# Patient Record
Sex: FEMALE | Race: WHITE | NOT HISPANIC OR LATINO | Employment: OTHER | URBAN - METROPOLITAN AREA
[De-identification: names, ages, dates, MRNs, and addresses within clinical notes are randomized per-mention and may not be internally consistent; named-entity substitution may affect disease eponyms.]

---

## 2017-03-11 ENCOUNTER — HOSPITAL ENCOUNTER (OUTPATIENT)
Dept: LAB | Facility: MEDICAL CENTER | Age: 72
End: 2017-03-11
Attending: SPECIALIST
Payer: COMMERCIAL

## 2017-03-11 LAB
ALBUMIN SERPL BCP-MCNC: 4.3 G/DL (ref 3.2–4.9)
ALBUMIN/GLOB SERPL: 1.3 G/DL
ALP SERPL-CCNC: 79 U/L (ref 30–99)
ALT SERPL-CCNC: 35 U/L (ref 2–50)
ANION GAP SERPL CALC-SCNC: 11 MMOL/L (ref 0–11.9)
AST SERPL-CCNC: 39 U/L (ref 12–45)
BASOPHILS # BLD AUTO: 0.12 K/UL (ref 0–0.12)
BASOPHILS NFR BLD AUTO: 1.5 % (ref 0–1.8)
BILIRUB SERPL-MCNC: 0.7 MG/DL (ref 0.1–1.5)
BUN SERPL-MCNC: 29 MG/DL (ref 8–22)
CALCIUM SERPL-MCNC: 9.9 MG/DL (ref 8.5–10.5)
CHLORIDE SERPL-SCNC: 105 MMOL/L (ref 96–112)
CO2 SERPL-SCNC: 28 MMOL/L (ref 20–33)
CREAT SERPL-MCNC: 0.98 MG/DL (ref 0.5–1.4)
EOSINOPHIL # BLD: 0.21 K/UL (ref 0–0.51)
EOSINOPHIL NFR BLD AUTO: 2.6 % (ref 0–6.9)
ERYTHROCYTE [DISTWIDTH] IN BLOOD BY AUTOMATED COUNT: 48.1 FL (ref 35.9–50)
GLOBULIN SER CALC-MCNC: 3.3 G/DL (ref 1.9–3.5)
GLUCOSE SERPL-MCNC: 97 MG/DL (ref 65–99)
HCT VFR BLD AUTO: 46.7 % (ref 37–47)
HGB BLD-MCNC: 14.9 G/DL (ref 12–16)
IMM GRANULOCYTES # BLD AUTO: 0.02 K/UL (ref 0–0.11)
IMM GRANULOCYTES NFR BLD AUTO: 0.2 % (ref 0–0.9)
LYMPHOCYTES # BLD: 1.67 K/UL (ref 1–4.8)
LYMPHOCYTES NFR BLD AUTO: 20.6 % (ref 22–41)
MCH RBC QN AUTO: 28.5 PG (ref 27–33)
MCHC RBC AUTO-ENTMCNC: 31.9 G/DL (ref 33.6–35)
MCV RBC AUTO: 89.3 FL (ref 81.4–97.8)
MONOCYTES # BLD: 0.71 K/UL (ref 0–0.85)
MONOCYTES NFR BLD AUTO: 8.8 % (ref 0–13.4)
NEUTROPHILS # BLD: 5.37 K/UL (ref 2–7.15)
NEUTROPHILS NFR BLD AUTO: 66.3 % (ref 44–72)
NRBC # BLD AUTO: 0 K/UL
NRBC BLD-RTO: 0 /100 WBC
PLATELET # BLD AUTO: 373 K/UL (ref 164–446)
PMV BLD AUTO: 11 FL (ref 9–12.9)
POTASSIUM SERPL-SCNC: 4.2 MMOL/L (ref 3.6–5.5)
PROT SERPL-MCNC: 7.6 G/DL (ref 6–8.2)
RBC # BLD AUTO: 5.23 M/UL (ref 4.2–5.4)
SODIUM SERPL-SCNC: 144 MMOL/L (ref 135–145)
WBC # BLD AUTO: 8.1 K/UL (ref 4.8–10.8)

## 2017-03-11 PROCEDURE — 80053 COMPREHEN METABOLIC PANEL: CPT

## 2017-03-11 PROCEDURE — 85025 COMPLETE CBC W/AUTO DIFF WBC: CPT

## 2017-03-11 PROCEDURE — 36415 COLL VENOUS BLD VENIPUNCTURE: CPT

## 2017-03-13 RX ORDER — ATENOLOL 100 MG/1
TABLET ORAL
Qty: 90 TAB | Refills: 0 | Status: SHIPPED | OUTPATIENT
Start: 2017-03-13 | End: 2017-04-13 | Stop reason: SDUPTHER

## 2017-03-13 NOTE — TELEPHONE ENCOUNTER
Was the patient seen in the last year in this department? No     Does patient have an active prescription for medications requested? No     Received Request Via: Pharmacy      Pt met protocol?: No pt last ov with pcp 4/2014 last seen for this problem by Magi MARIN 2/2015   BP Readings from Last 1 Encounters:   02/08/16 110/72

## 2017-03-17 ENCOUNTER — HOSPITAL ENCOUNTER (OUTPATIENT)
Dept: RADIOLOGY | Facility: MEDICAL CENTER | Age: 72
End: 2017-03-17
Attending: SPECIALIST
Payer: COMMERCIAL

## 2017-03-17 DIAGNOSIS — C50.411 MALIGNANT NEOPLASM OF UPPER-OUTER QUADRANT OF RIGHT FEMALE BREAST (HCC): ICD-10-CM

## 2017-03-17 PROCEDURE — 77080 DXA BONE DENSITY AXIAL: CPT

## 2017-04-07 RX ORDER — BENAZEPRIL HYDROCHLORIDE AND HYDROCHLOROTHIAZIDE 20; 12.5 MG/1; MG/1
TABLET ORAL
Qty: 90 TAB | Refills: 0 | Status: SHIPPED | OUTPATIENT
Start: 2017-04-07 | End: 2017-04-13 | Stop reason: SDUPTHER

## 2017-04-07 NOTE — TELEPHONE ENCOUNTER
Was the patient seen in the last year in this department? No    Does patient have an active prescription for medications requested? No     Received Request Via: Pharmacy      Pt met protocol?: No, hasn't been seen since 2/16   BP Readings from Last 1 Encounters:   02/08/16 110/72

## 2017-04-07 NOTE — TELEPHONE ENCOUNTER
Was the patient seen in the last year in this department? No    Does patient have an active prescription for medications requested? No     Received Request Via: Pharmacy      Pt met protocol?: No, hasn't been seen since 2/16.

## 2017-04-07 NOTE — TELEPHONE ENCOUNTER
Pt was informed 1/17 that an appt needed to be made with PCP. Will only send 30 days to pharmacy, please make pt aware apt needs to be made or next request will be denied.

## 2017-04-07 NOTE — TELEPHONE ENCOUNTER
Please advise pt she is due for an appt. Have already told her 3/17. Will send 90 days but next request will be denied.

## 2017-04-13 ENCOUNTER — OFFICE VISIT (OUTPATIENT)
Dept: MEDICAL GROUP | Facility: PHYSICIAN GROUP | Age: 72
End: 2017-04-13
Payer: COMMERCIAL

## 2017-04-13 VITALS
BODY MASS INDEX: 37.5 KG/M2 | WEIGHT: 191 LBS | DIASTOLIC BLOOD PRESSURE: 84 MMHG | SYSTOLIC BLOOD PRESSURE: 140 MMHG | HEIGHT: 60 IN | OXYGEN SATURATION: 97 % | TEMPERATURE: 99.1 F | RESPIRATION RATE: 12 BRPM | HEART RATE: 88 BPM

## 2017-04-13 DIAGNOSIS — Z23 NEED FOR 23-POLYVALENT PNEUMOCOCCAL POLYSACCHARIDE VACCINE: ICD-10-CM

## 2017-04-13 DIAGNOSIS — I10 ESSENTIAL HYPERTENSION: Primary | ICD-10-CM

## 2017-04-13 PROCEDURE — 3014F SCREEN MAMMO DOC REV: CPT | Performed by: NURSE PRACTITIONER

## 2017-04-13 PROCEDURE — 3017F COLORECTAL CA SCREEN DOC REV: CPT | Performed by: NURSE PRACTITIONER

## 2017-04-13 PROCEDURE — 1101F PT FALLS ASSESS-DOCD LE1/YR: CPT | Performed by: NURSE PRACTITIONER

## 2017-04-13 PROCEDURE — 4040F PNEUMOC VAC/ADMIN/RCVD: CPT | Performed by: NURSE PRACTITIONER

## 2017-04-13 PROCEDURE — 90471 IMMUNIZATION ADMIN: CPT | Performed by: NURSE PRACTITIONER

## 2017-04-13 PROCEDURE — 99214 OFFICE O/P EST MOD 30 MIN: CPT | Mod: 25 | Performed by: NURSE PRACTITIONER

## 2017-04-13 PROCEDURE — 1036F TOBACCO NON-USER: CPT | Performed by: NURSE PRACTITIONER

## 2017-04-13 PROCEDURE — G8419 CALC BMI OUT NRM PARAM NOF/U: HCPCS | Performed by: NURSE PRACTITIONER

## 2017-04-13 PROCEDURE — 90732 PPSV23 VACC 2 YRS+ SUBQ/IM: CPT | Performed by: NURSE PRACTITIONER

## 2017-04-13 PROCEDURE — G8432 DEP SCR NOT DOC, RNG: HCPCS | Performed by: NURSE PRACTITIONER

## 2017-04-13 RX ORDER — BENAZEPRIL HYDROCHLORIDE AND HYDROCHLOROTHIAZIDE 20; 12.5 MG/1; MG/1
1 TABLET ORAL DAILY
Qty: 90 TAB | Refills: 3 | Status: SHIPPED | OUTPATIENT
Start: 2017-04-13 | End: 2017-09-18 | Stop reason: SDUPTHER

## 2017-04-13 RX ORDER — ATENOLOL 100 MG/1
100 TABLET ORAL DAILY
Qty: 90 TAB | Refills: 3 | Status: SHIPPED | OUTPATIENT
Start: 2017-04-13 | End: 2017-05-19 | Stop reason: SDUPTHER

## 2017-04-13 ASSESSMENT — PATIENT HEALTH QUESTIONNAIRE - PHQ9: CLINICAL INTERPRETATION OF PHQ2 SCORE: 0

## 2017-04-13 NOTE — MR AVS SNAPSHOT
"        Tereza Sandovalt   2017 3:40 PM   Office Visit   MRN: [id number]    Department:  [facility]   Dept Phone:  [phone]    Description:  Female : 1945   Provider:  HARRY Young           Reason for Visit     Medication Refill           Allergies as of 2017     Allergen Noted Reactions    Bactrim Ds 2016   Rash    Pt states \"I get a body rash\".    Morphine 2008   Vomiting    hallucinations      You were diagnosed with     Essential hypertension   [9465400]  -  Primary     Need for 23-polyvalent pneumococcal polysaccharide vaccine   [[id number]]         Vital Signs     Blood Pressure Pulse Temperature Respirations Height Weight    140/84 mmHg 88 37.3 °C (99.1 °F) 12 1.524 m (5') 86.637 kg (191 lb)    Body Mass Index Oxygen Saturation Smoking Status             37.30 kg/m2 97% Never Smoker          Basic Information     Date Of Birth Sex Race Ethnicity Preferred Language    1945 Female White, Unknown Non- English      Problem List              ICD-10-CM Priority Class Noted - Resolved    GERD (gastroesophageal reflux disease) K21.9   2013 - Present    HTN (hypertension) I10   2013 - Present    Chronic back pain M54.9, G89.29   2013 - Present    Peripheral edema R60.9   2013 - Present    Orthopnea R06.01   2013 - Present    Intracranial shunt Z98.2   2013 - Present    Shortness of breath R06.02   2013 - Present    Dyslipidemia E78.5   2013 - Present    Keratosis, seborrheic L82.1   2014 - Present    Strain of left Achilles tendon S86.012A   2014 - Present    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   2015 - Present      Health Maintenance        Date Due Completion Dates    IMM ZOSTER VACCINE 2007 ---    IMM PNEUMOCOCCAL 65+ (ADULT) LOW/MEDIUM RISK SERIES (2 of 2 - PPSV23) 2016    MAMMOGRAM 2017, 10/15/2015, 2015, 2014, 2009, " 9/24/2009, 3/18/2009, 3/3/2009, 3/3/2009    COLONOSCOPY 1/6/2018 1/6/2013 (Done)    Override on 1/6/2013: Done (2 polyps - 5 year return, [facility])    BONE DENSITY 3/17/2020 3/17/2017, 3/3/2009    IMM DTaP/Tdap/Td Vaccine (2 - Td) 6/6/2023 6/6/2013            Current Immunizations     13-VALENT PCV PREVNAR 12/1/2015    Hepatitis A Vaccine, Adult 6/6/2013    Influenza Vaccine Adult HD 11/17/2015    Pneumococcal polysaccharide vaccine (PPSV-23) 4/13/2017    Tdap Vaccine 6/6/2013      Below and/or attached are the medications your provider expects you to take. Review all of your home medications and newly ordered medications with your provider and/or pharmacist. Follow medication instructions as directed by your provider and/or pharmacist. Please keep your medication list with you and share with your provider. Update the information when medications are discontinued, doses are changed, or new medications (including over-the-counter products) are added; and carry medication information at all times in the event of emergency situations     Allergies:  BACTRIM DS - Rash     MORPHINE - Vomiting               Medications  Valid as of: April 13, 2017 -  3:56 PM    Generic Name Brand Name Tablet Size Instructions for use    Albuterol Sulfate (Aero Soln) albuterol 108 (90 BASE) MCG/ACT Inhale 2 Puffs by mouth every 6 hours as needed for Shortness of Breath.        Amitriptyline HCl (Tab) ELAVIL 25 MG Take 100 mg by mouth every evening.        Anastrozole (Tab) ARIMIDEX 1 MG Take 1 mg by mouth every day.        Atenolol (Tab) TENORMIN 100 MG Take 1 Tab by mouth every day.        Benazepril-Hydrochlorothiazide (Tab) LOTENSIN HCT 20-12.5 MG Take 1 Tab by mouth every day.        Calcium Citrate-Vitamin D   Take 2 Tabs by mouth every morning. Takes two        Esomeprazole Magnesium (CAPSULE DELAYED RELEASE) NEXIUM 20 MG TAKE 1 CAPSULE EVERY MORNING BEFORE BREAKFAST        Gabapentin (Cap) NEURONTIN 300 MG Take 300 mg by mouth 3 times a  day.        Hydrocodone-Acetaminophen (Tab) NORCO 5-325 MG Take 1 Tab by mouth every 6 hours as needed. Indications: Moderate to Moderately Severe Pain        Ibuprofen (Tab) MOTRIN 200 MG Take 400 mg by mouth every 6 hours as needed for Fever.        Multiple Vitamins-Minerals   Take 1 Tab by mouth every day.        .                 Medicines prescribed today were sent to:     [facility] #[id number] - [place], [place] - [address] [place] [address]    [address] [place] [facility]. [place] [address]    Phone: [phone] Fax: [phone]    Open 24 Hours?: No    [facility] - [place] [address]    Phone: [phone] Fax: [phone]    Open 24 Hours?: No    [facility] - [place] [address]    Phone: [phone] Fax: [phone]    Open 24 Hours?: No      Medication refill instructions:       If your prescription bottle indicates you have medication refills left, it is not necessary to call your provider’s office. Please contact your pharmacy and they will refill your medication.    If your prescription bottle indicates you do not have any refills left, you may request refills at any time through one of the following ways: The online [employer] system (except Urgent Care), by calling your provider’s office, or by asking your pharmacy to contact your provider’s office with a refill request. Medication refills are processed only during regular business hours and may not be available until the next business day. Your provider may request additional information or to have a follow-up visit with you prior to refilling your medication.   *Please Note: Medication refills are assigned a new Rx number when refilled electronically. Your pharmacy may indicate that no refills were authorized even though a new prescription for the same medication is available at the pharmacy. Please request the  medicine by name with the pharmacy before contacting your provider for a refill.           MyChart Access Code: Activation code not generated  Current MyChart Status: Active

## 2017-04-13 NOTE — PROGRESS NOTES
Chief Complaint   Patient presents with   • Medication Refill       HISTORY OF PRESENT ILLNESS: Patient is a 71 y.o. female established patient who presents today to discuss medication refills.  She is accompanied by her .    1. Essential hypertension  Patient is requesting a refill of Lotensin 20/12 point 5 mg.  She takes this daily.  She is also requesting a refill of atenolol 100 mg that she also takes daily.  She is not checking her blood pressures consistently outside of the clinic.  She denies any episodes of headache, blurry vision or chest pain.  She is under the care of a cardiologist.  Remainder of her medications are prescribed by her neurologist that she sees regularly.    2. Need for 23-polyvalent pneumococcal polysaccharide vaccine  Patient has received the Prevnar vaccine in the past and denies any reactions.  She has never received the pneumococcal vaccine.  She is going to have this completed today.    Allergies:Bactrim ds and Morphine    Current Outpatient Prescriptions Ordered in [facility]   Medication Sig Dispense Refill   • atenolol (TENORMIN) 100 MG Tab Take 1 Tab by mouth every day. 90 Tab 3   • benazepril-hydrochlorthiazide (LOTENSIN HCT) 20-12.5 MG per tablet Take 1 Tab by mouth every day. 90 Tab 3   • esomeprazole (NEXIUM) 20 MG capsule TAKE 1 CAPSULE EVERY MORNING BEFORE BREAKFAST 30 Cap 0   • ibuprofen (MOTRIN) 200 MG Tab Take 400 mg by mouth every 6 hours as needed for Fever.     • amitriptyline (ELAVIL) 25 MG Tab Take 100 mg by mouth every evening.     • anastrozole (ARIMIDEX) 1 MG Tab Take 1 mg by mouth every day.     • albuterol (VENTOLIN OR PROVENTIL) 108 (90 BASE) MCG/ACT Aero Soln inhalation aerosol Inhale 2 Puffs by mouth every 6 hours as needed for Shortness of Breath. 8.5 g 3   • GABAPENTIN 300 MG PO CAPS Take 300 mg by mouth 3 times a day.     • HYDROCODONE-ACETAMINOPHEN 5-325 MG PO TABS Take 1 Tab by mouth every 6 hours as needed. Indications: Moderate to Moderately Severe  Pain     • CALCIUM + D PO Take 2 Tabs by mouth every morning. Takes two     • MULTIVITAMIN PO Take 1 Tab by mouth every day.       No current [facility]-ordered facility-administered medications on file.       Past Medical History   Diagnosis Date   • Syringomyelia (CMS-[facility])      surgery 1973, 1977   • Scoliosis    • Anesthesia      PONV   • Arthritis      Left hip, knees, ankles   • Joint replacement      Right shoulder, cervical   • Heart burn    • Pain    • Hypertension    • Chronic back pain      2/2 scoliosis and syringomyelia    • Contracture of hand joint      left    • Peripheral edema 6/6/2013   • Indigestion    • Asthma    • Dental disorder      upper/lower   • Breath shortness      asthma   • Sleep apnea      uses O2 at night   • Chiari malformation 1970's     shunt  in place   • sore throat 1/15/15   • Decreased lung capacity        Social History   Substance Use Topics   • Smoking status: Never Smoker    • Smokeless tobacco: Never Used   • Alcohol Use: No       Family Status   Relation Status Death Age   • Mother Alive      Family History   Problem Relation Age of Onset   • Hypertension Mother    • Cancer Neg Hx    • Diabetes Neg Hx    • Stroke Neg Hx    • Heart Disease Neg Hx        ROS: see above    Review of Systems   Constitutional: Negative for fever, chills, weight loss and malaise/fatigue.   HENT: Negative for ear pain, nosebleeds, congestion, sore throat and neck pain.    Eyes: Negative for blurred vision.   Respiratory: Negative for cough, sputum production, shortness of breath and wheezing.    Cardiovascular: Negative for chest pain, palpitations, orthopnea and leg swelling.   Gastrointestinal: Negative for heartburn, nausea, vomiting and abdominal pain.   Genitourinary: Negative for dysuria, urgency and frequency.   Musculoskeletal: Negative for myalgias, back pain and joint pain.   Skin: Negative for rash and itching.   Neurological: Negative for dizziness, tingling, tremors, sensory change, focal  weakness and headaches.   Endo/Heme/Allergies: Does not bruise/bleed easily.   Psychiatric/Behavioral: Negative for depression, suicidal ideas and memory loss.  The patient is not nervous/anxious and does not have insomnia.        Exam:  Blood pressure 140/84, pulse 88, temperature 37.3 °C (99.1 °F), resp. rate 12, height 1.524 m (5'), weight 86.637 kg (191 lb), SpO2 97 %.  General:  Well nourished, well developed female in NAD  Head is grossly normal.  Neck: Thyroid is not enlarged.  Pulmonary: Normal effort. No rales, ronchi, or wheezing.  Cardiovascular: Regular rate and rhythm without murmur.   Extremities: no clubbing, cyanosis, or edema.  Psych:  Mood and affect are normal.  Answering questions appropriately with good eye contact.      Please note that this dictation was created using voice recognition software. I have made every reasonable attempt to correct obvious errors, but I expect that there are errors of grammar and possibly content that I did not discover before finalizing the note.    I have placed the below orders and discussed them with an approved delegating provider. The MA is performing the below orders under the direction of Dr. Duarte, who have provided verbal consent for supervision.    Assessment/Plan:    1. Essential hypertension  atenolol (TENORMIN) 100 MG Tab    benazepril-hydrochlorthiazide (LOTENSIN HCT) 20-12.5 MG per tablet   2. Need for 23-polyvalent pneumococcal polysaccharide vaccine  PNEUMOCOCCAL POLYSACCHARIDE VACCINE 23-VALENT =>1YO SQ/IM        1.  Refill medications as previously prescribed, adequately controlled.  2.  Pneumococcal vaccine given today, counseled  3.  Patient to schedule her annual wellness exam with Dr. Duarte and August, encouraged him to schedule today to ensure appropriate timing of appointment.

## 2017-05-19 DIAGNOSIS — I10 ESSENTIAL HYPERTENSION: ICD-10-CM

## 2017-05-19 RX ORDER — ATENOLOL 100 MG/1
100 TABLET ORAL DAILY
Qty: 90 TAB | Refills: 0 | Status: SHIPPED | OUTPATIENT
Start: 2017-05-19 | End: 2017-06-13 | Stop reason: SDUPTHER

## 2017-05-19 NOTE — TELEPHONE ENCOUNTER
[facility] sent us a fax that they are temporarily out of Atenolol 100 mg for the patient.  Please send to her local pharmacy([facility] [place]).    Thank you.

## 2017-06-13 ENCOUNTER — TELEPHONE (OUTPATIENT)
Dept: MEDICAL GROUP | Facility: PHYSICIAN GROUP | Age: 72
End: 2017-06-13

## 2017-06-13 DIAGNOSIS — I10 ESSENTIAL HYPERTENSION: ICD-10-CM

## 2017-06-13 RX ORDER — ATENOLOL 100 MG/1
100 TABLET ORAL DAILY
Qty: 90 TAB | Refills: 0 | Status: CANCELLED | OUTPATIENT
Start: 2017-06-13

## 2017-06-13 RX ORDER — ATENOLOL 50 MG/1
100 TABLET ORAL DAILY
Qty: 180 TAB | Refills: 1 | Status: SHIPPED | OUTPATIENT
Start: 2017-06-13 | End: 2017-09-18 | Stop reason: SDUPTHER

## 2017-06-13 NOTE — TELEPHONE ENCOUNTER
Was the patient seen in the last year in this department? Yes 04/13/17    Does patient have an active prescription for medications requested? Yes  atenolol pharmacy change    Received Request Via: Patient

## 2017-06-13 NOTE — TELEPHONE ENCOUNTER
Patient requests change in pharmacy.    [facility] - [facility] [place] - [facility] [address] [address]  Phone: [phone] Fax: [phone]

## 2017-06-13 NOTE — TELEPHONE ENCOUNTER
Received fax from [facility].  They are temporarily out of stock of atenolol.  To Dr Duarte to address

## 2017-06-13 NOTE — TELEPHONE ENCOUNTER
Received fax from [facility] stating they are temporarily out of stock of atenolol 100 mg.  They suggest possible alternative of atenolol 50 mg.  Option 1 is to send alternative to [facility].  Option 2 is refill sent to local pharmacy

## 2017-06-14 RX ORDER — BENAZEPRIL HYDROCHLORIDE AND HYDROCHLOROTHIAZIDE 20; 12.5 MG/1; MG/1
1 TABLET ORAL DAILY
Qty: 90 TAB | Refills: 0 | OUTPATIENT
Start: 2017-06-14

## 2017-06-14 NOTE — TELEPHONE ENCOUNTER
Ok lets change to 50 mg tabs, Tereza will need to take two tablets to equal 100 mg.  Please call patient and let her know of this change.

## 2017-07-27 ENCOUNTER — APPOINTMENT (OUTPATIENT)
Dept: PHYSICAL THERAPY | Facility: REHABILITATION | Age: 72
End: 2017-07-27
Attending: SPECIALIST
Payer: COMMERCIAL

## 2017-08-05 ENCOUNTER — OFFICE VISIT (OUTPATIENT)
Dept: URGENT CARE | Facility: PHYSICIAN GROUP | Age: 72
End: 2017-08-05
Payer: COMMERCIAL

## 2017-08-05 VITALS
HEART RATE: 78 BPM | RESPIRATION RATE: 14 BRPM | BODY MASS INDEX: 37.3 KG/M2 | OXYGEN SATURATION: 94 % | DIASTOLIC BLOOD PRESSURE: 86 MMHG | TEMPERATURE: 99 F | WEIGHT: 191 LBS | SYSTOLIC BLOOD PRESSURE: 144 MMHG

## 2017-08-05 DIAGNOSIS — J02.9 ACUTE PHARYNGITIS, UNSPECIFIED ETIOLOGY: ICD-10-CM

## 2017-08-05 PROCEDURE — 99214 OFFICE O/P EST MOD 30 MIN: CPT | Performed by: FAMILY MEDICINE

## 2017-08-05 RX ORDER — AMOXICILLIN AND CLAVULANATE POTASSIUM 875; 125 MG/1; MG/1
TABLET, FILM COATED ORAL
Qty: 14 TAB | Refills: 0 | Status: SHIPPED | OUTPATIENT
Start: 2017-08-05 | End: 2017-08-18

## 2017-08-05 NOTE — PROGRESS NOTES
Chief Complaint:    Chief Complaint   Patient presents with   • Cough     sore throat, nasal congestion- Sx started tuesday       History of Present Illness:     present. This is a new problem. Has sore throat since 8/1/17, not getting better. Has nasal symptoms and cough. No fever or purulent mucus. Augmentin worked/tolerated 1/31/16.      Review of Systems:    Constitutional: Negative for fever, chills, and diaphoresis.   Eyes: Negative for change in vision, photophobia, pain, redness, and discharge.  ENT: See HPI.    Respiratory: See HPI.  Cardiovascular: Negative for chest pain, palpitations, orthopnea, claudication, and PND.   Gastrointestinal: Negative for abdominal pain, nausea, vomiting, diarrhea, constipation, blood in stool, and melena.   Genitourinary: Negative for dysuria, urinary urgency, urinary frequency, hematuria, and flank pain.   Musculoskeletal: No new symptoms.  Skin: Negative for rash and itching.   Neurological: No new symptoms.  Endo: Negative for polydipsia.   Heme: Does not bruise/bleed easily.   Psychiatric/Behavioral: Negative for depression, suicidal ideas, hallucinations, memory loss and substance abuse. The patient is not nervous/anxious and does not have insomnia.      Past Medical History:    Past Medical History   Diagnosis Date   • Syringomyelia (CMS-[facility])      surgery 1973, 1977   • Scoliosis    • Anesthesia      PONV   • Arthritis      Left hip, knees, ankles   • Joint replacement      Right shoulder, cervical   • Heart burn    • Pain    • Hypertension    • Chronic back pain      2/2 scoliosis and syringomyelia    • Contracture of hand joint      left    • Peripheral edema 6/6/2013   • Indigestion    • Asthma    • Dental disorder      upper/lower   • Breath shortness      asthma   • Sleep apnea      uses O2 at night   • Chiari malformation 1970's     shunt  in place   • sore throat 1/15/15   • Decreased lung capacity        Past Surgical History:    Past Surgical History  "  Procedure Laterality Date   • Other surgical procedure  1973, 1977     \"remove cyst-cervical placement of shunt\"   • Ronda by laparoscopy  2002   • Shoulder arthroplasty total  2004     Right   • Hip arthroplasty total  5/19/08     Performed by FARTUN ERAZO at SURGERY [facility]   • Shoulder hanna-arthroplasty     • Hip arthroplasty total     • Us-needle core bx-breast panel     • Other neurological surg       cerebral shunt   • Mastectomy  2/6/2015     Performed by Adelina Wilson M.D. at SURGERY [facility]   • Node biopsy sentinel  2/6/2015     Performed by Adelina Wilson M.D. at SURGERY [facility]       Social History:    Social History     Social History   • Marital Status:      Spouse Name: N/A   • Number of Children: N/A   • Years of Education: N/A     Occupational History   • Not on file.     Social History Main Topics   • Smoking status: Never Smoker    • Smokeless tobacco: Never Used   • Alcohol Use: No   • Drug Use: No   • Sexual Activity: Not on file      Comment:  - 51 years      Other Topics Concern   • Not on file     Social History Narrative       Family History:    Family History   Problem Relation Age of Onset   • Hypertension Mother    • Cancer Neg Hx    • Diabetes Neg Hx    • Stroke Neg Hx    • Heart Disease Neg Hx        Medications:    Current Outpatient Prescriptions on File Prior to Visit   Medication Sig Dispense Refill   • esomeprazole (NEXIUM) 20 MG capsule TAKE 1 CAPSULE EVERY MORNING BEFORE BREAKFAST (MAKE APPOINTMENT PRIOR TO MORE REFILLS. NEXT REQUEST WILL BE DENIED) 30 Cap 1   • atenolol (TENORMIN) 50 MG Tab Take 2 Tabs by mouth every day. 180 Tab 1   • benazepril-hydrochlorthiazide (LOTENSIN HCT) 20-12.5 MG per tablet Take 1 Tab by mouth every day. 90 Tab 3   • ibuprofen (MOTRIN) 200 MG Tab Take 400 mg by mouth every 6 hours as needed for Fever.     • amitriptyline (ELAVIL) 25 MG Tab Take 100 mg by mouth every evening.     • anastrozole (ARIMIDEX) " "1 MG Tab Take 1 mg by mouth every day.     • albuterol (VENTOLIN OR PROVENTIL) 108 (90 BASE) MCG/ACT Aero Soln inhalation aerosol Inhale 2 Puffs by mouth every 6 hours as needed for Shortness of Breath. 8.5 g 3   • GABAPENTIN 300 MG PO CAPS Take 300 mg by mouth 3 times a day.     • HYDROCODONE-ACETAMINOPHEN 5-325 MG PO TABS Take 1 Tab by mouth every 6 hours as needed. Indications: Moderate to Moderately Severe Pain     • CALCIUM + D PO Take 2 Tabs by mouth every morning. Takes two     • MULTIVITAMIN PO Take 1 Tab by mouth every day.       No current facility-administered medications on file prior to visit.       Allergies:    Allergies   Allergen Reactions   • Bactrim Ds Rash     Pt states \"I get a body rash\".   • Morphine Vomiting     hallucinations         Vitals:    Filed Vitals:    08/05/17 0935   BP: 144/86   Pulse: 78   Temp: 37.2 °C (99 °F)   Resp: 14   Weight: 86.637 kg (191 lb)   SpO2: 94%       Physical Exam:    Constitutional: Vital signs reviewed. Appears well-developed and well-nourished. No acute distress.   Eyes: Sclera white, conjunctivae clear.   ENT: External ears normal. Hearing normal. Nasal mucosa pink. Lips/teeth are normal. Oral mucosa pink and moist. Posterior pharynx and uvula are moderately erythematous and swollen with some purulent mucus in posterior pharynx.  Neck: Neck supple.   Cardiovascular: Regular rate and rhythm. No murmur.  Pulmonary/Chest: Respirations non-labored. Clear to auscultation bilaterally.  Lymph: Cervical nodes without tenderness or enlargement.  Musculoskeletal: No muscular atrophy or weakness.  Neurological: Alert and oriented to person, place, and time. Muscle tone normal. Coordination normal.   Skin: No rashes or lesions. Warm, dry, normal turgor.  Psychiatric: Normal mood and affect. Behavior is normal. Judgment and thought content normal.       Assessment / Plan:    1. Acute pharyngitis, unspecified etiology  - amoxicillin-clavulanate (AUGMENTIN) 875-125 MG Tab; " 1 TAB TWICE A DAY X 7 DAYS. TAKE WITH FOOD.  Dispense: 14 Tab; Refill: 0      Discussed with them DDX and management options.    Declines Rapid Strep test.    Agreeable to medication prescribed.    Follow-up with PCP or urgent care if getting worse or not better with above.

## 2017-08-05 NOTE — MR AVS SNAPSHOT
"        Tereza Sánchez   2017 9:15 AM   Office Visit   MRN: [id number]    Department:  [facility]   Dept Phone:  [phone]    Description:  Female : 1945   Provider:  Rocky Monae M.D.           Reason for Visit     Cough sore throat, nasal congestion- Sx started tuesday      Allergies as of 2017     Allergen Noted Reactions    Bactrim Ds 2016   Rash    Pt states \"I get a body rash\".    Morphine 2008   Vomiting    hallucinations      You were diagnosed with     Acute pharyngitis, unspecified etiology   [[id number]]         Vital Signs     Blood Pressure Pulse Temperature Respirations Weight Oxygen Saturation    144/86 mmHg 78 37.2 °C (99 °F) 14 86.637 kg (191 lb) 94%    Smoking Status                   Never Smoker            Basic Information     Date Of Birth Sex Race Ethnicity Preferred Language    1945 Female White, Unknown Non- English      Your appointments     Aug 18, 2017  9:00 AM   ANNUAL EXAM PREVENTATIVE with Lucía Duarte M.D.   [facility] [address] [address]   [phone]              Problem List              ICD-10-CM Priority Class Noted - Resolved    GERD (gastroesophageal reflux disease) K21.9   2013 - Present    HTN (hypertension) I10   2013 - Present    Chronic back pain M54.9, G89.29   2013 - Present    Peripheral edema R60.9   2013 - Present    Orthopnea R06.01   2013 - Present    Intracranial shunt Z98.2   2013 - Present    Shortness of breath R06.02   2013 - Present    Dyslipidemia E78.5   2013 - Present    Keratosis, seborrheic L82.1   2014 - Present    Strain of left Achilles tendon S86.012A   2014 - Present    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   2015 - Present      Health Maintenance        Date Due Completion Dates    IMM ZOSTER VACCINE 2007 ---    IMM INFLUENZA (1) 2015   "    MAMMOGRAM 11/18/2017 11/18/2016, 10/15/2015, 1/7/2015, 12/26/2014, 9/24/2009, 9/24/2009, 3/18/2009, 3/3/2009, 3/3/2009    COLONOSCOPY 1/6/2018 1/6/2013 (Done)    Override on 1/6/2013: Done (2 polyps - 5 year return, DHA)    BONE DENSITY 3/17/2020 3/17/2017, 3/3/2009    IMM DTaP/Tdap/Td Vaccine (2 - Td) 6/6/2023 6/6/2013            Current Immunizations     13-VALENT PCV PREVNAR 12/1/2015    Hepatitis A Vaccine, Adult 6/6/2013    Influenza Vaccine Adult HD 11/17/2015    Pneumococcal polysaccharide vaccine (PPSV-23) 4/13/2017    Tdap Vaccine 6/6/2013      Below and/or attached are the medications your provider expects you to take. Review all of your home medications and newly ordered medications with your provider and/or pharmacist. Follow medication instructions as directed by your provider and/or pharmacist. Please keep your medication list with you and share with your provider. Update the information when medications are discontinued, doses are changed, or new medications (including over-the-counter products) are added; and carry medication information at all times in the event of emergency situations     Allergies:  BACTRIM DS - Rash     MORPHINE - Vomiting               Medications  Valid as of: August 05, 2017 - 10:49 AM    Generic Name Brand Name Tablet Size Instructions for use    Albuterol Sulfate (Aero Soln) albuterol 108 (90 BASE) MCG/ACT Inhale 2 Puffs by mouth every 6 hours as needed for Shortness of Breath.        Amitriptyline HCl (Tab) ELAVIL 25 MG Take 100 mg by mouth every evening.        Amoxicillin-Pot Clavulanate (Tab) AUGMENTIN 875-125 MG 1 TAB TWICE A DAY X 7 DAYS. TAKE WITH FOOD.        Anastrozole (Tab) ARIMIDEX 1 MG Take 1 mg by mouth every day.        Atenolol (Tab) TENORMIN 50 MG Take 2 Tabs by mouth every day.        Benazepril-Hydrochlorothiazide (Tab) LOTENSIN HCT 20-12.5 MG Take 1 Tab by mouth every day.        Calcium Citrate-Vitamin D   Take 2 Tabs by mouth every morning. Takes two         Esomeprazole Magnesium (CAPSULE DELAYED RELEASE) NEXIUM 20 MG TAKE 1 CAPSULE EVERY MORNING BEFORE BREAKFAST (MAKE APPOINTMENT PRIOR TO MORE REFILLS. NEXT REQUEST WILL BE DENIED)        Gabapentin (Cap) NEURONTIN 300 MG Take 300 mg by mouth 3 times a day.        Hydrocodone-Acetaminophen (Tab) NORCO 5-325 MG Take 1 Tab by mouth every 6 hours as needed. Indications: Moderate to Moderately Severe Pain        Ibuprofen (Tab) MOTRIN 200 MG Take 400 mg by mouth every 6 hours as needed for Fever.        Multiple Vitamins-Minerals   Take 1 Tab by mouth every day.        .                 Medicines prescribed today were sent to:     [facility]/PHARMACY #[id number] - [place], [place] - [address] [place] [address]    [address] [place] [address] [place] [address]    Phone: [phone] Fax: [phone]    Open 24 Hours?: No    [facility] - [place] [address]    Phone: [phone] Fax: [phone]    Open 24 Hours?: No    [facility] - [place] [address]    Phone: [phone] Fax: [phone]    Open 24 Hours?: No      Medication refill instructions:       If your prescription bottle indicates you have medication refills left, it is not necessary to call your provider’s office. Please contact your pharmacy and they will refill your medication.    If your prescription bottle indicates you do not have any refills left, you may request refills at any time through one of the following ways: The online [employer] system (except Urgent Care), by calling your provider’s office, or by asking your pharmacy to contact your provider’s office with a refill request. Medication refills are processed only during regular business hours and may not be available until the next business day. Your provider may request additional information or to have a follow-up visit with you prior to refilling your medication.    *Please Note: Medication refills are assigned a new Rx number when refilled electronically. Your pharmacy may indicate that no refills were authorized even though a new prescription for the same medication is available at the pharmacy. Please request the medicine by name with the pharmacy before contacting your provider for a refill.           [employer] Access Code: Activation code not generated  Current [employer] Status: Active

## 2017-08-18 ENCOUNTER — OFFICE VISIT (OUTPATIENT)
Dept: MEDICAL GROUP | Facility: PHYSICIAN GROUP | Age: 72
End: 2017-08-18
Payer: COMMERCIAL

## 2017-08-18 VITALS
WEIGHT: 192 LBS | BODY MASS INDEX: 38.71 KG/M2 | HEIGHT: 59 IN | DIASTOLIC BLOOD PRESSURE: 74 MMHG | OXYGEN SATURATION: 93 % | SYSTOLIC BLOOD PRESSURE: 130 MMHG | RESPIRATION RATE: 16 BRPM | HEART RATE: 74 BPM | TEMPERATURE: 96.4 F

## 2017-08-18 DIAGNOSIS — F32.0 MILD MAJOR DEPRESSION (HCC): ICD-10-CM

## 2017-08-18 DIAGNOSIS — G89.29 CHRONIC BACK PAIN, UNSPECIFIED BACK LOCATION, UNSPECIFIED BACK PAIN LATERALITY: ICD-10-CM

## 2017-08-18 DIAGNOSIS — M54.9 CHRONIC BACK PAIN, UNSPECIFIED BACK LOCATION, UNSPECIFIED BACK PAIN LATERALITY: ICD-10-CM

## 2017-08-18 DIAGNOSIS — C50.219 MALIGNANT NEOPLASM OF UPPER-INNER QUADRANT OF FEMALE BREAST, UNSPECIFIED LATERALITY: ICD-10-CM

## 2017-08-18 DIAGNOSIS — Z12.39 SCREENING FOR BREAST CANCER: ICD-10-CM

## 2017-08-18 DIAGNOSIS — I10 ESSENTIAL HYPERTENSION: ICD-10-CM

## 2017-08-18 DIAGNOSIS — M85.89 OSTEOPENIA OF MULTIPLE SITES: ICD-10-CM

## 2017-08-18 DIAGNOSIS — E78.5 DYSLIPIDEMIA: ICD-10-CM

## 2017-08-18 DIAGNOSIS — Z98.2 INTRACRANIAL SHUNT: ICD-10-CM

## 2017-08-18 DIAGNOSIS — K21.9 GASTROESOPHAGEAL REFLUX DISEASE, ESOPHAGITIS PRESENCE NOT SPECIFIED: ICD-10-CM

## 2017-08-18 DIAGNOSIS — Z00.00 WELLNESS EXAMINATION: ICD-10-CM

## 2017-08-18 DIAGNOSIS — Z91.81 RISK FOR FALLS: ICD-10-CM

## 2017-08-18 DIAGNOSIS — E66.9 OBESITY (BMI 35.0-39.9 WITHOUT COMORBIDITY): ICD-10-CM

## 2017-08-18 RX ORDER — DULOXETIN HYDROCHLORIDE 30 MG/1
30 CAPSULE, DELAYED RELEASE ORAL DAILY
Qty: 30 CAP | Refills: 11 | Status: SHIPPED | OUTPATIENT
Start: 2017-08-18 | End: 2017-09-18 | Stop reason: SDUPTHER

## 2017-08-18 ASSESSMENT — PATIENT HEALTH QUESTIONNAIRE - PHQ9
SUM OF ALL RESPONSES TO PHQ QUESTIONS 1-9: 11
CLINICAL INTERPRETATION OF PHQ2 SCORE: 4
5. POOR APPETITE OR OVEREATING: 0 - NOT AT ALL

## 2017-08-18 NOTE — PROGRESS NOTES
Chief Complaint   Patient presents with   • Annual Exam         HPI:  Tereza Sánchez is a 72 y.o. 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 NEXT REQUEST WILL BE DENIED) 30 Cap 1   • atenolol (TENORMIN) 50 MG Tab Take 2 Tabs by mouth every day. 180 Tab 1   • benazepril-hydrochlorthiazide (LOTENSIN HCT) 20-12.5 MG per tablet Take 1 Tab by mouth every day. 90 Tab 3   • ibuprofen (MOTRIN) 200 MG Tab Take 400 mg by mouth every 6 hours as needed for Fever.     • amitriptyline (ELAVIL) 25 MG Tab Take 100 mg by mouth every evening.     • anastrozole (ARIMIDEX) 1 MG Tab Take 1 mg by mouth every day.     • albuterol (VENTOLIN OR PROVENTIL) 108 (90 BASE) MCG/ACT Aero Soln inhalation aerosol Inhale 2 Puffs by mouth every 6 hours as needed for Shortness of Breath. 8.5 g 3   • GABAPENTIN 300 MG PO CAPS Take 300 mg by mouth 3 times a day.     • HYDROCODONE-ACETAMINOPHEN 5-325 MG PO TABS Take 1 Tab by mouth every 6 hours as needed. Indications: Moderate to Moderately Severe Pain     • CALCIUM + D PO Take 2 Tabs by mouth every morning. Takes two     • MULTIVITAMIN PO Take 1 Tab by mouth every day.     • amoxicillin-clavulanate (AUGMENTIN) 875-125 MG Tab 1 TAB TWICE A DAY X 7 DAYS. TAKE WITH FOOD. 14 Tab 0     No current facility-administered medications for this visit.            Current supplements as per medication list.       Allergies: Bactrim ds and  Morphine    Current social contact/activities:   Out of the house once per week     She  reports that she has never smoked. She has never used smokeless tobacco. She reports that she does not drink alcohol or use illicit drugs.  Counseling given: Not Answered        DPA/Advanced Directive:  Patient does not have an Advanced Directive.  A packet and workshop information was given on Advanced Directives.      ROS:    Gait: Uses a cane and walker  Ostomy: no   Other tubes: no   Amputations: no   Chronic oxygen use: yes   Last eye exam:N/A   : Denies incontinence.       Screening:    Depression Screening    Little interest or pleasure in doing things?  2 - more than half the days  Feeling down, depressed , or hopeless? 2 - more than half the days  Trouble falling or staying asleep, or sleeping too much?  3 - nearly every day  Feeling tired or having little energy?  3 - nearly every day  Poor appetite or overeating?  0 - not at all  Feeling bad about yourself - or that you are a failure or have let yourself or your family down? 0 - not at all  Trouble concentrating on things, such as reading the newspaper or watching television? 0 - not at all  Moving or speaking so slowly that other people could have noticed.  Or the opposite - being so fidgety or restless that you have been moving around a lot more than usual?  1 - several days  Thoughts that you would be better off dead, or of hurting yourself?  0 - not at all  Patient Health Questionnaire Score: 11    If depressive symptoms identified deferred to follow up visit unless specifically addressed in assessment and plan.    Interpretation of PHQ-9 Total Score   Score Severity   1-4 No Depression   5-9 Mild Depression   10-14 Moderate Depression   15-19 Moderately Severe Depression   20-27 Severe Depression      Screening for Cognitive Impairment    Three Minute Recall (apple, watch, dario)  2/3    Draw clock face with all 12 numbers set to the hand to show 10 minutes  past 11 o'clock  1    Cognitive concerns identified deferred for follow up unless specifically addressed in assessment and plan.    Fall Risk Assessment    Has the patient had two or more falls in the last year or any fall with injury in the last year?  Yes    Safety Assessment    Throw rugs on floor.  Yes  Handrails on all stairs.  Yes  Good lighting in all hallways.  Yes  Difficulty hearing.  No  Patient counseled about all safety risks that were identified.    Functional Assessment ADLs    Are there any barriers preventing you from cooking for yourself or meeting nutritional needs?  No.    Are there any barriers preventing you from driving safely or obtaining transportation?  No.    Are there any barriers preventing you from using a telephone or calling for help?  No.    Are there any barriers preventing you from shopping?  Yes.    Are there any barriers preventing you from taking care of your own finances?  No.    Are there any barriers preventing you from managing your medications?  No.    Are currently engaging any exercise or physical activity?  No.       Health Maintenance Summary                IMM ZOSTER VACCINE Overdue 8/4/2007     IMM INFLUENZA Next Due 9/1/2017      Done 11/17/2015 Imm Admin: Influenza Vaccine Adult HD    MAMMOGRAM Next Due 11/18/2017      Done 11/18/2016 MA-SCREEN MAMMO W/CAD-BILAT     Patient has more history with this topic...    COLONOSCOPY Next Due 1/6/2018      Done 1/6/2013 2 polyps - 5 year return, DHA    BONE DENSITY Next Due 3/17/2020      Done 3/17/2017 DS-BONE DENSITY STUDY (DEXA)     Patient has more history with this topic...    IMM DTaP/Tdap/Td Vaccine Next Due 6/6/2023      Done 6/6/2013 Imm Admin: Tdap Vaccine          Patient Care Team:  Lucía Duarte M.D. as PCP - General (Family Medicine)      Social History   Substance Use Topics   • Smoking status: Never Smoker    • Smokeless tobacco: Never Used   • Alcohol Use: No     Family History   Problem Relation Age of  "Onset   • Hypertension Mother    • Cancer Neg Hx    • Diabetes Neg Hx    • Stroke Neg Hx    • Heart Disease Neg Hx      She  has a past medical history of Syringomyelia (CMS-[facility]); Scoliosis; Anesthesia; Arthritis; Joint replacement; Heart burn; Pain; Hypertension; Chronic back pain; Contracture of hand joint; Peripheral edema (6/6/2013); Indigestion; Asthma; Dental disorder; Breath shortness; Sleep apnea; Chiari malformation (1970's); sore throat (1/15/15); and Decreased lung capacity.   Past Surgical History   Procedure Laterality Date   • Other surgical procedure  1973, 1977     \"remove cyst-cervical placement of shunt\"   • Ronda by laparoscopy  2002   • Shoulder arthroplasty total  2004     Right   • Hip arthroplasty total  5/19/08     Performed by FARTUN ERAZO at SURGERY [facility]   • Shoulder hanna-arthroplasty     • Hip arthroplasty total     • Us-needle core bx-breast panel     • Other neurological surg       cerebral shunt   • Mastectomy  2/6/2015     Performed by Adelina Wilson M.D. at SURGERY [facility]   • Node biopsy sentinel  2/6/2015     Performed by Adelina Wilson M.D. at SURGERY [facility]       Exam:     Blood pressure 130/74, pulse 74, temperature 35.8 °C (96.4 °F), resp. rate 16, height 1.511 m (4' 11.49\"), weight 87.091 kg (192 lb), SpO2 93 %. Body mass index is 38.15 kg/(m^2).    Hearing excellent.    Dentition good  Alert, oriented in no acute distress.  Eye contact is good, speech goal directed, affect calm      Assessment and Plan. The following treatment and monitoring plan is recommended:    1. Wellness examination     2. Obesity (BMI 35.0-39.9 without comorbidity) ([facility])  Patient identified as having weight management issue.  Appropriate orders and counseling given.   3. Screening for breast cancer  MA-MAMMO SCREENING BILAT W/MELISSA W/O CAD   4. Chronic back pain, unspecified back location, unspecified back pain laterality     5. Dyslipidemia     6. Gastroesophageal " reflux disease, esophagitis presence not specified     7. Essential hypertension     8. Intracranial shunt     9. 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  Follow up with neurology, oncology    HM topic updated and reviewed.      Start Cymbalta 30 mg daily for chronic pain related to chiari malformation/shunt, scoliosis and chronic back pain  As well as mood support,     Follow up in 4 weeks.     Labs for cholesterol.    Services suggested: No services needed at this time  Health Care Screening: Age-appropriate preventive services Medicare covers discussed today and ordered if indicated.  Referrals offered: Community-based lifestyle interventions to reduce health risks and promote self-management and wellness, fall prevention, nutrition, physical activity, tobacco-use cessation, weight loss, and mental health services as per orders if indicated.    Discussion today about general wellness and lifestyle habits:    · Prevent falls and reduce trip hazards; Cautioned about securing or removing rugs.  · Have a working fire alarm and carbon monoxide detector;   · Engage in regular physical activity and social activities       Follow-up:1 month

## 2017-08-18 NOTE — MR AVS SNAPSHOT
"        Tereza Sánchez   2017 9:00 AM   Office Visit   MRN: [id number]    Department:  [facility]   Dept Phone:  [phone]    Description:  Female : 1945   Provider:  Lucía Duarte M.D.           Reason for Visit     Annual Exam           Allergies as of 2017     Allergen Noted Reactions    Bactrim Ds 2016   Rash    Pt states \"I get a body rash\".    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, unspecified laterality (CMS-[facility])   [468555]       Risk for falls   [982623]       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[facility])   [434561]       Osteopenia of multiple sites   [4862470]         Vital Signs     Blood Pressure Pulse Temperature Respirations Height Weight    130/74 mmHg 74 35.8 °C (96.4 °F) 16 1.511 m (4' 11.49\") 87.091 kg (192 lb)    Body Mass Index Oxygen Saturation Smoking Status             38.15 kg/m2 93% Never Smoker          Basic Information     Date Of Birth Sex Race Ethnicity Preferred Language    1945 Female White, Unknown Non- English      Problem List              ICD-10-CM Priority Class Noted - Resolved    GERD (gastroesophageal reflux disease) K21.9   2013 - Present    HTN (hypertension) I10   2013 - Present    Chronic back pain M54.9, G89.29   2013 - Present    Intracranial shunt Z98.2   2013 - Present    Dyslipidemia E78.5   2013 - Present    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) " C50.219   2/6/2015 - Present    Obesity (BMI 35.0-39.9 without comorbidity) ([facility]) E66.9   8/18/2017 - Present    Risk for falls Z91.81   8/18/2017 - Present      Health Maintenance        Date Due Completion Dates    IMM ZOSTER VACCINE 8/4/2007 ---    IMM INFLUENZA (1) 9/1/2017 11/17/2015    MAMMOGRAM 11/18/2017 11/18/2016, 10/15/2015, 1/7/2015, 12/26/2014, 9/24/2009, 9/24/2009, 3/18/2009, 3/3/2009, 3/3/2009    COLONOSCOPY 1/6/2018 1/6/2013 (Done)    Override on 1/6/2013: Done (2 polyps - 5 year return, DHA)    BONE DENSITY 3/17/2020 3/17/2017, 3/3/2009    IMM DTaP/Tdap/Td Vaccine (2 - Td) 6/6/2023 6/6/2013            Current Immunizations     13-VALENT PCV PREVNAR 12/1/2015    Hepatitis A Vaccine, Adult 6/6/2013    Influenza Vaccine Adult HD 11/17/2015    Pneumococcal polysaccharide vaccine (PPSV-23) 4/13/2017    Tdap Vaccine 6/6/2013      Below and/or attached are the medications your provider expects you to take. Review all of your home medications and newly ordered medications with your provider and/or pharmacist. Follow medication instructions as directed by your provider and/or pharmacist. Please keep your medication list with you and share with your provider. Update the information when medications are discontinued, doses are changed, or new medications (including over-the-counter products) are added; and carry medication information at all times in the event of emergency situations     Allergies:  BACTRIM DS - Rash     MORPHINE - Vomiting               Medications  Valid as of: August 18, 2017 -  9:23 AM    Generic Name Brand Name Tablet Size Instructions for use    Albuterol Sulfate (Aero Soln) albuterol 108 (90 Base) MCG/ACT Inhale 2 Puffs by mouth every 6 hours as needed for Shortness of Breath.        Amitriptyline HCl (Tab) ELAVIL 25 MG Take 100 mg by mouth every evening.        Anastrozole (Tab) ARIMIDEX 1 MG Take 1 mg by mouth every day.        Atenolol (Tab) TENORMIN 50 MG Take 2 Tabs by mouth every  day.        Benazepril-Hydrochlorothiazide (Tab) LOTENSIN HCT 20-12.5 MG Take 1 Tab by mouth every day.        Calcium Citrate-Vitamin D   Take 2 Tabs by mouth every morning. Takes two        DULoxetine HCl (Cap DR Particles) CYMBALTA 30 MG Take 1 Cap by mouth every day.        Esomeprazole Magnesium (CAPSULE DELAYED RELEASE) NEXIUM 20 MG TAKE 1 CAPSULE EVERY MORNING BEFORE BREAKFAST (MAKE APPOINTMENT PRIOR TO MORE REFILLS. NEXT REQUEST WILL BE DENIED)        Gabapentin (Cap) NEURONTIN 300 MG Take 300 mg by mouth 3 times a day.        Hydrocodone-Acetaminophen (Tab) NORCO 5-325 MG Take 1 Tab by mouth every 6 hours as needed. Indications: Moderate to Moderately Severe Pain        Ibuprofen (Tab) MOTRIN 200 MG Take 400 mg by mouth every 6 hours as needed for Fever.        Multiple Vitamins-Minerals   Take 1 Tab by mouth every day.        .                 Medicines prescribed today were sent to:     [facility]/PHARMACY #[id number] - [place] - [address]    [address] [place] [address]    Phone: [phone] Fax: [phone]    Open 24 Hours?: No    [facility] - [place] [address]    Phone: [phone] Fax: [phone]    Open 24 Hours?: No    [facility] - [place] [address]    Phone: [phone] Fax: [phone]    Open 24 Hours?: No      Medication refill instructions:       If your prescription bottle indicates you have medication refills left, it is not necessary to call your provider’s office. Please contact your pharmacy and they will refill your medication.    If your prescription bottle indicates you do not have any refills left, you may request refills at any time through one of the following ways: The online [employer] system (except Urgent Care), by calling your provider’s office, or by asking your pharmacy to contact your provider’s office  with a refill request. Medication refills are processed only during regular business hours and may not be available until the next business day. Your provider may request additional information or to have a follow-up visit with you prior to refilling your medication.   *Please Note: Medication refills are assigned a new Rx number when refilled electronically. Your pharmacy may indicate that no refills were authorized even though a new prescription for the same medication is available at the pharmacy. Please request the medicine by name with the pharmacy before contacting your provider for a refill.        Your To Do List     Future Labs/Procedures Complete By Expires    MA-MAMMO SCREENING BILAT W/MELISSA W/O CAD  11/18/2017 8/18/2018    LIPID PROFILE  As directed 8/18/2018    VITAMIN D,25 HYDROXY  As directed 8/18/2018         [employer] Access Code: Activation code not generated  Current [employer] Status: Active

## 2017-09-18 ENCOUNTER — HOSPITAL ENCOUNTER (OUTPATIENT)
Dept: LAB | Facility: MEDICAL CENTER | Age: 72
End: 2017-09-18
Attending: FAMILY MEDICINE
Payer: COMMERCIAL

## 2017-09-18 ENCOUNTER — HOSPITAL ENCOUNTER (OUTPATIENT)
Dept: LAB | Facility: MEDICAL CENTER | Age: 72
End: 2017-09-18
Attending: SPECIALIST
Payer: COMMERCIAL

## 2017-09-18 ENCOUNTER — OFFICE VISIT (OUTPATIENT)
Dept: MEDICAL GROUP | Facility: PHYSICIAN GROUP | Age: 72
End: 2017-09-18
Payer: COMMERCIAL

## 2017-09-18 VITALS
WEIGHT: 192 LBS | TEMPERATURE: 98.2 F | HEIGHT: 59 IN | RESPIRATION RATE: 18 BRPM | BODY MASS INDEX: 38.71 KG/M2 | OXYGEN SATURATION: 97 % | HEART RATE: 98 BPM | SYSTOLIC BLOOD PRESSURE: 128 MMHG | DIASTOLIC BLOOD PRESSURE: 80 MMHG

## 2017-09-18 DIAGNOSIS — K21.9 GASTROESOPHAGEAL REFLUX DISEASE, ESOPHAGITIS PRESENCE NOT SPECIFIED: ICD-10-CM

## 2017-09-18 DIAGNOSIS — F32.0 MILD MAJOR DEPRESSION (HCC): ICD-10-CM

## 2017-09-18 DIAGNOSIS — M54.9 CHRONIC BACK PAIN, UNSPECIFIED BACK LOCATION, UNSPECIFIED BACK PAIN LATERALITY: ICD-10-CM

## 2017-09-18 DIAGNOSIS — I10 ESSENTIAL HYPERTENSION: ICD-10-CM

## 2017-09-18 DIAGNOSIS — E78.5 DYSLIPIDEMIA: ICD-10-CM

## 2017-09-18 DIAGNOSIS — G89.29 CHRONIC BACK PAIN, UNSPECIFIED BACK LOCATION, UNSPECIFIED BACK PAIN LATERALITY: ICD-10-CM

## 2017-09-18 DIAGNOSIS — M85.89 OSTEOPENIA OF MULTIPLE SITES: ICD-10-CM

## 2017-09-18 LAB
25(OH)D3 SERPL-MCNC: 33 NG/ML (ref 30–100)
ALBUMIN SERPL BCP-MCNC: 4.1 G/DL (ref 3.2–4.9)
ALBUMIN/GLOB SERPL: 1.2 G/DL
ALP SERPL-CCNC: 87 U/L (ref 30–99)
ALT SERPL-CCNC: 31 U/L (ref 2–50)
ANION GAP SERPL CALC-SCNC: 11 MMOL/L (ref 0–11.9)
AST SERPL-CCNC: 36 U/L (ref 12–45)
BASOPHILS # BLD AUTO: 1.4 % (ref 0–1.8)
BASOPHILS # BLD: 0.1 K/UL (ref 0–0.12)
BILIRUB SERPL-MCNC: 0.5 MG/DL (ref 0.1–1.5)
BUN SERPL-MCNC: 19 MG/DL (ref 8–22)
CALCIUM SERPL-MCNC: 9.6 MG/DL (ref 8.5–10.5)
CHLORIDE SERPL-SCNC: 103 MMOL/L (ref 96–112)
CHOLEST SERPL-MCNC: 191 MG/DL (ref 100–199)
CO2 SERPL-SCNC: 27 MMOL/L (ref 20–33)
CREAT SERPL-MCNC: 0.77 MG/DL (ref 0.5–1.4)
EOSINOPHIL # BLD AUTO: 0.33 K/UL (ref 0–0.51)
EOSINOPHIL NFR BLD: 4.5 % (ref 0–6.9)
ERYTHROCYTE [DISTWIDTH] IN BLOOD BY AUTOMATED COUNT: 47 FL (ref 35.9–50)
GFR SERPL CREATININE-BSD FRML MDRD: >60 ML/MIN/1.73 M 2
GLOBULIN SER CALC-MCNC: 3.4 G/DL (ref 1.9–3.5)
GLUCOSE SERPL-MCNC: 97 MG/DL (ref 65–99)
HCT VFR BLD AUTO: 44.3 % (ref 37–47)
HDLC SERPL-MCNC: 51 MG/DL
HGB BLD-MCNC: 14.4 G/DL (ref 12–16)
IMM GRANULOCYTES # BLD AUTO: 0.02 K/UL (ref 0–0.11)
IMM GRANULOCYTES NFR BLD AUTO: 0.3 % (ref 0–0.9)
LDLC SERPL CALC-MCNC: 108 MG/DL
LYMPHOCYTES # BLD AUTO: 1.41 K/UL (ref 1–4.8)
LYMPHOCYTES NFR BLD: 19.3 % (ref 22–41)
MCH RBC QN AUTO: 29.3 PG (ref 27–33)
MCHC RBC AUTO-ENTMCNC: 32.5 G/DL (ref 33.6–35)
MCV RBC AUTO: 90 FL (ref 81.4–97.8)
MONOCYTES # BLD AUTO: 0.55 K/UL (ref 0–0.85)
MONOCYTES NFR BLD AUTO: 7.5 % (ref 0–13.4)
NEUTROPHILS # BLD AUTO: 4.91 K/UL (ref 2–7.15)
NEUTROPHILS NFR BLD: 67 % (ref 44–72)
NRBC # BLD AUTO: 0 K/UL
NRBC BLD AUTO-RTO: 0 /100 WBC
PLATELET # BLD AUTO: 361 K/UL (ref 164–446)
PMV BLD AUTO: 10.9 FL (ref 9–12.9)
POTASSIUM SERPL-SCNC: 3.7 MMOL/L (ref 3.6–5.5)
PROT SERPL-MCNC: 7.5 G/DL (ref 6–8.2)
RBC # BLD AUTO: 4.92 M/UL (ref 4.2–5.4)
SODIUM SERPL-SCNC: 141 MMOL/L (ref 135–145)
TRIGL SERPL-MCNC: 159 MG/DL (ref 0–149)
WBC # BLD AUTO: 7.3 K/UL (ref 4.8–10.8)

## 2017-09-18 PROCEDURE — 99214 OFFICE O/P EST MOD 30 MIN: CPT | Performed by: FAMILY MEDICINE

## 2017-09-18 PROCEDURE — 82306 VITAMIN D 25 HYDROXY: CPT | Mod: GA

## 2017-09-18 PROCEDURE — 85025 COMPLETE CBC W/AUTO DIFF WBC: CPT

## 2017-09-18 PROCEDURE — 80053 COMPREHEN METABOLIC PANEL: CPT

## 2017-09-18 PROCEDURE — 80061 LIPID PANEL: CPT

## 2017-09-18 PROCEDURE — 36415 COLL VENOUS BLD VENIPUNCTURE: CPT

## 2017-09-18 RX ORDER — DULOXETIN HYDROCHLORIDE 30 MG/1
30 CAPSULE, DELAYED RELEASE ORAL DAILY
Qty: 90 CAP | Refills: 3 | Status: SHIPPED | OUTPATIENT
Start: 2017-09-18 | End: 2018-09-17 | Stop reason: SDUPTHER

## 2017-09-18 RX ORDER — ATENOLOL 50 MG/1
100 TABLET ORAL DAILY
Qty: 180 TAB | Refills: 3 | Status: SHIPPED | OUTPATIENT
Start: 2017-09-18 | End: 2018-11-01 | Stop reason: SDUPTHER

## 2017-09-18 RX ORDER — BENAZEPRIL HYDROCHLORIDE AND HYDROCHLOROTHIAZIDE 20; 12.5 MG/1; MG/1
1 TABLET ORAL DAILY
Qty: 90 TAB | Refills: 3 | Status: SHIPPED | OUTPATIENT
Start: 2017-09-18 | End: 2018-11-28 | Stop reason: SDUPTHER

## 2017-09-18 ASSESSMENT — PATIENT HEALTH QUESTIONNAIRE - PHQ9: CLINICAL INTERPRETATION OF PHQ2 SCORE: 0

## 2017-09-18 NOTE — PROGRESS NOTES
Chief Complaint   Patient presents with   • Follow-Up     meds       HISTORY OF PRESENT ILLNESS: Patient is a 72 y.o. female established patient here today for the following concerns:    1. Essential hypertension  Here today for follow up on BP, continues on the lotensin HCTZ and Atenolol.  Reports no dizziness or light-headed.     2. Chronic back pain, unspecified back location, unspecified back pain laterality  Struggling with chronic low back pain. Followed by neurology, Dr. Pierce who prescribes her amitriptyline, Hydrocodone, and gabapentin.  She does ambulate with a cane.     3. 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HCC)  Last visit we added cymbalta for mood support and pain control.  repots good tolerability and no side effects.  Would like to continue at the lower dose a bit longer to see if she gets any more effect from it.      4. Gastroesophageal reflux disease, esophagitis presence not specified  Needs refill on Nexium.  Reports no regurgitation, no dysphagia.  Has attempted to stop in the past and develops worsening symptoms.       Past Medical, Social, and Family history reviewed and updated in EPIC    Allergies:Bactrim ds and Morphine    Current Outpatient Prescriptions   Medication Sig Dispense Refill   • benazepril-hydrochlorthiazide (LOTENSIN HCT) 20-12.5 MG per tablet Take 1 Tab by mouth every day. 90 Tab 3   • atenolol (TENORMIN) 50 MG Tab Take 2 Tabs by mouth every day. 180 Tab 3   • esomeprazole (NEXIUM) 20 MG capsule Take 1 Cap by mouth every morning before breakfast. 90 Cap 3   • duloxetine (CYMBALTA) 30 MG Cap DR Particles Take 1 Cap by mouth every day. 90 Cap 3   • ibuprofen (MOTRIN) 200 MG Tab Take 400 mg by mouth every 6 hours as needed for Fever.     • amitriptyline (ELAVIL) 25 MG Tab Take 100 mg by mouth every evening.     • anastrozole (ARIMIDEX) 1 MG Tab Take 1 mg by mouth every day.     • GABAPENTIN 300 MG PO CAPS Take 300 mg by mouth 3 times a day.     • HYDROCODONE-ACETAMINOPHEN 5-325 MG PO  "TABS Take 1 Tab by mouth every 6 hours as needed. Indications: Moderate to Moderately Severe Pain     • CALCIUM + D PO Take 2 Tabs by mouth every morning. Takes two     • MULTIVITAMIN PO Take 1 Tab by mouth every day.     • albuterol (VENTOLIN OR PROVENTIL) 108 (90 BASE) MCG/ACT Aero Soln inhalation aerosol Inhale 2 Puffs by mouth every 6 hours as needed for Shortness of Breath. 8.5 g 3     No current facility-administered medications for this visit.          ROS:  Review of Systems   Constitutional: Negative for fever, chills, weight loss and malaise/fatigue.   HENT: Negative for ear pain, nosebleeds, congestion, sore throat and neck pain.    Eyes: Negative for blurred vision.   Respiratory: Negative for cough, sputum production, shortness of breath and wheezing.    Cardiovascular: Negative for chest pain, palpitations,  and leg swelling.   Gastrointestinal: Negative for heartburn, nausea, vomiting, diarrhea and abdominal pain.   Genitourinary: Negative for dysuria, urgency and frequency.   Musculoskeletal: Negative for myalgias, back pain and joint pain.   Skin: Negative for rash and itching.   Neurological: Negative for dizziness, tingling, tremors, sensory change, focal weakness and headaches.   Endo/Heme/Allergies: Does not bruise/bleed easily.   Psychiatric/Behavioral: Negative for depression, anxiety, suicidal ideas, insomnia and memory loss.      Exam:  Blood pressure 128/80, pulse 98, temperature 36.8 °C (98.2 °F), resp. rate 18, height 1.499 m (4' 11\"), weight 87.1 kg (192 lb), SpO2 97 %, not currently breastfeeding.    General:  Well nourished, well developed in NAD  Head is grossly normal.  Neck: Supple without JVD   Pulmonary:  Normal effort.   Cardiovascular: Regular rate  Extremities: no clubbing, cyanosis, or edema.  Psych: affect appropriate      Please note that this dictation was created using voice recognition software. I have made every reasonable attempt to correct obvious errors, but I expect " that there are errors of grammar and possibly content that I did not discover before finalizing the note.    Assessment/Plan:  1. Essential hypertension  Continue   - benazepril-hydrochlorthiazide (LOTENSIN HCT) 20-12.5 MG per tablet; Take 1 Tab by mouth every day.  Dispense: 90 Tab; Refill: 3  - atenolol (TENORMIN) 50 MG Tab; Take 2 Tabs by mouth every day.  Dispense: 180 Tab; Refill: 3    2. Chronic back pain, unspecified back location, unspecified back pain laterality  continue  - duloxetine (CYMBALTA) 30 MG Cap DR Particles; Take 1 Cap by mouth every day.  Dispense: 90 Cap; Refill: 3    3. 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HCC)  Continue   - duloxetine (CYMBALTA) 30 MG Cap DR Particles; Take 1 Cap by mouth every day.  Dispense: 90 Cap; Refill: 3  May increase to 60 mg daily  4. Gastroesophageal reflux disease, esophagitis presence not specified  Continue   - esomeprazole (NEXIUM) 20 MG capsule; Take 1 Cap by mouth every morning before breakfast.  Dispense: 90 Cap; Refill: 3    6 month follow up

## 2017-10-21 ENCOUNTER — OFFICE VISIT (OUTPATIENT)
Dept: URGENT CARE | Facility: PHYSICIAN GROUP | Age: 72
End: 2017-10-21
Payer: COMMERCIAL

## 2017-10-21 VITALS
WEIGHT: 191 LBS | RESPIRATION RATE: 18 BRPM | DIASTOLIC BLOOD PRESSURE: 78 MMHG | OXYGEN SATURATION: 95 % | HEART RATE: 75 BPM | HEIGHT: 60 IN | BODY MASS INDEX: 37.5 KG/M2 | SYSTOLIC BLOOD PRESSURE: 124 MMHG | TEMPERATURE: 98.8 F

## 2017-10-21 DIAGNOSIS — B02.9 HERPES ZOSTER WITHOUT COMPLICATION: ICD-10-CM

## 2017-10-21 PROCEDURE — 99214 OFFICE O/P EST MOD 30 MIN: CPT | Performed by: PHYSICIAN ASSISTANT

## 2017-10-21 RX ORDER — VALACYCLOVIR HYDROCHLORIDE 1 G/1
1000 TABLET, FILM COATED ORAL 3 TIMES DAILY
Qty: 21 TAB | Refills: 0 | Status: SHIPPED | OUTPATIENT
Start: 2017-10-21 | End: 2017-10-28

## 2017-10-21 ASSESSMENT — ENCOUNTER SYMPTOMS
NEUROLOGICAL NEGATIVE: 1
CONSTITUTIONAL NEGATIVE: 1
MYALGIAS: 0
RESPIRATORY NEGATIVE: 1
HEADACHES: 0
CARDIOVASCULAR NEGATIVE: 1
GASTROINTESTINAL NEGATIVE: 1
BRUISES/BLEEDS EASILY: 0

## 2017-10-21 NOTE — PROGRESS NOTES
Subjective:      Tereza Sánchez is a 72 y.o. female who presents with Rash (rash, blisters over chest)        Rash     Patient presents today for about 5 day history of left sided thorax rash.  Started anterior chest around her sternum she feels and now has it under her breast on and the side of her chest.  She states there are blisters but the that started is crusting over at this point.  She has not been under increased stress that she is aware of.   No fevers or malaise.  No attempted interventions.     Review of Systems   Constitutional: Negative.    Respiratory: Negative.    Cardiovascular: Negative.    Gastrointestinal: Negative.    Musculoskeletal: Negative for myalgias.   Skin: Positive for itching and rash.   Neurological: Negative.  Negative for headaches.   Endo/Heme/Allergies: Does not bruise/bleed easily.       PMH:  has a past medical history of Anesthesia; Arthritis; Asthma; Breath shortness; Chiari malformation (1970's); Chronic back pain; Contracture of hand joint; Decreased lung capacity; Dental disorder; Heart burn; Hypertension; Indigestion; Joint replacement; Pain; Peripheral edema (6/6/2013); Scoliosis; Sleep apnea; sore throat (1/15/15); and Syringomyelia (CMS-[facility]).  MEDS:   Current Outpatient Prescriptions:   •  valacyclovir (VALTREX) 1 GM Tab, Take 1 Tab by mouth 3 times a day for 7 days., Disp: 21 Tab, Rfl: 0  •  benazepril-hydrochlorthiazide (LOTENSIN HCT) 20-12.5 MG per tablet, Take 1 Tab by mouth every day., Disp: 90 Tab, Rfl: 3  •  atenolol (TENORMIN) 50 MG Tab, Take 2 Tabs by mouth every day., Disp: 180 Tab, Rfl: 3  •  esomeprazole (NEXIUM) 20 MG capsule, Take 1 Cap by mouth every morning before breakfast., Disp: 90 Cap, Rfl: 3  •  duloxetine (CYMBALTA) 30 MG Cap DR Particles, Take 1 Cap by mouth every day., Disp: 90 Cap, Rfl: 3  •  ibuprofen (MOTRIN) 200 MG Tab, Take 400 mg by mouth every 6 hours as needed for Fever., Disp: , Rfl:   •  amitriptyline (ELAVIL) 25 MG Tab, Take 100 mg  "by mouth every evening., Disp: , Rfl:   •  anastrozole (ARIMIDEX) 1 MG Tab, Take 1 mg by mouth every day., Disp: , Rfl:   •  albuterol (VENTOLIN OR PROVENTIL) 108 (90 BASE) MCG/ACT Aero Soln inhalation aerosol, Inhale 2 Puffs by mouth every 6 hours as needed for Shortness of Breath., Disp: 8.5 g, Rfl: 3  •  GABAPENTIN 300 MG PO CAPS, Take 300 mg by mouth 3 times a day., Disp: , Rfl:   •  HYDROCODONE-ACETAMINOPHEN 5-325 MG PO TABS, Take 1 Tab by mouth every 6 hours as needed. Indications: Moderate to Moderately Severe Pain, Disp: , Rfl:   •  CALCIUM + D PO, Take 2 Tabs by mouth every morning. Takes two, Disp: , Rfl:   •  MULTIVITAMIN PO, Take 1 Tab by mouth every day., Disp: , Rfl:   ALLERGIES:   Allergies   Allergen Reactions   • Bactrim Ds Rash     Pt states \"I get a body rash\".   • Morphine Vomiting     hallucinations     SURGHX:   Past Surgical History:   Procedure Laterality Date   • MASTECTOMY  2/6/2015    Performed by Adelina Wilson M.D. at SURGERY [facility]   • NODE BIOPSY SENTINEL  2/6/2015    Performed by Adelina Wilson M.D. at SURGERY [facility]   • HIP ARTHROPLASTY TOTAL  5/19/08    Performed by FARTUN ERAZO at SURGERY [facility]   • SHOULDER ARTHROPLASTY TOTAL  2004    Right   • JAN BY LAPAROSCOPY  2002   • HIP ARTHROPLASTY TOTAL     • OTHER NEUROLOGICAL SURG      cerebral shunt   • OTHER SURGICAL PROCEDURE  1973, 1977    \"remove cyst-cervical placement of shunt\"   • SHOULDER ILEANA-ARTHROPLASTY     • US-NEEDLE CORE BX-BREAST PANEL       SOCHX:  reports that she has never smoked. She has never used smokeless tobacco. She reports that she does not drink alcohol or use drugs.  FH: Family history was reviewed, no pertinent findings to report     Objective:     /78   Pulse 75   Temp 37.1 °C (98.8 °F)   Resp 18   Ht 1.524 m (5')   Wt 86.6 kg (191 lb)   SpO2 95%   BMI 37.30 kg/m²      Physical Exam   Constitutional: She is oriented to person, place, and time. She appears " well-developed and well-nourished.   Eyes: Conjunctivae and EOM are normal. Pupils are equal, round, and reactive to light.   Neck: Normal range of motion. Neck supple.   Cardiovascular: Normal rate and regular rhythm.    Pulmonary/Chest: Effort normal and breath sounds normal.   Neurological: She is alert and oriented to person, place, and time.   Skin: Skin is warm and dry. Rash noted.        Psychiatric: She has a normal mood and affect. Her behavior is normal.   Vitals reviewed.            Assessment/Plan:     1. Herpes zoster without complication  valacyclovir (VALTREX) 1 GM Tab       -consistent with zoster as above.   -detailed discussion and education of this dx with patient.   -valtrex rx.   -PCP follow up encouraged and recommended ot this.       Bruna Montes P.A.-C.

## 2017-10-21 NOTE — PATIENT INSTRUCTIONS
Shingles    Shingles, which is also known as herpes zoster, is an infection that causes a painful skin rash and fluid-filled blisters. Shingles is not related to genital herpes, which is a sexually transmitted infection.     Shingles only develops in people who:  · Have had chickenpox.  · Have received the chickenpox vaccine. (This is rare.)  CAUSES  Shingles is caused by varicella-zoster virus (VZV). This is the same virus that causes chickenpox. After exposure to VZV, the virus stays in the body in an inactive (dormant) state. Shingles develops if the virus reactivates. This can happen many years after the initial exposure to VZV. It is not known what causes this virus to reactivate.  RISK FACTORS  People who have had chickenpox or received the chickenpox vaccine are at risk for shingles. Infection is more common in people who:  · Are older than age 50.  · Have a weakened defense (immune) system, such as those with HIV, AIDS, or cancer.  · Are taking medicines that weaken the immune system, such as transplant medicines.  · Are under great stress.  SYMPTOMS  Early symptoms of this condition include itching, tingling, and pain in an area on your skin. Pain may be described as burning, stabbing, or throbbing.  A few days or weeks after symptoms start, a painful red rash appears, usually on one side of the body in a bandlike or beltlike pattern. The rash eventually turns into fluid-filled blisters that break open, scab over, and dry up in about 2-3 weeks.  At any time during the infection, you may also develop:  · A fever.  · Chills.  · A headache.  · An upset stomach.  DIAGNOSIS  This condition is diagnosed with a skin exam. Sometimes, skin or fluid samples are taken from the blisters before a diagnosis is made. These samples are examined under a microscope or sent to a lab for testing.  TREATMENT  There is no specific cure for this condition. Your health care provider will probably prescribe medicines to help you  manage pain, recover more quickly, and avoid long-term problems. Medicines may include:  · Antiviral drugs.  · Anti-inflammatory drugs.  · Pain medicines.  If the area involved is on your face, you may be referred to a specialist, such as an eye doctor (ophthalmologist) or an ear, nose, and throat (ENT) doctor to help you avoid eye problems, chronic pain, or disability.  HOME CARE INSTRUCTIONS  Medicines  · Take medicines only as directed by your health care provider.  · Apply an anti-itch or numbing cream to the affected area as directed by your health care provider.  Blister and Rash Care  · Take a cool bath or apply cool compresses to the area of the rash or blisters as directed by your health care provider. This may help with pain and itching.  · Keep your rash covered with a loose bandage (dressing). Wear loose-fitting clothing to help ease the pain of material rubbing against the rash.  · Keep your rash and blisters clean with mild soap and cool water or as directed by your health care provider.  · Check your rash every day for signs of infection. These include redness, swelling, and pain that lasts or increases.  · Do not pick your blisters.  · Do not scratch your rash.  General Instructions  · Rest as directed by your health care provider.  · Keep all follow-up visits as directed by your health care provider. This is important.  · Until your blisters scab over, your infection can cause chickenpox in people who have never had it or been vaccinated against it. To prevent this from happening, avoid contact with other people, especially:  ¨ Babies.  ¨ Pregnant women.  ¨ Children who have eczema.  ¨ Elderly people who have transplants.  ¨ People who have chronic illnesses, such as leukemia or AIDS.  SEEK MEDICAL CARE IF:  · Your pain is not relieved with prescribed medicines.  · Your pain does not get better after the rash heals.  · Your rash looks infected. Signs of infection include redness, swelling, and pain  that lasts or increases.  SEEK IMMEDIATE MEDICAL CARE IF:  · The rash is on your face or nose.  · You have facial pain, pain around your eye area, or loss of feeling on one side of your face.  · You have ear pain or you have ringing in your ear.  · You have loss of taste.  · Your condition gets worse.     This information is not intended to replace advice given to you by your health care provider. Make sure you discuss any questions you have with your health care provider.     Document Released: 12/18/2006 Document Revised: 01/08/2016 Document Reviewed: 10/29/2015  [employer] Interactive Patient Education ©2016 [employer]

## 2017-11-20 ENCOUNTER — HOSPITAL ENCOUNTER (OUTPATIENT)
Dept: RADIOLOGY | Facility: MEDICAL CENTER | Age: 72
End: 2017-11-20
Attending: FAMILY MEDICINE
Payer: COMMERCIAL

## 2017-11-20 DIAGNOSIS — Z12.31 ENCOUNTER FOR MAMMOGRAM TO ESTABLISH BASELINE MAMMOGRAM: ICD-10-CM

## 2017-11-20 DIAGNOSIS — Z12.39 SCREENING FOR BREAST CANCER: ICD-10-CM

## 2017-11-20 PROCEDURE — 77063 BREAST TOMOSYNTHESIS BI: CPT

## 2017-12-26 ENCOUNTER — HOSPITAL ENCOUNTER (OUTPATIENT)
Dept: LAB | Facility: MEDICAL CENTER | Age: 72
End: 2017-12-26
Attending: SPECIALIST
Payer: COMMERCIAL

## 2017-12-26 LAB
ALBUMIN SERPL BCP-MCNC: 4 G/DL (ref 3.2–4.9)
ALBUMIN/GLOB SERPL: 1.1 G/DL
ALP SERPL-CCNC: 86 U/L (ref 30–99)
ALT SERPL-CCNC: 45 U/L (ref 2–50)
ANION GAP SERPL CALC-SCNC: 11 MMOL/L (ref 0–11.9)
AST SERPL-CCNC: 53 U/L (ref 12–45)
BASOPHILS # BLD AUTO: 1.3 % (ref 0–1.8)
BASOPHILS # BLD: 0.12 K/UL (ref 0–0.12)
BILIRUB SERPL-MCNC: 0.7 MG/DL (ref 0.1–1.5)
BUN SERPL-MCNC: 19 MG/DL (ref 8–22)
CALCIUM SERPL-MCNC: 9.8 MG/DL (ref 8.5–10.5)
CHLORIDE SERPL-SCNC: 103 MMOL/L (ref 96–112)
CO2 SERPL-SCNC: 27 MMOL/L (ref 20–33)
CREAT SERPL-MCNC: 0.92 MG/DL (ref 0.5–1.4)
EOSINOPHIL # BLD AUTO: 0.28 K/UL (ref 0–0.51)
EOSINOPHIL NFR BLD: 3.1 % (ref 0–6.9)
ERYTHROCYTE [DISTWIDTH] IN BLOOD BY AUTOMATED COUNT: 49.4 FL (ref 35.9–50)
GFR SERPL CREATININE-BSD FRML MDRD: >60 ML/MIN/1.73 M 2
GLOBULIN SER CALC-MCNC: 3.5 G/DL (ref 1.9–3.5)
GLUCOSE SERPL-MCNC: 86 MG/DL (ref 65–99)
HCT VFR BLD AUTO: 44.2 % (ref 37–47)
HGB BLD-MCNC: 14.5 G/DL (ref 12–16)
IMM GRANULOCYTES # BLD AUTO: 0.04 K/UL (ref 0–0.11)
IMM GRANULOCYTES NFR BLD AUTO: 0.4 % (ref 0–0.9)
LYMPHOCYTES # BLD AUTO: 1.89 K/UL (ref 1–4.8)
LYMPHOCYTES NFR BLD: 21.2 % (ref 22–41)
MCH RBC QN AUTO: 28.8 PG (ref 27–33)
MCHC RBC AUTO-ENTMCNC: 32.8 G/DL (ref 33.6–35)
MCV RBC AUTO: 87.7 FL (ref 81.4–97.8)
MONOCYTES # BLD AUTO: 0.69 K/UL (ref 0–0.85)
MONOCYTES NFR BLD AUTO: 7.7 % (ref 0–13.4)
NEUTROPHILS # BLD AUTO: 5.91 K/UL (ref 2–7.15)
NEUTROPHILS NFR BLD: 66.3 % (ref 44–72)
NRBC # BLD AUTO: 0 K/UL
NRBC BLD-RTO: 0 /100 WBC
PLATELET # BLD AUTO: 345 K/UL (ref 164–446)
PMV BLD AUTO: 10.5 FL (ref 9–12.9)
POTASSIUM SERPL-SCNC: 3.6 MMOL/L (ref 3.6–5.5)
PROT SERPL-MCNC: 7.5 G/DL (ref 6–8.2)
RBC # BLD AUTO: 5.04 M/UL (ref 4.2–5.4)
SODIUM SERPL-SCNC: 141 MMOL/L (ref 135–145)
WBC # BLD AUTO: 8.9 K/UL (ref 4.8–10.8)

## 2017-12-26 PROCEDURE — 36415 COLL VENOUS BLD VENIPUNCTURE: CPT

## 2017-12-26 PROCEDURE — 85025 COMPLETE CBC W/AUTO DIFF WBC: CPT

## 2017-12-26 PROCEDURE — 80053 COMPREHEN METABOLIC PANEL: CPT

## 2018-01-23 ENCOUNTER — HOSPITAL ENCOUNTER (OUTPATIENT)
Dept: RADIOLOGY | Facility: MEDICAL CENTER | Age: 73
End: 2018-01-23
Attending: SPECIALIST
Payer: COMMERCIAL

## 2018-01-23 VITALS — HEIGHT: 60 IN | BODY MASS INDEX: 37.3 KG/M2 | WEIGHT: 190 LBS

## 2018-01-23 DIAGNOSIS — G95.0 SYRINGOMYELIA AND SYRINGOBULBIA (HCC): ICD-10-CM

## 2018-01-23 PROCEDURE — 72156 MRI NECK SPINE W/O & W/DYE: CPT

## 2018-01-23 PROCEDURE — 700102 HCHG RX REV CODE 250 W/ 637 OVERRIDE(OP): Performed by: RADIOLOGY

## 2018-01-23 PROCEDURE — 700117 HCHG RX CONTRAST REV CODE 255: Performed by: SPECIALIST

## 2018-01-23 PROCEDURE — 72158 MRI LUMBAR SPINE W/O & W/DYE: CPT

## 2018-01-23 PROCEDURE — A9270 NON-COVERED ITEM OR SERVICE: HCPCS | Performed by: RADIOLOGY

## 2018-01-23 PROCEDURE — 72157 MRI CHEST SPINE W/O & W/DYE: CPT

## 2018-01-23 PROCEDURE — A9579 GAD-BASE MR CONTRAST NOS,1ML: HCPCS | Performed by: SPECIALIST

## 2018-01-23 RX ORDER — DIAZEPAM 5 MG/1
5 TABLET ORAL
Status: COMPLETED | OUTPATIENT
Start: 2018-01-23 | End: 2018-01-23

## 2018-01-23 RX ADMIN — DIAZEPAM 5 MG: 5 TABLET ORAL at 14:10

## 2018-01-23 RX ADMIN — GADODIAMIDE 19 ML: 287 INJECTION INTRAVENOUS at 16:55

## 2018-01-23 ASSESSMENT — PAIN SCALES - GENERAL
PAINLEVEL_OUTOF10: 0
PAINLEVEL_OUTOF10: 8

## 2018-01-23 NOTE — DISCHARGE INSTRUCTIONS
MRI ADULT DISCHARGE INSTRUCTIONS    You have been medicated today for your scan. Please follow the instructions below to ensure your safe recovery. If you have any questions or problems, feel free to call us at [phone] or [phone].     1.   Have someone stay with you to assist you as needed.    2.   Do not drive or operate any mechanical devices.    3.   Do not perform any activity that requires concentration. Make no major decisions over the next 24 hours.     4.   Be careful changing positions from laying down to sitting or standing, as you may become dizzy.     5.   Do not drink alcohol for 48 hours.    6.   There are no restrictions for eating your normal meals. Drink fluids.    7.   You may continue your usual medications for pain, tranquilizers, muscle relaxants or sedatives when awake.     8.   Tomorrow, you may continue your normal daily activities.     9.   Pressure dressing on 10 - 15 minutes. If swelling or bleeding occurs when removed, continue placing direct pressure on injection site for another 5 minutes, or until bleeding stops.     I have been informed of and understand the above discharge instructions. Diazepam (VALIUM) Oral solution  What is this medicine?  You were prescribed DIAZEPAM (dye AZ e vainash) for the procedure you had today. This medication is a benzodiazepine. It is used to treat anxiety and nervousness. It also can help treat alcohol withdrawal, relax muscles, and treat certain types of seizures.  This medicine may be used for other purposes; ask your health care provider or pharmacist if you have questions.  What side effects may I notice from receiving this medicine?  Side effects that you should report to your doctor or health care professional as soon as possible:  • allergic reactions like skin rash, itching or hives, swelling of the face, lips, or tongue  • angry, confused, depressed, other mood changes  • breathing problems  • feeling faint or lightheaded, falls  • muscle  cramps  • problems with balance, talking, walking  • restlessness  • tremors  • trouble passing urine or change in the amount of urine  • unusually weak or tired  Side effects that usually do not require medical attention (report to your doctor or health care professional if they continue or are bothersome):  • difficulty sleeping, nightmares  • dizziness, drowsiness, clumsiness, or unsteadiness, a hangover effect  • headache  • nausea, vomiting  This list may not describe all possible side effects. Call your doctor for medical advice about side effects. You may report side effects to FDA at 4-413-LBC-9187.

## 2018-03-21 NOTE — PROGRESS NOTES
Order(s) created erroneously. Erroneous order ID: [id number]   Order moved by: YARON PATEL   Order move date/time: 03/21/2018 9:27 AM   Source Patient: [id number]   Source Contact: 03/19/2018   Destination Patient: [id number]   Destination Contact: 02/08/2016

## 2018-04-21 ENCOUNTER — HOSPITAL ENCOUNTER (OUTPATIENT)
Dept: LAB | Facility: MEDICAL CENTER | Age: 73
End: 2018-04-21
Attending: SPECIALIST
Payer: COMMERCIAL

## 2018-04-21 LAB
ALBUMIN SERPL BCP-MCNC: 4.1 G/DL (ref 3.2–4.9)
ALBUMIN/GLOB SERPL: 1.2 G/DL
ALP SERPL-CCNC: 96 U/L (ref 30–99)
ALT SERPL-CCNC: 35 U/L (ref 2–50)
ANION GAP SERPL CALC-SCNC: 8 MMOL/L (ref 0–11.9)
AST SERPL-CCNC: 35 U/L (ref 12–45)
BASOPHILS # BLD AUTO: 1.1 % (ref 0–1.8)
BASOPHILS # BLD: 0.1 K/UL (ref 0–0.12)
BILIRUB SERPL-MCNC: 0.7 MG/DL (ref 0.1–1.5)
BUN SERPL-MCNC: 22 MG/DL (ref 8–22)
CALCIUM SERPL-MCNC: 9.9 MG/DL (ref 8.5–10.5)
CHLORIDE SERPL-SCNC: 102 MMOL/L (ref 96–112)
CO2 SERPL-SCNC: 30 MMOL/L (ref 20–33)
CREAT SERPL-MCNC: 1.02 MG/DL (ref 0.5–1.4)
EOSINOPHIL # BLD AUTO: 0.25 K/UL (ref 0–0.51)
EOSINOPHIL NFR BLD: 2.8 % (ref 0–6.9)
ERYTHROCYTE [DISTWIDTH] IN BLOOD BY AUTOMATED COUNT: 53.1 FL (ref 35.9–50)
GLOBULIN SER CALC-MCNC: 3.3 G/DL (ref 1.9–3.5)
GLUCOSE SERPL-MCNC: 93 MG/DL (ref 65–99)
HCT VFR BLD AUTO: 44.2 % (ref 37–47)
HGB BLD-MCNC: 14.3 G/DL (ref 12–16)
IMM GRANULOCYTES # BLD AUTO: 0.03 K/UL (ref 0–0.11)
IMM GRANULOCYTES NFR BLD AUTO: 0.3 % (ref 0–0.9)
LYMPHOCYTES # BLD AUTO: 1.54 K/UL (ref 1–4.8)
LYMPHOCYTES NFR BLD: 17.5 % (ref 22–41)
MCH RBC QN AUTO: 29.1 PG (ref 27–33)
MCHC RBC AUTO-ENTMCNC: 32.4 G/DL (ref 33.6–35)
MCV RBC AUTO: 90 FL (ref 81.4–97.8)
MONOCYTES # BLD AUTO: 0.81 K/UL (ref 0–0.85)
MONOCYTES NFR BLD AUTO: 9.2 % (ref 0–13.4)
NEUTROPHILS # BLD AUTO: 6.09 K/UL (ref 2–7.15)
NEUTROPHILS NFR BLD: 69.1 % (ref 44–72)
NRBC # BLD AUTO: 0 K/UL
NRBC BLD-RTO: 0 /100 WBC
PLATELET # BLD AUTO: 334 K/UL (ref 164–446)
PMV BLD AUTO: 10.5 FL (ref 9–12.9)
POTASSIUM SERPL-SCNC: 4.6 MMOL/L (ref 3.6–5.5)
PROT SERPL-MCNC: 7.4 G/DL (ref 6–8.2)
RBC # BLD AUTO: 4.91 M/UL (ref 4.2–5.4)
SODIUM SERPL-SCNC: 140 MMOL/L (ref 135–145)
WBC # BLD AUTO: 8.8 K/UL (ref 4.8–10.8)

## 2018-04-21 PROCEDURE — 85025 COMPLETE CBC W/AUTO DIFF WBC: CPT

## 2018-04-21 PROCEDURE — 80053 COMPREHEN METABOLIC PANEL: CPT

## 2018-04-21 PROCEDURE — 36415 COLL VENOUS BLD VENIPUNCTURE: CPT

## 2018-05-01 ENCOUNTER — OFFICE VISIT (OUTPATIENT)
Dept: MEDICAL GROUP | Facility: PHYSICIAN GROUP | Age: 73
End: 2018-05-01
Payer: COMMERCIAL

## 2018-05-01 VITALS
RESPIRATION RATE: 16 BRPM | HEART RATE: 100 BPM | HEIGHT: 60 IN | BODY MASS INDEX: 37.5 KG/M2 | SYSTOLIC BLOOD PRESSURE: 120 MMHG | DIASTOLIC BLOOD PRESSURE: 86 MMHG | TEMPERATURE: 98.5 F | OXYGEN SATURATION: 98 % | WEIGHT: 191 LBS

## 2018-05-01 DIAGNOSIS — J44.9 CHRONIC OBSTRUCTIVE PULMONARY DISEASE, UNSPECIFIED COPD TYPE (HCC): ICD-10-CM

## 2018-05-01 DIAGNOSIS — G47.30 SLEEP-DISORDERED BREATHING: ICD-10-CM

## 2018-05-01 DIAGNOSIS — E66.9 OBESITY (BMI 30-39.9): ICD-10-CM

## 2018-05-01 PROCEDURE — 99214 OFFICE O/P EST MOD 30 MIN: CPT | Performed by: FAMILY MEDICINE

## 2018-05-01 RX ORDER — FLUTICASONE PROPIONATE AND SALMETEROL 232; 14 UG/1; UG/1
1 POWDER, METERED RESPIRATORY (INHALATION) 2 TIMES DAILY
Qty: 1 EACH | Refills: 11 | Status: SHIPPED | OUTPATIENT
Start: 2018-05-01 | End: 2021-08-27

## 2018-05-01 ASSESSMENT — PATIENT HEALTH QUESTIONNAIRE - PHQ9: CLINICAL INTERPRETATION OF PHQ2 SCORE: 0

## 2018-05-01 NOTE — PROGRESS NOTES
Chief Complaint   Patient presents with   • Sleep Problem       HISTORY OF PRESENT ILLNESS: Patient is a 72 y.o. female established patient here today for the following concerns:    1. Sleep-disordered breathing  2. Obesity (BMI 30-39.9)  3. Chronic obstructive pulmonary disease, unspecified COPD type (HCC)  Tereza is a pleasant 72-year-old female today accompanied by her significant other for concerns over her stopping breathing throughout the night. She reports that she sometimes awakens to him trying to arouse her from sleep as she is not taking a deep breath. She's noticed herself that she does not take deep breaths and often can feel a suffocating feeling at night and has difficulty awakening and not to take a deep breath. She reports that she did have some breathing tests many years ago which did reveal some decrease in lung function. She denies history of smoking. She was told that she had about 30% lung function at that time.  In addition she wheezes on a daily basis, does have an albuterol inhaler but does not always use it. Wheezing occurs day or night. Worse with exertion. Denies any productive cough no hemoptysis. She reports that they did do a overnight sleep study and a lab many many years ago but she does not recall what happened to the results. She does not use CPAP or oxygen at night at this time. Patient continues to struggle with weight. Much due to chronic pain that is treated with gabapentin and hydrocodone and tricyclic antidepressant. She reports the pain limits her movement. Severe arthritis also does so. She is hoping to get rid of the hydrocodone as she feels it may not be even giving her relief and she understands that it can make her breathing worse.      Past Medical, Social, and Family history reviewed and updated in EPIC    Allergies:Bactrim ds and Morphine    Current Outpatient Prescriptions   Medication Sig Dispense Refill   • Fluticasone-Salmeterol 232-14 MCG/ACT AEROSOL POWDER,  BREATH ACTIVATED Inhale 1 Inhalation by mouth 2 times a day. 1 Each 11   • benazepril-hydrochlorthiazide (LOTENSIN HCT) 20-12.5 MG per tablet Take 1 Tab by mouth every day. 90 Tab 3   • atenolol (TENORMIN) 50 MG Tab Take 2 Tabs by mouth every day. 180 Tab 3   • esomeprazole (NEXIUM) 20 MG capsule Take 1 Cap by mouth every morning before breakfast. 90 Cap 3   • duloxetine (CYMBALTA) 30 MG Cap DR Particles Take 1 Cap by mouth every day. 90 Cap 3   • ibuprofen (MOTRIN) 200 MG Tab Take 400 mg by mouth every 6 hours as needed for Fever.     • amitriptyline (ELAVIL) 25 MG Tab Take 100 mg by mouth every evening.     • anastrozole (ARIMIDEX) 1 MG Tab Take 1 mg by mouth every day.     • albuterol (VENTOLIN OR PROVENTIL) 108 (90 BASE) MCG/ACT Aero Soln inhalation aerosol Inhale 2 Puffs by mouth every 6 hours as needed for Shortness of Breath. 8.5 g 3   • GABAPENTIN 300 MG PO CAPS Take 300 mg by mouth 3 times a day.     • CALCIUM + D PO Take 2 Tabs by mouth every morning. Takes two     • MULTIVITAMIN PO Take 1 Tab by mouth every day.       No current facility-administered medications for this visit.          ROS:  Review of Systems   Constitutional: Negative for fever, chills, weight loss and malaise/fatigue.   HENT: Negative for ear pain, nosebleeds, congestion, sore throat and neck pain.    Eyes: Negative for blurred vision.   Respiratory: Negative for cough, sputum production, shortness of breath and wheezing.    Cardiovascular: Negative for chest pain, palpitations,  and leg swelling.   Gastrointestinal: Negative for heartburn, nausea, vomiting, diarrhea and abdominal pain.   Genitourinary: Negative for dysuria, urgency and frequency.   Musculoskeletal: Negative for myalgias, back pain and joint pain.   Skin: Negative for rash and itching.   Neurological: Negative for dizziness, tingling, tremors, sensory change, focal weakness and headaches.   Endo/Heme/Allergies: Does not bruise/bleed easily.   Psychiatric/Behavioral:  Negative for depression, anxiety, suicidal ideas, insomnia and memory loss.      Exam:  Blood pressure 120/86, pulse 100, temperature 36.9 °C (98.5 °F), resp. rate 16, height 1.524 m (5'), weight 86.6 kg (191 lb), SpO2 98 %.    General:  Well nourished, well developed in NAD  Head is grossly normal.  Neck: Supple without JVD   Pulmonary:  Normal effort. Scattered wheezes throughout  Cardiovascular: Regular rate  Extremities: no clubbing, cyanosis, or edema.  Psych: affect appropriate      Please note that this dictation was created using voice recognition software. I have made every reasonable attempt to correct obvious errors, but I expect that there are errors of grammar and possibly content that I did not discover before finalizing the note.    Assessment/Plan:  1. Sleep-disordered breathing  Highly suspicious for obstructive sleep apnea versus central sleep apnea or combination thereof. We will obtain OPO and make referral to sleep medicine for full evaluation.  - REFERRAL TO SLEEP STUDIES    2. Obesity (BMI 30-39.9)   today  - Patient identified as having weight management issue.  Appropriate orders and counseling given.    3. Chronic obstructive pulmonary disease, unspecified COPD type (HCC)  Start Airduo, check pulmonary function testing, rescue inhaler with albuterol  - PULMONARY FUNCTION TESTS Test requested: Complete Pulmonary Function Test  - Fluticasone-Salmeterol 232-14 MCG/ACT AEROSOL POWDER, BREATH ACTIVATED; Inhale 1 Inhalation by mouth 2 times a day.  Dispense: 1 Each; Refill: 11    Follow-up in 1 month

## 2018-05-30 ENCOUNTER — TELEPHONE (OUTPATIENT)
Dept: MEDICAL GROUP | Facility: PHYSICIAN GROUP | Age: 73
End: 2018-05-30

## 2018-05-30 NOTE — TELEPHONE ENCOUNTER
Pt notified on results. Would like to wait until she sees the Pulmonologist on 6/6/18 and go from there.

## 2018-05-30 NOTE — TELEPHONE ENCOUNTER
----- Message from Lucía Duarte M.D. sent at 5/30/2018  1:09 PM PDT -----  It appears the oxygen levels are dipping down below 88 at night.  Recommend 2 L O2 by NC

## 2018-06-06 ENCOUNTER — HOSPITAL ENCOUNTER (OUTPATIENT)
Dept: PULMONOLOGY | Facility: MEDICAL CENTER | Age: 73
End: 2018-06-06
Attending: FAMILY MEDICINE
Payer: COMMERCIAL

## 2018-06-06 PROCEDURE — 94726 PLETHYSMOGRAPHY LUNG VOLUMES: CPT | Mod: 26 | Performed by: INTERNAL MEDICINE

## 2018-06-06 PROCEDURE — 94729 DIFFUSING CAPACITY: CPT

## 2018-06-06 PROCEDURE — 94729 DIFFUSING CAPACITY: CPT | Mod: 26 | Performed by: INTERNAL MEDICINE

## 2018-06-06 PROCEDURE — 94726 PLETHYSMOGRAPHY LUNG VOLUMES: CPT

## 2018-06-06 PROCEDURE — 94060 EVALUATION OF WHEEZING: CPT | Mod: 26 | Performed by: INTERNAL MEDICINE

## 2018-06-06 PROCEDURE — 94060 EVALUATION OF WHEEZING: CPT

## 2018-06-07 ENCOUNTER — TELEPHONE (OUTPATIENT)
Dept: MEDICAL GROUP | Facility: PHYSICIAN GROUP | Age: 73
End: 2018-06-07

## 2018-06-07 DIAGNOSIS — J98.4 RESTRICTIVE LUNG DISEASE: ICD-10-CM

## 2018-06-07 NOTE — TELEPHONE ENCOUNTER
----- Message from Lucía Duarte M.D. sent at 6/7/2018  3:20 PM PDT -----  Please let Tereza know that the PFTs showed restrictive lung disease.  I'd like to get a cat scan of her lungs to further try to find out why.  Please have her keep follow up.

## 2018-06-07 NOTE — PROCEDURES
PULMONARY FUNCTION TEST INTERPRETATION    AGE:  72.    GENDER:  Female.    RACE:  .    HEIGHT:  152.4 cm.    WEIGHT:  191 pounds.    INDICATION:  COPD.    DATE OF STUDY:  06/06/2018    INTERPRETATION:  Spirometry is suggestive of mild restrictive defect with no   significant post-bronchodilator change.  The post-bronchodilator ratio of FEV1   to forced vital capacity is 90% pre-bronchodilator FEV1 is 1.43 liters, 74%   of predicted with no post-bronchodilator change and pre-bronchodilator forced   vital capacity is reduced to 1.63 liters, 66% of predicted and   post-bronchodilator, there is a bronchoconstriction with forced vital capacity   decreased to 1.58 liters, 64% of predicted with negative 3% change.  The lung   volumes are also suggestive of a restrictive pattern with total lung capacity   decreased to 3.22 liters, 72% of predicted.  However, the diffusing capacity   of the lung for carbon monoxide is in the normal range, it is 15.90 mL per   minute per mmHg, 111% of predicted.    IMPRESSION:  Mild restrictive defect with normal diffusion.  Consider extra   pulmonary restrictive pathology versus poor effort.       ____________________________________     Muhammad K. Gondal, MD MKG / JOSE    DD:  06/07/2018 06:36:54  DT:  06/07/2018 07:34:38    D#:  [id number]  Job#:  [id number]

## 2018-06-15 ENCOUNTER — HOSPITAL ENCOUNTER (OUTPATIENT)
Dept: RADIOLOGY | Facility: MEDICAL CENTER | Age: 73
End: 2018-06-15
Attending: FAMILY MEDICINE
Payer: COMMERCIAL

## 2018-06-15 DIAGNOSIS — J98.4 RESTRICTIVE LUNG DISEASE: ICD-10-CM

## 2018-06-15 PROCEDURE — 71250 CT THORAX DX C-: CPT

## 2018-06-22 ENCOUNTER — OFFICE VISIT (OUTPATIENT)
Dept: MEDICAL GROUP | Facility: PHYSICIAN GROUP | Age: 73
End: 2018-06-22
Payer: COMMERCIAL

## 2018-06-22 VITALS
OXYGEN SATURATION: 96 % | HEIGHT: 60 IN | HEART RATE: 74 BPM | WEIGHT: 191.2 LBS | RESPIRATION RATE: 14 BRPM | SYSTOLIC BLOOD PRESSURE: 114 MMHG | DIASTOLIC BLOOD PRESSURE: 68 MMHG | BODY MASS INDEX: 37.54 KG/M2 | TEMPERATURE: 96.6 F

## 2018-06-22 DIAGNOSIS — G47.34 NOCTURNAL HYPOXIA: ICD-10-CM

## 2018-06-22 DIAGNOSIS — J44.9 CHRONIC OBSTRUCTIVE PULMONARY DISEASE, UNSPECIFIED COPD TYPE (HCC): ICD-10-CM

## 2018-06-22 DIAGNOSIS — R91.8 LUNG NODULES: ICD-10-CM

## 2018-06-22 DIAGNOSIS — E04.1 THYROID NODULE: ICD-10-CM

## 2018-06-22 PROCEDURE — 99214 OFFICE O/P EST MOD 30 MIN: CPT | Performed by: FAMILY MEDICINE

## 2018-06-22 NOTE — PROGRESS NOTES
Chief Complaint   Patient presents with   • Shortness of Breath     fv ct       HISTORY OF PRESENT ILLNESS: Patient is a 72 y.o. female established patient here today for the following concerns:    Here today for follow up on lung testing for symptoms of SOB and nocturnal sleep disordered breathing and hypoxia.  She has started the Airduo with improvement of her symptoms.  She PFTs showed a bit of a restrictive pattern and therefore CT was performed.  This showed air trapping and some Sub-centermeric lung nodules consistent with her picture of likely COPD and pickwickian habitus.  She was found to have an incidental thyroid nodule of 2.7 cm thyroid nodule which was not previously noted.  Her Noc Ox demonstrated dips in oxygen below 88 % consistent with nocturnal hypoxia.  She was referred to sleep medicine but has not yet set up the consultation.      Past Medical, Social, and Family history reviewed and updated in EPIC    Allergies:Bactrim ds and Morphine    Current Outpatient Prescriptions   Medication Sig Dispense Refill   • Fluticasone-Salmeterol 232-14 MCG/ACT AEROSOL POWDER, BREATH ACTIVATED Inhale 1 Inhalation by mouth 2 times a day. 1 Each 11   • benazepril-hydrochlorthiazide (LOTENSIN HCT) 20-12.5 MG per tablet Take 1 Tab by mouth every day. 90 Tab 3   • atenolol (TENORMIN) 50 MG Tab Take 2 Tabs by mouth every day. 180 Tab 3   • esomeprazole (NEXIUM) 20 MG capsule Take 1 Cap by mouth every morning before breakfast. 90 Cap 3   • duloxetine (CYMBALTA) 30 MG Cap DR Particles Take 1 Cap by mouth every day. 90 Cap 3   • ibuprofen (MOTRIN) 200 MG Tab Take 400 mg by mouth every 6 hours as needed for Fever.     • amitriptyline (ELAVIL) 25 MG Tab Take 100 mg by mouth every evening.     • anastrozole (ARIMIDEX) 1 MG Tab Take 1 mg by mouth every day.     • albuterol (VENTOLIN OR PROVENTIL) 108 (90 BASE) MCG/ACT Aero Soln inhalation aerosol Inhale 2 Puffs by mouth every 6 hours as needed for Shortness of Breath. 8.5 g  3   • GABAPENTIN 300 MG PO CAPS Take 300 mg by mouth 3 times a day.     • CALCIUM + D PO Take 2 Tabs by mouth every morning. Takes two     • MULTIVITAMIN PO Take 1 Tab by mouth every day.       No current facility-administered medications for this visit.          ROS:  Review of Systems   Constitutional: Negative for fever, chills, weight loss and malaise/fatigue.   HENT: Negative for ear pain, nosebleeds, congestion, sore throat and neck pain.    Eyes: Negative for blurred vision.   Respiratory: Negative for cough, sputum production, shortness of breath and wheezing.    Cardiovascular: Negative for chest pain, palpitations,  and leg swelling.   Gastrointestinal: Negative for heartburn, nausea, vomiting, diarrhea and abdominal pain.   Genitourinary: Negative for dysuria, urgency and frequency.   Musculoskeletal: Negative for myalgias, back pain and joint pain.   Skin: Negative for rash and itching.   Neurological: Negative for dizziness, tingling, tremors, sensory change, focal weakness and headaches.   Endo/Heme/Allergies: Does not bruise/bleed easily.   Psychiatric/Behavioral: Negative for depression, anxiety, suicidal ideas, insomnia and memory loss.      Exam:  Blood pressure 114/68, pulse 74, temperature 35.9 °C (96.6 °F), resp. rate 14, height 1.524 m (5'), weight 86.7 kg (191 lb 3.2 oz), SpO2 96 %.    General:  Well nourished, well developed in NAD  Head is grossly normal.  Neck: Supple without JVD   Pulmonary:  Normal effort.   Cardiovascular: Regular rate  Extremities: no clubbing, cyanosis, or edema.  Psych: affect appropriate      Please note that this dictation was created using voice recognition software. I have made every reasonable attempt to correct obvious errors, but I expect that there are errors of grammar and possibly content that I did not discover before finalizing the note.    Assessment/Plan:  1. Thyroid nodule  - US-SOFT TISSUES OF HEAD - NECK; Future    2. Chronic obstructive pulmonary  disease, unspecified COPD type (HCC)  Continue airduo,     3. Nocturnal hypoxia  Start O2    Follow up on lung nodules in 12 months (low risk)

## 2018-07-05 ENCOUNTER — HOSPITAL ENCOUNTER (OUTPATIENT)
Dept: RADIOLOGY | Facility: MEDICAL CENTER | Age: 73
End: 2018-07-05
Attending: FAMILY MEDICINE
Payer: COMMERCIAL

## 2018-07-05 DIAGNOSIS — E04.1 THYROID NODULE: ICD-10-CM

## 2018-07-05 PROCEDURE — 76536 US EXAM OF HEAD AND NECK: CPT

## 2018-07-06 ENCOUNTER — TELEPHONE (OUTPATIENT)
Dept: MEDICAL GROUP | Facility: PHYSICIAN GROUP | Age: 73
End: 2018-07-06

## 2018-07-06 DIAGNOSIS — E07.9 THYROID MASS: ICD-10-CM

## 2018-07-06 NOTE — PROGRESS NOTES
Patient called and given results of her Ultrasound.  Referral is being placed to Surgery for consultation on 3 cm thyroid mass.       Please also send the Ultrasound report to Dr. Dia's office who treats her Breast Cancer

## 2018-07-06 NOTE — TELEPHONE ENCOUNTER
Called [facility] and [facility] Have sent orders over to [facility].

## 2018-07-16 ENCOUNTER — HOSPITAL ENCOUNTER (OUTPATIENT)
Dept: LAB | Facility: MEDICAL CENTER | Age: 73
End: 2018-07-16
Attending: SURGERY
Payer: COMMERCIAL

## 2018-07-16 LAB
T3 SERPL-MCNC: 96.5 NG/DL (ref 60–181)
T3FREE SERPL-MCNC: 3.49 PG/ML (ref 2.4–4.2)
T4 FREE SERPL-MCNC: 0.97 NG/DL (ref 0.53–1.43)
TSH SERPL DL<=0.005 MIU/L-ACNC: 0.23 UIU/ML (ref 0.38–5.33)

## 2018-07-16 PROCEDURE — 36415 COLL VENOUS BLD VENIPUNCTURE: CPT

## 2018-07-16 PROCEDURE — 84443 ASSAY THYROID STIM HORMONE: CPT

## 2018-07-16 PROCEDURE — 86376 MICROSOMAL ANTIBODY EACH: CPT

## 2018-07-16 PROCEDURE — 84481 FREE ASSAY (FT-3): CPT

## 2018-07-16 PROCEDURE — 84439 ASSAY OF FREE THYROXINE: CPT

## 2018-07-16 PROCEDURE — 86800 THYROGLOBULIN ANTIBODY: CPT

## 2018-07-16 PROCEDURE — 84480 ASSAY TRIIODOTHYRONINE (T3): CPT

## 2018-07-17 ENCOUNTER — HOSPITAL ENCOUNTER (OUTPATIENT)
Dept: RADIOLOGY | Facility: MEDICAL CENTER | Age: 73
End: 2018-07-17
Attending: SURGERY
Payer: COMMERCIAL

## 2018-07-17 DIAGNOSIS — E07.9 THYROID MASS: ICD-10-CM

## 2018-07-17 DIAGNOSIS — E04.2 MULTINODULAR GOITER: ICD-10-CM

## 2018-07-17 LAB — THYROPEROXIDASE AB SERPL-ACNC: <0.2 IU/ML (ref 0–9)

## 2018-07-17 PROCEDURE — 88112 CYTOPATH CELL ENHANCE TECH: CPT

## 2018-07-17 PROCEDURE — 10022 IR-FINE NEEDLE ASPIR W/GUIDE(FL): CPT

## 2018-07-17 NOTE — PROGRESS NOTES
"Patient given [facility] \"Preventing the Spread of Infection\" Brochure upon being checked in.     US guided left thyroid nodule fine needle aspiration done by Dr. Sutton; NON-SEDATION  (no H&P required as this is a NON SEDATION procedure); left anterior aspect of neck access site; procedural RN: Rich; 1 jar of cytolyt obtained and sent to pathology lab; pt tolerated the procedure well; pt remained stable pre/intra/post procedure; all questions and concerns answered prior to being d/c; patient provided with appropriate education for procedure; pt d/c home.     "

## 2018-07-18 LAB — THYROGLOB AB SERPL-ACNC: <0.9 IU/ML (ref 0–4)

## 2018-08-06 DIAGNOSIS — Z01.812 PRE-PROCEDURAL LABORATORY EXAMINATION: ICD-10-CM

## 2018-08-06 DIAGNOSIS — Z01.810 PRE-OPERATIVE CARDIOVASCULAR EXAMINATION: ICD-10-CM

## 2018-08-06 LAB
ANION GAP SERPL CALC-SCNC: 10 MMOL/L (ref 0–11.9)
BUN SERPL-MCNC: 16 MG/DL (ref 8–22)
CALCIUM SERPL-MCNC: 9.6 MG/DL (ref 8.5–10.5)
CHLORIDE SERPL-SCNC: 105 MMOL/L (ref 96–112)
CO2 SERPL-SCNC: 26 MMOL/L (ref 20–33)
CREAT SERPL-MCNC: 0.95 MG/DL (ref 0.5–1.4)
ERYTHROCYTE [DISTWIDTH] IN BLOOD BY AUTOMATED COUNT: 45.5 FL (ref 35.9–50)
GLUCOSE SERPL-MCNC: 94 MG/DL (ref 65–99)
HCT VFR BLD AUTO: 39.4 % (ref 37–47)
HGB BLD-MCNC: 13 G/DL (ref 12–16)
MCH RBC QN AUTO: 28.9 PG (ref 27–33)
MCHC RBC AUTO-ENTMCNC: 33 G/DL (ref 33.6–35)
MCV RBC AUTO: 87.6 FL (ref 81.4–97.8)
PLATELET # BLD AUTO: 341 K/UL (ref 164–446)
PMV BLD AUTO: 10.6 FL (ref 9–12.9)
POTASSIUM SERPL-SCNC: 3.6 MMOL/L (ref 3.6–5.5)
RBC # BLD AUTO: 4.5 M/UL (ref 4.2–5.4)
SODIUM SERPL-SCNC: 141 MMOL/L (ref 135–145)
WBC # BLD AUTO: 9.3 K/UL (ref 4.8–10.8)

## 2018-08-06 PROCEDURE — 36415 COLL VENOUS BLD VENIPUNCTURE: CPT

## 2018-08-06 PROCEDURE — 93005 ELECTROCARDIOGRAM TRACING: CPT

## 2018-08-06 PROCEDURE — 85027 COMPLETE CBC AUTOMATED: CPT

## 2018-08-06 PROCEDURE — 93010 ELECTROCARDIOGRAM REPORT: CPT | Performed by: INTERNAL MEDICINE

## 2018-08-06 PROCEDURE — 80048 BASIC METABOLIC PNL TOTAL CA: CPT

## 2018-08-07 LAB — EKG IMPRESSION: NORMAL

## 2018-08-17 ENCOUNTER — HOSPITAL ENCOUNTER (OUTPATIENT)
Facility: MEDICAL CENTER | Age: 73
End: 2018-08-17
Attending: SURGERY | Admitting: SURGERY
Payer: COMMERCIAL

## 2018-08-17 VITALS
HEIGHT: 60 IN | TEMPERATURE: 97.4 F | RESPIRATION RATE: 18 BRPM | SYSTOLIC BLOOD PRESSURE: 121 MMHG | BODY MASS INDEX: 36.49 KG/M2 | DIASTOLIC BLOOD PRESSURE: 62 MMHG | OXYGEN SATURATION: 94 % | WEIGHT: 185.85 LBS | HEART RATE: 101 BPM

## 2018-08-17 DIAGNOSIS — G89.18 POSTOPERATIVE PAIN: ICD-10-CM

## 2018-08-17 PROCEDURE — 502594 HCHG SCISSOR HANDLE, HARMONIC ACE: Performed by: SURGERY

## 2018-08-17 PROCEDURE — 160009 HCHG ANES TIME/MIN: Performed by: SURGERY

## 2018-08-17 PROCEDURE — 700102 HCHG RX REV CODE 250 W/ 637 OVERRIDE(OP)

## 2018-08-17 PROCEDURE — 88307 TISSUE EXAM BY PATHOLOGIST: CPT

## 2018-08-17 PROCEDURE — 700101 HCHG RX REV CODE 250

## 2018-08-17 PROCEDURE — 700111 HCHG RX REV CODE 636 W/ 250 OVERRIDE (IP)

## 2018-08-17 PROCEDURE — 501838 HCHG SUTURE GENERAL: Performed by: SURGERY

## 2018-08-17 PROCEDURE — 160036 HCHG PACU - EA ADDL 30 MINS PHASE I: Performed by: SURGERY

## 2018-08-17 PROCEDURE — 160002 HCHG RECOVERY MINUTES (STAT): Performed by: SURGERY

## 2018-08-17 PROCEDURE — 160048 HCHG OR STATISTICAL LEVEL 1-5: Performed by: SURGERY

## 2018-08-17 PROCEDURE — 160042 HCHG SURGERY MINUTES - EA ADDL 1 MIN LEVEL 5: Performed by: SURGERY

## 2018-08-17 PROCEDURE — A9270 NON-COVERED ITEM OR SERVICE: HCPCS

## 2018-08-17 PROCEDURE — 160035 HCHG PACU - 1ST 60 MINS PHASE I: Performed by: SURGERY

## 2018-08-17 PROCEDURE — 700101 HCHG RX REV CODE 250: Mod: JW

## 2018-08-17 PROCEDURE — 160031 HCHG SURGERY MINUTES - 1ST 30 MINS LEVEL 5: Performed by: SURGERY

## 2018-08-17 RX ORDER — ACETAMINOPHEN 500 MG
TABLET ORAL
Status: COMPLETED
Start: 2018-08-17 | End: 2018-08-17

## 2018-08-17 RX ORDER — CELECOXIB 200 MG/1
CAPSULE ORAL
Status: COMPLETED
Start: 2018-08-17 | End: 2018-08-17

## 2018-08-17 RX ORDER — OXYCODONE HCL 5 MG/5 ML
SOLUTION, ORAL ORAL
Status: COMPLETED
Start: 2018-08-17 | End: 2018-08-17

## 2018-08-17 RX ORDER — KETAMINE HYDROCHLORIDE 50 MG/ML
INJECTION, SOLUTION INTRAMUSCULAR; INTRAVENOUS
Status: DISCONTINUED
Start: 2018-08-17 | End: 2018-08-17 | Stop reason: HOSPADM

## 2018-08-17 RX ORDER — SCOLOPAMINE TRANSDERMAL SYSTEM 1 MG/1
PATCH, EXTENDED RELEASE TRANSDERMAL
Status: DISCONTINUED
Start: 2018-08-17 | End: 2018-08-17 | Stop reason: HOSPADM

## 2018-08-17 RX ORDER — REMIFENTANIL HYDROCHLORIDE 1 MG/ML
INJECTION, POWDER, LYOPHILIZED, FOR SOLUTION INTRAVENOUS
Status: DISCONTINUED
Start: 2018-08-17 | End: 2018-08-17 | Stop reason: HOSPADM

## 2018-08-17 RX ORDER — SODIUM CHLORIDE, SODIUM LACTATE, POTASSIUM CHLORIDE, CALCIUM CHLORIDE 600; 310; 30; 20 MG/100ML; MG/100ML; MG/100ML; MG/100ML
INJECTION, SOLUTION INTRAVENOUS CONTINUOUS
Status: DISCONTINUED | OUTPATIENT
Start: 2018-08-17 | End: 2018-08-17 | Stop reason: HOSPADM

## 2018-08-17 RX ORDER — HYDROCODONE BITARTRATE AND ACETAMINOPHEN 5; 325 MG/1; MG/1
1-2 TABLET ORAL EVERY 4 HOURS PRN
Qty: 30 TAB | Refills: 0 | Status: SHIPPED | OUTPATIENT
Start: 2018-08-17 | End: 2018-08-24

## 2018-08-17 RX ORDER — ACETAMINOPHEN 500 MG
500-1000 TABLET ORAL EVERY 6 HOURS PRN
COMMUNITY
End: 2021-01-22

## 2018-08-17 RX ADMIN — SODIUM CHLORIDE, SODIUM LACTATE, POTASSIUM CHLORIDE, CALCIUM CHLORIDE: 600; 310; 30; 20 INJECTION, SOLUTION INTRAVENOUS at 06:50

## 2018-08-17 RX ADMIN — ACETAMINOPHEN 1000 MG: 500 TABLET, FILM COATED ORAL at 06:30

## 2018-08-17 RX ADMIN — ALBUTEROL SULFATE 2.5 MG: 2.5 SOLUTION RESPIRATORY (INHALATION) at 10:20

## 2018-08-17 RX ADMIN — FENTANYL CITRATE 25 MCG: 50 INJECTION, SOLUTION INTRAMUSCULAR; INTRAVENOUS at 09:45

## 2018-08-17 RX ADMIN — OXYCODONE HYDROCHLORIDE 10 MG: 5 SOLUTION ORAL at 09:45

## 2018-08-17 RX ADMIN — CELECOXIB 200 MG: 200 CAPSULE ORAL at 06:30

## 2018-08-17 ASSESSMENT — PAIN SCALES - GENERAL
PAINLEVEL_OUTOF10: 0
PAINLEVEL_OUTOF10: 6
PAINLEVEL_OUTOF10: 5
PAINLEVEL_OUTOF10: 0

## 2018-08-17 NOTE — OR NURSING
1150 Handoff report received from JIMMY Choi. Patient resting quietly on gurney, O2 on per nasal cannula,  at bedside.    1220 Continues to sleep.  at side. O2 remains on at 1.5 LPM.    1225 Handoff report to JIMMY Choi.

## 2018-08-17 NOTE — PROGRESS NOTES
Narcotic  check, risk stratification, and patient informed consent undertaken in the office, does not need to be repeated in hospital setting.    Adelina Wilson M.D.  [facility]  [phone]

## 2018-08-17 NOTE — DISCHARGE INSTRUCTIONS
ACTIVITY: Rest and take it easy for the first 24 hours.  A responsible adult is recommended to remain with you during that time.  It is normal to feel sleepy.  We encourage you to not do anything that requires balance, judgment or coordination.    MILD FLU-LIKE SYMPTOMS ARE NORMAL. YOU MAY EXPERIENCE GENERALIZED MUSCLE ACHES, THROAT IRRITATION, HEADACHE AND/OR SOME NAUSEA.    FOR 24 HOURS DO NOT:  Drive, operate machinery or run household appliances.  Drink beer or alcoholic beverages.   Make important decisions or sign legal documents.    SPECIAL INSTRUCTIONS: *Discharge instructions after thyroidectomy:     You had surgery called thyroidectomy. This means that part or all of your thyroid gland was removed. The main job of the thyroid gland is to make thyroid hormone. This hormone controls your body’s metabolism. This is the way your body creates and uses energy. Removing the thyroid gland removes your body’s source of thyroid hormone. So after the surgery, you will need to take thyroid hormone pills daily. This helps keep the level of thyroid hormone in your body steady. This sheet tells you more about how to care for yourself after surgery.     Recovering After Surgery:     Get plenty of rest.     Care for your incision as directed. Remove dressing in 2 days.     Avoid heavy lifting and strenuous activity for 3-5 weeks.     Walk a few times daily. But don’t push yourself too hard. Slowly increase your pace and distance, as you feel able.     Return to work when your doctor says it’s okay.     Taking Your Thyroid Medication:     Take your medication as directed.     Use a pillbox labeled with the days of the week. This will help you remember whether you’ve taken your medication each day.     Take your medication with a liquid. But avoid taking it with soy milk. Soy milk can affect how well your body absorbs the medication. The pill needs to reach your stomach and not dissolve in your throat.     Try to take your  medication with the same types and amounts of food and liquid each day. This helps control the amount of thyroid hormone in your system.     After taking your medication:     Wait 4 hours before eating or drinking anything that contains soy.     Wait 4 hours before taking certain medications. These include:                 Iron supplements                 Calcium supplements                 Antacids that contain either calcium or aluminum hydroxide                 Medications that lower your cholesterol     Do not stop taking your medication even if you become pregnant.     Never stop taking medications on your own.     Keeping Your Doctor’s Appointments:     See your doctor for regular visits. These are needed to monitor your health.     Have routine blood tests done. These check the level of thyroid hormone in your body. This helps your doctor know whether to adjust the dosage of your medication if needed.     Tell your doctor about any signs of further thyroid problems.     Signs that you may have too much thyroid hormone in your body include:                 Restlessness                 Rapid weight loss                 Sweating                 Signs that you may have too little thyroid hormone in your body include:                 Fatigue or sluggishness                 Puffy hands, face, or feet                 Hoarseness                 Muscle pain                 Slow pulse (less than 60 beats per minute)     When to Call Your Doctor:  Call your doctor right away if you have any of the following:  Fever above 100.4°F  Swelling or bleeding at the incision site  Choking  Trouble breathing  A sore throat that lasts longer than 7 days  Tingling or cramps in your hands, feet, or lips     FOLLOW-UP:  · Please call my office at [phone] to make an appointment in 1 week     Office address:  [address] [place] [address] [address]  [place] [address]     Adelina Wilson M.D.  [facility]  [phone]**    DIET: To avoid  nausea, slowly advance diet as tolerated, avoiding spicy or greasy foods for the first day.  Add more substantial food to your diet according to your physician's instructions.  Babies can be fed formula or breast milk as soon as they are hungry.  INCREASE FLUIDS AND FIBER TO AVOID CONSTIPATION.    FOLLOW-UP APPOINTMENT:  A follow-up appointment should be arranged with your doctor in *1 week**; call to schedule.    You should CALL YOUR PHYSICIAN if you develop:  Fever greater than 101 degrees F.  Pain not relieved by medication, or persistent nausea or vomiting.  Excessive bleeding (blood soaking through dressing) or unexpected drainage from the wound.  Extreme redness or swelling around the incision site, drainage of pus or foul smelling drainage.  Inability to urinate or empty your bladder within 8 hours.  Problems with breathing or chest pain.    You should call 911 if you develop problems with breathing or chest pain.  If you are unable to contact your doctor or surgical center, you should go to the nearest emergency room or urgent care center.    Physician's telephone #: *[phone]**    If any questions arise, call your doctor.  If your doctor is not available, please feel free to call the Surgical Center at [phone].  The Center is open Monday through Friday from 7AM to 7PM.  You can also call the [facility] HOTLINE open 24 hours/day, 7 days/week and speak to a nurse at [phone], or toll free at [phone].    A registered nurse may call you a few days after your surgery to see how you are doing after your procedure.    MEDICATIONS: Resume taking daily medication.  Take prescribed pain medication with food.  If no medication is prescribed, you may take non-aspirin pain medication if needed.  PAIN MEDICATION CAN BE VERY CONSTIPATING.  Take a stool softener or laxative such as senokot, pericolace, or milk of magnesia if needed.    Prescription given for **Norco*.  Last pain medication given at  *_______**.    If your physician has prescribed pain medication that includes Acetaminophen (Tylenol), do not take additional Acetaminophen (Tylenol) while taking the prescribed medication.    Depression / Suicide Risk    As you are discharged from this [facility] facility, it is important to learn how to keep safe from harming yourself.    Recognize the warning signs:  · Abrupt changes in personality, positive or negative- including increase in energy   · Giving away possessions  · Change in eating patterns- significant weight changes-  positive or negative  · Change in sleeping patterns- unable to sleep or sleeping all the time   · Unwillingness or inability to communicate  · Depression  · Unusual sadness, discouragement and loneliness  · Talk of wanting to die  · Neglect of personal appearance   · Rebelliousness- reckless behavior  · Withdrawal from people/activities they love  · Confusion- inability to concentrate     If you or a loved one observes any of these behaviors or has concerns about self-harm, here's what you can do:  · Talk about it- your feelings and reasons for harming yourself  · Remove any means that you might use to hurt yourself (examples: pills, rope, extension cords, firearm)  · Get professional help from the community (Mental Health, Substance Abuse, psychological counseling)  · Do not be alone:Call your Safe Contact- someone whom you trust who will be there for you.  · Call your local CRISIS HOTLINE 986-9516 or 394-387-5892  · Call your local Children's Mobile Crisis Response Team [place] (810) 776-7687 or www.[employer]  · Call the toll free National Suicide Prevention Hotlines   · National Suicide Prevention Lifeline 642-793-ARIN (5468)  · National Hope Line Network 800-SUICIDE (829-7267)

## 2018-08-17 NOTE — OR NURSING
0891 Pt transferred to PACU. Report received from OR RN and anesthesia. Oral airway in place. Appears to have no distress at this time. VS stable, respirations even and unlabored. Medial neck sx site with dry gauze and Tegaderm, CDI.     0854 Airway dc/d.     8415 Handoff to JIMMY Rodriguez.    0965 Report back from JIMMY Rodriguez. Pt medicated with fent and oxy for pain 5/10 to left ear per JIMMY Rodriguez. Dr. Strange at bedside. Pt tolerating sips of water without complains of nausea.    1010 Pt educated on cough and deep breathing exercises. Able to pull IS at 1000.    1020 Pt complains of difficulty breathing. Pt sats 92% on 1.5L NC. Lungs clear to auscultation. Given nebulizer treatment.    1150 Pt is currently sleeping. Handoff to JIMMY Mcduffie.     1220 Report back from JIMMY Mcduffie. Pt is awake and alert. Placed on room air. Educated patient on discharge process.      1343 Pt and spouse educated on discharge instructions. Verbalized understanding. All questions answered. All belongings are with patient. Pt discharged home.

## 2018-08-17 NOTE — PROGRESS NOTES
Discharge instructions after thyroidectomy:    You had surgery called thyroidectomy. This means that part or all of your thyroid gland was removed. The main job of the thyroid gland is to make thyroid hormone. This hormone controls your body’s metabolism. This is the way your body creates and uses energy. Removing the thyroid gland removes your body’s source of thyroid hormone. So after the surgery, you will need to take thyroid hormone pills daily. This helps keep the level of thyroid hormone in your body steady. This sheet tells you more about how to care for yourself after surgery.    Recovering After Surgery:    Get plenty of rest.    Care for your incision as directed.    Avoid heavy lifting and strenuous activity for 3-5 weeks.    Walk a few times daily. But don’t push yourself too hard. Slowly increase your pace and distance, as you feel able.    Return to work when your doctor says it’s okay.    Taking Your Thyroid Medication:    Take your medication as directed.    Use a pillbox labeled with the days of the week. This will help you remember whether you’ve taken your medication each day.    Take your medication with a liquid. But avoid taking it with soy milk. Soy milk can affect how well your body absorbs the medication. The pill needs to reach your stomach and not dissolve in your throat.    Try to take your medication with the same types and amounts of food and liquid each day. This helps control the amount of thyroid hormone in your system.    After taking your medication:    Wait 4 hours before eating or drinking anything that contains soy.    Wait 4 hours before taking certain medications. These include:     Iron supplements     Calcium supplements     Antacids that contain either calcium or aluminum hydroxide     Medications that lower your cholesterol    Do not stop taking your medication even if you become pregnant.    Never stop taking medications on your own.    Keeping Your Doctor’s  Appointments:    See your doctor for regular visits. These are needed to monitor your health.    Have routine blood tests done. These check the level of thyroid hormone in your body. This helps your doctor know whether to adjust the dosage of your medication if needed.    Tell your doctor about any signs of further thyroid problems.    Signs that you may have too much thyroid hormone in your body include:     Restlessness     Rapid weight loss     Sweating     Signs that you may have too little thyroid hormone in your body include:     Fatigue or sluggishness     Puffy hands, face, or feet     Hoarseness     Muscle pain     Slow pulse (less than 60 beats per minute)    When to Call Your Doctor:  Call your doctor right away if you have any of the following:  Fever above 100.4°F  Swelling or bleeding at the incision site  Choking  Trouble breathing  A sore throat that lasts longer than 7 days  Tingling or cramps in your hands, feet, or lips    FOLLOW-UP:  ? Please call my office at [phone] to make an appointment in 1 week    Office address:  [address] [place] [address] [address]  [place] [address]    Adelina Wilson M.D.  [facility]  [phone]

## 2018-08-17 NOTE — OP REPORT
Operative Report    Date: 8/17/2018    Surgeon: Adelina Wilson M.D.    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    Procedure in detail: The patient was identified in the pre-operative holding area and brought to the operating room. Correct side and site were identified.  GETA was smoothly induced. The patient was prepped and draped in the usual sterile fashion.    With the patient in the supine position, the head of the bed slightly elevated, the neck slightly extended, and the patient intubated with a NIM endotracheal tube, the precordial electrodes were placed by the surgical assistant, who then monitored the left recurrent laryngeal nerve throughout the procedure.     The neck was prepared with betadiene and draped in the usual fashion. The neck was entered through a lower transverse cervical incision and carried down through the platysma. Subplatysmal flaps were dissected superiorly and inferiorly down to the sternal notch. The midline cervical fascia was incised down to the capsule of the thyroid. The strap muscles were mobilized off the left thyroid lobe, and the superior pole vessels progressively divided with the Harmonic. The thyroid veins were divided with the Harmonic. The recurrent laryngeal nerve and both parathyroids were identified and protected. Branches of the inferior thyroid artery were divided on the capsule of the gland with the Harmonic. Smaller vessels were either divided with the Harmonic or, when near to the recurrent nerve, divided between clamps and suture ligated with 3-0 Vicryl suture as the gland was dissected free from the nerve  and off the trachea. The gland was transected at the medial border of the right thyroid lobe and the left thyroid lobe and the isthmus were sent for pathologic exam.    Good hemostasis was assured.     The integrity of the left recurrent laryngeal nerve was documented. Small pieces of Surgicel Fibrillar were placed in in the left side of the neck. The midline cervical fascia was reapproximated with running 3-0 Vicryl. Platysma was reapproximated with interrupted 3-0 Vicryl. The skin was closed with monocryl.    The patient was awakened from general anesthetic, and was taken to the recovery room in stable condition.    Sponge and needle counts were correct at the end of the case.     Specimen: left thyroid lobe and isthmus    EBL: 25 mL    Dispo: stable, extubated, to PACU    Adelina Wilson M.D.  [facility]  [phone]

## 2018-09-06 ENCOUNTER — OFFICE VISIT (OUTPATIENT)
Dept: MEDICAL GROUP | Facility: PHYSICIAN GROUP | Age: 73
End: 2018-09-06
Payer: COMMERCIAL

## 2018-09-06 VITALS
OXYGEN SATURATION: 93 % | HEIGHT: 60 IN | DIASTOLIC BLOOD PRESSURE: 76 MMHG | WEIGHT: 189.6 LBS | TEMPERATURE: 98 F | BODY MASS INDEX: 37.22 KG/M2 | HEART RATE: 96 BPM | SYSTOLIC BLOOD PRESSURE: 114 MMHG | RESPIRATION RATE: 14 BRPM

## 2018-09-06 DIAGNOSIS — E89.0 H/O PARTIAL THYROIDECTOMY: ICD-10-CM

## 2018-09-06 DIAGNOSIS — F41.0 PANIC ATTACKS: ICD-10-CM

## 2018-09-06 DIAGNOSIS — E04.1 THYROID NODULE: ICD-10-CM

## 2018-09-06 PROCEDURE — 99214 OFFICE O/P EST MOD 30 MIN: CPT | Performed by: FAMILY MEDICINE

## 2018-09-06 RX ORDER — CLONAZEPAM 0.5 MG/1
0.5 TABLET ORAL 2 TIMES DAILY PRN
Qty: 30 TAB | Refills: 0 | Status: SHIPPED | OUTPATIENT
Start: 2018-09-06 | End: 2018-10-06

## 2018-09-06 NOTE — PROGRESS NOTES
Chief Complaint   Patient presents with   • Follow-Up     surgery fv    • Nodule     lung       HISTORY OF PRESENT ILLNESS: Patient is a 73 y.o. female established patient here today for the following concerns:    1. Panic attacks  Here today for follow up.  8/17/18 underwent partial thyroidectomy for very large thyroid nodule.  Pathology was benign, but developed panic attacks after surgery.  She reports that they have continued to some extent.  She would like to consider treatment for it.   Denies any leg swelling, CP.  She has hx of COPD and notes that the smoke has worsened her breathing slightly with occasional wheezing.  She is using her Asmanex and albuterol as needed.  No productive cough.  She does have pending labs prior to appointment with surgery in the next few weeks.     In addition, as part of the work up for her lungs she was found to have pulmonary nodules.  Never smoker, but has had significant second hand exposure with recommendations for follow up CT in 6-12 months.         Past Medical, Social, and Family history reviewed and updated in EPIC    Allergies:Bactrim ds and Morphine    Current Outpatient Prescriptions   Medication Sig Dispense Refill   • clonazePAM (KLONOPIN) 0.5 MG Tab Take 1 Tab by mouth 2 times a day as needed for up to 30 days. 30 Tab 0   • acetaminophen (TYLENOL) 500 MG Tab Take 500-1,000 mg by mouth every 6 hours as needed.     • Fluticasone-Salmeterol 232-14 MCG/ACT AEROSOL POWDER, BREATH ACTIVATED Inhale 1 Inhalation by mouth 2 times a day. 1 Each 11   • benazepril-hydrochlorthiazide (LOTENSIN HCT) 20-12.5 MG per tablet Take 1 Tab by mouth every day. 90 Tab 3   • atenolol (TENORMIN) 50 MG Tab Take 2 Tabs by mouth every day. 180 Tab 3   • esomeprazole (NEXIUM) 20 MG capsule Take 1 Cap by mouth every morning before breakfast. 90 Cap 3   • duloxetine (CYMBALTA) 30 MG Cap DR Particles Take 1 Cap by mouth every day. 90 Cap 3   • amitriptyline (ELAVIL) 25 MG Tab Take 100 mg by  mouth every evening.     • anastrozole (ARIMIDEX) 1 MG Tab Take 1 mg by mouth every day.     • GABAPENTIN 300 MG PO CAPS Take 300 mg by mouth 3 times a day.     • CALCIUM + D PO Take 2 Tabs by mouth every morning. Takes two     • MULTIVITAMIN PO Take 1 Tab by mouth every day.       No current facility-administered medications for this visit.          ROS:  Review of Systems   Constitutional: Negative for fever, chills, weight loss and malaise/fatigue.   HENT: Negative for ear pain, nosebleeds, congestion, sore throat and neck pain.    Eyes: Negative for blurred vision.   Respiratory: Negative for cough, sputum production, shortness of breath and wheezing.    Cardiovascular: Negative for chest pain, palpitations,  and leg swelling.   Gastrointestinal: Negative for heartburn, nausea, vomiting, diarrhea and abdominal pain.   Genitourinary: Negative for dysuria, urgency and frequency.   Musculoskeletal: Negative for myalgias, back pain and joint pain.   Skin: Negative for rash and itching.   Neurological: Negative for dizziness, tingling, tremors, sensory change, focal weakness and headaches.   Endo/Heme/Allergies: Does not bruise/bleed easily.   Psychiatric/Behavioral: Negative for depression, anxiety, suicidal ideas, insomnia and memory loss.      Exam:  Blood pressure 114/76, pulse 96, temperature 36.7 °C (98 °F), resp. rate 14, height 1.524 m (5'), weight 86 kg (189 lb 9.6 oz), SpO2 93 %.    General:  Well nourished, well developed in NAD  Head is grossly normal.  Neck: Supple without JVD   Pulmonary:  Normal effort.   Cardiovascular: Regular rate  Extremities: no clubbing, cyanosis, or edema.  Psych: affect appropriate      Please note that this dictation was created using voice recognition software. I have made every reasonable attempt to correct obvious errors, but I expect that there are errors of grammar and possibly content that I did not discover before finalizing the note.    Assessment/Plan:  1. Panic  attacks  For the mean time will use  - clonazePAM (KLONOPIN) 0.5 MG Tab; Take 1 Tab by mouth 2 times a day as needed for up to 30 days.  Dispense: 30 Tab; Refill: 0  Await thyroid and calcium labs.     2. H/O partial thyroidectomy  Noted healing well.     3. Thyroid nodule  Benign pathology s/p thyroidectomy    4. Lung nodule < 6cm on CT  Recheck in 6-12 months .    2 month follow up

## 2018-09-11 ENCOUNTER — HOSPITAL ENCOUNTER (OUTPATIENT)
Dept: LAB | Facility: MEDICAL CENTER | Age: 73
End: 2018-09-11
Attending: SURGERY
Payer: COMMERCIAL

## 2018-09-11 LAB — TSH SERPL DL<=0.005 MIU/L-ACNC: 0.62 UIU/ML (ref 0.38–5.33)

## 2018-09-11 PROCEDURE — 84443 ASSAY THYROID STIM HORMONE: CPT

## 2018-09-11 PROCEDURE — 36415 COLL VENOUS BLD VENIPUNCTURE: CPT

## 2018-09-17 DIAGNOSIS — F32.0 MILD MAJOR DEPRESSION (HCC): ICD-10-CM

## 2018-09-17 DIAGNOSIS — M54.9 CHRONIC BACK PAIN, UNSPECIFIED BACK LOCATION, UNSPECIFIED BACK PAIN LATERALITY: ICD-10-CM

## 2018-09-17 DIAGNOSIS — G89.29 CHRONIC BACK PAIN, UNSPECIFIED BACK LOCATION, UNSPECIFIED BACK PAIN LATERALITY: ICD-10-CM

## 2018-09-18 RX ORDER — DULOXETIN HYDROCHLORIDE 30 MG/1
CAPSULE, DELAYED RELEASE ORAL
Qty: 90 CAP | Refills: 0 | Status: SHIPPED | OUTPATIENT
Start: 2018-09-18 | End: 2019-01-11 | Stop reason: SDUPTHER

## 2018-09-18 NOTE — TELEPHONE ENCOUNTER
Was the patient seen in the last year in this department? Yes    Does patient have an active prescription for medications requested? No     Received Request Via: Pharmacy      Pt met protocol?: Yes, OV earlier this month

## 2018-10-05 ENCOUNTER — SLEEP CENTER VISIT (OUTPATIENT)
Dept: SLEEP MEDICINE | Facility: MEDICAL CENTER | Age: 73
End: 2018-10-05
Payer: COMMERCIAL

## 2018-10-05 VITALS
DIASTOLIC BLOOD PRESSURE: 78 MMHG | OXYGEN SATURATION: 93 % | BODY MASS INDEX: 37.3 KG/M2 | WEIGHT: 190 LBS | SYSTOLIC BLOOD PRESSURE: 124 MMHG | RESPIRATION RATE: 15 BRPM | HEIGHT: 60 IN | HEART RATE: 77 BPM

## 2018-10-05 DIAGNOSIS — E66.9 OBESITY (BMI 35.0-39.9 WITHOUT COMORBIDITY): ICD-10-CM

## 2018-10-05 DIAGNOSIS — G47.30 SLEEP DISORDER BREATHING: ICD-10-CM

## 2018-10-05 DIAGNOSIS — F51.3 SLEEP WALKING: ICD-10-CM

## 2018-10-05 PROCEDURE — 99204 OFFICE O/P NEW MOD 45 MIN: CPT | Performed by: FAMILY MEDICINE

## 2018-10-05 RX ORDER — ALPRAZOLAM 0.25 MG/1
0.25 TABLET ORAL NIGHTLY PRN
Qty: 2 TAB | Refills: 0 | Status: SHIPPED | OUTPATIENT
Start: 2018-10-05 | End: 2018-10-06

## 2018-10-05 NOTE — PROGRESS NOTES
"     [facility]  Consult Note     Date: 10/5/2018 / Time: 8:08 AM    Patient ID:   Name:             Tereza Sánchez   YOB: 1945  Age:                 73 y.o.  female   MRN:               [id number]      Thank you for requesting a sleep medicine consultation on Tereza Sánchez at the sleep center. He presents today with the chief complaints of snoring, and witnessed apneas. She is referred by Dr. Duarte for evaluation and treatment of sleep disorder breathing . She had OPO in 6/2018 which showed nocturnal hypoxia and has been on supplemental O2 at night time at 2L/min. She had a CT chest and was found to have  3 mm nodule in the left lower lobe. There is a 3 mm nodule in the left upper lobe. Additional 2 mm nodule in the left lingula. Recent PFT showed mild restrictive disease and currently on advair.     HISTORY OF PRESENT ILLNESS:       At night,  Tereza Sánchez goes to bed around 12 am on weekdays and  on weekends. She gets out of bed at 5 am on weekdays and on weekends.  She averages 5 hrs of sleep on a good night and few hrs on a bad night. Pt has bad nights few nights per week. She falls asleep within 60 minutes. She awakens 2-3 times a night due to back pain or bathroom use. It takes him 20 min to fall back asleep. During that time She lies in bed.  She does not use the bed only for sleeping. At the beginning of the night she watched TV for hours before trying to fall asleep.     She is aware of snoring/witnessed apneas but denies gasping or choking in sleep.  She  denies any symptoms of restless legs syndrome such as an \"urge to move\"  She  legs in the evening or bedtime. She  denies any symptoms of narcolepsy such as sleep paralysis or cataplexy. She has done sleep walking as an adult in recent past. Last episode was about 3 months ago however she has not done it since she has been on supplemental O2. or acting out of dreams. She  has not used any medications for her sleep " problem.    Overall,  Eric finds her sleep refreshing. When She  wakes up in the morning, She does feel tired. In terms of  excessive daytime sleepiness,  She complains of sleepiness while reading or watching TV. [place] sleepiness scale score is normal at  3.   She unintentionally takes regular nap. The naps are usually 30 min long.      REVIEW OF SYSTEMS:       Constitutional: Denies fevers, Denies weight changes  Eyes: Denies changes in vision, no eye pain  Ears/Nose/Throat/Mouth: + nasal congestion  Cardiovascular: Denies chest pain or palpitations   Respiratory: Denies shortness of breath , Denies cough  Gastrointestinal/Hepatic: Denies abdominal pain, nausea, vomiting, diarrhea, constipation or GI bleeding   Genitourinary: Denies bladder dysfunction, dysuria or frequency  Musculoskeletal/Rheum: + back pain   Neurological: Denies headache, confusion, memory loss or focal weakness/parasthesias  Psychiatric: denies mood disorder     Comprehensive review of systems form is reviewed with the patient and is attached in the EMR.     PMH:  has a past medical history of Anesthesia; Arthritis; Asthma; Bowel habit changes; Breath shortness; Cancer ([facility]); Chiari malformation (1970's); Chickenpox; Chronic back pain; Contracture of hand joint; Decreased lung capacity; Dental disorder; Emphysema of lung ([facility]); Heart burn; High cholesterol; Hypertension; Indigestion; Joint replacement; Obesity; Pain; Pain (08/06/2018); Peripheral edema (6/6/2013); Pneumonia; Scoliosis; Shortness of breath (08/06/2018); Sleep apnea; Sleep apnea (08/07/2018); Snoring; sore throat (1/15/15); and Syringomyelia ([facility]).  MEDS:   Current Outpatient Prescriptions:   •  DULoxetine (CYMBALTA) 30 MG Cap DR Particles, TAKE 1 CAPSULE DAILY, Disp: 90 Cap, Rfl: 0  •  clonazePAM (KLONOPIN) 0.5 MG Tab, Take 1 Tab by mouth 2 times a day as needed for up to 30 days., Disp: 30 Tab, Rfl: 0  •  acetaminophen (TYLENOL) 500 MG Tab, Take 500-1,000 mg by mouth  "every 6 hours as needed., Disp: , Rfl:   •  Fluticasone-Salmeterol 232-14 MCG/ACT AEROSOL POWDER, BREATH ACTIVATED, Inhale 1 Inhalation by mouth 2 times a day., Disp: 1 Each, Rfl: 11  •  benazepril-hydrochlorthiazide (LOTENSIN HCT) 20-12.5 MG per tablet, Take 1 Tab by mouth every day., Disp: 90 Tab, Rfl: 3  •  atenolol (TENORMIN) 50 MG Tab, Take 2 Tabs by mouth every day., Disp: 180 Tab, Rfl: 3  •  esomeprazole (NEXIUM) 20 MG capsule, Take 1 Cap by mouth every morning before breakfast., Disp: 90 Cap, Rfl: 3  •  amitriptyline (ELAVIL) 25 MG Tab, Take 100 mg by mouth every evening., Disp: , Rfl:   •  anastrozole (ARIMIDEX) 1 MG Tab, Take 1 mg by mouth every day., Disp: , Rfl:   •  GABAPENTIN 300 MG PO CAPS, Take 300 mg by mouth 3 times a day., Disp: , Rfl:   •  CALCIUM + D PO, Take 2 Tabs by mouth every morning. Takes two, Disp: , Rfl:   •  MULTIVITAMIN PO, Take 1 Tab by mouth every day., Disp: , Rfl:   ALLERGIES:   Allergies   Allergen Reactions   • Bactrim Ds Rash     Pt states \"I get a body rash\".   • Morphine Vomiting     hallucinations     SURGHX:   Past Surgical History:   Procedure Laterality Date   • THYROID LOBECTOMY Left 8/17/2018    Procedure: THYROID LOBECTOMY;  Surgeon: Adelina Wilson M.D.;  Location: SURGERY SAME DAY [facility] ORS;  Service: General   • THYROIDECTOMY TOTAL  8/17/2018    Procedure: NIMS RECURRENT LARYNGEAL NERVE MONITORING;  Surgeon: Adelina Wilson M.D.;  Location: SURGERY SAME DAY [facility] OR[facility];  Service: General   • MASTECTOMY  2/6/2015    Performed by Adelina Wilson M.D. at SURGERY [facility] ORS   • NODE BIOPSY SENTINEL  2/6/2015    Performed by Adelina Wilson M.D. at SURGERY [facility] ORS   • HIP ARTHROPLASTY TOTAL  5/19/08    Performed by FARTUN ERAZO at SURGERY [facility] ORS   • SHOULDER ARTHROPLASTY TOTAL  2004    Right   • JAN BY LAPAROSCOPY  2002   • HIP ARTHROPLASTY TOTAL     • HIP REPLACEMENT, TOTAL     • LAMINOTOMY     • OTHER NEUROLOGICAL SURG      cerebral " "shunt   • OTHER SURGICAL PROCEDURE  1973, 1977    \"remove cyst-cervical placement of shunt\"   • SHOULDER ILEANA-ARTHROPLASTY     • US-NEEDLE CORE BX-BREAST PANEL       SOCHX:  reports that she has never smoked. She has never used smokeless tobacco. She reports that she does not drink alcohol or use drugs..   FH:   Family History   Problem Relation Age of Onset   • Hypertension Mother    • Sleep Apnea Brother    • Cancer Neg Hx    • Diabetes Neg Hx    • Stroke Neg Hx    • Heart Disease Neg Hx            Physical Exam:  Vitals/ General Appearance:   Weight/BMI: Body mass index is 37.11 kg/m².  Blood pressure 124/78, pulse 77, resp. rate 15, height 1.524 m (5'), weight 86.2 kg (190 lb), SpO2 93 %, not currently breastfeeding.  Vitals:    10/05/18 0800   BP: 124/78   BP Location: Right arm   Patient Position: Sitting   BP Cuff Size: Adult   Pulse: 77   Resp: 15   SpO2: 93%   Weight: 86.2 kg (190 lb)   Height: 1.524 m (5')       Pt. is alert and oriented to time, place and person. Cooperative and in no apparent distress.       1. Head: Atraumatic, normocephalic.   2. Ears: Normal tympanic membrane and no discharge  3. Nose: No inferior turbinate hypertophy,   4. Throat: Oropharynx appears crowded in that the palate is overhanging (Malam Savita scale 4. Tonsils are not enlarged, uvula is large, pharynx not inflamed. Tongue is large. She wears complete upper and lower dentures  5. Neck: Supple. No thyromegaly  6. Chest: Trachea central  7. Lungs auscultation: B/L good air entry, vesicular breath sounds, no adventitious sounds  8. Heart auscultation: 1st and 2nd heart sounds normal, regular rhythm. No appreciable murmur.  9. Abdomen: Soft, non tender, no organomegaly. Bowel sounds present  10. Extremities:  no pedal edema.   Peripheral pulses felt.  11. Skin: No rash  12. NEUROLOGICAL EXAMINATION: On neurological exam, the patient was alert and oriented x3. speech was clear and fluent without dysarthria.    INVESTIGATIONS: "   PFT (6/6/18) : Spirometry is suggestive of mild restrictive defect with no   significant post-bronchodilator change.  The post-bronchodilator ratio of FEV1   to forced vital capacity is 90% pre-bronchodilator FEV1 is 1.43 liters, 74%   of predicted with no post-bronchodilator change and pre-bronchodilator forced   vital capacity is reduced to 1.63 liters, 66% of predicted and   post-bronchodilator.       ASSESSMENT AND PLAN     1. She  has symptoms of Obstructive Sleep Apnea (SELWYN). She  has excessive daytime sleepiness that interferes with activites of daily living. She  risk factors for SELWYN include obesity, thick neck, and crowded oropharynx.     The pathophysiology of SELWYN and the increased risk of cardiovascular morbidity from untreated SELWYN is discussed in detail with the patient.      We have discussed diagnostic options including in-laboratory, attended polysomnography and home sleep testing. We have also discussed treatment options including airway pressurization, reconstructive otolaryngologic surgery, dental appliances and weight management.       Subsequently,treatment options will be discussed based on the diagnostic study. Meanwhile, She is urged to avoid supine sleep, weight gain and alcoholic beverages since all of these can worsen SELWYN. She is cautioned against drowsy driving. If She feels sleepy while driving, She must pull over for a break/nap, rather than persist on the road, in the interest of She own safety and that of others on the road.    Plan  -  She  will be scheduled for an overnight  PSG  to assess sleep related  breathing disorder.  - continue O2 at night time      - xanax is prescribed only for the night of the SS. I have obtained and reviewed patient utilization report from [facility] prior to writing prescription for controlled substance II, III or IV. Based on the report and my clinical assessment the prescription is medically necessary. Pt was advised to usexanaxappropriately. Do not  drive or use fast moving machinery after taking the medication. Side affects were discussed in detail.      2. NREM Parasomnia: Sleep walking. No injuries due to sleep walking and it has improved after being on supplemental oxygen. It could have been precipitated by SDB.      Plan   - hold off on starting clonopin PRN for sleep walking. Reassess after the sleep study      -Bed safety: In the meantime, pt. is recommended to take bed-side safety precautions such as remove hard furniture or sharp objects from the bedside. Carpet the floor. Lower the level of the bed. Use padded side rails to prevent fall. Bed partner to use separate bed until symptoms resolve, or, at least, use a body pillow between the pt and the bed-partner to prevent injury.      3. Regarding treatment of other past medical problems and general health maintenance,  She is urged to follow up with PCP.    Patient's body mass index is 37.11 kg/m². Exercise and nutrition counseling were performed at this visit.    - recommended healthy diet with portion control , aerobic exercise 5x week  - obesity counseling done today: Drink more water. Reduce carb intake in drinks. Try to eat 3 meals a day with last meal 4-6 hours prior to bedtime. Limit caloric intake to 1600 - 1800 kcal per day.Restrict carbohydrate intake to 50 g per day to 100 g per day

## 2018-10-05 NOTE — PATIENT INSTRUCTIONS
Bed safety: pt. is recommended to take bed-side safety precautions such as remove hard furniture or sharp objects from the bedside. Carpet the floor. Lower the level of the bed. Use padded side rails to prevent fall. Bed partner to use separate bed until symptoms resolve, or, at least, use a body pillow between the pt and the bed-partner to prevent injury.    Sleep Apnea  Sleep apnea is a condition that affects breathing. People with sleep apnea have moments during sleep when their breathing pauses briefly or gets shallow. Sleep apnea can cause these symptoms:  · Trouble staying asleep.  · Sleepiness or tiredness during the day.  · Irritability.  · Loud snoring.  · Morning headaches.  · Trouble concentrating.  · Forgetting things.  · Less interest in sex.  · Being sleepy for no reason.  · Mood swings.  · Personality changes.  · Depression.  · Waking up a lot during the night to pee (urinate).  · Dry mouth.  · Sore throat.  Follow these instructions at home:  · Make any changes in your routine that your doctor recommends.  · Eat a healthy, well-balanced diet.  · Take over-the-counter and prescription medicines only as told by your doctor.  · Avoid using alcohol, calming medicines (sedatives), and narcotic medicines.  · Take steps to lose weight if you are overweight.  · If you were given a machine (device) to use while you sleep, use it only as told by your doctor.  · Do not use any tobacco products, such as cigarettes, chewing tobacco, and e-cigarettes. If you need help quitting, ask your doctor.  · Keep all follow-up visits as told by your doctor. This is important.  Contact a doctor if:  · The machine that you were given to use during sleep is uncomfortable or does not seem to be working.  · Your symptoms do not get better.  · Your symptoms get worse.  Get help right away if:  · Your chest hurts.  · You have trouble breathing in enough air (shortness of breath).  · You have an uncomfortable feeling in your back,  arms, or stomach.  · You have trouble talking.  · One side of your body feels weak.  · A part of your face is hanging down (drooping).  These symptoms may be an emergency. Do not wait to see if the symptoms will go away. Get medical help right away. Call your local emergency services (911 in the [place] Do not drive yourself to the hospital.   This information is not intended to replace advice given to you by your health care provider. Make sure you discuss any questions you have with your health care provider.  Document Released: 09/26/2009 Document Revised: 08/13/2017 Document Reviewed: 09/26/2016  [employer] Interactive Patient Education © 2017 [employer]

## 2018-10-26 ENCOUNTER — SLEEP STUDY (OUTPATIENT)
Dept: SLEEP MEDICINE | Facility: MEDICAL CENTER | Age: 73
End: 2018-10-26
Attending: FAMILY MEDICINE
Payer: COMMERCIAL

## 2018-10-26 DIAGNOSIS — F51.3 SLEEP WALKING: ICD-10-CM

## 2018-10-26 DIAGNOSIS — G47.30 SLEEP DISORDER BREATHING: ICD-10-CM

## 2018-10-26 PROCEDURE — 95810 POLYSOM 6/> YRS 4/> PARAM: CPT | Performed by: FAMILY MEDICINE

## 2018-10-29 NOTE — PROCEDURES
The patient underwent a diagnostic polysomnogram using the standard montage for measurement of parameters of sleep, respiratory events, movement abnormalities, and heart rate and rhythm.    A microphone was used to monitor snoring.  Interpretation:  Study start time was 08:52:53 PM.  Diagnostic recording time was 8h 56.0m with a total sleep time of 7h 17.0m resulting in a sleep efficiency of 81.53%%.    Sleep latency from the start of the study was 28 minutes and the latency from sleep to REM was 102 minutes.  In total,44  arousals were scored for an arousal index of 6.0.  Respiratory:  There were a total of 0 apneas consisting of 0 obstructive apneas, 0 mixed apneas, and 0 central apneas.  A total of 170 hypopneas were scored.  The apnea index was 0.00 per hour and the hypopnea index was 23.34 per hour resulting in an overall AHI of 23.34.  AHI during rem was 51.8 and AHI while supine was 24.32.  Oximetry:  There was a mean oxygen saturation of 88.0% with a minimum oxygen saturation of 72.0%.  Time spent with oxygen saturations below 89% was 279.6 minutes.  Cardiac:  The highest heart rate seen while awake was 105 BPM while the highest heart rate during sleep was 99 BPM with an average sleeping heart rate of 82 BPM.  Limb Movements:  There were a total of 0 PLMs during sleep, of which 0 were PLMS arousals.  This resulted in a PLMS index of 0.0 and a PLMS arousal index of 0.0.      Technical summary:The patient underwent a diagnostic polysomnogram. This was done with extended EMG montage.   Respiratory effort was assessed with the use of a thoracic and abdominal monitor and overnight oximetry was obtained. Audio and video recordings were reviewed. This was a fully attended study and sleep stage scoring was performed. The test was technically adequate.       Scoring Criteria: A modification of the the AASM Manual for the Scoring of Sleep and Associated Events, 2012, was used.   Obstructive apnea was scored by  cessation of airflow for at least 10 seconds with continuing respiratory effort.  Central apnea was scored by cessation of airflow for at least 10 seconds with no effort.  Hypopnea was scored by a 30% or more reduction in airflow for at least 10 seconds accompanied by an arterial oxygen desaturation of 3% or more.  (For Medicare patients, hypopneas were scored by a 30% or more reduction in airflow for at least 10 seconds accompanied by an arterial oxygen saturation of 4% or more, as required by their insurance, CMS.    General sleep summary:  General sleep summary:  During the overnight study, the sleep latency was 28 min which is prolonged. The REM latency was 102 which is increased. The total sleep time was 437 min and sleep efficiency was 81 % which is normal.  Sleep stage proportions showed decreased REM and increased WASO of 99 min.  In regards to sleep quality there was a mild degree of sleep fragmentation as shown by the arousal index of 6 an hourThe arousals were due to spontaneous arousal.    Respiratory summary: During the overnight study, the patient demonstrated moderate obstructive sleep apnea as shown by the apnea hypopnea index of 23/hr. There was a total of 0 apneas and 170 hypopneas. In the supine position the respiratory disturbance index was 20 an hour and in the non-supine position the respiratory disturbance index was 2 per hour. The respiratory events were associated with O2 fausto of 81% and average O2 saturation of 87%. She spent 271 % of sleep time below 89% O2 saturation. There was snoring. The patient demonstrated a NSr with an average heartbeat of 82 bpm.     Periodic limb movement summary: The patient demonstrated an normal degree of periodic limb movements as shown by the PLM index of 0 per hour.    The EMG leads showed excessive activity however she did not meed the criteria for REM w/o Atonia. She had non purposeful leg movement during sleep. No other abnormal behavior were observed  except somnambulism, which was observed in 3rd REM cycle around 0350 am with screaming. However upon awaking patient did not remember any any dreams.       Impression:  1.  Moderate SELWYN  2.  Sleep related hypoxia   3.  somnambulism       Recommendations:  Recommend the patient return for a CPAP titration. In some cases alternative treatment options may prove effective in resolving sleep apnea and these options include upper airway surgery, the use of a dental orthotic or weight loss and positional therapy. Clinical correlation is required. In general patients with sleep apnea are advised to avoid alcohol and sedatives and to not operate a motor vehicle while drowsy and are at a greater risk for cardiovascular disease.

## 2018-11-01 DIAGNOSIS — I10 ESSENTIAL HYPERTENSION: ICD-10-CM

## 2018-11-02 RX ORDER — ATENOLOL 50 MG/1
TABLET ORAL
Qty: 180 TAB | Refills: 0 | Status: SHIPPED | OUTPATIENT
Start: 2018-11-02 | End: 2020-08-24 | Stop reason: SDUPTHER

## 2018-11-02 NOTE — TELEPHONE ENCOUNTER
Was the patient seen in the last year in this department? Yes    Does patient have an active prescription for medications requested? No     Received Request Via: Pharmacy      Pt met protocol?: Yes  pt last ov 9/2018   BP Readings from Last 1 Encounters:   10/05/18 124/78

## 2018-11-05 DIAGNOSIS — K21.9 GASTROESOPHAGEAL REFLUX DISEASE, ESOPHAGITIS PRESENCE NOT SPECIFIED: ICD-10-CM

## 2018-11-28 ENCOUNTER — HOSPITAL ENCOUNTER (OUTPATIENT)
Dept: RADIOLOGY | Facility: MEDICAL CENTER | Age: 73
End: 2018-11-28
Attending: SPECIALIST
Payer: COMMERCIAL

## 2018-11-28 ENCOUNTER — HOSPITAL ENCOUNTER (OUTPATIENT)
Dept: RADIOLOGY | Facility: MEDICAL CENTER | Age: 73
End: 2018-11-28
Attending: FAMILY MEDICINE
Payer: COMMERCIAL

## 2018-11-28 ENCOUNTER — OFFICE VISIT (OUTPATIENT)
Dept: MEDICAL GROUP | Facility: PHYSICIAN GROUP | Age: 73
End: 2018-11-28
Payer: COMMERCIAL

## 2018-11-28 VITALS
TEMPERATURE: 97.6 F | RESPIRATION RATE: 16 BRPM | WEIGHT: 190 LBS | DIASTOLIC BLOOD PRESSURE: 82 MMHG | SYSTOLIC BLOOD PRESSURE: 130 MMHG | HEIGHT: 60 IN | BODY MASS INDEX: 37.3 KG/M2 | OXYGEN SATURATION: 96 % | HEART RATE: 80 BPM

## 2018-11-28 DIAGNOSIS — G47.34 NOCTURNAL HYPOXIA: ICD-10-CM

## 2018-11-28 DIAGNOSIS — I10 ESSENTIAL HYPERTENSION: ICD-10-CM

## 2018-11-28 DIAGNOSIS — G47.33 OSA (OBSTRUCTIVE SLEEP APNEA): ICD-10-CM

## 2018-11-28 DIAGNOSIS — E89.0 H/O PARTIAL THYROIDECTOMY: ICD-10-CM

## 2018-11-28 DIAGNOSIS — M79.671 RIGHT FOOT PAIN: ICD-10-CM

## 2018-11-28 DIAGNOSIS — Z12.31 ENCOUNTER FOR SCREENING MAMMOGRAM FOR MALIGNANT NEOPLASM OF BREAST: ICD-10-CM

## 2018-11-28 PROCEDURE — 73630 X-RAY EXAM OF FOOT: CPT | Mod: RT

## 2018-11-28 PROCEDURE — 77067 SCR MAMMO BI INCL CAD: CPT

## 2018-11-28 PROCEDURE — 99214 OFFICE O/P EST MOD 30 MIN: CPT | Performed by: FAMILY MEDICINE

## 2018-11-28 RX ORDER — BENAZEPRIL HYDROCHLORIDE AND HYDROCHLOROTHIAZIDE 20; 12.5 MG/1; MG/1
1 TABLET ORAL DAILY
Qty: 90 TAB | Refills: 3 | Status: SHIPPED | OUTPATIENT
Start: 2018-11-28 | End: 2019-10-28 | Stop reason: SDUPTHER

## 2018-11-28 NOTE — PROGRESS NOTES
Chief Complaint   Patient presents with   • Other     thyroid lab fv        HISTORY OF PRESENT ILLNESS: Patient is a 73 y.o. female established patient here today for the following concerns:    1. Essential hypertension  Here for follow up on HTN.  Doing well with the benazepril HCTZ.  NO dizziness or lightheaded.  No CP.      2. H/O partial thyroidectomy  Doing well s/p thyroidectomy partial for thyroid nodule, benign pathology and repeat thyroid labs show euthyroid.     3. SELWYN (obstructive sleep apnea)  4. Nocturnal hypoxia  Found to have SELWYN moderate and nocturnal hypoxia.  She has started O2 and is awaiting further consultation with sleep medicine about her sleep study.  Reports she feels much improved since starting the oxygen.     5. Right foot pain  Reports about 3 months ago, dropped a vacuum  on her right foot.  It was bruised and swollen, but continues to be painful to the touch on the lateral dorsal midfoot and medial aspect.  Able to wear shoes and bear weight.       Past Medical, Social, and Family history reviewed and updated in EPIC    Allergies:Bactrim ds and Morphine    Current Outpatient Prescriptions   Medication Sig Dispense Refill   • benazepril-hydrochlorthiazide (LOTENSIN HCT) 20-12.5 MG per tablet Take 1 Tab by mouth every day. 90 Tab 3   • esomeprazole (NEXIUM) 20 MG capsule TAKE 1 CAPSULE EVERY MORNING BEFORE BREAKFAST 90 Cap 0   • atenolol (TENORMIN) 50 MG Tab TAKE 2 TABLETS DAILY 180 Tab 0   • DULoxetine (CYMBALTA) 30 MG Cap DR Particles TAKE 1 CAPSULE DAILY 90 Cap 0   • Fluticasone-Salmeterol 232-14 MCG/ACT AEROSOL POWDER, BREATH ACTIVATED Inhale 1 Inhalation by mouth 2 times a day. 1 Each 11   • amitriptyline (ELAVIL) 25 MG Tab Take 100 mg by mouth every evening.     • anastrozole (ARIMIDEX) 1 MG Tab Take 1 mg by mouth every day.     • GABAPENTIN 300 MG PO CAPS Take 300 mg by mouth 3 times a day.     • CALCIUM + D PO Take 2 Tabs by mouth every morning. Takes two     •  MULTIVITAMIN PO Take 1 Tab by mouth every day.     • acetaminophen (TYLENOL) 500 MG Tab Take 500-1,000 mg by mouth every 6 hours as needed.       No current facility-administered medications for this visit.          ROS:  Review of Systems   Constitutional: Negative for fever, chills, weight loss and malaise/fatigue.   HENT: Negative for ear pain, nosebleeds, congestion, sore throat and neck pain.    Eyes: Negative for blurred vision.   Respiratory: Negative for cough, sputum production, shortness of breath and wheezing.    Cardiovascular: Negative for chest pain, palpitations,  and leg swelling.   Gastrointestinal: Negative for heartburn, nausea, vomiting, diarrhea and abdominal pain.   Genitourinary: Negative for dysuria, urgency and frequency.   Musculoskeletal: Negative for myalgias, back pain and joint pain.   Skin: Negative for rash and itching.   Neurological: Negative for dizziness, tingling, tremors, sensory change, focal weakness and headaches.   Endo/Heme/Allergies: Does not bruise/bleed easily.   Psychiatric/Behavioral: Negative for depression, anxiety, suicidal ideas, insomnia and memory loss.      Exam:  Blood pressure 130/82, pulse 80, temperature 36.4 °C (97.6 °F), resp. rate 16, height 1.524 m (5'), weight 86.2 kg (190 lb), SpO2 96 %, not currently breastfeeding.    General:  Well nourished, well developed in NAD  Head is grossly normal.  Neck: Supple without JVD   Pulmonary:  Normal effort.   Cardiovascular: Regular rate  Extremities: no clubbing, cyanosis, or edema.  Psych: affect appropriate  MSK; Right foot with some tenderness over the midfoot.  No deformity appreciated.      Please note that this dictation was created using voice recognition software. I have made every reasonable attempt to correct obvious errors, but I expect that there are errors of grammar and possibly content that I did not discover before finalizing the note.    Assessment/Plan:  1. Essential hypertension  -  benazepril-hydrochlorthiazide (LOTENSIN HCT) 20-12.5 MG per tablet; Take 1 Tab by mouth every day.  Dispense: 90 Tab; Refill: 3    2. H/O partial thyroidectomy  Stable without replacement.     3. SELWYN (obstructive sleep apnea)  Awaiting consultation with Pulm    4. Nocturnal hypoxia  Continue O2.     5. Right foot pain  - DX-FOOT-COMPLETE 3+ RIGHT; Future    3-6 month follow up

## 2019-01-11 DIAGNOSIS — G89.29 CHRONIC BACK PAIN, UNSPECIFIED BACK LOCATION, UNSPECIFIED BACK PAIN LATERALITY: ICD-10-CM

## 2019-01-11 DIAGNOSIS — F32.0 MILD MAJOR DEPRESSION (HCC): ICD-10-CM

## 2019-01-11 DIAGNOSIS — K21.9 GASTROESOPHAGEAL REFLUX DISEASE, ESOPHAGITIS PRESENCE NOT SPECIFIED: ICD-10-CM

## 2019-01-11 DIAGNOSIS — M54.9 CHRONIC BACK PAIN, UNSPECIFIED BACK LOCATION, UNSPECIFIED BACK PAIN LATERALITY: ICD-10-CM

## 2019-01-11 RX ORDER — DULOXETIN HYDROCHLORIDE 30 MG/1
30 CAPSULE, DELAYED RELEASE ORAL DAILY
Qty: 90 CAP | Refills: 1 | Status: SHIPPED | OUTPATIENT
Start: 2019-01-11 | End: 2019-07-12 | Stop reason: SDUPTHER

## 2019-02-16 ENCOUNTER — HOSPITAL ENCOUNTER (OUTPATIENT)
Dept: LAB | Facility: MEDICAL CENTER | Age: 74
End: 2019-02-16
Attending: SURGERY
Payer: COMMERCIAL

## 2019-02-16 LAB — TSH SERPL DL<=0.005 MIU/L-ACNC: 0.7 UIU/ML (ref 0.38–5.33)

## 2019-02-16 PROCEDURE — 84443 ASSAY THYROID STIM HORMONE: CPT

## 2019-02-16 PROCEDURE — 36415 COLL VENOUS BLD VENIPUNCTURE: CPT

## 2019-03-18 ENCOUNTER — HOSPITAL ENCOUNTER (OUTPATIENT)
Dept: RADIOLOGY | Facility: MEDICAL CENTER | Age: 74
End: 2019-03-18
Attending: INTERNAL MEDICINE
Payer: COMMERCIAL

## 2019-03-18 DIAGNOSIS — C50.411 MALIGNANT NEOPLASM OF UPPER-OUTER QUADRANT OF RIGHT FEMALE BREAST, UNSPECIFIED ESTROGEN RECEPTOR STATUS (HCC): ICD-10-CM

## 2019-03-18 PROCEDURE — 77080 DXA BONE DENSITY AXIAL: CPT

## 2019-06-05 ENCOUNTER — HOSPITAL ENCOUNTER (OUTPATIENT)
Facility: MEDICAL CENTER | Age: 74
End: 2019-06-05
Attending: FAMILY MEDICINE
Payer: COMMERCIAL

## 2019-06-05 ENCOUNTER — OFFICE VISIT (OUTPATIENT)
Dept: URGENT CARE | Facility: PHYSICIAN GROUP | Age: 74
End: 2019-06-05
Payer: COMMERCIAL

## 2019-06-05 VITALS
OXYGEN SATURATION: 92 % | DIASTOLIC BLOOD PRESSURE: 82 MMHG | HEART RATE: 108 BPM | TEMPERATURE: 99.2 F | RESPIRATION RATE: 18 BRPM | HEIGHT: 60 IN | WEIGHT: 190 LBS | BODY MASS INDEX: 37.3 KG/M2 | SYSTOLIC BLOOD PRESSURE: 128 MMHG

## 2019-06-05 DIAGNOSIS — E87.6 HYPOKALEMIA: ICD-10-CM

## 2019-06-05 DIAGNOSIS — R79.89 ELEVATED D-DIMER: ICD-10-CM

## 2019-06-05 DIAGNOSIS — R60.0 LOWER EXTREMITY EDEMA: ICD-10-CM

## 2019-06-05 LAB
ANION GAP SERPL CALC-SCNC: 13 MMOL/L (ref 0–11.9)
BNP SERPL-MCNC: 19 PG/ML (ref 0–100)
BUN SERPL-MCNC: 23 MG/DL (ref 8–22)
CALCIUM SERPL-MCNC: 9.7 MG/DL (ref 8.5–10.5)
CHLORIDE SERPL-SCNC: 104 MMOL/L (ref 96–112)
CO2 SERPL-SCNC: 23 MMOL/L (ref 20–33)
CREAT SERPL-MCNC: 1.16 MG/DL (ref 0.5–1.4)
D DIMER PPP IA.FEU-MCNC: 1.13 UG/ML (FEU) (ref 0–0.5)
GLUCOSE SERPL-MCNC: 84 MG/DL (ref 65–99)
POTASSIUM SERPL-SCNC: 3.4 MMOL/L (ref 3.6–5.5)
SODIUM SERPL-SCNC: 140 MMOL/L (ref 135–145)

## 2019-06-05 PROCEDURE — 85379 FIBRIN DEGRADATION QUANT: CPT

## 2019-06-05 PROCEDURE — 99214 OFFICE O/P EST MOD 30 MIN: CPT | Performed by: FAMILY MEDICINE

## 2019-06-05 PROCEDURE — 80048 BASIC METABOLIC PNL TOTAL CA: CPT

## 2019-06-05 PROCEDURE — 83880 ASSAY OF NATRIURETIC PEPTIDE: CPT | Mod: GZ

## 2019-06-05 ASSESSMENT — ENCOUNTER SYMPTOMS
NAUSEA: 0
ABDOMINAL PAIN: 0
FOCAL WEAKNESS: 0
MYALGIAS: 0
SENSORY CHANGE: 0
FEVER: 0
VOMITING: 0
WEIGHT LOSS: 0

## 2019-06-06 ENCOUNTER — HOSPITAL ENCOUNTER (OUTPATIENT)
Dept: RADIOLOGY | Facility: MEDICAL CENTER | Age: 74
End: 2019-06-06
Attending: FAMILY MEDICINE
Payer: COMMERCIAL

## 2019-06-06 DIAGNOSIS — R60.0 LOWER EXTREMITY EDEMA: ICD-10-CM

## 2019-06-06 DIAGNOSIS — R79.89 ELEVATED D-DIMER: ICD-10-CM

## 2019-06-06 PROCEDURE — 93970 EXTREMITY STUDY: CPT

## 2019-06-06 NOTE — PROGRESS NOTES
Subjective:      Tereza Sánchez is a 73 y.o. female who presents with Leg Swelling (bilat leg swelling x2 wks)            2 weeks bilateral ankle swelling. Burning sensation left knee. No change in chronic SOB. No new orthopnea. No new medications. No chest pain.  No calf pain.  No chest pain.  No hemoptysis.  No other aggravating or alleviating factors.        Review of Systems   Constitutional: Negative for fever and weight loss.   Gastrointestinal: Negative for abdominal pain, nausea and vomiting.   Musculoskeletal: Negative for joint pain ( No other joints affected) and myalgias.   Skin: Negative for itching and rash.   Neurological: Negative for sensory change and focal weakness.     .  Medications, Allergies, and current problem list reviewed today in Epic       Objective:     /82 (BP Location: Right arm, Patient Position: Sitting, BP Cuff Size: Adult)   Pulse (!) 108   Temp 37.3 °C (99.2 °F) (Temporal)   Resp 18   Ht 1.524 m (5')   Wt 86.2 kg (190 lb)   SpO2 92%   BMI 37.11 kg/m²      Physical Exam   Constitutional: She appears well-developed and well-nourished. No distress.   HENT:   Head: Normocephalic and atraumatic.   Mouth/Throat: Oropharynx is clear and moist.   Neck: Normal range of motion. Neck supple. No JVD present.   Cardiovascular: Normal rate, regular rhythm and normal heart sounds.    No murmur heard.  Pulse now 90   Pulmonary/Chest: Effort normal and breath sounds normal.   Musculoskeletal: She exhibits edema ( 1+ right 2+ left ankle). She exhibits no tenderness.   Skin: Skin is warm and dry. No rash noted.               Assessment/Plan:     1. Lower extremity edema  Basic Metabolic Panel    BTYPE NATRIURETIC PEPTIDE    D-DIMER     Differential diagnosis, natural history, supportive care, and indications for immediate follow-up discussed at length.     We will follow-up on laboratory studies.  Relatively low risk for DVT however she does have a past medical history of breast  cancer.

## 2019-06-07 ENCOUNTER — TELEPHONE (OUTPATIENT)
Dept: URGENT CARE | Facility: PHYSICIAN GROUP | Age: 74
End: 2019-06-07

## 2019-06-07 RX ORDER — POTASSIUM CHLORIDE 20 MEQ/1
20 TABLET, EXTENDED RELEASE ORAL 2 TIMES DAILY
Qty: 14 TAB | Refills: 0 | Status: SHIPPED | OUTPATIENT
Start: 2019-06-07 | End: 2019-06-14

## 2019-06-07 RX ORDER — FUROSEMIDE 20 MG/1
20 TABLET ORAL DAILY
Qty: 3 TAB | Refills: 0 | Status: SHIPPED | OUTPATIENT
Start: 2019-06-07 | End: 2019-06-10

## 2019-06-07 NOTE — PROGRESS NOTES
Discussed current findings with Dr. Duarte.  Will Rx Lasix for 3 days along with potassium.  Recheck BMP next week.    Compression stockings and elevation discussed.

## 2019-06-08 ENCOUNTER — TELEPHONE (OUTPATIENT)
Dept: URGENT CARE | Facility: PHYSICIAN GROUP | Age: 74
End: 2019-06-08

## 2019-06-08 NOTE — TELEPHONE ENCOUNTER
Lolita with [facility] phoned stating that they are unable to process the lab work due to NDC 10 codes not being accepted by the Insurance.  The are asking for different codes.

## 2019-06-22 ENCOUNTER — HOSPITAL ENCOUNTER (OUTPATIENT)
Dept: LAB | Facility: MEDICAL CENTER | Age: 74
End: 2019-06-22
Attending: FAMILY MEDICINE
Payer: COMMERCIAL

## 2019-06-22 DIAGNOSIS — E87.6 HYPOKALEMIA: ICD-10-CM

## 2019-06-22 LAB
ANION GAP SERPL CALC-SCNC: 14 MMOL/L (ref 0–11.9)
BUN SERPL-MCNC: 16 MG/DL (ref 8–22)
CALCIUM SERPL-MCNC: 9.7 MG/DL (ref 8.5–10.5)
CHLORIDE SERPL-SCNC: 103 MMOL/L (ref 96–112)
CO2 SERPL-SCNC: 26 MMOL/L (ref 20–33)
CREAT SERPL-MCNC: 1.02 MG/DL (ref 0.5–1.4)
GLUCOSE SERPL-MCNC: 106 MG/DL (ref 65–99)
POTASSIUM SERPL-SCNC: 3.5 MMOL/L (ref 3.6–5.5)
SODIUM SERPL-SCNC: 143 MMOL/L (ref 135–145)

## 2019-06-22 PROCEDURE — 36415 COLL VENOUS BLD VENIPUNCTURE: CPT

## 2019-06-22 PROCEDURE — 80048 BASIC METABOLIC PNL TOTAL CA: CPT

## 2019-06-24 RX ORDER — FUROSEMIDE 40 MG/1
40 TABLET ORAL DAILY
Qty: 5 TAB | Refills: 0 | Status: SHIPPED | OUTPATIENT
Start: 2019-06-24 | End: 2019-06-29

## 2019-06-24 NOTE — PROGRESS NOTES
Repeat labs are stable.  She got minimal relief with 20 mg of Lasix for 3 days.  Will try 40 mg daily for 5 days and ask her to make an appoint follow-up with Dr. Duarte.

## 2019-07-10 DIAGNOSIS — K21.9 GASTROESOPHAGEAL REFLUX DISEASE, ESOPHAGITIS PRESENCE NOT SPECIFIED: ICD-10-CM

## 2019-07-12 DIAGNOSIS — M54.9 CHRONIC BACK PAIN, UNSPECIFIED BACK LOCATION, UNSPECIFIED BACK PAIN LATERALITY: ICD-10-CM

## 2019-07-12 DIAGNOSIS — G89.29 CHRONIC BACK PAIN, UNSPECIFIED BACK LOCATION, UNSPECIFIED BACK PAIN LATERALITY: ICD-10-CM

## 2019-07-12 DIAGNOSIS — F32.0 MILD MAJOR DEPRESSION (HCC): ICD-10-CM

## 2019-07-12 NOTE — TELEPHONE ENCOUNTER
Was the patient seen in the last year in this department? Yes    Does patient have an active prescription for medications requested? No     Received Request Via: Pharmacy      Pt met protocol?: Yes    LAST OV 11/28/2018, FOLLOW UP 7/16 WITH PCP

## 2019-07-14 RX ORDER — DULOXETIN HYDROCHLORIDE 30 MG/1
30 CAPSULE, DELAYED RELEASE ORAL DAILY
Qty: 90 CAP | Refills: 0 | Status: SHIPPED | OUTPATIENT
Start: 2019-07-14 | End: 2019-10-31 | Stop reason: SDUPTHER

## 2019-07-16 ENCOUNTER — OFFICE VISIT (OUTPATIENT)
Dept: MEDICAL GROUP | Facility: PHYSICIAN GROUP | Age: 74
End: 2019-07-16
Payer: COMMERCIAL

## 2019-07-16 VITALS
RESPIRATION RATE: 18 BRPM | OXYGEN SATURATION: 92 % | WEIGHT: 198.8 LBS | HEART RATE: 88 BPM | HEIGHT: 60 IN | BODY MASS INDEX: 39.03 KG/M2 | TEMPERATURE: 99.9 F | DIASTOLIC BLOOD PRESSURE: 72 MMHG | SYSTOLIC BLOOD PRESSURE: 106 MMHG

## 2019-07-16 DIAGNOSIS — M25.40 SWELLING OF MULTIPLE JOINTS: ICD-10-CM

## 2019-07-16 DIAGNOSIS — R60.0 PEDAL EDEMA: ICD-10-CM

## 2019-07-16 DIAGNOSIS — F33.1 MODERATE EPISODE OF RECURRENT MAJOR DEPRESSIVE DISORDER (HCC): ICD-10-CM

## 2019-07-16 PROBLEM — F32.9 MAJOR DEPRESSIVE DISORDER: Status: ACTIVE | Noted: 2019-07-16

## 2019-07-16 PROCEDURE — 99214 OFFICE O/P EST MOD 30 MIN: CPT | Performed by: FAMILY MEDICINE

## 2019-07-16 RX ORDER — HYDROCODONE BITARTRATE AND ACETAMINOPHEN 5; 325 MG/1; MG/1
TABLET ORAL
Refills: 0 | COMMUNITY
Start: 2019-06-24

## 2019-07-16 RX ORDER — POTASSIUM CHLORIDE 20 MEQ/1
20 TABLET, EXTENDED RELEASE ORAL DAILY
Qty: 90 TAB | Refills: 1 | Status: ON HOLD | OUTPATIENT
Start: 2019-07-16 | End: 2023-10-02 | Stop reason: SDUPTHER

## 2019-07-16 RX ORDER — FUROSEMIDE 20 MG/1
20 TABLET ORAL DAILY
Qty: 90 TAB | Refills: 3 | Status: SHIPPED | OUTPATIENT
Start: 2019-07-16 | End: 2019-10-14

## 2019-07-16 ASSESSMENT — PATIENT HEALTH QUESTIONNAIRE - PHQ9
SUM OF ALL RESPONSES TO PHQ QUESTIONS 1-9: 16
CLINICAL INTERPRETATION OF PHQ2 SCORE: 6
5. POOR APPETITE OR OVEREATING: 0 - NOT AT ALL

## 2019-07-16 NOTE — PROGRESS NOTES
Chief Complaint   Patient presents with   • Foot Swelling     bilateral feet x over 1 mo       HISTORY OF PRESENT ILLNESS: Patient is a 73 y.o. female established patient here today for the following concerns:    1. Pedal edema  2. Swelling of multiple joints  Here today with persistent leg swelling, left >right.  Responded nicely to use of lasix from , but when stopped it the swelling returned.  Denies any trouble breathing.  No orthopnea or PND.  Patient has hx of SELWYN and nocturnal hypoxia, tolerating only oxygen at night.  Hx of Breast cancer on Arimidex.  Had + D-dimer and negative US for DVT.  She reports that they are aching in nature.  Her hands also swell, but she relates that to her back.  Occasionally the pain in the hands and feet will be accompanied by redness and warmth.  No hx of RA or lupus. No new rashes.      3. Moderate episode of recurrent major depressive disorder (HCC)  Annual screening shows PHQ 16.  She reports its situational.  She reports that with all her health problems she is depressed that she cannot do the things she used to.  Declines any adjustment to pharmacotherapy or counseling.        Past Medical, Social, and Family history reviewed and updated in EPIC    Allergies:Bactrim ds and Morphine    Current Outpatient Prescriptions   Medication Sig Dispense Refill   • LYRICA 100 MG Cap Take 100 mg by mouth 3 times a day.  1   • HYDROcodone-acetaminophen (NORCO) 5-325 MG Tab per tablet TAKE 1 TABLET BY MOUTH 3 TIMES A DAY FOR 30 DAYS, 724.02  0   • furosemide (LASIX) 20 MG Tab Take 1 Tab by mouth every day for 90 days. 90 Tab 3   • potassium chloride SA (KDUR) 20 MEQ Tab CR Take 1 Tab by mouth every day. 90 Tab 1   • DULoxetine (CYMBALTA) 30 MG Cap DR Particles Take 1 Cap by mouth every day. 90 Cap 0   • esomeprazole (NEXIUM) 20 MG capsule TAKE 1 CAPSULE EVERY MORNING BEFORE BREAKFAST 90 Cap 0   • benazepril-hydrochlorthiazide (LOTENSIN HCT) 20-12.5 MG per tablet Take 1 Tab by mouth  every day. 90 Tab 3   • atenolol (TENORMIN) 50 MG Tab TAKE 2 TABLETS DAILY 180 Tab 0   • Fluticasone-Salmeterol 232-14 MCG/ACT AEROSOL POWDER, BREATH ACTIVATED Inhale 1 Inhalation by mouth 2 times a day. 1 Each 11   • amitriptyline (ELAVIL) 25 MG Tab Take 100 mg by mouth every evening.     • anastrozole (ARIMIDEX) 1 MG Tab Take 1 mg by mouth every day.     • CALCIUM + D PO Take 2 Tabs by mouth every morning. Takes two     • acetaminophen (TYLENOL) 500 MG Tab Take 500-1,000 mg by mouth every 6 hours as needed.     • GABAPENTIN 300 MG PO CAPS Take 300 mg by mouth 3 times a day.     • MULTIVITAMIN PO Take 1 Tab by mouth every day.       No current facility-administered medications for this visit.          ROS:  Review of Systems   Constitutional: Negative for fever, chills, weight loss and malaise/fatigue.   HENT: Negative for ear pain, nosebleeds, congestion, sore throat and neck pain.    Eyes: Negative for blurred vision.   Respiratory: Negative for cough, sputum production, shortness of breath and wheezing.    Cardiovascular: Negative for chest pain, palpitations,  and leg swelling.   Gastrointestinal: Negative for heartburn, nausea, vomiting, diarrhea and abdominal pain.   Genitourinary: Negative for dysuria, urgency and frequency.   Musculoskeletal: Negative for myalgias, back pain and joint pain.   Skin: Negative for rash and itching.   Neurological: Negative for dizziness, tingling, tremors, sensory change, focal weakness and headaches.   Endo/Heme/Allergies: Does not bruise/bleed easily.   Psychiatric/Behavioral: Negative for depression, anxiety, suicidal ideas, insomnia and memory loss.      Exam:  /72 (BP Location: Right arm, Patient Position: Sitting, BP Cuff Size: Large adult)   Pulse 88   Temp 37.7 °C (99.9 °F)   Resp 18   Ht 1.524 m (5')   Wt 90.2 kg (198 lb 12.8 oz)   SpO2 92%     General:  Well nourished, well developed in NAD  Head is grossly normal.  Neck: Supple without JVD   Pulmonary:   Normal effort.   Cardiovascular: Regular rate  Extremities: no clubbing, cyanosis, or + bilateral non-p[itting edema.  Psych: affect appropriate      Please note that this dictation was created using voice recognition software. I have made every reasonable attempt to correct obvious errors, but I expect that there are errors of grammar and possibly content that I did not discover before finalizing the note.    Assessment/Plan:  1. Pedal edema  Start lasix and potassium, recheck labs in  2 weeks.  Check US.  Suspect pulmonary hypertension as the cause, but given non-pitting could be thyroid.  Will check.   - furosemide (LASIX) 20 MG Tab; Take 1 Tab by mouth every day for 90 days.  Dispense: 90 Tab; Refill: 3  - potassium chloride SA (KDUR) 20 MEQ Tab CR; Take 1 Tab by mouth every day.  Dispense: 90 Tab; Refill: 1  - Comp Metabolic Panel; Future  - TSH; Future  - FREE THYROXINE; Future  - EC-ECHOCARDIOGRAM COMPLETE W/O CONT; Future    2. Swelling of multiple joints  - RHEUMATOID ARTHRITIS FACTOR; Future  - ANTI-NUCLEAR ANTIBODY SERUM; Future    3. Moderate episode of recurrent major depressive disorder (HCC)  - Patient has been identified as having a positive depression screening. Appropriate orders and counseling have been given.  Continue cymbalta for now.  Declines further adjustment.     Follow up pending findings

## 2019-07-29 ENCOUNTER — HOSPITAL ENCOUNTER (OUTPATIENT)
Dept: CARDIOLOGY | Facility: MEDICAL CENTER | Age: 74
End: 2019-07-29
Attending: FAMILY MEDICINE
Payer: COMMERCIAL

## 2019-07-29 DIAGNOSIS — R60.0 PEDAL EDEMA: ICD-10-CM

## 2019-07-29 LAB
LV EJECT FRACT  99904: 70
LV EJECT FRACT MOD 2C 99903: 55.62
LV EJECT FRACT MOD 4C 99902: 73.76
LV EJECT FRACT MOD BP 99901: 64.73

## 2019-07-29 PROCEDURE — 93306 TTE W/DOPPLER COMPLETE: CPT | Mod: 26 | Performed by: INTERNAL MEDICINE

## 2019-07-29 PROCEDURE — 93306 TTE W/DOPPLER COMPLETE: CPT

## 2019-08-03 ENCOUNTER — HOSPITAL ENCOUNTER (OUTPATIENT)
Dept: LAB | Facility: MEDICAL CENTER | Age: 74
End: 2019-08-03
Attending: FAMILY MEDICINE
Payer: COMMERCIAL

## 2019-08-03 DIAGNOSIS — M25.40 SWELLING OF MULTIPLE JOINTS: ICD-10-CM

## 2019-08-03 DIAGNOSIS — R60.0 PEDAL EDEMA: ICD-10-CM

## 2019-08-03 LAB
ALBUMIN SERPL BCP-MCNC: 4.1 G/DL (ref 3.2–4.9)
ALBUMIN/GLOB SERPL: 1.2 G/DL
ALP SERPL-CCNC: 89 U/L (ref 30–99)
ALT SERPL-CCNC: 26 U/L (ref 2–50)
ANION GAP SERPL CALC-SCNC: 10 MMOL/L (ref 0–11.9)
AST SERPL-CCNC: 35 U/L (ref 12–45)
BILIRUB SERPL-MCNC: 0.3 MG/DL (ref 0.1–1.5)
BUN SERPL-MCNC: 20 MG/DL (ref 8–22)
CALCIUM SERPL-MCNC: 9.5 MG/DL (ref 8.5–10.5)
CHLORIDE SERPL-SCNC: 102 MMOL/L (ref 96–112)
CO2 SERPL-SCNC: 25 MMOL/L (ref 20–33)
CREAT SERPL-MCNC: 1.39 MG/DL (ref 0.5–1.4)
GLOBULIN SER CALC-MCNC: 3.5 G/DL (ref 1.9–3.5)
GLUCOSE SERPL-MCNC: 99 MG/DL (ref 65–99)
POTASSIUM SERPL-SCNC: 3.6 MMOL/L (ref 3.6–5.5)
PROT SERPL-MCNC: 7.6 G/DL (ref 6–8.2)
RHEUMATOID FACT SER IA-ACNC: <10 IU/ML (ref 0–14)
SODIUM SERPL-SCNC: 137 MMOL/L (ref 135–145)
T4 FREE SERPL-MCNC: 0.96 NG/DL (ref 0.53–1.43)
TSH SERPL DL<=0.005 MIU/L-ACNC: 0.75 UIU/ML (ref 0.38–5.33)

## 2019-08-03 PROCEDURE — 84443 ASSAY THYROID STIM HORMONE: CPT

## 2019-08-03 PROCEDURE — 86038 ANTINUCLEAR ANTIBODIES: CPT

## 2019-08-03 PROCEDURE — 86039 ANTINUCLEAR ANTIBODIES (ANA): CPT

## 2019-08-03 PROCEDURE — 80053 COMPREHEN METABOLIC PANEL: CPT

## 2019-08-03 PROCEDURE — 36415 COLL VENOUS BLD VENIPUNCTURE: CPT

## 2019-08-03 PROCEDURE — 84439 ASSAY OF FREE THYROXINE: CPT

## 2019-08-03 PROCEDURE — 86431 RHEUMATOID FACTOR QUANT: CPT

## 2019-08-05 LAB — NUCLEAR IGG SER QL IA: DETECTED

## 2019-08-06 LAB
ANA INTERPRETIVE COMMENT Q5143: NORMAL
ANTINUCLEAR ANTIBODY (ANA), HEP-2, IGG Q5142: NORMAL

## 2019-08-14 ENCOUNTER — OFFICE VISIT (OUTPATIENT)
Dept: MEDICAL GROUP | Facility: PHYSICIAN GROUP | Age: 74
End: 2019-08-14
Payer: COMMERCIAL

## 2019-08-14 VITALS
OXYGEN SATURATION: 92 % | HEART RATE: 80 BPM | TEMPERATURE: 97.9 F | BODY MASS INDEX: 38.28 KG/M2 | WEIGHT: 195 LBS | SYSTOLIC BLOOD PRESSURE: 114 MMHG | HEIGHT: 60 IN | DIASTOLIC BLOOD PRESSURE: 72 MMHG | RESPIRATION RATE: 16 BRPM

## 2019-08-14 DIAGNOSIS — M19.079 ARTHRITIS OF ANKLE: ICD-10-CM

## 2019-08-14 PROCEDURE — 99214 OFFICE O/P EST MOD 30 MIN: CPT | Performed by: FAMILY MEDICINE

## 2019-08-14 RX ORDER — PREDNISONE 10 MG/1
TABLET ORAL
Qty: 63 TAB | Refills: 0 | Status: SHIPPED
Start: 2019-08-14 | End: 2020-11-04 | Stop reason: SDUPTHER

## 2019-08-14 NOTE — PROGRESS NOTES
Chief Complaint   Patient presents with   • Foot Swelling     bilateral foot swelling   • Hypertension     fv labs       HISTORY OF PRESENT ILLNESS: Patient is a 74 y.o. female established patient here today for the following concerns:    1. Arthritis of ankle  Here for follow up.  Last visit was concerned over pedal edema that seems to be mostly concentrated in the ankles and feet.  She was started on lasix and potassium and underwent US, labs, and Echo all which was relatively normal and showed no reason for the symptoms.  She notes that the diuretics have not really made much improvement in the left ankle, which is still quite painful and swollen.        Past Medical, Social, and Family history reviewed and updated in EPIC    Allergies:Bactrim ds and Morphine    Current Outpatient Medications   Medication Sig Dispense Refill   • predniSONE (DELTASONE) 10 MG Tab 60 mg for 3 days, 50 mg for 3 days, 40 mg for 3 days, 30 mg for 3 days, 20 mg for 3 days, 10 mg for 3 days, stop. 63 Tab 0   • LYRICA 100 MG Cap Take 100 mg by mouth 3 times a day.  1   • HYDROcodone-acetaminophen (NORCO) 5-325 MG Tab per tablet TAKE 1 TABLET BY MOUTH 3 TIMES A DAY FOR 30 DAYS, 724.02  0   • furosemide (LASIX) 20 MG Tab Take 1 Tab by mouth every day for 90 days. 90 Tab 3   • potassium chloride SA (KDUR) 20 MEQ Tab CR Take 1 Tab by mouth every day. 90 Tab 1   • DULoxetine (CYMBALTA) 30 MG Cap DR Particles Take 1 Cap by mouth every day. 90 Cap 0   • esomeprazole (NEXIUM) 20 MG capsule TAKE 1 CAPSULE EVERY MORNING BEFORE BREAKFAST 90 Cap 0   • benazepril-hydrochlorthiazide (LOTENSIN HCT) 20-12.5 MG per tablet Take 1 Tab by mouth every day. 90 Tab 3   • atenolol (TENORMIN) 50 MG Tab TAKE 2 TABLETS DAILY 180 Tab 0   • acetaminophen (TYLENOL) 500 MG Tab Take 500-1,000 mg by mouth every 6 hours as needed.     • Fluticasone-Salmeterol 232-14 MCG/ACT AEROSOL POWDER, BREATH ACTIVATED Inhale 1 Inhalation by mouth 2 times a day. 1 Each 11   •  amitriptyline (ELAVIL) 25 MG Tab Take 100 mg by mouth every evening.     • anastrozole (ARIMIDEX) 1 MG Tab Take 1 mg by mouth every day.     • GABAPENTIN 300 MG PO CAPS Take 300 mg by mouth 3 times a day.     • CALCIUM + D PO Take 2 Tabs by mouth every morning. Takes two     • MULTIVITAMIN PO Take 1 Tab by mouth every day.       No current facility-administered medications for this visit.          ROS:  Review of Systems   Constitutional: Negative for fever, chills, weight loss and malaise/fatigue.   HENT: Negative for ear pain, nosebleeds, congestion, sore throat and neck pain.    Eyes: Negative for blurred vision.   Respiratory: Negative for cough, sputum production, shortness of breath and wheezing.    Cardiovascular: Negative for chest pain, palpitations,  and leg swelling.   Gastrointestinal: Negative for heartburn, nausea, vomiting, diarrhea and abdominal pain.   Genitourinary: Negative for dysuria, urgency and frequency.   Musculoskeletal: Negative for myalgias, back pain and + joint pain.   Skin: Negative for rash and itching.   Neurological: Negative for dizziness, tingling, tremors, sensory change, focal weakness and headaches.   Endo/Heme/Allergies: Does not bruise/bleed easily.   Psychiatric/Behavioral: Negative for depression, anxiety, suicidal ideas, insomnia and memory loss.      Exam:  /72 (BP Location: Left arm, Patient Position: Sitting, BP Cuff Size: Adult)   Pulse 80   Temp 36.6 °C (97.9 °F)   Resp 16   Ht 1.524 m (5')   Wt 88.5 kg (195 lb)   SpO2 92%     General:  Well nourished, well developed in NAD  Head is grossly normal.  Neck: Supple without JVD   Pulmonary:  Normal effort.   Cardiovascular: Regular rate  Extremities: no clubbing, cyanosis, or edema. Left ankle with swelling, tenderness.  No redness.  Bogginess noted about the joint space.   Psych: affect appropriate      Please note that this dictation was created using voice recognition software. I have made every reasonable  attempt to correct obvious errors, but I expect that there are errors of grammar and possibly content that I did not discover before finalizing the note.    Assessment/Plan:  1. Arthritis of ankle  Labs reviewed, likely false positive COURTNEY,  Seems to have some reactive arthritis/gout or other inflammatory arthritis.  Trial of steroids.Seems much less vascular.    - predniSONE (DELTASONE) 10 MG Tab; 60 mg for 3 days, 50 mg for 3 days, 40 mg for 3 days, 30 mg for 3 days, 20 mg for 3 days, 10 mg for 3 days, stop.  Dispense: 63 Tab; Refill: 0

## 2019-09-06 ENCOUNTER — TELEPHONE (OUTPATIENT)
Dept: MEDICAL GROUP | Facility: PHYSICIAN GROUP | Age: 74
End: 2019-09-06

## 2019-09-06 ENCOUNTER — OFFICE VISIT (OUTPATIENT)
Dept: MEDICAL GROUP | Facility: PHYSICIAN GROUP | Age: 74
End: 2019-09-06
Payer: COMMERCIAL

## 2019-09-06 VITALS
OXYGEN SATURATION: 92 % | TEMPERATURE: 99.1 F | RESPIRATION RATE: 18 BRPM | SYSTOLIC BLOOD PRESSURE: 104 MMHG | HEART RATE: 102 BPM | WEIGHT: 197 LBS | BODY MASS INDEX: 38.68 KG/M2 | HEIGHT: 60 IN | DIASTOLIC BLOOD PRESSURE: 68 MMHG

## 2019-09-06 DIAGNOSIS — M25.361 INSTABILITY OF RIGHT KNEE JOINT: ICD-10-CM

## 2019-09-06 DIAGNOSIS — M25.561 ACUTE PAIN OF RIGHT KNEE: ICD-10-CM

## 2019-09-06 PROCEDURE — 99214 OFFICE O/P EST MOD 30 MIN: CPT | Performed by: FAMILY MEDICINE

## 2019-09-06 RX ORDER — OXYCODONE HYDROCHLORIDE AND ACETAMINOPHEN 5; 325 MG/1; MG/1
1 TABLET ORAL EVERY 4 HOURS PRN
Qty: 28 TAB | Refills: 0 | Status: SHIPPED | OUTPATIENT
Start: 2019-09-06 | End: 2019-09-13

## 2019-09-06 NOTE — TELEPHONE ENCOUNTER
I see on  that she received 90 Hydrocodone.  This is her chronic therapy, she has an acute flare and can fill the oxycodone for the 1 week supply as she will hold the hydrocodone.  If they see that she filled oxycodone #90, then they need to keep the Rx and shread it.

## 2019-09-06 NOTE — TELEPHONE ENCOUNTER
[id number] - [place], [place] - [address]  [address] [place] [address]  [place] [address]  Phone: [phone] Fax: [phone]    Pharmacy received rx for oxycodone that they are asking if OK to fill as pt received #90 oxycodone on 08/27/19.

## 2019-09-06 NOTE — PROGRESS NOTES
Chief Complaint   Patient presents with   • Knee Injury     rt knee injury       HISTORY OF PRESENT ILLNESS: Patient is a 74 y.o. female established patient here today for the following concerns:    1. Acute pain of right knee  2. Instability of right knee joint  Tereza is here today accompanied by her  after a week ago bending down on the ground and kneeling when she stood up felt a pop in the right knee with sudden onset of severe searing pain in the posterior knee radiating down into the back of the calf.  She reports that it was associated with some swelling but no bruising was noted.  She went into the emergency room as the pain continued to worsen.  She underwent x-ray imaging and ultrasound which were largely negative but on exam was felt to likely have a Baker's cyst.  She has been referred back to us for consideration of additional imaging and treatment.  Currently Tereza has been using some hydrocodone that she has for her chronic back pain and arthritis pain and ended up taking 2 pills but did not have any improvement in her pain control.  She feels as though the knee is going to give out on her at times and has nearly fallen.  She does ambulate with a cane.      Past Medical, Social, and Family history reviewed and updated in EPIC    Allergies:Bactrim ds and Morphine    Current Outpatient Medications   Medication Sig Dispense Refill   • oxyCODONE-acetaminophen (PERCOCET) 5-325 MG Tab Take 1 Tab by mouth every four hours as needed for up to 7 days. 28 Tab 0   • LYRICA 100 MG Cap Take 100 mg by mouth 3 times a day.  1   • HYDROcodone-acetaminophen (NORCO) 5-325 MG Tab per tablet TAKE 1 TABLET BY MOUTH 3 TIMES A DAY FOR 30 DAYS, 724.02  0   • furosemide (LASIX) 20 MG Tab Take 1 Tab by mouth every day for 90 days. 90 Tab 3   • potassium chloride SA (KDUR) 20 MEQ Tab CR Take 1 Tab by mouth every day. 90 Tab 1   • DULoxetine (CYMBALTA) 30 MG Cap DR Particles Take 1 Cap by mouth every day. 90 Cap 0   •  benazepril-hydrochlorthiazide (LOTENSIN HCT) 20-12.5 MG per tablet Take 1 Tab by mouth every day. 90 Tab 3   • atenolol (TENORMIN) 50 MG Tab TAKE 2 TABLETS DAILY 180 Tab 0   • acetaminophen (TYLENOL) 500 MG Tab Take 500-1,000 mg by mouth every 6 hours as needed.     • Fluticasone-Salmeterol 232-14 MCG/ACT AEROSOL POWDER, BREATH ACTIVATED Inhale 1 Inhalation by mouth 2 times a day. 1 Each 11   • amitriptyline (ELAVIL) 25 MG Tab Take 100 mg by mouth every evening.     • anastrozole (ARIMIDEX) 1 MG Tab Take 1 mg by mouth every day.     • GABAPENTIN 300 MG PO CAPS Take 300 mg by mouth 3 times a day.     • CALCIUM + D PO Take 2 Tabs by mouth every morning. Takes two     • MULTIVITAMIN PO Take 1 Tab by mouth every day.     • predniSONE (DELTASONE) 10 MG Tab 60 mg for 3 days, 50 mg for 3 days, 40 mg for 3 days, 30 mg for 3 days, 20 mg for 3 days, 10 mg for 3 days, stop. (Patient not taking: Reported on 9/6/2019) 63 Tab 0   • esomeprazole (NEXIUM) 20 MG capsule TAKE 1 CAPSULE EVERY MORNING BEFORE BREAKFAST 90 Cap 0     No current facility-administered medications for this visit.          ROS:  Review of Systems   Constitutional: Negative for fever, chills, weight loss and malaise/fatigue.   HENT: Negative for ear pain, nosebleeds, congestion, sore throat and neck pain.    Eyes: Negative for blurred vision.   Respiratory: Negative for cough, sputum production, shortness of breath and wheezing.    Cardiovascular: Negative for chest pain, palpitations,  and leg swelling.   Gastrointestinal: Negative for heartburn, nausea, vomiting, diarrhea and abdominal pain.   Genitourinary: Negative for dysuria, urgency and frequency.   Musculoskeletal: Negative for myalgias, back pain and +joint pain.   Skin: Negative for rash and itching.   Neurological: Negative for dizziness, tingling, tremors, sensory change, focal weakness and headaches.   Endo/Heme/Allergies: Does not bruise/bleed easily.   Psychiatric/Behavioral: Negative for  depression, anxiety, suicidal ideas, insomnia and memory loss.      Exam:  /68   Pulse (!) 102   Temp 37.3 °C (99.1 °F)   Resp 18   Ht 1.524 m (5')   Wt 89.4 kg (197 lb)   SpO2 92%     General:  Well nourished, well developed in NAD  Head is grossly normal.  Neck: Supple without JVD   Pulmonary:  Normal effort.   Cardiovascular: Regular rate  Extremities: no clubbing, cyanosis, or edema.  Psych: affect appropriate  MSK: Large effusion on the right knee, large palpable Baker's cyst in the posterior fossa.  Tender to palpation over the joint line anteriorly.  No ecchymosis noted.  Unable to get good anterior and posterior drawer exam due to effusion and patient comfort    Please note that this dictation was created using voice recognition software. I have made every reasonable attempt to correct obvious errors, but I expect that there are errors of grammar and possibly content that I did not discover before finalizing the note.    Assessment/Plan:  1. Acute pain of right knee  Concerning for large Baker's cyst and possible ligament rupture, will treat acute pain use knee brace and elevate the leg as possible and obtain additional imaging  - MR-KNEE-W/O RIGHT; Future  - oxyCODONE-acetaminophen (PERCOCET) 5-325 MG Tab; Take 1 Tab by mouth every four hours as needed for up to 7 days.  Dispense: 28 Tab; Refill: 0    2. Instability of right knee joint  - MR-KNEE-W/O RIGHT; Future  - oxyCODONE-acetaminophen (PERCOCET) 5-325 MG Tab; Take 1 Tab by mouth every four hours as needed for up to 7 days.  Dispense: 28 Tab; Refill: 0    Follow-up pending findings

## 2019-09-07 ENCOUNTER — HOSPITAL ENCOUNTER (OUTPATIENT)
Dept: RADIOLOGY | Facility: MEDICAL CENTER | Age: 74
End: 2019-09-07
Attending: FAMILY MEDICINE
Payer: COMMERCIAL

## 2019-09-07 DIAGNOSIS — M25.561 ACUTE PAIN OF RIGHT KNEE: ICD-10-CM

## 2019-09-07 DIAGNOSIS — M25.361 INSTABILITY OF RIGHT KNEE JOINT: ICD-10-CM

## 2019-09-07 PROCEDURE — 73721 MRI JNT OF LWR EXTRE W/O DYE: CPT | Mod: RT

## 2019-09-10 NOTE — TELEPHONE ENCOUNTER
Asher from [facility] states that he does not see a request for Oxycodone at all and it does not need to be filled at this time.   Disregard previous message.

## 2019-09-23 ENCOUNTER — TELEPHONE (OUTPATIENT)
Dept: MEDICAL GROUP | Facility: PHYSICIAN GROUP | Age: 74
End: 2019-09-23

## 2019-09-23 NOTE — TELEPHONE ENCOUNTER
----- Message from Chichi Mendez, Med Ass't sent at 9/23/2019  7:48 AM PDT -----  Regarding: FW: Non-Urgent Medical Question  Contact: [phone]      ----- Message -----  From: Tereza Sánchez  Sent: 9/22/2019   4:35 PM PDT  To: [place] [facility]  Subject: Non-Urgent Medical Question                      I would like to know If you could get me some help right now my leg hurts so bad I. Can hardly stand it my whole leg hurts so bad my leg is swollen my feet are swollen it gets worse each day please try to help me soon Thank You will be waiting to hear from someone.Thank You  Tereza Sánchez

## 2019-09-24 ENCOUNTER — PATIENT MESSAGE (OUTPATIENT)
Dept: MEDICAL GROUP | Facility: PHYSICIAN GROUP | Age: 74
End: 2019-09-24

## 2019-09-24 DIAGNOSIS — M25.561 CHRONIC PAIN OF BOTH KNEES: ICD-10-CM

## 2019-09-24 DIAGNOSIS — M25.562 CHRONIC PAIN OF BOTH KNEES: ICD-10-CM

## 2019-09-24 DIAGNOSIS — G89.29 CHRONIC PAIN OF BOTH KNEES: ICD-10-CM

## 2019-10-04 NOTE — TELEPHONE ENCOUNTER
Patient was seen by [facility].  She states that she was just given a shot in her knee.  She states she is still really bad and now cannot walk without walker.  Please advise.    Thank you.

## 2019-10-09 DIAGNOSIS — K21.9 GASTROESOPHAGEAL REFLUX DISEASE, ESOPHAGITIS PRESENCE NOT SPECIFIED: ICD-10-CM

## 2019-10-16 ENCOUNTER — OFFICE VISIT (OUTPATIENT)
Dept: MEDICAL GROUP | Facility: PHYSICIAN GROUP | Age: 74
End: 2019-10-16
Payer: COMMERCIAL

## 2019-10-16 VITALS
BODY MASS INDEX: 36.95 KG/M2 | RESPIRATION RATE: 18 BRPM | HEIGHT: 60 IN | HEART RATE: 98 BPM | WEIGHT: 188.2 LBS | SYSTOLIC BLOOD PRESSURE: 115 MMHG | TEMPERATURE: 99.7 F | OXYGEN SATURATION: 93 % | DIASTOLIC BLOOD PRESSURE: 72 MMHG

## 2019-10-16 DIAGNOSIS — S83.511S RUPTURE OF ANTERIOR CRUCIATE LIGAMENT OF RIGHT KNEE, SEQUELA: ICD-10-CM

## 2019-10-16 DIAGNOSIS — M23.006 CYST OF MENISCUS OF RIGHT KNEE: ICD-10-CM

## 2019-10-16 DIAGNOSIS — M17.11 PRIMARY OSTEOARTHRITIS OF RIGHT KNEE: ICD-10-CM

## 2019-10-16 PROCEDURE — 99213 OFFICE O/P EST LOW 20 MIN: CPT | Performed by: FAMILY MEDICINE

## 2019-10-16 NOTE — PROGRESS NOTES
Chief Complaint   Patient presents with   • Knee Pain     Leg pain       HISTORY OF PRESENT ILLNESS: Patient is a 74 y.o. female established patient here today for the following concerns:    1. Rupture of anterior cruciate ligament of right knee, sequela  2. Primary osteoarthritis of right knee  3. Cyst of meniscus of right knee    Last visit: Tereza is here today accompanied by her  after a week ago bending down on the ground and kneeling when she stood up felt a pop in the right knee with sudden onset of severe searing pain in the posterior knee radiating down into the back of the calf.  She reports that it was associated with some swelling but no bruising was noted.  She went into the emergency room as the pain continued to worsen.  She underwent x-ray imaging and ultrasound which were largely negative but on exam was felt to likely have a Baker's cyst.  She has been referred back to us for consideration of additional imaging and treatment.  Currently Tereza has been using some hydrocodone that she has for her chronic back pain and arthritis pain and ended up taking 2 pills but did not have any improvement in her pain control.  She feels as though the knee is going to give out on her at times and has nearly fallen.  She does ambulate with a cane.     Since has had MRI of the knee demonstrating 9/7/2019:    1.  Horizontal type tear involving the anterior horn and posterior horn of the lateral meniscus.    2.  Chronic sprain/partial thickness tear of the anterior cruciate ligament. There is focal fluid signal seen adjacent to the femoral origin of the anterior cruciate ligament likely representing a ganglion cyst.    3.  Mild chronic sprain of the posterior cruciate ligament.    4.  Tricompartment degenerative change. This involves the patellofemoral articulation to the greatest degree.    5.   Joint effusion.    6.  Some edema and fluid tracking along the popliteus muscle consistent with strain.    7.  Soft  tissue edema surrounding the knee.    Referral was placed to Ortho and somehow this was routed to [facility].  There she reports another Xray was done and per patient was told that what was seen on the MRI was not present and given a steroid shot.  She reports that she had no improvement with the steroid shot.      She is using hydrocodone that she gets from Dr. Kline office for her chronic back pain, lyrica, and cymbalta without much relief.  It is very painful to bare weight.        Past Medical, Social, and Family history reviewed and updated in EPIC    Allergies:Bactrim ds and Morphine    Current Outpatient Medications   Medication Sig Dispense Refill   • Diclofenac Sodium 1 % Gel Apply 4 g to skin as directed 3 times a day as needed (Right Knee pain). 1 Tube 0   • esomeprazole (NEXIUM) 20 MG capsule TAKE 1 CAPSULE EVERY MORNING BEFORE BREAKFAST 90 Cap 0   • LYRICA 100 MG Cap Take 100 mg by mouth 3 times a day.  1   • HYDROcodone-acetaminophen (NORCO) 5-325 MG Tab per tablet TAKE 1 TABLET BY MOUTH 3 TIMES A DAY FOR 30 DAYS, 724.02  0   • potassium chloride SA (KDUR) 20 MEQ Tab CR Take 1 Tab by mouth every day. 90 Tab 1   • DULoxetine (CYMBALTA) 30 MG Cap DR Particles Take 1 Cap by mouth every day. 90 Cap 0   • benazepril-hydrochlorthiazide (LOTENSIN HCT) 20-12.5 MG per tablet Take 1 Tab by mouth every day. 90 Tab 3   • atenolol (TENORMIN) 50 MG Tab TAKE 2 TABLETS DAILY 180 Tab 0   • acetaminophen (TYLENOL) 500 MG Tab Take 500-1,000 mg by mouth every 6 hours as needed.     • amitriptyline (ELAVIL) 25 MG Tab Take 100 mg by mouth every evening.     • anastrozole (ARIMIDEX) 1 MG Tab Take 1 mg by mouth every day.     • CALCIUM + D PO Take 2 Tabs by mouth every morning. Takes two     • Calcium Carbonate-Vitamin D (CALCIUM 500/D PO) Calcium 500 + D     • predniSONE (DELTASONE) 10 MG Tab 60 mg for 3 days, 50 mg for 3 days, 40 mg for 3 days, 30 mg for 3 days, 20 mg for 3 days, 10 mg for 3 days, stop. (Patient not taking:  Reported on 9/6/2019) 63 Tab 0   • Fluticasone-Salmeterol 232-14 MCG/ACT AEROSOL POWDER, BREATH ACTIVATED Inhale 1 Inhalation by mouth 2 times a day. (Patient not taking: Reported on 10/16/2019) 1 Each 11   • GABAPENTIN 300 MG PO CAPS Take 300 mg by mouth 3 times a day.     • MULTIVITAMIN PO Take 1 Tab by mouth every day.       No current facility-administered medications for this visit.          ROS:  Review of Systems   Constitutional: Negative for fever, chills, weight loss and malaise/fatigue.   HENT: Negative for ear pain, nosebleeds, congestion, sore throat and neck pain.    Eyes: Negative for blurred vision.   Respiratory: Negative for cough, sputum production, shortness of breath and wheezing.    Cardiovascular: Negative for chest pain, palpitations,  and leg swelling.   Gastrointestinal: Negative for heartburn, nausea, vomiting, diarrhea and abdominal pain.   Genitourinary: Negative for dysuria, urgency and frequency.   Musculoskeletal: Negative for myalgias, back pain and joint pain.   Skin: Negative for rash and itching.   Neurological: Negative for dizziness, tingling, tremors, sensory change, focal weakness and headaches.   Endo/Heme/Allergies: Does not bruise/bleed easily.   Psychiatric/Behavioral: Negative for depression, anxiety, suicidal ideas, insomnia and memory loss.      Exam:  /72 (BP Location: Right arm, Patient Position: Sitting, BP Cuff Size: Adult)   Pulse 98   Temp 37.6 °C (99.7 °F) (Temporal)   Resp 18   Ht 1.524 m (5')   Wt 85.4 kg (188 lb 3.2 oz)   SpO2 93%     General:  Well nourished, well developed in NAD  Head is grossly normal.  Neck: Supple without JVD   Pulmonary:  Normal effort.   Cardiovascular: Regular rate  Extremities: no clubbing, cyanosis, or edema.  Psych: affect appropriate  MSK: right knee with effusion.  No redness or warmth appreciated.  Limited ROM due to pain and swelling.  Prominent     Please note that this dictation was created using voice recognition  software. I have made every reasonable attempt to correct obvious errors, but I expect that there are errors of grammar and possibly content that I did not discover before finalizing the note.    Assessment/Plan:  1. Rupture of anterior cruciate ligament of right knee, sequela  - REFERRAL TO ORTHOPEDICS    2. Primary osteoarthritis of right knee   REFERRAL TO ORTHOPEDICS    3. Cyst of meniscus of right knee  - REFERRAL TO ORTHOPEDICS    Trial of voltaren gel.  Renal function not conducive to use of oral NSAIDs.

## 2019-10-28 DIAGNOSIS — I10 ESSENTIAL HYPERTENSION: ICD-10-CM

## 2019-10-28 NOTE — TELEPHONE ENCOUNTER
Was the patient seen in the last year in this department? Yes    Does patient have an active prescription for medications requested? No     Received Request Via: pharmacy

## 2019-10-30 RX ORDER — BENAZEPRIL HYDROCHLORIDE AND HYDROCHLOROTHIAZIDE 20; 12.5 MG/1; MG/1
1 TABLET ORAL DAILY
Qty: 90 TAB | Refills: 1 | Status: SHIPPED | OUTPATIENT
Start: 2019-10-30 | End: 2020-04-28

## 2019-10-30 NOTE — TELEPHONE ENCOUNTER
Was the patient seen in the last year in this department? Yes    Does patient have an active prescription for medications requested? No     Received Request Via: Pharmacy      Pt met protocol?: Yes    LAST O V10/16/2019    BP Readings from Last 1 Encounters:   10/16/19 115/72     Lab Results   Component Value Date/Time    CHOLSTRLTOT 191 09/18/2017 08:59 AM     (H) 09/18/2017 08:59 AM    HDL 51 09/18/2017 08:59 AM    TRIGLYCERIDE 159 (H) 09/18/2017 08:59 AM       Lab Results   Component Value Date/Time    SODIUM 137 08/03/2019 11:11 AM    POTASSIUM 3.6 08/03/2019 11:11 AM    CHLORIDE 102 08/03/2019 11:11 AM    CO2 25 08/03/2019 11:11 AM    GLUCOSE 99 08/03/2019 11:11 AM    BUN 20 08/03/2019 11:11 AM    CREATININE 1.39 08/03/2019 11:11 AM    CREATININE 1.0 05/20/2008 05:55 AM     Lab Results   Component Value Date/Time    ALKPHOSPHAT 89 08/03/2019 11:11 AM    ASTSGOT 35 08/03/2019 11:11 AM    ALTSGPT 26 08/03/2019 11:11 AM    TBILIRUBIN 0.3 08/03/2019 11:11 AM

## 2019-10-30 NOTE — TELEPHONE ENCOUNTER
Refill X 6 months, sent to pharmacy.Pt. Seen in the last 6 months per protocol.   Lab Results   Component Value Date/Time    SODIUM 137 08/03/2019 11:11 AM    POTASSIUM 3.6 08/03/2019 11:11 AM    CHLORIDE 102 08/03/2019 11:11 AM    CO2 25 08/03/2019 11:11 AM    GLUCOSE 99 08/03/2019 11:11 AM    BUN 20 08/03/2019 11:11 AM    CREATININE 1.39 08/03/2019 11:11 AM    CREATININE 1.0 05/20/2008 05:55 AM

## 2019-10-31 DIAGNOSIS — G89.29 CHRONIC BACK PAIN, UNSPECIFIED BACK LOCATION, UNSPECIFIED BACK PAIN LATERALITY: ICD-10-CM

## 2019-10-31 DIAGNOSIS — F32.0 MILD MAJOR DEPRESSION (HCC): ICD-10-CM

## 2019-10-31 DIAGNOSIS — M54.9 CHRONIC BACK PAIN, UNSPECIFIED BACK LOCATION, UNSPECIFIED BACK PAIN LATERALITY: ICD-10-CM

## 2019-11-01 RX ORDER — DULOXETIN HYDROCHLORIDE 30 MG/1
CAPSULE, DELAYED RELEASE ORAL
Qty: 90 CAP | Refills: 1 | Status: SHIPPED | OUTPATIENT
Start: 2019-11-01 | End: 2020-04-29

## 2019-11-14 DIAGNOSIS — I10 ESSENTIAL HYPERTENSION: ICD-10-CM

## 2019-11-14 NOTE — TELEPHONE ENCOUNTER
Was the patient seen in the last year in this department? Yes    Does patient have an active prescription for medications requested? Yes    Received Request Via: Pharmacy      Pt met protocol?: Yes pt last ov  10/2019 pt is changing pharmacy from mail order    BP Readings from Last 1 Encounters:   10/16/19 115/72

## 2019-11-15 RX ORDER — BENAZEPRIL HYDROCHLORIDE AND HYDROCHLOROTHIAZIDE 20; 12.5 MG/1; MG/1
TABLET ORAL
Qty: 90 TAB | Refills: 3 | Status: SHIPPED | OUTPATIENT
Start: 2019-11-15 | End: 2020-04-28

## 2019-12-17 ENCOUNTER — HOSPITAL ENCOUNTER (OUTPATIENT)
Dept: RADIOLOGY | Facility: MEDICAL CENTER | Age: 74
End: 2019-12-17
Attending: INTERNAL MEDICINE
Payer: COMMERCIAL

## 2019-12-17 DIAGNOSIS — Z12.31 OTHER SCREENING MAMMOGRAM: ICD-10-CM

## 2019-12-17 PROCEDURE — 77063 BREAST TOMOSYNTHESIS BI: CPT

## 2020-01-10 DIAGNOSIS — K21.9 GASTROESOPHAGEAL REFLUX DISEASE, ESOPHAGITIS PRESENCE NOT SPECIFIED: ICD-10-CM

## 2020-01-13 NOTE — TELEPHONE ENCOUNTER
Was the patient seen in the last year in this department? Yes    Does patient have an active prescription for medications requested? No     Received Request Via: Patient      Pt met protocol?: Yes    OV 10/19

## 2020-03-03 ENCOUNTER — OFFICE VISIT (OUTPATIENT)
Dept: MEDICAL GROUP | Facility: PHYSICIAN GROUP | Age: 75
End: 2020-03-03
Payer: COMMERCIAL

## 2020-03-03 VITALS
TEMPERATURE: 99.7 F | OXYGEN SATURATION: 91 % | WEIGHT: 193 LBS | RESPIRATION RATE: 18 BRPM | HEIGHT: 60 IN | BODY MASS INDEX: 37.89 KG/M2 | SYSTOLIC BLOOD PRESSURE: 116 MMHG | DIASTOLIC BLOOD PRESSURE: 70 MMHG | HEART RATE: 84 BPM

## 2020-03-03 DIAGNOSIS — G89.29 CHRONIC NONINTRACTABLE HEADACHE, UNSPECIFIED HEADACHE TYPE: ICD-10-CM

## 2020-03-03 DIAGNOSIS — E89.0 H/O PARTIAL THYROIDECTOMY: ICD-10-CM

## 2020-03-03 DIAGNOSIS — C50.219 MALIGNANT NEOPLASM OF UPPER-INNER QUADRANT OF FEMALE BREAST, UNSPECIFIED ESTROGEN RECEPTOR STATUS, UNSPECIFIED LATERALITY (HCC): ICD-10-CM

## 2020-03-03 DIAGNOSIS — R51.9 CHRONIC NONINTRACTABLE HEADACHE, UNSPECIFIED HEADACHE TYPE: ICD-10-CM

## 2020-03-03 DIAGNOSIS — Z98.2 INTRACRANIAL SHUNT: ICD-10-CM

## 2020-03-03 DIAGNOSIS — I10 ESSENTIAL HYPERTENSION: ICD-10-CM

## 2020-03-03 DIAGNOSIS — Z86.69 HISTORY OF CHIARI MALFORMATION: ICD-10-CM

## 2020-03-03 DIAGNOSIS — N18.30 CKD (CHRONIC KIDNEY DISEASE) STAGE 3, GFR 30-59 ML/MIN: ICD-10-CM

## 2020-03-03 PROCEDURE — 99214 OFFICE O/P EST MOD 30 MIN: CPT | Performed by: FAMILY MEDICINE

## 2020-03-03 ASSESSMENT — FIBROSIS 4 INDEX: FIB4 SCORE: 1.45

## 2020-03-03 NOTE — PROGRESS NOTES
Chief Complaint   Patient presents with   • Fatigue       HISTORY OF PRESENT ILLNESS: Patient is a 74 y.o. female established patient here today for the following concerns:    1. Essential hypertension  Hx of hypertension.  Reports that it has been well controlled.  Hx of hypokalemia on replacement due for recheck.     2. H/O partial thyroidectomy  Large follicular cyst removed, benign pathology.  No difficulty swallowing.  Due for recheck on hormone levels.      3. Lung nodule < 6cm on CT  Previously seen on CT scan in 2018 < 6 mm no other risk factors.      4. 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  Followed by oncology.      5. CKD (chronic kidney disease) stage 3, GFR 30-59 ml/min (HCC)  Noted to have declining renal function over the last year.  Due for recheck on levels.  Has also noted some dysuria.  No flank pains.  Increasing fatigue     6. Chronic nonintractable headache, unspecified headache type  7. Intracranial shunt  8. History of Chiari malformation  Reports that she has been experiencing daily headaches for the last several months, feeling more fatigued and notes that she is seeing Things at night such as people standing in the door that are not there.  She denies any new medication changes other than lyrica which was added some time ago to manage pain.  She does have intracranial shunt which was placed > 50 years ago per patient.  No seizures.  No fevers, chills.       Past Medical, Social, and Family history reviewed and updated in EPIC    Allergies:Bactrim ds and Morphine    Current Outpatient Medications   Medication Sig Dispense Refill   • esomeprazole (NEXIUM) 20 MG capsule TAKE 1 CAPSULE EVERY MORNING BEFORE BREAKFAST 90 Cap 1   • benazepril-hydrochlorthiazide (LOTENSIN HCT) 20-12.5 MG per tablet TAKE 1 TABLET BY MOUTH EVERY DAY 90 Tab 3   • DULoxetine (CYMBALTA) 30 MG Cap DR Particles TAKE 1  CAPSULE DAILY 90 Cap 1   • benazepril-hydrochlorthiazide (LOTENSIN HCT) 20-12.5 MG per tablet Take 1 Tab by mouth every day. 90 Tab 1   • Calcium Carbonate-Vitamin D (CALCIUM 500/D PO) Calcium 500 + D     • Diclofenac Sodium 1 % Gel Apply 4 g to skin as directed 3 times a day as needed (Right Knee pain). 1 Tube 0   • LYRICA 100 MG Cap Take 100 mg by mouth 3 times a day.  1   • HYDROcodone-acetaminophen (NORCO) 5-325 MG Tab per tablet TAKE 1 TABLET BY MOUTH 3 TIMES A DAY FOR 30 DAYS, 724.02  0   • potassium chloride SA (KDUR) 20 MEQ Tab CR Take 1 Tab by mouth every day. 90 Tab 1   • atenolol (TENORMIN) 50 MG Tab TAKE 2 TABLETS DAILY 180 Tab 0   • acetaminophen (TYLENOL) 500 MG Tab Take 500-1,000 mg by mouth every 6 hours as needed.     • Fluticasone-Salmeterol 232-14 MCG/ACT AEROSOL POWDER, BREATH ACTIVATED Inhale 1 Inhalation by mouth 2 times a day. 1 Each 11   • amitriptyline (ELAVIL) 25 MG Tab Take 100 mg by mouth every evening.     • anastrozole (ARIMIDEX) 1 MG Tab Take 1 mg by mouth every day.     • GABAPENTIN 300 MG PO CAPS Take 300 mg by mouth 3 times a day.     • CALCIUM + D PO Take 2 Tabs by mouth every morning. Takes two     • predniSONE (DELTASONE) 10 MG Tab 60 mg for 3 days, 50 mg for 3 days, 40 mg for 3 days, 30 mg for 3 days, 20 mg for 3 days, 10 mg for 3 days, stop. (Patient not taking: Reported on 9/6/2019) 63 Tab 0   • MULTIVITAMIN PO Take 1 Tab by mouth every day.       No current facility-administered medications for this visit.          ROS:  Review of Systems   Constitutional: Negative for fever, chills, weight loss and malaise/fatigue.   HENT: Negative for ear pain, nosebleeds, congestion, sore throat and neck pain.    Eyes: Negative for blurred vision.   Respiratory: Negative for cough, sputum production, shortness of breath and wheezing.    Cardiovascular: Negative for chest pain, palpitations,  and leg swelling.   Gastrointestinal: Negative for heartburn, nausea, vomiting, diarrhea and  abdominal pain.   Genitourinary: Negative for dysuria, urgency and frequency.   Musculoskeletal: Negative for myalgias, back pain and joint pain.   Skin: Negative for rash and itching.   Neurological: Negative for dizziness, tingling, tremors, sensory change, focal weakness and headaches.   Endo/Heme/Allergies: Does not bruise/bleed easily.   Psychiatric/Behavioral: Negative for depression, anxiety, suicidal ideas, insomnia and memory loss.      Exam:  /70   Pulse 84   Temp 37.6 °C (99.7 °F)   Resp 18   Ht 1.524 m (5')   Wt 87.5 kg (193 lb)   SpO2 91%     General:  Well nourished, well developed in NAD  Head is grossly normal.  Neck: Supple without JVD   Pulmonary:  Normal effort.  CTAB  Cardiovascular: Regular rate and rhythm no m/r/g  Extremities: no clubbing, cyanosis, or 2+edema   Psych: affect appropriate   MSK: erythematous deformed MCP, PIP, DIP joints bilaterally     Please note that this dictation was created using voice recognition software. I have made every reasonable attempt to correct obvious errors, but I expect that there are errors of grammar and possibly content that I did not discover before finalizing the note.    Assessment/Plan:  1. Essential hypertension  Controlled, recheck renal function and lytes    2. H/O partial thyroidectomy  Recheck levels.   - FREE THYROXINE; Standing  - TRIIDOTHYRONINE; Standing  - TSH; Standing    3. Lung nodule < 6cm on CT  No indication for recheck at this time     4. 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 CKD (chronic kidney disease) stage 3, GFR 30-59 ml/min ([facility])  Recheck levels.   - Comp Metabolic Panel; Future  - URINALYSIS,CULTURE IF INDICATED; Future  - CBC WITH DIFFERENTIAL; Future    6. Chronic nonintractable headache, unspecified headache type  - CT-HEAD  W/O; Future    7. Intracranial shunt  - CT-HEAD W/O; Future    8. History of Chiari malformation  - CT-HEAD W/O; Future

## 2020-03-10 ENCOUNTER — HOSPITAL ENCOUNTER (OUTPATIENT)
Dept: LAB | Facility: MEDICAL CENTER | Age: 75
End: 2020-03-10
Attending: FAMILY MEDICINE
Payer: COMMERCIAL

## 2020-03-10 DIAGNOSIS — C50.219 MALIGNANT NEOPLASM OF UPPER-INNER QUADRANT OF FEMALE BREAST, UNSPECIFIED ESTROGEN RECEPTOR STATUS, UNSPECIFIED LATERALITY (HCC): ICD-10-CM

## 2020-03-10 DIAGNOSIS — E89.0 H/O PARTIAL THYROIDECTOMY: ICD-10-CM

## 2020-03-10 DIAGNOSIS — N18.30 CKD (CHRONIC KIDNEY DISEASE) STAGE 3, GFR 30-59 ML/MIN: ICD-10-CM

## 2020-03-10 LAB
ALBUMIN SERPL BCP-MCNC: 3.9 G/DL (ref 3.2–4.9)
ALBUMIN/GLOB SERPL: 1.1 G/DL
ALP SERPL-CCNC: 83 U/L (ref 30–99)
ALT SERPL-CCNC: 18 U/L (ref 2–50)
ANION GAP SERPL CALC-SCNC: 10 MMOL/L (ref 0–11.9)
AST SERPL-CCNC: 25 U/L (ref 12–45)
BASOPHILS # BLD AUTO: 1.7 % (ref 0–1.8)
BASOPHILS # BLD: 0.14 K/UL (ref 0–0.12)
BILIRUB SERPL-MCNC: 0.6 MG/DL (ref 0.1–1.5)
BUN SERPL-MCNC: 18 MG/DL (ref 8–22)
CALCIUM SERPL-MCNC: 10.2 MG/DL (ref 8.5–10.5)
CHLORIDE SERPL-SCNC: 103 MMOL/L (ref 96–112)
CO2 SERPL-SCNC: 28 MMOL/L (ref 20–33)
CREAT SERPL-MCNC: 1.01 MG/DL (ref 0.5–1.4)
EOSINOPHIL # BLD AUTO: 0.23 K/UL (ref 0–0.51)
EOSINOPHIL NFR BLD: 2.8 % (ref 0–6.9)
ERYTHROCYTE [DISTWIDTH] IN BLOOD BY AUTOMATED COUNT: 49.4 FL (ref 35.9–50)
GLOBULIN SER CALC-MCNC: 3.6 G/DL (ref 1.9–3.5)
GLUCOSE SERPL-MCNC: 97 MG/DL (ref 65–99)
HCT VFR BLD AUTO: 44.2 % (ref 37–47)
HGB BLD-MCNC: 13.4 G/DL (ref 12–16)
IMM GRANULOCYTES # BLD AUTO: 0.03 K/UL (ref 0–0.11)
IMM GRANULOCYTES NFR BLD AUTO: 0.4 % (ref 0–0.9)
LYMPHOCYTES # BLD AUTO: 1.45 K/UL (ref 1–4.8)
LYMPHOCYTES NFR BLD: 17.5 % (ref 22–41)
MCH RBC QN AUTO: 26.1 PG (ref 27–33)
MCHC RBC AUTO-ENTMCNC: 30.3 G/DL (ref 33.6–35)
MCV RBC AUTO: 86.2 FL (ref 81.4–97.8)
MONOCYTES # BLD AUTO: 0.64 K/UL (ref 0–0.85)
MONOCYTES NFR BLD AUTO: 7.7 % (ref 0–13.4)
NEUTROPHILS # BLD AUTO: 5.8 K/UL (ref 2–7.15)
NEUTROPHILS NFR BLD: 69.9 % (ref 44–72)
NRBC # BLD AUTO: 0 K/UL
NRBC BLD-RTO: 0 /100 WBC
PLATELET # BLD AUTO: 378 K/UL (ref 164–446)
PMV BLD AUTO: 11.2 FL (ref 9–12.9)
POTASSIUM SERPL-SCNC: 3.9 MMOL/L (ref 3.6–5.5)
PROT SERPL-MCNC: 7.5 G/DL (ref 6–8.2)
RBC # BLD AUTO: 5.13 M/UL (ref 4.2–5.4)
SODIUM SERPL-SCNC: 141 MMOL/L (ref 135–145)
T3 SERPL-MCNC: 99.3 NG/DL (ref 60–181)
T4 FREE SERPL-MCNC: 0.9 NG/DL (ref 0.53–1.43)
TSH SERPL DL<=0.005 MIU/L-ACNC: 0.39 UIU/ML (ref 0.38–5.33)
WBC # BLD AUTO: 8.3 K/UL (ref 4.8–10.8)

## 2020-03-10 PROCEDURE — 36415 COLL VENOUS BLD VENIPUNCTURE: CPT

## 2020-03-10 PROCEDURE — 84439 ASSAY OF FREE THYROXINE: CPT | Mod: GA

## 2020-03-10 PROCEDURE — 84480 ASSAY TRIIODOTHYRONINE (T3): CPT

## 2020-03-10 PROCEDURE — 80053 COMPREHEN METABOLIC PANEL: CPT

## 2020-03-10 PROCEDURE — 84443 ASSAY THYROID STIM HORMONE: CPT | Mod: GA

## 2020-03-10 PROCEDURE — 85025 COMPLETE CBC W/AUTO DIFF WBC: CPT

## 2020-03-23 ENCOUNTER — HOSPITAL ENCOUNTER (OUTPATIENT)
Dept: RADIOLOGY | Facility: MEDICAL CENTER | Age: 75
End: 2020-03-23
Attending: FAMILY MEDICINE
Payer: COMMERCIAL

## 2020-03-23 DIAGNOSIS — R51.9 CHRONIC NONINTRACTABLE HEADACHE, UNSPECIFIED HEADACHE TYPE: ICD-10-CM

## 2020-03-23 DIAGNOSIS — Z98.2 INTRACRANIAL SHUNT: ICD-10-CM

## 2020-03-23 DIAGNOSIS — G89.29 CHRONIC NONINTRACTABLE HEADACHE, UNSPECIFIED HEADACHE TYPE: ICD-10-CM

## 2020-03-23 DIAGNOSIS — Z86.69 HISTORY OF CHIARI MALFORMATION: ICD-10-CM

## 2020-03-23 PROCEDURE — 70450 CT HEAD/BRAIN W/O DYE: CPT

## 2020-04-26 DIAGNOSIS — I10 ESSENTIAL HYPERTENSION: ICD-10-CM

## 2020-04-28 DIAGNOSIS — M54.9 CHRONIC BACK PAIN, UNSPECIFIED BACK LOCATION, UNSPECIFIED BACK PAIN LATERALITY: ICD-10-CM

## 2020-04-28 DIAGNOSIS — F32.0 MILD MAJOR DEPRESSION (HCC): ICD-10-CM

## 2020-04-28 DIAGNOSIS — G89.29 CHRONIC BACK PAIN, UNSPECIFIED BACK LOCATION, UNSPECIFIED BACK PAIN LATERALITY: ICD-10-CM

## 2020-04-28 RX ORDER — BENAZEPRIL HYDROCHLORIDE AND HYDROCHLOROTHIAZIDE 20; 12.5 MG/1; MG/1
TABLET ORAL
Qty: 90 TAB | Refills: 1 | Status: SHIPPED | OUTPATIENT
Start: 2020-04-28 | End: 2020-10-27

## 2020-04-28 NOTE — TELEPHONE ENCOUNTER
*CHANGEIN PHARMACY*  Was the patient seen in the last year in this department? Yes    Does patient have an active prescription for medications requested?YES    Received Request Via: Pharmacy      Pt met protocol?: Yes    LAST OV 03/03/2020    BP Readings from Last 1 Encounters:   03/03/20 116/70     Lab Results   Component Value Date/Time    CHOLSTRLTOT 191 09/18/2017 08:59 AM     (H) 09/18/2017 08:59 AM    HDL 51 09/18/2017 08:59 AM    TRIGLYCERIDE 159 (H) 09/18/2017 08:59 AM       Lab Results   Component Value Date/Time    SODIUM 141 03/10/2020 03:20 PM    POTASSIUM 3.9 03/10/2020 03:20 PM    CHLORIDE 103 03/10/2020 03:20 PM    CO2 28 03/10/2020 03:20 PM    GLUCOSE 97 03/10/2020 03:20 PM    BUN 18 03/10/2020 03:20 PM    CREATININE 1.01 03/10/2020 03:20 PM    CREATININE 1.0 05/20/2008 05:55 AM     Lab Results   Component Value Date/Time    ALKPHOSPHAT 83 03/10/2020 03:20 PM    ASTSGOT 25 03/10/2020 03:20 PM    ALTSGPT 18 03/10/2020 03:20 PM    TBILIRUBIN 0.6 03/10/2020 03:20 PM

## 2020-04-29 NOTE — TELEPHONE ENCOUNTER
Was the patient seen in the last year in this department? Yes    Does patient have an active prescription for medications requested? No     Received Request Via: Pharmacy      Pt met protocol?: Yes, OV 3/20

## 2020-04-30 RX ORDER — DULOXETIN HYDROCHLORIDE 30 MG/1
CAPSULE, DELAYED RELEASE ORAL
Qty: 90 CAP | Refills: 1 | Status: SHIPPED | OUTPATIENT
Start: 2020-04-30 | End: 2020-10-27

## 2020-07-10 DIAGNOSIS — K21.9 GASTROESOPHAGEAL REFLUX DISEASE, ESOPHAGITIS PRESENCE NOT SPECIFIED: ICD-10-CM

## 2020-08-24 DIAGNOSIS — I10 ESSENTIAL HYPERTENSION: ICD-10-CM

## 2020-08-24 RX ORDER — ATENOLOL 50 MG/1
100 TABLET ORAL
Qty: 180 TAB | Refills: 0 | Status: SHIPPED | OUTPATIENT
Start: 2020-08-24 | End: 2020-11-04 | Stop reason: SDUPTHER

## 2020-10-25 DIAGNOSIS — F32.0 MILD MAJOR DEPRESSION (HCC): ICD-10-CM

## 2020-10-25 DIAGNOSIS — M54.9 CHRONIC BACK PAIN, UNSPECIFIED BACK LOCATION, UNSPECIFIED BACK PAIN LATERALITY: ICD-10-CM

## 2020-10-25 DIAGNOSIS — I10 ESSENTIAL HYPERTENSION: ICD-10-CM

## 2020-10-25 DIAGNOSIS — G89.29 CHRONIC BACK PAIN, UNSPECIFIED BACK LOCATION, UNSPECIFIED BACK PAIN LATERALITY: ICD-10-CM

## 2020-10-27 RX ORDER — DULOXETIN HYDROCHLORIDE 30 MG/1
CAPSULE, DELAYED RELEASE ORAL
Qty: 90 CAP | Refills: 1 | Status: SHIPPED | OUTPATIENT
Start: 2020-10-27 | End: 2020-11-04 | Stop reason: SDUPTHER

## 2020-10-27 RX ORDER — BENAZEPRIL HYDROCHLORIDE AND HYDROCHLOROTHIAZIDE 20; 12.5 MG/1; MG/1
TABLET ORAL
Qty: 90 TAB | Refills: 1 | Status: SHIPPED | OUTPATIENT
Start: 2020-10-27 | End: 2020-11-04 | Stop reason: SDUPTHER

## 2020-10-27 NOTE — TELEPHONE ENCOUNTER
Refill X 6 months, sent to pharmacy.Pt. Seen in the last 6 months per protocol.   Lab Results   Component Value Date/Time    SODIUM 141 03/10/2020 03:20 PM    POTASSIUM 3.9 03/10/2020 03:20 PM    CHLORIDE 103 03/10/2020 03:20 PM    CO2 28 03/10/2020 03:20 PM    GLUCOSE 97 03/10/2020 03:20 PM    BUN 18 03/10/2020 03:20 PM    CREATININE 1.01 03/10/2020 03:20 PM    CREATININE 1.0 05/20/2008 05:55 AM

## 2020-11-04 DIAGNOSIS — G89.29 CHRONIC BACK PAIN, UNSPECIFIED BACK LOCATION, UNSPECIFIED BACK PAIN LATERALITY: ICD-10-CM

## 2020-11-04 DIAGNOSIS — K21.9 GASTROESOPHAGEAL REFLUX DISEASE: ICD-10-CM

## 2020-11-04 DIAGNOSIS — M19.079 ARTHRITIS OF ANKLE: ICD-10-CM

## 2020-11-04 DIAGNOSIS — M54.9 CHRONIC BACK PAIN, UNSPECIFIED BACK LOCATION, UNSPECIFIED BACK PAIN LATERALITY: ICD-10-CM

## 2020-11-04 DIAGNOSIS — F32.0 MILD MAJOR DEPRESSION (HCC): ICD-10-CM

## 2020-11-04 DIAGNOSIS — I10 ESSENTIAL HYPERTENSION: ICD-10-CM

## 2020-11-04 RX ORDER — BENAZEPRIL HYDROCHLORIDE AND HYDROCHLOROTHIAZIDE 20; 12.5 MG/1; MG/1
TABLET ORAL
Qty: 90 TAB | Refills: 1 | Status: SHIPPED | OUTPATIENT
Start: 2020-11-04 | End: 2021-06-22 | Stop reason: SDUPTHER

## 2020-11-04 RX ORDER — PREDNISONE 10 MG/1
TABLET ORAL
Qty: 63 TAB | Refills: 0 | Status: SHIPPED | OUTPATIENT
Start: 2020-11-04 | End: 2021-01-22

## 2020-11-04 RX ORDER — DULOXETIN HYDROCHLORIDE 30 MG/1
CAPSULE, DELAYED RELEASE ORAL
Qty: 90 CAP | Refills: 1 | Status: SHIPPED | OUTPATIENT
Start: 2020-11-04 | End: 2021-05-10 | Stop reason: SDUPTHER

## 2020-11-04 RX ORDER — ATENOLOL 50 MG/1
100 TABLET ORAL
Qty: 180 TAB | Refills: 0 | Status: SHIPPED | OUTPATIENT
Start: 2020-11-04 | End: 2021-08-11

## 2020-11-04 NOTE — TELEPHONE ENCOUNTER
----- Message from Tereza Sánchez sent at 11/4/2020  8:52 AM PST -----  Regarding: Medication Renewal Request  Contact: [phone]  Refills have been requested for the following medications:        predniSONE (DELTASONE) 10 MG Tab [Lucía Duarte M.D.]        esomeprazole (NEXIUM) 20 MG capsule [Lucía Duarte M.D.]        atenolol (TENORMIN) 50 MG Tab [Lucía Duarte M.D.]        benazepril-hydrochlorthiazide (LOTENSIN HCT) 20-12.5 MG per tablet [Lucía Duarte M.D.]        DULoxetine (CYMBALTA) 30 MG Cap DR Particles [Lucía Duarte M.D.]    Preferred pharmacy: [facility] #[id number] - [place], [place] - [address].  Delivery method: Pickup

## 2020-11-14 DIAGNOSIS — I10 ESSENTIAL HYPERTENSION: ICD-10-CM

## 2020-11-17 RX ORDER — ATENOLOL 50 MG/1
TABLET ORAL
Qty: 60 TAB | Refills: 2 | OUTPATIENT
Start: 2020-11-17

## 2020-12-18 ENCOUNTER — HOSPITAL ENCOUNTER (OUTPATIENT)
Dept: RADIOLOGY | Facility: MEDICAL CENTER | Age: 75
End: 2020-12-18
Attending: INTERNAL MEDICINE
Payer: MEDICARE

## 2020-12-18 DIAGNOSIS — Z12.31 VISIT FOR SCREENING MAMMOGRAM: ICD-10-CM

## 2020-12-18 PROCEDURE — 77067 SCR MAMMO BI INCL CAD: CPT

## 2020-12-21 ENCOUNTER — HOSPITAL ENCOUNTER (OUTPATIENT)
Dept: RADIOLOGY | Facility: MEDICAL CENTER | Age: 75
End: 2020-12-21
Attending: SPECIALIST
Payer: MEDICARE

## 2020-12-21 DIAGNOSIS — M25.512 LEFT SHOULDER PAIN, UNSPECIFIED CHRONICITY: ICD-10-CM

## 2020-12-21 PROCEDURE — 73030 X-RAY EXAM OF SHOULDER: CPT | Mod: LT

## 2021-01-15 DIAGNOSIS — Z23 NEED FOR VACCINATION: ICD-10-CM

## 2021-01-18 DIAGNOSIS — K21.9 GASTROESOPHAGEAL REFLUX DISEASE: ICD-10-CM

## 2021-01-18 NOTE — TELEPHONE ENCOUNTER
Patient requests change in pharmacy.     [facility] - [facility], [place] - [address]  [address] [address]  Phone: [phone] Fax: [phone]

## 2021-01-20 NOTE — TELEPHONE ENCOUNTER
Was the patient seen in the last year in this department? Yes    Does patient have an active prescription for medications requested? Yes    Received Request Via: Pharmacy      Pt met protocol?: Yes pt last ov 3/2020

## 2021-01-22 ENCOUNTER — OFFICE VISIT (OUTPATIENT)
Dept: MEDICAL GROUP | Facility: PHYSICIAN GROUP | Age: 76
End: 2021-01-22
Payer: MEDICARE

## 2021-01-22 VITALS
DIASTOLIC BLOOD PRESSURE: 72 MMHG | SYSTOLIC BLOOD PRESSURE: 142 MMHG | TEMPERATURE: 99 F | HEART RATE: 95 BPM | HEIGHT: 60 IN | BODY MASS INDEX: 39.66 KG/M2 | OXYGEN SATURATION: 92 % | WEIGHT: 202 LBS

## 2021-01-22 DIAGNOSIS — E78.5 DYSLIPIDEMIA: Primary | ICD-10-CM

## 2021-01-22 DIAGNOSIS — J96.11 CHRONIC RESPIRATORY FAILURE WITH HYPOXIA (HCC): ICD-10-CM

## 2021-01-22 DIAGNOSIS — Z00.00 WELL ADULT EXAM: ICD-10-CM

## 2021-01-22 DIAGNOSIS — I10 ESSENTIAL HYPERTENSION: ICD-10-CM

## 2021-01-22 DIAGNOSIS — G89.29 CHRONIC BACK PAIN, UNSPECIFIED BACK LOCATION, UNSPECIFIED BACK PAIN LATERALITY: ICD-10-CM

## 2021-01-22 DIAGNOSIS — F33.1 MODERATE EPISODE OF RECURRENT MAJOR DEPRESSIVE DISORDER (HCC): ICD-10-CM

## 2021-01-22 DIAGNOSIS — Z85.3 HISTORY OF BREAST CANCER: ICD-10-CM

## 2021-01-22 DIAGNOSIS — M54.9 CHRONIC BACK PAIN, UNSPECIFIED BACK LOCATION, UNSPECIFIED BACK PAIN LATERALITY: ICD-10-CM

## 2021-01-22 DIAGNOSIS — E89.0 H/O PARTIAL THYROIDECTOMY: ICD-10-CM

## 2021-01-22 PROBLEM — M70.20 OLECRANON BURSITIS: Status: ACTIVE | Noted: 2021-01-22

## 2021-01-22 PROCEDURE — 99213 OFFICE O/P EST LOW 20 MIN: CPT | Performed by: NURSE PRACTITIONER

## 2021-01-22 RX ORDER — AMOXICILLIN AND CLAVULANATE POTASSIUM 875; 125 MG/1; MG/1
TABLET, FILM COATED ORAL
COMMUNITY
End: 2021-01-22

## 2021-01-22 RX ORDER — AMITRIPTYLINE HYDROCHLORIDE 25 MG/1
TABLET, FILM COATED ORAL
COMMUNITY
End: 2021-01-22

## 2021-01-22 RX ORDER — PREDNISONE 10 MG/1
TABLET ORAL
COMMUNITY
Start: 2021-09-25 | End: 2021-09-29 | Stop reason: CLARIF

## 2021-01-22 RX ORDER — ANASTROZOLE 1 MG/1
TABLET ORAL
COMMUNITY
End: 2021-03-11

## 2021-01-22 RX ORDER — PROMETHAZINE HYDROCHLORIDE, PHENYLEPHRINE HYDROCHLORIDE AND CODEINE PHOSPHATE 6.25; 5; 1 MG/5ML; MG/5ML; MG/5ML
SOLUTION ORAL
COMMUNITY
End: 2021-01-21

## 2021-01-22 RX ORDER — POTASSIUM CHLORIDE 20 MEQ/1
TABLET, EXTENDED RELEASE ORAL
COMMUNITY
End: 2021-01-22

## 2021-01-22 RX ORDER — BENAZEPRIL HYDROCHLORIDE AND HYDROCHLOROTHIAZIDE 20; 12.5 MG/1; MG/1
TABLET ORAL
COMMUNITY
End: 2021-01-22

## 2021-01-22 RX ORDER — ATENOLOL 100 MG/1
TABLET ORAL
COMMUNITY
End: 2021-01-22

## 2021-01-22 RX ORDER — FUROSEMIDE 20 MG/1
TABLET ORAL
COMMUNITY
End: 2021-08-27

## 2021-01-22 RX ORDER — PREGABALIN 100 MG/1
CAPSULE ORAL
COMMUNITY
End: 2021-01-22

## 2021-01-22 RX ORDER — MELOXICAM 15 MG/1
TABLET ORAL
COMMUNITY
End: 2021-08-27

## 2021-01-22 RX ORDER — DULOXETIN HYDROCHLORIDE 30 MG/1
CAPSULE, DELAYED RELEASE ORAL
COMMUNITY
End: 2021-01-22

## 2021-01-22 RX ORDER — SULFAMETHOXAZOLE AND TRIMETHOPRIM 800; 160 MG/1; MG/1
TABLET ORAL
COMMUNITY
End: 2021-08-27

## 2021-01-22 RX ORDER — ESOMEPRAZOLE MAGNESIUM 20 MG/1
GRANULE, DELAYED RELEASE ORAL
COMMUNITY
End: 2021-01-22

## 2021-01-22 RX ORDER — GABAPENTIN 300 MG/1
CAPSULE ORAL
COMMUNITY
End: 2021-01-22

## 2021-01-22 RX ORDER — ALBUTEROL SULFATE 90 UG/1
AEROSOL, METERED RESPIRATORY (INHALATION)
COMMUNITY
Start: 2021-09-25 | End: 2021-09-29 | Stop reason: CLARIF

## 2021-01-22 RX ORDER — ATENOLOL 50 MG/1
TABLET ORAL
COMMUNITY
End: 2021-01-22

## 2021-01-22 RX ORDER — HYDROCODONE BITARTRATE AND ACETAMINOPHEN 5; 325 MG/1; MG/1
TABLET ORAL
COMMUNITY
End: 2021-01-22

## 2021-01-22 RX ORDER — BENZONATATE 200 MG/1
CAPSULE ORAL
COMMUNITY
End: 2021-01-22

## 2021-01-22 RX ORDER — ALBUTEROL SULFATE 90 UG/1
AEROSOL, METERED RESPIRATORY (INHALATION)
COMMUNITY
End: 2021-08-27

## 2021-01-22 ASSESSMENT — FIBROSIS 4 INDEX: FIB4 SCORE: 1.14

## 2021-01-22 NOTE — ASSESSMENT & PLAN NOTE
Chronic condition, stable.  Patient takes atenolol 50 mg daily as well as benazepril/hydrochlorothiazide 20/12.5 mg daily.  Her blood pressure in the office is 142/72.  She denies symptoms of high blood pressure such as headache, chest pain, shortness of breath.  No dizziness or palpitations.

## 2021-01-22 NOTE — PROGRESS NOTES

## 2021-01-22 NOTE — ASSESSMENT & PLAN NOTE
Chronic condition, stable.  Patient reports that she feels well overall.  She denies lethargy, constipation, palpitations.  No skin or hair changes.

## 2021-01-22 NOTE — ASSESSMENT & PLAN NOTE
Chronic condition, stable.  Patient has not had her lipids checked since 2017.  At that time she did have a slightly elevated LDL though improved from prior years.  She does not take a statin at this time.

## 2021-01-22 NOTE — PROGRESS NOTES
Subjective:     CC: Establish care.    HPI:   Tereza presents today with her , to establish care.  Tereza tells me that she overall feels well, and though she has multiple medical conditions, she feels that they are all managed at this time.  Her past medical, social, and surgical history was reviewed today.  The following issues were discussed at todays visit:    Dyslipidemia  Chronic condition, stable.  Patient has not had her lipids checked since 2017.  At that time she did have a slightly elevated LDL though improved from prior years.  She does not take a statin at this time.    H/O partial thyroidectomy  Chronic condition, stable.  Patient reports that she feels well overall.  She denies lethargy, constipation, palpitations.  No skin or hair changes.    HTN (hypertension)  Chronic condition, stable.  Patient takes atenolol 50 mg daily as well as benazepril/hydrochlorothiazide 20/12.5 mg daily.  Her blood pressure in the office is 142/72.  She denies symptoms of high blood pressure such as headache, chest pain, shortness of breath.  No dizziness or palpitations.    Major depressive disorder  Chronic condition, stable.  Patient is maintained on amitriptyline and Cymbalta daily.  She reports good control of her mood.   Continue current regimen.             Past Medical History:   Diagnosis Date   • Anesthesia     PONV   • Arthritis     Left hip, knees, ankles   • Asthma     Inhaler use daily.   • Bowel habit changes     Constipation   • Breath shortness     asthma   • Cancer (HCC)     Breast 2015   • Chiari malformation 1970's    shunt  in place   • Chickenpox    • Chronic back pain     2/2 scoliosis and syringomyelia    • Contracture of hand joint     left    • Decreased lung capacity    • Dental disorder     upper/lower   • Emphysema of lung (HCC)     O2 at night.   • Heart burn    • High cholesterol    • Hypertension    • Indigestion    • Joint replacement     Right shoulder, cervical   • Obesity    •  Pain    • Pain 08/06/2018    Back, neck and legs   • Peripheral edema 6/6/2013   • Pneumonia    • Scoliosis    • Shortness of breath 08/06/2018    Chronic   • Sleep apnea     uses O2 at night   • Sleep apnea 08/07/2018    Patient states having sleep study October 2018.   • Snoring     No sleep study   • sore throat 1/15/15   • Syringomyelia (HCC)     surgery 1973, 1977       Social History     Tobacco Use   • Smoking status: Never Smoker   • Smokeless tobacco: Never Used   Substance Use Topics   • Alcohol use: No     Alcohol/week: 0.0 oz   • Drug use: No       Current Outpatient Medications Ordered in Epic   Medication Sig Dispense Refill   • calcium-vitamin D (CALCIUM 500 + D) 500-125 MG-UNIT Tab Calcium 500 + D     • albuterol (VENTOLIN HFA) 108 (90 Base) MCG/ACT Aero Soln inhalation aerosol Ventolin HFA 90 mcg/actuation aerosol inhaler     • predniSONE (DELTASONE) 10 MG Tab prednisone 10 mg tablet     • meloxicam (MOBIC) 15 MG tablet meloxicam 15 mg tablet     • furosemide (LASIX) 20 MG Tab furosemide 20 mg tablet     • albuterol 108 (90 Base) MCG/ACT Aero Soln inhalation aerosol Ventolin HFA 90 mcg/actuation aerosol inhaler     • esomeprazole (NEXIUM) 20 MG capsule Take 1 Cap by mouth every morning before breakfast. 90 Cap 0   • atenolol (TENORMIN) 50 MG Tab Take 2 Tabs by mouth every day. 180 Tab 0   • benazepril-hydrochlorthiazide (LOTENSIN HCT) 20-12.5 MG per tablet TAKE 1 TABLET DAILY 90 Tab 1   • DULoxetine (CYMBALTA) 30 MG Cap DR Particles TAKE 1 CAPSULE DAILY 90 Cap 1   • LYRICA 100 MG Cap Take 100 mg by mouth 3 times a day.  1   • HYDROcodone-acetaminophen (NORCO) 5-325 MG Tab per tablet TAKE 1 TABLET BY MOUTH 3 TIMES A DAY FOR 30 DAYS, 724.02  0   • potassium chloride SA (KDUR) 20 MEQ Tab CR Take 1 Tab by mouth every day. 90 Tab 1   • Fluticasone-Salmeterol 232-14 MCG/ACT AEROSOL POWDER, BREATH ACTIVATED Inhale 1 Inhalation by mouth 2 times a day. 1 Each 11   • amitriptyline (ELAVIL) 25 MG Tab Take 100  mg by mouth every evening.     • MULTIVITAMIN PO Take 1 Tab by mouth every day.     • anastrozole (ARIMIDEX) 1 MG Tab anastrozole 1 mg tablet     • sulfamethoxazole-trimethoprim (BACTRIM DS) 800-160 MG tablet sulfamethoxazole 800 mg-trimethoprim 160 mg tablet     • Influenza Vac Subunit Quad Suspension Flucelvax Quad 0383-1948 60 mcg (15 mcg x 4)/0.5 mL intramuscular susp   ADM 0.5ML IM UTD     • Influenza Vaccine High-Dose pf 0.5 ML Suspension Prefilled Syringe injection Fluzone High-Dose 1423-7140 (PF) 180 mcg/0.5 mL intramuscular syringe   ADM 0.5ML IM UTD     • Influenza Vaccine High-Dose pf 0.5 ML Suspension Prefilled Syringe injection Fluzone High-Dose 2015-16 (PF) 180 mcg/0.5 mL intramuscular syringe   ADM 0.5ML IM UTD     • Diclofenac Sodium 1 % Gel Apply 4 g to skin as directed 3 times a day as needed (Right Knee pain). (Patient not taking: Reported on 1/22/2021) 1 Tube 0     No current Epic-ordered facility-administered medications on file.        Allergies:  Bactrim ds and Morphine    Health Maintenance: Deferred.  Patient will return in 2 to 3 months for annual wellness.    ROS:  Gen: no fevers/chills, no changes in weight  Eyes: no changes in vision  ENT: no sore throat, no hearing loss, no bloody nose  Pulm: no sob, no cough  CV: no chest pain, no palpitations  GI: no nausea/vomiting, no diarrhea  : no dysuria  MSk: no myalgias  Skin: no rash  Neuro: no headaches, no numbness/tingling  Heme/Lymph: no easy bruising      Objective:       Exam:  /72   Pulse 95   Temp 37.2 °C (99 °F)   Ht 1.524 m (5')   Wt 91.6 kg (202 lb)   SpO2 92%   BMI 39.45 kg/m²  Body mass index is 39.45 kg/m².    Gen: Alert and oriented, No apparent distress.  Neck: Neck is supple without lymphadenopathy.  Lungs: Normal effort, CTA bilaterally, no wheezes, rhonchi, or rales  CV: Regular rate and rhythm. No murmurs, rubs, or gallops.  Ext: No clubbing, cyanosis, edema.    Labs: None.    Assessment & Plan:     75 y.o.  female with the following -     1. Well adult exam  - CBC WITH DIFFERENTIAL; Future  - Comp Metabolic Panel; Future  - HEMOGLOBIN A1C; Future  - VITAMIN D,25 HYDROXY; Future    2. Dyslipidemia  Chronic condition, stable.  Will update labs.  - Lipid Profile; Future    3. H/O partial thyroidectomy  Chronic condition, stable.  Will update labs.  - TSH; Future  - TRIIDOTHYRONINE; Future    4. Essential hypertension  Chronic condition, stable.  Continue current medication regimen.    Problem List Items Addressed This Visit     HTN (hypertension)     Chronic condition, stable.  Patient takes atenolol 50 mg daily as well as benazepril/hydrochlorothiazide 20/12.5 mg daily.  Her blood pressure in the office is 142/72.  She denies symptoms of high blood pressure such as headache, chest pain, shortness of breath.  No dizziness or palpitations.         Relevant Medications    furosemide (LASIX) 20 MG Tab    Chronic back pain, due to scoliosis    Relevant Medications    predniSONE (DELTASONE) 10 MG Tab    meloxicam (MOBIC) 15 MG tablet    Dyslipidemia - Primary     Chronic condition, stable.  Patient has not had her lipids checked since 2017.  At that time she did have a slightly elevated LDL though improved from prior years.  She does not take a statin at this time.         H/O partial thyroidectomy     Chronic condition, stable.  Patient reports that she feels well overall.  She denies lethargy, constipation, palpitations.  No skin or hair changes.         Major depressive disorder, mild     Chronic condition, stable.  Patient is maintained on amitriptyline and Cymbalta daily.  She reports good control of her mood.   Continue current regimen.            Chronic respiratory failure with hypoxia (HCC)  Patient is on O2 at nighttime, and needs a CPAP    History of breast cancer      Other Visit Diagnoses     Well adult exam        Relevant Orders    CBC WITH DIFFERENTIAL (Completed)    Comp Metabolic Panel (Completed)    TSH  (Completed)    TRIIDOTHYRONINE (Completed)    Lipid Profile (Completed)    HEMOGLOBIN A1C (Completed)    VITAMIN D,25 HYDROXY (Completed)            Follow-up in approximately 2 months, for annual wellness exam.  Sooner if indicated.    Please note that this dictation was created using voice recognition software. I have made every reasonable attempt to correct obvious errors, but I expect that there are errors of grammar and possibly content that I did not discover before finalizing the note.

## 2021-01-25 ENCOUNTER — TELEPHONE (OUTPATIENT)
Dept: MEDICAL GROUP | Facility: PHYSICIAN GROUP | Age: 76
End: 2021-01-25

## 2021-01-25 DIAGNOSIS — R91.1 SOLITARY PULMONARY NODULE: ICD-10-CM

## 2021-01-25 NOTE — TELEPHONE ENCOUNTER
----- Message from ERWIN Mae sent at 1/25/2021  2:16 PM PST -----  Can you call and let them know that I did order a f/u chest CT to look at the lung nodule please?  Thanks,  Catie

## 2021-02-03 ENCOUNTER — HOSPITAL ENCOUNTER (OUTPATIENT)
Dept: RADIOLOGY | Facility: MEDICAL CENTER | Age: 76
End: 2021-02-03
Attending: NURSE PRACTITIONER
Payer: MEDICARE

## 2021-02-03 DIAGNOSIS — R91.1 SOLITARY PULMONARY NODULE: ICD-10-CM

## 2021-02-03 PROCEDURE — 71250 CT THORAX DX C-: CPT

## 2021-02-23 ENCOUNTER — IMMUNIZATION (OUTPATIENT)
Dept: FAMILY PLANNING/WOMEN'S HEALTH CLINIC | Facility: IMMUNIZATION CENTER | Age: 76
End: 2021-02-23
Payer: MEDICARE

## 2021-02-23 ENCOUNTER — APPOINTMENT (OUTPATIENT)
Dept: FAMILY PLANNING/WOMEN'S HEALTH CLINIC | Facility: IMMUNIZATION CENTER | Age: 76
End: 2021-02-23
Attending: INTERNAL MEDICINE
Payer: MEDICARE

## 2021-02-23 DIAGNOSIS — Z23 NEED FOR VACCINATION: ICD-10-CM

## 2021-02-23 DIAGNOSIS — Z23 ENCOUNTER FOR VACCINATION: Primary | ICD-10-CM

## 2021-02-23 PROCEDURE — 91300 PFIZER SARS-COV-2 VACCINE: CPT

## 2021-02-23 PROCEDURE — 0001A PFIZER SARS-COV-2 VACCINE: CPT

## 2021-02-24 ENCOUNTER — HOSPITAL ENCOUNTER (OUTPATIENT)
Dept: LAB | Facility: MEDICAL CENTER | Age: 76
End: 2021-02-24
Attending: NURSE PRACTITIONER
Payer: MEDICARE

## 2021-02-24 DIAGNOSIS — Z00.00 WELL ADULT EXAM: ICD-10-CM

## 2021-02-24 LAB
25(OH)D3 SERPL-MCNC: 37 NG/ML (ref 30–100)
ALBUMIN SERPL BCP-MCNC: 4.2 G/DL (ref 3.2–4.9)
ALBUMIN/GLOB SERPL: 1.3 G/DL
ALP SERPL-CCNC: 104 U/L (ref 30–99)
ALT SERPL-CCNC: 29 U/L (ref 2–50)
ANION GAP SERPL CALC-SCNC: 12 MMOL/L (ref 7–16)
AST SERPL-CCNC: 36 U/L (ref 12–45)
BASOPHILS # BLD AUTO: 1.6 % (ref 0–1.8)
BASOPHILS # BLD: 0.09 K/UL (ref 0–0.12)
BILIRUB SERPL-MCNC: 0.7 MG/DL (ref 0.1–1.5)
BUN SERPL-MCNC: 21 MG/DL (ref 8–22)
CALCIUM SERPL-MCNC: 9.5 MG/DL (ref 8.5–10.5)
CHLORIDE SERPL-SCNC: 99 MMOL/L (ref 96–112)
CHOLEST SERPL-MCNC: 200 MG/DL (ref 100–199)
CO2 SERPL-SCNC: 27 MMOL/L (ref 20–33)
CREAT SERPL-MCNC: 0.85 MG/DL (ref 0.5–1.4)
EOSINOPHIL # BLD AUTO: 0.28 K/UL (ref 0–0.51)
EOSINOPHIL NFR BLD: 5 % (ref 0–6.9)
ERYTHROCYTE [DISTWIDTH] IN BLOOD BY AUTOMATED COUNT: 53.1 FL (ref 35.9–50)
FASTING STATUS PATIENT QL REPORTED: NORMAL
GLOBULIN SER CALC-MCNC: 3.2 G/DL (ref 1.9–3.5)
GLUCOSE SERPL-MCNC: 101 MG/DL (ref 65–99)
HCT VFR BLD AUTO: 45.3 % (ref 37–47)
HDLC SERPL-MCNC: 48 MG/DL
HGB BLD-MCNC: 14.1 G/DL (ref 12–16)
IMM GRANULOCYTES # BLD AUTO: 0.02 K/UL (ref 0–0.11)
IMM GRANULOCYTES NFR BLD AUTO: 0.4 % (ref 0–0.9)
LDLC SERPL CALC-MCNC: 120 MG/DL
LYMPHOCYTES # BLD AUTO: 1.15 K/UL (ref 1–4.8)
LYMPHOCYTES NFR BLD: 20.4 % (ref 22–41)
MCH RBC QN AUTO: 27.6 PG (ref 27–33)
MCHC RBC AUTO-ENTMCNC: 31.1 G/DL (ref 33.6–35)
MCV RBC AUTO: 88.8 FL (ref 81.4–97.8)
MONOCYTES # BLD AUTO: 0.5 K/UL (ref 0–0.85)
MONOCYTES NFR BLD AUTO: 8.8 % (ref 0–13.4)
NEUTROPHILS # BLD AUTO: 3.61 K/UL (ref 2–7.15)
NEUTROPHILS NFR BLD: 63.8 % (ref 44–72)
NRBC # BLD AUTO: 0 K/UL
NRBC BLD-RTO: 0 /100 WBC
PLATELET # BLD AUTO: 343 K/UL (ref 164–446)
PMV BLD AUTO: 11.4 FL (ref 9–12.9)
POTASSIUM SERPL-SCNC: 3.8 MMOL/L (ref 3.6–5.5)
PROT SERPL-MCNC: 7.4 G/DL (ref 6–8.2)
RBC # BLD AUTO: 5.1 M/UL (ref 4.2–5.4)
SODIUM SERPL-SCNC: 138 MMOL/L (ref 135–145)
T3 SERPL-MCNC: 127 NG/DL (ref 60–181)
TRIGL SERPL-MCNC: 161 MG/DL (ref 0–149)
TSH SERPL DL<=0.005 MIU/L-ACNC: 0.71 UIU/ML (ref 0.38–5.33)
WBC # BLD AUTO: 5.7 K/UL (ref 4.8–10.8)

## 2021-02-24 PROCEDURE — 84480 ASSAY TRIIODOTHYRONINE (T3): CPT

## 2021-02-24 PROCEDURE — 82306 VITAMIN D 25 HYDROXY: CPT

## 2021-02-24 PROCEDURE — 84443 ASSAY THYROID STIM HORMONE: CPT

## 2021-02-24 PROCEDURE — 85025 COMPLETE CBC W/AUTO DIFF WBC: CPT

## 2021-02-24 PROCEDURE — 80053 COMPREHEN METABOLIC PANEL: CPT

## 2021-02-24 PROCEDURE — 80061 LIPID PANEL: CPT

## 2021-02-24 PROCEDURE — 83036 HEMOGLOBIN GLYCOSYLATED A1C: CPT

## 2021-02-24 PROCEDURE — 36415 COLL VENOUS BLD VENIPUNCTURE: CPT

## 2021-02-25 LAB
EST. AVERAGE GLUCOSE BLD GHB EST-MCNC: 137 MG/DL
HBA1C MFR BLD: 6.4 % (ref 4–5.6)

## 2021-03-01 DIAGNOSIS — K21.9 GASTROESOPHAGEAL REFLUX DISEASE: ICD-10-CM

## 2021-03-02 ENCOUNTER — TELEPHONE (OUTPATIENT)
Dept: MEDICAL GROUP | Facility: PHYSICIAN GROUP | Age: 76
End: 2021-03-02

## 2021-03-02 DIAGNOSIS — M25.511 ACUTE PAIN OF RIGHT SHOULDER: ICD-10-CM

## 2021-03-02 NOTE — TELEPHONE ENCOUNTER
"----- Message from ERWIN Mae sent at 3/1/2021  4:34 PM PST -----  Can you call Tereza, or  Anthony, and report the following:    Your hemoglobin A1C, which checks your average blood sugar over the past three months, was elevated at 6.4% which means your average blood sugar is 137 (normal is 65-99).   At 5.7%-6.4% there is an increased risk for diabetes and and greater than 6.4% it is considered diabetes.  I advise that you should reduce sugar/carbohydrate/alcohol, eat more vegetables and lean meats such as fish/chicken/turkey. I also recommend at least 30 minutes of cardiovascular exercise most days of the week.    Your total cholesterol is elevated at 200 (normal 100-199), your triglycerides are elevated at 161, your HDL (happy cholesterol) is normal at 48 (normal is greater than 40), and your LDL (lousy cholesterol) is elevated at 120 (normal is less than 100).    You can take an over-the-counter omega 3 fatty acids supplement, increase fiber in your diet, and decrease saturated fats (red meats, full fat dairy's) and trans fat (may be listed on labels as \"partially hydrogenated vegetable oil\") to help bring these values back to normal range. Additionally, losing weight, participating in aerobic exercise at least 150 minutes per week, avoidance of sugar, alcohol, and fatty/fried food will help to bring these levels back to normal. If you take plain omega 3 fatty acids you should take anywhere from 1-4 grams per day.    For her cholesterol, we can begin a statin medication or she can try the dietary changes noted above with a lab recheck in 3 months.    Let me know what they would like to do:)  Catie"

## 2021-03-02 NOTE — TELEPHONE ENCOUNTER
Spoke with Tereza and relayed recent labs results via PCP. I also sent information to pt's mychart for reference. Pt thanked me for the call.

## 2021-03-02 NOTE — RESULT ENCOUNTER NOTE
"Can you call Tereza, or  Anthony, and report the following:    Your hemoglobin A1C, which checks your average blood sugar over the past three months, was elevated at 6.4% which means your average blood sugar is 137 (normal is 65-99).   At 5.7%-6.4% there is an increased risk for diabetes and and greater than 6.4% it is considered diabetes.  I advise that you should reduce sugar/carbohydrate/alcohol, eat more vegetables and lean meats such as fish/chicken/turkey. I also recommend at least 30 minutes of cardiovascular exercise most days of the week.    Your total cholesterol is elevated at 200 (normal 100-199), your triglycerides are elevated at 161, your HDL (happy cholesterol) is normal at 48 (normal is greater than 40), and your LDL (lousy cholesterol) is elevated at 120 (normal is less than 100).    You can take an over-the-counter omega 3 fatty acids supplement, increase fiber in your diet, and decrease saturated fats (red meats, full fat dairy's) and trans fat (may be listed on labels as \"partially hydrogenated vegetable oil\") to help bring these values back to normal range. Additionally, losing weight, participating in aerobic exercise at least 150 minutes per week, avoidance of sugar, alcohol, and fatty/fried food will help to bring these levels back to normal. If you take plain omega 3 fatty acids you should take anywhere from 1-4 grams per day.    For her cholesterol, we can begin a statin medication or she can try the dietary changes noted above with a lab recheck in 3 months.    Let me know what they would like to do:)  Catie    "

## 2021-03-13 ENCOUNTER — IMMUNIZATION (OUTPATIENT)
Dept: FAMILY PLANNING/WOMEN'S HEALTH CLINIC | Facility: IMMUNIZATION CENTER | Age: 76
End: 2021-03-13
Attending: INTERNAL MEDICINE
Payer: MEDICARE

## 2021-03-13 DIAGNOSIS — Z23 ENCOUNTER FOR VACCINATION: Primary | ICD-10-CM

## 2021-03-13 PROCEDURE — 91300 PFIZER SARS-COV-2 VACCINE: CPT

## 2021-03-13 PROCEDURE — 0002A PFIZER SARS-COV-2 VACCINE: CPT

## 2021-03-23 PROBLEM — Z85.3 HISTORY OF BREAST CANCER: Status: ACTIVE | Noted: 2021-03-23

## 2021-03-23 PROBLEM — J96.11 CHRONIC RESPIRATORY FAILURE WITH HYPOXIA (HCC): Status: ACTIVE | Noted: 2021-03-23

## 2021-03-23 NOTE — ASSESSMENT & PLAN NOTE
Chronic condition, stable.  Patient is maintained on amitriptyline and Cymbalta daily.  She reports good control of her mood.   Continue current regimen.

## 2021-04-13 ENCOUNTER — PATIENT MESSAGE (OUTPATIENT)
Dept: HEALTH INFORMATION MANAGEMENT | Facility: OTHER | Age: 76
End: 2021-04-13

## 2021-05-10 ENCOUNTER — TELEPHONE (OUTPATIENT)
Dept: MEDICAL GROUP | Facility: PHYSICIAN GROUP | Age: 76
End: 2021-05-10

## 2021-05-10 VITALS — HEART RATE: 90 BPM | DIASTOLIC BLOOD PRESSURE: 66 MMHG | SYSTOLIC BLOOD PRESSURE: 116 MMHG

## 2021-05-10 DIAGNOSIS — G89.29 CHRONIC BACK PAIN, UNSPECIFIED BACK LOCATION, UNSPECIFIED BACK PAIN LATERALITY: ICD-10-CM

## 2021-05-10 DIAGNOSIS — M54.9 CHRONIC BACK PAIN, UNSPECIFIED BACK LOCATION, UNSPECIFIED BACK PAIN LATERALITY: ICD-10-CM

## 2021-05-10 DIAGNOSIS — F32.0 MILD MAJOR DEPRESSION (HCC): ICD-10-CM

## 2021-05-10 RX ORDER — DULOXETIN HYDROCHLORIDE 30 MG/1
CAPSULE, DELAYED RELEASE ORAL
Qty: 90 CAPSULE | Refills: 1 | Status: SHIPPED | OUTPATIENT
Start: 2021-05-10 | End: 2021-11-02 | Stop reason: SDUPTHER

## 2021-05-10 NOTE — TELEPHONE ENCOUNTER
----- Message from Anthony Sánchez on behalf of Tereza Sánchez sent at 5/10/2021  1:37 PM PDT -----  Regarding: RE:Blood Pressure  Contact: [phone]  This message is being sent by Anthony Sánchez on behalf of Tereza Sánchez    On 4/10/2021 my blood pressure is 116/66, pulse is 90.

## 2021-05-17 DIAGNOSIS — Z98.2 INTRACRANIAL SHUNT: ICD-10-CM

## 2021-06-01 DIAGNOSIS — Z98.2 INTRACRANIAL SHUNT: ICD-10-CM

## 2021-06-22 DIAGNOSIS — I10 ESSENTIAL HYPERTENSION: ICD-10-CM

## 2021-06-23 RX ORDER — BENAZEPRIL HYDROCHLORIDE AND HYDROCHLOROTHIAZIDE 20; 12.5 MG/1; MG/1
TABLET ORAL
Qty: 90 TABLET | Refills: 1 | Status: SHIPPED | OUTPATIENT
Start: 2021-06-23 | End: 2021-12-15

## 2021-07-21 ENCOUNTER — HOSPITAL ENCOUNTER (OUTPATIENT)
Dept: LAB | Facility: MEDICAL CENTER | Age: 76
End: 2021-07-21
Attending: ORTHOPAEDIC SURGERY
Payer: MEDICARE

## 2021-07-21 DIAGNOSIS — M25.552 LEFT HIP PAIN: ICD-10-CM

## 2021-07-21 DIAGNOSIS — Z96.642 PRESENCE OF LEFT ARTIFICIAL HIP JOINT: ICD-10-CM

## 2021-07-21 PROBLEM — T84.021A DISPLACEMENT OF INTERNAL LEFT HIP PROSTHESIS (HCC): Status: ACTIVE | Noted: 2021-07-21

## 2021-07-21 LAB
ALBUMIN SERPL BCP-MCNC: 4 G/DL (ref 3.2–4.9)
ALBUMIN/GLOB SERPL: 1.4 G/DL
ALP SERPL-CCNC: 95 U/L (ref 30–99)
ALT SERPL-CCNC: 15 U/L (ref 2–50)
ANION GAP SERPL CALC-SCNC: 13 MMOL/L (ref 7–16)
AST SERPL-CCNC: 23 U/L (ref 12–45)
BASOPHILS # BLD AUTO: 1.5 % (ref 0–1.8)
BASOPHILS # BLD: 0.12 K/UL (ref 0–0.12)
BILIRUB SERPL-MCNC: 0.5 MG/DL (ref 0.1–1.5)
BUN SERPL-MCNC: 15 MG/DL (ref 8–22)
CALCIUM SERPL-MCNC: 9.1 MG/DL (ref 8.5–10.5)
CHLORIDE SERPL-SCNC: 101 MMOL/L (ref 96–112)
CO2 SERPL-SCNC: 25 MMOL/L (ref 20–33)
CREAT SERPL-MCNC: 0.71 MG/DL (ref 0.5–1.4)
CRP SERPL HS-MCNC: 1.17 MG/DL (ref 0–0.75)
EOSINOPHIL # BLD AUTO: 0.25 K/UL (ref 0–0.51)
EOSINOPHIL NFR BLD: 3.1 % (ref 0–6.9)
ERYTHROCYTE [DISTWIDTH] IN BLOOD BY AUTOMATED COUNT: 51.5 FL (ref 35.9–50)
ERYTHROCYTE [SEDIMENTATION RATE] IN BLOOD BY WESTERGREN METHOD: 29 MM/HOUR (ref 0–25)
GLOBULIN SER CALC-MCNC: 2.9 G/DL (ref 1.9–3.5)
GLUCOSE SERPL-MCNC: 78 MG/DL (ref 65–99)
HCT VFR BLD AUTO: 41.4 % (ref 37–47)
HGB BLD-MCNC: 13 G/DL (ref 12–16)
IMM GRANULOCYTES # BLD AUTO: 0.08 K/UL (ref 0–0.11)
IMM GRANULOCYTES NFR BLD AUTO: 1 % (ref 0–0.9)
LYMPHOCYTES # BLD AUTO: 1.24 K/UL (ref 1–4.8)
LYMPHOCYTES NFR BLD: 15.6 % (ref 22–41)
MCH RBC QN AUTO: 28.7 PG (ref 27–33)
MCHC RBC AUTO-ENTMCNC: 31.4 G/DL (ref 33.6–35)
MCV RBC AUTO: 91.4 FL (ref 81.4–97.8)
MONOCYTES # BLD AUTO: 0.59 K/UL (ref 0–0.85)
MONOCYTES NFR BLD AUTO: 7.4 % (ref 0–13.4)
NEUTROPHILS # BLD AUTO: 5.68 K/UL (ref 2–7.15)
NEUTROPHILS NFR BLD: 71.4 % (ref 44–72)
NRBC # BLD AUTO: 0 K/UL
NRBC BLD-RTO: 0 /100 WBC
PLATELET # BLD AUTO: 391 K/UL (ref 164–446)
PMV BLD AUTO: 10.4 FL (ref 9–12.9)
POTASSIUM SERPL-SCNC: 3.6 MMOL/L (ref 3.6–5.5)
PROT SERPL-MCNC: 6.9 G/DL (ref 6–8.2)
RBC # BLD AUTO: 4.53 M/UL (ref 4.2–5.4)
SODIUM SERPL-SCNC: 139 MMOL/L (ref 135–145)
WBC # BLD AUTO: 8 K/UL (ref 4.8–10.8)

## 2021-07-21 PROCEDURE — 86140 C-REACTIVE PROTEIN: CPT

## 2021-07-21 PROCEDURE — 85025 COMPLETE CBC W/AUTO DIFF WBC: CPT

## 2021-07-21 PROCEDURE — 80053 COMPREHEN METABOLIC PANEL: CPT

## 2021-07-21 PROCEDURE — 85652 RBC SED RATE AUTOMATED: CPT

## 2021-07-21 PROCEDURE — 36415 COLL VENOUS BLD VENIPUNCTURE: CPT

## 2021-07-26 DIAGNOSIS — K21.9 GASTROESOPHAGEAL REFLUX DISEASE: ICD-10-CM

## 2021-08-07 DIAGNOSIS — I10 ESSENTIAL HYPERTENSION: ICD-10-CM

## 2021-08-10 NOTE — TELEPHONE ENCOUNTER
Catie- I don't see you have ever prescribed medication and it is no longer active on med list. Please refill as you see fit.

## 2021-08-11 RX ORDER — ATENOLOL 50 MG/1
TABLET ORAL
Qty: 180 TABLET | Refills: 0 | Status: SHIPPED | OUTPATIENT
Start: 2021-08-11 | End: 2021-11-02 | Stop reason: SDUPTHER

## 2021-08-25 PROBLEM — T84.029A RECURRENT DISLOCATION OF HIP JOINT PROSTHESIS (HCC): Status: ACTIVE | Noted: 2021-08-25

## 2021-08-25 PROBLEM — Z96.649 RECURRENT DISLOCATION OF HIP JOINT PROSTHESIS (HCC): Status: ACTIVE | Noted: 2021-08-25

## 2021-08-27 ENCOUNTER — PRE-ADMISSION TESTING (OUTPATIENT)
Dept: ADMISSIONS | Facility: MEDICAL CENTER | Age: 76
DRG: 466 | End: 2021-08-27
Attending: ORTHOPAEDIC SURGERY
Payer: MEDICARE

## 2021-08-27 DIAGNOSIS — Z01.810 PRE-OPERATIVE CARDIOVASCULAR EXAMINATION: ICD-10-CM

## 2021-08-27 DIAGNOSIS — Z96.642 PRESENCE OF LEFT ARTIFICIAL HIP JOINT: ICD-10-CM

## 2021-08-27 DIAGNOSIS — T84.021D DISLOCATION OF INTERNAL LEFT HIP PROSTHESIS, SUBSEQUENT ENCOUNTER: ICD-10-CM

## 2021-08-27 LAB
ALBUMIN SERPL BCP-MCNC: 3.8 G/DL (ref 3.2–4.9)
ALBUMIN/GLOB SERPL: 1.2 G/DL
ALP SERPL-CCNC: 90 U/L (ref 30–99)
ALT SERPL-CCNC: 15 U/L (ref 2–50)
ANION GAP SERPL CALC-SCNC: 11 MMOL/L (ref 7–16)
AST SERPL-CCNC: 25 U/L (ref 12–45)
BASOPHILS # BLD AUTO: 2.3 % (ref 0–1.8)
BASOPHILS # BLD: 0.15 K/UL (ref 0–0.12)
BILIRUB SERPL-MCNC: 0.3 MG/DL (ref 0.1–1.5)
BUN SERPL-MCNC: 17 MG/DL (ref 8–22)
CALCIUM SERPL-MCNC: 9.3 MG/DL (ref 8.4–10.2)
CHLORIDE SERPL-SCNC: 103 MMOL/L (ref 96–112)
CO2 SERPL-SCNC: 29 MMOL/L (ref 20–33)
CREAT SERPL-MCNC: 0.93 MG/DL (ref 0.5–1.4)
EKG IMPRESSION: NORMAL
EOSINOPHIL # BLD AUTO: 0.32 K/UL (ref 0–0.51)
EOSINOPHIL NFR BLD: 5 % (ref 0–6.9)
ERYTHROCYTE [DISTWIDTH] IN BLOOD BY AUTOMATED COUNT: 47.5 FL (ref 35.9–50)
GLOBULIN SER CALC-MCNC: 3.1 G/DL (ref 1.9–3.5)
GLUCOSE SERPL-MCNC: 103 MG/DL (ref 65–99)
HCT VFR BLD AUTO: 42.1 % (ref 37–47)
HGB BLD-MCNC: 13.5 G/DL (ref 12–16)
IMM GRANULOCYTES # BLD AUTO: 0.02 K/UL (ref 0–0.11)
IMM GRANULOCYTES NFR BLD AUTO: 0.3 % (ref 0–0.9)
LYMPHOCYTES # BLD AUTO: 1.35 K/UL (ref 1–4.8)
LYMPHOCYTES NFR BLD: 21.1 % (ref 22–41)
MCH RBC QN AUTO: 29 PG (ref 27–33)
MCHC RBC AUTO-ENTMCNC: 32.1 G/DL (ref 33.6–35)
MCV RBC AUTO: 90.5 FL (ref 81.4–97.8)
MONOCYTES # BLD AUTO: 0.62 K/UL (ref 0–0.85)
MONOCYTES NFR BLD AUTO: 9.7 % (ref 0–13.4)
NEUTROPHILS # BLD AUTO: 3.93 K/UL (ref 2–7.15)
NEUTROPHILS NFR BLD: 61.6 % (ref 44–72)
NRBC # BLD AUTO: 0 K/UL
NRBC BLD-RTO: 0 /100 WBC
PLATELET # BLD AUTO: 343 K/UL (ref 164–446)
PMV BLD AUTO: 10.6 FL (ref 9–12.9)
POTASSIUM SERPL-SCNC: 3.7 MMOL/L (ref 3.6–5.5)
PROT SERPL-MCNC: 6.9 G/DL (ref 6–8.2)
RBC # BLD AUTO: 4.65 M/UL (ref 4.2–5.4)
SODIUM SERPL-SCNC: 143 MMOL/L (ref 135–145)
WBC # BLD AUTO: 6.4 K/UL (ref 4.8–10.8)

## 2021-08-27 PROCEDURE — 87640 STAPH A DNA AMP PROBE: CPT | Mod: XU

## 2021-08-27 PROCEDURE — 87641 MR-STAPH DNA AMP PROBE: CPT

## 2021-08-27 PROCEDURE — 80053 COMPREHEN METABOLIC PANEL: CPT

## 2021-08-27 PROCEDURE — 93005 ELECTROCARDIOGRAM TRACING: CPT

## 2021-08-27 PROCEDURE — 36415 COLL VENOUS BLD VENIPUNCTURE: CPT

## 2021-08-27 PROCEDURE — 93010 ELECTROCARDIOGRAM REPORT: CPT | Performed by: INTERNAL MEDICINE

## 2021-08-27 PROCEDURE — 85025 COMPLETE CBC W/AUTO DIFF WBC: CPT

## 2021-08-27 RX ORDER — GINSENG 100 MG
1 CAPSULE ORAL DAILY
COMMUNITY
End: 2023-10-01

## 2021-08-27 RX ORDER — MV-MIN/FOLIC/VIT K/LYCOP/COQ10 200-100MCG
1 CAPSULE ORAL DAILY
COMMUNITY
End: 2023-10-01

## 2021-08-27 ASSESSMENT — FIBROSIS 4 INDEX: FIB4 SCORE: 1.12

## 2021-08-27 NOTE — PREPROCEDURE INSTRUCTIONS
Hx and meds reviewed, pre op instructions given, handouts reviewed, questions answered.  Pt instructed to continue regularly prescribed medications through day before surgery.Per anesthesia protocol pt instructed to take these medications with a sip of water the day of surgery-atenolol, nexium, norco if needed, lyrica and prednisone and to use albuterol inh if needed. . Anesthesia fasting guidelines reviewed with pt. Covid vaccine complete, aware to continue wearing mask per mandate. Dr Mandel notified of hx.Placed on SELWYN protocol (no port tank, uses Preferred for O2). Placed on fall risk for hx of falls with current hip issues.

## 2021-08-28 LAB
SCCMEC + MECA PNL NOSE NAA+PROBE: NEGATIVE
SCCMEC + MECA PNL NOSE NAA+PROBE: POSITIVE

## 2021-08-30 NOTE — DISCHARGE PLANNING
DISCHARGE PLANNING NOTE - TOTAL JOINT    Procedure: Procedure(s):  REVISION, TOTAL ARTHROPLASTY, HIP  Procedure Date: 9/2/2021  Insurance: Payor: [employer]US / Plan: [employer]US / Product Type: *No Product type* /    Equipment currently available at home?  front-wheel walker, raised toilet seat and might get a tub transfer bench.  Steps into the home? 2  Steps within the home? 0  Toilet height? Standard  Type of shower? tub-shower  Who will be with you during your recovery?spouse.  Is Outpatient Physical Therapy set up after surgery? No   Did you take the Total Joint Class and where? Yes received [facility] book today.  Planning same day discharge?unsure.    This writer met with pt during her preadmission appointment. Pt states she has all needed equipment and spouse will be transportation. Home safety checklist reviewed and copy given to pt. Pt educated to dc criteria. All questions answered and verbalizes understanding of all instructions. No dc needs identified at this time. Anticipate dc to home without barriers.

## 2021-08-31 NOTE — OR NURSING
Dr Mandel's response:    It is hard to pinpoint if there are any workouts that will be here for this patient. Based on the current available information, there is nothing further to add to your assessment. OK to proceed. Thank you.  Margarita Mandel M.D.  [facility]

## 2021-09-02 ENCOUNTER — APPOINTMENT (OUTPATIENT)
Dept: RADIOLOGY | Facility: MEDICAL CENTER | Age: 76
DRG: 466 | End: 2021-09-02
Attending: ORTHOPAEDIC SURGERY
Payer: MEDICARE

## 2021-09-02 ENCOUNTER — HOSPITAL ENCOUNTER (INPATIENT)
Facility: MEDICAL CENTER | Age: 76
LOS: 1 days | DRG: 466 | End: 2021-09-04
Attending: ORTHOPAEDIC SURGERY | Admitting: ORTHOPAEDIC SURGERY
Payer: MEDICARE

## 2021-09-02 ENCOUNTER — APPOINTMENT (OUTPATIENT)
Dept: RADIOLOGY | Facility: MEDICAL CENTER | Age: 76
DRG: 466 | End: 2021-09-02
Attending: STUDENT IN AN ORGANIZED HEALTH CARE EDUCATION/TRAINING PROGRAM
Payer: MEDICARE

## 2021-09-02 ENCOUNTER — ANESTHESIA EVENT (OUTPATIENT)
Dept: SURGERY | Facility: MEDICAL CENTER | Age: 76
DRG: 466 | End: 2021-09-02
Payer: MEDICARE

## 2021-09-02 ENCOUNTER — ANESTHESIA (OUTPATIENT)
Dept: SURGERY | Facility: MEDICAL CENTER | Age: 76
DRG: 466 | End: 2021-09-02
Payer: MEDICARE

## 2021-09-02 DIAGNOSIS — T84.029A RECURRENT DISLOCATION OF HIP JOINT PROSTHESIS, INITIAL ENCOUNTER (HCC): ICD-10-CM

## 2021-09-02 DIAGNOSIS — Z96.649 RECURRENT DISLOCATION OF HIP JOINT PROSTHESIS, INITIAL ENCOUNTER (HCC): ICD-10-CM

## 2021-09-02 PROBLEM — I95.9 HYPOTENSION: Status: ACTIVE | Noted: 2021-09-02

## 2021-09-02 PROBLEM — J96.90 RESPIRATORY FAILURE (HCC): Status: ACTIVE | Noted: 2021-09-02

## 2021-09-02 LAB
BASOPHILS # BLD AUTO: 0.2 % (ref 0–1.8)
BASOPHILS # BLD: 0.02 K/UL (ref 0–0.12)
EOSINOPHIL # BLD AUTO: 0 K/UL (ref 0–0.51)
EOSINOPHIL NFR BLD: 0 % (ref 0–6.9)
ERYTHROCYTE [DISTWIDTH] IN BLOOD BY AUTOMATED COUNT: 46.7 FL (ref 35.9–50)
HCT VFR BLD AUTO: 35.7 % (ref 37–47)
HCT VFR BLD AUTO: 36.6 % (ref 37–47)
HGB BLD-MCNC: 11.2 G/DL (ref 12–16)
HGB BLD-MCNC: 11.5 G/DL (ref 12–16)
IMM GRANULOCYTES # BLD AUTO: 0.08 K/UL (ref 0–0.11)
IMM GRANULOCYTES NFR BLD AUTO: 0.7 % (ref 0–0.9)
LYMPHOCYTES # BLD AUTO: 0.46 K/UL (ref 1–4.8)
LYMPHOCYTES NFR BLD: 3.8 % (ref 22–41)
MCH RBC QN AUTO: 28.4 PG (ref 27–33)
MCHC RBC AUTO-ENTMCNC: 31.4 G/DL (ref 33.6–35)
MCV RBC AUTO: 90.4 FL (ref 81.4–97.8)
MONOCYTES # BLD AUTO: 0.37 K/UL (ref 0–0.85)
MONOCYTES NFR BLD AUTO: 3 % (ref 0–13.4)
NEUTROPHILS # BLD AUTO: 11.23 K/UL (ref 2–7.15)
NEUTROPHILS NFR BLD: 92.3 % (ref 44–72)
NRBC # BLD AUTO: 0 K/UL
NRBC BLD-RTO: 0 /100 WBC
PLATELET # BLD AUTO: 322 K/UL (ref 164–446)
PMV BLD AUTO: 10.4 FL (ref 9–12.9)
RBC # BLD AUTO: 3.95 M/UL (ref 4.2–5.4)
WBC # BLD AUTO: 12.2 K/UL (ref 4.8–10.8)

## 2021-09-02 PROCEDURE — 85025 COMPLETE CBC W/AUTO DIFF WBC: CPT

## 2021-09-02 PROCEDURE — 85018 HEMOGLOBIN: CPT

## 2021-09-02 PROCEDURE — 99204 OFFICE O/P NEW MOD 45 MIN: CPT | Performed by: STUDENT IN AN ORGANIZED HEALTH CARE EDUCATION/TRAINING PROGRAM

## 2021-09-02 PROCEDURE — 700105 HCHG RX REV CODE 258: Performed by: ANESTHESIOLOGY

## 2021-09-02 PROCEDURE — 700101 HCHG RX REV CODE 250: Performed by: ORTHOPAEDIC SURGERY

## 2021-09-02 PROCEDURE — 27137 REVISE HIP JOINT REPLACEMENT: CPT | Performed by: ORTHOPAEDIC SURGERY

## 2021-09-02 PROCEDURE — C1776 JOINT DEVICE (IMPLANTABLE): HCPCS | Performed by: ORTHOPAEDIC SURGERY

## 2021-09-02 PROCEDURE — 700111 HCHG RX REV CODE 636 W/ 250 OVERRIDE (IP): Performed by: ORTHOPAEDIC SURGERY

## 2021-09-02 PROCEDURE — 36415 COLL VENOUS BLD VENIPUNCTURE: CPT

## 2021-09-02 PROCEDURE — 71045 X-RAY EXAM CHEST 1 VIEW: CPT

## 2021-09-02 PROCEDURE — 501838 HCHG SUTURE GENERAL: Performed by: ORTHOPAEDIC SURGERY

## 2021-09-02 PROCEDURE — 700102 HCHG RX REV CODE 250 W/ 637 OVERRIDE(OP): Performed by: ANESTHESIOLOGY

## 2021-09-02 PROCEDURE — 700101 HCHG RX REV CODE 250: Performed by: ANESTHESIOLOGY

## 2021-09-02 PROCEDURE — 94669 MECHANICAL CHEST WALL OSCILL: CPT

## 2021-09-02 PROCEDURE — 96365 THER/PROPH/DIAG IV INF INIT: CPT

## 2021-09-02 PROCEDURE — 0SPB0JZ REMOVAL OF SYNTHETIC SUBSTITUTE FROM LEFT HIP JOINT, OPEN APPROACH: ICD-10-PCS | Performed by: ORTHOPAEDIC SURGERY

## 2021-09-02 PROCEDURE — 72170 X-RAY EXAM OF PELVIS: CPT

## 2021-09-02 PROCEDURE — 700102 HCHG RX REV CODE 250 W/ 637 OVERRIDE(OP): Performed by: STUDENT IN AN ORGANIZED HEALTH CARE EDUCATION/TRAINING PROGRAM

## 2021-09-02 PROCEDURE — 0SRB06A REPLACEMENT OF LEFT HIP JOINT WITH OXIDIZED ZIRCONIUM ON POLYETHYLENE SYNTHETIC SUBSTITUTE, UNCEMENTED, OPEN APPROACH: ICD-10-PCS | Performed by: ORTHOPAEDIC SURGERY

## 2021-09-02 PROCEDURE — 160009 HCHG ANES TIME/MIN: Performed by: ORTHOPAEDIC SURGERY

## 2021-09-02 PROCEDURE — 160036 HCHG PACU - EA ADDL 30 MINS PHASE I: Performed by: ORTHOPAEDIC SURGERY

## 2021-09-02 PROCEDURE — C1713 ANCHOR/SCREW BN/BN,TIS/BN: HCPCS | Performed by: ORTHOPAEDIC SURGERY

## 2021-09-02 PROCEDURE — 700111 HCHG RX REV CODE 636 W/ 250 OVERRIDE (IP): Performed by: ANESTHESIOLOGY

## 2021-09-02 PROCEDURE — A9270 NON-COVERED ITEM OR SERVICE: HCPCS | Performed by: ANESTHESIOLOGY

## 2021-09-02 PROCEDURE — A9270 NON-COVERED ITEM OR SERVICE: HCPCS | Performed by: STUDENT IN AN ORGANIZED HEALTH CARE EDUCATION/TRAINING PROGRAM

## 2021-09-02 PROCEDURE — 700101 HCHG RX REV CODE 250

## 2021-09-02 PROCEDURE — 160035 HCHG PACU - 1ST 60 MINS PHASE I: Performed by: ORTHOPAEDIC SURGERY

## 2021-09-02 PROCEDURE — 85014 HEMATOCRIT: CPT

## 2021-09-02 PROCEDURE — 160002 HCHG RECOVERY MINUTES (STAT): Performed by: ORTHOPAEDIC SURGERY

## 2021-09-02 PROCEDURE — 700105 HCHG RX REV CODE 258: Performed by: ORTHOPAEDIC SURGERY

## 2021-09-02 PROCEDURE — 502000 HCHG MISC OR IMPLANTS RC 0278: Performed by: ORTHOPAEDIC SURGERY

## 2021-09-02 PROCEDURE — 160048 HCHG OR STATISTICAL LEVEL 1-5: Performed by: ORTHOPAEDIC SURGERY

## 2021-09-02 PROCEDURE — 94760 N-INVAS EAR/PLS OXIMETRY 1: CPT

## 2021-09-02 PROCEDURE — 160031 HCHG SURGERY MINUTES - 1ST 30 MINS LEVEL 5: Performed by: ORTHOPAEDIC SURGERY

## 2021-09-02 PROCEDURE — G0378 HOSPITAL OBSERVATION PER HR: HCPCS

## 2021-09-02 PROCEDURE — 700102 HCHG RX REV CODE 250 W/ 637 OVERRIDE(OP): Performed by: ORTHOPAEDIC SURGERY

## 2021-09-02 PROCEDURE — A9270 NON-COVERED ITEM OR SERVICE: HCPCS | Performed by: ORTHOPAEDIC SURGERY

## 2021-09-02 PROCEDURE — 500864 HCHG NEEDLE, SPINAL 18G: Performed by: ORTHOPAEDIC SURGERY

## 2021-09-02 PROCEDURE — 160042 HCHG SURGERY MINUTES - EA ADDL 1 MIN LEVEL 5: Performed by: ORTHOPAEDIC SURGERY

## 2021-09-02 DEVICE — IMPLANT OXINIUM FEM HD 12/14 28MM +4 (1EA): Type: IMPLANTABLE DEVICE | Site: KNEE | Status: FUNCTIONAL

## 2021-09-02 DEVICE — IMPLANTABLE DEVICE: Type: IMPLANTABLE DEVICE | Site: KNEE | Status: FUNCTIONAL

## 2021-09-02 RX ORDER — OXYCODONE HYDROCHLORIDE 5 MG/1
2.5 TABLET ORAL
Status: DISCONTINUED | OUTPATIENT
Start: 2021-09-02 | End: 2021-09-04 | Stop reason: HOSPADM

## 2021-09-02 RX ORDER — MAGNESIUM SULFATE HEPTAHYDRATE 500 MG/ML
INJECTION, SOLUTION INTRAMUSCULAR; INTRAVENOUS PRN
Status: DISCONTINUED | OUTPATIENT
Start: 2021-09-02 | End: 2021-09-02 | Stop reason: SURG

## 2021-09-02 RX ORDER — HALOPERIDOL 5 MG/ML
1 INJECTION INTRAMUSCULAR
Status: DISCONTINUED | OUTPATIENT
Start: 2021-09-02 | End: 2021-09-02 | Stop reason: HOSPADM

## 2021-09-02 RX ORDER — SCOLOPAMINE TRANSDERMAL SYSTEM 1 MG/1
PATCH, EXTENDED RELEASE TRANSDERMAL
Status: COMPLETED
Start: 2021-09-02 | End: 2021-09-02

## 2021-09-02 RX ORDER — OMEPRAZOLE 20 MG/1
20 CAPSULE, DELAYED RELEASE ORAL
Status: DISCONTINUED | OUTPATIENT
Start: 2021-09-02 | End: 2021-09-04 | Stop reason: HOSPADM

## 2021-09-02 RX ORDER — CEFAZOLIN SODIUM IN 0.9 % NACL 2 G/100 ML
2 PLASTIC BAG, INJECTION (ML) INTRAVENOUS ONCE
Status: COMPLETED | OUTPATIENT
Start: 2021-09-02 | End: 2021-09-02

## 2021-09-02 RX ORDER — ONDANSETRON 2 MG/ML
4 INJECTION INTRAMUSCULAR; INTRAVENOUS EVERY 4 HOURS PRN
Status: DISCONTINUED | OUTPATIENT
Start: 2021-09-02 | End: 2021-09-04 | Stop reason: HOSPADM

## 2021-09-02 RX ORDER — KETOROLAC TROMETHAMINE 30 MG/ML
INJECTION, SOLUTION INTRAMUSCULAR; INTRAVENOUS
Status: DISCONTINUED | OUTPATIENT
Start: 2021-09-02 | End: 2021-09-02 | Stop reason: HOSPADM

## 2021-09-02 RX ORDER — TRANEXAMIC ACID 100 MG/ML
INJECTION, SOLUTION INTRAVENOUS PRN
Status: DISCONTINUED | OUTPATIENT
Start: 2021-09-02 | End: 2021-09-02 | Stop reason: SURG

## 2021-09-02 RX ORDER — ENEMA 19; 7 G/133ML; G/133ML
1 ENEMA RECTAL
Status: DISCONTINUED | OUTPATIENT
Start: 2021-09-02 | End: 2021-09-04 | Stop reason: HOSPADM

## 2021-09-02 RX ORDER — HYDROMORPHONE HYDROCHLORIDE 2 MG/ML
INJECTION, SOLUTION INTRAMUSCULAR; INTRAVENOUS; SUBCUTANEOUS PRN
Status: DISCONTINUED | OUTPATIENT
Start: 2021-09-02 | End: 2021-09-02 | Stop reason: SURG

## 2021-09-02 RX ORDER — OXYCODONE HCL 5 MG/5 ML
5 SOLUTION, ORAL ORAL
Status: DISCONTINUED | OUTPATIENT
Start: 2021-09-02 | End: 2021-09-02 | Stop reason: HOSPADM

## 2021-09-02 RX ORDER — LORAZEPAM 2 MG/ML
0.5 INJECTION INTRAMUSCULAR
Status: DISCONTINUED | OUTPATIENT
Start: 2021-09-02 | End: 2021-09-02 | Stop reason: HOSPADM

## 2021-09-02 RX ORDER — MIDODRINE HYDROCHLORIDE 5 MG/1
5 TABLET ORAL
Status: DISCONTINUED | OUTPATIENT
Start: 2021-09-02 | End: 2021-09-02

## 2021-09-02 RX ORDER — HYDROMORPHONE HYDROCHLORIDE 1 MG/ML
0.2 INJECTION, SOLUTION INTRAMUSCULAR; INTRAVENOUS; SUBCUTANEOUS
Status: DISCONTINUED | OUTPATIENT
Start: 2021-09-02 | End: 2021-09-02

## 2021-09-02 RX ORDER — DIPHENHYDRAMINE HYDROCHLORIDE 50 MG/ML
12.5 INJECTION INTRAMUSCULAR; INTRAVENOUS
Status: DISCONTINUED | OUTPATIENT
Start: 2021-09-02 | End: 2021-09-02 | Stop reason: HOSPADM

## 2021-09-02 RX ORDER — HYDROMORPHONE HYDROCHLORIDE 1 MG/ML
0.4 INJECTION, SOLUTION INTRAMUSCULAR; INTRAVENOUS; SUBCUTANEOUS
Status: DISCONTINUED | OUTPATIENT
Start: 2021-09-02 | End: 2021-09-02

## 2021-09-02 RX ORDER — DEXAMETHASONE SODIUM PHOSPHATE 4 MG/ML
INJECTION, SOLUTION INTRA-ARTICULAR; INTRALESIONAL; INTRAMUSCULAR; INTRAVENOUS; SOFT TISSUE PRN
Status: DISCONTINUED | OUTPATIENT
Start: 2021-09-02 | End: 2021-09-02 | Stop reason: SURG

## 2021-09-02 RX ORDER — POLYETHYLENE GLYCOL 3350 17 G/17G
1 POWDER, FOR SOLUTION ORAL 2 TIMES DAILY PRN
Status: DISCONTINUED | OUTPATIENT
Start: 2021-09-02 | End: 2021-09-04 | Stop reason: HOSPADM

## 2021-09-02 RX ORDER — OXYCODONE HCL 5 MG/5 ML
10 SOLUTION, ORAL ORAL
Status: DISCONTINUED | OUTPATIENT
Start: 2021-09-02 | End: 2021-09-02 | Stop reason: HOSPADM

## 2021-09-02 RX ORDER — PREDNISONE 10 MG/1
10 TABLET ORAL DAILY
Status: DISCONTINUED | OUTPATIENT
Start: 2021-09-03 | End: 2021-09-04 | Stop reason: HOSPADM

## 2021-09-02 RX ORDER — DULOXETIN HYDROCHLORIDE 30 MG/1
30 CAPSULE, DELAYED RELEASE ORAL DAILY
Status: DISCONTINUED | OUTPATIENT
Start: 2021-09-02 | End: 2021-09-04 | Stop reason: HOSPADM

## 2021-09-02 RX ORDER — HYDROMORPHONE HYDROCHLORIDE 1 MG/ML
0.5 INJECTION, SOLUTION INTRAMUSCULAR; INTRAVENOUS; SUBCUTANEOUS
Status: DISCONTINUED | OUTPATIENT
Start: 2021-09-02 | End: 2021-09-02

## 2021-09-02 RX ORDER — MEPERIDINE HYDROCHLORIDE 25 MG/ML
12.5 INJECTION INTRAMUSCULAR; INTRAVENOUS; SUBCUTANEOUS
Status: DISCONTINUED | OUTPATIENT
Start: 2021-09-02 | End: 2021-09-02 | Stop reason: HOSPADM

## 2021-09-02 RX ORDER — ACETAMINOPHEN 325 MG/1
650 TABLET ORAL EVERY 6 HOURS
Status: DISCONTINUED | OUTPATIENT
Start: 2021-09-02 | End: 2021-09-04 | Stop reason: HOSPADM

## 2021-09-02 RX ORDER — HYDROCODONE BITARTRATE AND ACETAMINOPHEN 5; 325 MG/1; MG/1
1-2 TABLET ORAL EVERY 4 HOURS PRN
Status: DISCONTINUED | OUTPATIENT
Start: 2021-09-02 | End: 2021-09-04 | Stop reason: HOSPADM

## 2021-09-02 RX ORDER — HALOPERIDOL 5 MG/ML
1 INJECTION INTRAMUSCULAR EVERY 6 HOURS PRN
Status: DISCONTINUED | OUTPATIENT
Start: 2021-09-02 | End: 2021-09-03

## 2021-09-02 RX ORDER — DOCUSATE SODIUM 100 MG/1
100 CAPSULE, LIQUID FILLED ORAL 2 TIMES DAILY
Status: DISCONTINUED | OUTPATIENT
Start: 2021-09-02 | End: 2021-09-04 | Stop reason: HOSPADM

## 2021-09-02 RX ORDER — HYDROMORPHONE HYDROCHLORIDE 1 MG/ML
0.25 INJECTION, SOLUTION INTRAMUSCULAR; INTRAVENOUS; SUBCUTANEOUS
Status: DISCONTINUED | OUTPATIENT
Start: 2021-09-02 | End: 2021-09-04 | Stop reason: HOSPADM

## 2021-09-02 RX ORDER — ONDANSETRON 2 MG/ML
INJECTION INTRAMUSCULAR; INTRAVENOUS PRN
Status: DISCONTINUED | OUTPATIENT
Start: 2021-09-02 | End: 2021-09-02 | Stop reason: SURG

## 2021-09-02 RX ORDER — CELECOXIB 200 MG/1
200 CAPSULE ORAL ONCE
Status: COMPLETED | OUTPATIENT
Start: 2021-09-02 | End: 2021-09-02

## 2021-09-02 RX ORDER — BUPIVACAINE HYDROCHLORIDE 2.5 MG/ML
INJECTION, SOLUTION EPIDURAL; INFILTRATION; INTRACAUDAL
Status: DISCONTINUED | OUTPATIENT
Start: 2021-09-02 | End: 2021-09-02 | Stop reason: HOSPADM

## 2021-09-02 RX ORDER — SODIUM CHLORIDE, SODIUM GLUCONATE, SODIUM ACETATE, POTASSIUM CHLORIDE AND MAGNESIUM CHLORIDE 526; 502; 368; 37; 30 MG/100ML; MG/100ML; MG/100ML; MG/100ML; MG/100ML
INJECTION, SOLUTION INTRAVENOUS
Status: DISCONTINUED | OUTPATIENT
Start: 2021-09-02 | End: 2021-09-02 | Stop reason: SURG

## 2021-09-02 RX ORDER — SODIUM CHLORIDE, SODIUM LACTATE, POTASSIUM CHLORIDE, CALCIUM CHLORIDE 600; 310; 30; 20 MG/100ML; MG/100ML; MG/100ML; MG/100ML
1000 INJECTION, SOLUTION INTRAVENOUS CONTINUOUS
Status: DISCONTINUED | OUTPATIENT
Start: 2021-09-02 | End: 2021-09-02

## 2021-09-02 RX ORDER — AMOXICILLIN 250 MG
1 CAPSULE ORAL
Status: DISCONTINUED | OUTPATIENT
Start: 2021-09-02 | End: 2021-09-04 | Stop reason: HOSPADM

## 2021-09-02 RX ORDER — SCOLOPAMINE TRANSDERMAL SYSTEM 1 MG/1
1 PATCH, EXTENDED RELEASE TRANSDERMAL
Status: DISCONTINUED | OUTPATIENT
Start: 2021-09-02 | End: 2021-09-04 | Stop reason: HOSPADM

## 2021-09-02 RX ORDER — CEFAZOLIN SODIUM 1 G/3ML
INJECTION, POWDER, FOR SOLUTION INTRAMUSCULAR; INTRAVENOUS PRN
Status: DISCONTINUED | OUTPATIENT
Start: 2021-09-02 | End: 2021-09-02 | Stop reason: SURG

## 2021-09-02 RX ORDER — SODIUM CHLORIDE, SODIUM LACTATE, POTASSIUM CHLORIDE, CALCIUM CHLORIDE 600; 310; 30; 20 MG/100ML; MG/100ML; MG/100ML; MG/100ML
INJECTION, SOLUTION INTRAVENOUS CONTINUOUS
Status: DISCONTINUED | OUTPATIENT
Start: 2021-09-02 | End: 2021-09-02 | Stop reason: HOSPADM

## 2021-09-02 RX ORDER — DEXMEDETOMIDINE HYDROCHLORIDE 100 UG/ML
INJECTION, SOLUTION INTRAVENOUS PRN
Status: DISCONTINUED | OUTPATIENT
Start: 2021-09-02 | End: 2021-09-02 | Stop reason: SURG

## 2021-09-02 RX ORDER — LIDOCAINE HYDROCHLORIDE 20 MG/ML
INJECTION, SOLUTION EPIDURAL; INFILTRATION; INTRACAUDAL; PERINEURAL PRN
Status: DISCONTINUED | OUTPATIENT
Start: 2021-09-02 | End: 2021-09-02 | Stop reason: SURG

## 2021-09-02 RX ORDER — ACETAMINOPHEN 500 MG
1000 TABLET ORAL ONCE
Status: COMPLETED | OUTPATIENT
Start: 2021-09-02 | End: 2021-09-02

## 2021-09-02 RX ORDER — DIPHENHYDRAMINE HYDROCHLORIDE 50 MG/ML
25 INJECTION INTRAMUSCULAR; INTRAVENOUS EVERY 6 HOURS PRN
Status: DISCONTINUED | OUTPATIENT
Start: 2021-09-02 | End: 2021-09-04 | Stop reason: HOSPADM

## 2021-09-02 RX ORDER — ONDANSETRON 2 MG/ML
4 INJECTION INTRAMUSCULAR; INTRAVENOUS
Status: DISCONTINUED | OUTPATIENT
Start: 2021-09-02 | End: 2021-09-02 | Stop reason: HOSPADM

## 2021-09-02 RX ORDER — ACETAMINOPHEN 325 MG/1
650 TABLET ORAL EVERY 6 HOURS PRN
Status: DISCONTINUED | OUTPATIENT
Start: 2021-09-07 | End: 2021-09-04 | Stop reason: HOSPADM

## 2021-09-02 RX ORDER — HYDRALAZINE HYDROCHLORIDE 20 MG/ML
5 INJECTION INTRAMUSCULAR; INTRAVENOUS
Status: DISCONTINUED | OUTPATIENT
Start: 2021-09-02 | End: 2021-09-02 | Stop reason: HOSPADM

## 2021-09-02 RX ORDER — MIDODRINE HYDROCHLORIDE 5 MG/1
5 TABLET ORAL
Status: DISCONTINUED | OUTPATIENT
Start: 2021-09-02 | End: 2021-09-03

## 2021-09-02 RX ORDER — OXYCODONE HYDROCHLORIDE 5 MG/1
5 TABLET ORAL
Status: DISCONTINUED | OUTPATIENT
Start: 2021-09-02 | End: 2021-09-04 | Stop reason: HOSPADM

## 2021-09-02 RX ORDER — SODIUM CHLORIDE 9 MG/ML
INJECTION, SOLUTION INTRAMUSCULAR; INTRAVENOUS; SUBCUTANEOUS
Status: DISCONTINUED | OUTPATIENT
Start: 2021-09-02 | End: 2021-09-02 | Stop reason: HOSPADM

## 2021-09-02 RX ORDER — CEFAZOLIN SODIUM IN 0.9 % NACL 2 G/100 ML
2 PLASTIC BAG, INJECTION (ML) INTRAVENOUS ONCE
Status: DISCONTINUED | OUTPATIENT
Start: 2021-09-02 | End: 2021-09-02 | Stop reason: HOSPADM

## 2021-09-02 RX ORDER — AMOXICILLIN 250 MG
1 CAPSULE ORAL NIGHTLY
Status: DISCONTINUED | OUTPATIENT
Start: 2021-09-02 | End: 2021-09-04 | Stop reason: HOSPADM

## 2021-09-02 RX ORDER — ROCURONIUM BROMIDE 10 MG/ML
INJECTION, SOLUTION INTRAVENOUS PRN
Status: DISCONTINUED | OUTPATIENT
Start: 2021-09-02 | End: 2021-09-02 | Stop reason: SURG

## 2021-09-02 RX ORDER — PHENYLEPHRINE HCL IN 0.9% NACL 0.5 MG/5ML
SYRINGE (ML) INTRAVENOUS PRN
Status: DISCONTINUED | OUTPATIENT
Start: 2021-09-02 | End: 2021-09-02 | Stop reason: SURG

## 2021-09-02 RX ORDER — BISACODYL 10 MG
10 SUPPOSITORY, RECTAL RECTAL
Status: DISCONTINUED | OUTPATIENT
Start: 2021-09-02 | End: 2021-09-04 | Stop reason: HOSPADM

## 2021-09-02 RX ORDER — SCOLOPAMINE TRANSDERMAL SYSTEM 1 MG/1
PATCH, EXTENDED RELEASE TRANSDERMAL PRN
Status: DISCONTINUED | OUTPATIENT
Start: 2021-09-02 | End: 2021-09-02 | Stop reason: HOSPADM

## 2021-09-02 RX ADMIN — Medication 100 MCG: at 07:58

## 2021-09-02 RX ADMIN — DEXAMETHASONE SODIUM PHOSPHATE 8 MG: 4 INJECTION, SOLUTION INTRAMUSCULAR; INTRAVENOUS at 07:04

## 2021-09-02 RX ADMIN — Medication 100 MCG: at 07:02

## 2021-09-02 RX ADMIN — EPHEDRINE SULFATE 10 MG: 50 INJECTION INTRAMUSCULAR; INTRAVENOUS; SUBCUTANEOUS at 09:34

## 2021-09-02 RX ADMIN — HYDROMORPHONE HYDROCHLORIDE 0.5 MG: 2 INJECTION, SOLUTION INTRAMUSCULAR; INTRAVENOUS; SUBCUTANEOUS at 06:57

## 2021-09-02 RX ADMIN — EPHEDRINE SULFATE 10 MG: 50 INJECTION INTRAVENOUS at 10:10

## 2021-09-02 RX ADMIN — EPHEDRINE SULFATE 10 MG: 50 INJECTION INTRAMUSCULAR; INTRAVENOUS; SUBCUTANEOUS at 09:38

## 2021-09-02 RX ADMIN — EPHEDRINE SULFATE 10 MG: 50 INJECTION INTRAMUSCULAR; INTRAVENOUS; SUBCUTANEOUS at 07:31

## 2021-09-02 RX ADMIN — HYDROCODONE BITARTRATE AND ACETAMINOPHEN 1 TABLET: 5; 325 TABLET ORAL at 21:43

## 2021-09-02 RX ADMIN — ACETAMINOPHEN 650 MG: 325 TABLET, FILM COATED ORAL at 21:43

## 2021-09-02 RX ADMIN — SUGAMMADEX 200 MG: 100 INJECTION, SOLUTION INTRAVENOUS at 08:36

## 2021-09-02 RX ADMIN — SODIUM CHLORIDE, SODIUM GLUCONATE, SODIUM ACETATE, POTASSIUM CHLORIDE AND MAGNESIUM CHLORIDE: 526; 502; 368; 37; 30 INJECTION, SOLUTION INTRAVENOUS at 07:59

## 2021-09-02 RX ADMIN — Medication 100 MCG: at 07:05

## 2021-09-02 RX ADMIN — MAGNESIUM SULFATE HEPTAHYDRATE 3 G: 500 INJECTION, SOLUTION INTRAMUSCULAR; INTRAVENOUS at 07:16

## 2021-09-02 RX ADMIN — ACETAMINOPHEN 1000 MG: 500 TABLET, FILM COATED ORAL at 05:55

## 2021-09-02 RX ADMIN — EPHEDRINE SULFATE 10 MG: 50 INJECTION INTRAMUSCULAR; INTRAVENOUS; SUBCUTANEOUS at 07:49

## 2021-09-02 RX ADMIN — PROPOFOL 100 MG: 10 INJECTION, EMULSION INTRAVENOUS at 06:57

## 2021-09-02 RX ADMIN — CELECOXIB 200 MG: 200 CAPSULE ORAL at 05:55

## 2021-09-02 RX ADMIN — EPHEDRINE SULFATE 10 MG: 50 INJECTION INTRAVENOUS at 09:58

## 2021-09-02 RX ADMIN — DULOXETINE 30 MG: 30 CAPSULE, DELAYED RELEASE ORAL at 18:15

## 2021-09-02 RX ADMIN — Medication 100 MCG: at 07:10

## 2021-09-02 RX ADMIN — SODIUM CHLORIDE, POTASSIUM CHLORIDE, SODIUM LACTATE AND CALCIUM CHLORIDE 1000 ML: 600; 310; 30; 20 INJECTION, SOLUTION INTRAVENOUS at 05:55

## 2021-09-02 RX ADMIN — EPHEDRINE SULFATE 10 MG: 50 INJECTION INTRAMUSCULAR; INTRAVENOUS; SUBCUTANEOUS at 07:38

## 2021-09-02 RX ADMIN — EPHEDRINE SULFATE 10 MG: 50 INJECTION INTRAVENOUS at 09:48

## 2021-09-02 RX ADMIN — EPHEDRINE SULFATE 10 MG: 50 INJECTION INTRAMUSCULAR; INTRAVENOUS; SUBCUTANEOUS at 07:55

## 2021-09-02 RX ADMIN — ACETAMINOPHEN 650 MG: 325 TABLET, FILM COATED ORAL at 15:37

## 2021-09-02 RX ADMIN — Medication 200 MCG: at 08:03

## 2021-09-02 RX ADMIN — MIDODRINE HYDROCHLORIDE 5 MG: 5 TABLET ORAL at 14:57

## 2021-09-02 RX ADMIN — DEXMEDETOMIDINE 40 MCG: 200 INJECTION, SOLUTION INTRAVENOUS at 06:57

## 2021-09-02 RX ADMIN — SCOPALAMINE 1 PATCH: 1 PATCH, EXTENDED RELEASE TRANSDERMAL at 06:45

## 2021-09-02 RX ADMIN — ONDANSETRON 4 MG: 2 INJECTION INTRAMUSCULAR; INTRAVENOUS at 08:20

## 2021-09-02 RX ADMIN — MIDODRINE HYDROCHLORIDE 5 MG: 5 TABLET ORAL at 18:22

## 2021-09-02 RX ADMIN — Medication 100 MCG: at 07:15

## 2021-09-02 RX ADMIN — EPHEDRINE SULFATE 10 MG: 50 INJECTION INTRAMUSCULAR; INTRAVENOUS; SUBCUTANEOUS at 07:23

## 2021-09-02 RX ADMIN — TRANEXAMIC ACID 1000 MG: 100 INJECTION, SOLUTION INTRAVENOUS at 08:32

## 2021-09-02 RX ADMIN — PHENYLEPHRINE HYDROCHLORIDE 70 MCG/MIN: 10 INJECTION INTRAVENOUS at 08:07

## 2021-09-02 RX ADMIN — OMEPRAZOLE 20 MG: 20 CAPSULE, DELAYED RELEASE ORAL at 18:14

## 2021-09-02 RX ADMIN — CEFAZOLIN 2 G: 330 INJECTION, POWDER, FOR SOLUTION INTRAMUSCULAR; INTRAVENOUS at 06:57

## 2021-09-02 RX ADMIN — ROCURONIUM BROMIDE 80 MG: 10 INJECTION, SOLUTION INTRAVENOUS at 06:57

## 2021-09-02 RX ADMIN — LIDOCAINE HYDROCHLORIDE 100 MG: 20 INJECTION, SOLUTION EPIDURAL; INFILTRATION; INTRACAUDAL; PERINEURAL at 06:57

## 2021-09-02 RX ADMIN — CEFAZOLIN 2 G: 1 INJECTION, POWDER, FOR SOLUTION INTRAVENOUS at 18:17

## 2021-09-02 RX ADMIN — TRANEXAMIC ACID 1000 MG: 100 INJECTION, SOLUTION INTRAVENOUS at 06:57

## 2021-09-02 ASSESSMENT — ENCOUNTER SYMPTOMS
SHORTNESS OF BREATH: 0
SPUTUM PRODUCTION: 0
HEADACHES: 0
PALPITATIONS: 0
WHEEZING: 0
DIZZINESS: 0

## 2021-09-02 ASSESSMENT — PAIN DESCRIPTION - PAIN TYPE
TYPE: ACUTE PAIN;CHRONIC PAIN
TYPE: ACUTE PAIN;CHRONIC PAIN

## 2021-09-02 ASSESSMENT — PAIN SCALES - GENERAL: PAIN_LEVEL: 0

## 2021-09-02 NOTE — OR NURSING
0852: To PACU post left total hip revision. Pt is extubated, breathing is spontaneous and unlabored. Palpable pulse observed on operative extremity. Pt denies pain at this time.    0930: Ephedrine given at bedside by anesthesiologist for hypotension. Fluids opened.    0948: Pt remains hypotensive, continuing ephedrine and fluid. Pt is alert and oriented. Denies pain. Dressings remain c/d/i w/o hematoma.    1034: BP cuff changed to forearm. BP within parameters. No other changes. Report given to Gayatri Machado RN.    1055: Pt remains pain/nausea free. BP within parameter.    1115: BP again low.    1126: Four o'clock H&H released to be drawn now. Call placed to lab.    1205: Phlebotomist at bedside.    1300: Anesthesiologist at bedside, will consult w/ hospitalist.    1353: Awaiting hospitalist.    1416: Report given to LEN Machado RN.    1538: Tylenol given for HA.

## 2021-09-02 NOTE — ANESTHESIA PROCEDURE NOTES
Airway    Date/Time: 9/2/2021 6:59 AM  Performed by: Kevin Strange M.D.  Authorized by: Kevin Strange M.D.     Location:  OR  Urgency:  Elective  Difficult Airway: No    Indications for Airway Management:  Anesthesia      Spontaneous Ventilation: absent    Sedation Level:  Deep  Preoxygenated: Yes    Patient Position:  Sniffing  Mask Difficulty Assessment:  1 - vent by mask  Final Airway Type:  Endotracheal airway  Final Endotracheal Airway:  ETT  Cuffed: Yes    Technique Used for Successful ETT Placement:  Direct laryngoscopy    Insertion Site:  Oral  Blade Type:  Zarco  Laryngoscope Blade/Videolaryngoscope Blade Size:  2  ETT Size (mm):  7.0  Measured from:  Lips  ETT to Lips (cm):  20  Placement Verified by: auscultation and capnometry    Cormack-Lehane Classification:  Grade I - full view of glottis  Number of Attempts at Approach:  1

## 2021-09-02 NOTE — ANESTHESIA TIME REPORT
Anesthesia Start and Stop Event Times     Date Time Event    9/2/2021 0645 Ready for Procedure     0653 Anesthesia Start     0856 Anesthesia Stop        Responsible Staff  09/02/21    Name Role Begin End    Kevin Strange M.D. 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 3PM - 7AM    Comments:

## 2021-09-02 NOTE — ASSESSMENT & PLAN NOTE
Post surgery respiratory failure requiring 6L NC. Hx of SELWYN on CPAP nightly  CXR with atelectasis - exam has normalized today without rales after diuresis, ambulating to the bathroom without O2 requirement   Echo normal, EKG with mild sinus tach, trops flat and stable  Procal normal - white count elevated but likely reactive to surgery   Had similar issue in 2016 admission with iatrogenic volume overload  Now euvolemic

## 2021-09-02 NOTE — H&P
Surgery Orthopedic History & Physical Note    Date  9/2/2021    Primary Care Physician  ERWIN Mae      Pre-Op Diagnosis Codes:     * Recurrent dislocation of hip joint prosthesis, initial encounter ([facility]) [T84.029A, Z96.649]    HPI  This is a 76 y.o. female who presented with recurrent dislocations of a left hip replacement.    Past Medical History:   Diagnosis Date   • Anesthesia     PONV   • Arthritis     Left hip, knees, ankles   • Asthma     Inhaler use daily.   • Bowel habit changes     Constipation   • Breath shortness     asthma   • Cancer ([facility])     Breast 2015   • Chiari malformation 1970's    shunt  in place   • Chickenpox    • Chronic back pain     2/2 scoliosis and syringomyelia    • Contracture of hand joint     left    • Decreased lung capacity    • Dental disorder     upper/lower   • Emphysema of lung ([facility])     O2 at night.   • Heart burn    • High cholesterol    • Hypertension    • Indigestion    • Joint replacement     Right shoulder, cervical   • Obesity    • Pain    • Pain 08/06/2018    Back, neck and legs   • Peripheral edema 6/6/2013   • Pneumonia    • Scoliosis    • Shortness of breath 08/06/2018    Chronic   • Sleep apnea     uses O2 at night   • Sleep apnea 08/07/2018    Patient states having sleep study October 2018.   • Snoring     No sleep study   • sore throat 1/15/15   • Syringomyelia ([facility])     surgery 1973, 1977       Past Surgical History:   Procedure Laterality Date   • THYROID LOBECTOMY Left 8/17/2018    Procedure: THYROID LOBECTOMY;  Surgeon: Adelina Wilson M.D.;  Location: SURGERY SAME DAY [facility];  Service: General   • THYROIDECTOMY TOTAL  8/17/2018    [facility];  Surgeon: Adelina Wilson M.D.;  Location: SURGERY SAME DAY [facility];  Service: General   • NODE BIOPSY SENTINEL  2/6/2015    Performed by Adelina Wilson M.D. at SURGERY [facility]   • MASTECTOMY  2/6/2015    right-Performed by Adelina Wilson  "M.D. at SURGERY [facility] ORS   • HIP ARTHROPLASTY TOTAL  5/19/08    Performed by FARTUN ERAZO at SURGERY [facility] ORS   • SHOULDER ARTHROPLASTY TOTAL  2004    Right   • JAN BY LAPAROSCOPY  2002   • CYST EXCISION  1973    \"remove cyst   • HIP ARTHROPLASTY TOTAL     • HIP REPLACEMENT, TOTAL     • LAMINOTOMY     • SHUNT INSERTION      cerebral shunt   • US-NEEDLE CORE BX-BREAST PANEL         Current Facility-Administered Medications   Medication Dose Route Frequency Provider Last Rate Last Admin   • SCOPOLAMINE 1 MG/3DAYS TD PT72            • ceFAZolin (ANCEF) 2 g in  mL IVPB premix  2 g Intravenous Once Daniel Allison M.D.       • Pharmacy Consult Request - Initiate Total Joint Antibiotic Protocol Per Pharmacy   Other PHARMACY TO DOSE Daniel Allison M.D.       • Tranexamic Acid (CYKLOKAPRON) 1,000 mg in  mL IVPB  1,000 mg Intravenous Once Daniel Allison M.D.       • lactated ringers infusion  1,000 mL Intravenous Continuous Daniel Allison M.D. 10 mL/hr at 09/02/21 0555 1,000 mL at 09/02/21 0555       Social History     Socioeconomic History   • Marital status:      Spouse name: Not on file   • Number of children: Not on file   • Years of education: Not on file   • Highest education level: Not on file   Occupational History   • Not on file   Tobacco Use   • Smoking status: Never Smoker   • Smokeless tobacco: Never Used   Vaping Use   • Vaping Use: Never used   Substance and Sexual Activity   • Alcohol use: No     Alcohol/week: 0.0 oz   • Drug use: No   • Sexual activity: Not on file     Comment:  - 51 years    Other Topics Concern   • Not on file   Social History Narrative   • Not on file     Social Determinants of Health     Financial Resource Strain:    • Difficulty of Paying Living Expenses:    Food Insecurity:    • Worried About Running Out of Food in the Last Year:    • Ran Out of Food in the Last Year:    Transportation Needs:    • Lack of Transportation " (Medical):    • Lack of Transportation (Non-Medical):    Physical Activity:    • Days of Exercise per Week:    • Minutes of Exercise per Session:    Stress:    • Feeling of Stress :    Social Connections:    • Frequency of Communication with Friends and Family:    • Frequency of Social Gatherings with Friends and Family:    • Attends Jainism Services:    • Active Member of Clubs or Organizations:    • Attends Club or Organization Meetings:    • Marital Status:    Intimate Partner Violence:    • Fear of Current or Ex-Partner:    • Emotionally Abused:    • Physically Abused:    • Sexually Abused:        Family History   Problem Relation Age of Onset   • Hypertension Mother    • Sleep Apnea Brother    • Cancer Neg Hx    • Diabetes Neg Hx    • Stroke Neg Hx    • Heart Disease Neg Hx        Allergies  Bactrim ds and Morphine    Review of Systems  Negative except for neurological issues (weakness) from Chiari malformation and syringomyelia    Physical Exam    Vital Signs  Blood Pressure : 141/67   Temperature: 36.1 °C (97 °F)   Pulse: 75   Respiration: 16           Labs:                    Radiology:  No orders to display         Assessment/Plan:  Pre-Op Diagnosis Codes:     * Recurrent dislocation of hip joint prosthesis, initial encounter ([facility]) [T84.029A, Z96.649]  Procedure(s):  REVISION, TOTAL ARTHROPLASTY, HIP

## 2021-09-02 NOTE — LETTER
August 25, 2021    [facility]  [address] [place] [address]  [phone]    Patient Name: Tereza Sánchez  Surgeon Name: Daniel Allison M.D.  [facility] [facility]  Surgery Date: 9/2/2021    The time of your surgery is not final and may change up to and until the day of your surgery. You will be contacted 24-48 hours prior to your surgery date with your check-in and surgery time.    If you will not be at one of the below numbers please call/text the surgery scheduler at [facility] ([phone])  Cell Phone: [phone]    BEFORE YOUR SURGERY  Pre Registration and/or Lab Work must be done within and no earlier than 28 days prior to your surgery date. Please call [phone] for an appointment as soon as possible.    Please refrain from smoking any substance after midnight prior to surgery. Smoking may interfere with the anesthetic and frequently produces nausea during the recovery period.    Continue taking all lifesaving medications. Including the morning of your surgery with small sip of water.    Please read the MEDICATION INSTRUCTIONS below completely.    DAY OF YOUR SURGERY  Nothing to eat or drink after midnight     Please arrive at the hospital/surgery center at the check-in time provided.     An adult will need to bring you and take you home after your surgery.     AFTER YOUR SURGERY  Post op Appointment:   Date: 9.20.21   Time: 10:00 AM    With: Daniel Allison M.D.   Location: [address] [place] [address] [phone]      MEDICATION INSTRUCTIONS  The following medications should be stopped a minimum of 10 days prior to surgery:  All over the counter, Supplements & Herbal medications    Anorectics: Phentermine (Adipex-P, Lomaira and Suprenza), Phentermine-topiramate (Qsymia), Bupropion-naltrexone (Contrave)    Opiod Partial Agonists/Opioid Antagonists: Buprenorphine (Subocone, Belbuca, Butrans, Probuphine  Implant, Sublocade), Naltrexone (ReVia, Vivitrol), Naloxone    Amphetamines: Dextroamphetamine/Amphetamine (Adderall, Mydayis), Methylphenidate Hydrochloride (Concerta, Metadate, Methylin, Ritalin)    The following medications should be stopped 5 days prior to surgery:  Blood Thinners: Any Aspirin, Aspirin products, anti-inflammatories such as ibuprofen and any blood thinners such as Coumadin and Plavix. Please consult your prescribing physician if you are on life saving blood thinners, in regards to when to stop medications prior to surgery.     The following medications should be stopped a minimum of 3 days prior to surgery:  PDE-5 inhibitors: Sildenafil (Viagra), Tadalafil (Cialis), Vardenafil (Levitra), Avanafil (Stendra)    MAO Inhibitors: Rasagiline (Azilect), Selegiline (Eldepryl, Emsam, Selapar), Isocarboxazid (Marplan), Phenelzine (Nardil)

## 2021-09-02 NOTE — ANESTHESIA PREPROCEDURE EVALUATION
Chiari malformation with syringomelia, bilateral hand contractures and lower extremity weakness, utilizes walker, most recent TTE reviewed (no significant abnormalities), chronic respiratory failure with hypoxia and SELWYN which require nocturnal supplemental oxygen. Chronic pain with Norco use TID,  shunt. Hx of PONV (last anesthetic record reviewed). Denies: MI/CHF/smoking/CVA/DM/CKD      Relevant Problems   ANESTHESIA   (positive) SELWYN (obstructive sleep apnea)      NEURO   (positive) History of breast cancer      CARDIAC   (positive) HTN (hypertension)      GI   (positive) GERD (gastroesophageal reflux disease)       Physical Exam    Airway   Mallampati: II  TM distance: >3 FB  Neck ROM: full       Cardiovascular - normal exam  Rhythm: regular  Rate: normal  (-) murmur     Dental   Comments: Dentures removed, poor dentition         Pulmonary - normal exam  Breath sounds clear to auscultation     Abdominal    Neurological - normal exam                 Anesthesia Plan    ASA 3 (chronic respiratory failure with hypoxia)   ASA physical status 3 criteria: respiratory insufficiency or compromise    Plan - general       Airway plan will be ETT    (Possible fascia iliaca block depending on if surgery becomes more extensive than anticipated)      Induction: intravenous    Postoperative Plan: Postoperative administration of opioids is intended.    Pertinent diagnostic labs and testing reviewed    Informed Consent:    Anesthetic plan and risks discussed with patient.    Use of blood products discussed with: patient whom consented to blood products.

## 2021-09-02 NOTE — ASSESSMENT & PLAN NOTE
Post surgery, asymptomatic, patient is awake, alert and follows commands. Suspecting this is from anesthesia effects.   Received total 3L IVF in the PACU, with subsequent overload  Off Midodrine and pressures are stable

## 2021-09-02 NOTE — ANESTHESIA POSTPROCEDURE EVALUATION
Patient: Tereza Sánchez    Procedure Summary     Date: 09/02/21 Room / Location:  OR 06 / SURGERY [facility]    Anesthesia Start: 0653 Anesthesia Stop: 0856    Procedure: REVISION, TOTAL ARTHROPLASTY, HIP (Left Knee) Diagnosis:       Recurrent dislocation of hip joint prosthesis, initial encounter ([facility])      (Recurrent dislocation of hip joint prosthesis, initial encounter ([facility]) [T84.029A, Z96.649])    Surgeons: Daniel Allison M.D. Responsible Provider: Kevin Strange M.D.    Anesthesia Type: general ASA Status: 3          Final Anesthesia Type: general  Last vitals  BP   Blood Pressure : 102/61    Temp   37 °C (98.6 °F)    Pulse   90   Resp   20    SpO2   96 %      Anesthesia Post Evaluation    Patient location during evaluation: PACU  Patient participation: complete - patient participated  Level of consciousness: awake and alert  Pain score: 0    Airway patency: patent  Anesthetic complications: no  Cardiovascular status: hemodynamically stable  Respiratory status: acceptable  Hydration status: euvolemic    PONV: none          No complications documented.     Nurse Pain Score: 0 (NPRS)

## 2021-09-02 NOTE — OP REPORT
DATE OF PROCEDURE: 9/2/2021    PREOPERATIVE DIAGNOSIS:     1.  Recurrent instability, left total hip arthroplasty  2.  Syringomyelia and Chiari malformation    POSTOPERATIVE DIAGNOSIS:     1.  Recurrent instability, left total hip arthroplasty  2.  Syringomyelia and Chiari malformation    PROCEDURE: Revision of left total hip arthroplasty    SURGEON: Daniel Allison MD    ASSISTANT: PHAM Muller    ANESTHESIA: Virgil Armstrong MD    ANESTHETIC: General    EBL: 150 cc    IMPLANTS:     1.  Chey 56 mm multihole cup  2.  3 acetabular screws, 50 mm, 25 mm, 20 mm  3.  MDM liner  4.  Size F (46 mm) MDM bearing  5.  Patel & Nephew 28 mm/+4 Oxinium femoral head    INDICATIONS: Patient is 75 years old.  She had a left total hip arthroplasty over 10 years ago.  She has had multiple dislocations over the last several months.  I have recommended revision.  Risks were discussed with the patient and these include bleeding, infection, neurovascular, pain, stiffness, fracture, dislocation, implant failure, thromboembolic phenomena, anesthetic and medical complications etc.    PROCEDURE DESCRIPTION: The patient was identified in the preoperative holding area and her left hip was marked.  She was taken the operating room where she placed supine the operating table.  General anesthetic was administered.  Intravenous antibiotics and transexamic acid were given.  She was placed in the lateral decubitus position with the left side up.  The left hindquarter was then prepped and draped in sterile fashion.  A timeout was held to confirm the patient's identity and correct surgical site.    Posterior approach to the hip was performed with incision through the previous scar.  I carried this down to the IT band which was split longitudinally and the split was carried into the gluteus sandra in line with its fibers.  A Charnley retractor was placed deep to the IT band.  The pseudocapsule was elevated off the proximal femur.  The hip was  exposed.  Hip dislocated with about 60° of flexion, abduction and 20° of internal rotation.  I dislocated the hip.  I remove the femoral head.  The femoral stem had good position and was not loose.    Acetabulum was exposed.  I remove the liner and then remove the screws.  I then reinserted the liner and used the cup extractor to remove the cup.  There was still good stock I reamed to 56 mm and then placed a 56 mm cup.  I placed 3 screws into the ilium to secure this.  I then tapped the MDM liner into place in standard fashion.  We trialed stability with a +4 head (previously had a -3).  This gave good stability.  The trial was removed.  The Gilbert taper was washed and dried.  The dual mobility component was assembled on the back table and then tapped into place in standard fashion.  The hip was reduced.    Pseudocapsule was repaired with #1 Vicryl.  The IT band and gluteus sandra fascia were closed with #1 Vicryl.  The skin was closed with 2-0 Vicryl and 3-0 Monocryl.  Steri-Strips were applied.  The patient was transferred to her bed in the supine position.  She was extubated and taken to recovery room in stable condition.    POSTOPERATIVE PLAN:     1.  Weightbearing as tolerated.  2.  Posterior hip precautions.  3.  Intravenous antibiotics for 24 hours.  4.  DVT prophylaxis with early mobilization, SCDs, and aspirin 81 mg p.o. twice daily for 4 weeks.  5.  AP pelvis radiograph in recovery room.

## 2021-09-02 NOTE — PROGRESS NOTES
Anesthesia Progress Note    Briefly, this is a 76 year old female with a history of chronic respiratory insufficiency/SELWYN requiring nocturnal supplemental oxygen, hypertension, chronic pain requiring chronic opioid use, and Chiari malformation with syringomelia. She underwent left hip revision arthroplasty for recurrent left hip dislocations.    Her intra-op course was significant for hypotension which required ephedrine and phenylephrine infusion. EBL was 400ml. Her blood pressure improved at the end of surgery as the anesthetics were discontinued and she was extubated without any issues or concerns. In the PACU, her blood pressure was normal for about the first 30-45 mins post-operatively, and she was sleepy but awake with good pain control. Since then, her blood pressures have continued to be low (mostly high 70s to low 80s systolic) despited boluses of ephedrine in addition to judicious fluid boluses. Clinically, she appears to be doing very well -- good pain control with no dizziness/nausea/lightheadedness. The patient feels well, but did report that over the past couple weeks her blood pressure readings had been low at her pain clinic visits.    I had an H&H drawn, and her hemoglobin had not dropped significantly. I notified Dr. Allison that we would be consulting the hospitalist (Dr. Strickland) for further evaluation given that her blood pressure was still on the lower end, and she may not be appropriate for a floor bed. I had a discussion with Dr. Strickland, and she will be evaluating whether further work-up or medication treatments are needed, and if a floor or ICU bed is warranted.    Please contact me with any questions or concerns.    Virgil Strange MD  [facility]

## 2021-09-02 NOTE — CONSULTS
Hospital Medicine Consultation    Date of Service  9/2/2021    Referring Physician  Daniel Allison M.D.    Consulting Physician  Malgorzata Strickland M.D.    Reason for Consultation  hypotension    History of Presenting Illness  76 y.o. female who presented 9/2/2021 with elective Revision of left total hip arthroplasty by Dr. Lynch. Patient has past medical history of HTN, SELWYN on CPAP, HLD. Patient underwent left total hip arthroplasty uneventfully. However post surgery, she was noted persistent hypotension with Bp around 70-85/60, MAP around 55-65. Patient denies dizziness or light headeness, no chest pain, sob, nausea, vomiting, no sign of active bleeding. She has received 3L IVF. She was also noted hypoxia and requires 6L NC. IM was consulted to assist management of hypotension and hypoxemia.     Review of Systems  Review of Systems   Respiratory: Negative for sputum production, shortness of breath and wheezing.    Cardiovascular: Negative for chest pain and palpitations.   Neurological: Negative for dizziness and headaches.   All other systems reviewed and are negative.      Past Medical History   has a past medical history of Anesthesia, Arthritis, Asthma, Bowel habit changes, Breath shortness, Cancer ([facility]), Chiari malformation (1970's), Chickenpox, Chronic back pain, Contracture of hand joint, Decreased lung capacity, Dental disorder, Emphysema of lung ([facility]), Heart burn, High cholesterol, Hypertension, Indigestion, Joint replacement, Obesity, Pain, Pain (08/06/2018), Peripheral edema (6/6/2013), Pneumonia, Scoliosis, Shortness of breath (08/06/2018), Sleep apnea, Sleep apnea (08/07/2018), Snoring, sore throat (1/15/15), and Syringomyelia ([facility]).    Surgical History   has a past surgical history that includes rubin by laparoscopy (2002); shoulder arthroplasty total (2004); hip arthroplasty total (5/19/08); hip arthroplasty total; us-needle core bx-breast panel; node biopsy sentinel (2/6/2015); thyroid lobectomy  (Left, 8/17/2018); thyroidectomy total (8/17/2018); laminotomy; hip replacement, total; cyst excision (1973); shunt insertion; and mastectomy (2/6/2015).    Family History  family history includes Hypertension in her mother; Sleep Apnea in her brother.    Social History   reports that she has never smoked. She has never used smokeless tobacco. She reports that she does not drink alcohol and does not use drugs.    Medications  Prior to Admission Medications   Prescriptions Last Dose Informant Patient Reported? Taking?   DULoxetine (CYMBALTA) 30 MG Cap DR Particles 9/1/2021 at Unknown time  No No   Sig: TAKE 1 CAPSULE DAILY   HYDROcodone-acetaminophen (NORCO) 5-325 MG Tab per tablet 9/1/2021 at Unknown time  Yes No   Sig: TAKE 1 TABLET BY MOUTH 3 TIMES A DAY FOR 30 DAYS, 724.02   Influenza Vaccine High-Dose pf 0.5 ML Suspension Prefilled Syringe injection 9/1/2021 at Unknown time  Yes No   Sig: Fluzone High-Dose 2015-16 (PF) 180 mcg/0.5 mL intramuscular syringe   ADM 0.5ML IM UTD   LYRICA 100 MG Cap 9/1/2021 at Unknown time  Yes No   Sig: Take 100 mg by mouth 3 times a day.   MULTIVITAMIN PO 9/1/2021 at Unknown time Patient Yes No   Sig: Take 1 Tab by mouth every day.   Omega-3 350 MG CAPSULE DELAYED RELEASE 9/1/2021 at Unknown time  Yes No   Sig: Take 1 Capsule by mouth every day.   Zinc 50 MG Tab   Yes No   Sig: Take 1 Tablet by mouth every day.   albuterol 108 (90 Base) MCG/ACT Aero Soln inhalation aerosol 9/1/2021 at Unknown time  Yes No   Sig: Ventolin HFA 90 mcg/actuation aerosol inhaler   amitriptyline (ELAVIL) 25 MG Tab 9/1/2021 at Unknown time Patient Yes No   Sig: Take 100 mg by mouth every evening.   ascorbic acid (VITAMIN C) 1000 MG tablet 9/1/2021 at Unknown time  Yes No   Sig: Take 1,000 mg by mouth every day.   atenolol (TENORMIN) 50 MG Tab 9/1/2021 at Unknown time  No No   Sig: TAKE 2 TABLETS BY MOUTH EVERY DAY   benazepril-hydrochlorthiazide (LOTENSIN HCT) 20-12.5 MG per tablet 9/1/2021 at Unknown  "time  No No   Sig: TAKE 1 TABLET DAILY   calcium-vitamin D (CALCIUM 500 + D) 500-125 MG-UNIT Tab 9/1/2021 at Unknown time  Yes No   Sig: Calcium 500 + D   esomeprazole (NEXIUM) 20 MG capsule 9/1/2021 at Unknown time  No No   Sig: TAKE 1 CAPSULE BY MOUTH EVERY DAY IN THE MORNING BEFORE BREAKFAST   mupirocin (BACTROBAN) 2 % Ointment 9/1/2021 at Unknown time  No No   Sig: Apply 1 Application topically 2 times a day for 5 days.   potassium chloride SA (KDUR) 20 MEQ Tab CR 9/1/2021 at Unknown time  No No   Sig: Take 1 Tab by mouth every day.   predniSONE (DELTASONE) 10 MG Tab 9/1/2021 at Unknown time  Yes No   Sig: prednisone 10 mg tablet      Facility-Administered Medications: None       Allergies  Allergies   Allergen Reactions   • Bactrim Ds Rash     Pt states \"I get a body rash\".   • Morphine Vomiting     hallucinations       Physical Exam  Temp:  [36.1 °C (97 °F)-37 °C (98.6 °F)] 36.2 °C (97.2 °F)  Pulse:  [60-97] 89  Resp:  [14-20] 14  BP: ()/(32-76) 96/54  SpO2:  [90 %-97 %] 95 %    Physical Exam  Vitals and nursing note reviewed.   Constitutional:       General: She is not in acute distress.     Appearance: Normal appearance. She is obese. She is ill-appearing.   HENT:      Head: Normocephalic and atraumatic.      Nose: Nose normal.      Mouth/Throat:      Pharynx: Oropharynx is clear.   Eyes:      Extraocular Movements: Extraocular movements intact.      Conjunctiva/sclera: Conjunctivae normal.      Pupils: Pupils are equal, round, and reactive to light.   Cardiovascular:      Rate and Rhythm: Normal rate and regular rhythm.      Pulses: Normal pulses.      Heart sounds: Normal heart sounds.   Pulmonary:      Effort: Pulmonary effort is normal. No respiratory distress.      Breath sounds: Normal breath sounds. No wheezing.      Comments: On oxygen supplements  Abdominal:      General: Abdomen is flat. Bowel sounds are normal. There is no distension.      Palpations: Abdomen is soft.      Tenderness: There " is no abdominal tenderness.   Musculoskeletal:         General: No swelling. Normal range of motion.      Cervical back: Normal range of motion and neck supple.      Comments: LLE post surgery, dressing noted   Skin:     General: Skin is warm and dry.   Neurological:      General: No focal deficit present.      Mental Status: She is alert and oriented to person, place, and time. Mental status is at baseline.   Psychiatric:         Mood and Affect: Mood normal.         Behavior: Behavior normal.         Fluids  Date 09/02/21 0700 - 09/03/21 0659   Shift 1422-5849 5125-6265 4687-0467 24 Hour Total   INTAKE   P.O. 250   250   I.V. 3300   3300   Shift Total 3550   3550   OUTPUT   Blood 400   400   Shift Total 400   400   Weight (kg) 89.7 89.7 89.7 89.7       Laboratory  Recent Labs     09/02/21  1204   HEMOGLOBIN 11.5*   HEMATOCRIT 36.6*                         Imaging  DX-PELVIS-1 OR 2 VIEWS   Final Result      Post left hip arthroplasty.          Assessment/Plan  * Hypotension  Assessment & Plan  Post surgery, asymptomatic, patient is awake, alert and follows commands. Suspecting this is from anesthesia effects.   Received total 3L IVF  Start midodrine  Check H&H  Cont monitoring    Respiratory failure (HCC)  Assessment & Plan  Post surgery respiratory failure requiring 6L NC. Hx of SELWYN on CPAP nightly  Check CXR  IS  Wean O2 as tolerated    Recurrent dislocation of hip joint prosthesis (HCC)  Assessment & Plan  S/p  left total hip arthroplasty 9/2  Follow ortho rec    SELWYN (obstructive sleep apnea)- (present on admission)  Assessment & Plan  On CPAP    Obesity (BMI 30-39.9)- (present on admission)  Assessment & Plan  Life style modifications including healthy plant based diet and regular exercise recommended     Chronic back pain- (present on admission)  Assessment & Plan  Due to scoliosis. She is on prednisone 10mg daily chronically, will continue it

## 2021-09-03 ENCOUNTER — HOME HEALTH ADMISSION (OUTPATIENT)
Dept: HOME HEALTH SERVICES | Facility: HOME HEALTHCARE | Age: 76
End: 2021-09-03
Payer: MEDICARE

## 2021-09-03 ENCOUNTER — APPOINTMENT (OUTPATIENT)
Dept: CARDIOLOGY | Facility: MEDICAL CENTER | Age: 76
DRG: 466 | End: 2021-09-03
Attending: FAMILY MEDICINE
Payer: MEDICARE

## 2021-09-03 PROBLEM — J96.01 ACUTE RESPIRATORY FAILURE WITH HYPOXIA (HCC): Status: ACTIVE | Noted: 2021-09-02

## 2021-09-03 LAB
ANION GAP SERPL CALC-SCNC: 17 MMOL/L (ref 7–16)
BASOPHILS # BLD AUTO: 0.1 % (ref 0–1.8)
BASOPHILS # BLD: 0.02 K/UL (ref 0–0.12)
BUN SERPL-MCNC: 20 MG/DL (ref 8–22)
CALCIUM SERPL-MCNC: 8.2 MG/DL (ref 8.4–10.2)
CHLORIDE SERPL-SCNC: 101 MMOL/L (ref 96–112)
CO2 SERPL-SCNC: 21 MMOL/L (ref 20–33)
CREAT SERPL-MCNC: 1.13 MG/DL (ref 0.5–1.4)
EKG IMPRESSION: NORMAL
EOSINOPHIL # BLD AUTO: 0 K/UL (ref 0–0.51)
EOSINOPHIL NFR BLD: 0 % (ref 0–6.9)
ERYTHROCYTE [DISTWIDTH] IN BLOOD BY AUTOMATED COUNT: 47.1 FL (ref 35.9–50)
GLUCOSE SERPL-MCNC: 164 MG/DL (ref 65–99)
HCT VFR BLD AUTO: 33.5 % (ref 37–47)
HGB BLD-MCNC: 10.6 G/DL (ref 12–16)
IMM GRANULOCYTES # BLD AUTO: 0.08 K/UL (ref 0–0.11)
IMM GRANULOCYTES NFR BLD AUTO: 0.5 % (ref 0–0.9)
LV EJECT FRACT  99904: 70
LV EJECT FRACT MOD 2C 99903: 81.89
LV EJECT FRACT MOD 4C 99902: 73.7
LV EJECT FRACT MOD BP 99901: 79.1
LYMPHOCYTES # BLD AUTO: 0.62 K/UL (ref 1–4.8)
LYMPHOCYTES NFR BLD: 4 % (ref 22–41)
MCH RBC QN AUTO: 28.4 PG (ref 27–33)
MCHC RBC AUTO-ENTMCNC: 31.6 G/DL (ref 33.6–35)
MCV RBC AUTO: 89.8 FL (ref 81.4–97.8)
MONOCYTES # BLD AUTO: 1.1 K/UL (ref 0–0.85)
MONOCYTES NFR BLD AUTO: 7.1 % (ref 0–13.4)
NEUTROPHILS # BLD AUTO: 13.59 K/UL (ref 2–7.15)
NEUTROPHILS NFR BLD: 88.3 % (ref 44–72)
NRBC # BLD AUTO: 0 K/UL
NRBC BLD-RTO: 0 /100 WBC
PLATELET # BLD AUTO: 309 K/UL (ref 164–446)
PMV BLD AUTO: 10.9 FL (ref 9–12.9)
POTASSIUM SERPL-SCNC: 3.6 MMOL/L (ref 3.6–5.5)
PROCALCITONIN SERPL-MCNC: 0.06 NG/ML
RBC # BLD AUTO: 3.73 M/UL (ref 4.2–5.4)
SODIUM SERPL-SCNC: 139 MMOL/L (ref 135–145)
T4 FREE SERPL-MCNC: 1.3 NG/DL (ref 0.93–1.7)
TROPONIN T SERPL-MCNC: 16 NG/L (ref 6–19)
TROPONIN T SERPL-MCNC: 19 NG/L (ref 6–19)
TROPONIN T SERPL-MCNC: 20 NG/L (ref 6–19)
TSH SERPL DL<=0.005 MIU/L-ACNC: 0.18 UIU/ML (ref 0.38–5.33)
WBC # BLD AUTO: 15.4 K/UL (ref 4.8–10.8)

## 2021-09-03 PROCEDURE — 84145 PROCALCITONIN (PCT): CPT

## 2021-09-03 PROCEDURE — 700102 HCHG RX REV CODE 250 W/ 637 OVERRIDE(OP): Performed by: ORTHOPAEDIC SURGERY

## 2021-09-03 PROCEDURE — A9270 NON-COVERED ITEM OR SERVICE: HCPCS | Performed by: ORTHOPAEDIC SURGERY

## 2021-09-03 PROCEDURE — 99024 POSTOP FOLLOW-UP VISIT: CPT | Performed by: ORTHOPAEDIC SURGERY

## 2021-09-03 PROCEDURE — 93010 ELECTROCARDIOGRAM REPORT: CPT | Performed by: INTERNAL MEDICINE

## 2021-09-03 PROCEDURE — 84484 ASSAY OF TROPONIN QUANT: CPT | Mod: 91

## 2021-09-03 PROCEDURE — A9270 NON-COVERED ITEM OR SERVICE: HCPCS | Performed by: STUDENT IN AN ORGANIZED HEALTH CARE EDUCATION/TRAINING PROGRAM

## 2021-09-03 PROCEDURE — 84439 ASSAY OF FREE THYROXINE: CPT

## 2021-09-03 PROCEDURE — 700111 HCHG RX REV CODE 636 W/ 250 OVERRIDE (IP): Performed by: FAMILY MEDICINE

## 2021-09-03 PROCEDURE — 97165 OT EVAL LOW COMPLEX 30 MIN: CPT

## 2021-09-03 PROCEDURE — 94664 DEMO&/EVAL PT USE INHALER: CPT

## 2021-09-03 PROCEDURE — 96375 TX/PRO/DX INJ NEW DRUG ADDON: CPT

## 2021-09-03 PROCEDURE — 700111 HCHG RX REV CODE 636 W/ 250 OVERRIDE (IP): Performed by: STUDENT IN AN ORGANIZED HEALTH CARE EDUCATION/TRAINING PROGRAM

## 2021-09-03 PROCEDURE — 700102 HCHG RX REV CODE 250 W/ 637 OVERRIDE(OP): Performed by: STUDENT IN AN ORGANIZED HEALTH CARE EDUCATION/TRAINING PROGRAM

## 2021-09-03 PROCEDURE — 94760 N-INVAS EAR/PLS OXIMETRY 1: CPT

## 2021-09-03 PROCEDURE — 93306 TTE W/DOPPLER COMPLETE: CPT

## 2021-09-03 PROCEDURE — 80048 BASIC METABOLIC PNL TOTAL CA: CPT

## 2021-09-03 PROCEDURE — 770001 HCHG ROOM/CARE - MED/SURG/GYN PRIV*

## 2021-09-03 PROCEDURE — 93005 ELECTROCARDIOGRAM TRACING: CPT | Performed by: FAMILY MEDICINE

## 2021-09-03 PROCEDURE — 97535 SELF CARE MNGMENT TRAINING: CPT

## 2021-09-03 PROCEDURE — 97162 PT EVAL MOD COMPLEX 30 MIN: CPT

## 2021-09-03 PROCEDURE — 93306 TTE W/DOPPLER COMPLETE: CPT | Mod: 26 | Performed by: INTERNAL MEDICINE

## 2021-09-03 PROCEDURE — 99233 SBSQ HOSP IP/OBS HIGH 50: CPT | Performed by: FAMILY MEDICINE

## 2021-09-03 PROCEDURE — 84443 ASSAY THYROID STIM HORMONE: CPT

## 2021-09-03 PROCEDURE — 85025 COMPLETE CBC W/AUTO DIFF WBC: CPT

## 2021-09-03 PROCEDURE — 36415 COLL VENOUS BLD VENIPUNCTURE: CPT

## 2021-09-03 RX ORDER — MIDODRINE HYDROCHLORIDE 2.5 MG/1
2.5 TABLET ORAL
Status: DISCONTINUED | OUTPATIENT
Start: 2021-09-03 | End: 2021-09-03

## 2021-09-03 RX ORDER — HALOPERIDOL 5 MG/ML
2-5 INJECTION INTRAMUSCULAR EVERY 4 HOURS PRN
Status: DISCONTINUED | OUTPATIENT
Start: 2021-09-03 | End: 2021-09-04 | Stop reason: HOSPADM

## 2021-09-03 RX ORDER — FUROSEMIDE 10 MG/ML
40 INJECTION INTRAMUSCULAR; INTRAVENOUS
Status: DISCONTINUED | OUTPATIENT
Start: 2021-09-03 | End: 2021-09-04

## 2021-09-03 RX ADMIN — FUROSEMIDE 40 MG: 40 INJECTION, SOLUTION INTRAMUSCULAR; INTRAVENOUS at 10:47

## 2021-09-03 RX ADMIN — MIDODRINE HYDROCHLORIDE 5 MG: 5 TABLET ORAL at 11:27

## 2021-09-03 RX ADMIN — ACETAMINOPHEN 650 MG: 325 TABLET, FILM COATED ORAL at 18:00

## 2021-09-03 RX ADMIN — HYDROCODONE BITARTRATE AND ACETAMINOPHEN 2 TABLET: 5; 325 TABLET ORAL at 05:08

## 2021-09-03 RX ADMIN — OXYCODONE 5 MG: 5 TABLET ORAL at 11:25

## 2021-09-03 RX ADMIN — OXYCODONE 5 MG: 5 TABLET ORAL at 20:20

## 2021-09-03 RX ADMIN — OMEPRAZOLE 20 MG: 20 CAPSULE, DELAYED RELEASE ORAL at 05:08

## 2021-09-03 RX ADMIN — DOCUSATE SODIUM 50 MG AND SENNOSIDES 8.6 MG 1 TABLET: 8.6; 5 TABLET, FILM COATED ORAL at 20:20

## 2021-09-03 RX ADMIN — ASPIRIN 81 MG: 81 TABLET, COATED ORAL at 07:19

## 2021-09-03 RX ADMIN — OXYCODONE 2.5 MG: 5 TABLET ORAL at 07:28

## 2021-09-03 RX ADMIN — ACETAMINOPHEN 650 MG: 325 TABLET, FILM COATED ORAL at 05:07

## 2021-09-03 RX ADMIN — PREDNISONE 10 MG: 10 TABLET ORAL at 05:08

## 2021-09-03 RX ADMIN — DULOXETINE 30 MG: 30 CAPSULE, DELAYED RELEASE ORAL at 05:08

## 2021-09-03 RX ADMIN — MIDODRINE HYDROCHLORIDE 5 MG: 5 TABLET ORAL at 07:20

## 2021-09-03 RX ADMIN — ASPIRIN 81 MG: 81 TABLET, COATED ORAL at 18:00

## 2021-09-03 RX ADMIN — HYDROCODONE BITARTRATE AND ACETAMINOPHEN 2 TABLET: 5; 325 TABLET ORAL at 09:22

## 2021-09-03 ASSESSMENT — COGNITIVE AND FUNCTIONAL STATUS - GENERAL
MOVING FROM LYING ON BACK TO SITTING ON SIDE OF FLAT BED: A LITTLE
TURNING FROM BACK TO SIDE WHILE IN FLAT BAD: A LITTLE
CLIMB 3 TO 5 STEPS WITH RAILING: A LOT
CLIMB 3 TO 5 STEPS WITH RAILING: A LOT
PERSONAL GROOMING: A LITTLE
DRESSING REGULAR LOWER BODY CLOTHING: A LITTLE
MOBILITY SCORE: 19
SUGGESTED CMS G CODE MODIFIER DAILY ACTIVITY: CK
MOVING TO AND FROM BED TO CHAIR: A LITTLE
MOVING FROM LYING ON BACK TO SITTING ON SIDE OF FLAT BED: A LITTLE
MOVING TO AND FROM BED TO CHAIR: A LITTLE
SUGGESTED CMS G CODE MODIFIER MOBILITY: CK
WALKING IN HOSPITAL ROOM: A LITTLE
PERSONAL GROOMING: A LITTLE
MOVING FROM LYING ON BACK TO SITTING ON SIDE OF FLAT BED: A LITTLE
EATING MEALS: A LITTLE
DRESSING REGULAR LOWER BODY CLOTHING: A LITTLE
HELP NEEDED FOR BATHING: A LITTLE
DAILY ACTIVITIY SCORE: 18
MOBILITY SCORE: 17
MOVING TO AND FROM BED TO CHAIR: A LITTLE
SUGGESTED CMS G CODE MODIFIER MOBILITY: CK
DRESSING REGULAR UPPER BODY CLOTHING: A LITTLE
STANDING UP FROM CHAIR USING ARMS: A LITTLE
CLIMB 3 TO 5 STEPS WITH RAILING: A LITTLE
HELP NEEDED FOR BATHING: A LITTLE
SUGGESTED CMS G CODE MODIFIER DAILY ACTIVITY: CJ
TOILETING: A LITTLE
EATING MEALS: A LITTLE
DRESSING REGULAR LOWER BODY CLOTHING: A LITTLE
DAILY ACTIVITIY SCORE: 21
WALKING IN HOSPITAL ROOM: A LITTLE
WALKING IN HOSPITAL ROOM: A LITTLE
STANDING UP FROM CHAIR USING ARMS: A LITTLE
SUGGESTED CMS G CODE MODIFIER MOBILITY: CK
MOBILITY SCORE: 17
TOILETING: A LITTLE
TOILETING: A LITTLE
DRESSING REGULAR UPPER BODY CLOTHING: A LITTLE
STANDING UP FROM CHAIR USING ARMS: A LITTLE
TURNING FROM BACK TO SIDE WHILE IN FLAT BAD: A LITTLE

## 2021-09-03 ASSESSMENT — PAIN DESCRIPTION - PAIN TYPE
TYPE: ACUTE PAIN;SURGICAL PAIN
TYPE: ACUTE PAIN;CHRONIC PAIN

## 2021-09-03 ASSESSMENT — GAIT ASSESSMENTS
GAIT LEVEL OF ASSIST: MINIMAL ASSIST
ASSISTIVE DEVICE: FRONT WHEEL WALKER
DEVIATION: ANTALGIC;STEP TO
DISTANCE (FEET): 100

## 2021-09-03 ASSESSMENT — LIFESTYLE VARIABLES
EVER FELT BAD OR GUILTY ABOUT YOUR DRINKING: NO
CONSUMPTION TOTAL: NEGATIVE
TOTAL SCORE: 0
ON A TYPICAL DAY WHEN YOU DRINK ALCOHOL HOW MANY DRINKS DO YOU HAVE: 0
AVERAGE NUMBER OF DAYS PER WEEK YOU HAVE A DRINK CONTAINING ALCOHOL: 0
HAVE PEOPLE ANNOYED YOU BY CRITICIZING YOUR DRINKING: NO
TOTAL SCORE: 0
HOW MANY TIMES IN THE PAST YEAR HAVE YOU HAD 5 OR MORE DRINKS IN A DAY: 0
EVER HAD A DRINK FIRST THING IN THE MORNING TO STEADY YOUR NERVES TO GET RID OF A HANGOVER: NO
TOTAL SCORE: 0
ALCOHOL_USE: NO
HAVE YOU EVER FELT YOU SHOULD CUT DOWN ON YOUR DRINKING: NO

## 2021-09-03 ASSESSMENT — ACTIVITIES OF DAILY LIVING (ADL): TOILETING: INDEPENDENT

## 2021-09-03 ASSESSMENT — PATIENT HEALTH QUESTIONNAIRE - PHQ9
SUM OF ALL RESPONSES TO PHQ9 QUESTIONS 1 AND 2: 0
1. LITTLE INTEREST OR PLEASURE IN DOING THINGS: NOT AT ALL
2. FEELING DOWN, DEPRESSED, IRRITABLE, OR HOPELESS: NOT AT ALL

## 2021-09-03 ASSESSMENT — ENCOUNTER SYMPTOMS
CHILLS: 0
FEVER: 0
SHORTNESS OF BREATH: 1
COUGH: 0

## 2021-09-03 ASSESSMENT — FIBROSIS 4 INDEX: FIB4 SCORE: 1.48

## 2021-09-03 NOTE — RESPIRATORY CARE
"   COPD EDUCATION by COPD CLINICAL EDUCATOR  9/3/2021 at 3:48 PM by Britney Lehman, RRT     Patient reviewed by COPD education team. Patient does not have a history or diagnosis of COPD and is a long time never smoker.  Therefore, patient does not qualify for the COPD program.    COPD Screen  COPD Risk Screening  Do you have a history of COPD?: No (Pt states she has Asthma)    COPD Assessment  COPD Clinical Specialists ONLY  COPD Education Initiated: No--Quick Screen (per patient she does not have COPD, hx Asthma uses a Albuterol inhaler occassionally, gave spacer and instruct, pt did not want any other resources/benefits of [facility] gave information to them)  Physician Name: ERWIN Mae  Pulmonologist Name: 6/6/2018 Per PFT - Mild restrictive defect with normal diffusion  Referrals Initiated:  (pt will be getting Home Health at discharge, did not want any other resources but Home Health)  Is this a COPD exacerbation patient?: No (hip surgery)  $ Demo/Eval of SVN's, MDI's and Aerosols: Yes    Meds to Beds        MY COPD ACTION PLAN     It is recommended that patients and physicians /healthcare providers complete this action plan together. This plan should be discussed at each physician visit and updated as needed.    The green, yellow and red zones show groups of symptoms of COPD. This list of symptoms is not comprehensive, and you may experience other symptoms. In the \"Actions\" column, your healthcare provider has recommended actions for you to take based on your symptoms.    Patient Name: Tereza Sánchez   YOB: 1945   Last Updated on:     Green Zone:  I am doing well today Actions   •  Usual activitiy and exercise level •  Take daily medications   •  Usual amounts of cough and phlegm/mucus •  Use oxygen as prescribed   •  Sleep well at night •  Continue regular exercise/diet plan   •  Appetite is good •  At all times avoid cigarette smoke, inhaled irritants     Daily Medications " "(these medications are taken every day):                Yellow Zone:  I am having a bad day or a COPD flare Actions   •  More breathless than usual •  Continue daily medications   •  I have less energy for my daily activities •  Use quick relief inhaler as ordered   •  Increased or thicker phlegm/mucus •  Use oxygen as prescribed   •  Using quick relief inhaler/nebulizer more often •  Get plenty of rest   •  Swelling of ankles more than usual •  Use pursed lip breathing   •  More coughing than usual •  At all times avoid cigarette smoke, inhaled irritants   •  I feel like I have a \"chest cold\"   •  Poor sleep and my symptoms woke me up   •  My appetite is not good   •  My medicine is not helping    •  Call provider immediately if symptoms don’t improve     Continue daily medications, add rescue medications:               Medications to be used during a flare up, (as Discussed with Provider):              Red Zone:  I need urgent medical care Actions   •  Severe shortness of breath even at rest •  Call 911 or seek medical care immediately   •  Not able to do any activity because of breathing    •  Fever or shaking chills    •  Feeling confused or very drowsy     •  Chest pains    •  Coughing up blood              "

## 2021-09-03 NOTE — CARE PLAN
The patient is Stable - Low risk of patient condition declining or worsening    Shift Goals  Clinical Goals: pain control, ambulate 50 ft, void, remain free from falls    Progress made toward(s) clinical / shift goals:  Pt able to ambulate 50 ft with front wheel walker. Pain managed with rest and medication per MAR. Pt able to void appropriately. No falls during this shift. Fall precautions in place.

## 2021-09-03 NOTE — DISCHARGE PLANNING
Anticipated Discharge Disposition: Home with HH     Action: LSW informed by bedside RNAmy that pt will require HH and possible O2. Pt has O2 at home. Pt may potentially stay one more night.     LSW messaged Dr. Allison requesting HH order and F2F.     Note in Epic from María Elena BECKFORD stating that during pt's preadmission appointment it was identified that pt has all of the needed equipment and spouse will be transportation. Pt reported to have the following DME available- front-wheel walker, raised toilet seat and might get a tub transfer bench.    Barriers to Discharge: None     Plan: LSW to collect HH CHOICE, Await HH order and F2F, LSW to continue to follow for d/c needs     Addendum 1024  LSW met with pt and spouse, Anthony at bedside to discuss HH CHOICE. Pt requested spouse provide consent for HH CHOICE. Pt has SCP and consent provided for referral to be placed to [facility].     Addendum 1107  F2F was only available. LSW reached out to Dr. Allison requesting HH order.   Now both in place.     LSW faxed HH CHOICE to Brenda [facility]ANTO[facility].

## 2021-09-03 NOTE — PROGRESS NOTES
Received report from Chele MARQUEZ at 9794. Pt to floor via bed at 1735. Pt is awake and alert. No signs of distress. Pt denies any nausea. Pt denies any numbness or tingling. Pt reports pain 0/10. Pt is able to planter flex and dorsi flex ble, + pulses. Dressing to l hip is cdi. Cold pack in place. Fall precautions in place. Bed in lowest and locked position. Call light within reach.  Chart check complete.

## 2021-09-03 NOTE — CARE PLAN
The patient is Watcher - Medium risk of patient condition declining or worsening    Shift Goals  Clinical Goals: safety    Progress made toward(s) clinical / shift goals:  fall precautions in place. Bed in locked and lowest position. Call light within reach.     Patient is not progressing towards the following goals:

## 2021-09-03 NOTE — DISCHARGE PLANNING
ATTN: Case Management  RE: Referral for Home Health    As of 09/03/21, we have accepted the Home Health referral for the patient listed above.    A [facility] clinician will be out to see the patient either Tuesday or Wednesday, if that is fine have the attending MD place a note. If you have any questions or concerns regarding the patient's transition to Home Health, please do not hesitate to contact us at [phone].      We look forward to collaborating with you,  [facility] Team

## 2021-09-03 NOTE — DISCHARGE PLANNING
Received Choice form at 1111  Agency/Facility Name: [facility]   Referral sent per Choice form @ 5905

## 2021-09-03 NOTE — FACE TO FACE
Face to Face Supporting Documentation - Home Health    The encounter with this patient was in whole or in part the primary reason for home health admission.    Date of encounter:   Patient:                    MRN:                       YOB: 2019  Tereza Sánchez  [id number]  1945     Home health to see patient for:  Skilled Nursing care for assessment, interventions & education, Physical Therapy evaluation and treatment and Occupational therapy evaluation and treatment    Skilled need for:  Surgical Aftercare Revision left hip replacement    Skilled nursing interventions to include:  Wound Care    Homebound status evidenced by:  Need the aid of supportive devices such as crutches, canes, wheelchairs or walkers or Needs the assistance of another person in order to leave the home. Leaving home requires a considerable and taxing effort. There is a normal inability to leave the home.    Community Physician to provide follow up care: ERWIN Mae     Optional Interventions? No      I certify the face to face encounter for this home health care referral meets the CMS requirements and the encounter/clinical assessment with the patient was, in whole, or in part, for the medical condition(s) listed above, which is the primary reason for home health care. Based on my clinical findings: the service(s) are medically necessary, support the need for home health care, and the homebound criteria are met.  I certify that this patient has had a face to face encounter by myself.  Daniel Allison M.D. - NPI: [id number]

## 2021-09-03 NOTE — CARE PLAN
The patient is Watcher - Medium risk of patient condition declining or worsening    Shift Goals  Clinical Goals: pain control, ambulate, remain free from falls    Progress made toward(s) clinical / shift goals:  Pt given norco and oxycodone 2.5 and oxycodone 5mg for pain control pt ambulated with PT during shift. Fall precautions in place. Bed in lowest and locked position. Call light within reach.    Patient is not progressing towards the following goals:

## 2021-09-03 NOTE — THERAPY
Physical Therapy   Initial Evaluation     Patient Name: Tereza Sánchez  Age:  76 y.o., Sex:  female  Medical Record #: [id number]  Today's Date: 9/3/2021     Precautions  Precautions: (P) Fall Risk;Posterior Hip Precautions;Weight Bearing As Tolerated Left Lower Extremity    Assessment  Patient is 76 y.o. female who was recently seen s/p revision of L DMITRY with WBAT orders for L LE and orders for posterior hip precautions. Pt presented to PT with impaired balance, impaired gait, pain, and poor activity tolerance. Pt is at a high fall risk at this time given current functional impairments and requires CGA to Min A for all activity for safe functional mobility. Pt provided with education on positioning, elevation, icing, and walking program. Pt provided with cues and demo for posterior hip precautions during transfers. Pt was able to complete stair training and provided with cues for correct mechanics. Pt will continue to benefit from skilled PT while in house and may benefit from one night stay for continued therapy to improve activity tolerance and upright gait mechanics. Otherwise anticipate pt to d/c home with 24/7 family assist and recommendation for HH therapy services.     Plan    Recommend Physical Therapy daily until therapy goals are met for the following treatments:  Bed Mobility, Community Re-integration, Equipment, Gait Training, Manual Therapy, Neuro Re-Education / Balance, Self Care/Home Evaluation, Stair Training, Therapeutic Activities and Therapeutic Exercises    DC Equipment Recommendations: (P) Front-Wheel Walker  Discharge Recommendations: (P) Recommend home health for continued physical therapy services     Objective       09/03/21 0830   Initial Contact Note    Initial Contact Note Order Received and Verified, Physical Therapy Evaluation in Progress with Full Report to Follow.   Precautions   Precautions Fall Risk;Posterior Hip Precautions;Weight Bearing As Tolerated Left Lower Extremity   Pain 0  - 10 Group   Location Back;Hip   Location Orientation Left;Lower   Description Aching;Stabbing   Therapist Pain Assessment Prior to Activity;During Activity;Post Activity;Nurse Notified;5   Prior Living Situation   Prior Services Intermittent Physical Support for ADL Per Family   Housing / Facility 1 Story House   Steps Into Home 2   Steps In Home 0   Equipment Owned Front-Wheel Walker;Bed Side Commode;4-Wheel Walker   Lives with - Patient's Self Care Capacity Spouse;Other (Comments)  (sister )   Comments pt states spouse and sister will provide 24/7 assist upon d/c to home    Prior Level of Functional Mobility   Bed Mobility Independent   Transfer Status Independent   Ambulation Required Assist   Distance Ambulation (Feet)   (household distances )   Assistive Devices Used 4-Wheel Walker   Stairs Required Assist   Comments pt was primarly I with mobility, however, spouse and sister provide assist as needed to reduce risk of falling   Cognition    Cognition / Consciousness WDL   Level of Consciousness Alert   Comments pleasant/cooperative   Passive ROM Lower Body   Passive ROM Lower Body X   Comments limited in L LE due to pain and posterior hip precautions    Active ROM Lower Body    Active ROM Lower Body  X   Comments same as above; WNL for R LE    Strength Lower Body   Lower Body Strength  X   Comments limited in L LE due to pain/weakness; able to demo functional strength for upright mobility    Sensation Lower Body   Lower Extremity Sensation   WDL   Lower Body Muscle Tone   Lower Body Muscle Tone  WDL   Neurological Concerns   Neurological Concerns No   Coordination Lower Body    Coordination Lower Body  WDL   Balance Assessment   Sitting Balance (Static) Fair +   Sitting Balance (Dynamic) Fair +   Standing Balance (Static) Fair   Standing Balance (Dynamic) Fair   Weight Shift Sitting Fair   Weight Shift Standing Fair   Comments w/fww use; no navjot LOB noted; heavy reliance on FWW    Gait Analysis   Gait Level  Of Assist Minimal Assist  (CGA)   Assistive Device Front Wheel Walker   Distance (Feet) 100   # of Times Distance was Traveled 1   Deviation Antalgic;Step To   # of Stairs Climbed 2   Level of Assist with Stairs Minimal Assist  (CGA)   Weight Bearing Status WBAT L LE   Comments cues for heel to toe mechanics and cues to go up/down steps with correcet mechanics    Bed Mobility    Supine to Sit Minimal Assist   Sit to Supine Minimal Assist   Scooting Supervised   Comments HOB elevated and rails up    Functional Mobility   Sit to Stand Minimal Assist  (CGA)   Bed, Chair, Wheelchair Transfer Minimal Assist  (CGA)   Transfer Method Stand Step   Mobility EOB, sit<>stand, ambulation, stairs, back to bed    Comments cues/demo to maintain posterior hip precautions    How much difficulty does the patient currently have...   Turning over in bed (including adjusting bedclothes, sheets and blankets)? 4   Sitting down on and standing up from a chair with arms (e.g., wheelchair, bedside commode, etc.) 3   Moving from lying on back to sitting on the side of the bed? 3   How much help from another person does the patient currently need...   Moving to and from a bed to a chair (including a wheelchair)? 3   Need to walk in a hospital room? 3   Climbing 3-5 steps with a railing? 3   6 clicks Mobility Score 19   Activity Tolerance   Sitting in Chair NT   Sitting Edge of Bed 5 mins   Standing 10 mins   Comments no adverse events noted    Edema / Skin Assessment   Edema / Skin  Not Assessed   Patient / Family Goals    Patient / Family Goal #1 to go home   Short Term Goals    Short Term Goal # 1 pt will go supine<>sit w/hob flat and rails down w/spv in 6tx for safe d/c home    Short Term Goal # 2 pt will go sit<>stand w/fww w/spv in 6tx for safe d/c home   Short Term Goal # 3 pt will transfer bed<>chair w/fww w/spv in 6tx for safe d/c home    Short Term Goal # 4 pt will ambulate for 150ft w/fww w/spv in 6tx for safe d/c home    Short Term  Goal # 5 pt will go up/down 2 steps w/fww w/spv in 6tx for safe d/c home    Education Group   Education Provided Role of Physical Therapist;Hip Precautions Posterior   Hip Precautions Posterior Patient Response Patient;Acceptance;Explanation;Demonstration;Verbal Demonstration;Action Demonstration;Reinforcement Needed   Role of Physical Therapist Patient Response Patient;Acceptance;Demonstration;Explanation;Verbal Demonstration;Action Demonstration   Problem List    Problems Pain;Impaired Bed Mobility;Impaired Transfers;Impaired Ambulation;Impaired Balance;Decreased Activity Tolerance   Anticipated Discharge Equipment and Recommendations   DC Equipment Recommendations Front-Wheel Walker   Discharge Recommendations Recommend home health for continued physical therapy services   Interdisciplinary Plan of Care Collaboration   IDT Collaboration with  Nursing   Patient Position at End of Therapy In Bed;Call Light within Reach;Tray Table within Reach;Phone within Reach   Collaboration Comments aware of visit and recs    Session Information   Date / Session Number  9/3-1 (1/7, 9/9)    Priority 4

## 2021-09-03 NOTE — PROGRESS NOTES
Received report from night shift RN. Pt is awake and alert. No signs of distress. Pt denies any nausea. Pt denies any numbness or tingling. Pt reports pain 6/10, pt medicated per MAR. Pt is able to planter flex and dorsi flex ble, + pulses. Dressing to l hip is cdi.  Ice in place to l hip. fall precautions in place. Bed in lowest and locked position. Call light within reach.  Chart check complete.

## 2021-09-03 NOTE — PROGRESS NOTES
4 Eyes Skin Assessment Completed by Randell RN and Thu, RN.    Head WDL  Ears WDL  Nose WDL  Mouth WDL  Neck WDL  Breast/Chest WDL  Shoulder Blades WDL  Spine WDL  (R) Arm/Elbow/Hand WDL  (L) Arm/Elbow/Hand WDL  Abdomen WDL  Groin WDL  Scrotum/Coccyx/Buttocks WDL  (R) Leg WDL  (L) Leg WDL  (R) Heel/Foot/Toe WDL  (L) Heel/Foot/Toe WDL          Devices In Places Pulse Ox and Nasal Cannula      Interventions In Place Gray Ear Foams    Possible Skin Injury No    Pictures Uploaded Into Epic N/A  Wound Consult Placed N/A  RN Wound Prevention Protocol Ordered No

## 2021-09-03 NOTE — PROGRESS NOTES
Hospital Medicine Daily Progress Note    Date of Service  9/3/2021    Chief Complaint  Tereza Sánchez is a 76 y.o. female admitted 9/2/2021 with hip pain    Hospital Course  y.o. female who presented 9/2/2021 with elective Revision of left total hip arthroplasty by Dr. Lynch. Patient has past medical history of HTN, SELWYN on CPAP, HLD. Patient underwent left total hip arthroplasty uneventfully. However post surgery, she was noted persistent hypotension with Bp around 70-85/60, MAP around 55-65. Patient denies dizziness or light headeness, no chest pain, sob, nausea, vomiting, no sign of active bleeding. She has received 3L IVF. She was also noted hypoxia and requires 6L NC. IM was consulted to assist management of hypotension and hypoxemia    Interval Problem Update  9/3 - Pt remains with some tachycardia and SOB - requiring 2L currently, is not on home O2 during the day normally. Has rales on exam - suspect volume overload due to fluids given for hypotension in the PACU - I/Os state she is positive 3L. EKG with sinus tach, echo normal - will just need some diuresis. White count elevated but procal negative, likely reactive from surgery. Likely will be able to dc in the am once diuresed.    I have personally seen and examined the patient at bedside. I discussed the plan of care with patient, family and bedside RN.    Consultants/Specialty  orthopedics    Code Status  Full Code    Disposition  Patient is not medically cleared.   Anticipate discharge to to home with organized home healthcare and close outpatient follow-up.  I have placed the appropriate orders for post-discharge needs.    Review of Systems  Review of Systems   Constitutional: Negative for chills and fever.   Respiratory: Positive for shortness of breath. Negative for cough.    Cardiovascular: Positive for leg swelling. Negative for chest pain.   Musculoskeletal: Positive for joint pain.   All other systems reviewed and are negative.       Physical  Exam  Temp:  [37.1 °C (98.8 °F)-37.7 °C (99.9 °F)] 37.7 °C (99.9 °F)  Pulse:  [] 103  Resp:  [14-20] 18  BP: ()/(40-68) 125/50  SpO2:  [90 %-96 %] 91 %    Physical Exam  Vitals and nursing note reviewed.   Constitutional:       Appearance: Normal appearance.   HENT:      Head: Normocephalic and atraumatic.      Mouth/Throat:      Mouth: Mucous membranes are moist.   Cardiovascular:      Rate and Rhythm: Regular rhythm. Tachycardia present.      Heart sounds: No murmur heard.     Pulmonary:      Effort: Pulmonary effort is normal. No respiratory distress.      Breath sounds: Rales present. No rhonchi.   Abdominal:      General: Abdomen is flat.   Neurological:      Mental Status: She is alert.         Fluids    Intake/Output Summary (Last 24 hours) at 9/3/2021 1457  Last data filed at 9/3/2021 1200  Gross per 24 hour   Intake 440 ml   Output 700 ml   Net -260 ml       Laboratory  Recent Labs     09/02/21  1204 09/02/21  1912 09/03/21  0302   WBC  --  12.2* 15.4*   RBC  --  3.95* 3.73*   HEMOGLOBIN 11.5* 11.2* 10.6*   HEMATOCRIT 36.6* 35.7* 33.5*   MCV  --  90.4 89.8   MCH  --  28.4 28.4   MCHC  --  31.4* 31.6*   RDW  --  46.7 47.1   PLATELETCT  --  322 309   MPV  --  10.4 10.9     Recent Labs     09/03/21  0302   SODIUM 139   POTASSIUM 3.6   CHLORIDE 101   CO2 21   GLUCOSE 164*   BUN 20   CREATININE 1.13   CALCIUM 8.2*                   Imaging  EC-ECHOCARDIOGRAM COMPLETE W/O CONT   Final Result      DX-CHEST-PORTABLE (1 VIEW)   Final Result      Mild right basilar atelectasis      DX-PELVIS-1 OR 2 VIEWS   Final Result      Post left hip arthroplasty.           Assessment/Plan  * Hypotension  Assessment & Plan  Post surgery, asymptomatic, patient is awake, alert and follows commands. Suspecting this is from anesthesia effects.   Received total 3L IVF in the PACU, now overloaded   Wean midodrine as pressures are improving       Acute respiratory failure with hypoxia (HCC) due to iatrogenic volume overload    Assessment & Plan  Post surgery respiratory failure requiring 6L NC. 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 She is on prednisone 10mg daily chronically, will continue it    HTN (hypertension)- (present on admission)  Assessment & Plan  - Hold home meds for normotension         VTE prophylaxis: SCDs/TEDs and therapeutic anticoagulation with ASA BID    I have performed a physical exam and reviewed and updated ROS and Plan today (9/3/2021). In review of yesterday's note (9/2/2021), there are no changes except as documented above.

## 2021-09-03 NOTE — PROGRESS NOTES
No c/o  Wants to go home  Still on O2  + DF/PF  Dressing dry  AVSS  XR OK    Plan:    PT/OT  WBAT LLE, posterior hip precautions  DVT proph (SCDs, ASA)  D/c planning  Appreciate hospitalists assistance

## 2021-09-03 NOTE — THERAPY
Occupational Therapy   Initial Evaluation     Patient Name: Tereza Sánchez  Age:  76 y.o., Sex:  female  Medical Record #: [id number]  Today's Date: 9/3/2021     Precautions  Precautions: (P) Posterior Hip Precautions, Weight Bearing As Tolerated Left Lower Extremity, Fall Risk    Assessment  Patient is 76 y.o. female s/p Revision L DMITRY. A&Ox3, c/o fatigue. Recalls 3/3 posterior hip prec's. AE use reviewed; declines LB dressing at this time. Toileting with Sup. Uses FWW for ADL transfers and functional mobility- Sup. Toleates UIC post session; seated grooming due to fatigue. Lives with spouse and sister, who assisted with I/ADL's prior. OT to follow while in house.          Plan  Recommend Occupational Therapy 3 times per week until therapy goals are met for the following treatments:  Neuro Re-Education / Balance, Self Care/Activities of Daily Living and Therapeutic Activities.    DC Equipment Recommendations: Unable to determine at this time  Discharge Recommendations: Recommend home health for continued occupational therapy services      09/03/21 1218   Prior Living Situation   Prior Services Intermittent Physical Support for ADL Per Family   Housing / Facility 1 Story House   Bathroom Set up Bathtub / Shower Combination;Shower Chair;Grab Bars   Equipment Owned Front-Wheel Walker;4-Wheel Walker;Tub / Shower Seat;Grab Bar(s) In Tub / Shower;Bed Side Commode;Reacher;Oxygen   Lives with - Patient's Self Care Capacity Spouse;Other (Comments)  (and sister)   Comments spouse and sister helping pt with I/ADL's prior    Prior Level of ADL Function   Self Feeding Independent   Grooming / Hygiene Independent   Bathing Requires Assist   Dressing Requires Assist   Toileting Independent   Prior Level of IADL Function   Medication Management Unable To Determine At This Time   Laundry Requires Assist   Kitchen Mobility Independent   Finances Requires Assist   Home Management Requires Assist   Shopping Requires Assist   Prior  Level Of Mobility Independent With Device in Home   Driving / Transportation Relatives / Others Provide Transportation   Occupation (Pre-Hospital Vocational) Retired Due To Age   Leisure Interests Television   Precautions   Precautions Posterior Hip Precautions;Weight Bearing As Tolerated Left Lower Extremity;Fall Risk   Vitals   O2 (LPM) 2   O2 Delivery Device Silicone Nasal Cannula   Cognition    Cognition / Consciousness WDL   Level of Consciousness Alert   Comments a bit lethargic.   Passive ROM Upper Body   Passive ROM Upper Body WDL   Active ROM Upper Body   Active ROM Upper Body  WDL   Strength Upper Body   Upper Body Strength  WDL   Upper Body Muscle Tone   Upper Body Muscle Tone  WDL   Coordination Upper Body   Coordination WDL   Balance Assessment   Sitting Balance (Static) Good   Sitting Balance (Dynamic) Fair +   Standing Balance (Static) Fair +   Standing Balance (Dynamic) Fair   Weight Shift Sitting Good   Weight Shift Standing Fair   ADL Assessment   Eating Independent   Grooming Supervision;Standing   Lower Body Dressing   (tere, demo'd AE use- pt familiar )   Toileting Supervision   How much help from another person does the patient currently need...   Putting on and taking off regular lower body clothing? 3   Bathing (including washing, rinsing, and drying)? 3   Toileting, which includes using a toilet, bedpan, or urinal? 3   Putting on and taking off regular upper body clothing? 4   Taking care of personal grooming such as brushing teeth? 4   Eating meals? 4   6 Clicks Daily Activity Score 21   Functional Mobility   Sit to Stand Supervised   Bed, Chair, Wheelchair Transfer Supervised   Toilet Transfers Supervised   Transfer Method Stand Step   Visual Perception   Visual Perception  WDL   Activity Tolerance   Sitting in Chair >1hr   Sitting Edge of Bed 17   Standing 7   Patient / Family Goals   Patient / Family Goal #1 home   Short Term Goals   Short Term Goal # 1 pt will perform FB dressing ,  "AE prn, with Sup within 3 days    Short Term Goal # 2 pt will perform ADL transfers with Sup within 3 days      Short Term Goal # 3 tolerate 7-10\" at sink for G/H with sup within 3 days     "

## 2021-09-04 VITALS
WEIGHT: 197.75 LBS | HEART RATE: 100 BPM | HEIGHT: 60 IN | DIASTOLIC BLOOD PRESSURE: 53 MMHG | SYSTOLIC BLOOD PRESSURE: 145 MMHG | RESPIRATION RATE: 22 BRPM | TEMPERATURE: 98.4 F | OXYGEN SATURATION: 94 % | BODY MASS INDEX: 38.82 KG/M2

## 2021-09-04 PROBLEM — R41.0 DELIRIUM: Status: ACTIVE | Noted: 2021-09-04

## 2021-09-04 LAB
APPEARANCE UR: CLEAR
BILIRUB UR QL STRIP.AUTO: NEGATIVE
COLOR UR: YELLOW
GLUCOSE UR STRIP.AUTO-MCNC: NEGATIVE MG/DL
KETONES UR STRIP.AUTO-MCNC: 40 MG/DL
LEUKOCYTE ESTERASE UR QL STRIP.AUTO: NEGATIVE
MICRO URNS: ABNORMAL
NITRITE UR QL STRIP.AUTO: NEGATIVE
PH UR STRIP.AUTO: 5.5 [PH] (ref 5–8)
PROT UR QL STRIP: NEGATIVE MG/DL
RBC UR QL AUTO: NEGATIVE
SP GR UR STRIP.AUTO: >=1.03

## 2021-09-04 PROCEDURE — 99232 SBSQ HOSP IP/OBS MODERATE 35: CPT | Performed by: FAMILY MEDICINE

## 2021-09-04 PROCEDURE — 94760 N-INVAS EAR/PLS OXIMETRY 1: CPT

## 2021-09-04 PROCEDURE — 81003 URINALYSIS AUTO W/O SCOPE: CPT

## 2021-09-04 RX ORDER — ASPIRIN 81 MG/1
81 TABLET ORAL 2 TIMES DAILY
Qty: 30 TABLET | COMMUNITY
Start: 2021-09-04 | End: 2021-10-02

## 2021-09-04 ASSESSMENT — ENCOUNTER SYMPTOMS
CHILLS: 0
FEVER: 0
COUGH: 0
SHORTNESS OF BREATH: 1

## 2021-09-04 ASSESSMENT — PAIN DESCRIPTION - PAIN TYPE: TYPE: ACUTE PAIN;SURGICAL PAIN

## 2021-09-04 NOTE — PROGRESS NOTES
"At midnight ,Rn checked on patient to assess wrist restraint, pt  told RN \" I took it off\". Rn explained to pt  it okay restraints can stay off if he remains at bedside. Pt appears more calm   restraint  Off.  "

## 2021-09-04 NOTE — PROGRESS NOTES
Discharging patient home per MD order.  Pt demonstrated understanding of discharge instructions, follow up appointments, home medications, prescriptions, and home care for surgical wound. Pt is voiding without difficulty, pain well controlled, tolerating oral medications, O2 greater than 90%. Pt discharged off unit with hospital escort.

## 2021-09-04 NOTE — CARE PLAN
The patient is Stable - Low risk of patient condition declining or worsening    Shift Goals  Clinical Goals: pain control, , remain free from falls    Progress made toward(s) clinical / shift goals:  pt on bilateral wrist restraint , progressing    Patient is not progressing towards the following goals:      Problem: Safety - Medical Restraint  Goal: Remains free of injury from restraints (Restraint for Interference with Medical Device)  Outcome: Progressing    Problem: Fall Risk  Goal: Patient will remain free from falls  Outcome: Progressing        pt refused morning vitals signs and medication. Pt  at bed side. Restraint d/c

## 2021-09-04 NOTE — CARE PLAN
The patient is Stable - Low risk of patient condition declining or worsening    Shift Goals  Clinical Goals: pain control, remain free from fall    Progress made toward(s) clinical / shift goals:   Given pharmacological and non pharmacological pain interventions.             Bed alarm on, bed in lowest position.         Fall precaution in place.      Patient is not progressing towards the following goals:      Problem: Post-Operative Hip Replacement  Goal: Patient will demonstrate understanding of post-surgical hip precautions, weight bearing restrictions and DME usage during hospital stay and post discharge  Outcome: Progressing  Goal: Patient's neurovascular status will be maintained or improve  Outcome: Progressing  Goal: Early mobilization post surgery  Outcome: Progressing     Problem: Incision Care  Goal: Optimal post surgical incision care  Outcome: Progressing     Problem: Pain - Standard  Goal: Alleviation of pain or a reduction in pain to the patient’s comfort goal  Outcome: Progressing  Problem: Fall Risk  Goal: Patient will remain free from falls  Outcome: Progressing

## 2021-09-04 NOTE — PROGRESS NOTES
Pt is agitated and anxious to go home.  is at bedside. She wanted to discharge now, and go AMA. Already walk to the elevator. Talked to hospitalist regarding discharge, okay for discharge if okay with the orthopedic surgeon.   1243 Paged Dr. Daniel Neri but no response yet. Since patient is agitated and  wants to discharge. Paged Dra Mary ALEMAN.  1317 Dra. Mary Galicia informed that the patient wanted to discharge. If okay to talked to hospitalist.   1314 Dr. Daniel Neri put discharge order.  Jerri charge nurse talked to Dra. Hernandez and Dra. Kramer.  Pt is no pain, no signs of distress. Able to walk using FWW. Pt is refusing any HH or any interventions.

## 2021-09-04 NOTE — PROGRESS NOTES
"Checked on pt.  at bedside holding her hand. Appears more calm, restraints off.  stated \"you'd better not get any closer.\" Denies any needs at this time.  "

## 2021-09-04 NOTE — DISCHARGE INSTRUCTIONS
Discharge Instructions    Discharged to home by car with relative. Discharged via wheelchair, hospital escort: Yes.  Special equipment needed: Walker    Be sure to schedule a follow-up appointment with your primary care doctor or any specialists as instructed.     Discharge Plan:   Diet Plan: Discussed  Activity Level: Discussed  Confirmed Follow up Appointment: Patient to Call and Schedule Appointment  Confirmed Symptoms Management: Discussed  Medication Reconciliation Updated: No (Comments)    I understand that a diet low in cholesterol, fat, and sodium is recommended for good health. Unless I have been given specific instructions below for another diet, I accept this instruction as my diet prescription.   Other diet: Regular      Special Instructions: Discharge instructions for the Orthopedic Patient    Follow up with Primary Care Physician within 2 weeks of discharge to home, regarding:  Review of medications and diagnostic testing.  Surveillance for medical complications.  Workup and treatment of osteoporosis, if appropriate.     -Is this a Hip/Knee/Shoulder Joint Replacement patient? Yes   TOTAL HIP REPLACEMENT, AFTER-CARE GUIDELINES     These instructions provide you with information on caring for yourself and your hip after surgery. Your health care provider may also give you instructions that are more specific. Your treatment was planned and performed according to current medical practices but problems sometimes occur. Call your health care provider if you have any problems or questions.     WHAT TO EXPECT AFTER THE PROCEDURE   After your procedure, your hip will typically be stiff, sore, and bruised. This will improve over time.     Pain   · Follow your home pain management plan as discussed with your nurse and as directed by your provider.   · It is important to follow any scheduled pain medications for maximal pain relief.   · If prescribed opioid medication, the goal is to use opioids only as needed and  to wean off prescription pain medicine as soon as possible.   · Ice use for pain control.  · Put ice in a plastic bag.   · Place a towel between your skin and the bag.   · Leave the ice on for 20 minutes, 2-3 times a day at a minimum.   · Most patients are off the pain pills by 3 weeks. If your pain continues to be severe, follow up with your provider.     Infection   Deep hip joint infections that require removal of the prostheses occur in less than 1.0% of patients. Lesser infections in the skin (cellulites) are more common and much more easily treated.   · Keep the incision as clean and dry as possible.   · Always wash your hands before touching your incision.   · Avoid dental care for 3 months after surgery. Your provider may recommend taking a dose of antibiotics an hour prior to any dental procedure. After 2 years, most providers recommend antibiotics only before an extensive procedure. Ask your provider what they recommend.   · Signs and symptoms of infection include low-grade fever, redness, pain, swelling and drainage from your incision. Notify your provider IMMEDIATELY if you develop ANY of these symptoms.     Post op Disturbances   · Bowel Habits - constipation is extremely common and caused by a combination of anesthesia, lack of mobility, dehydration and pain medicine. Use stool softeners or laxatives if necessary. It is important not to ignore this problem as bowel obstructions can be a serious complication after joint replacement surgery.   · Mood/Energy Level - Many patients experience a lack of energy and endurance for up to 2-3 months after surgery. Some people feel down and can even become depressed. This is likely due to postoperative anemia, change in activity level, lack of sleep, pain medicine and just the emotional reaction to the surgery itself that is a big disruption in a person’s life. This usually passes. If symptoms persist, follow up with your primary care provider.   · Returning to  Work - Your provider will give you specific instructions based on your profession. Generally, if you work a sedentary job requiring little standing or walking, most patients may return within 2-6 weeks. Manual labor jobs involving walking, lifting and standing may take 3-4 months. Your provider’s office can provide a release to part-time or light duty work early on in your recovery and progress you to full duty as able.   · Driving - You can begin driving once cleared by your provider, provided you are no longer taking narcotic pain medication or any other medications that impair driving. Discuss the length of time with your doctor as returning to driving will depend on things such as your vehicle, which hip was replaced (right or left) and if you do or do not have post-operative movement precautions.   · Avoiding falls - A fall during the first few weeks after surgery can damage your new hip and may result in a need for further surgery.  throw rugs and tack down loose carpeting. Be aware of floor hazards such as pets, small objects or uneven surfaces. Notify your provider of any falls.   · Airport Metal Detectors - The sensitivity of metal detectors varies and it is likely that your prosthesis will cause an alarm. Inform the  of your artificial joint.     Diet   · Resume your normal diet as tolerated.   · It is important to achieve a healthy nutritional status by eating a well-balanced diet on a regular basis.   · Your provider may recommend that you take iron and vitamin supplements.   · Continue to drink plenty of fluids.     Shower/Bathing   · You may shower as soon as you get home from the hospital unless otherwise instructed.   · Keep your incision out of water to prevent infection. To keep the incision dry when showering, cover it with a plastic bag or plastic wrap. If your bandage is waterproof, this may not be necessary.   · Pat incision dry if it gets wet. Do not rub. Notify your  provider.   · Do not submerge in a bath until cleared by your provider. Your staples must be out and the incision completely healed.     Dressing Changes: Only change your dressing if directed by your provider.   · Wash hands.   · Open all dressing change materials.   · Remove old dressing and discard.   · Inspect incision for signs of irritation or infection including redness, increase in clear drainage, yellow/green drainage, odor and surrounding skin hot to touch. Notify your provider if present.   ·  the new dressing by one corner and lay over the incision. Be careful not to touch the inside of the dressing that will lay over the incision.   · Secure in place as instructed.     Swelling/Bruising   · Swelling is normal after hip replacement and can involve the thigh, knee, calf and foot.   · Swelling can last from 3-6 months.   · To reduce swelling, elevate your leg higher than your heart while reclining. The first week you are home you should elevate your leg an equal amount of time as you are active.   · The swelling is usually worse after you go home since you are upright for longer periods of time.   · Bruising often does not appear until after you arrive home and can be quite dramatic- appearing purple, black, or green. Bruising is typically not concerning and will subside without any treatment.     Blood Clot Prevention   Your treatment plan includes multiple preventative measures to decrease the risk of blood clots in the legs (DVTs) and the less common, but serious, clots that travel to the lungs (pulmonary emboli). Most patients are at standard risk for them, but people who are at higher risk include those who have had previous clots, a family history of clotting, smoking, diabetes, obesity, advanced age, use estrogen and/or live a sedentary lifestyle.     · Signs of blood clots in legs include - Swelling in thigh, calf or ankle that does not go down with elevation. Pain, heat and tenderness in  calf, back of calf or groin area. NOTE: blood clots can occur in either leg.   · Signs of blood clots in lungs include - Sudden increased shortness of breath, sudden onset of chest pain, and localized chest pain with coughing.   · If you experience any of the above symptoms, notify your provider and seek medical attention immediately.   · You received anticoagulant therapy (blood thinners) in the hospital. Continue the prescribed blood-thinning medication at home, as directed by your provider.   · Your risk for developing a clot continues for up to 2-3 months after surgery. Avoid prolonged sitting and dehydration (long air trips and car trips). If you do take a trip during this time, please get up, move around every 1-1.5 hours, and discuss all travel plans with your provider.     Activity   Once you get home, stay active. The key is not to overdo it. While you can expect some good days and some bad days, you should notice a gradual improvement over time and a gradual increase in endurance over the next 6 to 12 months.     · Weight Bearing - You can begin to bear weight as tolerated unless otherwise directed by your provider. Use a walker, crutches or cane to assist with walking until you can walk smoothly (minimal or no limp) without assistance.   · Physical Precautions - To assure proper recovery and tissue healing, you may be asked to take special precautions when moving (sitting, bending or sleeping) - usually for the first 6 weeks after surgery. Precautions vary from patient to patient depending on surgical approach used by your provider. Follow any specific precautions provided by your provider and physical therapist until cleared by your provider.   · Sitting - Early on it is easier to get up from a tall chair or a chair with arms. The physical therapist will show you how to sit and stand from a chair keeping your affected leg out in front of you. Get up and move around on a regular basis--at least once every  hour.   · Walking - Walk as much as you like once your doctor gives permission to proceed, but remember that walking is no substitute for your prescribed exercises.  · Follow the home exercise program prescribed during your hospital stay as directed by your physical therapist or provider.   · Continued physical therapy may be prescribed after hospital discharge to strengthen your muscles and improve your gait/walking pattern. The decision to have therapy or not after the hospital stay is made by you and your provider prior to surgery or at your follow up appointment.   · Swimming is an excellent low impact activity; you can begin as soon as the wound is healed and your provider clears you. Using a pair of training fins may make swimming a more enjoyable and effective exercise.   · Sexual activity - Your provider can tell you when it is safe to resume sexual activity.   · Other activities - Low impact activities are preferred. If you have specific questions, consult your provider.     When to Call the Doctor   Call the provider if you experience:   · Fever over 100.5° F   · Increased pain, drainage, redness, odor or heat around the incision area   · Shaking chills   · Increased knee pain with activity and rest   · Increased pain in calf, tenderness or redness above or below the knee   · Increased swelling of calf, ankle, foot   · Sudden increased shortness of breath, sudden onset of chest pain, localized chest pain with coughing   · Incision opening   Or, if there are any questions or concerns about medications or care.     Infection statistic resource:   https://www.[employer].com/contents/prosthetic-joint-infection-epidemiology-microbiology-clinical-manifestations-and-diagnosis     -Is this patient being discharged with medication to prevent blood clots?  Yes, Aspirin Aspirin, ASA oral tablets  What is this medicine?  ASPIRIN (AS pir in) is a pain reliever. It is used to treat mild pain and fever. This medicine is  also used as directed by a doctor to prevent and to treat heart attacks, to prevent strokes and blood clots, and to treat arthritis or inflammation.  This medicine may be used for other purposes; ask your health care provider or pharmacist if you have questions.  COMMON BRAND NAME(S): Aspir-Low, Aspir-Loli, Aspirtab, Fariba Advanced Aspirin, Fariba Aspirin, Fariba Aspirin Extra Strength, Fariba Aspirin Plus, Fariba Extra Strength, Fariba Extra Strength Plus, Fariba Genuine Aspirin, Fariba Womens Aspirin, Bufferin, Bufferin Extra Strength, Bufferin Low Dose  What should I tell my health care provider before I take this medicine?  They need to know if you have any of these conditions:  · anemia  · asthma  · bleeding problems  · child with chickenpox, the flu, or other viral infection  · diabetes  · gout  · if you frequently drink alcohol containing drinks  · kidney disease  · liver disease  · low level of vitamin K  · lupus  · smoke tobacco  · stomach ulcers or other problems  · an unusual or allergic reaction to aspirin, tartrazine dye, other medicines, dyes, or preservatives  · pregnant or trying to get pregnant  · breast-feeding  How should I use this medicine?  Take this medicine by mouth with a glass of water. Follow the directions on the package or prescription label. You can take this medicine with or without food. If it upsets your stomach, take it with food. Do not take your medicine more often than directed.  Talk to your pediatrician regarding the use of this medicine in children. While this drug may be prescribed for children as young as 12 years of age for selected conditions, precautions do apply. Children and teenagers should not use this medicine to treat chicken pox or flu symptoms unless directed by a doctor.  Patients over 65 years old may have a stronger reaction and need a smaller dose.  Overdosage: If you think you have taken too much of this medicine contact a poison control center or emergency room at  once.  NOTE: This medicine is only for you. Do not share this medicine with others.  What if I miss a dose?  If you are taking this medicine on a regular schedule and miss a dose, take it as soon as you can. If it is almost time for your next dose, take only that dose. Do not take double or extra doses.  What may interact with this medicine?  Do not take this medicine with any of the following medications:  · cidofovir  · ketorolac  · probenecid  This medicine may also interact with the following medications:  · alcohol  · alendronate  · bismuth subsalicylate  · flavocoxid  · herbal supplements like feverfew, garlic, valery, ginkgo biloba, horse chestnut  · medicines for diabetes or glaucoma like acetazolamide, methazolamide  · medicines for gout  · medicines that treat or prevent blood clots like enoxaparin, heparin, ticlopidine, warfarin  · other aspirin and aspirin-like medicines  · NSAIDs, medicines for pain and inflammation, like ibuprofen or naproxen  · pemetrexed  · sulfinpyrazone  · varicella live vaccine  This list may not describe all possible interactions. Give your health care provider a list of all the medicines, herbs, non-prescription drugs, or dietary supplements you use. Also tell them if you smoke, drink alcohol, or use illegal drugs. Some items may interact with your medicine.  What should I watch for while using this medicine?  If you are treating yourself for pain, tell your doctor or health care professional if the pain lasts more than 10 days, if it gets worse, or if there is a new or different kind of pain. Tell your doctor if you see redness or swelling. Also, check with your doctor if you have a fever that lasts for more than 3 days. Only take this medicine to prevent heart attacks or blood clotting if prescribed by your doctor or health care professional.  Do not take aspirin or aspirin-like medicines with this medicine. Too much aspirin can be dangerous. Always read the labels  carefully.  This medicine can irritate your stomach or cause bleeding problems. Do not smoke cigarettes or drink alcohol while taking this medicine. Do not lie down for 30 minutes after taking this medicine to prevent irritation to your throat.  If you are scheduled for any medical or dental procedure, tell your healthcare provider that you are taking this medicine. You may need to stop taking this medicine before the procedure.  This medicine may be used to treat migraines. If you take migraine medicines for 10 or more days a month, your migraines may get worse. Keep a diary of headache days and medicine use. Contact your healthcare professional if your migraine attacks occur more frequently.  What side effects may I notice from receiving this medicine?  Side effects that you should report to your doctor or health care professional as soon as possible:  · allergic reactions like skin rash, itching or hives, swelling of the face, lips, or tongue  · breathing problems  · changes in hearing, ringing in the ears  · confusion  · general ill feeling or flu-like symptoms  · pain on swallowing  · redness, blistering, peeling or loosening of the skin, including inside the mouth or nose  · signs and symptoms of bleeding such as bloody or black, tarry stools; red or dark-brown urine; spitting up blood or brown material that looks like coffee grounds; red spots on the skin; unusual bruising or bleeding from the eye, gums, or nose  · trouble passing urine or change in the amount of urine  · unusually weak or tired  · yellowing of the eyes or skin  Side effects that usually do not require medical attention (report to your doctor or health care professional if they continue or are bothersome):  · diarrhea or constipation  · headache  · nausea, vomiting  · stomach gas, heartburn  This list may not describe all possible side effects. Call your doctor for medical advice about side effects. You may report side effects to FDA at  1-800-FDA-1088.  Where should I keep my medicine?  Keep out of the reach of children.  Store at room temperature between 15 and 30 degrees C (59 and 86 degrees F). Protect from heat and moisture. Do not use this medicine if it has a strong vinegar smell. Throw away any unused medicine after the expiration date.  NOTE: This sheet is a summary. It may not cover all possible information. If you have questions about this medicine, talk to your doctor, pharmacist, or health care provider.  © 2020 [employer]/Gold Standard (2018-01-30 10:42:13)      · Is patient discharged on Warfarin / Coumadin?   No     Depression / Suicide Risk    As you are discharged from this [facility] facility, it is important to learn how to keep safe from harming yourself.    Recognize the warning signs:  · Abrupt changes in personality, positive or negative- including increase in energy   · Giving away possessions  · Change in eating patterns- significant weight changes-  positive or negative  · Change in sleeping patterns- unable to sleep or sleeping all the time   · Unwillingness or inability to communicate  · Depression  · Unusual sadness, discouragement and loneliness  · Talk of wanting to die  · Neglect of personal appearance   · Rebelliousness- reckless behavior  · Withdrawal from people/activities they love  · Confusion- inability to concentrate     If you or a loved one observes any of these behaviors or has concerns about self-harm, here's what you can do:  · Talk about it- your feelings and reasons for harming yourself  · Remove any means that you might use to hurt yourself (examples: pills, rope, extension cords, firearm)  · Get professional help from the community (Mental Health, Substance Abuse, psychological counseling)  · Do not be alone:Call your Safe Contact- someone whom you trust who will be there for you.  · Call your local CRISIS HOTLINE 639-0575 or 499-868-5887  · Call your local Children's Mobile Crisis Response Team  [facility] (576) 581-7520 or www.Everyone Counts.[employer]  · Call the toll free National Suicide Prevention Hotlines   · National Suicide Prevention Lifeline 917-098-NKUG (9486)  · National Hope Line Network 800-SUICIDE (628-7154)

## 2021-09-04 NOTE — PROGRESS NOTES
Orthopedic Physician Assistant Note    Subjective:  Agitated, confused  Objective:  Agitated, not oriented to place, situation. Walked with a one person assist and walker to bathroom.   at bs.  Dressing c/d/i.   Blood pressure 145/53, pulse (!) 123, temperature 36.9 °C (98.4 °F), temperature source Oral, resp. rate (!) 22, height 1.524 m (5'), weight 89.7 kg (197 lb 12 oz), SpO2 95 %, not currently breastfeeding.    Labs:  Recent Labs     09/02/21  1204 09/02/21  1912 09/03/21  0302   WBC  --  12.2* 15.4*   RBC  --  3.95* 3.73*   HEMOGLOBIN 11.5* 11.2* 10.6*   HEMATOCRIT 36.6* 35.7* 33.5*   MCV  --  90.4 89.8   MCH  --  28.4 28.4   RDW  --  46.7 47.1   PLATELETCT  --  322 309   MPV  --  10.4 10.9   NEUTSPOLYS  --  92.30* 88.30*   LYMPHOCYTES  --  3.80* 4.00*   MONOCYTES  --  3.00 7.10   EOSINOPHILS  --  0.00 0.00   BASOPHILS  --  0.20 0.10         Recent Labs     09/03/21  0302   SODIUM 139   POTASSIUM 3.6   CHLORIDE 101   CO2 21   GLUCOSE 164*   BUN 20       Results     Procedure Component Value Units Date/Time    URINALYSIS [[id number]]     Order Status: No result Specimen: Urine           Assessment:  POD 2 revision left DMITRY  Plan:  Wbat, able to discharged when medically cleared by medicine, not likely today.

## 2021-09-04 NOTE — PROGRESS NOTES
"At about 2230, Pt found by charge nurse roaming about hallways and entering other patient room.  This Rn witnessed patient found in another room, redirected patient to her room.  Pt very agitated and combative. Pt \"yells leave me alone\" , . Pt refused to go to  her room.   Charge nurse called  Security on the floor. Assisted to the room with help of security. Multiple assist from staff attempted to direct patient to her room , pt refused .  Security personals and nursing staff got patient back to room by wheelchair.  On call md notified. Orders received for restraints and haldol. Pt on bilateral wrist restraints .  Vitals taken, bp 145/53, R22, hr 123, spo2 95 on 2L   Pt spouse called and notified about situation. Will continue to monitor           "

## 2021-09-04 NOTE — PROGRESS NOTES
Received bedside report. Assumed pt care. Assessment and chart check complete. Pt is alert, awake, confuse, no signs of distress, denies nausea/vomiting and pain at this moment. Dressing left hip CDI, able to wiggle toes and dorsi/plantar flex feet, good CMS and pulses to atif LE, denies numbness and tingling. Polar ice and SCD's in place. Fall precautions in place, treaded socks on pt, bed in low position. Call light within reach. Educated pt to call if needing anything. Hourly rounding.

## 2021-09-04 NOTE — ASSESSMENT & PLAN NOTE
- Likely hospital induced delirium exacerbated by opiates  - UA and CXR without infection  - Required restraints overnight, is alert and oriented x 3 during my examination but is quite angry that she was restrained last night and does not want to participate much in questioning or examination. Did allow heart and lung exam and answered orientation questions, states she does not want further workup and wants to discharge. Given that she's oriented x 3, she can discharge home from my standpoint.   64 y.o male with epistaxis on AC- sent to CDU for observation for rebleeding/ reassessments, ENT following.

## 2021-09-04 NOTE — PROGRESS NOTES
"Pt's  arrived. Updated him on pt status and escorted him to patient's room. Discussed that she is still in restraints for her safety, but we will continue to assess her and attempt to remove them as soon as possible. Upon entering the room, pt yelled \"you get out of here!\" and using swear words towards this RN.   "

## 2021-09-04 NOTE — PROGRESS NOTES
Hospital Medicine Daily Progress Note    Date of Service  9/4/2021    Chief Complaint  Tereza Sánchez is a 76 y.o. female admitted 9/2/2021 with hip pain    Hospital Course  y.o. female who presented 9/2/2021 with elective Revision of left total hip arthroplasty by Dr. Lynch. Patient has past medical history of HTN, SELWYN on CPAP, HLD. Patient underwent left total hip arthroplasty uneventfully. However post surgery, she was noted persistent hypotension with Bp around 70-85/60, MAP around 55-65. Patient denies dizziness or light headeness, no chest pain, sob, nausea, vomiting, no sign of active bleeding. She has received 3L IVF. She was also noted hypoxia and requires 6L NC. IM was consulted to assist management of hypotension and hypoxemia    Interval Problem Update  9/3 - Pt remains with some tachycardia and SOB - requiring 2L currently, is not on home O2 during the day normally. Has rales on exam - suspect volume overload due to fluids given for hypotension in the PACU - I/Os state she is positive 3L. EKG with sinus tach, echo normal - will just need some diuresis. White count elevated but procal negative, likely reactive from surgery. Likely will be able to dc in the am once diuresed.    9/4 - No longer SOB, ambulating without O2 or CAPPS.  Had episode overnight where she became very agitated and disoriented, required restraints.  Minimal cooperation from the patient today, but she did answer orientation questions for me and is alert and oriented x 3, states that she feels mistreated by staff due to the need for restraints and wants to leave.  Has no neurologic deficits to indicate CVA causing her temporary delirium, UA is negative for infection, as is CXR. Likely medication induced due to opiates and hospital induced delirium given her age. Is refusing further workup. Tachycardia documented on vitals last night was during agitation episode, no tachycardia on examination today.  Pt can discharge with home health  today with close PCP follow up this week to ensure stability from my standpoint.    I have personally seen and examined the patient at bedside. I discussed the plan of care with patient, family and bedside RN.    Consultants/Specialty  orthopedics    Code Status  Full Code    Disposition  Patient is medically cleared.   Anticipate discharge to to home with organized home healthcare and close outpatient follow-up.  I have placed the appropriate orders for post-discharge needs.    Review of Systems  Review of Systems   Constitutional: Negative for chills and fever.   Respiratory: Positive for shortness of breath. Negative for cough.    Cardiovascular: Positive for leg swelling. Negative for chest pain.   Musculoskeletal: Positive for joint pain.   All other systems reviewed and are negative.       Physical Exam  Temp:  [36.9 °C (98.4 °F)-37.2 °C (99 °F)] 36.9 °C (98.4 °F)  Pulse:  [100-123] 123  Resp:  [18-22] 22  BP: (103-145)/(38-68) 145/53  SpO2:  [92 %-95 %] 95 %    Physical Exam  Vitals and nursing note reviewed.   Constitutional:       Appearance: Normal appearance.   HENT:      Head: Normocephalic and atraumatic.      Mouth/Throat:      Mouth: Mucous membranes are moist.   Cardiovascular:      Rate and Rhythm: Normal rate and regular rhythm.      Heart sounds: No murmur heard.     Pulmonary:      Effort: Pulmonary effort is normal. No respiratory distress.      Breath sounds: No rhonchi or rales.   Abdominal:      General: Abdomen is flat.   Neurological:      Mental Status: She is alert.   Psychiatric:         Attention and Perception: Attention normal.         Mood and Affect: Affect is angry.         Speech: Speech normal.         Behavior: Behavior is uncooperative and withdrawn.         Cognition and Memory: Cognition normal.         Fluids    Intake/Output Summary (Last 24 hours) at 9/4/2021 1140  Last data filed at 9/3/2021 1700  Gross per 24 hour   Intake 340 ml   Output 250 ml   Net 90 ml        Laboratory  Recent Labs     09/02/21  1204 09/02/21  1912 09/03/21  0302   WBC  --  12.2* 15.4*   RBC  --  3.95* 3.73*   HEMOGLOBIN 11.5* 11.2* 10.6*   HEMATOCRIT 36.6* 35.7* 33.5*   MCV  --  90.4 89.8   MCH  --  28.4 28.4   MCHC  --  31.4* 31.6*   RDW  --  46.7 47.1   PLATELETCT  --  322 309   MPV  --  10.4 10.9     Recent Labs     09/03/21  0302   SODIUM 139   POTASSIUM 3.6   CHLORIDE 101   CO2 21   GLUCOSE 164*   BUN 20   CREATININE 1.13   CALCIUM 8.2*                   Imaging  EC-ECHOCARDIOGRAM COMPLETE W/O CONT   Final Result      DX-CHEST-PORTABLE (1 VIEW)   Final Result      Mild right basilar atelectasis      DX-PELVIS-1 OR 2 VIEWS   Final Result      Post left hip arthroplasty.           Assessment/Plan  * Hypotension  Assessment & Plan  Post surgery, asymptomatic, patient is awake, alert and follows commands. Suspecting this is from anesthesia effects.   Received total 3L IVF in the PACU, with subsequent overload  Off Midodrine and pressures are stable     Delirium  Assessment & Plan  - Likely hospital induced delirium exacerbated by opiates  - UA and CXR without infection  - Required restraints overnight, is alert and oriented x 3 during my examination but is quite angry that she was restrained last night and does not want to participate much in questioning or examination. Did allow heart and lung exam and answered orientation questions, states she does not want further workup and wants to discharge. Given that she's oriented x 3, she can discharge home from my standpoint.    Acute respiratory failure with hypoxia (HCC) due to iatrogenic volume overload   Assessment & Plan  Post surgery respiratory failure requiring 6L NC. 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 She is on prednisone 10mg daily chronically, will continue it    HTN (hypertension)- (present on admission)  Assessment & Plan  - Resume home meds at discharge          VTE prophylaxis: SCDs/TEDs and therapeutic anticoagulation with ASA BID    I have performed a physical exam and reviewed and updated ROS and Plan today (9/4/2021). In review of yesterday's note (9/3/2021), there are no changes except as documented above.

## 2021-09-04 NOTE — PROGRESS NOTES
"Pt found walking outside of room in hallway using walker, states she is going to her sister's room. Explained that she is in the hospital and that her sister is not here, but no evidence of learning. Pt then attempting to go into other patient's rooms and became increasingly agitated and combative. Primary JIMMY Sutton notified and security called. Pt then stating she wants to call her  and walked to  and grabbed the call light system phone. Pt pressing random numbers and trying to call on call light system, unsuccessful with dialing. Multiple staff members attempted to assist pt with dialing on phone and provided the patient with her 's phone number, but pt refusing any assistance and pushing away staff. Pt appeared short of breath and refusing to allow staff to put her back on her oxygen. Also noted that her IV was pulled out of her arm. Pt then pt got up without walker, very unsteady gait, and attempted to leave. Pt swung at nursing staff and security when we tried to assist. Security and nursing staff got patient into wheelchair, brought her back to her room, and applied bilateral soft wrist restraints. Vital signs obtained and nasal cannula replaced. Pt's  Anthony contacted by primary JIMMY Sutton, updated on situation and restraints, and phone given to patient. Pt telling  on phone to call 911 for her. I then spoke with pt's  on the phone to further discuss situation and he stated \"I don't understand. What did you guys do to her?\" Explained that it is not clear at this point why she is confused, but that it could be from the anesthesia or pain medications, and that we have a page out to the physician. Pt's  encouraged to come to hospital and he states he will leave now.   "

## 2021-09-06 NOTE — DISCHARGE SUMMARY
DATE OF ADMISSION:  09/02/2021   DATE OF DISCHARGE:  09/04/2021     ADMISSION DIAGNOSIS:  Recurrent instability, left total hip arthroplasty.     DISCHARGE DIAGNOSIS:  Recurrent instability, left total hip arthroplasty.     SURGICAL PROCEDURE:  Revision of left total hip arthroplasty.     HISTORY:  The patient is 76 years old.  Had hip replacement over 10 years ago.    She started having dislocations this year and has had multiple.  On the day   of admission, she was taken to the operating room for revision of the   acetabulum.  She tolerated this procedure well and was transferred to the   floor postoperatively in stable condition.  She was mobilized with therapy.    She did require consultation from the hospitalist service as she was   hypotensive and requiring oxygen for about 36 hours.  She stabilized.  She was   subsequently discharged home in stable condition.     DISCHARGE MEDICATIONS:  Resume preoperative medical regimen and include   aspirin 81 mg p.o. b.i.d. for 4 weeks.     DISCHARGE INSTRUCTIONS:  The patient can be weightbear as tolerated with   posterior hip precautions.  She may remove her dressing on postoperative day   #5 and then may get wet in the shower, but no soaking.  I will see her back in   the clinic in approximately 2 weeks for wound check and followup radiographs.        ______________________________  MD PEPE ALVARADO/YULISA    DD:  09/05/2021 15:09  DT:  09/05/2021 17:54    Job#:  [id number]

## 2021-09-20 PROBLEM — F11.20 UNCOMPLICATED OPIOID DEPENDENCE (HCC): Status: ACTIVE | Noted: 2021-09-20

## 2021-09-22 ENCOUNTER — HOME HEALTH ADMISSION (OUTPATIENT)
Dept: HOME HEALTH SERVICES | Facility: HOME HEALTHCARE | Age: 76
End: 2021-09-22
Payer: MEDICARE

## 2021-09-22 NOTE — PROGRESS NOTES
Assumed care of pt at 1915. Received report from Amy MARQUEZ. Pt stated pain 8 on a 0 to 10 scale. Medicated pt per MAR. Pt stated pain is mostly from chronic back pain and that her hip is more tolerable. Pt left hip dressing CDI. Pt left pedal pulse 2+, able to plantar and dorsi flex, and no numbness or tingling reported. Pt educated on the importance of ambulating by midnight and needing to void. Pt verbalized understanding. Pt educated on IS use and was able to reach 1100. Pt has no other needs at this time. Call light in reach, bed in lowest position.   Spontaneous, unlabored and symmetrical

## 2021-09-25 ENCOUNTER — HOME CARE VISIT (OUTPATIENT)
Dept: HOME HEALTH SERVICES | Facility: HOME HEALTHCARE | Age: 76
End: 2021-09-25
Payer: MEDICARE

## 2021-09-25 PROCEDURE — 665001 SOC-HOME HEALTH

## 2021-09-25 PROCEDURE — G0493 RN CARE EA 15 MIN HH/HOSPICE: HCPCS

## 2021-09-25 ASSESSMENT — FIBROSIS 4 INDEX: FIB4 SCORE: 1.55

## 2021-09-27 ENCOUNTER — DOCUMENTATION (OUTPATIENT)
Dept: MEDICAL GROUP | Facility: PHYSICIAN GROUP | Age: 76
End: 2021-09-27

## 2021-09-27 VITALS
WEIGHT: 190 LBS | SYSTOLIC BLOOD PRESSURE: 125 MMHG | RESPIRATION RATE: 18 BRPM | TEMPERATURE: 97.7 F | BODY MASS INDEX: 35.87 KG/M2 | HEART RATE: 77 BPM | OXYGEN SATURATION: 98 % | HEIGHT: 61 IN | DIASTOLIC BLOOD PRESSURE: 80 MMHG

## 2021-09-27 ASSESSMENT — ENCOUNTER SYMPTOMS
SUBJECTIVE PAIN PROGRESSION: UNCHANGED
PAIN LOCATION - PAIN SEVERITY: 10/10
PAIN LOCATION: BACK
HIGHEST PAIN SEVERITY IN PAST 24 HOURS: 10/10
PAIN LOCATION - RELIEVING FACTORS: REST
PAIN LOCATION - PAIN DURATION: 24/7
PAIN: 1
NAUSEA: DENIES
DEPRESSED MOOD: 1
PAIN LOCATION - PAIN FREQUENCY: CONSTANT
PAIN SEVERITY GOAL: 5/10
DEBILITATING PAIN: 1
PAIN LOCATION - PAIN QUALITY: ACHY, THROBBING
VOMITING: DENIES
LOWEST PAIN SEVERITY IN PAST 24 HOURS: 6/10

## 2021-09-27 ASSESSMENT — PATIENT HEALTH QUESTIONNAIRE - PHQ9
2. FEELING DOWN, DEPRESSED, IRRITABLE, OR HOPELESS: 00
1. LITTLE INTEREST OR PLEASURE IN DOING THINGS: 00
CLINICAL INTERPRETATION OF PHQ2 SCORE: 0

## 2021-09-27 NOTE — PROGRESS NOTES
"Medication chart review for [facility] services    PCP:  ERWIN Mae  [address] [facility] [address]  Fax: [phone]    Current medication list     Current Outpatient Medications:   •  Home Care Oxygen, 2 L/min, Inhalation, QHS  •  acetaminophen, 1,000 mg, Oral, BID PRN  •  aspirin EC, 81 mg, Oral, BID  •  Omega-3, 1 Capsule, Oral, DAILY  •  Zinc, 1 Tablet, Oral, DAILY  •  ascorbic acid, 1,000 mg, Oral, DAILY  •  atenolol, TAKE 2 TABLETS BY MOUTH EVERY DAY  •  esomeprazole, TAKE 1 CAPSULE BY MOUTH EVERY DAY IN THE MORNING BEFORE BREAKFAST  •  benazepril-hydrochlorthiazide, TAKE 1 TABLET DAILY  •  DULoxetine, TAKE 1 CAPSULE DAILY  •  Calcium 500 + D, Calcium 500 + D  •  predniSONE, prednisone 10 mg tablet  •  Influenza Vaccine High-Dose pf, Fluzone High-Dose 2015-16 (PF) 180 mcg/0.5 mL intramuscular syringe  ADM 0.5ML IM UTD  •  albuterol, Ventolin HFA 90 mcg/actuation aerosol inhaler  •  Lyrica, 100 mg, Oral, TID  •  HYDROcodone-acetaminophen, TAKE 1 TABLET BY MOUTH 3 TIMES A DAY FOR 30 DAYS, 724.02  •  potassium chloride SA, 20 mEq, Oral, DAILY  •  amitriptyline, 100 mg, Oral, Nightly    Allergies   Allergen Reactions   • Bactrim Ds Rash     Pt states \"I get a body rash\".   • Morphine Vomiting     hallucinations       Labs     Lab Results   Component Value Date/Time    HBA1C 6.4 (H) 02/24/2021 08:17 AM          Lab Results   Component Value Date/Time    CHOLSTRLTOT 200 (H) 02/24/2021 08:17 AM     (H) 02/24/2021 08:17 AM    HDL 48 02/24/2021 08:17 AM    TRIGLYCERIDE 161 (H) 02/24/2021 08:17 AM       Lab Results   Component Value Date/Time    SODIUM 139 09/03/2021 03:02 AM    POTASSIUM 3.6 09/03/2021 03:02 AM    CHLORIDE 101 09/03/2021 03:02 AM    CO2 21 09/03/2021 03:02 AM    GLUCOSE 164 (H) 09/03/2021 03:02 AM    BUN 20 09/03/2021 03:02 AM    CREATININE 1.13 09/03/2021 03:02 AM    CREATININE 1.0 05/20/2008 05:55 AM     Lab Results   Component Value Date/Time    " ALKPHOSPHAT 90 08/27/2021 10:01 AM    ASTSGOT 25 08/27/2021 10:01 AM    ALTSGPT 15 08/27/2021 10:01 AM    TBILIRUBIN 0.3 08/27/2021 10:01 AM    INR 1.12 01/25/2016 12:19 PM    ALBUMIN 3.8 08/27/2021 10:01 AM      Lab Results   Component Value Date/Time    RBC 3.73 (L) 09/03/2021 03:02 AM    HEMOGLOBIN 10.6 (L) 09/03/2021 03:02 AM    HEMATOCRIT 33.5 (L) 09/03/2021 03:02 AM    PLATELETCT 309 09/03/2021 03:02 AM      No results found for: MALBCRT, MICROALBUR      Assessment and Plan:   • Received referral from [facility]. Medications reviewed.         Ezequiel Rdoríguez, PharmD, MS, BCACP, [facility]  Phone [phone] fax [phone]    This note was created using voice recognition software (Dragon). The accuracy of the dictation is limited by the abilities of the software. I have reviewed the note prior to signing, however some errors in grammar and context are still possible. If you have any questions related to this note please do not hesitate to contact our office.

## 2021-09-29 ENCOUNTER — HOME CARE VISIT (OUTPATIENT)
Dept: HOME HEALTH SERVICES | Facility: HOME HEALTHCARE | Age: 76
End: 2021-09-29
Payer: MEDICARE

## 2021-09-29 VITALS
SYSTOLIC BLOOD PRESSURE: 116 MMHG | OXYGEN SATURATION: 97 % | HEART RATE: 77 BPM | TEMPERATURE: 98.1 F | DIASTOLIC BLOOD PRESSURE: 70 MMHG | RESPIRATION RATE: 16 BRPM

## 2021-09-29 PROCEDURE — G0151 HHCP-SERV OF PT,EA 15 MIN: HCPCS

## 2021-10-04 ENCOUNTER — HOME CARE VISIT (OUTPATIENT)
Dept: HOME HEALTH SERVICES | Facility: HOME HEALTHCARE | Age: 76
End: 2021-10-04
Payer: MEDICARE

## 2021-10-04 SDOH — ECONOMIC STABILITY: HOUSING INSECURITY: EVIDENCE OF SMOKING MATERIAL: 0

## 2021-10-04 ASSESSMENT — ACTIVITIES OF DAILY LIVING (ADL)
OASIS_M1830: 03
AMBULATION ASSISTANCE: 1
AMBULATION ASSISTANCE ON FLAT SURFACES: 1
CURRENT_FUNCTION: MODERATE ASSIST
AMBULATION ASSISTANCE: STAND BY ASSIST
TRANSPORTATION: DEPENDENT
PHYSICAL TRANSFERS ASSESSED: 1
CURRENT_FUNCTION: ONE PERSON
TRANSPORTATION ASSESSED: 1

## 2021-10-04 ASSESSMENT — ENCOUNTER SYMPTOMS
ARTHRALGIAS: 1
SUBJECTIVE PAIN PROGRESSION: GRADUALLY IMPROVING
PAIN SEVERITY GOAL: 3/10
PAIN LOCATION - RELIEVING FACTORS: REPOSITION
HIGHEST PAIN SEVERITY IN PAST 24 HOURS: 10/10
LOWEST PAIN SEVERITY IN PAST 24 HOURS: 4/10
PERSON REPORTING PAIN: PATIENT
PAIN LOCATION: LEFT HIP
PAIN LOCATION - PAIN QUALITY: ACHE
PAIN LOCATION - PAIN SEVERITY: 6/10
PAIN LOCATION - PAIN DURATION: DAILY
PAIN LOCATION - EXACERBATING FACTORS: EVERYTHING
LIMITED RANGE OF MOTION: 1
PAIN: 1
MUSCLE WEAKNESS: 1
DEBILITATING PAIN: 1
PAIN LOCATION - PAIN FREQUENCY: CONSTANT

## 2021-10-04 NOTE — CASE COMMUNICATION
Quality Review for 9/25/21 SOC OASIS by LUISA Williamson RN on  October 4, 2021  No LSOC    Edits completed by LUISA Williamson RN    1.  is 4 as pt uses oxygen at night only(at rest)  2. Added safety concerns tab  3. Per narrative that patient needs stand by assistance and a walker for safety, the following changes were made: , , m1820,  are 2;  and  are 3  4.  is 3 per ambulation status  5. Completed F2F information  6. Changed depression assessment and depression care assessment to each visit as pt has a history and is on medication

## 2021-10-05 NOTE — CASE COMMUNICATION
Noted and agree with all changes. Thank you.     Sherley Zuniga RN    ----- Message -----  From: Rupinder Williamson R.N.  Sent: 10/4/2021  12:37 PM PDT  To: Skyler Zuniga R.N.      Quality Review for 9/25/21 SOC OASIS by LUISA Williamson RN on  October 4, 2021  No LSOC    Edits completed by LUISA Williamson RN    1.  is 4 as pt uses oxygen at night only(at rest)  2. Added safety concerns tab  3. Per narrative that patient needs stand by assistance and a walker for safety, the following changes were made: , , m1820,  are 2;  and  are 3  4.  is 3 per ambulation status  5. Completed F2F information  6. Changed depression assessment and depression care assessment to each visit as pt has a history and is on medication

## 2021-10-06 ENCOUNTER — HOME CARE VISIT (OUTPATIENT)
Dept: HOME HEALTH SERVICES | Facility: HOME HEALTHCARE | Age: 76
End: 2021-10-06
Payer: MEDICARE

## 2021-10-06 VITALS
OXYGEN SATURATION: 95 % | SYSTOLIC BLOOD PRESSURE: 122 MMHG | TEMPERATURE: 98.2 F | RESPIRATION RATE: 17 BRPM | HEART RATE: 85 BPM | DIASTOLIC BLOOD PRESSURE: 64 MMHG

## 2021-10-06 PROCEDURE — G0151 HHCP-SERV OF PT,EA 15 MIN: HCPCS

## 2021-10-08 ENCOUNTER — HOME CARE VISIT (OUTPATIENT)
Dept: HOME HEALTH SERVICES | Facility: HOME HEALTHCARE | Age: 76
End: 2021-10-08
Payer: MEDICARE

## 2021-10-08 VITALS
SYSTOLIC BLOOD PRESSURE: 128 MMHG | HEART RATE: 85 BPM | DIASTOLIC BLOOD PRESSURE: 64 MMHG | RESPIRATION RATE: 16 BRPM | TEMPERATURE: 98.7 F | OXYGEN SATURATION: 98 %

## 2021-10-08 PROCEDURE — G0151 HHCP-SERV OF PT,EA 15 MIN: HCPCS

## 2021-10-10 ASSESSMENT — ENCOUNTER SYMPTOMS
PAIN SEVERITY GOAL: 2/10
PAIN LOCATION - PAIN FREQUENCY: FREQUENT
HIGHEST PAIN SEVERITY IN PAST 24 HOURS: 9/10
PAIN LOCATION - PAIN FREQUENCY: FREQUENT
PAIN: 1
PAIN LOCATION - PAIN QUALITY: ACHE
PAIN LOCATION: GENERALIZED
PAIN LOCATION - EXACERBATING FACTORS: ACTIVITY
HIGHEST PAIN SEVERITY IN PAST 24 HOURS: 9/10
PERSON REPORTING PAIN: PATIENT
PAIN LOCATION - PAIN DURATION: DAILY
PAIN LOCATION - RELIEVING FACTORS: REST
SUBJECTIVE PAIN PROGRESSION: WAXING AND WANING
LOWEST PAIN SEVERITY IN PAST 24 HOURS: 4/10
PAIN LOCATION - PAIN DURATION: DAILY
PERSON REPORTING PAIN: PATIENT
SUBJECTIVE PAIN PROGRESSION: WAXING AND WANING
PAIN LOCATION - PAIN SEVERITY: 6/10
PAIN LOCATION: GENERALIZED
DEBILITATING PAIN: 1
LOWEST PAIN SEVERITY IN PAST 24 HOURS: 6/10
DEBILITATING PAIN: 1
PAIN LOCATION - PAIN SEVERITY: 6/10
PAIN: 1
PAIN LOCATION - PAIN QUALITY: ACHE
PAIN SEVERITY GOAL: 2/10

## 2021-10-12 ENCOUNTER — HOME CARE VISIT (OUTPATIENT)
Dept: HOME HEALTH SERVICES | Facility: HOME HEALTHCARE | Age: 76
End: 2021-10-12
Payer: MEDICARE

## 2021-10-12 SDOH — ECONOMIC STABILITY: HOUSING INSECURITY: EVIDENCE OF SMOKING MATERIAL: 0

## 2021-10-12 SDOH — ECONOMIC STABILITY: HOUSING INSECURITY
HOME SAFETY: ON EACH LEVEL OF THE HOME. PATIENT DOES HAVE A FIRE ESCAPE PLAN DEVELOPED. PATIENT DOES NOT HAVE FLAMMABLE MATERIALS PRESENT IN THE HOME PRESENTING A FIRE HAZARD. NO EVIDENCE FOUND OF SMOKING MATERIALS PRESENT IN THE HOME.

## 2021-10-12 SDOH — ECONOMIC STABILITY: HOUSING INSECURITY
HOME SAFETY: OXYGEN SAFETY RISK ASSESSMENT PERFORMED. PATIENT PT WAS GIVEN A NO SMOKING SIGN AND PROVIDED EDUCATION ABOUT WHY IT IS IMPORTANT TO PLACE ONE. PATIENT DOES HAVE A WORKING FIRE EXTINGUISHER PRESENT IN THE HOME. SMOKE ALARMS ARE PRESENT AND FUNCTIONAL

## 2021-10-13 ENCOUNTER — HOME CARE VISIT (OUTPATIENT)
Dept: HOME HEALTH SERVICES | Facility: HOME HEALTHCARE | Age: 76
End: 2021-10-13
Payer: MEDICARE

## 2021-10-13 PROCEDURE — G0180 MD CERTIFICATION HHA PATIENT: HCPCS | Performed by: NURSE PRACTITIONER

## 2021-10-13 PROCEDURE — G0151 HHCP-SERV OF PT,EA 15 MIN: HCPCS

## 2021-10-15 ENCOUNTER — HOME CARE VISIT (OUTPATIENT)
Dept: HOME HEALTH SERVICES | Facility: HOME HEALTHCARE | Age: 76
End: 2021-10-15
Payer: MEDICARE

## 2021-10-15 VITALS
DIASTOLIC BLOOD PRESSURE: 64 MMHG | RESPIRATION RATE: 16 BRPM | HEART RATE: 80 BPM | TEMPERATURE: 98.9 F | SYSTOLIC BLOOD PRESSURE: 120 MMHG | OXYGEN SATURATION: 94 %

## 2021-10-15 PROCEDURE — G0151 HHCP-SERV OF PT,EA 15 MIN: HCPCS

## 2021-10-18 ENCOUNTER — HOME CARE VISIT (OUTPATIENT)
Dept: HOME HEALTH SERVICES | Facility: HOME HEALTHCARE | Age: 76
End: 2021-10-18
Payer: MEDICARE

## 2021-10-19 ENCOUNTER — HOME CARE VISIT (OUTPATIENT)
Dept: HOME HEALTH SERVICES | Facility: HOME HEALTHCARE | Age: 76
End: 2021-10-19
Payer: MEDICARE

## 2021-10-22 ENCOUNTER — HOME CARE VISIT (OUTPATIENT)
Dept: HOME HEALTH SERVICES | Facility: HOME HEALTHCARE | Age: 76
End: 2021-10-22
Payer: MEDICARE

## 2021-10-25 ENCOUNTER — HOME CARE VISIT (OUTPATIENT)
Dept: HOME HEALTH SERVICES | Facility: HOME HEALTHCARE | Age: 76
End: 2021-10-25
Payer: MEDICARE

## 2021-10-28 ENCOUNTER — HOME CARE VISIT (OUTPATIENT)
Dept: HOME HEALTH SERVICES | Facility: HOME HEALTHCARE | Age: 76
End: 2021-10-28
Payer: MEDICARE

## 2021-10-28 VITALS
DIASTOLIC BLOOD PRESSURE: 70 MMHG | RESPIRATION RATE: 18 BRPM | SYSTOLIC BLOOD PRESSURE: 120 MMHG | TEMPERATURE: 98.4 F | OXYGEN SATURATION: 97 % | HEART RATE: 74 BPM

## 2021-10-28 SDOH — ECONOMIC STABILITY: HOUSING INSECURITY: EVIDENCE OF SMOKING MATERIAL: 0

## 2021-10-28 ASSESSMENT — ACTIVITIES OF DAILY LIVING (ADL)
AMBULATION ASSISTANCE ON FLAT SURFACES: 1
AMBULATION ASSISTANCE ON FLAT SURFACES: 1
HOME_HEALTH_OASIS: 01
AMBULATION_DISTANCE/DURATION_TOLERATED: 100 FT

## 2021-10-28 ASSESSMENT — ENCOUNTER SYMPTOMS
PAIN LOCATION: GENERALIZED
PAIN LOCATION - PAIN DURATION: DAILY
DEBILITATING PAIN: 1
PAIN LOCATION - PAIN DURATION: DAILY
PAIN SEVERITY GOAL: 2/10
PAIN LOCATION - PAIN FREQUENCY: FREQUENT
LOWEST PAIN SEVERITY IN PAST 24 HOURS: 6/10
PAIN LOCATION - EXACERBATING FACTORS: ACTIVITY, WEATHER CHANGES
DEBILITATING PAIN: 1
HIGHEST PAIN SEVERITY IN PAST 24 HOURS: 7/10
PAIN LOCATION - PAIN FREQUENCY: FREQUENT
PAIN: 1
PAIN LOCATION - PAIN QUALITY: ACHE
LOWEST PAIN SEVERITY IN PAST 24 HOURS: 6/10
PAIN LOCATION - PAIN QUALITY: ACHY
HIGHEST PAIN SEVERITY IN PAST 24 HOURS: 8/10
PERSON REPORTING PAIN: PATIENT
PAIN LOCATION - RELIEVING FACTORS: HEAT, REST
PAIN LOCATION - EXACERBATING FACTORS: WEATHER CHANGES
PAIN LOCATION - PAIN SEVERITY: 6/10
PAIN LOCATION: GENERALIZED
PAIN: 1
SUBJECTIVE PAIN PROGRESSION: WAXING AND WANING
PERSON REPORTING PAIN: PATIENT
SUBJECTIVE PAIN PROGRESSION: WAXING AND WANING
PAIN SEVERITY GOAL: 2/10
PAIN LOCATION - PAIN SEVERITY: 6/10

## 2021-10-28 ASSESSMENT — PATIENT HEALTH QUESTIONNAIRE - PHQ9: CLINICAL INTERPRETATION OF PHQ2 SCORE: 0

## 2021-10-28 NOTE — Clinical Note
Quality Review Completed for [place] OASIS by GERARDO Sheppard RN on October 28, 2021:  Edits completed by GERARDO Sheppard RN:  1.  is NA for e pressure ulcers per care plan interventions

## 2021-11-01 ASSESSMENT — ACTIVITIES OF DAILY LIVING (ADL): OASIS_M1830: 02

## 2021-11-02 DIAGNOSIS — F32.0 MILD MAJOR DEPRESSION (HCC): ICD-10-CM

## 2021-11-02 DIAGNOSIS — I10 ESSENTIAL HYPERTENSION: ICD-10-CM

## 2021-11-02 DIAGNOSIS — M54.9 CHRONIC BACK PAIN, UNSPECIFIED BACK LOCATION, UNSPECIFIED BACK PAIN LATERALITY: ICD-10-CM

## 2021-11-02 DIAGNOSIS — G89.29 CHRONIC BACK PAIN, UNSPECIFIED BACK LOCATION, UNSPECIFIED BACK PAIN LATERALITY: ICD-10-CM

## 2021-11-02 RX ORDER — ATENOLOL 50 MG/1
TABLET ORAL
Qty: 180 TABLET | Refills: 0 | Status: SHIPPED | OUTPATIENT
Start: 2021-11-02 | End: 2022-01-30

## 2021-11-02 RX ORDER — DULOXETIN HYDROCHLORIDE 30 MG/1
CAPSULE, DELAYED RELEASE ORAL
Qty: 90 CAPSULE | Refills: 1 | Status: SHIPPED | OUTPATIENT
Start: 2021-11-02 | End: 2022-05-03

## 2021-11-02 NOTE — CASE COMMUNICATION
Patient:   IZABELA MARCOS            MRN: [id number]            FIN: [id number]              Age:   66 years     Sex:  FEMALE     :  56   Associated Diagnoses:   None   Author:   GUILHERME JOYA     Basic Information   Time seen: Date & time 19 10:07:00.   History source: Patient.   Arrival mode: Private vehicle.   History limitation: None.   Additional information.   History of Present Illness   The patient presents with 66-year-old female with a history of remote breast cancer with lumpectomy, Graves' disease, hypertension, here today with right lower thoracic back pain radiating around to the right upper quadrant of the abdomen.  Patient states that she has typical chronic thoracic back pain related to her scoliosis.  This is slightly different and that it radiates around to the right upper quadrant with pain in her abdomen.  With  nausea as well.  No vomiting.  No diarrhea.  No fevers.  No burning on urination.  Patient has not had any abdominal surgeries.  Pain is 7 out of 10 currently.  She has not taken anything for the pain. PCP is Dr Marin.  .       Review of Systems             Additional review of systems information: 10 point review of systems performed, all pertinent positives and negatives are reported in the history.     Health Status   Allergies:   Allergies (5) Active Reaction  cefazolin RASH  chlorhexidine topical rash  Mold None Documented  Pollen None Documented  sulfamethoxazole None Documented   .   Medications:   Medications (1) Active  Scheduled: (1)  piperacillin-tazobactam  3.375 gm, IVPB, Q8H  Continuous: (0)  PRN: (0)   .     Past Medical/ Family/ Social History   Medical history:    All Problems  Breast cancer / 174.9 / Confirmed  Graves disease / 6707442526 / Confirmed  Hypertension / 09504254 / Confirmed.   Surgical history:    Lumpectomy of breast (2601317408)..   Family history: Not significant.   Social history:    Social & Psychosocial  Therapist agrees to changes. MARY  ----- Message -----  From: Gaby Sheppard R.N.  Sent: 11/2/2021   5:43 AM PDT  To: Lashonda White PT      Resyonatan for response to CDI revisions. Thanks Lashonda Habits  Alcohol  06/20/2014  Use: Current    Frequency: 1-2 times per year    Use: Current    Frequency: 1-2 times per year  Home/Environment  06/20/2014  Alcohol Abuse in Household: No    Substance Abuse in Household: No    Smoker in Household: No    Alcohol Abuse in Household: No    Substance Abuse in Household: No    Smoker in Household: No  Substance Abuse  06/20/2014  Use: None, None  Tobacco  06/20/2014  Smoking Tobacco Use: Former smoker    Used in Last 12 Months: No    Smoking Tobacco Use: Former smoker, Former smoker  .     Physical Examination              Vital Signs   Vital Sign   06/05/19 09:42 CDT Temperature - VS 36.8 deg_C  Normal    Temperature Source - VS Oral    Heart/Pulse Rate 90  Normal    Pulse Source Monitor    Respiration Rate 16 breaths/min  Normal    SpO2 96 %  Normal    NIBP Systolic 147  HI    NIBP Diastolic 99  HI    NIBP Source Left Arm    NIBP   HI   .   Oxygen saturation.   General:  Alert, no acute distress.    Skin:  Warm, dry, intact.    Head:  Normocephalic, atraumatic.    Neck:  Supple.   Eye:  Extraocular movements are intact, normal conjunctiva.    Cardiovascular:  Regular rate and rhythm, No murmur, Normal peripheral perfusion, No edema, Radial pulses are symmetric billaterally.   Respiratory:  Lungs are clear to auscultation, respirations are non-labored, breath sounds are equal, Symmetrical chest wall expansion.   Gastrointestinal:  Soft, no cva tenderness  + murphys sign  + RUQ tenderness  .    Musculoskeletal:  no tenderness to posterior midline thoracic spine or lumbar spine  no cva tenderness   .   Neurological:  Alert and oriented to person, place, time, and situation, normal sensory observed, normal motor observed, normal speech observed.   Psychiatric:  Cooperative, appropriate mood & affect.      Medical Decision Making   Rationale:  66-year-old female with a history of remote breast cancer with lumpectomy, Graves' disease, hypertension, here today with right  lower thoracic back pain radiating around to the right upper quadrant of the abdomen.  Vitals are normal and stable.  Patient does believe she has her chronic thoracic pain from scoliosis however she has obvious right upper quadrant tenderness.  Will obtain CT abdomen and pelvis as well as CT chest for the  thoracic pain.  Will check basic lab work.  Patient did have a normal urine test at urgent care..  Cardiac monitor:  130 PM P 80 NSR   .   Electrocardiogram:  1:21 PM pulse 82  mild T wave flattening in comparison to previous EKG in V2 and V3..  Results review:    Labs between:  04-JUN-2019 12:58 to 05-JUN-2019 12:58  CBC:                 WBC  HgB  Hct  Plt  MCV  RDW   05-JUN-2019 (H) 12.9  14.6  44.5  218  86.1  12.5   DIFF:                 Seg  Neutroph//ABS  Lymph//ABS  Mono//ABS  EOS/ABS  05-JUN-2019 NOT APPLICABLE  84 // (H) 11.0  7 // (L) 0.9  7 // 0.9 0 // (L) 0.0  BMP:                 Na  Cl  BUN  Glu   05-JUN-2019 138  107  20  (H) 112                              K  CO2  Cr  Ca                              4.1  25  0.87  (H) 10.4   CMP:                 AST  ALT  AlkPhos  Bili  Albumin   05-JUN-2019 17  23  79  0.4  4.0   COAG:                 INR  PT  PTT  Ddimer  Fibrinogen    05-JUN-2019 1.0  10.3  28                       .   Chest X-Ray:     .   Radiology results:    Result title:  CT ABDOMEN AND PELVIS W CON/CT CHEST W CON  Result status:  Final  Verified by:  CONSTANCE DAVIS on 06/05/2019 2:25  IMPRESSION:1.  No pulmonary mass or consolidation.  Subsegmental atelectasis or scarring is seen.2.  Distended gallbladder with prominent wall thickening and cholelithiasis.  Main differential consideration is acute cholecystitis.  Correlation with physical findings is recommended, with ultrasound and HIDA scan for further evaluation if clinically indicated.3.  S-shaped scoliotic curvature with multilevel degenerative disc and facet disease of  the spine.4.  Please see above for additional  observations.Dr. Marcial was notified of the findings over the telephone by Dr. Ge on 06/05/2019 at 12:25.  ,    .      Reexamination/ Reevaluation   Time: 06/05/19 13:11:00 .   Notes: CT with acute cholecystitis. allergic to cephalosporins but does tolerate penicillins. will treat with zosyn.  no blood thinners  npo since yesterday evening  preop EKG okay   CT chest okay  Endorsed to Dr Ryne martinez dmission   Endorsed to Dr Hernández on call for surgery   .     Impression and Plan   Diagnosis   acute cholecystitis   Plan   Condition: Unchanged.    Disposition: Admit to Inpatient Unit.    Counseled: Patient, Family, Regarding diagnosis, Regarding diagnostic results, Regarding treatment plan, Regarding prescription, Patient indicated understanding of instructions.

## 2021-12-15 DIAGNOSIS — I10 ESSENTIAL HYPERTENSION: ICD-10-CM

## 2021-12-15 RX ORDER — BENAZEPRIL HYDROCHLORIDE AND HYDROCHLOROTHIAZIDE 20; 12.5 MG/1; MG/1
TABLET ORAL
Qty: 90 TABLET | Refills: 1 | Status: SHIPPED | OUTPATIENT
Start: 2021-12-15 | End: 2022-04-12 | Stop reason: SDUPTHER

## 2021-12-15 NOTE — TELEPHONE ENCOUNTER
Received request via: Unspecified    Was the patient seen in the last year in this department? Yes    Does the patient have an active prescription (recently filled or refills available) for medication(s) requested? No

## 2022-01-28 DIAGNOSIS — I10 ESSENTIAL HYPERTENSION: ICD-10-CM

## 2022-01-30 RX ORDER — ATENOLOL 50 MG/1
TABLET ORAL
Qty: 180 TABLET | Refills: 0 | Status: SHIPPED | OUTPATIENT
Start: 2022-01-30 | End: 2023-02-15

## 2022-02-16 ENCOUNTER — OFFICE VISIT (OUTPATIENT)
Dept: MEDICAL GROUP | Facility: PHYSICIAN GROUP | Age: 77
End: 2022-02-16
Payer: MEDICARE

## 2022-02-16 VITALS
DIASTOLIC BLOOD PRESSURE: 74 MMHG | BODY MASS INDEX: 37.69 KG/M2 | RESPIRATION RATE: 16 BRPM | HEIGHT: 60 IN | OXYGEN SATURATION: 95 % | HEART RATE: 85 BPM | WEIGHT: 192 LBS | TEMPERATURE: 98.4 F | SYSTOLIC BLOOD PRESSURE: 136 MMHG

## 2022-02-16 DIAGNOSIS — Z79.899 HIGH RISK MEDICATION USE: ICD-10-CM

## 2022-02-16 DIAGNOSIS — F33.1 MODERATE EPISODE OF RECURRENT MAJOR DEPRESSIVE DISORDER (HCC): ICD-10-CM

## 2022-02-16 DIAGNOSIS — Z13.6 SCREENING FOR CARDIOVASCULAR CONDITION: ICD-10-CM

## 2022-02-16 DIAGNOSIS — J96.01 ACUTE RESPIRATORY FAILURE WITH HYPOXIA (HCC): ICD-10-CM

## 2022-02-16 DIAGNOSIS — I10 PRIMARY HYPERTENSION: ICD-10-CM

## 2022-02-16 DIAGNOSIS — F11.20 UNCOMPLICATED OPIOID DEPENDENCE (HCC): Primary | ICD-10-CM

## 2022-02-16 DIAGNOSIS — E78.5 DYSLIPIDEMIA: ICD-10-CM

## 2022-02-16 DIAGNOSIS — D72.829 LEUKOCYTOSIS, UNSPECIFIED TYPE: ICD-10-CM

## 2022-02-16 DIAGNOSIS — E89.0 H/O PARTIAL THYROIDECTOMY: ICD-10-CM

## 2022-02-16 DIAGNOSIS — Z12.31 ENCOUNTER FOR SCREENING MAMMOGRAM FOR MALIGNANT NEOPLASM OF BREAST: ICD-10-CM

## 2022-02-16 PROCEDURE — 99214 OFFICE O/P EST MOD 30 MIN: CPT | Performed by: NURSE PRACTITIONER

## 2022-02-16 ASSESSMENT — PATIENT HEALTH QUESTIONNAIRE - PHQ9
4. FEELING TIRED OR HAVING LITTLE ENERGY: SEVERAL DAYS
7. TROUBLE CONCENTRATING ON THINGS, SUCH AS READING THE NEWSPAPER OR WATCHING TELEVISION: NOT AT ALL
SUM OF ALL RESPONSES TO PHQ9 QUESTIONS 1 AND 2: 2
6. FEELING BAD ABOUT YOURSELF - OR THAT YOU ARE A FAILURE OR HAVE LET YOURSELF OR YOUR FAMILY DOWN: NOT AL ALL
3. TROUBLE FALLING OR STAYING ASLEEP OR SLEEPING TOO MUCH: NOT AT ALL
2. FEELING DOWN, DEPRESSED, IRRITABLE, OR HOPELESS: SEVERAL DAYS
8. MOVING OR SPEAKING SO SLOWLY THAT OTHER PEOPLE COULD HAVE NOTICED. OR THE OPPOSITE, BEING SO FIGETY OR RESTLESS THAT YOU HAVE BEEN MOVING AROUND A LOT MORE THAN USUAL: NOT AT ALL
SUM OF ALL RESPONSES TO PHQ QUESTIONS 1-9: 3
9. THOUGHTS THAT YOU WOULD BE BETTER OFF DEAD, OR OF HURTING YOURSELF: NOT AT ALL
5. POOR APPETITE OR OVEREATING: NOT AT ALL
1. LITTLE INTEREST OR PLEASURE IN DOING THINGS: SEVERAL DAYS

## 2022-02-16 ASSESSMENT — FIBROSIS 4 INDEX: FIB4 SCORE: 1.55

## 2022-02-16 NOTE — ASSESSMENT & PLAN NOTE
Chronic condition, stable.  Patient has had low back pain, and hip pain for many, many years.  Patient is managed by Dr. Chavez for her opioids.  She does take Norco 5/325 3 times a day, as well as Lyrica 100 mg 3 times daily.  She states that this regimen does help her pain, but she remains in a fair amount of pain that is not controlled.  She also reports that she does get injection procedures with Dr. Chavez that do help temporarily.  She will continue to follow-up with Dr. Chavez.

## 2022-02-16 NOTE — ASSESSMENT & PLAN NOTE
Chronic condition, stable.  Patient takes amitriptyline 25 mg daily, as well as Cymbalta 30 mg daily.  I suspect the Cymbalta as directed more towards her pain complaints.  Patient states that her symptoms are under control, and she does not want to change her regimen.  She will continue her current regimen.

## 2022-02-16 NOTE — ASSESSMENT & PLAN NOTE
Chronic condition, stable.  Patient feels well overall.  She does get tired, but she is in chronic pain.  She denies any palpitations, skin or hair changes.  She is due for updated labs today.

## 2022-02-16 NOTE — ASSESSMENT & PLAN NOTE
Chronic condition, stable.  Patient takes Tenormin 100 milligrams daily, as well as benazepril/hydrochlorothiazide 20-12.5 mg daily.  Patient tolerates her medication well, and her blood pressure is 136/74 in the office today.

## 2022-02-16 NOTE — ASSESSMENT & PLAN NOTE
Chronic condition, stable.  Patient is on supplemental oxygen 2 L per nasal cannula at nighttime only.  She states that she has not had to increase this, and feels that her O2 saturations are in the nineties on 2 L.  She will continue her current regimen.

## 2022-02-16 NOTE — ASSESSMENT & PLAN NOTE
Chronic condition, stable.  Patient not currently take statin medication.  This was discussed today, including her ASCVD risk at 21%.  Patient would like to repeat her labs, and see if there have been any changes.  We will update her labs today.  The 10-year ASCVD risk score (Osmelramila CHAVEZ Jr., et al., 2013) is: 21.3%

## 2022-02-16 NOTE — PROGRESS NOTES
Subjective:     CC: The primary encounter diagnosis was Uncomplicated opioid dependence (HCC). Diagnoses of Moderate episode of recurrent major depressive disorder (HCC), Acute respiratory failure with hypoxia (HCC), H/O partial thyroidectomy, Dyslipidemia, Leukocytosis, unspecified type, Screening for cardiovascular condition, Encounter for screening mammogram for malignant neoplasm of breast, High risk medication use, and Primary hypertension were also pertinent to this visit.      HPI:   Tereza is an established patient of [facility] here for follow-up.  We discussed the following problems:    1. Uncomplicated opioid dependence (HCC)  Chronic condition, stable.  Patient managed by Dr. Chavez.    2. Moderate episode of recurrent major depressive disorder (HCC)  Chronic condition, stable.  Patient here for follow-up today.    3. Acute respiratory failure with hypoxia (HCC)  Chronic condition, stable.  Patient here for follow-up today.    4. H/O partial thyroidectomy  Chronic condition, stable.  Patient due for updated labs.    5. Dyslipidemia  Chronic condition, stable.  Patient due for updated labs.    6. Leukocytosis, unspecified type  Patient with leukocytosis noted on labs from September 2021 hospitalization.  Repeat labs due today.    7. Screening for cardiovascular condition  Labs ordered in anticipation of annual wellness.    8. Encounter for screening mammogram for malignant neoplasm of breast  Screening mammogram ordered for patient today.  Patient with history of breast cancer in 2015.    9. High risk medication use  Labs ordered for surveillance of high risk medication use.    10. Primary hypertension  Chronic condition, stable.  Patient here for follow-up today.       Past Medical History:   Diagnosis Date   • Anesthesia     PONV   • Arthritis     Left hip, knees, ankles   • Asthma     Inhaler use daily.   • Bowel habit changes     Constipation   • Breath shortness     asthma   • Cancer (HCC)     Breast 2015    • Chiari malformation 1970's    shunt  in place   • Chickenpox    • Chronic back pain     2/2 scoliosis and syringomyelia    • Contracture of hand joint     left    • Decreased lung capacity    • Dental disorder     upper/lower   • Heart burn    • High cholesterol    • Hypertension    • Indigestion    • Joint replacement     Right shoulder, cervical   • Obesity    • Pain    • Pain 08/06/2018    Back, neck and legs   • Peripheral edema 6/6/2013   • Pneumonia    • Scoliosis    • Shortness of breath 08/06/2018    Chronic   • Sleep apnea     uses O2 at night   • Sleep apnea 08/07/2018    Patient states having sleep study October 2018.   • Snoring     No sleep study   • sore throat 1/15/15   • Syringomyelia (HCC)     surgery 1973, 1977       Social History     Tobacco Use   • Smoking status: Never Smoker   • Smokeless tobacco: Never Used   Vaping Use   • Vaping Use: Never used   Substance Use Topics   • Alcohol use: No     Alcohol/week: 0.0 oz   • Drug use: No       Current Outpatient Medications Ordered in Epic   Medication Sig Dispense Refill   • atenolol (TENORMIN) 50 MG Tab TAKE 2 TABLETS BY MOUTH EVERY  Tablet 0   • benazepril-hydrochlorthiazide (LOTENSIN HCT) 20-12.5 MG per tablet TAKE 1 TABLET BY MOUTH EVERY DAY 90 Tablet 1   • DULoxetine (CYMBALTA) 30 MG Cap DR Particles TAKE 1 CAPSULE DAILY 90 Capsule 1   • predniSONE (DELTASONE) 10 MG Tab Take 10 mg by mouth every day.     • albuterol 108 (90 Base) MCG/ACT Aero Soln inhalation aerosol Inhale 2 Puffs every 6 hours as needed for Shortness of Breath.     • Calcium Carb-Cholecalciferol (CALCIUM + D3 PO) Take 1 Tablet by mouth every day.     • Home Care Oxygen Inhale 2 L/min at bedtime. Oxygen dose range: 2 L/min  Respiratory route via: Nasal Cannula   Oxygen supplier: Preferred         • acetaminophen (TYLENOL) 500 MG Tab Take 1,000 mg by mouth 2 times a day as needed (pain supplement in addition to Norco, if with severe pain).     • Omega-3 350 MG CAPSULE  DELAYED RELEASE Take 1 Capsule by mouth every day.     • Zinc 50 MG Tab Take 1 Tablet by mouth every day.     • ascorbic acid (VITAMIN C) 1000 MG tablet Take 1,000 mg by mouth every day.     • esomeprazole (NEXIUM) 20 MG capsule TAKE 1 CAPSULE BY MOUTH EVERY DAY IN THE MORNING BEFORE BREAKFAST 30 capsule 0   • Influenza Vaccine High-Dose pf 0.5 ML Suspension Prefilled Syringe injection Fluzone High-Dose 2015-16 (PF) 180 mcg/0.5 mL intramuscular syringe ADM 0.5ML IM UTD     • LYRICA 100 MG Cap Take 100 mg by mouth 3 times a day.  1   • HYDROcodone-acetaminophen (NORCO) 5-325 MG Tab per tablet TAKE 1 TABLET BY MOUTH 3 TIMES A DAY FOR 30 DAYS, 724.02  0   • potassium chloride SA (KDUR) 20 MEQ Tab CR Take 1 Tab by mouth every day. 90 Tab 1   • amitriptyline (ELAVIL) 25 MG Tab Take 100 mg by mouth every evening.       No current Epic-ordered facility-administered medications on file.       Allergies:  Bactrim ds and Morphine    Health Maintenance: Completed    ROS:  Gen: no fevers/chills, no changes in weight  Eyes: no changes in vision  ENT: no sore throat, no hearing loss, no bloody nose  Pulm: no sob, no cough  CV: no chest pain, no palpitations  GI: no nausea/vomiting, no diarrhea  : no dysuria  MSk: no myalgias  Skin: no rash  Neuro: no headaches, no numbness/tingling  Heme/Lymph: no easy bruising      Objective:       Exam:  /74   Pulse 85   Temp 36.9 °C (98.4 °F)   Resp 16   Ht 1.524 m (5')   Wt 87.1 kg (192 lb)   SpO2 95%   BMI 37.50 kg/m²  Body mass index is 37.5 kg/m².    Gen: Alert and oriented, No apparent distress.  Neck: Neck is supple without lymphadenopathy.  No carotid bruits.  Lungs: Normal effort, CTA bilaterally, no wheezes, rhonchi, or rales  CV: Regular rate and rhythm. No murmurs, rubs, or gallops.  Ext: No clubbing, cyanosis, edema.    Labs: Dated: September 2021, discussed results today and all questions answered.  Assessment & Plan:     76 y.o. female with the following -      Dyslipidemia  Chronic condition, stable.  Patient not currently take statin medication.  This was discussed today, including her ASCVD risk at 21%.  Patient would like to repeat her labs, and see if there have been any changes.  We will update her labs today.  The 10-year ASCVD risk score (Le Raysvilleramila CHAVEZ Jr., et al., 2013) is: 21.3%      H/O partial thyroidectomy  Chronic condition, stable.  Patient feels well overall.  She does get tired, but she is in chronic pain.  She denies any palpitations, skin or hair changes.  She is due for updated labs today.    Major depressive disorder  Chronic condition, stable.  Patient takes amitriptyline 25 mg daily, as well as Cymbalta 30 mg daily.  I suspect the Cymbalta as directed more towards her pain complaints.  Patient states that her symptoms are under control, and she does not want to change her regimen.  She will continue her current regimen.    Acute respiratory failure with hypoxia (HCC) due to iatrogenic volume overload   Chronic condition, stable.  Patient is on supplemental oxygen 2 L per nasal cannula at nighttime only.  She states that she has not had to increase this, and feels that her O2 saturations are in the nineties on 2 L.  She will continue her current regimen.    Uncomplicated opioid dependence (HCC)  Chronic condition, stable.  Patient has had low back pain, and hip pain for many, many years.  Patient is managed by Dr. Chavez for her opioids.  She does take Norco 5/325 3 times a day, as well as Lyrica 100 mg 3 times daily.  She states that this regimen does help her pain, but she remains in a fair amount of pain that is not controlled.  She also reports that she does get injection procedures with Dr. Chavez that do help temporarily.  She will continue to follow-up with Dr. Chavez.    HTN (hypertension)  Chronic condition, stable.  Patient takes Tenormin 100 milligrams daily, as well as benazepril/hydrochlorothiazide 20-12.5 mg daily.  Patient tolerates  her medication well, and her blood pressure is 136/74 in the office today.      Orders Placed This Encounter   • MA-SCREENING MAMMO BILAT W/TOMOSYNTHESIS W/CAD   • CBC WITHOUT DIFFERENTIAL   • Comp Metabolic Panel   • Lipid Profile   • TSH WITH REFLEX TO FT4          Return in about 6 months (around 8/16/2022) for annual wellness.    I have placed the below orders and discussed them with an approved delegating provider.  The MA is performing the below orders under the direction of Lulu Gonzalez MD.    Please note that this dictation was created using voice recognition software. I have made every reasonable attempt to correct obvious errors, but I expect that there are errors of grammar and possibly content that I did not discover before finalizing the note.

## 2022-03-29 ENCOUNTER — HOSPITAL ENCOUNTER (OUTPATIENT)
Dept: LAB | Facility: MEDICAL CENTER | Age: 77
End: 2022-03-29
Attending: NURSE PRACTITIONER
Payer: MEDICARE

## 2022-03-29 ENCOUNTER — HOSPITAL ENCOUNTER (OUTPATIENT)
Dept: RADIOLOGY | Facility: MEDICAL CENTER | Age: 77
End: 2022-03-29
Attending: NURSE PRACTITIONER
Payer: MEDICARE

## 2022-03-29 DIAGNOSIS — E89.0 H/O PARTIAL THYROIDECTOMY: ICD-10-CM

## 2022-03-29 DIAGNOSIS — D72.829 LEUKOCYTOSIS, UNSPECIFIED TYPE: ICD-10-CM

## 2022-03-29 DIAGNOSIS — Z12.31 ENCOUNTER FOR SCREENING MAMMOGRAM FOR MALIGNANT NEOPLASM OF BREAST: ICD-10-CM

## 2022-03-29 DIAGNOSIS — Z79.899 HIGH RISK MEDICATION USE: ICD-10-CM

## 2022-03-29 DIAGNOSIS — E78.5 DYSLIPIDEMIA: ICD-10-CM

## 2022-03-29 LAB
ALBUMIN SERPL BCP-MCNC: 4.5 G/DL (ref 3.2–4.9)
ALBUMIN/GLOB SERPL: 1.8 G/DL
ALP SERPL-CCNC: 100 U/L (ref 30–99)
ALT SERPL-CCNC: 29 U/L (ref 2–50)
ANION GAP SERPL CALC-SCNC: 16 MMOL/L (ref 7–16)
AST SERPL-CCNC: 42 U/L (ref 12–45)
BILIRUB SERPL-MCNC: 0.5 MG/DL (ref 0.1–1.5)
BUN SERPL-MCNC: 17 MG/DL (ref 8–22)
CALCIUM SERPL-MCNC: 9.2 MG/DL (ref 8.5–10.5)
CHLORIDE SERPL-SCNC: 102 MMOL/L (ref 96–112)
CHOLEST SERPL-MCNC: 198 MG/DL (ref 100–199)
CO2 SERPL-SCNC: 23 MMOL/L (ref 20–33)
CREAT SERPL-MCNC: 0.91 MG/DL (ref 0.5–1.4)
ERYTHROCYTE [DISTWIDTH] IN BLOOD BY AUTOMATED COUNT: 53.6 FL (ref 35.9–50)
FASTING STATUS PATIENT QL REPORTED: NORMAL
GFR SERPLBLD CREATININE-BSD FMLA CKD-EPI: 66 ML/MIN/1.73 M 2
GLOBULIN SER CALC-MCNC: 2.5 G/DL (ref 1.9–3.5)
GLUCOSE SERPL-MCNC: 83 MG/DL (ref 65–99)
HCT VFR BLD AUTO: 39.2 % (ref 37–47)
HDLC SERPL-MCNC: 53 MG/DL
HGB BLD-MCNC: 11.6 G/DL (ref 12–16)
LDLC SERPL CALC-MCNC: 117 MG/DL
MCH RBC QN AUTO: 23.3 PG (ref 27–33)
MCHC RBC AUTO-ENTMCNC: 29.6 G/DL (ref 33.6–35)
MCV RBC AUTO: 78.7 FL (ref 81.4–97.8)
PLATELET # BLD AUTO: 360 K/UL (ref 164–446)
PMV BLD AUTO: 11.3 FL (ref 9–12.9)
POTASSIUM SERPL-SCNC: 4.2 MMOL/L (ref 3.6–5.5)
PROT SERPL-MCNC: 7 G/DL (ref 6–8.2)
RBC # BLD AUTO: 4.98 M/UL (ref 4.2–5.4)
SODIUM SERPL-SCNC: 141 MMOL/L (ref 135–145)
TRIGL SERPL-MCNC: 141 MG/DL (ref 0–149)
TSH SERPL DL<=0.005 MIU/L-ACNC: 0.64 UIU/ML (ref 0.38–5.33)
WBC # BLD AUTO: 6.7 K/UL (ref 4.8–10.8)

## 2022-03-29 PROCEDURE — 85027 COMPLETE CBC AUTOMATED: CPT

## 2022-03-29 PROCEDURE — 80053 COMPREHEN METABOLIC PANEL: CPT

## 2022-03-29 PROCEDURE — 80061 LIPID PANEL: CPT

## 2022-03-29 PROCEDURE — 36415 COLL VENOUS BLD VENIPUNCTURE: CPT

## 2022-03-29 PROCEDURE — 84443 ASSAY THYROID STIM HORMONE: CPT

## 2022-03-29 PROCEDURE — 77063 BREAST TOMOSYNTHESIS BI: CPT

## 2022-03-31 DIAGNOSIS — D64.9 ANEMIA, UNSPECIFIED TYPE: ICD-10-CM

## 2022-03-31 NOTE — RESULT ENCOUNTER NOTE
Can we call Tereza (or her ) and let them know that Tereza's labs look good, except she is anemic. I have ordered some labs to determine if iron deficiency is the cause, and they are available at a [facility] lab.  They are not fasting labs.  If they have questions, have them schedule with me,  Catie:)

## 2022-04-01 ENCOUNTER — TELEPHONE (OUTPATIENT)
Dept: MEDICAL GROUP | Facility: PHYSICIAN GROUP | Age: 77
End: 2022-04-01
Payer: MEDICARE

## 2022-04-01 NOTE — TELEPHONE ENCOUNTER
----- Message from ERWIN Mae sent at 3/31/2022 10:58 AM PDT -----  Can we call Tereza (or her ) and let them know that Tereza's labs look good, except she is anemic. I have ordered some labs to determine if iron deficiency is the cause, and they are available at a [facility] lab.  They are not fasting labs.  If they have questions, have them schedule with me,  Catie:)

## 2022-04-12 DIAGNOSIS — I10 ESSENTIAL HYPERTENSION: ICD-10-CM

## 2022-04-12 RX ORDER — BENAZEPRIL HYDROCHLORIDE AND HYDROCHLOROTHIAZIDE 20; 12.5 MG/1; MG/1
TABLET ORAL
Qty: 100 TABLET | Refills: 1 | Status: SHIPPED | OUTPATIENT
Start: 2022-04-12 | End: 2023-01-05 | Stop reason: SDUPTHER

## 2022-04-12 NOTE — TELEPHONE ENCOUNTER
Received request via: Pharmacy    Was the patient seen in the last year in this department? Yes    Does the patient have an active prescription (recently filled or refills available) for medication(s) requested? Yes. Insurance pays for 100 day supply

## 2022-04-19 PROBLEM — M25.512 LEFT SHOULDER PAIN: Status: ACTIVE | Noted: 2022-04-19

## 2022-04-22 ENCOUNTER — HOSPITAL ENCOUNTER (OUTPATIENT)
Dept: LAB | Facility: MEDICAL CENTER | Age: 77
End: 2022-04-22
Attending: NURSE PRACTITIONER
Payer: MEDICARE

## 2022-04-22 DIAGNOSIS — D64.9 ANEMIA, UNSPECIFIED TYPE: ICD-10-CM

## 2022-04-22 LAB
FERRITIN SERPL-MCNC: 20 NG/ML (ref 10–291)
FOLATE SERPL-MCNC: 14.4 NG/ML
IRON SATN MFR SERPL: 12 % (ref 15–55)
IRON SERPL-MCNC: 52 UG/DL (ref 40–170)
TIBC SERPL-MCNC: 425 UG/DL (ref 250–450)
UIBC SERPL-MCNC: 373 UG/DL (ref 110–370)
VIT B12 SERPL-MCNC: 572 PG/ML (ref 211–911)

## 2022-04-22 PROCEDURE — 36415 COLL VENOUS BLD VENIPUNCTURE: CPT

## 2022-04-22 PROCEDURE — 82746 ASSAY OF FOLIC ACID SERUM: CPT

## 2022-04-22 PROCEDURE — 83550 IRON BINDING TEST: CPT

## 2022-04-22 PROCEDURE — 82607 VITAMIN B-12: CPT

## 2022-04-22 PROCEDURE — 83540 ASSAY OF IRON: CPT

## 2022-04-22 PROCEDURE — 82728 ASSAY OF FERRITIN: CPT

## 2022-04-27 ENCOUNTER — PRE-ADMISSION TESTING (OUTPATIENT)
Dept: ADMISSIONS | Facility: MEDICAL CENTER | Age: 77
End: 2022-04-27
Attending: SURGERY
Payer: MEDICARE

## 2022-04-27 DIAGNOSIS — Z01.810 PRE-OPERATIVE CARDIOVASCULAR EXAMINATION: ICD-10-CM

## 2022-04-27 DIAGNOSIS — D50.9 IRON DEFICIENCY ANEMIA, UNSPECIFIED IRON DEFICIENCY ANEMIA TYPE: ICD-10-CM

## 2022-04-27 DIAGNOSIS — Z01.812 PRE-OPERATIVE LABORATORY EXAMINATION: ICD-10-CM

## 2022-04-27 LAB
ANION GAP SERPL CALC-SCNC: 10 MMOL/L (ref 7–16)
BUN SERPL-MCNC: 24 MG/DL (ref 8–22)
CALCIUM SERPL-MCNC: 9.3 MG/DL (ref 8.5–10.5)
CHLORIDE SERPL-SCNC: 105 MMOL/L (ref 96–112)
CO2 SERPL-SCNC: 27 MMOL/L (ref 20–33)
CREAT SERPL-MCNC: 0.88 MG/DL (ref 0.5–1.4)
EKG IMPRESSION: NORMAL
ERYTHROCYTE [DISTWIDTH] IN BLOOD BY AUTOMATED COUNT: 52.4 FL (ref 35.9–50)
GFR SERPLBLD CREATININE-BSD FMLA CKD-EPI: 69 ML/MIN/1.73 M 2
GLUCOSE SERPL-MCNC: 106 MG/DL (ref 65–99)
HCT VFR BLD AUTO: 39.8 % (ref 37–47)
HGB BLD-MCNC: 11.8 G/DL (ref 12–16)
MCH RBC QN AUTO: 23.4 PG (ref 27–33)
MCHC RBC AUTO-ENTMCNC: 29.6 G/DL (ref 33.6–35)
MCV RBC AUTO: 79 FL (ref 81.4–97.8)
PLATELET # BLD AUTO: 343 K/UL (ref 164–446)
PMV BLD AUTO: 10.6 FL (ref 9–12.9)
POTASSIUM SERPL-SCNC: 3.9 MMOL/L (ref 3.6–5.5)
RBC # BLD AUTO: 5.04 M/UL (ref 4.2–5.4)
SCCMEC + MECA PNL NOSE NAA+PROBE: NEGATIVE
SCCMEC + MECA PNL NOSE NAA+PROBE: POSITIVE
SODIUM SERPL-SCNC: 142 MMOL/L (ref 135–145)
WBC # BLD AUTO: 8.5 K/UL (ref 4.8–10.8)

## 2022-04-27 PROCEDURE — 87641 MR-STAPH DNA AMP PROBE: CPT

## 2022-04-27 PROCEDURE — 87640 STAPH A DNA AMP PROBE: CPT | Mod: XU

## 2022-04-27 PROCEDURE — 80048 BASIC METABOLIC PNL TOTAL CA: CPT

## 2022-04-27 PROCEDURE — 85027 COMPLETE CBC AUTOMATED: CPT

## 2022-04-27 PROCEDURE — 93010 ELECTROCARDIOGRAM REPORT: CPT | Performed by: INTERNAL MEDICINE

## 2022-04-27 PROCEDURE — 36415 COLL VENOUS BLD VENIPUNCTURE: CPT

## 2022-04-27 PROCEDURE — 93005 ELECTROCARDIOGRAM TRACING: CPT

## 2022-04-27 RX ORDER — LANOLIN ALCOHOL/MO/W.PET/CERES
325 CREAM (GRAM) TOPICAL
Qty: 90 TABLET | Refills: 2 | Status: SHIPPED | OUTPATIENT
Start: 2022-04-27 | End: 2022-05-27

## 2022-04-27 ASSESSMENT — FIBROSIS 4 INDEX: FIB4 SCORE: 1.6

## 2022-05-03 ENCOUNTER — ANESTHESIA EVENT (OUTPATIENT)
Dept: SURGERY | Facility: MEDICAL CENTER | Age: 77
End: 2022-05-03
Payer: MEDICARE

## 2022-05-03 DIAGNOSIS — F32.0 MILD MAJOR DEPRESSION (HCC): ICD-10-CM

## 2022-05-03 DIAGNOSIS — M54.9 CHRONIC BACK PAIN, UNSPECIFIED BACK LOCATION, UNSPECIFIED BACK PAIN LATERALITY: ICD-10-CM

## 2022-05-03 DIAGNOSIS — G89.29 CHRONIC BACK PAIN, UNSPECIFIED BACK LOCATION, UNSPECIFIED BACK PAIN LATERALITY: ICD-10-CM

## 2022-05-03 RX ORDER — DULOXETIN HYDROCHLORIDE 30 MG/1
CAPSULE, DELAYED RELEASE ORAL
Qty: 90 CAPSULE | Refills: 1 | Status: SHIPPED | OUTPATIENT
Start: 2022-05-03 | End: 2022-10-25 | Stop reason: SDUPTHER

## 2022-05-03 NOTE — OR NURSING
COVID-19 Pre-Surgery Screening:     Pt did not answer generic voicemail was left with call back number.    no apparent

## 2022-05-04 ENCOUNTER — ANESTHESIA (OUTPATIENT)
Dept: SURGERY | Facility: MEDICAL CENTER | Age: 77
End: 2022-05-04
Payer: MEDICARE

## 2022-05-04 ENCOUNTER — HOSPITAL ENCOUNTER (OUTPATIENT)
Facility: MEDICAL CENTER | Age: 77
End: 2022-05-04
Attending: SURGERY | Admitting: SURGERY
Payer: MEDICARE

## 2022-05-04 ENCOUNTER — APPOINTMENT (OUTPATIENT)
Dept: RADIOLOGY | Facility: MEDICAL CENTER | Age: 77
End: 2022-05-04
Attending: SURGERY
Payer: MEDICARE

## 2022-05-04 VITALS
OXYGEN SATURATION: 93 % | DIASTOLIC BLOOD PRESSURE: 77 MMHG | HEIGHT: 60 IN | RESPIRATION RATE: 20 BRPM | SYSTOLIC BLOOD PRESSURE: 111 MMHG | HEART RATE: 85 BPM | TEMPERATURE: 97.4 F | WEIGHT: 192.46 LBS | BODY MASS INDEX: 37.79 KG/M2

## 2022-05-04 DIAGNOSIS — M25.512 ACUTE PAIN OF LEFT SHOULDER: ICD-10-CM

## 2022-05-04 DIAGNOSIS — M25.512 CHRONIC LEFT SHOULDER PAIN: ICD-10-CM

## 2022-05-04 DIAGNOSIS — G89.29 CHRONIC LEFT SHOULDER PAIN: ICD-10-CM

## 2022-05-04 PROCEDURE — 160002 HCHG RECOVERY MINUTES (STAT): Performed by: SURGERY

## 2022-05-04 PROCEDURE — 700111 HCHG RX REV CODE 636 W/ 250 OVERRIDE (IP): Performed by: ANESTHESIOLOGY

## 2022-05-04 PROCEDURE — 160046 HCHG PACU - 1ST 60 MINS PHASE II: Performed by: SURGERY

## 2022-05-04 PROCEDURE — C1713 ANCHOR/SCREW BN/BN,TIS/BN: HCPCS | Performed by: SURGERY

## 2022-05-04 PROCEDURE — 160048 HCHG OR STATISTICAL LEVEL 1-5: Performed by: SURGERY

## 2022-05-04 PROCEDURE — 160041 HCHG SURGERY MINUTES - EA ADDL 1 MIN LEVEL 4: Performed by: SURGERY

## 2022-05-04 PROCEDURE — 73020 X-RAY EXAM OF SHOULDER: CPT | Mod: LT

## 2022-05-04 PROCEDURE — 99100 ANES PT EXTEME AGE<1 YR&>70: CPT | Performed by: ANESTHESIOLOGY

## 2022-05-04 PROCEDURE — C1776 JOINT DEVICE (IMPLANTABLE): HCPCS | Performed by: SURGERY

## 2022-05-04 PROCEDURE — A9270 NON-COVERED ITEM OR SERVICE: HCPCS | Performed by: ANESTHESIOLOGY

## 2022-05-04 PROCEDURE — A4565 SLINGS: HCPCS | Performed by: SURGERY

## 2022-05-04 PROCEDURE — 700102 HCHG RX REV CODE 250 W/ 637 OVERRIDE(OP): Performed by: ANESTHESIOLOGY

## 2022-05-04 PROCEDURE — 160029 HCHG SURGERY MINUTES - 1ST 30 MINS LEVEL 4: Performed by: SURGERY

## 2022-05-04 PROCEDURE — 501838 HCHG SUTURE GENERAL: Performed by: SURGERY

## 2022-05-04 PROCEDURE — 160035 HCHG PACU - 1ST 60 MINS PHASE I: Performed by: SURGERY

## 2022-05-04 PROCEDURE — 700105 HCHG RX REV CODE 258: Performed by: SURGERY

## 2022-05-04 PROCEDURE — 160009 HCHG ANES TIME/MIN: Performed by: SURGERY

## 2022-05-04 PROCEDURE — 23472 RECONSTRUCT SHOULDER JOINT: CPT | Mod: LT | Performed by: SURGERY

## 2022-05-04 PROCEDURE — 160025 RECOVERY II MINUTES (STATS): Performed by: SURGERY

## 2022-05-04 PROCEDURE — 700101 HCHG RX REV CODE 250: Performed by: ANESTHESIOLOGY

## 2022-05-04 PROCEDURE — 23430 REPAIR BICEPS TENDON: CPT | Mod: 59 | Performed by: SURGERY

## 2022-05-04 PROCEDURE — 700111 HCHG RX REV CODE 636 W/ 250 OVERRIDE (IP): Performed by: SURGERY

## 2022-05-04 PROCEDURE — 502000 HCHG MISC OR IMPLANTS RC 0278: Performed by: SURGERY

## 2022-05-04 PROCEDURE — 502240 HCHG MISC OR SUPPLY RC 0272: Performed by: SURGERY

## 2022-05-04 PROCEDURE — 700101 HCHG RX REV CODE 250: Performed by: SURGERY

## 2022-05-04 PROCEDURE — 01638 ANES OPN/ARTHR TOT SHO RPLCM: CPT | Performed by: ANESTHESIOLOGY

## 2022-05-04 DEVICE — IMPLANTABLE DEVICE: Type: IMPLANTABLE DEVICE | Site: SHOULDER | Status: FUNCTIONAL

## 2022-05-04 RX ORDER — ACETAMINOPHEN 500 MG
1000 TABLET ORAL ONCE
Status: COMPLETED | OUTPATIENT
Start: 2022-05-04 | End: 2022-05-04

## 2022-05-04 RX ORDER — CEFAZOLIN SODIUM 1 G/3ML
2 INJECTION, POWDER, FOR SOLUTION INTRAMUSCULAR; INTRAVENOUS ONCE
Status: COMPLETED | OUTPATIENT
Start: 2022-05-04 | End: 2022-05-04

## 2022-05-04 RX ORDER — VASOPRESSIN 20 U/ML
INJECTION PARENTERAL PRN
Status: DISCONTINUED | OUTPATIENT
Start: 2022-05-04 | End: 2022-05-04 | Stop reason: SURG

## 2022-05-04 RX ORDER — HYDROMORPHONE HYDROCHLORIDE 1 MG/ML
0.2 INJECTION, SOLUTION INTRAMUSCULAR; INTRAVENOUS; SUBCUTANEOUS
Status: DISCONTINUED | OUTPATIENT
Start: 2022-05-04 | End: 2022-05-04 | Stop reason: HOSPADM

## 2022-05-04 RX ORDER — HYDROMORPHONE HYDROCHLORIDE 1 MG/ML
0.4 INJECTION, SOLUTION INTRAMUSCULAR; INTRAVENOUS; SUBCUTANEOUS
Status: DISCONTINUED | OUTPATIENT
Start: 2022-05-04 | End: 2022-05-04 | Stop reason: HOSPADM

## 2022-05-04 RX ORDER — LIDOCAINE HYDROCHLORIDE 20 MG/ML
INJECTION, SOLUTION EPIDURAL; INFILTRATION; INTRACAUDAL; PERINEURAL PRN
Status: DISCONTINUED | OUTPATIENT
Start: 2022-05-04 | End: 2022-05-04 | Stop reason: SURG

## 2022-05-04 RX ORDER — TRANEXAMIC ACID 100 MG/ML
INJECTION, SOLUTION INTRAVENOUS PRN
Status: DISCONTINUED | OUTPATIENT
Start: 2022-05-04 | End: 2022-05-04 | Stop reason: SURG

## 2022-05-04 RX ORDER — BUPIVACAINE HYDROCHLORIDE AND EPINEPHRINE 5; 5 MG/ML; UG/ML
INJECTION, SOLUTION EPIDURAL; INTRACAUDAL; PERINEURAL
Status: DISCONTINUED | OUTPATIENT
Start: 2022-05-04 | End: 2022-05-04 | Stop reason: HOSPADM

## 2022-05-04 RX ORDER — ALBUTEROL SULFATE 2.5 MG/3ML
2.5 SOLUTION RESPIRATORY (INHALATION)
Status: DISCONTINUED | OUTPATIENT
Start: 2022-05-04 | End: 2022-05-04 | Stop reason: HOSPADM

## 2022-05-04 RX ORDER — SODIUM CHLORIDE, SODIUM LACTATE, POTASSIUM CHLORIDE, CALCIUM CHLORIDE 600; 310; 30; 20 MG/100ML; MG/100ML; MG/100ML; MG/100ML
INJECTION, SOLUTION INTRAVENOUS CONTINUOUS
Status: DISCONTINUED | OUTPATIENT
Start: 2022-05-04 | End: 2022-05-04 | Stop reason: HOSPADM

## 2022-05-04 RX ORDER — CLINDAMYCIN PHOSPHATE 150 MG/ML
INJECTION, SOLUTION INTRAVENOUS PRN
Status: DISCONTINUED | OUTPATIENT
Start: 2022-05-04 | End: 2022-05-04 | Stop reason: SURG

## 2022-05-04 RX ORDER — HALOPERIDOL 5 MG/ML
1 INJECTION INTRAMUSCULAR
Status: DISCONTINUED | OUTPATIENT
Start: 2022-05-04 | End: 2022-05-04 | Stop reason: HOSPADM

## 2022-05-04 RX ORDER — PHENYLEPHRINE HCL IN 0.9% NACL 0.5 MG/5ML
SYRINGE (ML) INTRAVENOUS PRN
Status: DISCONTINUED | OUTPATIENT
Start: 2022-05-04 | End: 2022-05-04 | Stop reason: SURG

## 2022-05-04 RX ORDER — DEXAMETHASONE SODIUM PHOSPHATE 4 MG/ML
INJECTION, SOLUTION INTRA-ARTICULAR; INTRALESIONAL; INTRAMUSCULAR; INTRAVENOUS; SOFT TISSUE PRN
Status: DISCONTINUED | OUTPATIENT
Start: 2022-05-04 | End: 2022-05-04 | Stop reason: SURG

## 2022-05-04 RX ORDER — ONDANSETRON 2 MG/ML
4 INJECTION INTRAMUSCULAR; INTRAVENOUS
Status: DISCONTINUED | OUTPATIENT
Start: 2022-05-04 | End: 2022-05-04 | Stop reason: HOSPADM

## 2022-05-04 RX ORDER — OXYCODONE HCL 5 MG/5 ML
10 SOLUTION, ORAL ORAL
Status: COMPLETED | OUTPATIENT
Start: 2022-05-04 | End: 2022-05-04

## 2022-05-04 RX ORDER — HYDROMORPHONE HYDROCHLORIDE 1 MG/ML
0.1 INJECTION, SOLUTION INTRAMUSCULAR; INTRAVENOUS; SUBCUTANEOUS
Status: DISCONTINUED | OUTPATIENT
Start: 2022-05-04 | End: 2022-05-04 | Stop reason: HOSPADM

## 2022-05-04 RX ORDER — OXYCODONE HCL 5 MG/5 ML
5 SOLUTION, ORAL ORAL
Status: COMPLETED | OUTPATIENT
Start: 2022-05-04 | End: 2022-05-04

## 2022-05-04 RX ORDER — SODIUM CHLORIDE, SODIUM LACTATE, POTASSIUM CHLORIDE, CALCIUM CHLORIDE 600; 310; 30; 20 MG/100ML; MG/100ML; MG/100ML; MG/100ML
INJECTION, SOLUTION INTRAVENOUS CONTINUOUS
Status: ACTIVE | OUTPATIENT
Start: 2022-05-04 | End: 2022-05-04

## 2022-05-04 RX ORDER — LABETALOL HYDROCHLORIDE 5 MG/ML
5 INJECTION, SOLUTION INTRAVENOUS
Status: DISCONTINUED | OUTPATIENT
Start: 2022-05-04 | End: 2022-05-04 | Stop reason: HOSPADM

## 2022-05-04 RX ORDER — HYDROMORPHONE HYDROCHLORIDE 2 MG/ML
INJECTION, SOLUTION INTRAMUSCULAR; INTRAVENOUS; SUBCUTANEOUS PRN
Status: DISCONTINUED | OUTPATIENT
Start: 2022-05-04 | End: 2022-05-04 | Stop reason: SURG

## 2022-05-04 RX ORDER — BUPIVACAINE HYDROCHLORIDE AND EPINEPHRINE 5; 5 MG/ML; UG/ML
INJECTION, SOLUTION EPIDURAL; INTRACAUDAL; PERINEURAL
Status: DISCONTINUED
Start: 2022-05-04 | End: 2022-05-04 | Stop reason: HOSPADM

## 2022-05-04 RX ORDER — ONDANSETRON 2 MG/ML
INJECTION INTRAMUSCULAR; INTRAVENOUS PRN
Status: DISCONTINUED | OUTPATIENT
Start: 2022-05-04 | End: 2022-05-04 | Stop reason: SURG

## 2022-05-04 RX ORDER — ROCURONIUM BROMIDE 10 MG/ML
INJECTION, SOLUTION INTRAVENOUS PRN
Status: DISCONTINUED | OUTPATIENT
Start: 2022-05-04 | End: 2022-05-04 | Stop reason: SURG

## 2022-05-04 RX ORDER — HYDRALAZINE HYDROCHLORIDE 20 MG/ML
5 INJECTION INTRAMUSCULAR; INTRAVENOUS
Status: DISCONTINUED | OUTPATIENT
Start: 2022-05-04 | End: 2022-05-04 | Stop reason: HOSPADM

## 2022-05-04 RX ORDER — LIDOCAINE HYDROCHLORIDE 20 MG/ML
JELLY TOPICAL PRN
Status: DISCONTINUED | OUTPATIENT
Start: 2022-05-04 | End: 2022-05-04 | Stop reason: SURG

## 2022-05-04 RX ORDER — OXYCODONE HYDROCHLORIDE 5 MG/1
5 TABLET ORAL EVERY 4 HOURS PRN
Qty: 30 TABLET | Refills: 0 | Status: SHIPPED | OUTPATIENT
Start: 2022-05-04 | End: 2022-05-11

## 2022-05-04 RX ADMIN — EPHEDRINE SULFATE 10 MG: 50 INJECTION, SOLUTION INTRAVENOUS at 10:31

## 2022-05-04 RX ADMIN — Medication 100 MCG: at 10:32

## 2022-05-04 RX ADMIN — EPHEDRINE SULFATE 10 MG: 50 INJECTION, SOLUTION INTRAVENOUS at 10:18

## 2022-05-04 RX ADMIN — DEXAMETHASONE SODIUM PHOSPHATE 8 MG: 4 INJECTION, SOLUTION INTRA-ARTICULAR; INTRALESIONAL; INTRAMUSCULAR; INTRAVENOUS; SOFT TISSUE at 10:22

## 2022-05-04 RX ADMIN — SODIUM CHLORIDE, POTASSIUM CHLORIDE, SODIUM LACTATE AND CALCIUM CHLORIDE 1000 ML: 600; 310; 30; 20 INJECTION, SOLUTION INTRAVENOUS at 08:29

## 2022-05-04 RX ADMIN — SUGAMMADEX 200 MG: 100 INJECTION, SOLUTION INTRAVENOUS at 11:29

## 2022-05-04 RX ADMIN — VASOPRESSIN 1 UNITS: 20 INJECTION PARENTERAL at 10:58

## 2022-05-04 RX ADMIN — VASOPRESSIN 1 UNITS: 20 INJECTION PARENTERAL at 10:42

## 2022-05-04 RX ADMIN — ACETAMINOPHEN 500 MG: 500 TABLET ORAL at 08:28

## 2022-05-04 RX ADMIN — ROCURONIUM BROMIDE 70 MG: 10 INJECTION, SOLUTION INTRAVENOUS at 10:05

## 2022-05-04 RX ADMIN — LIDOCAINE HYDROCHLORIDE 100 MG: 20 INJECTION, SOLUTION EPIDURAL; INFILTRATION; INTRACAUDAL at 10:03

## 2022-05-04 RX ADMIN — VASOPRESSIN 1 UNITS: 20 INJECTION PARENTERAL at 10:49

## 2022-05-04 RX ADMIN — Medication 100 MCG: at 10:13

## 2022-05-04 RX ADMIN — SODIUM CHLORIDE, POTASSIUM CHLORIDE, SODIUM LACTATE AND CALCIUM CHLORIDE: 600; 310; 30; 20 INJECTION, SOLUTION INTRAVENOUS at 11:10

## 2022-05-04 RX ADMIN — Medication 100 MCG: at 10:21

## 2022-05-04 RX ADMIN — TRANEXAMIC ACID 1000 MG: 100 INJECTION, SOLUTION INTRAVENOUS at 11:15

## 2022-05-04 RX ADMIN — FENTANYL CITRATE 100 MCG: 50 INJECTION, SOLUTION INTRAMUSCULAR; INTRAVENOUS at 09:55

## 2022-05-04 RX ADMIN — CEFAZOLIN 2 G: 330 INJECTION, POWDER, FOR SOLUTION INTRAMUSCULAR; INTRAVENOUS at 10:15

## 2022-05-04 RX ADMIN — EPHEDRINE SULFATE 10 MG: 50 INJECTION, SOLUTION INTRAVENOUS at 10:23

## 2022-05-04 RX ADMIN — LIDOCAINE HYDROCHLORIDE 3 ML: 20 JELLY TOPICAL at 10:06

## 2022-05-04 RX ADMIN — FENTANYL CITRATE 100 MCG: 50 INJECTION, SOLUTION INTRAMUSCULAR; INTRAVENOUS at 10:04

## 2022-05-04 RX ADMIN — ONDANSETRON 4 MG: 2 INJECTION INTRAMUSCULAR; INTRAVENOUS at 11:15

## 2022-05-04 RX ADMIN — PROPOFOL 100 MG: 10 INJECTION, EMULSION INTRAVENOUS at 10:05

## 2022-05-04 RX ADMIN — CLINDAMYCIN PHOSPHATE 900 MG: 150 INJECTION, SOLUTION INTRAMUSCULAR; INTRAVENOUS at 10:18

## 2022-05-04 RX ADMIN — HYDROMORPHONE HYDROCHLORIDE 1 MG: 2 INJECTION INTRAMUSCULAR; INTRAVENOUS; SUBCUTANEOUS at 11:33

## 2022-05-04 RX ADMIN — OXYCODONE HYDROCHLORIDE 10 MG: 5 SOLUTION ORAL at 11:58

## 2022-05-04 RX ADMIN — TRANEXAMIC ACID 1000 MG: 100 INJECTION, SOLUTION INTRAVENOUS at 10:19

## 2022-05-04 RX ADMIN — VASOPRESSIN 1 UNITS: 20 INJECTION PARENTERAL at 10:36

## 2022-05-04 ASSESSMENT — FIBROSIS 4 INDEX: FIB4 SCORE: 1.68

## 2022-05-04 ASSESSMENT — PAIN DESCRIPTION - PAIN TYPE: TYPE: SURGICAL PAIN

## 2022-05-04 NOTE — OR NURSING
1140-  Patient from OR unresponsive via gurney to PACU s/p L total shoulder arthroplasty. 6 L via mask to OPA . L shoulder dressing clean,dry and intact. Arm sling in place. Report from the anesthesiologist and RN received. Patient's name and  verified.     1143: OPA DC'd.     1147: Patient awake. Encouraged deep breaths.     1200: Oxycodone 10 mg PO given for pain.     1215: Anesthesiologist at bedside.     1219: CXR done at bedside.     1220: Patient in a recliner. Will wean O2 off as tolerated. LUE CMS intact.     1240: Discharge instructions discussed with ritchie All questions answered and verbalizes understanding.     1256: Pain is better. Patient escorted out to responsible adult via wheelchair. Belongings with patient, ambulated with steady gait. Discharge paperwork and instructions reviewed, signed, and sent with patient.

## 2022-05-04 NOTE — OP REPORT
SURGEON:  Vinicius Whaley MD     ASSISTANT:  None     PREOPERATIVE DIAGNOSIS:  Left shoulder arthritis with rotator cuff tear      POSTOPERATIVE DIAGNOSIS:  Left shoulder arthritis with rotator cuff tear      PROCEDURES PERFORMED:  1.  Left reverse total shoulder arthroplasty.  2.  Left open biceps tenodesis.       ANESTHESIOLOGIST: Dr. Stevan MD     ANESTHESIA:  General with upper extremity nerve block for pain control.     COMPLICATIONS:  None noted.     DRAINS:  Hemovac x1.     SPECIMENS:  None.     ESTIMATED BLOOD LOSS:  100 mL    IMPLANTS: Integra Titan Reverse Total shoulder arthroplasty system; 13mm pres-fit stem; 9mm polyethylene; 38mm x 5mm lateralized glenosphere with a 13mm base plate with multiple locking and non-locking screws.      INDICATIONS:      This patient is well known to me through my   outpatient clinic for the above-mentioned diagnosis. The patient believes and I would agree they have failed conservative treatment and are a candidate for the   above-mentioned procedure.  Prior to the procedure, the patient understood the risks,   benefits, and alternatives to surgery.  The patient understood the risks to include,   but not limited to the risk of infection requiring repeat surgery, bleeding   requiring blood transfusion, nerve, blood vessel, tendon injury requiring   repair, chronic pain, periprosthetic fracture subsidence, loosening or   dislocation requiring revision surgery, DVT, pulmonary embolism, heart attack,  stroke, and even death.  The patient states despite these risks, the patient wished   to proceed with surgery.     DESCRIPTION OF PROCEDURE:  The patient was met in the preoperative holding   area.  The left shoulder was initialed as correct operative site.  The patient had an   opportunity to ask questions, all questions were answered and informed consent was obtained.     The patient was brought to the operating room and laid supine on the operating   room table.  All  bony and dependent prominences were well-padded.  Upper   extremity nerve block and general anesthesia were induced without any   complication.  Ancef and clindamycin were administered for infection   prophylaxis.  The patient was placed in well-padded beach chair position.  The left   upper extremity was prepped and draped in the usual sterile fashion.  A formal  time-out was performed, in which all parties agreed upon the correct patient,  procedure, and operative site.  I began the procedure by making an oblique   incision in the anterior aspect of the shoulder for a deltopectoral approach.   I identified the cephalic vein, retracted it medially, and cauterized lateral  branches.  I released the upper third of pectoralis major tendon to help   mobilize the shoulder.  Deep to this, I identified the long head of biceps   Tendon, which was very tendonopathic.  Under resting tension, I performed open biceps tenodesis to the upper  half of the pectoralis major tendon with 0 Ethibond suture to prevent painful  jeannie deformity and subluxation into the implant.  I then excised the   proximal aspect of the tendon including the labral insertion.  I then   performed a longitudinal subscapularis tenotomy while palpating and protecting  the axillary nerve.  I tagged it for later repair, dislocated the shoulder and noted significant arthritis. Osteophytes were removed with a rongeur. I then performed an anatomic neck cut at 30 degrees retroversion.  supraspinatus was torn. I then placed glenoid retractors, excised the labrum and released the capsule circumferentially around the glenoid while palpating and protecting the axillary nerve.  I placed the guide and guidewire adjusting to her anatomy. I then drilled for the central peg.  Using the Titan system, I placed 13 mm baseplate with multiple locking and nonlocking screws and a 38 mm x 5 mm lateralized glenosphere, which had   excellent fixation of the scapula.  I turned my  attention back to the humerus,  broached up to the above mentioned stem size, which had excellent press fit and stability.  I reamed the metaphysis and placed the implant, standard body,  stem press fit. I trialed  Polyethylene sized up to the above mentioned size, which had excellent range of motion and stability.  I placed this final component after copious irrigation of the   joint.  I then repaired subscapularis tenotomy with #5 Ti-Cron suture, took   the shoulder through range of motion.  There was excellent range of motion and  stability.  I then placed one deep Hemovac drain, closed the skin with 2-0   Monocryl suture and skin stapler, all covered with sterile Xeroform, 4x4s,   Ioban, and a sling.       The patient was transferred in stable condition to PACU where there were no   immediate post-term complaints.     POSTOPERATIVE PLAN:  The patient will be admitted overnight for pain control   and will be discharged home, likely on postoperative day #1.

## 2022-05-04 NOTE — ANESTHESIA POSTPROCEDURE EVALUATION
Patient: Tereza Sánchez    Procedure Summary     Date: 05/04/22 Room / Location: [facility] ROOM 21 / SURGERY SAME DAY [facility]    Anesthesia Start: 0952 Anesthesia Stop: 1142    Procedure: LEFT REVERSE TOTAL SHOULDER ARTHROPLASTY (Left Shoulder) Diagnosis:       Left shoulder pain      (Left shoulder pain )    Surgeons: Vinicius Whaley M.D. Responsible Provider: Jarrell Hunter M.D.    Anesthesia Type: general ASA Status: 3          Final Anesthesia Type: general  Last vitals  BP   Blood Pressure : 111/77    Temp   36.3 °C (97.4 °F)    Pulse   85   Resp   20    SpO2   93 %      Anesthesia Post Evaluation    Patient location during evaluation: PACU  Patient participation: complete - patient participated  Level of consciousness: awake and alert    Airway patency: patent  Anesthetic complications: no  Cardiovascular status: hemodynamically stable  Respiratory status: acceptable  Hydration status: euvolemic    PONV: none          No complications documented.     Nurse Pain Score: 0 (NPRS)

## 2022-05-04 NOTE — ANESTHESIA PROCEDURE NOTES
Airway    Date/Time: 5/4/2022 10:06 AM  Performed by: Jarrell Hunter M.D.  Authorized by: Jarrell Hunter M.D.     Location:  OR  Urgency:  Elective  Difficult Airway: No    Indications for Airway Management:  Anesthesia      Spontaneous Ventilation: absent    Sedation Level:  Deep  Preoxygenated: Yes    Patient Position:  Sniffing  Mask Difficulty Assessment:  2 - vent by mask + OA or adjuvant +/- NMBA  Final Airway Type:  Endotracheal airway  Final Endotracheal Airway:  ETT  Cuffed: Yes    Technique Used for Successful ETT Placement:  Direct laryngoscopy    Insertion Site:  Oral  Blade Type:  Ez  Laryngoscope Blade/Videolaryngoscope Blade Size:  3  ETT Size (mm):  6.5  Measured from:  Teeth  ETT to Teeth (cm):  20  Placement Verified by: auscultation and capnometry    Cormack-Lehane Classification:  Grade I - full view of glottis  Number of Attempts at Approach:  1   Atraumatic DLx1          
Patient has office visit with  on 9/13/19; last office visitr with  was in 3/19; no future appointments scheduled;  Call placed to patient; reviewed lab results and patient asked to RTC for office visit with ; patient declined to schedule at this time; he wants to see  first and see what she recommends; he is asked to call back and schedule an office visit with  at his earliest convenience; INFORMED.    
show

## 2022-05-04 NOTE — DISCHARGE INSTRUCTIONS
ACTIVITY: Rest and take it easy for the first 24 hours.  A responsible adult is recommended to remain with you during that time.  It is normal to feel sleepy.  We encourage you to not do anything that requires balance, judgment or coordination.    MILD FLU-LIKE SYMPTOMS ARE NORMAL. YOU MAY EXPERIENCE GENERALIZED MUSCLE ACHES, THROAT IRRITATION, HEADACHE AND/OR SOME NAUSEA.    FOR 24 HOURS DO NOT:  Drive, operate machinery or run household appliances.  Drink beer or alcoholic beverages.   Make important decisions or sign legal documents.    SPECIAL INSTRUCTIONS: or   SHOULDER REPLACEMENT, AFTER-CARE GUIDELINES     These instructions provide you with information on caring for yourself and your shoulder after surgery. Your health care provider may also give you instructions that are more specific. Your treatment has been planned and performed according to current medical practices but problems sometimes occur. Call your health care provider if you have any problems or questions.     WHAT TO EXPECT AFTER THE PROCEDURE   After your procedure, your shoulder and arm will typically be stiff, sore, and bruised. This will improve over time.     HOME CARE INSTRUCTIONS   · Follow your home pain management plan as discussed with your nurse and as directed by your provider.   · It is important to follow any scheduled pain medications as directed for maximal pain relief.   · If prescribed opioid medication, the goal is to use opioids ONLY IF NEEDED and to wean off prescription pain medicine as soon as possible.   · Apply ice to the injured area for several days after surgery:   · Put ice in a plastic bag.   · Place a towel between your skin and the bag.   · Leave the ice on for 20 minutes, 2-3 times a day at a minimum.   · You may resume your normal diet and activities as directed by your provider.   · Your arm will be in a sling. You will need to wear this for 4-6 weeks after surgery.   · It may be more comfortable to sleep in  a recliner for several days or weeks after surgery.   · Do not use your arm to push yourself up in bed or from a chair.   · If prescribed a home exercise plan, follow the program of home exercises as suggested by your provider and/or occupational therapist. Do the exercises at least 3x daily as directed.   · Do not lift anything heavier than a cup of coffee for the first 6 weeks after surgery.   · Ask for help. If you do not have adequate assistance at home, please seek advice from your provider about home care or other services.   · Change dressings only if necessary and as directed. Keep wound dry and covered until otherwise directed by your provider.   · Make sure that you have a follow-up appointment with your provider after surgery.     SEEK URGENT MEDICAL CARE IF:   · You have redness, swelling, or increased pain at your operative site.   · You see pus coming from the wound.   · You have a fever greater than 100.5° F.   · You notice a bad smell coming from the wound or dressing.   · The edges of the wound break open after suture or staple removal.   · You have increasing pain with movement of the shoulder.   · You develop a rash.   · You have chest pain or shortness of breath.     This information is not intended to replace advice given to you by your health care provider. Please discuss any specific questions you have with your health care provider.       DIET: To avoid nausea, slowly advance diet as tolerated, avoiding spicy or greasy foods for the first day.  Add more substantial food to your diet according to your physician's instructions.  Babies can be fed formula or breast milk as soon as they are hungry.  INCREASE FLUIDS AND FIBER TO AVOID CONSTIPATION.    SURGICAL DRESSING/BATHING:     Keep Sling on at all times until clinic followup except for gentle elbow motion and hygeine.   Sedentary use only of hand, no shoulder motion until then.   Okay to remove bandage in 5 days (Monday) and cover incision  with bandaids and get wet.     FOLLOW-UP APPOINTMENT:  A follow-up appointment should be arranged with your doctor ; call to schedule.    You should CALL YOUR PHYSICIAN if you develop:  Fever greater than 101 degrees F.  Pain not relieved by medication, or persistent nausea or vomiting.  Excessive bleeding (blood soaking through dressing) or unexpected drainage from the wound.  Extreme redness or swelling around the incision site, drainage of pus or foul smelling drainage.  Inability to urinate or empty your bladder within 8 hours.  Problems with breathing or chest pain.    You should call 911 if you develop problems with breathing or chest pain.  If you are unable to contact your doctor or surgical center, you should go to the nearest emergency room or urgent care center.      Physician's telephone #: [phone]    If any questions arise, call your doctor.  If your doctor is not available, please feel free to call the Surgical Center at [phone].     A registered nurse may call you a few days after your surgery to see how you are doing after your procedure.    MEDICATIONS: Resume taking daily medication.  Take prescribed pain medication with food.  If no medication is prescribed, you may take non-aspirin pain medication if needed.  PAIN MEDICATION CAN BE VERY CONSTIPATING.  Take a stool softener or laxative such as senokot, pericolace, or milk of magnesia if needed.     Last pain medication Oxycodone 10 mg given at 12 noon.    If your physician has prescribed pain medication that includes Acetaminophen (Tylenol), do not take additional Acetaminophen (Tylenol) while taking the prescribed medication.    Depression / Suicide Risk    As you are discharged from this [facility] facility, it is important to learn how to keep safe from harming yourself.    Recognize the warning signs:  · Abrupt changes in personality, positive or negative- including increase in energy   · Giving away possessions  · Change in eating  patterns- significant weight changes-  positive or negative  · Change in sleeping patterns- unable to sleep or sleeping all the time   · Unwillingness or inability to communicate  · Depression  · Unusual sadness, discouragement and loneliness  · Talk of wanting to die  · Neglect of personal appearance   · Rebelliousness- reckless behavior  · Withdrawal from people/activities they love  · Confusion- inability to concentrate     If you or a loved one observes any of these behaviors or has concerns about self-harm, here's what you can do:  · Talk about it- your feelings and reasons for harming yourself  · Remove any means that you might use to hurt yourself (examples: pills, rope, extension cords, firearm)  · Get professional help from the community (Mental Health, Substance Abuse, psychological counseling)  · Do not be alone:Call your Safe Contact- someone whom you trust who will be there for you.  · Call your local CRISIS HOTLINE 263-5660 or 152-230-4737  · Call your local Children's Mobile Crisis Response Team [place] (541) 441-0531 or www.[employer]  · Call the toll free National Suicide Prevention Hotlines   · National Suicide Prevention Lifeline 848-052-BFLE (9773)  · National Hope Line Network 800-SUICIDE (939-7898)

## 2022-05-04 NOTE — ANESTHESIA PREPROCEDURE EVALUATION
Chiari malformation with syringomelia, bilateral hand contractures and lower extremity weakness, utilizes walker,  chronic respiratory failure with hypoxia and SELWYN which require O2 at night 2L/m. Chronic pain with Norco use TID,  shunt.     Denies: MI/CHF/smoking/CVA/DM/CKD    PONV-did Ok with last anesthetic  Relevant Problems   ANESTHESIA   (positive) SELWYN (obstructive sleep apnea)      NEURO   (positive) History of breast cancer      CARDIAC   (positive) HTN (hypertension)      GI   (positive) GERD (gastroesophageal reflux disease)      Other   (positive) Chronic back pain   (positive) Dyslipidemia   (positive) Intracranial shunt   (positive) Major depressive disorder   (positive) Nocturnal hypoxia   (positive) Obesity (BMI 30-39.9)   (positive) Obesity (BMI 35.0-39.9 without comorbidity) (HCC)   (positive) Uncomplicated opioid dependence (HCC)       Physical Exam    Airway   Mallampati: II  TM distance: >3 FB  Neck ROM: full       Cardiovascular - normal exam  Rhythm: regular  Rate: normal  (-) murmur     Dental   Comments: Dentures removed, poor dentition         Pulmonary - normal exam  Breath sounds clear to auscultation     Abdominal    Neurological - normal exam                 Anesthesia Plan    ASA 3 (chronic respiratory failure with hypoxia)   ASA physical status 3 criteria: respiratory insufficiency or compromise    Plan - general       Airway plan will be ETT    (Possible fascia iliaca block depending on if surgery becomes more extensive than anticipated)      Induction: intravenous    Postoperative Plan: Postoperative administration of opioids is intended.    Pertinent diagnostic labs and testing reviewed    Informed Consent:    Anesthetic plan and risks discussed with patient.    Use of blood products discussed with: patient whom consented to blood products.

## 2022-05-04 NOTE — LETTER
April 25, 2022    Patient Name: Tereza Sánchez  Surgeon Name: Vinicius Whaley M.D.  Surgery Facility: [facility] ([address] [facility])  Surgery Date: 5/4/2022    The time of your surgery is not final and may change up to and until the day of your surgery. You will be contacted 24-48 hours prior to your surgery date with your check-in and surgery time.    If you will not be at one of the below numbers please call/text the surgery scheduler at [phone]  Preferred Phone: [phone]    BEFORE YOUR SURGERY   Pre Registration and/or Lab Work must be done within and no earlier than 28 days prior to your surgery date. Please call [facility] at [phone] for an appointment as soon as possible.    Instructions: Bring a list of all medications you are taking including the dosing and frequency.    Please refrain from smoking any substance after midnight prior to surgery. Smoking may interfere with the anesthetic and frequently produces nausea during the recovery period.    Continue taking all lifesaving medications. Including the morning of your surgery with small sip of water.    Please read the MEDICATION INSTRUCTIONS below completely.    DAY OF YOUR SURGERY  Nothing to eat or drink after midnight     Please arrive at the hospital/surgery center at the check-in time provided.     An adult will need to bring you and take you home after your surgery.     AFTER YOUR SURGERY  Post op Appointment:   Date: 5/17/22   Time: 245PM   With: Vinicius Whaley M.D.   Location: [address] [place] [address]            TIME OFF WORK  FMLA or Disability forms can be faxed directly to: [phone] or you may drop them off at [facility] [place] [address]. Our office charges a $35.00 fee per form. Forms will be completed within 10 business days of drop off and payment received. For the status of your forms you may contact our disability office directly [phone].    MEDICATION INSTRUCTIONS  The following medications should be stopped a minimum of 10 days prior to surgery:  All over the counter, Supplements & Herbal medications    Anorectics: Phentermine (Adipex-P, Lomaira and Suprenza), Phentermine-topiramate (Qsymia), Bupropion-naltrexone (Contrave)    Opiod Partial Agonists/Opioid Antagonists: Buprenorphine (Subocone, Belbuca, Butrans, Probuphine Implant, Sublocade), Naltrexone (ReVia, Vivitrol), Naloxone    Amphetamines: Dextroamphetamine/Amphetamine (Adderall, Mydayis), Methylphenidate Hydrochloride (Concerta, Metadate, Methylin, Ritalin)    The following medications should be stopped 5 days prior to surgery:  Blood Thinners: Any Aspirin, Aspirin products, anti-inflammatories such as ibuprofen and any blood thinners such as Coumadin and Plavix. Please consult your prescribing physician if you are on life saving blood thinners, in regards to when to stop medications prior to surgery.     The following medications should be stopped a minimum of 3 days prior to surgery:  PDE-5 inhibitors: Sildenafil (Viagra), Tadalafil (Cialis), Vardenafil (Levitra), Avanafil (Stendra)    MAO Inhibitors: Rasagiline (Azilect), Selegiline (Eldepryl, Emsam, Selapar), Isocarboxazid (Marplan), Phenelzine (Nardil)

## 2022-05-04 NOTE — ANESTHESIA TIME REPORT
Anesthesia Start and Stop Event Times     Date Time Event    5/4/2022 0928 Ready for Procedure     0952 Anesthesia Start     1142 Anesthesia Stop        Responsible Staff  05/04/22    Name Role Begin End    Jarrell Hunter M.D. Anesth 0952 1142        Overtime Reason:  no overtime (within assigned shift)    Comments:

## 2022-06-15 ENCOUNTER — TELEPHONE (OUTPATIENT)
Dept: HEALTH INFORMATION MANAGEMENT | Facility: OTHER | Age: 77
End: 2022-06-15
Payer: MEDICARE

## 2022-10-25 DIAGNOSIS — F32.0 MILD MAJOR DEPRESSION (HCC): ICD-10-CM

## 2022-10-25 DIAGNOSIS — M54.9 CHRONIC BACK PAIN, UNSPECIFIED BACK LOCATION, UNSPECIFIED BACK PAIN LATERALITY: ICD-10-CM

## 2022-10-25 DIAGNOSIS — G89.29 CHRONIC BACK PAIN, UNSPECIFIED BACK LOCATION, UNSPECIFIED BACK PAIN LATERALITY: ICD-10-CM

## 2022-10-25 RX ORDER — DULOXETIN HYDROCHLORIDE 30 MG/1
CAPSULE, DELAYED RELEASE ORAL
Qty: 90 CAPSULE | Refills: 1 | Status: SHIPPED | OUTPATIENT
Start: 2022-10-25 | End: 2023-03-29 | Stop reason: SDUPTHER

## 2022-11-21 ENCOUNTER — OFFICE VISIT (OUTPATIENT)
Dept: MEDICAL GROUP | Facility: PHYSICIAN GROUP | Age: 77
End: 2022-11-21
Payer: MEDICARE

## 2022-11-21 VITALS
WEIGHT: 194 LBS | HEIGHT: 60 IN | OXYGEN SATURATION: 96 % | TEMPERATURE: 97.9 F | SYSTOLIC BLOOD PRESSURE: 130 MMHG | HEART RATE: 77 BPM | RESPIRATION RATE: 20 BRPM | BODY MASS INDEX: 38.09 KG/M2 | DIASTOLIC BLOOD PRESSURE: 80 MMHG

## 2022-11-21 DIAGNOSIS — E83.59 CALCINOSIS: ICD-10-CM

## 2022-11-21 PROCEDURE — 99214 OFFICE O/P EST MOD 30 MIN: CPT | Performed by: NURSE PRACTITIONER

## 2022-11-21 RX ORDER — ALPRAZOLAM 0.25 MG/1
TABLET ORAL
COMMUNITY
Start: 2022-10-19 | End: 2023-03-29

## 2022-11-21 ASSESSMENT — FIBROSIS 4 INDEX: FIB4 SCORE: 1.71

## 2022-11-21 NOTE — PROGRESS NOTES
Subjective  Chief Complaint  Skin Concern    History of Present Illness  Tereza Sánchez is a 77 y.o. female. This established patient is here today to discuss a skin concern.    Her PCP is PÉREZ Cisneros.    Calcinosis  Chronic and worsening. She states that for the past 6 months she has noticed a bump to the left side of her left nostril. She states that she thought it was a mole. She states that it continues to grow and is starting to effect the way she breathes.    Past Medical History    Allergies: Bactrim ds and Morphine  Past Medical History:   Diagnosis Date    Anesthesia     PONV    Arthritis     Left hip, knees, ankles    Asthma     Inhaler use daily.    Bowel habit changes     Constipation    Breath shortness     asthma    Cancer (HCC)     Breast 2015    Chiari malformation 1970's    shunt  in place    Chickenpox     Chronic back pain     2/2 scoliosis and syringomyelia     Contracture of hand joint     left     Decreased lung capacity     Dental disorder     upper/lower    Disorder of thyroid     Heart burn     High cholesterol     Hypertension     Indigestion     Joint replacement     Right shoulder, cervical    Obesity     Pain     Pain 08/06/2018    Back, neck and legs    Peripheral edema 06/06/2013    Pneumonia     PONV (postoperative nausea and vomiting)     Psychiatric problem     depression, anxiety    Scoliosis     Shortness of breath 08/06/2018    Chronic    Sleep apnea     uses O2 at night 2L    Sleep apnea 08/07/2018    Patient states having sleep study October 2018.    Snoring     No sleep study    sore throat 01/15/2015    Supplemental oxygen dependent     Syringomyelia (HCC)     surgery 1973, 1977     Past Surgical History:   Procedure Laterality Date    PB RECONSTR TOTAL SHOULDER IMPLANT Left 5/4/2022    Procedure: LEFT REVERSE TOTAL SHOULDER ARTHROPLASTY;  Surgeon: Vinicius Whaley M.D.;  Location: SURGERY SAME DAY [facility];  Service: Orthopedics    HIP REVISION TOTAL Left  "9/2/2021    Procedure: REVISION, TOTAL ARTHROPLASTY, HIP;  Surgeon: Daniel Allison M.D.;  Location: SURGERY [facility];  Service: Orthopedics    THYROID LOBECTOMY Left 8/17/2018    Procedure: THYROID LOBECTOMY;  Surgeon: Adelina Wilson M.D.;  Location: SURGERY SAME DAY [facility];  Service: General    THYROIDECTOMY TOTAL  8/17/2018    Procedure: NIMS RECURRENT LARYNGEAL NERVE MONITORING;  Surgeon: Adelina Wilson M.D.;  Location: SURGERY SAME DAY [facility];  Service: General    NODE BIOPSY SENTINEL  2/6/2015    Performed by Adelina Wilson M.D. at SURGERY [facility]    MASTECTOMY  2/6/2015    right-Performed by Adelina Wilson M.D. at SURGERY [facility]    HIP ARTHROPLASTY TOTAL  5/19/08    Performed by FARTUN ERAZO at SURGERY [facility]    SHOULDER ARTHROPLASTY TOTAL  2004    Right    JAN BY LAPAROSCOPY  2002    CYST EXCISION  1973    \"remove cyst    HIP ARTHROPLASTY TOTAL      HIP REPLACEMENT, TOTAL      LAMINOTOMY      OTHER ABDOMINAL SURGERY      SHUNT INSERTION      cerebral shunt    US-NEEDLE CORE BX-BREAST PANEL       Current Outpatient Medications Ordered in Epic   Medication Sig Dispense Refill    DULoxetine (CYMBALTA) 30 MG Cap DR Particles TAKE 1 CAPSULE BY MOUTH EVERY DAY 90 Capsule 1    pregabalin (LYRICA) 150 MG Cap       baclofen (LIORESAL) 5 MG Tab TAKE 1 TABLET BY MOUTH TWICE A DAY AS NEEDED FOR SPASTICITY      benazepril-hydrochlorthiazide (LOTENSIN HCT) 20-12.5 MG per tablet TAKE 1 TABLET BY MOUTH EVERY  Tablet 1    atenolol (TENORMIN) 50 MG Tab TAKE 2 TABLETS BY MOUTH EVERY  Tablet 0    predniSONE (DELTASONE) 10 MG Tab Take 1 Tablet by mouth every day.      albuterol 108 (90 Base) MCG/ACT Aero Soln inhalation aerosol Inhale 2 Puffs every 6 hours as needed for Shortness of Breath.      Home Care Oxygen Inhale 2 L/min at bedtime. Oxygen dose range: 2 L/min  Respiratory route via: Nasal Cannula   Oxygen supplier: Preferred          acetaminophen " (TYLENOL) 500 MG Tab Take 2 Tablets by mouth 2 times a day as needed (pain supplement in addition to Norco, if with severe pain).      Omega-3 350 MG CAPSULE DELAYED RELEASE Take 1 Capsule by mouth every day.      Zinc 50 MG Tab Take 1 Tablet by mouth every day.      ascorbic acid (VITAMIN C) 1000 MG tablet Take 1 Tablet by mouth every day.      esomeprazole (NEXIUM) 20 MG capsule TAKE 1 CAPSULE BY MOUTH EVERY DAY IN THE MORNING BEFORE BREAKFAST 30 capsule 0    HYDROcodone-acetaminophen (NORCO) 5-325 MG Tab per tablet TAKE 1 TABLET BY MOUTH 3 TIMES A DAY FOR 30 DAYS, 724.02  0    potassium chloride SA (KDUR) 20 MEQ Tab CR Take 1 Tab by mouth every day. 90 Tab 1    amitriptyline (ELAVIL) 25 MG Tab Take 4 Tablets by mouth every evening.      ALPRAZolam (XANAX) 0.25 MG Tab TAKE 1 TAB 30 MIN PRIOR TO MRI, MAY REPEAT IN 20 MIN X 1 IF NEEDED. F40.240      LYRICA 100 MG Cap Take 100 mg by mouth 3 times a day.  1     No current [facility]-ordered facility-administered medications on file.     Family History:    Family History   Problem Relation Age of Onset    Hypertension Mother     Sleep Apnea Brother     Cancer Neg Hx     Diabetes Neg Hx     Stroke Neg Hx     Heart Disease Neg Hx       Personal/Social History:    Social History     Tobacco Use    Smoking status: Never    Smokeless tobacco: Never   Vaping Use    Vaping Use: Never used   Substance Use Topics    Alcohol use: No     Alcohol/week: 0.0 oz    Drug use: No     Social History     Social History Narrative    Not on file      Review of Systems:   General: Negative for fever/chills and unexpected weight change.   Respiratory:  Negative for cough and dyspnea.    Cardiovascular:  Negative for chest pain and palpitations.  Musculoskeletal:  Negative for myalgias.   Skin: Positive for calcinosis.    Objective  Physical Exam:   /80 (BP Location: Left arm, Patient Position: Sitting, BP Cuff Size: Adult)   Pulse 77   Temp 36.6 °C (97.9 °F) (Temporal)   Resp 20   Ht  1.524 m (5')   Wt 88 kg (194 lb)   SpO2 96%  Body mass index is 37.89 kg/m².  General:  Alert and oriented.  Well appearing.  NAD  Neck: Supple without JVD. No lymphadenopathy.  Pulmonary:  Normal effort.  Clear to ausculation without rales, ronchi, or wheezing.  Cardiovascular:  Regular rate and rhythm without murmur, rubs or gallop.   Skin: Left outer nostril area with tiny white skin deposit.  Musculoskeletal:  No extremity cyanosis, clubbing, or edema.      Assessment/Plan  1. Calcinosis  Chronic and worsening.  Discussed with her that she can see a Dermatologist and they can remove the area, she is agreeable and would like a referral placed at this time.  - Referral to Dermatology        Return if symptoms worsen or fail to improve.    Please note that this dictation was created using voice recognition software. I have made every reasonable attempt to correct obvious errors, but I expect that there are errors of grammar and possibly content that I did not discover before finalizing the note.    PÉREZ Palm  [facility]

## 2022-11-21 NOTE — ASSESSMENT & PLAN NOTE
Chronic and worsening. She states that for the past 6 months she has noticed a bump to the left side of her left nostril. She states that she thought it was a mole. She states that it continues to grow and is starting to effect the way she breathes.

## 2023-01-05 DIAGNOSIS — I10 ESSENTIAL HYPERTENSION: ICD-10-CM

## 2023-01-05 NOTE — TELEPHONE ENCOUNTER
Received request via: Pharmacy    Was the patient seen in the last year in this department? Yes    Does the patient have an active prescription (recently filled or refills available) for medication(s) requested? No    Does the patient have halfway Plus and need 100 day supply (blood pressure, diabetes and cholesterol meds only)? Yes, quantity updated to 100 days

## 2023-01-08 RX ORDER — BENAZEPRIL HYDROCHLORIDE AND HYDROCHLOROTHIAZIDE 20; 12.5 MG/1; MG/1
TABLET ORAL
Qty: 100 TABLET | Refills: 0 | Status: SHIPPED | OUTPATIENT
Start: 2023-01-08 | End: 2023-03-29 | Stop reason: SDUPTHER

## 2023-03-26 SDOH — ECONOMIC STABILITY: TRANSPORTATION INSECURITY
IN THE PAST 12 MONTHS, HAS LACK OF TRANSPORTATION KEPT YOU FROM MEETINGS, WORK, OR FROM GETTING THINGS NEEDED FOR DAILY LIVING?: NO

## 2023-03-26 SDOH — ECONOMIC STABILITY: FOOD INSECURITY: WITHIN THE PAST 12 MONTHS, YOU WORRIED THAT YOUR FOOD WOULD RUN OUT BEFORE YOU GOT MONEY TO BUY MORE.: NEVER TRUE

## 2023-03-26 SDOH — ECONOMIC STABILITY: HOUSING INSECURITY
IN THE LAST 12 MONTHS, WAS THERE A TIME WHEN YOU DID NOT HAVE A STEADY PLACE TO SLEEP OR SLEPT IN A SHELTER (INCLUDING NOW)?: NO

## 2023-03-26 SDOH — ECONOMIC STABILITY: FOOD INSECURITY: WITHIN THE PAST 12 MONTHS, THE FOOD YOU BOUGHT JUST DIDN'T LAST AND YOU DIDN'T HAVE MONEY TO GET MORE.: NEVER TRUE

## 2023-03-26 SDOH — ECONOMIC STABILITY: TRANSPORTATION INSECURITY
IN THE PAST 12 MONTHS, HAS THE LACK OF TRANSPORTATION KEPT YOU FROM MEDICAL APPOINTMENTS OR FROM GETTING MEDICATIONS?: NO

## 2023-03-26 SDOH — ECONOMIC STABILITY: INCOME INSECURITY: IN THE LAST 12 MONTHS, WAS THERE A TIME WHEN YOU WERE NOT ABLE TO PAY THE MORTGAGE OR RENT ON TIME?: NO

## 2023-03-26 SDOH — ECONOMIC STABILITY: INCOME INSECURITY: HOW HARD IS IT FOR YOU TO PAY FOR THE VERY BASICS LIKE FOOD, HOUSING, MEDICAL CARE, AND HEATING?: NOT VERY HARD

## 2023-03-26 SDOH — HEALTH STABILITY: PHYSICAL HEALTH: ON AVERAGE, HOW MANY DAYS PER WEEK DO YOU ENGAGE IN MODERATE TO STRENUOUS EXERCISE (LIKE A BRISK WALK)?: 0 DAYS

## 2023-03-26 SDOH — HEALTH STABILITY: PHYSICAL HEALTH: ON AVERAGE, HOW MANY MINUTES DO YOU ENGAGE IN EXERCISE AT THIS LEVEL?: 0 MIN

## 2023-03-26 ASSESSMENT — LIFESTYLE VARIABLES
SKIP TO QUESTIONS 9-10: 1
HOW OFTEN DO YOU HAVE A DRINK CONTAINING ALCOHOL: NEVER
HOW OFTEN DO YOU HAVE SIX OR MORE DRINKS ON ONE OCCASION: NEVER
HOW MANY STANDARD DRINKS CONTAINING ALCOHOL DO YOU HAVE ON A TYPICAL DAY: PATIENT DOES NOT DRINK
AUDIT-C TOTAL SCORE: 0

## 2023-03-26 ASSESSMENT — SOCIAL DETERMINANTS OF HEALTH (SDOH)
DO YOU BELONG TO ANY CLUBS OR ORGANIZATIONS SUCH AS CHURCH GROUPS UNIONS, FRATERNAL OR ATHLETIC GROUPS, OR SCHOOL GROUPS?: NO
HOW OFTEN DO YOU ATTENT MEETINGS OF THE CLUB OR ORGANIZATION YOU BELONG TO?: NEVER
HOW OFTEN DO YOU ATTEND CHURCH OR RELIGIOUS SERVICES?: NEVER
IN A TYPICAL WEEK, HOW MANY TIMES DO YOU TALK ON THE PHONE WITH FAMILY, FRIENDS, OR NEIGHBORS?: ONCE A WEEK
HOW OFTEN DO YOU GET TOGETHER WITH FRIENDS OR RELATIVES?: ONCE A WEEK

## 2023-03-29 ENCOUNTER — OFFICE VISIT (OUTPATIENT)
Dept: MEDICAL GROUP | Facility: PHYSICIAN GROUP | Age: 78
End: 2023-03-29
Payer: MEDICARE

## 2023-03-29 VITALS
DIASTOLIC BLOOD PRESSURE: 82 MMHG | WEIGHT: 193 LBS | TEMPERATURE: 97.8 F | SYSTOLIC BLOOD PRESSURE: 138 MMHG | HEART RATE: 92 BPM | RESPIRATION RATE: 18 BRPM | HEIGHT: 60 IN | OXYGEN SATURATION: 96 % | BODY MASS INDEX: 37.89 KG/M2

## 2023-03-29 DIAGNOSIS — F41.0 PANIC ATTACKS: ICD-10-CM

## 2023-03-29 DIAGNOSIS — R73.03 PREDIABETES: ICD-10-CM

## 2023-03-29 DIAGNOSIS — I10 PRIMARY HYPERTENSION: Chronic | ICD-10-CM

## 2023-03-29 DIAGNOSIS — R41.3 MEMORY CHANGES: ICD-10-CM

## 2023-03-29 DIAGNOSIS — E78.5 DYSLIPIDEMIA: ICD-10-CM

## 2023-03-29 DIAGNOSIS — Z78.0 POSTMENOPAUSAL: ICD-10-CM

## 2023-03-29 DIAGNOSIS — F33.1 MODERATE EPISODE OF RECURRENT MAJOR DEPRESSIVE DISORDER (HCC): ICD-10-CM

## 2023-03-29 DIAGNOSIS — E55.9 VITAMIN D DEFICIENCY: ICD-10-CM

## 2023-03-29 DIAGNOSIS — E66.3 OVERWEIGHT: ICD-10-CM

## 2023-03-29 DIAGNOSIS — D50.9 IRON DEFICIENCY ANEMIA, UNSPECIFIED IRON DEFICIENCY ANEMIA TYPE: ICD-10-CM

## 2023-03-29 DIAGNOSIS — R53.83 OTHER FATIGUE: ICD-10-CM

## 2023-03-29 DIAGNOSIS — Z98.2 INTRACRANIAL SHUNT: ICD-10-CM

## 2023-03-29 DIAGNOSIS — M54.9 CHRONIC BACK PAIN, UNSPECIFIED BACK LOCATION, UNSPECIFIED BACK PAIN LATERALITY: ICD-10-CM

## 2023-03-29 DIAGNOSIS — M85.832 OSTEOPENIA OF LEFT FOREARM: ICD-10-CM

## 2023-03-29 DIAGNOSIS — G89.29 CHRONIC BACK PAIN, UNSPECIFIED BACK LOCATION, UNSPECIFIED BACK PAIN LATERALITY: ICD-10-CM

## 2023-03-29 DIAGNOSIS — R54 AGE-RELATED PHYSICAL DEBILITY: ICD-10-CM

## 2023-03-29 PROBLEM — G47.00 INSOMNIA: Chronic | Status: ACTIVE | Noted: 2023-03-29

## 2023-03-29 PROBLEM — G47.00 INSOMNIA: Status: ACTIVE | Noted: 2023-03-29

## 2023-03-29 PROBLEM — Z96.649 RECURRENT DISLOCATION OF HIP JOINT PROSTHESIS (HCC): Status: RESOLVED | Noted: 2021-08-25 | Resolved: 2023-03-29

## 2023-03-29 PROBLEM — T84.021A DISPLACEMENT OF INTERNAL LEFT HIP PROSTHESIS (HCC): Status: RESOLVED | Noted: 2021-07-21 | Resolved: 2023-03-29

## 2023-03-29 PROBLEM — E66.9 OBESITY (BMI 35.0-39.9 WITHOUT COMORBIDITY): Status: RESOLVED | Noted: 2018-10-05 | Resolved: 2023-03-29

## 2023-03-29 PROBLEM — T84.029A RECURRENT DISLOCATION OF HIP JOINT PROSTHESIS (HCC): Status: RESOLVED | Noted: 2021-08-25 | Resolved: 2023-03-29

## 2023-03-29 PROBLEM — M70.20 OLECRANON BURSITIS: Status: RESOLVED | Noted: 2021-01-22 | Resolved: 2023-03-29

## 2023-03-29 PROBLEM — R41.0 DELIRIUM: Status: RESOLVED | Noted: 2021-09-04 | Resolved: 2023-03-29

## 2023-03-29 PROBLEM — G47.33 OSA (OBSTRUCTIVE SLEEP APNEA): Chronic | Status: ACTIVE | Noted: 2018-11-28

## 2023-03-29 PROBLEM — F32.9 MAJOR DEPRESSIVE DISORDER: Chronic | Status: ACTIVE | Noted: 2019-07-16

## 2023-03-29 PROBLEM — J96.01 ACUTE RESPIRATORY FAILURE WITH HYPOXIA (HCC): Status: RESOLVED | Noted: 2021-09-02 | Resolved: 2023-03-29

## 2023-03-29 PROCEDURE — 99214 OFFICE O/P EST MOD 30 MIN: CPT | Performed by: PHYSICIAN ASSISTANT

## 2023-03-29 RX ORDER — DULOXETIN HYDROCHLORIDE 30 MG/1
30 CAPSULE, DELAYED RELEASE ORAL DAILY
Qty: 90 CAPSULE | Refills: 1 | Status: SHIPPED | OUTPATIENT
Start: 2023-03-29 | End: 2023-09-19

## 2023-03-29 RX ORDER — ATENOLOL 50 MG/1
100 TABLET ORAL
Qty: 200 TABLET | Refills: 0 | Status: SHIPPED | OUTPATIENT
Start: 2023-03-29 | End: 2023-09-19

## 2023-03-29 RX ORDER — ATORVASTATIN CALCIUM 10 MG/1
10 TABLET, FILM COATED ORAL NIGHTLY
Qty: 100 TABLET | Refills: 1 | Status: SHIPPED | OUTPATIENT
Start: 2023-03-29 | End: 2023-09-19

## 2023-03-29 RX ORDER — BENAZEPRIL HYDROCHLORIDE AND HYDROCHLOROTHIAZIDE 20; 12.5 MG/1; MG/1
1 TABLET ORAL DAILY
Qty: 100 TABLET | Refills: 1 | Status: SHIPPED | OUTPATIENT
Start: 2023-03-29 | End: 2023-10-17

## 2023-03-29 RX ORDER — OLOPATADINE HYDROCHLORIDE 1 MG/ML
SOLUTION/ DROPS OPHTHALMIC
COMMUNITY
Start: 2023-03-23 | End: 2023-10-01

## 2023-03-29 SDOH — HEALTH STABILITY: MENTAL HEALTH
STRESS IS WHEN SOMEONE FEELS TENSE, NERVOUS, ANXIOUS, OR CAN'T SLEEP AT NIGHT BECAUSE THEIR MIND IS TROUBLED. HOW STRESSED ARE YOU?: VERY MUCH

## 2023-03-29 SDOH — HEALTH STABILITY: PHYSICAL HEALTH: ON AVERAGE, HOW MANY DAYS PER WEEK DO YOU ENGAGE IN MODERATE TO STRENUOUS EXERCISE (LIKE A BRISK WALK)?: 0 DAYS

## 2023-03-29 SDOH — ECONOMIC STABILITY: HOUSING INSECURITY

## 2023-03-29 SDOH — ECONOMIC STABILITY: TRANSPORTATION INSECURITY
IN THE PAST 12 MONTHS, HAS LACK OF RELIABLE TRANSPORTATION KEPT YOU FROM MEDICAL APPOINTMENTS, MEETINGS, WORK OR FROM GETTING THINGS NEEDED FOR DAILY LIVING?: NO

## 2023-03-29 SDOH — HEALTH STABILITY: PHYSICAL HEALTH: ON AVERAGE, HOW MANY MINUTES DO YOU ENGAGE IN EXERCISE AT THIS LEVEL?: 0 MIN

## 2023-03-29 ASSESSMENT — SOCIAL DETERMINANTS OF HEALTH (SDOH)
WITHIN THE PAST 12 MONTHS, YOU WORRIED THAT YOUR FOOD WOULD RUN OUT BEFORE YOU GOT THE MONEY TO BUY MORE: NEVER TRUE
HOW OFTEN DO YOU GET TOGETHER WITH FRIENDS OR RELATIVES?: ONCE A WEEK
HOW OFTEN DO YOU HAVE A DRINK CONTAINING ALCOHOL: NEVER
IN A TYPICAL WEEK, HOW MANY TIMES DO YOU TALK ON THE PHONE WITH FAMILY, FRIENDS, OR NEIGHBORS?: ONCE A WEEK
HOW HARD IS IT FOR YOU TO PAY FOR THE VERY BASICS LIKE FOOD, HOUSING, MEDICAL CARE, AND HEATING?: NOT VERY HARD
HOW OFTEN DO YOU ATTENT MEETINGS OF THE CLUB OR ORGANIZATION YOU BELONG TO?: NEVER
DO YOU BELONG TO ANY CLUBS OR ORGANIZATIONS SUCH AS CHURCH GROUPS UNIONS, FRATERNAL OR ATHLETIC GROUPS, OR SCHOOL GROUPS?: NO
HOW OFTEN DO YOU ATTEND CHURCH OR RELIGIOUS SERVICES?: NEVER
HOW MANY DRINKS CONTAINING ALCOHOL DO YOU HAVE ON A TYPICAL DAY WHEN YOU ARE DRINKING: PATIENT DOES NOT DRINK
HOW OFTEN DO YOU HAVE SIX OR MORE DRINKS ON ONE OCCASION: NEVER

## 2023-03-29 ASSESSMENT — ENCOUNTER SYMPTOMS
CHILLS: 0
SHORTNESS OF BREATH: 0
BACK PAIN: 1
FEVER: 0

## 2023-03-29 ASSESSMENT — FIBROSIS 4 INDEX: FIB4 SCORE: 1.71

## 2023-03-29 NOTE — PATIENT INSTRUCTIONS
March 29, 2023    Discuss the shingles vaccination with your pharmacy.  You can schedule your DEXA (bone density exam) by calling [phone].  You will receive a [employer]AT[employer] message regarding who you were referred to for your new neurologist.  Have your labs done at your earliest convenience. Please fast 8-10 hours for this if possible.  Follow up with me, Hortencia Jim PA-C, in about a month to go over your labs and bone density exam.

## 2023-03-29 NOTE — PROGRESS NOTES
Subjective:     CC: establish care     HPI:   Tereza presents today with    Problem   Insomnia    Chronic, controlled.  Tolerates amitriptyline 100mg at night.  Has been on this for 20 years or so.     Iron Deficiency Anemia    Chronic, control unknown.  Patient does not take supplementation.  Due to repeat labs.     Memory Changes    Chronic, uncontrolled.  Patient and her  have both noticed memory changes over the last few years, worse over the last several months to year.  She has difficult time remembering conversation that just happened.  She sees neurology right now but would like to see a different one.     Prediabetes    Chronic, stable.  History of A1c 6.4.  Due to repeat labs.     Age-Related Physical Debility    Chronic, uncontrolled.  Uses a 4 wheeled walker.  Has deformed fingers/hands, unable to use them for many of her ADLs.     Osteopenia of Left Forearm    Chronic, uncontrolled.  DEXA 2019:  L-spine T score -0.6  Right femur T score -1.3  Left forearm T score -2.1.  Patient has never been on Fosamax.     Calcinosis    Chronic, uncontrolled.  Started 6-8 months ago.  Declines referral to dermatology.     Left Shoulder Pain    Chronic, controlled.  Surgery 2022.  Improved pain but still has shoulder pain secondary to her back issues.     Uncomplicated Opioid Dependence (Hcc)    Chronic, controlled.  Managed by Dr. Chavez.     Hypotension    Chronic, controlled.  Has them maybe once/month.  Encouraged her to monitor her fluid intake.     Presence of Left Artificial Hip Joint    Chronic, controlled.  Replaced 2021.     Chronic Respiratory Failure With Hypoxia (Hcc)    Chronic, controlled.  Uses oxygen at night only.     History of Breast Cancer    Chronic, controlled.   History of breast cancer in 2015.   Was told she no longer needs to follow up.  Not on any ongoing medication.     Major Depressive Disorder    Chronic, controlled.  Tolerating duloxetine 30mg daily for the last few years.   No  therapy. No pyschiatry.       Nocturnal Hypoxia    Chronic, controlled.  Uses oxygen at night.  No longer sees pulmonology.     Timi (Obstructive Sleep Apnea)    Chronic, controlled.  Was told she doesn't need a CPAP/BiPAP.  Just does oxygen at night.  Has not seen pulmonology in a few years.     Panic Attacks    Chronic, controlled.  Has had them in the past.  Hasn't had one in at least a year.  Tolerating duloxetine 30mg daily.     H/O Partial Thyroidectomy    Chronic, controlled.  Removed due to nodules.  Not on thyroid medication.     Overweight    Chronic, uncontrolled.  Discussed increasing plant-based foods, decreasing animal-based foods.  Increase daily activity, aiming for 30-45 minutes brisk exercise daily.       Risk for Falls    Chronic, controlled.  Most recent fall: 2021.  Uses a 4 wheeled walker.     Dyslipidemia    Chronic, uncontrolled.   Latest Labs:   Lab Results   Component Value Date/Time    CHOLSTRLTOT 198 03/29/2022 09:10 AM     (H) 03/29/2022 09:10 AM    HDL 53 03/29/2022 09:10 AM    TRIGLYCERIDE 141 03/29/2022 09:10 AM      Medications: none previously; starting atorvastatin 3/29/2023.  Medication side effects:   Diet/Exercise:   Family History of high cholesterol or heart disease?   Risk calculator: The 10-year ASCVD risk score (Douglas DK, et al., 2019) is: 24%        Gerd (Gastroesophageal Reflux Disease)    Chronic, controlled.  Tolerates esomeprazole 20mg daily.  Has been on this for years.  Has tried other medications that didn't work.  Does not see GI.     Htn (Hypertension)    Chronic, controlled.  Tolerating atenolol 50mg, 2 tablets daily and benazepril-HCTZ 20/12.5 daily.  Does not see any specialist.     Chronic Back Pain    Chronic, uncontrolled.  Managed by Dr. Ruiz but sees [facility], Dr. Chavez, for HC/APAP 5/325 TID and Lyrica 150mg TID.     Intracranial Shunt    Chronic, controlled.  Shunt placed around 1973.  Followed by Dr. Pierce annually.     Delirium  (Resolved)   Acute respiratory failure with hypoxia (HCC) due to iatrogenic volume overload  (Resolved)   Recurrent Dislocation of Hip Joint Prosthesis (Hcc) (Resolved)    Added automatically from request for surgery [id number]     Displacement of Internal Left Hip Prosthesis (Hcc) (Resolved)   Olecranon Bursitis (Resolved)   Obesity (BMI 35.0-39.9 without comorbidity) (HCC) (Resolved)       Health Maintenance: Completed    ROS:  Review of Systems   Constitutional:  Positive for malaise/fatigue. Negative for chills and fever.   Respiratory:  Negative for shortness of breath.    Cardiovascular:  Negative for chest pain.   Musculoskeletal:  Positive for back pain and joint pain.     Objective:     Exam:  /82   Pulse 92   Temp 36.6 °C (97.8 °F) (Temporal)   Resp 18   Ht 1.524 m (5')   Wt 87.5 kg (193 lb)   SpO2 96%   BMI 37.69 kg/m²  Body mass index is 37.69 kg/m².    Physical Exam  Constitutional:       Appearance: Normal appearance.   Cardiovascular:      Rate and Rhythm: Normal rate and regular rhythm.      Heart sounds: Normal heart sounds.   Pulmonary:      Effort: Pulmonary effort is normal.      Breath sounds: Normal breath sounds.   Musculoskeletal:      Comments: Deformity noted to the back and hands.   Skin:     General: Skin is warm.   Neurological:      General: No focal deficit present.      Mental Status: She is alert.   Psychiatric:         Mood and Affect: Mood normal.         Assessment & Plan:     77 y.o. female with the following -         HCC Gap Form    Diagnosis: E66.01 - Morbid (severe) obesity due to excess calories (HCC)  Z68.37 - Body mass index (BMI) 37.0-37.9, adult  Comorbidity Diagnosis: Moderate episode of recurrent major depressive disorder (HCC)  The current BMI is 37.69 kg/m2 as of 03/29/23 09:37 PDT  Assessment and plan: Chronic, uncontrolled.  Discussed improving diet.  Patient does not eat a lot of fruits and vegetables.  Patient has mobility issues, using a 4 wheeled  walker.  Diagnosis to address: F11.20 - Uncomplicated opioid dependence (HCC)  Assessment and plan: Chronic, stable.  Managed by Dr. Chavez.  Diagnosis: J96.11 - Chronic respiratory failure with hypoxia (HCC)  Assessment and plan: Chronic, stable.  Uses oxygen at night only.  Has not seen pulmonology in years.  Diagnosis: F32.0 - Major depressive disorder, single episode, mild (HCC)  Assessment and plan: Chronic, stable.  Tolerating duloxetine 30 mg daily.  Last edited 03/29/23 09:40 PDT by Michelle Jim P.A.-C.         1. Primary hypertension  Chronic, controlled.  Continue benazepril-HCTZ 20-12.5 mg and atenolol 50 mg, 2 tablets daily.    - benazepril-hydrochlorthiazide (LOTENSIN HCT) 20-12.5 MG per tablet; Take 1 Tablet by mouth every day. BLOOD PRESSURE  Dispense: 100 Tablet; Refill: 1  - atenolol (TENORMIN) 50 MG Tab; Take 2 Tablets by mouth every day. BLOOD PRESSURE  Dispense: 200 Tablet; Refill: 0  - Comp Metabolic Panel; Future    2. Dyslipidemia  Chronic, uncontrolled.  Has never been on medication  Starting atorvastatin 10 mg daily.  Due to repeat labs.    - atorvastatin (LIPITOR) 10 MG Tab; Take 1 Tablet by mouth every evening. CHOLESTEROL  Dispense: 100 Tablet; Refill: 1  - Lipid Profile; Future    3. Moderate episode of recurrent major depressive disorder (HCC)  Chronic, stable.  Continue duloxetine 30 mg daily.    - DULoxetine (CYMBALTA) 30 MG Cap DR Particles; Take 1 Capsule by mouth every day. DEPRESSION  Dispense: 90 Capsule; Refill: 1    4. Panic attacks  Chronic, stable.  Continue duloxetine 30 mg daily.    5. Osteopenia of left forearm  Chronic, uncontrolled.  Due to repeat DEXA.    6. Postmenopausal  See #5  - DS-BONE DENSITY STUDY (DEXA); Future    7. Intracranial shunt  Chronic, stable.  Shunt placed in the 70s.  Patient is requesting a new neurologist.    8. Memory changes  Chronic, uncontrolled.  Referring to neurology.    - Referral to Neurology    9. Age-related physical  debility  Chronic, uncontrolled.  Chronic, uncontrolled.  Uses a 4 wheeled walker for mobility.  Deformities to both hands make ADLs difficult.    10. Chronic back pain, unspecified back location, unspecified back pain laterality  Chronic, uncontrolled.  Followed by spine.    - DULoxetine (CYMBALTA) 30 MG Cap DR Particles; Take 1 Capsule by mouth every day. DEPRESSION  Dispense: 90 Capsule; Refill: 1    11. Overweight  Chronic, uncontrolled.  Discussed importance of increasing plant-based foods, decreasing animal-based and processed foods.  Advised that better weight control will help improve mobility issues.    12. Prediabetes  Chronic, control unknown.  Due to repeat A1c.    - HEMOGLOBIN A1C; Future    13. Iron deficiency anemia, unspecified iron deficiency anemia type  Chronic, control unknown.  Not currently on supplementation.  Due to repeat labs.    - IRON/TOTAL IRON BIND; Future  - TRANSFERRIN SATURATION  - FERRITIN; Future  - CBC WITH DIFFERENTIAL; Future    14. Vitamin D deficiency  Chronic, control unknown.  Not currently on supplementation.  Due to repeat labs.    - VITAMIN D,25 HYDROXY (DEFICIENCY); Future    15. Other fatigue    - TSH WITH REFLEX TO FT4; Future    All other diagnoses are per HPI.      Return in about 1 month (around 4/29/2023) for med check, discuss labs.    Please note that this dictation was created using voice recognition software. I have made every reasonable attempt to correct obvious errors, but I expect that there are errors of grammar and possibly content that I did not discover before finalizing the note.

## 2023-03-29 NOTE — LETTER
[email or web address]  Michelle Jim P.A.-C.  [address]  [place] [address]  Fax: [phone]   Authorization for Release/Disclosure of   Protected Health Information   Name: TEREZA SÁNCHEZ : 1945 SSN: [id number]   Address: [address] [place] [address]  [place] [address] Phone:    There are no phone numbers on file.   I authorize the entity listed below to release/disclose the PHI below to:   On license of [facility]/Michelle Jim P.A.-C. and Michelle Jim P.A.-C.   Provider or Entity Name:   [facility]   Address   City, State, Zip   Phone:      Fax:     Reason for request: continuity of care   Information to be released:    [  xxxx] LAST COLONOSCOPY,  including any PATH REPORT and follow-up  [  ] LAST FIT/COLOGUARD RESULT [  ] LAST DEXA  [  ] LAST MAMMOGRAM  [  ] LAST PAP  [  ] LAST LABS [  ] RETINA EXAM REPORT  [  ] IMMUNIZATION RECORDS  [  ] Release all info      [  ] Check here and initial the line next to each item to release ALL health information INCLUDING  _____ Care and treatment for drug and / or alcohol abuse  _____ HIV testing, infection status, or AIDS  _____ Genetic Testing    DATES OF SERVICE OR TIME PERIOD TO BE DISCLOSED: _____________  I understand and acknowledge that:  * This Authorization may be revoked at any time by you in writing, except if your health information has already been used or disclosed.  * Your health information that will be used or disclosed as a result of you signing this authorization could be re-disclosed by the recipient. If this occurs, your re-disclosed health information may no longer be protected by State or Federal laws.  * You may refuse to sign this Authorization. Your refusal will not affect your ability to obtain treatment.  * This Authorization becomes effective upon signing and will  on (date) __________.      If no date is indicated, this Authorization will  one (1) year from the signature date.    Name: Tereza Sánchez  Signature: Date:    3/29/2023     PLEASE FAX REQUESTED RECORDS BACK TO: [phone]

## 2023-04-04 ENCOUNTER — HOSPITAL ENCOUNTER (OUTPATIENT)
Dept: LAB | Facility: MEDICAL CENTER | Age: 78
End: 2023-04-04
Attending: PHYSICIAN ASSISTANT
Payer: MEDICARE

## 2023-04-04 DIAGNOSIS — E55.9 VITAMIN D DEFICIENCY: ICD-10-CM

## 2023-04-04 DIAGNOSIS — E78.5 DYSLIPIDEMIA: ICD-10-CM

## 2023-04-04 DIAGNOSIS — I10 PRIMARY HYPERTENSION: Chronic | ICD-10-CM

## 2023-04-04 DIAGNOSIS — R53.83 OTHER FATIGUE: ICD-10-CM

## 2023-04-04 DIAGNOSIS — D50.9 IRON DEFICIENCY ANEMIA, UNSPECIFIED IRON DEFICIENCY ANEMIA TYPE: ICD-10-CM

## 2023-04-04 DIAGNOSIS — R73.03 PREDIABETES: ICD-10-CM

## 2023-04-04 LAB
25(OH)D3 SERPL-MCNC: 39 NG/ML (ref 30–100)
ALBUMIN SERPL BCP-MCNC: 4.2 G/DL (ref 3.2–4.9)
ALBUMIN/GLOB SERPL: 1.4 G/DL
ALP SERPL-CCNC: 85 U/L (ref 30–99)
ALT SERPL-CCNC: 21 U/L (ref 2–50)
ANION GAP SERPL CALC-SCNC: 14 MMOL/L (ref 7–16)
AST SERPL-CCNC: 22 U/L (ref 12–45)
BASOPHILS # BLD AUTO: 1.6 % (ref 0–1.8)
BASOPHILS # BLD: 0.14 K/UL (ref 0–0.12)
BILIRUB SERPL-MCNC: 0.4 MG/DL (ref 0.1–1.5)
BUN SERPL-MCNC: 25 MG/DL (ref 8–22)
CALCIUM ALBUM COR SERPL-MCNC: 9.3 MG/DL (ref 8.5–10.5)
CALCIUM SERPL-MCNC: 9.5 MG/DL (ref 8.5–10.5)
CHLORIDE SERPL-SCNC: 104 MMOL/L (ref 96–112)
CHOLEST SERPL-MCNC: 188 MG/DL (ref 100–199)
CO2 SERPL-SCNC: 28 MMOL/L (ref 20–33)
CREAT SERPL-MCNC: 0.96 MG/DL (ref 0.5–1.4)
EOSINOPHIL # BLD AUTO: 0.17 K/UL (ref 0–0.51)
EOSINOPHIL NFR BLD: 1.9 % (ref 0–6.9)
ERYTHROCYTE [DISTWIDTH] IN BLOOD BY AUTOMATED COUNT: 54.4 FL (ref 35.9–50)
EST. AVERAGE GLUCOSE BLD GHB EST-MCNC: 126 MG/DL
FASTING STATUS PATIENT QL REPORTED: NORMAL
FERRITIN SERPL-MCNC: 20.8 NG/ML (ref 10–291)
GFR SERPLBLD CREATININE-BSD FMLA CKD-EPI: 61 ML/MIN/1.73 M 2
GLOBULIN SER CALC-MCNC: 3.1 G/DL (ref 1.9–3.5)
GLUCOSE SERPL-MCNC: 76 MG/DL (ref 65–99)
HBA1C MFR BLD: 6 % (ref 4–5.6)
HCT VFR BLD AUTO: 43.4 % (ref 37–47)
HDLC SERPL-MCNC: 66 MG/DL
HGB BLD-MCNC: 13.3 G/DL (ref 12–16)
IMM GRANULOCYTES # BLD AUTO: 0.04 K/UL (ref 0–0.11)
IMM GRANULOCYTES NFR BLD AUTO: 0.5 % (ref 0–0.9)
IRON SATN MFR SERPL: 11 % (ref 15–55)
IRON SERPL-MCNC: 46 UG/DL (ref 40–170)
LDLC SERPL CALC-MCNC: 101 MG/DL
LYMPHOCYTES # BLD AUTO: 2.74 K/UL (ref 1–4.8)
LYMPHOCYTES NFR BLD: 31.2 % (ref 22–41)
MCH RBC QN AUTO: 24.9 PG (ref 27–33)
MCHC RBC AUTO-ENTMCNC: 30.6 G/DL (ref 33.6–35)
MCV RBC AUTO: 81.3 FL (ref 81.4–97.8)
MONOCYTES # BLD AUTO: 0.66 K/UL (ref 0–0.85)
MONOCYTES NFR BLD AUTO: 7.5 % (ref 0–13.4)
NEUTROPHILS # BLD AUTO: 5.04 K/UL (ref 2–7.15)
NEUTROPHILS NFR BLD: 57.3 % (ref 44–72)
NRBC # BLD AUTO: 0 K/UL
NRBC BLD-RTO: 0 /100 WBC
PLATELET # BLD AUTO: 317 K/UL (ref 164–446)
PMV BLD AUTO: 11.4 FL (ref 9–12.9)
POTASSIUM SERPL-SCNC: 3.8 MMOL/L (ref 3.6–5.5)
PROT SERPL-MCNC: 7.3 G/DL (ref 6–8.2)
RBC # BLD AUTO: 5.34 M/UL (ref 4.2–5.4)
SODIUM SERPL-SCNC: 146 MMOL/L (ref 135–145)
TIBC SERPL-MCNC: 430 UG/DL (ref 250–450)
TRIGL SERPL-MCNC: 106 MG/DL (ref 0–149)
TSH SERPL DL<=0.005 MIU/L-ACNC: 0.57 UIU/ML (ref 0.38–5.33)
UIBC SERPL-MCNC: 384 UG/DL (ref 110–370)
WBC # BLD AUTO: 8.8 K/UL (ref 4.8–10.8)

## 2023-04-04 PROCEDURE — 85025 COMPLETE CBC W/AUTO DIFF WBC: CPT

## 2023-04-04 PROCEDURE — 80061 LIPID PANEL: CPT

## 2023-04-04 PROCEDURE — 82728 ASSAY OF FERRITIN: CPT

## 2023-04-04 PROCEDURE — 83036 HEMOGLOBIN GLYCOSYLATED A1C: CPT

## 2023-04-04 PROCEDURE — 36415 COLL VENOUS BLD VENIPUNCTURE: CPT

## 2023-04-04 PROCEDURE — 84443 ASSAY THYROID STIM HORMONE: CPT

## 2023-04-04 PROCEDURE — 83550 IRON BINDING TEST: CPT

## 2023-04-04 PROCEDURE — 82306 VITAMIN D 25 HYDROXY: CPT

## 2023-04-04 PROCEDURE — 83540 ASSAY OF IRON: CPT

## 2023-04-04 PROCEDURE — 80053 COMPREHEN METABOLIC PANEL: CPT

## 2023-04-05 DIAGNOSIS — D50.9 IRON DEFICIENCY ANEMIA, UNSPECIFIED IRON DEFICIENCY ANEMIA TYPE: ICD-10-CM

## 2023-04-05 DIAGNOSIS — R73.03 PREDIABETES: ICD-10-CM

## 2023-04-05 DIAGNOSIS — E78.5 DYSLIPIDEMIA: ICD-10-CM

## 2023-04-05 RX ORDER — FERROUS SULFATE 325(65) MG
325 TABLET ORAL DAILY
Qty: 90 TABLET | Refills: 1 | Status: SHIPPED | OUTPATIENT
Start: 2023-04-05 | End: 2023-09-19

## 2023-04-30 ENCOUNTER — HOSPITAL ENCOUNTER (OUTPATIENT)
Dept: RADIOLOGY | Facility: MEDICAL CENTER | Age: 78
End: 2023-04-30
Attending: NURSE PRACTITIONER
Payer: MEDICARE

## 2023-04-30 DIAGNOSIS — G31.84 MILD COGNITIVE IMPAIRMENT OF UNCERTAIN OR UNKNOWN ETIOLOGY: ICD-10-CM

## 2023-04-30 DIAGNOSIS — G43.009 MIGRAINE WITHOUT AURA AND WITHOUT STATUS MIGRAINOSUS, NOT INTRACTABLE: ICD-10-CM

## 2023-04-30 DIAGNOSIS — F51.01 PRIMARY INSOMNIA: ICD-10-CM

## 2023-04-30 PROCEDURE — 70553 MRI BRAIN STEM W/O & W/DYE: CPT

## 2023-04-30 PROCEDURE — 700117 HCHG RX CONTRAST REV CODE 255: Performed by: NURSE PRACTITIONER

## 2023-04-30 PROCEDURE — A9579 GAD-BASE MR CONTRAST NOS,1ML: HCPCS | Performed by: NURSE PRACTITIONER

## 2023-04-30 PROCEDURE — 72156 MRI NECK SPINE W/O & W/DYE: CPT

## 2023-04-30 RX ADMIN — GADOTERIDOL 20 ML: 279.3 INJECTION, SOLUTION INTRAVENOUS at 15:30

## 2023-05-16 NOTE — PROGRESS NOTES
Gave report to night shift RN.   Low Dose Naltrexone Counseling- I discussed with the patient the potential risks and side effects of low dose naltrexone including but not limited to: more vivid dreams, headaches, nausea, vomiting, abdominal pain, fatigue, dizziness, and anxiety.

## 2023-05-30 ENCOUNTER — HOSPITAL ENCOUNTER (OUTPATIENT)
Dept: RADIOLOGY | Facility: MEDICAL CENTER | Age: 78
End: 2023-05-30
Attending: PHYSICIAN ASSISTANT
Payer: MEDICARE

## 2023-05-30 DIAGNOSIS — Z78.0 POSTMENOPAUSAL: ICD-10-CM

## 2023-05-30 PROCEDURE — 77080 DXA BONE DENSITY AXIAL: CPT

## 2023-06-06 DIAGNOSIS — M85.832 OSTEOPENIA OF LEFT FOREARM: ICD-10-CM

## 2023-06-06 RX ORDER — ALENDRONATE SODIUM 70 MG/1
70 TABLET ORAL
Qty: 12 TABLET | Refills: 3 | Status: SHIPPED | OUTPATIENT
Start: 2023-06-06 | End: 2023-10-01

## 2023-06-28 ENCOUNTER — DOCUMENTATION (OUTPATIENT)
Dept: HEALTH INFORMATION MANAGEMENT | Facility: OTHER | Age: 78
End: 2023-06-28
Payer: MEDICARE

## 2023-06-28 ENCOUNTER — PATIENT MESSAGE (OUTPATIENT)
Dept: HEALTH INFORMATION MANAGEMENT | Facility: OTHER | Age: 78
End: 2023-06-28

## 2023-08-14 ENCOUNTER — HOSPITAL ENCOUNTER (OUTPATIENT)
Dept: RADIOLOGY | Facility: MEDICAL CENTER | Age: 78
End: 2023-08-14
Attending: SPECIALIST
Payer: MEDICARE

## 2023-08-14 DIAGNOSIS — M79.18 MUSCLE PAIN, MYOFASCIAL: ICD-10-CM

## 2023-08-14 DIAGNOSIS — M19.90 ARTHRITIS: ICD-10-CM

## 2023-08-14 DIAGNOSIS — M54.9 CHRONIC NECK AND BACK PAIN: ICD-10-CM

## 2023-08-14 DIAGNOSIS — M54.2 CHRONIC NECK AND BACK PAIN: ICD-10-CM

## 2023-08-14 DIAGNOSIS — M79.604 LOWER EXTREMITY PAIN, RIGHT: ICD-10-CM

## 2023-08-14 DIAGNOSIS — Z79.891 CHRONIC USE OF OPIATE FOR THERAPEUTIC PURPOSE: ICD-10-CM

## 2023-08-14 DIAGNOSIS — G89.29 CHRONIC NECK AND BACK PAIN: ICD-10-CM

## 2023-08-14 PROCEDURE — 73590 X-RAY EXAM OF LOWER LEG: CPT | Mod: RT

## 2023-10-01 ENCOUNTER — HOSPITAL ENCOUNTER (OUTPATIENT)
Facility: MEDICAL CENTER | Age: 78
End: 2023-10-02
Attending: EMERGENCY MEDICINE | Admitting: STUDENT IN AN ORGANIZED HEALTH CARE EDUCATION/TRAINING PROGRAM
Payer: MEDICARE

## 2023-10-01 ENCOUNTER — APPOINTMENT (OUTPATIENT)
Dept: RADIOLOGY | Facility: MEDICAL CENTER | Age: 78
End: 2023-10-01
Attending: EMERGENCY MEDICINE
Payer: MEDICARE

## 2023-10-01 DIAGNOSIS — R60.0 PEDAL EDEMA: ICD-10-CM

## 2023-10-01 DIAGNOSIS — Z91.81 RISK FOR FALLS: ICD-10-CM

## 2023-10-01 DIAGNOSIS — I95.1 ORTHOSTASIS: ICD-10-CM

## 2023-10-01 DIAGNOSIS — R42 DIZZINESS: ICD-10-CM

## 2023-10-01 DIAGNOSIS — R11.2 NAUSEA AND VOMITING, UNSPECIFIED VOMITING TYPE: ICD-10-CM

## 2023-10-01 DIAGNOSIS — Z96.642 PRESENCE OF LEFT ARTIFICIAL HIP JOINT: ICD-10-CM

## 2023-10-01 DIAGNOSIS — R54 AGE-RELATED PHYSICAL DEBILITY: Chronic | ICD-10-CM

## 2023-10-01 PROBLEM — Z79.899 POLYPHARMACY: Status: ACTIVE | Noted: 2023-10-01

## 2023-10-01 PROBLEM — R79.89 ELEVATED TROPONIN: Status: ACTIVE | Noted: 2023-10-01

## 2023-10-01 PROBLEM — R10.33 ABDOMINAL PAIN, ACUTE, PERIUMBILICAL: Status: ACTIVE | Noted: 2023-10-01

## 2023-10-01 LAB
ALBUMIN SERPL BCP-MCNC: 3.7 G/DL (ref 3.2–4.9)
ALBUMIN/GLOB SERPL: 1.1 G/DL
ALP SERPL-CCNC: 94 U/L (ref 30–99)
ALT SERPL-CCNC: 15 U/L (ref 2–50)
ANION GAP SERPL CALC-SCNC: 16 MMOL/L (ref 7–16)
APPEARANCE UR: CLEAR
AST SERPL-CCNC: 23 U/L (ref 12–45)
BASOPHILS # BLD AUTO: 1 % (ref 0–1.8)
BASOPHILS # BLD: 0.1 K/UL (ref 0–0.12)
BILIRUB SERPL-MCNC: 0.5 MG/DL (ref 0.1–1.5)
BILIRUB UR QL STRIP.AUTO: NEGATIVE
BLOOD CULTURE HOLD CXBCH: NORMAL
BUN SERPL-MCNC: 15 MG/DL (ref 8–22)
CALCIUM ALBUM COR SERPL-MCNC: 9.7 MG/DL (ref 8.5–10.5)
CALCIUM SERPL-MCNC: 9.5 MG/DL (ref 8.5–10.5)
CHLORIDE SERPL-SCNC: 101 MMOL/L (ref 96–112)
CO2 SERPL-SCNC: 23 MMOL/L (ref 20–33)
COLOR UR: YELLOW
CREAT SERPL-MCNC: 0.7 MG/DL (ref 0.5–1.4)
EKG IMPRESSION: NORMAL
EOSINOPHIL # BLD AUTO: 0.24 K/UL (ref 0–0.51)
EOSINOPHIL NFR BLD: 2.5 % (ref 0–6.9)
ERYTHROCYTE [DISTWIDTH] IN BLOOD BY AUTOMATED COUNT: 50.4 FL (ref 35.9–50)
GFR SERPLBLD CREATININE-BSD FMLA CKD-EPI: 89 ML/MIN/1.73 M 2
GLOBULIN SER CALC-MCNC: 3.3 G/DL (ref 1.9–3.5)
GLUCOSE SERPL-MCNC: 110 MG/DL (ref 65–99)
GLUCOSE UR STRIP.AUTO-MCNC: NEGATIVE MG/DL
HCT VFR BLD AUTO: 42.4 % (ref 37–47)
HGB BLD-MCNC: 13.7 G/DL (ref 12–16)
IMM GRANULOCYTES # BLD AUTO: 0.03 K/UL (ref 0–0.11)
IMM GRANULOCYTES NFR BLD AUTO: 0.3 % (ref 0–0.9)
KETONES UR STRIP.AUTO-MCNC: 15 MG/DL
LEUKOCYTE ESTERASE UR QL STRIP.AUTO: NEGATIVE
LIPASE SERPL-CCNC: 31 U/L (ref 11–82)
LYMPHOCYTES # BLD AUTO: 1.15 K/UL (ref 1–4.8)
LYMPHOCYTES NFR BLD: 12 % (ref 22–41)
MCH RBC QN AUTO: 27.8 PG (ref 27–33)
MCHC RBC AUTO-ENTMCNC: 32.3 G/DL (ref 32.2–35.5)
MCV RBC AUTO: 86.2 FL (ref 81.4–97.8)
MICRO URNS: ABNORMAL
MONOCYTES # BLD AUTO: 0.74 K/UL (ref 0–0.85)
MONOCYTES NFR BLD AUTO: 7.7 % (ref 0–13.4)
NEUTROPHILS # BLD AUTO: 7.3 K/UL (ref 1.82–7.42)
NEUTROPHILS NFR BLD: 76.5 % (ref 44–72)
NITRITE UR QL STRIP.AUTO: NEGATIVE
NRBC # BLD AUTO: 0 K/UL
NRBC BLD-RTO: 0 /100 WBC (ref 0–0.2)
PH UR STRIP.AUTO: 7.5 [PH] (ref 5–8)
PLATELET # BLD AUTO: 552 K/UL (ref 164–446)
PMV BLD AUTO: 10.4 FL (ref 9–12.9)
POTASSIUM SERPL-SCNC: 3.4 MMOL/L (ref 3.6–5.5)
PROT SERPL-MCNC: 7 G/DL (ref 6–8.2)
PROT UR QL STRIP: NEGATIVE MG/DL
RBC # BLD AUTO: 4.92 M/UL (ref 4.2–5.4)
RBC UR QL AUTO: NEGATIVE
SODIUM SERPL-SCNC: 140 MMOL/L (ref 135–145)
SP GR UR STRIP.AUTO: 1.01
TROPONIN T SERPL-MCNC: 50 NG/L (ref 6–19)
UROBILINOGEN UR STRIP.AUTO-MCNC: 1 MG/DL
WBC # BLD AUTO: 9.6 K/UL (ref 4.8–10.8)

## 2023-10-01 PROCEDURE — G0378 HOSPITAL OBSERVATION PER HR: HCPCS

## 2023-10-01 PROCEDURE — 36415 COLL VENOUS BLD VENIPUNCTURE: CPT

## 2023-10-01 PROCEDURE — 93005 ELECTROCARDIOGRAM TRACING: CPT | Performed by: EMERGENCY MEDICINE

## 2023-10-01 PROCEDURE — 90471 IMMUNIZATION ADMIN: CPT

## 2023-10-01 PROCEDURE — 83690 ASSAY OF LIPASE: CPT

## 2023-10-01 PROCEDURE — 99223 1ST HOSP IP/OBS HIGH 75: CPT | Mod: GC | Performed by: STUDENT IN AN ORGANIZED HEALTH CARE EDUCATION/TRAINING PROGRAM

## 2023-10-01 PROCEDURE — 700105 HCHG RX REV CODE 258: Performed by: EMERGENCY MEDICINE

## 2023-10-01 PROCEDURE — 700111 HCHG RX REV CODE 636 W/ 250 OVERRIDE (IP): Performed by: STUDENT IN AN ORGANIZED HEALTH CARE EDUCATION/TRAINING PROGRAM

## 2023-10-01 PROCEDURE — 74018 RADEX ABDOMEN 1 VIEW: CPT

## 2023-10-01 PROCEDURE — 700105 HCHG RX REV CODE 258

## 2023-10-01 PROCEDURE — 90662 IIV NO PRSV INCREASED AG IM: CPT | Performed by: STUDENT IN AN ORGANIZED HEALTH CARE EDUCATION/TRAINING PROGRAM

## 2023-10-01 PROCEDURE — 81003 URINALYSIS AUTO W/O SCOPE: CPT

## 2023-10-01 PROCEDURE — 700102 HCHG RX REV CODE 250 W/ 637 OVERRIDE(OP)

## 2023-10-01 PROCEDURE — 85025 COMPLETE CBC W/AUTO DIFF WBC: CPT

## 2023-10-01 PROCEDURE — 99285 EMERGENCY DEPT VISIT HI MDM: CPT

## 2023-10-01 PROCEDURE — 96372 THER/PROPH/DIAG INJ SC/IM: CPT

## 2023-10-01 PROCEDURE — A9270 NON-COVERED ITEM OR SERVICE: HCPCS

## 2023-10-01 PROCEDURE — 84484 ASSAY OF TROPONIN QUANT: CPT

## 2023-10-01 PROCEDURE — 80053 COMPREHEN METABOLIC PANEL: CPT

## 2023-10-01 RX ORDER — HYDROCODONE BITARTRATE AND ACETAMINOPHEN 5; 325 MG/1; MG/1
1 TABLET ORAL EVERY 4 HOURS PRN
Status: DISCONTINUED | OUTPATIENT
Start: 2023-10-01 | End: 2023-10-02 | Stop reason: HOSPADM

## 2023-10-01 RX ORDER — ZONISAMIDE 50 MG/1
50 CAPSULE ORAL
COMMUNITY
End: 2023-10-11

## 2023-10-01 RX ORDER — ASPIRIN 81 MG/1
81 TABLET ORAL DAILY
COMMUNITY

## 2023-10-01 RX ORDER — AMOXICILLIN 250 MG
2 CAPSULE ORAL 2 TIMES DAILY
Status: DISCONTINUED | OUTPATIENT
Start: 2023-10-02 | End: 2023-10-02 | Stop reason: HOSPADM

## 2023-10-01 RX ORDER — ONDANSETRON 4 MG/1
4 TABLET, ORALLY DISINTEGRATING ORAL EVERY 4 HOURS PRN
Status: DISCONTINUED | OUTPATIENT
Start: 2023-10-01 | End: 2023-10-02 | Stop reason: HOSPADM

## 2023-10-01 RX ORDER — PREGABALIN 150 MG/1
150 CAPSULE ORAL 2 TIMES DAILY
Status: DISCONTINUED | OUTPATIENT
Start: 2023-10-01 | End: 2023-10-02 | Stop reason: HOSPADM

## 2023-10-01 RX ORDER — PREGABALIN 150 MG/1
150 CAPSULE ORAL 2 TIMES DAILY
COMMUNITY

## 2023-10-01 RX ORDER — HYDRALAZINE HYDROCHLORIDE 20 MG/ML
10 INJECTION INTRAMUSCULAR; INTRAVENOUS EVERY 4 HOURS PRN
Status: DISCONTINUED | OUTPATIENT
Start: 2023-10-01 | End: 2023-10-02 | Stop reason: HOSPADM

## 2023-10-01 RX ORDER — ONDANSETRON 2 MG/ML
4 INJECTION INTRAMUSCULAR; INTRAVENOUS EVERY 4 HOURS PRN
Status: DISCONTINUED | OUTPATIENT
Start: 2023-10-01 | End: 2023-10-02 | Stop reason: HOSPADM

## 2023-10-01 RX ORDER — FUROSEMIDE 20 MG/1
20 TABLET ORAL
COMMUNITY

## 2023-10-01 RX ORDER — BENAZEPRIL HYDROCHLORIDE AND HYDROCHLOROTHIAZIDE 20; 12.5 MG/1; MG/1
1 TABLET ORAL DAILY
Status: DISCONTINUED | OUTPATIENT
Start: 2023-10-02 | End: 2023-10-01

## 2023-10-01 RX ORDER — POLYETHYLENE GLYCOL 3350 17 G/17G
1 POWDER, FOR SOLUTION ORAL
Status: DISCONTINUED | OUTPATIENT
Start: 2023-10-01 | End: 2023-10-02 | Stop reason: HOSPADM

## 2023-10-01 RX ORDER — BACLOFEN 10 MG/1
10 TABLET ORAL 2 TIMES DAILY PRN
Status: DISCONTINUED | OUTPATIENT
Start: 2023-10-01 | End: 2023-10-02 | Stop reason: HOSPADM

## 2023-10-01 RX ORDER — QUETIAPINE FUMARATE 25 MG/1
25 TABLET, FILM COATED ORAL
COMMUNITY

## 2023-10-01 RX ORDER — BISACODYL 10 MG
10 SUPPOSITORY, RECTAL RECTAL
Status: DISCONTINUED | OUTPATIENT
Start: 2023-10-01 | End: 2023-10-02 | Stop reason: HOSPADM

## 2023-10-01 RX ORDER — ATENOLOL 50 MG/1
100 TABLET ORAL
Status: DISCONTINUED | OUTPATIENT
Start: 2023-10-02 | End: 2023-10-02 | Stop reason: HOSPADM

## 2023-10-01 RX ORDER — HYDROCHLOROTHIAZIDE 25 MG/1
12.5 TABLET ORAL
Status: DISCONTINUED | OUTPATIENT
Start: 2023-10-02 | End: 2023-10-02 | Stop reason: HOSPADM

## 2023-10-01 RX ORDER — SODIUM CHLORIDE, SODIUM LACTATE, POTASSIUM CHLORIDE, CALCIUM CHLORIDE 600; 310; 30; 20 MG/100ML; MG/100ML; MG/100ML; MG/100ML
1000 INJECTION, SOLUTION INTRAVENOUS ONCE
Status: COMPLETED | OUTPATIENT
Start: 2023-10-01 | End: 2023-10-01

## 2023-10-01 RX ORDER — FERROUS SULFATE 325(65) MG
325 TABLET ORAL DAILY
Status: DISCONTINUED | OUTPATIENT
Start: 2023-10-02 | End: 2023-10-02 | Stop reason: HOSPADM

## 2023-10-01 RX ORDER — AMITRIPTYLINE HYDROCHLORIDE 100 MG/1
100 TABLET ORAL NIGHTLY
COMMUNITY

## 2023-10-01 RX ORDER — QUETIAPINE FUMARATE 25 MG/1
25 TABLET, FILM COATED ORAL
Status: DISCONTINUED | OUTPATIENT
Start: 2023-10-01 | End: 2023-10-02 | Stop reason: HOSPADM

## 2023-10-01 RX ORDER — BENAZEPRIL HYDROCHLORIDE 20 MG/1
20 TABLET ORAL
Status: DISCONTINUED | OUTPATIENT
Start: 2023-10-02 | End: 2023-10-02 | Stop reason: HOSPADM

## 2023-10-01 RX ORDER — BACLOFEN 10 MG/1
10 TABLET ORAL 2 TIMES DAILY PRN
COMMUNITY
End: 2023-12-05

## 2023-10-01 RX ORDER — SODIUM CHLORIDE, SODIUM LACTATE, POTASSIUM CHLORIDE, AND CALCIUM CHLORIDE .6; .31; .03; .02 G/100ML; G/100ML; G/100ML; G/100ML
1000 INJECTION, SOLUTION INTRAVENOUS ONCE
Status: COMPLETED | OUTPATIENT
Start: 2023-10-01 | End: 2023-10-01

## 2023-10-01 RX ORDER — ASPIRIN 81 MG/1
81 TABLET ORAL DAILY
Status: DISCONTINUED | OUTPATIENT
Start: 2023-10-02 | End: 2023-10-02 | Stop reason: HOSPADM

## 2023-10-01 RX ORDER — AMITRIPTYLINE HYDROCHLORIDE 100 MG/1
100 TABLET ORAL NIGHTLY
Status: DISCONTINUED | OUTPATIENT
Start: 2023-10-01 | End: 2023-10-02 | Stop reason: HOSPADM

## 2023-10-01 RX ORDER — ENOXAPARIN SODIUM 100 MG/ML
40 INJECTION SUBCUTANEOUS DAILY
Status: DISCONTINUED | OUTPATIENT
Start: 2023-10-02 | End: 2023-10-02 | Stop reason: HOSPADM

## 2023-10-01 RX ORDER — OMEPRAZOLE 20 MG/1
20 CAPSULE, DELAYED RELEASE ORAL
Status: DISCONTINUED | OUTPATIENT
Start: 2023-10-02 | End: 2023-10-02 | Stop reason: HOSPADM

## 2023-10-01 RX ORDER — ACETAMINOPHEN 325 MG/1
650 TABLET ORAL EVERY 6 HOURS PRN
Status: DISCONTINUED | OUTPATIENT
Start: 2023-10-01 | End: 2023-10-02 | Stop reason: HOSPADM

## 2023-10-01 RX ORDER — ATORVASTATIN CALCIUM 10 MG/1
10 TABLET, FILM COATED ORAL NIGHTLY
Status: DISCONTINUED | OUTPATIENT
Start: 2023-10-01 | End: 2023-10-02 | Stop reason: HOSPADM

## 2023-10-01 RX ORDER — POTASSIUM CHLORIDE 20 MEQ/1
40 TABLET, EXTENDED RELEASE ORAL ONCE
Status: COMPLETED | OUTPATIENT
Start: 2023-10-01 | End: 2023-10-01

## 2023-10-01 RX ORDER — ZONISAMIDE 50 MG/1
50 CAPSULE ORAL
Status: DISCONTINUED | OUTPATIENT
Start: 2023-10-01 | End: 2023-10-02 | Stop reason: HOSPADM

## 2023-10-01 RX ADMIN — POTASSIUM CHLORIDE 40 MEQ: 1500 TABLET, EXTENDED RELEASE ORAL at 23:22

## 2023-10-01 RX ADMIN — INFLUENZA A VIRUS A/VICTORIA/4897/2022 IVR-238 (H1N1) ANTIGEN (FORMALDEHYDE INACTIVATED), INFLUENZA A VIRUS A/DARWIN/9/2021 SAN-010 (H3N2) ANTIGEN (FORMALDEHYDE INACTIVATED), INFLUENZA B VIRUS B/PHUKET/3073/2013 ANTIGEN (FORMALDEHYDE INACTIVATED), AND INFLUENZA B VIRUS B/MICHIGAN/01/2021 ANTIGEN (FORMALDEHYDE INACTIVATED) 0.7 ML: 60; 60; 60; 60 INJECTION, SUSPENSION INTRAMUSCULAR at 22:56

## 2023-10-01 RX ADMIN — PREGABALIN 150 MG: 150 CAPSULE ORAL at 22:55

## 2023-10-01 RX ADMIN — SODIUM CHLORIDE, POTASSIUM CHLORIDE, SODIUM LACTATE AND CALCIUM CHLORIDE 1000 ML: 600; 310; 30; 20 INJECTION, SOLUTION INTRAVENOUS at 15:10

## 2023-10-01 RX ADMIN — AMITRIPTYLINE HYDROCHLORIDE 100 MG: 100 TABLET, FILM COATED ORAL at 22:55

## 2023-10-01 RX ADMIN — ATORVASTATIN CALCIUM 10 MG: 10 TABLET, FILM COATED ORAL at 22:55

## 2023-10-01 RX ADMIN — ZONISAMIDE 50 MG: 50 CAPSULE ORAL at 22:55

## 2023-10-01 RX ADMIN — QUETIAPINE FUMARATE 25 MG: 25 TABLET ORAL at 22:55

## 2023-10-01 RX ADMIN — SODIUM CHLORIDE, POTASSIUM CHLORIDE, SODIUM LACTATE AND CALCIUM CHLORIDE 1000 ML: 600; 310; 30; 20 INJECTION, SOLUTION INTRAVENOUS at 22:55

## 2023-10-01 ASSESSMENT — COGNITIVE AND FUNCTIONAL STATUS - GENERAL
SUGGESTED CMS G CODE MODIFIER DAILY ACTIVITY: CJ
TOILETING: A LITTLE
SUGGESTED CMS G CODE MODIFIER MOBILITY: CL
DRESSING REGULAR UPPER BODY CLOTHING: A LITTLE
DRESSING REGULAR LOWER BODY CLOTHING: A LITTLE
CLIMB 3 TO 5 STEPS WITH RAILING: A LOT
TURNING FROM BACK TO SIDE WHILE IN FLAT BAD: A LITTLE
MOBILITY SCORE: 14
MOVING TO AND FROM BED TO CHAIR: A LITTLE
MOVING FROM LYING ON BACK TO SITTING ON SIDE OF FLAT BED: A LOT
STANDING UP FROM CHAIR USING ARMS: A LOT
DAILY ACTIVITIY SCORE: 21
WALKING IN HOSPITAL ROOM: A LOT

## 2023-10-01 ASSESSMENT — PATIENT HEALTH QUESTIONNAIRE - PHQ9
SUM OF ALL RESPONSES TO PHQ9 QUESTIONS 1 AND 2: 0
2. FEELING DOWN, DEPRESSED, IRRITABLE, OR HOPELESS: NOT AT ALL
1. LITTLE INTEREST OR PLEASURE IN DOING THINGS: NOT AT ALL

## 2023-10-01 ASSESSMENT — LIFESTYLE VARIABLES
TOTAL SCORE: 0
HAVE PEOPLE ANNOYED YOU BY CRITICIZING YOUR DRINKING: NO
HAVE YOU EVER FELT YOU SHOULD CUT DOWN ON YOUR DRINKING: NO
EVER HAD A DRINK FIRST THING IN THE MORNING TO STEADY YOUR NERVES TO GET RID OF A HANGOVER: NO
ALCOHOL_USE: NO
CONSUMPTION TOTAL: NEGATIVE
TOTAL SCORE: 0
ON A TYPICAL DAY WHEN YOU DRINK ALCOHOL HOW MANY DRINKS DO YOU HAVE: 0
TOTAL SCORE: 0
EVER FELT BAD OR GUILTY ABOUT YOUR DRINKING: NO
HOW MANY TIMES IN THE PAST YEAR HAVE YOU HAD 5 OR MORE DRINKS IN A DAY: 0
AVERAGE NUMBER OF DAYS PER WEEK YOU HAVE A DRINK CONTAINING ALCOHOL: 0

## 2023-10-01 ASSESSMENT — ENCOUNTER SYMPTOMS
DIZZINESS: 1
HEADACHES: 1
ABDOMINAL PAIN: 1
CHILLS: 1
NAUSEA: 1

## 2023-10-01 ASSESSMENT — FIBROSIS 4 INDEX
FIB4 SCORE: 1.15
FIB4 SCORE: 0.82

## 2023-10-01 ASSESSMENT — PAIN DESCRIPTION - PAIN TYPE: TYPE: ACUTE PAIN

## 2023-10-01 NOTE — ED PROVIDER NOTES
"ED Provider Note    CHIEF COMPLAINT  Chief Complaint   Patient presents with    Abdominal Pain     Reports upper abdominal pain in both the RUQ and LUQ    Nausea     Pt reports nausea for the past 2 weeks. Denies vomiting    Dizziness     Dizziness whenever pt sits up/stands. Per EMS, pts orthostatics were positive       EXTERNAL RECORDS REVIEWED  Outpatient Notes [facility]    HPI/ROS  LIMITATION TO HISTORY   Select: : None  OUTSIDE HISTORIAN(S):      Tereza Sánchez is a 78 y.o. female who presents here for evaluation of abdominal pain, and upper abdomen, nausea and vomiting over the last 2 weeks, and dizziness.  Patient states that she has been dizzy over the last couple of weeks, since she left the hospital.  States he has no chest pain or shortness of breath, but does have some upper abdominal pain with vomiting.  She states \"I have a hard time keeping anything down.\"  Patient has no reported fevers or chills.    PAST MEDICAL HISTORY   has a past medical history of Anesthesia, Arthritis, Asthma, Bowel habit changes, Breath shortness, Cancer (HCC), Chiari malformation (1970's), Chickenpox, Chronic back pain, Contracture of hand joint, Decreased lung capacity, Dental disorder, Disorder of thyroid, Heart burn, High cholesterol, Hypertension, Indigestion, Joint replacement, Obesity, Pain, Pain (08/06/2018), Peripheral edema (06/06/2013), Pneumonia, PONV (postoperative nausea and vomiting), Psychiatric problem, Scoliosis, Shortness of breath (08/06/2018), Sleep apnea, Sleep apnea (08/07/2018), Snoring, sore throat (01/15/2015), Supplemental oxygen dependent, and Syringomyelia (HCC).    SURGICAL HISTORY   has a past surgical history that includes rubin by laparoscopy (2002); shoulder arthroplasty total (2004); hip arthroplasty total (5/19/08); hip arthroplasty total; us-needle core bx-breast panel; node biopsy sentinel (2/6/2015); thyroid lobectomy (Left, 8/17/2018); thyroidectomy total (8/17/2018); " laminotomy; hip replacement, total; cyst excision (1973); shunt insertion; mastectomy (2/6/2015); hip revision total (Left, 9/2/2021); other abdominal surgery; and reconstr total shoulder implant (Left, 5/4/2022).    FAMILY HISTORY  Family History   Problem Relation Age of Onset    Hypertension Mother     Sleep Apnea Brother     Cancer Neg Hx     Diabetes Neg Hx     Stroke Neg Hx     Heart Disease Neg Hx     Ovarian Cancer Neg Hx     Tubal Cancer Neg Hx     Peritoneal Cancer Neg Hx     Colorectal Cancer Neg Hx     Breast Cancer Neg Hx        SOCIAL HISTORY  Social History     Tobacco Use    Smoking status: Never    Smokeless tobacco: Never   Vaping Use    Vaping Use: Never used   Substance and Sexual Activity    Alcohol use: No     Alcohol/week: 0.0 oz    Drug use: No    Sexual activity: Not Currently     Comment:  - 51 years        CURRENT MEDICATIONS  Home Medications       Reviewed by Abbie Ibrahim R.N. (Registered Nurse) on 10/01/23 at 1447  Med List Status: Partial     Medication Last Dose Status   acetaminophen (TYLENOL) 500 MG Tab  Active   albuterol 108 (90 Base) MCG/ACT Aero Soln inhalation aerosol  Active   Albuterol Sulfate, sensor, 108 (90 Base) MCG/ACT AEROSOL POWDER, BREATH ACTIVATED  Active   alendronate (FOSAMAX) 70 MG Tab  Active   amitriptyline (ELAVIL) 25 MG Tab  Active   ascorbic acid (VITAMIN C) 1000 MG tablet  Active   atenolol (TENORMIN) 50 MG Tab  Active   atorvastatin (LIPITOR) 10 MG Tab  Active   benazepril-hydrochlorthiazide (LOTENSIN HCT) 20-12.5 MG per tablet  Active   DULoxetine (CYMBALTA) 30 MG Cap DR Particles  Active   esomeprazole (NEXIUM) 20 MG capsule  Active   ferrous sulfate 325 (65 Fe) MG tablet  Active   Home Care Oxygen  Active   HYDROcodone-acetaminophen (NORCO) 5-325 MG Tab per tablet  Active   LYRICA 100 MG Cap  Active   olopatadine (PATANOL) 0.1 % ophthalmic solution  Active   Omega-3 350 MG CAPSULE DELAYED RELEASE  Active   potassium chloride SA (KDUR) 20 MEQ  "Tab CR  Active   predniSONE (DELTASONE) 10 MG Tab  Active   pregabalin (LYRICA) 150 MG Cap  Active   Zinc 50 MG Tab  Active                    ALLERGIES  Allergies   Allergen Reactions    Bactrim Ds Rash     Pt states \"I get a body rash\".    Morphine Vomiting     hallucinations       PHYSICAL EXAM  VITAL SIGNS: /74   Pulse (!) 123   Temp 35.9 °C (96.7 °F) (Temporal)   Resp 20   Ht 1.524 m (5')   Wt 63.5 kg (140 lb)   SpO2 98%   BMI 27.34 kg/m²    Constitutional: Well developed, well nourished.  Mild acute distress.  HEENT: Normocephalic, atraumatic. Posterior pharynx clear and moist.  Eyes:  EOMI. Normal sclera.  Neck: Supple, Full range of motion, nontender.  Chest/Pulmonary: clear to ausculation. Symmetrical expansion.   Cardio: Regular rate and rhythm with no murmur.   Abdomen: Soft, mild epigastric tenderness, no peritoneal signs. No guarding. No palpable masses.  Back: No CVA tenderness, nontender midline, no step offs.  Musculoskeletal: No deformity, no edema, neurovascular intact.   Neuro: Clear speech, appropriate, cooperative, cranial nerves II-XII grossly intact.  Psych: Normal mood and affect      DIAGNOSTIC STUDIES / PROCEDURES  Results for orders placed or performed during the hospital encounter of 10/01/23   CBC with Differential   Result Value Ref Range    WBC 9.6 4.8 - 10.8 K/uL    RBC 4.92 4.20 - 5.40 M/uL    Hemoglobin 13.7 12.0 - 16.0 g/dL    Hematocrit 42.4 37.0 - 47.0 %    MCV 86.2 81.4 - 97.8 fL    MCH 27.8 27.0 - 33.0 pg    MCHC 32.3 32.2 - 35.5 g/dL    RDW 50.4 (H) 35.9 - 50.0 fL    Platelet Count 552 (H) 164 - 446 K/uL    MPV 10.4 9.0 - 12.9 fL    Neutrophils-Polys 76.50 (H) 44.00 - 72.00 %    Lymphocytes 12.00 (L) 22.00 - 41.00 %    Monocytes 7.70 0.00 - 13.40 %    Eosinophils 2.50 0.00 - 6.90 %    Basophils 1.00 0.00 - 1.80 %    Immature Granulocytes 0.30 0.00 - 0.90 %    Nucleated RBC 0.00 0.00 - 0.20 /100 WBC    Neutrophils (Absolute) 7.30 1.82 - 7.42 K/uL    Lymphs " (Absolute) 1.15 1.00 - 4.80 K/uL    Monos (Absolute) 0.74 0.00 - 0.85 K/uL    Eos (Absolute) 0.24 0.00 - 0.51 K/uL    Baso (Absolute) 0.10 0.00 - 0.12 K/uL    Immature Granulocytes (abs) 0.03 0.00 - 0.11 K/uL    NRBC (Absolute) 0.00 K/uL   Complete Metabolic Panel   Result Value Ref Range    Sodium 140 135 - 145 mmol/L    Potassium 3.4 (L) 3.6 - 5.5 mmol/L    Chloride 101 96 - 112 mmol/L    Co2 23 20 - 33 mmol/L    Anion Gap 16.0 7.0 - 16.0    Glucose 110 (H) 65 - 99 mg/dL    Bun 15 8 - 22 mg/dL    Creatinine 0.70 0.50 - 1.40 mg/dL    Calcium 9.5 8.5 - 10.5 mg/dL    Correct Calcium 9.7 8.5 - 10.5 mg/dL    AST(SGOT) 23 12 - 45 U/L    ALT(SGPT) 15 2 - 50 U/L    Alkaline Phosphatase 94 30 - 99 U/L    Total Bilirubin 0.5 0.1 - 1.5 mg/dL    Albumin 3.7 3.2 - 4.9 g/dL    Total Protein 7.0 6.0 - 8.2 g/dL    Globulin 3.3 1.9 - 3.5 g/dL    A-G Ratio 1.1 g/dL   Lipase   Result Value Ref Range    Lipase 31 11 - 82 U/L   Urinalysis    Specimen: Urine   Result Value Ref Range    Color Yellow     Character Clear     Specific Gravity 1.008 <1.035    Ph 7.5 5.0 - 8.0    Glucose Negative Negative mg/dL    Ketones 15 (A) Negative mg/dL    Protein Negative Negative mg/dL    Bilirubin Negative Negative    Urobilinogen, Urine 1.0 Negative    Nitrite Negative Negative    Leukocyte Esterase Negative Negative    Occult Blood Negative Negative    Micro Urine Req see below    TROPONIN   Result Value Ref Range    Troponin T 50 (H) 6 - 19 ng/L   ESTIMATED GFR   Result Value Ref Range    GFR (CKD-EPI) 89 >60 mL/min/1.73 m 2   Blood Culture,Hold   Result Value Ref Range    Blood Culture Hold Collected    EKG   Result Value Ref Range    Report       [facility]    Test Date:  2023-10-01  Pt Name:    ODETTE NICKERSON                  Department: ER  MRN:        [id number]                      Room:       [facility]  Gender:     Female                       Technician: [id number]  :        1945                   Requested  By:ER TRIAGE PROTOCOL  Order #:    [id number]                    Reading MD:    Measurements  Intervals                                Axis  Rate:       87                           P:          37  PA:         153                          QRS:        7  QRSD:       89                           T:          15  QT:         369  QTc:        444    Interpretive Statements  Sinus rhythm  Baseline wander in lead(s) V2,V4  Compared to ECG 04/27/2022 09:18:47  No significant changes     Ekg;  nsr 87. No st elevation, no st depression, qtc 444. Compared to ekg from 4/27/22.       RADIOLOGY  I have independently interpreted the diagnostic imaging associated with this visit and am waiting the final reading from the radiologist.   My preliminary interpretation is as follows: see below  Radiologist interpretation:   [id number] VIEW   Final Result      1.  Normal bowel gas pattern.            COURSE & MEDICAL DECISION MAKING    She will be admitted to the [facility] for gentle hydration, trend of the troponin.    INITIAL ASSESSMENT, COURSE AND PLAN  Care Narrative: This is a 78-year-old female here for evaluation of nausea vomiting, and dizziness.  Her initial work-up here shows a troponin of 50, and orthostasis on vital signs.  The patient was given IV fluids here, but will be admitted for trending of the troponins, and hydration.    DISPOSITION AND DISCUSSIONS  I have discussed management of the patient with the following physicians and MERCED's: [facility] will admit the patient.      FINAL DIAGNOSIS  1. Dizziness    2. Orthostasis    3. Nausea and vomiting, unspecified vomiting type           Electronically signed by: Josue Caicedo D.O., 10/1/2023 3:31 PM

## 2023-10-01 NOTE — ED TRIAGE NOTES
Chief Complaint   Patient presents with    Abdominal Pain     Reports upper abdominal pain in both the RUQ and LUQ    Nausea     Pt reports nausea for the past 2 weeks. Denies vomiting    Dizziness     Dizziness whenever pt sits up/stands. Per EMS, pts orthostatics were positive     Pt BIB EMS for above complaint. Pt given 4mg zofran PO en route.   Pt HX: HTN  Pt AOx4, GCS15, VSS  BP (!) 159/74   Pulse 93   Temp 35.9 °C (96.7 °F) (Temporal)   Resp 20   Ht 1.524 m (5')   Wt 63.5 kg (140 lb)   SpO2 100%   BMI 27.34 kg/m²

## 2023-10-02 ENCOUNTER — APPOINTMENT (OUTPATIENT)
Dept: RADIOLOGY | Facility: MEDICAL CENTER | Age: 78
End: 2023-10-02
Payer: MEDICARE

## 2023-10-02 VITALS
BODY MASS INDEX: 35.45 KG/M2 | TEMPERATURE: 98 F | OXYGEN SATURATION: 98 % | RESPIRATION RATE: 15 BRPM | HEIGHT: 60 IN | WEIGHT: 180.56 LBS | SYSTOLIC BLOOD PRESSURE: 107 MMHG | HEART RATE: 63 BPM | DIASTOLIC BLOOD PRESSURE: 59 MMHG

## 2023-10-02 LAB
ALBUMIN SERPL BCP-MCNC: 2.7 G/DL (ref 3.2–4.9)
ALBUMIN/GLOB SERPL: 1 G/DL
ALP SERPL-CCNC: 74 U/L (ref 30–99)
ALT SERPL-CCNC: 9 U/L (ref 2–50)
ANION GAP SERPL CALC-SCNC: 10 MMOL/L (ref 7–16)
AST SERPL-CCNC: 18 U/L (ref 12–45)
BASOPHILS # BLD AUTO: 1.6 % (ref 0–1.8)
BASOPHILS # BLD: 0.09 K/UL (ref 0–0.12)
BILIRUB SERPL-MCNC: 0.4 MG/DL (ref 0.1–1.5)
BUN SERPL-MCNC: 13 MG/DL (ref 8–22)
CALCIUM ALBUM COR SERPL-MCNC: 9.7 MG/DL (ref 8.5–10.5)
CALCIUM SERPL-MCNC: 8.7 MG/DL (ref 8.5–10.5)
CHLORIDE SERPL-SCNC: 106 MMOL/L (ref 96–112)
CO2 SERPL-SCNC: 23 MMOL/L (ref 20–33)
CREAT SERPL-MCNC: 0.6 MG/DL (ref 0.5–1.4)
EOSINOPHIL # BLD AUTO: 0.28 K/UL (ref 0–0.51)
EOSINOPHIL NFR BLD: 4.8 % (ref 0–6.9)
ERYTHROCYTE [DISTWIDTH] IN BLOOD BY AUTOMATED COUNT: 51.7 FL (ref 35.9–50)
GFR SERPLBLD CREATININE-BSD FMLA CKD-EPI: 93 ML/MIN/1.73 M 2
GLOBULIN SER CALC-MCNC: 2.7 G/DL (ref 1.9–3.5)
GLUCOSE SERPL-MCNC: 84 MG/DL (ref 65–99)
HCT VFR BLD AUTO: 37 % (ref 37–47)
HGB BLD-MCNC: 11.9 G/DL (ref 12–16)
IMM GRANULOCYTES # BLD AUTO: 0.01 K/UL (ref 0–0.11)
IMM GRANULOCYTES NFR BLD AUTO: 0.2 % (ref 0–0.9)
LYMPHOCYTES # BLD AUTO: 1.14 K/UL (ref 1–4.8)
LYMPHOCYTES NFR BLD: 19.7 % (ref 22–41)
MAGNESIUM SERPL-MCNC: 1.9 MG/DL (ref 1.5–2.5)
MCH RBC QN AUTO: 28.1 PG (ref 27–33)
MCHC RBC AUTO-ENTMCNC: 32.2 G/DL (ref 32.2–35.5)
MCV RBC AUTO: 87.3 FL (ref 81.4–97.8)
MONOCYTES # BLD AUTO: 0.49 K/UL (ref 0–0.85)
MONOCYTES NFR BLD AUTO: 8.5 % (ref 0–13.4)
NEUTROPHILS # BLD AUTO: 3.77 K/UL (ref 1.82–7.42)
NEUTROPHILS NFR BLD: 65.2 % (ref 44–72)
NRBC # BLD AUTO: 0 K/UL
NRBC BLD-RTO: 0 /100 WBC (ref 0–0.2)
PLATELET # BLD AUTO: 429 K/UL (ref 164–446)
PMV BLD AUTO: 10.4 FL (ref 9–12.9)
POTASSIUM SERPL-SCNC: 3.9 MMOL/L (ref 3.6–5.5)
PROT SERPL-MCNC: 5.4 G/DL (ref 6–8.2)
RBC # BLD AUTO: 4.24 M/UL (ref 4.2–5.4)
SODIUM SERPL-SCNC: 139 MMOL/L (ref 135–145)
TROPONIN T SERPL-MCNC: 47 NG/L (ref 6–19)
TROPONIN T SERPL-MCNC: 54 NG/L (ref 6–19)
WBC # BLD AUTO: 5.8 K/UL (ref 4.8–10.8)

## 2023-10-02 PROCEDURE — 83735 ASSAY OF MAGNESIUM: CPT

## 2023-10-02 PROCEDURE — A9270 NON-COVERED ITEM OR SERVICE: HCPCS

## 2023-10-02 PROCEDURE — 80053 COMPREHEN METABOLIC PANEL: CPT

## 2023-10-02 PROCEDURE — 700102 HCHG RX REV CODE 250 W/ 637 OVERRIDE(OP)

## 2023-10-02 PROCEDURE — 36415 COLL VENOUS BLD VENIPUNCTURE: CPT

## 2023-10-02 PROCEDURE — 97535 SELF CARE MNGMENT TRAINING: CPT

## 2023-10-02 PROCEDURE — 84484 ASSAY OF TROPONIN QUANT: CPT

## 2023-10-02 PROCEDURE — 71045 X-RAY EXAM CHEST 1 VIEW: CPT

## 2023-10-02 PROCEDURE — 85025 COMPLETE CBC W/AUTO DIFF WBC: CPT

## 2023-10-02 PROCEDURE — G0378 HOSPITAL OBSERVATION PER HR: HCPCS

## 2023-10-02 PROCEDURE — 97162 PT EVAL MOD COMPLEX 30 MIN: CPT

## 2023-10-02 PROCEDURE — 700111 HCHG RX REV CODE 636 W/ 250 OVERRIDE (IP): Mod: JZ

## 2023-10-02 PROCEDURE — 99239 HOSP IP/OBS DSCHRG MGMT >30: CPT | Mod: GC | Performed by: HOSPITALIST

## 2023-10-02 PROCEDURE — 96372 THER/PROPH/DIAG INJ SC/IM: CPT

## 2023-10-02 PROCEDURE — 93971 EXTREMITY STUDY: CPT | Mod: RT

## 2023-10-02 RX ORDER — MECLIZINE HYDROCHLORIDE 25 MG/1
12.5 TABLET ORAL 3 TIMES DAILY
Status: DISCONTINUED | OUTPATIENT
Start: 2023-10-02 | End: 2023-10-02 | Stop reason: HOSPADM

## 2023-10-02 RX ORDER — ONDANSETRON 4 MG/1
4 TABLET, FILM COATED ORAL EVERY 4 HOURS PRN
Qty: 20 TABLET | Refills: 0 | Status: SHIPPED | OUTPATIENT
Start: 2023-10-02 | End: 2023-10-17

## 2023-10-02 RX ORDER — POTASSIUM CHLORIDE 20 MEQ/1
20 TABLET, EXTENDED RELEASE ORAL
Qty: 30 TABLET | Refills: 0 | Status: SHIPPED | OUTPATIENT
Start: 2023-10-02

## 2023-10-02 RX ORDER — MECLIZINE HCL 12.5 MG/1
12.5 TABLET ORAL 3 TIMES DAILY
Qty: 30 TABLET | Refills: 0 | Status: SHIPPED | OUTPATIENT
Start: 2023-10-03 | End: 2023-10-16 | Stop reason: SDUPTHER

## 2023-10-02 RX ADMIN — FERROUS SULFATE TAB 325 MG (65 MG ELEMENTAL FE) 325 MG: 325 (65 FE) TAB at 05:18

## 2023-10-02 RX ADMIN — MECLIZINE HYDROCHLORIDE 12.5 MG: 25 TABLET ORAL at 17:39

## 2023-10-02 RX ADMIN — ENOXAPARIN SODIUM 40 MG: 100 INJECTION SUBCUTANEOUS at 17:39

## 2023-10-02 RX ADMIN — OMEPRAZOLE 20 MG: 20 CAPSULE, DELAYED RELEASE ORAL at 08:43

## 2023-10-02 RX ADMIN — HYDROCODONE BITARTRATE AND ACETAMINOPHEN 1 TABLET: 5; 325 TABLET ORAL at 12:35

## 2023-10-02 RX ADMIN — ATENOLOL 100 MG: 50 TABLET ORAL at 05:19

## 2023-10-02 RX ADMIN — SENNOSIDES AND DOCUSATE SODIUM 2 TABLET: 50; 8.6 TABLET ORAL at 17:39

## 2023-10-02 RX ADMIN — PREGABALIN 150 MG: 150 CAPSULE ORAL at 05:19

## 2023-10-02 RX ADMIN — BENAZEPRIL HYDROCHLORIDE 20 MG: 20 TABLET ORAL at 05:18

## 2023-10-02 RX ADMIN — MECLIZINE HYDROCHLORIDE 12.5 MG: 25 TABLET ORAL at 08:43

## 2023-10-02 RX ADMIN — PREGABALIN 150 MG: 150 CAPSULE ORAL at 17:39

## 2023-10-02 RX ADMIN — HYDROCHLOROTHIAZIDE 12.5 MG: 25 TABLET ORAL at 05:20

## 2023-10-02 RX ADMIN — SENNOSIDES AND DOCUSATE SODIUM 2 TABLET: 50; 8.6 TABLET ORAL at 05:19

## 2023-10-02 RX ADMIN — MECLIZINE HYDROCHLORIDE 12.5 MG: 25 TABLET ORAL at 12:29

## 2023-10-02 RX ADMIN — ASPIRIN 81 MG: 81 TABLET, COATED ORAL at 05:18

## 2023-10-02 ASSESSMENT — COGNITIVE AND FUNCTIONAL STATUS - GENERAL
MOBILITY SCORE: 18
SUGGESTED CMS G CODE MODIFIER MOBILITY: CK
MOVING TO AND FROM BED TO CHAIR: A LITTLE
MOVING FROM LYING ON BACK TO SITTING ON SIDE OF FLAT BED: A LITTLE
STANDING UP FROM CHAIR USING ARMS: A LITTLE
WALKING IN HOSPITAL ROOM: A LITTLE
CLIMB 3 TO 5 STEPS WITH RAILING: A LOT

## 2023-10-02 ASSESSMENT — GAIT ASSESSMENTS
DISTANCE (FEET): 30
ASSISTIVE DEVICE: FRONT WHEEL WALKER
GAIT LEVEL OF ASSIST: MINIMAL ASSIST

## 2023-10-02 ASSESSMENT — FIBROSIS 4 INDEX: FIB4 SCORE: 0.82

## 2023-10-02 NOTE — DIETARY
Nutrition services: Day 0 of admit.  Tereza Sánchez is a 78 y.o. female with admitting DX of orthostasis.    Consult received for wt loss (34 lbs or more in 1 month), poor PO per admit screen. Visited with pt at bedside. Pt was resting in bed, she appeared adequately nourished. Pt reports a poor appetite for 2 weeks prior to admit, stating she has only been able to eat one banana per day d/t nausea, and abdominal pain.  Pt also reported significant dizziness upon standing. Pt declined Boost/Ensure oral nutrition supplements as she does not like them. She was however, agreeable to alternative snacks (chobani yogurt smoothie, cottage cheese with pears). RD obtained preferences and will adjust pt's menu accordingly. Pt shared that she has lost 40 lbs in the last month with a previous body weight of 180 lbs, stated her current wt was 140 lbs. Wt hx listed below.     Assessment:  Height: 152.4 cm (5')  Weight: 81.9 kg (180 lb 8.9 oz)  Body mass index is 35.26 kg/m²., BMI classification: obese class II  Diet/Intake: L6/L0 (soft and bite sized with thin liquids); no PO yet recorded to assess/trend     Evaluation:   Admitted with abdominal pain, nausea, dizziness.  PMH: asthma, bowel habit changes, shortness of breath, cancer, chiari malformation, heart burn, high cholesterol, hypertension, indigestion, pneumonia, scoliosis  Wt hx per chart review: 193 lbs 3/29/23, 194 lbs 11/21/22. Wt loss of 7% in seven months is not significant.  Labs and meds reviewed.     Malnutrition Risk: Does not meet criteria per ASPEN guidelines at this time.    Recommendations/Plan:  Diet per SLP.  Snacks BID.  Encourage intake of meals and snacks.  Document intake of all meals and snacks as % taken in ADLs to provide interdisciplinary communication across all shifts.   Monitor weight.  Nutrition rep will continue to see patient for ongoing meal and snack preferences.     RD will follow.

## 2023-10-02 NOTE — H&P
[facility] Internal Medicine History & Physical Note    Date of Service  10/1/2023    [facility] Team: ALYSON   Attending: Jordan Angel M.d.  Senior Resident: Dr. Suh  Contact Number: [phone]    Primary Care Physician  Michelle Jim P.A.-C.    Consultants  None    Code Status  Full Code    Chief Complaint  Chief Complaint   Patient presents with    Abdominal Pain     Reports upper abdominal pain in both the RUQ and LUQ    Nausea     Pt reports nausea for the past 2 weeks. Denies vomiting    Dizziness     Dizziness whenever pt sits up/stands. Per EMS, pts orthostatics were positive       History of Presenting Illness (HPI):   Tereza Sánchez is a 78 y.o. female who presented 10/1/2023 with complaints of abdominal pain accompanied by nausea and dizziness for the past 2 weeks. Patient reports she was recently hospitalized for a septic bladder infection (9/7-9/16/23) and after she went home she was unable to eat, drink or swallow due to progressively worsening nausea secondary to dizziness. She endorses periumbilical abdominal pain, persistent achy pain, sharp intermittently. Standing provokes dizziness and consuming liquids or solids provokes nausea. She reports since she was discharged, she has hardly been able to drink water and has been eating only 1 banana a day for the past week. She reports feeling nauseated and dizzy upon standing, denies any vomiting episodes.  She denies any radiation of the abdominal pain. She tried OTC tums, prescription nexium which both did not provide relief. Patient reports she was administered sublingual zofran in the ambulance upon arrival which relived nausea. Patient endorses chills, malaise, headaches, fatigue, dizziness. She denies chest pain, sob, pain, numbness or tingling. She reports her last bowel movement was yesterday and normal, denies any blood in the urine, stool, or sputum.   Family history remarkable for sister having stroke at age 78, and mother having pacemaker placed  in lifetime. Patient endorses compliance with medication.      In the ED, orthostatic vital signs were positive. Chem panel remarkable for hypokalemia of 3.4. elevated troponin of 50, UA revealed ketonuria; blood cultures were collected; abdominal xray revealed normal bowel gas pattern; and EKG was sinus rhythm with heart rate of 87.     I discussed the plan of care with patient and family.    Review of Systems  Review of Systems   Constitutional:  Positive for chills and malaise/fatigue.   Gastrointestinal:  Positive for abdominal pain and nausea.   Neurological:  Positive for dizziness and headaches.   All other systems reviewed and are negative.      Past Medical History   has a past medical history of Anesthesia, Arthritis, Asthma, Bowel habit changes, Breath shortness, Cancer (HCC), Chiari malformation (1970's), Chickenpox, Chronic back pain, Contracture of hand joint, Decreased lung capacity, Dental disorder, Disorder of thyroid, Heart burn, High cholesterol, Hypertension, Indigestion, Joint replacement, Obesity, Pain, Pain (08/06/2018), Peripheral edema (06/06/2013), Pneumonia, PONV (postoperative nausea and vomiting), Psychiatric problem, Scoliosis, Shortness of breath (08/06/2018), Sleep apnea, Sleep apnea (08/07/2018), Snoring, sore throat (01/15/2015), Supplemental oxygen dependent, and Syringomyelia ([facility]).    Surgical History   has a past surgical history that includes rubin by laparoscopy (2002); shoulder arthroplasty total (2004); hip arthroplasty total (5/19/08); hip arthroplasty total; us-needle core bx-breast panel; node biopsy sentinel (2/6/2015); thyroid lobectomy (Left, 8/17/2018); thyroidectomy total (8/17/2018); laminotomy; hip replacement, total; cyst excision (1973); shunt insertion; mastectomy (2/6/2015); hip revision total (Left, 9/2/2021); other abdominal surgery; and pr reconstr total shoulder implant (Left, 5/4/2022).     Family History  family history includes Hypertension in her  "mother; Sleep Apnea in her brother.   Family history reviewed with patient.     Social History  Tobacco: Denies  Alcohol: Denies  Recreational drugs (illegal or prescription): denies  Employment: retired  Living Situation: with spouse  Recent Travel: no recent travel   Primary Care Provider: Reviewed RACHEL Jim  Other (stressors, spirituality, exposures): not discussed     Allergies  Allergies   Allergen Reactions    Bactrim Ds Rash     Pt states \"I get a body rash\".    Morphine Vomiting     hallucinations       Medications  Prior to Admission Medications   Prescriptions Last Dose Informant Patient Reported? Taking?   HYDROcodone-acetaminophen (NORCO) 5-325 MG Tab per tablet   Yes No   Sig: TAKE 1 TABLET BY MOUTH 3 TIMES A DAY FOR 30 DAYS, 724.02   Zinc 50 MG Tab   Yes No   Sig: Take 1 Tablet by mouth every day.   Patient not taking: Reported on 9/28/2023   amitriptyline (ELAVIL) 100 MG Tab 9/30/2023 at 2000  Yes Yes   Sig: Take 100 mg by mouth every evening.   atenolol (TENORMIN) 50 MG Tab 10/1/2023 at 0800  No No   Sig: TAKE 2 TABLETS BY MOUTH EVERY DAY. BLOOD PRESSURE   Patient taking differently: Take 50 mg by mouth 2 times a day.   atorvastatin (LIPITOR) 10 MG Tab 9/30/2023 at 2000  No No   Sig: TAKE 1 TABLET BY MOUTH EVERY EVENING. CHOLESTEROL   benazepril-hydrochlorthiazide (LOTENSIN HCT) 20-12.5 MG per tablet 10/1/2023 at 0800  No No   Sig: Take 1 Tablet by mouth every day. BLOOD PRESSURE   esomeprazole (NEXIUM) 20 MG capsule 10/1/2023 at 0800  No No   Sig: TAKE 1 CAPSULE BY MOUTH EVERY DAY IN THE MORNING BEFORE BREAKFAST   ferrous sulfate 325 (65 Fe) MG tablet   No No   Sig: TAKE 1 TABLET BY MOUTH EVERY DAY   potassium chloride SA (KDUR) 20 MEQ Tab CR   No No   Sig: Take 1 Tab by mouth every day.   predniSONE (DELTASONE) 10 MG Tab   Yes No   Sig: Take 1 Tablet by mouth every day.   Patient not taking: Reported on 9/28/2023   pregabalin (LYRICA) 150 MG Cap 10/1/2023 at 0800  Yes Yes   Sig: Take 150 mg by " "mouth 2 times a day.      Facility-Administered Medications: None       Physical Exam  Temp:  [35.9 °C (96.7 °F)] 35.9 °C (96.7 °F)  Pulse:  [] 90  Resp:  [15-20] 18  BP: (113-176)/() 157/78  SpO2:  [97 %-100 %] 97 %  Blood Pressure : (!) 157/78   Temperature: 35.9 °C (96.7 °F)   Pulse: 90   Respiration: 18   Pulse Oximetry: 97 %       Physical Exam  Vitals reviewed.   Constitutional:       Appearance: Normal appearance.   HENT:      Head: Normocephalic and atraumatic.      Mouth/Throat:      Mouth: Mucous membranes are dry.   Eyes:      Extraocular Movements: Extraocular movements intact.      Conjunctiva/sclera: Conjunctivae normal.      Pupils: Pupils are equal, round, and reactive to light.   Cardiovascular:      Rate and Rhythm: Normal rate and regular rhythm.      Pulses: Normal pulses.      Heart sounds: Normal heart sounds.   Pulmonary:      Effort: Pulmonary effort is normal.      Breath sounds: Normal breath sounds.   Abdominal:      General: Abdomen is flat. Bowel sounds are decreased.      Palpations: Abdomen is soft.      Tenderness: There is abdominal tenderness in the periumbilical area.   Musculoskeletal:         General: Normal range of motion.   Skin:     General: Skin is warm and dry.   Neurological:      General: No focal deficit present.      Mental Status: She is alert.   Psychiatric:         Mood and Affect: Mood normal.         Behavior: Behavior normal.       Laboratory:  Recent Labs     10/01/23  1458   WBC 9.6   RBC 4.92   HEMOGLOBIN 13.7   HEMATOCRIT 42.4   MCV 86.2   MCH 27.8   MCHC 32.3   RDW 50.4*   PLATELETCT 552*   MPV 10.4     Recent Labs     10/01/23  1458   SODIUM 140   POTASSIUM 3.4*   CHLORIDE 101   CO2 23   GLUCOSE 110*   BUN 15   CREATININE 0.70   CALCIUM 9.5     Recent Labs     10/01/23  1458   ALTSGPT 15   ASTSGOT 23   ALKPHOSPHAT 94   TBILIRUBIN 0.5   LIPASE 31   GLUCOSE 110*         No results for input(s): \"NTPROBNP\" in the last 72 hours.      Recent Labs     " 10/01/23  1458   TROPONINT 50*       Imaging:  [id number] [id number]   Final Result      1.  Normal bowel gas pattern.          X-Ray:  I have personally reviewed the images and compared with prior images.  EKG:  I have personally reviewed the images and compared with prior images.    Assessment/Plan:  Problem Representation: Tereza Sánchez is a 78 y.o. female who presented 10/1/2023 with complaints of abdominal pain accompanied by nausea and dizziness for the past 2 weeks. Orthostatic vital signs were positive upon presentation to the ED, elevated troponin of 50, UA revealed ketonuria, abdominal xray revealed normal bowel gas pattern.   I anticipate this patient is appropriate for observation status at this time because trending troponins, will likely resolve with adequate hydration.     Patient will need a Med/Surg bed on MEDICAL service .  The need is secondary to continuous cardiac monitoring.    * Orthostasis- (present on admission)  Assessment & Plan  Orthostatic vital signs positive in ED; Received 1L LR bolus in ED; likely secondary to polypharmacy and to dehydration from dizziness accompanied by nausea and decreased oral intake over past 1 week.    -additional 1L LR bolus   -telemetry to monitor for arrhythmias   -continue to monitor       Polypharmacy  Assessment & Plan  Patient is on multiple medications that can cause polypharmacy; outpatient problem but dayteam to consider discontinue or decreasing dosing of medications leading to symptoms of dizziness: baclofen, quetiapine, norco, atenolol, duloxetine, pregabalin, amitriptyline    Abdominal pain, acute, periumbilical  Assessment & Plan  Accompanied by nausea; Abdominal xray revealed normal bowel gas pattern  -1L LR bolus for rehydration  -clear liquid diet   -consider GI consult if patient remains intolerant of oral intake and if nausea does not improve   -zofran 4mg prn for nausea       Elevated troponin  Assessment & Plan  Troponin 50 upon  presentation  -f/u repeat troponin  -telemetry       Iron deficiency anemia  Assessment & Plan  Continue home ferrous sulfate 325mg daily     Major depressive disorder  Assessment & Plan  Continue home amitriptyline 100mg daily     Dyslipidemia  Assessment & Plan  Continue home atorvastatin 10mg daily     Chronic back pain  Assessment & Plan  Continue home norco 5-325mg prn     HTN (hypertension)  Assessment & Plan  -continue home meds atenolol 100mg daily, benazepril 20mg daily, hctz 12.5mg daily     GERD (gastroesophageal reflux disease)  Assessment & Plan  Continue home nexium 10mg daily         VTE prophylaxis: enoxaparin ppx

## 2023-10-02 NOTE — CARE PLAN
The patient is Stable - Low risk of patient condition declining or worsening    Shift Goals  Clinical Goals: Orthostatics, tele monitoring  Patient Goals: dizziness/fall prevention, rest  Family Goals: support, visiting, help with POC    Progress made toward(s) clinical / shift goals:    Problem: Knowledge Deficit - Standard  Goal: Patient and family/care givers will demonstrate understanding of plan of care, disease process/condition, diagnostic tests and medications  Outcome: Progressing  Note: Patient is A&Ox4, is receptive to education, and asks appropriate questions related to her diagnosis, plan of care, and discharge plan.      Problem: Fall Risk  Goal: Patient will remain free from falls  10/2/2023 0550 by Chong Garcia R.N.  Outcome: Progressing  Note: Patient acknowledges her dizziness and high fall risk. Patient does not attempt to get out of bed on her own and has thus far remained free of falls throughout her current admission.   10/2/2023 0547 by Chong Garcia R.N.  Outcome: Progressing

## 2023-10-02 NOTE — THERAPY
Physical Therapy   Initial Evaluation     Patient Name: Tereza Sánchez  Age:  78 y.o., Sex:  female  Medical Record #: [id number]  Today's Date: 10/2/2023     Precautions  Precautions: (P) Fall Risk  Comments: (P) R knee buckling    Assessment  Patient is 78 y.o. female who was recently admitted for abdominal pain, orthostatic hypotension, dizziness, and nausea. Pt was agreeable to therapy evaluation and states dizziness and nausea has improved since admission. Per RN, orthostatics were negative. Pt states her baseline mobility has many deficits and she is typically using her 4WW to mobilize within her home, however, is typically Mod I with intermittent assist from spouse. Pt presents to PT with impaired balance, impaired gait, weakness, and dec activity tolerance. Above mentioned deficits are near her baseline, however, due to immobility in acute care setting impairments have been exacerbated per patient report and objective findings. Pt is currently limited by fatigue and weakness. Pt was able to demonstrate CGA for bed mobility, Min A for ambulation with FWW use for short distances, and CGA for transfers. Pt demonstrates with multiple functional deficits, however, these deficits are her baseline per patient report. Pt reports of frequent R knee buckling and states of falls secondary to R knee buckling. Pt will benefit with use of pediatric FWW upon d/c to home and immobilizer use for R knee to reduce risk of falls. Pt encouraged to avoid use of 4WW due to increased risk of falls. Pt states she has had custom fitting for R knee immobilizer, however, since admission in acute care setting she has not been able to obtain it. Anticipate pt to d/c home once medically clear and is able to return demo safe navigation of 1 step to enter home. Will recommend HH therapy services along with assist from spouse.     Pt provided with following txt: Pt provided with compensatory strategies with appropriate use of FWW and use of B  "UE in order to offload R LE during ambulation and maintain step to gait pattern to avoid R knee buckling and reduce risk of falls.     Plan    Physical Therapy Initial Treatment Plan   Treatment Plan : (P) Equipment, Gait Training, Neuro Re-Education / Balance, Self Care / Home Evaluation, Stair Training, Therapeutic Activities, Therapeutic Exercise  Treatment Frequency: (P) 4 Times per Week  Duration: (P) Until Therapy Goals Met    DC Equipment Recommendations: (P) Front-Wheel Walker (pediatric FWW & R knee immobilizer)  Discharge Recommendations: (P) Recommend home health for continued physical therapy services (along with assist from spouse upon d/c to home)     Subjective    \" I feel like my R knee is going to give out and bend forward\"      Objective       10/02/23 1501   Initial Contact Note    Initial Contact Note Order Received and Verified, Physical Therapy Evaluation in Progress with Full Report to Follow.   Precautions   Precautions Fall Risk   Comments R knee buckling   Vitals   O2 (LPM) 2   O2 Delivery Device Nasal Cannula   Pain 0 - 10 Group   Therapist Pain Assessment Nurse Notified;0   Prior Living Situation   Prior Services None   Housing / Facility 1 Story House   Steps Into Home 1   Steps In Home 0   Bathroom Set up Walk In Shower   Equipment Owned 4-Wheel Walker;Wheelchair;Hospital Bed   Lives with - Patient's Self Care Capacity Spouse   Comments pt states spouse is supportive and assists with ADL's and mobility such as step navigation to enter home   Prior Level of Functional Mobility   Bed Mobility Independent   Transfer Status Independent   Ambulation Independent   Ambulation Distance   (household distanecs only)   Assistive Devices Used 4-Wheel Walker   Stairs Required Assist   Comments pt states she is primarily independent within the house and utilizes 4WW at all times. Spouse provides assist with 1 step navigation to enter home and ADL's   History of Falls   History of Falls Yes   Date of " Last Fall   (states of a fall about a year ago that happended secondary to R knee buckling and stepping over bathtub)   Cognition    Cognition / Consciousness WDL   Level of Consciousness Alert   Comments pleasant/cooperative   Passive ROM Upper Body   Passive ROM Upper Body X   Comments presents with B hand contractures, pt states this is baseline from hx of brain and back surgeries when she was young   Active ROM Upper Body   Active ROM Upper Body  X   Comments same as above, AROM limited due to contractures in hand   Strength Upper Body   Upper Body Strength  Not Tested   Sensation Upper Body   Upper Extremity Sensation  X   Comments reports of intermittent numbness/tingling in R UE, however, pt states it does not effect her functioanl mobility   Upper Body Muscle Tone   Upper Body Muscle Tone  Not Tested   Passive ROM Lower Body   Passive ROM Lower Body WDL   Active ROM Lower Body    Active ROM Lower Body  WDL   Strength Lower Body   Lower Body Strength  X   Comments able to demonstrate 5/5 strength during MMT, however, pt states with WB activites her R knee tends to buckle   Sensation Lower Body   Lower Extremity Sensation   X   Comments reports of intermittened numbness/tingling in feet, however, is able to feel the floor   Lower Body Muscle Tone   Lower Body Muscle Tone  WDL   Neurological Concerns   Neurological Concerns Yes   Comments pt reports of chronic neulogical issues due to hx of prior brain sx and low back sx   Coordination Upper Body   Coordination Not Tested   Coordination Lower Body    Coordination Lower Body  WDL   Vision   Vision Comments states she has trouble seeing near and wears glasses   Balance Assessment   Sitting Balance (Static) Fair +   Sitting Balance (Dynamic) Fair   Standing Balance (Static) Fair   Standing Balance (Dynamic) Fair -   Weight Shift Sitting Fair   Weight Shift Standing Fair   Comments w/fww use, demonstrates with R lateral lean once sitting EOB, however, pt states  this posture is her baseline due to hx of back pain and scoliosis   Bed Mobility    Supine to Sit Contact Guard Assist   Sit to Supine Standby Assist   Scooting Standby Assist   Comments HOB elevated and rails up   Gait Analysis   Gait Level Of Assist Minimal Assist   Assistive Device Front Wheel Walker   Distance (Feet) 30   # of Times Distance was Traveled 1   Deviation Antalgic;Step To;Decreased Base Of Support;Shuffled Gait;Decreased Toe Off;Decreased Heel Strike   Weight Bearing Status fwb   Comments pt presents with baseline gait mechanics. Per pt current gait is her new baseline and has these issues due to hx of scoliosis and hip replacements   Functional Mobility   Sit to Stand Contact Guard Assist   Bed, Chair, Wheelchair Transfer Contact Guard Assist   Transfer Method Stand Step   Mobility EOB, sit<>stand, ambulation, back to bed   How much difficulty does the patient currently have...   Turning over in bed (including adjusting bedclothes, sheets and blankets)? 4   Sitting down on and standing up from a chair with arms (e.g., wheelchair, bedside commode, etc.) 3   Moving from lying on back to sitting on the side of the bed? 3   How much help from another person does the patient currently need...   Moving to and from a bed to a chair (including a wheelchair)? 3   Need to walk in a hospital room? 3   Climbing 3-5 steps with a railing? 2   6 clicks Mobility Score 18   Activity Tolerance   Sitting in Chair NT   Sitting Edge of Bed 15 mins   Standing 6-8 mins   Comments limited due to fatigue   Edema / Skin Assessment   Edema / Skin  Not Assessed   Patient / Family Goals    Patient / Family Goal #1 to go home   Short Term Goals    Short Term Goal # 1 pt will ambulate for 150ft w/fww w/SBA in 6tx for safe d/c home   Short Term Goal # 2 pt will go up/down 1 step in order to enter home with use of FWW for safe d/c home   Education Group   Education Provided Role of Physical Therapist;Use of Assistive Device   Role  of Physical Therapist Patient Response Patient;Acceptance;Explanation;Demonstration;Verbal Demonstration;Action Demonstration   Use of Assistive Device Patient Response Patient;Acceptance;Demonstration;Explanation;Verbal Demonstration;Action Demonstration   Physical Therapy Initial Treatment Plan    Treatment Plan  Equipment;Gait Training;Neuro Re-Education / Balance;Self Care / Home Evaluation;Stair Training;Therapeutic Activities;Therapeutic Exercise   Treatment Frequency 4 Times per Week   Duration Until Therapy Goals Met   Problem List    Problems Impaired Transfers;Impaired Ambulation;Functional Strength Deficit;Impaired Balance;Decreased Activity Tolerance   Anticipated Discharge Equipment and Recommendations   DC Equipment Recommendations Front-Wheel Walker  (pediatric FWW & R knee immobilizer)   Discharge Recommendations Recommend home health for continued physical therapy services  (along with assist from spouse upon d/c to home)   Interdisciplinary Plan of Care Collaboration   IDT Collaboration with  Nursing   Patient Position at End of Therapy In Bed;Bed Alarm On;Call Light within Reach;Tray Table within Reach;Phone within Reach   Collaboration Comments aware of visit and recs   Session Information   Date / Session Number  10/2-1 (1/4, 10/8)   Priority   (stair training and use of FWW along with R knee immobilizer)

## 2023-10-02 NOTE — ED NOTES
Med updated and complete. Allergies reviewed. Confirmed name and date of birth .  Interviewed family ( ) at bedside.    No outpatient antibiotics in last 30 days.  No anticoagulant medications.   Last ASA dose  10/01/23    No infusions or injections.     stated that pt has not taken   Alendronate >2 months. Removed  from med rec.      Home pharmacy  [facility] = [phone]

## 2023-10-02 NOTE — ED NOTES
Pt being taken upstairs at this time by transport via gurney. Pt is awake and alert, talking to staff, in no apparent distress at time of transfer. Pt's paperwork and belongings sent upstairs with pt and transport.

## 2023-10-02 NOTE — ASSESSMENT & PLAN NOTE
Orthostatic vital signs positive in ED; Received 1L LR bolus in ED; likely secondary to polypharmacy and to dehydration from dizziness accompanied by nausea and decreased oral intake over past 1 week.    -additional 1L LR bolus   -telemetry to monitor for arrhythmias   -continue to monitor

## 2023-10-02 NOTE — ED NOTES
Bedside report received from off going RN, Abbie, assumed care of patient.  POC discussed with patient. Call light within reach, all needs addressed at this time.       Fall risk interventions in place: Patient's personal possessions are with in their safe reach, Keep floor surfaces clean and dry, and Accompanied to restroom (all applicable per [place] Fall risk assessment)   Continuous monitoring: Cardiac Leads, Pulse Ox, or Blood Pressure  IVF/IV medications: Not Applicable   Oxygen: 2L Nasal cannula  Bedside sitter: Not Applicable   Isolation: Not Applicable

## 2023-10-02 NOTE — DISCHARGE PLANNING
Care Transition Team Assessment    RN YAKOV met with patient at bedside and obtained the information used in this assessment. Patient verified accuracy of facesheet; patient lives in a single story home with spouse.  Prior to current hospitalization, patient was independent with ADLS/IADLS. Patient spouse drives and patient is able to attend necessary MD appointments. Patient's PCP is Michelle RAMOS. Patient has no financial concerns. Patient has support from spouse . Denies any hx of substance use and denies any diagnosis of mental illness.         Information Source: Patient and spouse  Orientation Level: Oriented X4  Information Given By: Patient, Spouse  Who is responsible for making decisions for patient? : Patient    Readmission Evaluation  Is this a readmission?: No    Elopement Risk  Legal Hold: No  Ambulatory or Self Mobile in Wheelchair: No-Not an Elopement Risk  Elopement Risk: Not at Risk for Elopement    Interdisciplinary Discharge Planning  Primary Care Physician: Ariella RAMOS  Lives with - Patient's Self Care Capacity: Spouse  Patient or legal guardian wants to designate a caregiver: No  Support Systems: Spouse / Significant Other  Housing / Facility: 1 Story House  Do You Take your Prescribed Medications Regularly: Yes  Able to Return to Previous ADL's: Yes  Mobility Issues: No  Prior Services: None  Patient Prefers to be Discharged to:: Home  Assistance Needed: Yes  Durable Medical Equipment: Not Applicable    Discharge Preparedness  What is your plan after discharge?: Home with help  What are your discharge supports?: Spouse  Prior Functional Level: Ambulatory  Difficulity with ADLs: None  Difficulity with IADLs: None    Functional Assesment  Prior Functional Level: Ambulatory    Finances  Financial Barriers to Discharge: No  Prescription Coverage: Yes    Vision / Hearing Impairment  Vision Impairment : Yes  Right Eye Vision: Wears Glasses, Impaired  Left Eye Vision: Impaired, Wears  Glasses  Hearing Impairment : No    Values / Beliefs / Concerns  Values / Beliefs Concerns : No    Advance Directive  Advance Directive?: None  Advance Directive offered?: AD Booklet refused    Domestic Abuse  Have you ever been the victim of abuse or violence?: No  Physical Abuse or Sexual Abuse: No  Verbal Abuse or Emotional Abuse: No  Possible Abuse/Neglect Reported to:: Not Applicable    Psychological Assessment  History of Substance Abuse: None  History of Psychiatric Problems: No    Discharge Risks or Barriers  Discharge risks or barriers?: No    Anticipated Discharge Information  Discharge Disposition: Discharged to home/self care (01)

## 2023-10-02 NOTE — DISCHARGE PLANNING
Case Management Discharge Planning    Admission Date: 10/1/2023  GMLOS:    ALOS: 0    6-Clicks ADL Score: 21  6-Clicks Mobility Score: 14  PT and/or OT Eval ordered: No  Post-acute Referrals Ordered: NA  Post-acute Choice Obtained: NA  Has referral(s) been sent to post-acute provider:  MELITON      Anticipated Discharge Dispo: Discharge Disposition: Discharged to home/self care (01)    DME Needed: No    Action(s) Taken: Updated Provider/Nurse on Discharge Plan Patient discussed during IDT rounds with medical team, no further needs at this time. Spouse at bedside, he will drive patient home.    Escalations Completed: None    Medically Clear: No    Next Steps: CM will continue to follow for discharge planning needs.     Barriers to Discharge: Medical clearance    Is the patient up for discharge tomorrow: No

## 2023-10-02 NOTE — CARE PLAN
The patient is Stable - Low risk of patient condition declining or worsening    Shift Goals  Clinical Goals: telemetry monitoring; I&O monitoring  Patient Goals: to feel better  Family Goals: NAHUM    Progress made toward(s) clinical / shift goals:  Pt has been resting in bed this shift.  She c/o headache earlier today with PRN pain medication being given as ordered in which she reported helped.  She remains free from non-verbal s/sx of discomfort at this time.  Safety measures in place, call light within reach.  Will continue with current POC.  Problem: Pain - Standard  Goal: Alleviation of pain or a reduction in pain to the patient’s comfort goal  Outcome: Progressing     Problem: Skin Integrity  Goal: Skin integrity is maintained or improved  Outcome: Progressing     Problem: Fall Risk  Goal: Patient will remain free from falls  Outcome: Progressing       Patient is not progressing towards the following goals:

## 2023-10-02 NOTE — ASSESSMENT & PLAN NOTE
Patient is on multiple medications that can cause polypharmacy; outpatient problem but dayteam to consider discontinue or decreasing dosing of medications leading to symptoms of dizziness: baclofen, quetiapine, norco, atenolol, duloxetine, pregabalin, amitriptyline

## 2023-10-02 NOTE — PROGRESS NOTES
4 Eyes Skin Assessment Completed by JIMMY Esquivel and JIMMY Badillo.    Head WDL  Ears Redness and Blanching  Nose WDL  Mouth WDL  Neck WDL  Breast/Chest Redness  Shoulder Blades WDL  Spine WDL  (R) Arm/Elbow/Hand WDL  (L) Arm/Elbow/Hand Redness  Abdomen WDL  Groin Redness  Scrotum/Coccyx/Buttocks WDL  (R) Leg Edema  (L) Leg Edema  (R) Heel/Foot/Toe WDL  (L) Heel/Foot/Toe WDL          Devices In Places Tele Box, Blood Pressure Cuff, and Nasal Cannula      Interventions In Place Waffle Overlay    Possible Skin Injury No    Pictures Uploaded Into Epic N/A  Wound Consult Placed N/A  RN Wound Prevention Protocol Ordered No

## 2023-10-02 NOTE — ASSESSMENT & PLAN NOTE
Accompanied by nausea; Abdominal xray revealed normal bowel gas pattern  -1L LR bolus for rehydration  -clear liquid diet   -consider GI consult if patient remains intolerant of oral intake and if nausea does not improve   -zofran 4mg prn for nausea

## 2023-10-03 ENCOUNTER — PATIENT OUTREACH (OUTPATIENT)
Dept: MEDICAL GROUP | Facility: PHYSICIAN GROUP | Age: 78
End: 2023-10-03
Payer: MEDICARE

## 2023-10-03 NOTE — DISCHARGE PLANNING
Patient discharged last night. TCN spoke with spouse Anthony this morning. HIPPA verified. TCN let him know the [facility]  referral was never sent. Patient has f/u appt with her PCP Michelle Jim 10/5/23. TCN encouraged spouse and patient to ask Michelle for a home health order and [facility]  if Tereza still feels she could benefit from home health PT services. Spouse also states he has a FWW at home Tereza can use if recommended by HH PT.

## 2023-10-03 NOTE — DISCHARGE PLANNING
SCP TCN chart review completed. Collaborated with YAKOV Long prior to meeting with the pt. The most current review of medical record, knowledge of pt's PLOF and social support, LACE+ score of 22 and 6 clicks scores of 21 for ADLs and 14 for mobility were considered. Per chart review, patient on 2L O2.    PT eval completed today with recommendation for home health and pediatric FWW.    Pt seen at bedside. Introduced TCN program. Provided education regarding post acute levels of care. Discussed SCP plan benefits (Meds to Beds, medical uber and [facility] transitional care). Pt verbalizes understanding. Patient lives with spouse in [place], [place]. She uses a 4WW for ambulation and uses 2L O2 at baseline (through [facility]). She is modified independent with ADLs and her spouse helps with IADLs and driving. Patient has f/u appt with [facility] PCP Michelle Jim on 10/5/23. Patient gave choice for HH and DME (FWW). Choice forms faxed to [employer] to be saved in media. Note HH and FWW orders not placed yet.    TCN will continue to follow and collaborate with discharge planning team as additional post acute needs arise. Thank you.     Completed today:  PT with recs for HH and pediatric FWW  Choice obtained: HH, DME(FWW)  SCP with [facility] PCP    *Addendum 1800 - Patient discharged home this evening before HH and DME(FWW) referrals sent out. TCN will call patient to let them know PCP will need to place HH and DME order when she sees her this week.

## 2023-10-03 NOTE — CARE PLAN
The patient is Stable - Low risk of patient condition declining or worsening    Shift Goals  Clinical Goals: telemetry monitoring; I&O monitoring  Patient Goals: to feel better  Family Goals: NAHUM    Progress made toward(s) clinical / shift goals:  Pt is being discharged home this evening, after tolerating advanced diet.  She denies c/o pain when asked and she remains free from non-verbal s/sx of discomfort at this time.  Education provided over knee immobilizer at D as well as changes to medications made with pt verbalizing understanding.  Safety measures in place, call light within reach.  Will continue with current POC.    Patient is not progressing towards the following goals:

## 2023-10-03 NOTE — DISCHARGE SUMMARY
[facility] Internal Medicine Discharge Summary    Attending: Dr. Fredo Willis MD  Senior Resident: Dr. Katelynn MD  Intern:  Dr. Liu MD  Contact Number: [phone]    CHIEF COMPLAINT ON ADMISSION  Chief Complaint   Patient presents with    Abdominal Pain     Reports upper abdominal pain in both the RUQ and LUQ    Nausea     Pt reports nausea for the past 2 weeks. Denies vomiting    Dizziness     Dizziness whenever pt sits up/stands. Per EMS, pts orthostatics were positive       Reason for Admission  Intolerance of oral intake    Admission Date  10/1/2023    CODE STATUS  Full Code    HPI & HOSPITAL COURSE  This is a 78 y.o. female here with dizziness, nausea/vomiting and abdominal pain.  Patient has an extensive past medical history noted below. Of note she is on anti-hypertensives and anti-psychotics. Risk for polypharmacy was considered given patients age. However, patient has been on these medications chronically, and does not endorse the admission symptoms prior. Patient was recently discharged from [facility] for sepsis secondary to cystitis and only been home for 1 week and had not been able to tolerate food due to N/V.    In hospital patient was given zofran and was able to tolerate progressively graduated foods up to regular diet. Patient was noted to have  hearing to the left ear and endorsed vertigo (spinning of the room) as the description of her dizziness. Meclizine was ordered.    Patient received a Chest Xray (crackles on physical exam) and Doppler ultrasound to right leg (concerned for right leg swelling) which reported negative findings for acute pathology (10/2/2023)    Patient states she has a PCP appointment on 10/12/2023 and was advised to inform them of her hospitalization and follow up the effects of meclizine and zofran on the vertigo and nausea respectively.    Therefore, she is discharged in good and stable condition to home with close outpatient follow-up.    The patient  recovered much more quickly than anticipated on admission.    Discharge Date  10/2/2023    Physical Exam on Day of Discharge  Physical Exam  HENT:      Mouth/Throat:      Mouth: Mucous membranes are moist.   Eyes:      Pupils: Pupils are equal, round, and reactive to light.   Cardiovascular:      Rate and Rhythm: Normal rate and regular rhythm.      Pulses: Normal pulses.      Heart sounds: Normal heart sounds. No murmur heard.  Pulmonary:      Effort: Pulmonary effort is normal.   Abdominal:      Palpations: Abdomen is soft.   Skin:     General: Skin is warm.      Capillary Refill: Capillary refill takes less than 2 seconds.   Neurological:      General: No focal deficit present.      Mental Status: She is alert.   Psychiatric:         Mood and Affect: Mood normal.         FOLLOW UP ITEMS POST DISCHARGE  PCP to follow up on OCT 12 for nausea/vomiting and vertigo (patient was prescribed meclizine) - ?[facility] vs BPPV  Mild asymptomatic Anemia based on last hemoglobin (10/2/2023) og 11.9. PCP to consider trending cbcs it in a month.    DISCHARGE DIAGNOSES  Principal Problem:    Orthostasis (POA: Yes)  Active Problems:    Elevated troponin (POA: Unknown)    Abdominal pain, acute, periumbilical (POA: Unknown)    Polypharmacy (POA: Unknown)  Resolved Problems:    * No resolved hospital problems. *      FOLLOW UP  Future Appointments   Date Time Provider Department Center   10/5/2023 10:00 AM JOHN Cotto [place]     No follow-up provider specified.    MEDICATIONS ON DISCHARGE     Medication List        START taking these medications        Instructions   meclizine 12.5 MG Tabs  Start taking on: October 3, 2023  Commonly known as: Antivert   Take 1 Tablet by mouth 3 times a day.  Dose: 12.5 mg     ondansetron 4 MG Tabs tablet  Commonly known as: Zofran   Take 1 Tablet by mouth every four hours as needed for Nausea/Vomiting for up to 15 days.  Dose: 4 mg            CHANGE how you take these medications         Instructions   potassium chloride SA 20 MEQ Tbcr  What changed:   when to take this  reasons to take this  additional instructions  Commonly known as: Kdur   Take 1 Tablet by mouth 1 time a day as needed (whenever you take lasix). * take with furosemide*  Dose: 20 mEq            CONTINUE taking these medications        Instructions   amitriptyline 100 MG Tabs  Commonly known as: Elavil   Take 100 mg by mouth every evening.  Dose: 100 mg     aspirin 81 MG EC tablet   Take 81 mg by mouth every day.  Dose: 81 mg     atenolol 50 MG Tabs  Commonly known as: Tenormin   TAKE 2 TABLETS BY MOUTH EVERY DAY. BLOOD PRESSURE  Dose: 100 mg     atorvastatin 10 MG Tabs  Commonly known as: Lipitor   TAKE 1 TABLET BY MOUTH EVERY EVENING. CHOLESTEROL  Dose: 10 mg     baclofen 10 MG Tabs  Commonly known as: Lioresal   Take 10 mg by mouth 2 times a day as needed. Indications: Muscle Spasm  Dose: 10 mg     benazepril-hydrochlorthiazide 20-12.5 MG per tablet  Commonly known as: Lotensin HCT   Take 1 Tablet by mouth every day. BLOOD PRESSURE  Dose: 1 Tablet     esomeprazole 20 MG capsule  Commonly known as: NexIUM   Doctor's comments: Pt to make appt prior to more refills.  TAKE 1 CAPSULE BY MOUTH EVERY DAY IN THE MORNING BEFORE BREAKFAST     ferrous sulfate 325 (65 Fe) MG tablet   TAKE 1 TABLET BY MOUTH EVERY DAY  Dose: 325 mg     furosemide 20 MG Tabs  Commonly known as: Lasix   Take 20 mg by mouth 1 time a day as needed. Indications: Edema  Dose: 20 mg     HYDROcodone-acetaminophen 5-325 MG Tabs per tablet  Commonly known as: Norco   TAKE 1 TABLET BY MOUTH 3 TIMES A DAY FOR 30 DAYS, 724.02     pregabalin 150 MG Caps  Commonly known as: Lyrica   Take 150 mg by mouth 2 times a day.  Dose: 150 mg     QUEtiapine 25 MG Tabs  Commonly known as: SEROquel   Take 25 mg by mouth at bedtime.  Dose: 25 mg     zonisamide 50 MG capsule  Commonly known as: Zonegran   Take 50 mg by mouth at bedtime.  Dose: 50 mg              Allergies  Allergies    Allergen Reactions    Morphine Vomiting     hallucinations    Sulfamethoxazole W-Trimethoprim Rash     * full body rash*>  10 years ago       DIET  Orders Placed This Encounter   Procedures    Diet Order Diet: Level 6 - Soft and Bite Sized; Liquid level: Level 0 - Thin     Standing Status:   Standing     Number of Occurrences:   1     Order Specific Question:   Diet:     Answer:   Level 6 - Soft and Bite Sized [23]     Order Specific Question:   Liquid level     Answer:   Level 0 - Thin       ACTIVITY  As tolerated.  Weight bearing as tolerated    CONSULTATIONS  NONE    PROCEDURES  Chest Xray 10/2/2023  Doppler Ultrasound 10/2/2023    LABORATORY  Lab Results   Component Value Date    SODIUM 139 10/02/2023    POTASSIUM 3.9 10/02/2023    CHLORIDE 106 10/02/2023    CO2 23 10/02/2023    GLUCOSE 84 10/02/2023    BUN 13 10/02/2023    CREATININE 0.60 10/02/2023    CREATININE 1.0 05/20/2008        Lab Results   Component Value Date    WBC 5.8 10/02/2023    HEMOGLOBIN 11.9 (L) 10/02/2023    HEMATOCRIT 37.0 10/02/2023    PLATELETCT 429 10/02/2023        Total time of the discharge process exceeds 30 minutes.

## 2023-10-03 NOTE — PROGRESS NOTES
Transitional Care Management  TCM Outreach Date and Time: Filed (9/28/2023 10:10 AM)     Discharge Questions  Actual Discharge Date: 09/16/23  Now that you are home, how are you feeling?: Good  Did you receive any new prescriptions?: Yes (Nystatin Powder)  Were you able to get them filled?: Yes  Meds to Bed or Pharmacy filled?: Pharmacy  Do you have any questions about your current medications or new medications (Review Med Rec)?: Yes (Please explain) (Needs clarification on which medications to resume after discharge from [facility].)  Do you have a follow up appointment scheduled with your PCP?: Yes (RN CM scheduled appointment)  Appointment Date: 10/11/23  Appointment Time: 1300  Any issues or paperwork you wish to discuss with your PCP?: Yes (Please specify) (Clarification on which medications need to be resumed after discharge from [facility])  Does this patient qualify for the CCM program?: No     Transitional Care  Number of attempts made to contact patient: 1  Current or previous attempts competed within two business days of discharge? : No  Provided education regarding treatment plan, medications, self-management, ADLs?: Yes  Has patient completed an Advanced Directive?: No  Has the Care Manager's phone number provided?: Yes  Is there anything else I can help you with?: No     Discharge Summary  Chief Complaint: ALOC  Admitting Diagnosis: Psychosis  Discharge Diagnosis: Psychosis           Copied note for Sophia Pierre RN case manager [facility] for TCM follow up visit, Alexei Colvin RN Consulting RN Population Health  Patient was readmitted to [facility] ED for abd pain w/nausea and dizziness on 10/1-10/2

## 2023-10-03 NOTE — HOSPITAL COURSE
Patient has an extensive past medical history noted below. Of note she is on anti-hypertensives and anti-psychotics. Risk for polypharmacy was considered given patients age. However, patient has been on these medications chronically, and does not endorse the admission symptoms prior. Patient was recently discharged from [facility] for sepsis secondary to cystitis and only been home for 1 week and had not been able to tolerate food due to N/V.    In hospital patient was given zofran and was able to tolerate progressively graduated foods up to regular diet. Patient was noted to have  hearing to the left ear and endorsed vertigo (spinning of the room) as the description of her dizziness. Meclizine was ordered.    Patient received a Chest Xray (crackles on physical exam) and Doppler ultrasound to right leg (concerned for right leg swelling) which reported negative findings for acute pathology (10/2/2023)    Patient states she has a PCP appointment on 10/12/2023 and was advised to inform them of her hospitalization and follow up the effects of meclizine and zofran on the vertigo and nausea respectively.

## 2023-10-03 NOTE — PROGRESS NOTES
Pt leaving facility via W/C in stable condition accompanied to private vehicle with  with her own portable home O2 at 2L/min via NC as ordered.

## 2023-10-03 NOTE — DISCHARGE PLANNING
Received Choice form at 0413  Agency/Facility Name: [facility]  Referral sent per Choice form @ [id number]

## 2023-10-10 RX ORDER — NYSTATIN 100000 [USP'U]/G
1 POWDER TOPICAL 2 TIMES DAILY
COMMUNITY
Start: 2023-09-20

## 2023-10-11 ENCOUNTER — OFFICE VISIT (OUTPATIENT)
Dept: MEDICAL GROUP | Facility: PHYSICIAN GROUP | Age: 78
End: 2023-10-11
Payer: MEDICARE

## 2023-10-11 ENCOUNTER — HOME HEALTH ADMISSION (OUTPATIENT)
Dept: HOME HEALTH SERVICES | Facility: HOME HEALTHCARE | Age: 78
End: 2023-10-11
Payer: MEDICARE

## 2023-10-11 VITALS
TEMPERATURE: 98.4 F | HEIGHT: 60 IN | DIASTOLIC BLOOD PRESSURE: 54 MMHG | RESPIRATION RATE: 18 BRPM | WEIGHT: 170 LBS | BODY MASS INDEX: 33.38 KG/M2 | SYSTOLIC BLOOD PRESSURE: 92 MMHG | HEART RATE: 77 BPM | OXYGEN SATURATION: 94 %

## 2023-10-11 DIAGNOSIS — I70.0 ATHEROSCLEROSIS OF AORTA (HCC): ICD-10-CM

## 2023-10-11 DIAGNOSIS — D50.9 IRON DEFICIENCY ANEMIA, UNSPECIFIED IRON DEFICIENCY ANEMIA TYPE: ICD-10-CM

## 2023-10-11 DIAGNOSIS — Z91.81 RISK FOR FALLS: ICD-10-CM

## 2023-10-11 DIAGNOSIS — R54 AGE-RELATED PHYSICAL DEBILITY: Chronic | ICD-10-CM

## 2023-10-11 DIAGNOSIS — R73.03 PREDIABETES: ICD-10-CM

## 2023-10-11 DIAGNOSIS — I10 PRIMARY HYPERTENSION: ICD-10-CM

## 2023-10-11 PROCEDURE — 3074F SYST BP LT 130 MM HG: CPT | Performed by: PHYSICIAN ASSISTANT

## 2023-10-11 PROCEDURE — 3078F DIAST BP <80 MM HG: CPT | Performed by: PHYSICIAN ASSISTANT

## 2023-10-11 PROCEDURE — 99214 OFFICE O/P EST MOD 30 MIN: CPT | Performed by: PHYSICIAN ASSISTANT

## 2023-10-11 RX ORDER — ZONISAMIDE 100 MG/1
CAPSULE ORAL
COMMUNITY
Start: 2023-10-05

## 2023-10-11 ASSESSMENT — FIBROSIS 4 INDEX: FIB4 SCORE: 1.06

## 2023-10-11 ASSESSMENT — ENCOUNTER SYMPTOMS
FEVER: 0
SHORTNESS OF BREATH: 0
CHILLS: 0

## 2023-10-11 NOTE — PROGRESS NOTES
Subjective:     CC: hospital follow up     HPI:   Tereza presents today with her  for hospital follow up:              CURRENTLY:  Overall feeling better.  BP at home around 112-155/54--80  Currently taking:  Atenolol 50mg BID  Benazepril-HCTZ 20/12.5 daily  Lasix as needed (hasn't been taking)  Still feeling light headed.  Nausea has resolved.  Denies vertigo.  Denies falls since discharge.  Uses a wheel chair outside the home and a 4WW inside the home.  Here with her .          ROS:  Review of Systems   Constitutional:  Negative for chills and fever.   Respiratory:  Negative for shortness of breath.    Cardiovascular:  Negative for chest pain.       Objective:     Exam:  BP 92/54 (BP Location: Right arm, Patient Position: Sitting, BP Cuff Size: Large adult)   Pulse 77   Temp 36.9 °C (98.4 °F) (Temporal)   Resp 18   Ht 1.524 m (5') Comment: per patient  Wt 77.1 kg (170 lb) Comment: per patient  SpO2 94%   Breastfeeding No   BMI 33.20 kg/m²  Body mass index is 33.2 kg/m².    Physical Exam  Vitals reviewed.   Constitutional:       General: She is not in acute distress.     Appearance: Normal appearance.      Comments: In her personal wheel chair   Pulmonary:      Effort: Pulmonary effort is normal.   Neurological:      General: No focal deficit present.      Mental Status: She is alert.   Psychiatric:         Mood and Affect: Mood normal.         Behavior: Behavior normal.         Judgment: Judgment normal.                   Assessment & Plan:     78 y.o. female with the following -     1. Primary hypertension  Chronic, typically stable.  Patient is running hypotensive currently due to recent medical issues.  They are monitoring her blood pressure at home.  Have advised cutting benazepril/HCTZ 20/12.5 mg tablet in half if she is symptomatically low or if she is symptomatic and running in the low normal range.  She has not been using furosemide.  Follow-up in 1 month for reevaluation, sooner as  needed.    2. Age-related physical debility  Chronic, uncontrolled but stable.  Uses a 4WW at home and a wheelchair when she is out and about.  Denies any falls since her hospitalization.  Referring to home health for physical therapy.    - Referral to Home Health    3. Risk for falls  See #2.    - Referral to Home Health    4. Iron deficiency anemia, unspecified iron deficiency anemia type  Chronic, stable.  Due to repeat labs.    5. Prediabetes  Chronic, stable.  Due to repeat labs.      HCC Gap Form    Diagnosis to address: I70.0 - Atherosclerosis of aorta (HCC)  Assessment and plan: Chronic, Stable.  Continue atorvastatin 10 mg daily.   Last edited 10/11/23 13:27 PDT by Michelle Jim P.A.-C.             Return in about 4 weeks (around 11/8/2023) for blood pressure, lab discussion.    Please note that this dictation was created using voice recognition software. I have made every reasonable attempt to correct obvious errors, but I expect that there are errors of grammar and possibly content that I did not discover before finalizing the note.

## 2023-10-15 DIAGNOSIS — I10 PRIMARY HYPERTENSION: Chronic | ICD-10-CM

## 2023-10-17 RX ORDER — MECLIZINE HCL 12.5 MG/1
12.5 TABLET ORAL 3 TIMES DAILY
Qty: 30 TABLET | Refills: 0 | Status: SHIPPED | OUTPATIENT
Start: 2023-10-17

## 2023-10-17 RX ORDER — BENAZEPRIL HYDROCHLORIDE AND HYDROCHLOROTHIAZIDE 20; 12.5 MG/1; MG/1
1 TABLET ORAL DAILY
Qty: 100 TABLET | Refills: 1 | Status: SHIPPED | OUTPATIENT
Start: 2023-10-17

## 2023-10-18 ENCOUNTER — HOME HEALTH ADMISSION (OUTPATIENT)
Dept: HOME HEALTH SERVICES | Facility: HOME HEALTHCARE | Age: 78
End: 2023-10-18
Payer: MEDICARE

## 2023-10-18 DIAGNOSIS — Z91.81 RISK FOR FALLS: ICD-10-CM

## 2023-10-18 DIAGNOSIS — R54 AGE-RELATED PHYSICAL DEBILITY: ICD-10-CM

## 2023-10-23 ENCOUNTER — HOME CARE VISIT (OUTPATIENT)
Dept: HOME HEALTH SERVICES | Facility: HOME HEALTHCARE | Age: 78
End: 2023-10-23

## 2023-12-01 ENCOUNTER — HOSPITAL ENCOUNTER (OUTPATIENT)
Dept: LAB | Facility: MEDICAL CENTER | Age: 78
End: 2023-12-01
Attending: PHYSICIAN ASSISTANT
Payer: MEDICARE

## 2023-12-01 DIAGNOSIS — R73.03 PREDIABETES: ICD-10-CM

## 2023-12-01 DIAGNOSIS — D50.9 IRON DEFICIENCY ANEMIA, UNSPECIFIED IRON DEFICIENCY ANEMIA TYPE: ICD-10-CM

## 2023-12-01 DIAGNOSIS — E78.5 DYSLIPIDEMIA: ICD-10-CM

## 2023-12-01 LAB
CHOLEST SERPL-MCNC: 159 MG/DL (ref 100–199)
EST. AVERAGE GLUCOSE BLD GHB EST-MCNC: 123 MG/DL
FASTING STATUS PATIENT QL REPORTED: NORMAL
HBA1C MFR BLD: 5.9 % (ref 4–5.6)
HDLC SERPL-MCNC: 53 MG/DL
IRON SATN MFR SERPL: 19 % (ref 15–55)
IRON SERPL-MCNC: 64 UG/DL (ref 40–170)
LDLC SERPL CALC-MCNC: 79 MG/DL
TIBC SERPL-MCNC: 342 UG/DL (ref 250–450)
TRIGL SERPL-MCNC: 133 MG/DL (ref 0–149)
UIBC SERPL-MCNC: 278 UG/DL (ref 110–370)

## 2023-12-01 PROCEDURE — 83550 IRON BINDING TEST: CPT

## 2023-12-01 PROCEDURE — 80061 LIPID PANEL: CPT

## 2023-12-01 PROCEDURE — 83540 ASSAY OF IRON: CPT

## 2023-12-01 PROCEDURE — 36415 COLL VENOUS BLD VENIPUNCTURE: CPT

## 2023-12-01 PROCEDURE — 83036 HEMOGLOBIN GLYCOSYLATED A1C: CPT

## 2023-12-05 ENCOUNTER — OFFICE VISIT (OUTPATIENT)
Dept: MEDICAL GROUP | Facility: PHYSICIAN GROUP | Age: 78
End: 2023-12-05
Payer: MEDICARE

## 2023-12-05 VITALS
OXYGEN SATURATION: 95 % | TEMPERATURE: 98.1 F | HEART RATE: 76 BPM | WEIGHT: 173 LBS | HEIGHT: 60 IN | DIASTOLIC BLOOD PRESSURE: 64 MMHG | SYSTOLIC BLOOD PRESSURE: 122 MMHG | BODY MASS INDEX: 33.96 KG/M2 | RESPIRATION RATE: 16 BRPM

## 2023-12-05 DIAGNOSIS — R30.0 DYSURIA: ICD-10-CM

## 2023-12-05 DIAGNOSIS — S60.512A: ICD-10-CM

## 2023-12-05 DIAGNOSIS — I10 PRIMARY HYPERTENSION: ICD-10-CM

## 2023-12-05 DIAGNOSIS — R54 AGE-RELATED PHYSICAL DEBILITY: Chronic | ICD-10-CM

## 2023-12-05 DIAGNOSIS — Z23 NEED FOR VACCINATION: ICD-10-CM

## 2023-12-05 DIAGNOSIS — Z91.81 RISK FOR FALLS: ICD-10-CM

## 2023-12-05 DIAGNOSIS — D50.9 IRON DEFICIENCY ANEMIA, UNSPECIFIED IRON DEFICIENCY ANEMIA TYPE: ICD-10-CM

## 2023-12-05 DIAGNOSIS — E78.5 DYSLIPIDEMIA: ICD-10-CM

## 2023-12-05 DIAGNOSIS — R73.03 PREDIABETES: ICD-10-CM

## 2023-12-05 PROCEDURE — 99214 OFFICE O/P EST MOD 30 MIN: CPT | Mod: 25 | Performed by: PHYSICIAN ASSISTANT

## 2023-12-05 PROCEDURE — 3074F SYST BP LT 130 MM HG: CPT | Performed by: PHYSICIAN ASSISTANT

## 2023-12-05 PROCEDURE — 3078F DIAST BP <80 MM HG: CPT | Performed by: PHYSICIAN ASSISTANT

## 2023-12-05 PROCEDURE — 90715 TDAP VACCINE 7 YRS/> IM: CPT | Performed by: PHYSICIAN ASSISTANT

## 2023-12-05 PROCEDURE — 90471 IMMUNIZATION ADMIN: CPT | Performed by: PHYSICIAN ASSISTANT

## 2023-12-05 ASSESSMENT — ENCOUNTER SYMPTOMS
CHILLS: 0
SHORTNESS OF BREATH: 0
FEVER: 0

## 2023-12-05 ASSESSMENT — FIBROSIS 4 INDEX: FIB4 SCORE: 1.06

## 2023-12-05 NOTE — PROGRESS NOTES
Subjective:     CC: blood pressure follow up; lab follow up     HPI:   Tereza presents today with the following:    Problem   Dysuria   Iron Deficiency Anemia    Chronic, controlled.  Tolerating 1 iron supplement daily.  May increase to BID if tired.  Component      Latest Ref Rng 4/4/2023 12/1/2023   Iron      40 - 170 ug/dL 46  64           Prediabetes    Chronic, stable.  Component      Latest Ref Rng 4/4/2023 12/1/2023   Glycohemoglobin      4.0 - 5.6 % 6.0 (H)  5.9 (H)            Age-Related Physical Debility    Chronic, uncontrolled.  Uses a 4 wheeled walker.  Has deformed fingers/hands, unable to use them for many of her ADLs.      Risk for Falls    Chronic, controlled.  Most recent fall: 2021.  Uses a 4 wheeled walker.       Dyslipidemia    Chronic, controlled.     Latest Labs:   Lab Results   Component Value Date/Time    CHOLSTRLTOT 159 12/01/2023 07:48 AM    LDL 79 12/01/2023 07:48 AM    HDL 53 12/01/2023 07:48 AM    TRIGLYCERIDE 133 12/01/2023 07:48 AM      Medications: started atorvastatin 3/29/2023.  Medication side effects: none    Risk calculator: The 10-year ASCVD risk score (Douglas DK, et al., 2019) is: 12.5%        Htn (Hypertension)    Chronic, controlled.  Tolerating atenolol 50mg, 2 tablets daily and benazepril-HCTZ 20/12.5 daily.  Does not see any specialist.            ROS:  Review of Systems   Constitutional:  Negative for chills and fever.   Respiratory:  Negative for shortness of breath.    Cardiovascular:  Negative for chest pain.       Objective:     Exam:  /64 (BP Location: Right arm, Patient Position: Sitting, BP Cuff Size: Large adult)   Pulse 76   Temp 36.7 °C (98.1 °F) (Temporal)   Resp 16   Ht 1.524 m (5')   Wt 78.5 kg (173 lb)   SpO2 95%   Breastfeeding No   BMI 33.79 kg/m²  Body mass index is 33.79 kg/m².    Physical Exam  Vitals reviewed.   Constitutional:       General: She is not in acute distress.     Appearance: Normal appearance.   Pulmonary:      Effort:  Pulmonary effort is normal.   Skin:     Comments: Healing scratch noted on the left hand.  No signs of infection noted.   Neurological:      General: No focal deficit present.      Mental Status: She is alert.   Psychiatric:         Mood and Affect: Mood normal.         Behavior: Behavior normal.         Judgment: Judgment normal.             Assessment & Plan:     78 y.o. female with the following -     1. Dysuria  Chronic, intermittent.  History of hospitalization for UTI.  Unable to leave urine sample in the clinic.  Sent home for the patient to take to the lab.    - URINALYSIS,CULTURE IF INDICATED; Future    2. Dyslipidemia  Chronic, controlled.  Continue atorvastatin 10 mg daily.    3. Iron deficiency anemia, unspecified iron deficiency anemia type  Chronic, controlled.  Continue 1 iron supplement daily.  May increase to twice daily dosing if desired.    4. Prediabetes  Chronic, stable.  A1c 5.9%.    5. Risk for falls  Chronic, stable.  Continues to use a 4WW.  Denies falls.    6. Primary hypertension  Chronic, stable.  Continue atenolol 50 mg, 2 tablets daily and benazepril/HCTZ 20/12.5 daily    7. Age-related physical debility  See #5.    8. Scratch of hand, left, initial encounter    - Tdap Vaccine =>8YO IM    9. Need for vaccination    - Tdap Vaccine =>8YO IM      Labs up to date.  Follow up in 6 months, sooner as needed.    Healthcare Maintenance:        Return in about 6 months (around 6/5/2024) for med check.    Please note that this dictation was created using voice recognition software. I have made every reasonable attempt to correct obvious errors, but I expect that there are errors of grammar and possibly content that I did not discover before finalizing the note.

## 2023-12-14 ENCOUNTER — HOSPITAL ENCOUNTER (OUTPATIENT)
Facility: MEDICAL CENTER | Age: 78
End: 2023-12-14
Attending: PHYSICIAN ASSISTANT
Payer: MEDICARE

## 2023-12-14 DIAGNOSIS — R30.0 DYSURIA: ICD-10-CM

## 2023-12-14 LAB
APPEARANCE UR: ABNORMAL
BACTERIA #/AREA URNS HPF: ABNORMAL /HPF
BILIRUB UR QL STRIP.AUTO: NEGATIVE
COLOR UR: YELLOW
EPI CELLS #/AREA URNS HPF: ABNORMAL /HPF
GLUCOSE UR STRIP.AUTO-MCNC: NEGATIVE MG/DL
HYALINE CASTS #/AREA URNS LPF: ABNORMAL /LPF
KETONES UR STRIP.AUTO-MCNC: NEGATIVE MG/DL
LEUKOCYTE ESTERASE UR QL STRIP.AUTO: ABNORMAL
MICRO URNS: ABNORMAL
NITRITE UR QL STRIP.AUTO: NEGATIVE
PH UR STRIP.AUTO: 6 [PH] (ref 5–8)
PROT UR QL STRIP: NEGATIVE MG/DL
RBC # URNS HPF: ABNORMAL /HPF
RBC UR QL AUTO: ABNORMAL
SP GR UR STRIP.AUTO: 1.01
UROBILINOGEN UR STRIP.AUTO-MCNC: 0.2 MG/DL
WBC #/AREA URNS HPF: ABNORMAL /HPF

## 2023-12-14 PROCEDURE — 81001 URINALYSIS AUTO W/SCOPE: CPT

## 2023-12-14 PROCEDURE — 87086 URINE CULTURE/COLONY COUNT: CPT

## 2023-12-16 DIAGNOSIS — D50.9 IRON DEFICIENCY ANEMIA, UNSPECIFIED IRON DEFICIENCY ANEMIA TYPE: ICD-10-CM

## 2023-12-16 LAB
BACTERIA UR CULT: NORMAL
SIGNIFICANT IND 70042: NORMAL
SITE SITE: NORMAL
SOURCE SOURCE: NORMAL

## 2023-12-19 RX ORDER — FERROUS SULFATE 325(65) MG
325 TABLET ORAL DAILY
Qty: 90 TABLET | Refills: 0 | Status: SHIPPED | OUTPATIENT
Start: 2023-12-19 | End: 2024-03-19

## 2024-01-01 ENCOUNTER — HOME CARE VISIT (OUTPATIENT)
Dept: HOSPICE | Facility: HOSPICE | Age: 79
End: 2024-01-01
Payer: MEDICARE

## 2024-01-01 ENCOUNTER — HOME HEALTH ADMISSION (OUTPATIENT)
Dept: HOME HEALTH SERVICES | Facility: HOME HEALTHCARE | Age: 79
End: 2024-01-01
Payer: MEDICARE

## 2024-01-01 ENCOUNTER — HOSPICE ADMISSION (OUTPATIENT)
Dept: HOSPICE | Facility: HOSPICE | Age: 79
End: 2024-01-01
Payer: MEDICARE

## 2024-01-01 ENCOUNTER — HOME CARE VISIT (OUTPATIENT)
Dept: HOME HEALTH SERVICES | Facility: HOME HEALTHCARE | Age: 79
End: 2024-01-01
Payer: MEDICARE

## 2024-01-01 ENCOUNTER — ANTICOAGULATION MONITORING (OUTPATIENT)
Dept: VASCULAR LAB | Facility: MEDICAL CENTER | Age: 79
End: 2024-01-01
Payer: MEDICARE

## 2024-01-01 ENCOUNTER — TELEPHONE (OUTPATIENT)
Dept: MEDICAL GROUP | Facility: PHYSICIAN GROUP | Age: 79
End: 2024-01-01
Payer: MEDICARE

## 2024-01-01 ENCOUNTER — PHARMACY VISIT (OUTPATIENT)
Dept: PHARMACY | Facility: MEDICAL CENTER | Age: 79
End: 2024-01-01
Payer: COMMERCIAL

## 2024-01-01 ENCOUNTER — DOCUMENTATION (OUTPATIENT)
Dept: MEDICAL GROUP | Facility: PHYSICIAN GROUP | Age: 79
End: 2024-01-01

## 2024-01-01 ENCOUNTER — TELEMEDICINE (OUTPATIENT)
Dept: MEDICAL GROUP | Facility: PHYSICIAN GROUP | Age: 79
End: 2024-01-01
Payer: MEDICARE

## 2024-01-01 VITALS
OXYGEN SATURATION: 96 % | RESPIRATION RATE: 20 BRPM | HEART RATE: 108 BPM | SYSTOLIC BLOOD PRESSURE: 96 MMHG | DIASTOLIC BLOOD PRESSURE: 74 MMHG | TEMPERATURE: 97.8 F

## 2024-01-01 VITALS
RESPIRATION RATE: 18 BRPM | HEART RATE: 80 BPM | SYSTOLIC BLOOD PRESSURE: 105 MMHG | DIASTOLIC BLOOD PRESSURE: 62 MMHG | TEMPERATURE: 97.4 F

## 2024-01-01 VITALS
OXYGEN SATURATION: 96 % | HEART RATE: 78 BPM | SYSTOLIC BLOOD PRESSURE: 92 MMHG | TEMPERATURE: 97.3 F | RESPIRATION RATE: 15 BRPM | DIASTOLIC BLOOD PRESSURE: 54 MMHG

## 2024-01-01 VITALS — BODY MASS INDEX: 32 KG/M2 | HEIGHT: 60 IN | WEIGHT: 163 LBS | RESPIRATION RATE: 16 BRPM

## 2024-01-01 VITALS
HEART RATE: 120 BPM | TEMPERATURE: 98.3 F | RESPIRATION RATE: 26 BRPM | DIASTOLIC BLOOD PRESSURE: 78 MMHG | SYSTOLIC BLOOD PRESSURE: 98 MMHG | OXYGEN SATURATION: 98 %

## 2024-01-01 VITALS
TEMPERATURE: 97.8 F | HEART RATE: 108 BPM | SYSTOLIC BLOOD PRESSURE: 96 MMHG | RESPIRATION RATE: 20 BRPM | DIASTOLIC BLOOD PRESSURE: 74 MMHG | OXYGEN SATURATION: 96 %

## 2024-01-01 VITALS — HEART RATE: 60 BPM | RESPIRATION RATE: 60 BRPM

## 2024-01-01 VITALS — RESPIRATION RATE: 22 BRPM

## 2024-01-01 DIAGNOSIS — I26.09 ACUTE COR PULMONALE (HCC): ICD-10-CM

## 2024-01-01 DIAGNOSIS — I48.0 PAROXYSMAL ATRIAL FIBRILLATION (HCC): ICD-10-CM

## 2024-01-01 DIAGNOSIS — R73.03 PREDIABETES: ICD-10-CM

## 2024-01-01 DIAGNOSIS — U07.1 ACUTE HYPOXEMIC RESPIRATORY FAILURE DUE TO COVID-19 (HCC): ICD-10-CM

## 2024-01-01 DIAGNOSIS — R54 FRAILTY SYNDROME IN GERIATRIC PATIENT: ICD-10-CM

## 2024-01-01 DIAGNOSIS — Z91.81 RISK FOR FALLS: ICD-10-CM

## 2024-01-01 DIAGNOSIS — I47.20 V-TACH (HCC): ICD-10-CM

## 2024-01-01 DIAGNOSIS — E78.5 DYSLIPIDEMIA: ICD-10-CM

## 2024-01-01 DIAGNOSIS — I26.02 ACUTE SADDLE PULMONARY EMBOLISM WITH ACUTE COR PULMONALE (HCC): ICD-10-CM

## 2024-01-01 DIAGNOSIS — J96.01 ACUTE HYPOXEMIC RESPIRATORY FAILURE DUE TO COVID-19 (HCC): ICD-10-CM

## 2024-01-01 DIAGNOSIS — J96.11 CHRONIC RESPIRATORY FAILURE WITH HYPOXIA (HCC): ICD-10-CM

## 2024-01-01 DIAGNOSIS — I10 PRIMARY HYPERTENSION: ICD-10-CM

## 2024-01-01 DIAGNOSIS — Z79.899 POLYPHARMACY: ICD-10-CM

## 2024-01-01 DIAGNOSIS — I70.0 ATHEROSCLEROSIS OF AORTA (HCC): ICD-10-CM

## 2024-01-01 DIAGNOSIS — R63.4 UNINTENTIONAL WEIGHT LOSS: ICD-10-CM

## 2024-01-01 DIAGNOSIS — N17.9 ACUTE RENAL FAILURE, UNSPECIFIED ACUTE RENAL FAILURE TYPE (HCC): ICD-10-CM

## 2024-01-01 DIAGNOSIS — J96.21 ACUTE ON CHRONIC HYPOXIC RESPIRATORY FAILURE (HCC): ICD-10-CM

## 2024-01-01 DIAGNOSIS — Z86.718 HISTORY OF THROMBOEMBOLISM: ICD-10-CM

## 2024-01-01 DIAGNOSIS — R62.7 ADULT FAILURE TO THRIVE: ICD-10-CM

## 2024-01-01 DIAGNOSIS — J96.11 CHRONIC RESPIRATORY FAILURE WITH HYPOXIA (HCC): Primary | ICD-10-CM

## 2024-01-01 DIAGNOSIS — I50.32 CHRONIC DIASTOLIC HEART FAILURE (HCC): ICD-10-CM

## 2024-01-01 DIAGNOSIS — E55.9 VITAMIN D DEFICIENCY: ICD-10-CM

## 2024-01-01 DIAGNOSIS — F11.20 UNCOMPLICATED OPIOID DEPENDENCE (HCC): ICD-10-CM

## 2024-01-01 DIAGNOSIS — Z51.5 HOSPICE CARE: ICD-10-CM

## 2024-01-01 DIAGNOSIS — R54 AGE-RELATED PHYSICAL DEBILITY: Chronic | ICD-10-CM

## 2024-01-01 DIAGNOSIS — Z71.89 ADVANCE CARE PLANNING: ICD-10-CM

## 2024-01-01 DIAGNOSIS — G89.29 CHRONIC BACK PAIN, UNSPECIFIED BACK LOCATION, UNSPECIFIED BACK PAIN LATERALITY: ICD-10-CM

## 2024-01-01 DIAGNOSIS — M54.9 CHRONIC BACK PAIN, UNSPECIFIED BACK LOCATION, UNSPECIFIED BACK PAIN LATERALITY: ICD-10-CM

## 2024-01-01 PROCEDURE — G0299 HHS/HOSPICE OF RN EA 15 MIN: HCPCS

## 2024-01-01 PROCEDURE — 665998 HH PPS REVENUE CREDIT

## 2024-01-01 PROCEDURE — 665999 HH PPS REVENUE DEBIT

## 2024-01-01 PROCEDURE — RXMED WILLOW AMBULATORY MEDICATION CHARGE: Performed by: STUDENT IN AN ORGANIZED HEALTH CARE EDUCATION/TRAINING PROGRAM

## 2024-01-01 PROCEDURE — G0180 MD CERTIFICATION HHA PATIENT: HCPCS | Performed by: PHYSICIAN ASSISTANT

## 2024-01-01 PROCEDURE — 665005 NO-PAY RAP - HOME HEALTH

## 2024-01-01 PROCEDURE — G0152 HHCP-SERV OF OT,EA 15 MIN: HCPCS

## 2024-01-01 PROCEDURE — 99215 OFFICE O/P EST HI 40 MIN: CPT | Performed by: PHYSICIAN ASSISTANT

## 2024-01-01 PROCEDURE — S9126 HOSPICE CARE, IN THE HOME, P: HCPCS

## 2024-01-01 PROCEDURE — 665036 HSPC NOTICE OF ELECTION NOE

## 2024-01-01 PROCEDURE — G0151 HHCP-SERV OF PT,EA 15 MIN: HCPCS

## 2024-01-01 PROCEDURE — 665001 SOC-HOME HEALTH

## 2024-01-01 PROCEDURE — G2212 PROLONG OUTPT/OFFICE VIS: HCPCS | Performed by: PHYSICIAN ASSISTANT

## 2024-01-01 RX ORDER — LORAZEPAM 2 MG/ML
1-2 CONCENTRATE ORAL
Qty: 360 ML | Refills: 0 | Status: SHIPPED | OUTPATIENT
Start: 2024-01-01

## 2024-01-01 RX ORDER — DULOXETIN HYDROCHLORIDE 30 MG/1
30 CAPSULE, DELAYED RELEASE ORAL DAILY
Qty: 90 CAPSULE | Refills: 0 | Status: SHIPPED | OUTPATIENT
Start: 2024-01-01 | End: 2024-01-01

## 2024-01-01 RX ORDER — FUROSEMIDE 20 MG
20 TABLET ORAL DAILY
Qty: 90 TABLET | Refills: 1 | Status: SHIPPED | OUTPATIENT
Start: 2024-01-01 | End: 2024-01-01

## 2024-01-01 RX ORDER — OXYCODONE HYDROCHLORIDE 100 MG/5ML
10-20 SOLUTION ORAL
Qty: 240 ML | Refills: 0 | Status: SHIPPED | OUTPATIENT
Start: 2024-01-01 | End: 2025-08-15

## 2024-01-01 RX ORDER — APIXABAN 5 MG/1
TABLET, FILM COATED ORAL
Qty: 60 TABLET | Refills: 0 | Status: SHIPPED | OUTPATIENT
Start: 2024-01-01 | End: 2024-01-01

## 2024-01-01 RX ORDER — ONDANSETRON 4 MG/1
4 TABLET, ORALLY DISINTEGRATING ORAL EVERY 6 HOURS PRN
Qty: 15 TABLET | Refills: 10 | Status: SHIPPED | OUTPATIENT
Start: 2024-01-01

## 2024-01-01 RX ORDER — BISACODYL 10 MG
10 SUPPOSITORY, RECTAL RECTAL PRN
Qty: 5 SUPPOSITORY | Refills: 10 | Status: SHIPPED | OUTPATIENT
Start: 2024-01-01 | End: 2025-08-15

## 2024-01-01 RX ORDER — AMIODARONE HYDROCHLORIDE 200 MG/1
200 TABLET ORAL DAILY
Qty: 100 TABLET | Refills: 1 | Status: SHIPPED | OUTPATIENT
Start: 2024-01-01 | End: 2024-01-01

## 2024-01-01 RX ORDER — SENNA AND DOCUSATE SODIUM 50; 8.6 MG/1; MG/1
2 TABLET, FILM COATED ORAL 2 TIMES DAILY PRN
Qty: 28 TABLET | Refills: 10 | Status: SHIPPED | OUTPATIENT
Start: 2024-01-01 | End: 2025-08-15

## 2024-01-01 RX ORDER — ACETAMINOPHEN 650 MG/1
650 SUPPOSITORY RECTAL EVERY 6 HOURS PRN
Qty: 5 SUPPOSITORY | Refills: 10 | Status: SHIPPED | OUTPATIENT
Start: 2024-01-01

## 2024-01-01 RX ORDER — ACETAMINOPHEN 500 MG
1000 TABLET ORAL EVERY 6 HOURS PRN
Qty: 30 TABLET | Refills: 10 | Status: SHIPPED | OUTPATIENT
Start: 2024-01-01 | End: 2025-08-15

## 2024-01-01 SDOH — ECONOMIC STABILITY: HOUSING INSECURITY
HOME SAFETY: PT LIVES IN A SINGLE STORY HOME WITH SPOUSE, 2 STEPS TO ENTER VIA FRONT DOOR; RAMP ACCESS VIA GARAGE; PT WILL HAVE HELP FROM SPOUSE 24/7; PT'S DAUGHTER AND GRANDDAUGHTER ALSO VISITING FROM OUT OF STATE TO ASSIST

## 2024-01-01 SDOH — ECONOMIC STABILITY: HOUSING INSECURITY: EVIDENCE OF SMOKING MATERIAL: 0

## 2024-01-01 ASSESSMENT — ENCOUNTER SYMPTOMS
MENTAL STATUS CHANGE: 0
DEPRESSED MOOD: 1
DEBILITATING PAIN: 1
PERSON REPORTING PAIN: PATIENT
FATIGUE: 1
BOWEL INCONTINENCE: 1
DECREASED ORAL INTAKE: 1
PERSON REPORTING PAIN: PATIENT
ARTHRALGIAS: 1
LIMITED RANGE OF MOTION: 1
SHORTNESS OF BREATH: 1
PERSON REPORTING PAIN: DIRECT OBSERVATION
LIMITED RANGE OF MOTION: 1
SEVERE DYSPNEA: 1
LAST BOWEL MOVEMENT: 67066
PAIN LOCATION - PAIN FREQUENCY: CONSTANT
HIGHEST PAIN SEVERITY IN PAST 24 HOURS: 8/10
PAIN: 1
LIMITED RANGE OF MOTION: 1
FATIGUES EASILY: 1
SHORTNESS OF BREATH: 0
UNABLE TO COMMUNICATE PAIN: 1
LAST BOWEL MOVEMENT: 67063
DEBILITATING PAIN: 1
FORGETFULNESS: 1
MUSCLE WEAKNESS: 1
ARTHRALGIAS: 1
CHILLS: 0
LAST BOWEL MOVEMENT: 67062
VOMITING: DENIES
LAST BOWEL MOVEMENT: 67057
DRY SKIN: 1
LAST BOWEL MOVEMENT: 67067
SLEEP QUALITY: FAIR
FEVER: 0
INCREASED SLEEPING: 1
INCREASED SLEEPING: 1
SEVERE DYSPNEA: 1
POOR JUDGMENT: 1
STOOL FREQUENCY: LESS THAN DAILY
DENIES PAIN: 1
HIGHEST PAIN SEVERITY IN PAST 24 HOURS: 0/10
DIFFICULTY THINKING: 1
BOWEL INCONTINENCE: 1
PERSON REPORTING PAIN: PATIENT
DYSPNEA ACTIVITY LEVEL: AT REST
MUSCLE WEAKNESS: 1
STOOL FREQUENCY: LESS THAN DAILY
PAIN SEVERITY GOAL: 0/10
LIMITED RANGE OF MOTION: 1
SHORTNESS OF BREATH: 1
DENIES PAIN: 1
MUSCLE WEAKNESS: 1
LOSS OF VISUAL FIELD: 1
FATIGUES EASILY: 1
MUSCLE WEAKNESS: 1
LOWEST PAIN SEVERITY IN PAST 24 HOURS: 0/10
DECREASED ORAL INTAKE: 1
PAIN LOCATION - PAIN QUALITY: ACHE
MUSCLE WEAKNESS: 1
DYSPNEA ACTIVITY LEVEL: AT REST
MUSCLE WEAKNESS: 1
INCREASED FATIGUE: 1
SUBJECTIVE PAIN PROGRESSION: WAXING AND WANING
PERSON REPORTING PAIN: PATIENT
PAIN: 1
DRY SKIN: 1
DESCRIPTION OF MEMORY LOSS: SHORT TERM
PAIN LOCATION - PAIN DURATION: DAILY
BOWEL INCONTINENCE: 1
STOOL FREQUENCY: LESS THAN DAILY
BOWEL INCONTINENCE: 1
DRY SKIN: 1
PERSON REPORTING PAIN: PATIENT
INCREASED FATIGUE: 1
PAIN LOCATION - RELIEVING FACTORS: REST/MEDS
SHORTNESS OF BREATH: 1
FATIGUES EASILY: 1
LOWEST PAIN SEVERITY IN PAST 24 HOURS: 5/10
PAIN LOCATION - PAIN SEVERITY: 5/10
PAIN: 1
NAUSEA: DENIES
STOOL FREQUENCY: LESS THAN DAILY
DENIES PAIN: 1
MENTAL STATUS CHANGE: 0
FATIGUE: 1
PAIN LOCATION - EXACERBATING FACTORS: MOVEMENT
VOMITING: YESTERDAY

## 2024-01-01 ASSESSMENT — ACTIVITIES OF DAILY LIVING (ADL)
PHYSICAL TRANSFERS ASSESSED: 1
TELEPHONE USE ASSESSED: 1
PREPARING MEALS: DEPENDENT
BATHING_CURRENT_FUNCTION: MAXIMUM ASSIST
HOME_HEALTH_OASIS: 01
CURRENT_FUNCTION: TWO PERSON
AMBULATION ASSISTANCE: ONE PERSON
CURRENT_FUNCTION: ONE PERSON
USING THE TELPHONE: DEPENDENT
TRANSPORTATION ASSESSED: 1
AMBULATION ASSISTANCE: NON-AMBULATORY
CURRENT_FUNCTION: MAXIMUM ASSIST
AMBULATION ASSISTANCE: NON-AMBULATORY
GROOMING_CURRENT_FUNCTION: MAXIMUM ASSIST
AMBULATION ASSISTANCE: NON-AMBULATORY
BATHING ASSESSED: 1
LIGHT HOUSEKEEPING: DEPENDENT
CURRENT_FUNCTION: TWO PERSON
AMBULATION ASSISTANCE: 1
DRESSING_UB_CURRENT_FUNCTION: MAXIMUM ASSIST
ORAL_CARE_ASSESSED: 1
ORAL_CARE_CURRENT_FUNCTION: DEPENDENT
OASIS_M1830: 03
SHOPPING ASSESSED: 1
AMBULATION ASSISTANCE: ONE PERSON
FEEDING ASSESSED: 1
HOUSEKEEPING ASSESSED: 1
CURRENT_FUNCTION: ONE PERSON
OASIS_M1830: 06
TOILETING: MAXIMUM ASSIST
FEEDING: MODERATE ASSIST
AMBULATION ASSISTANCE: NON-AMBULATORY
SHOPPING: DEPENDENT
TRANSPORTATION: DEPENDENT
CURRENT_FUNCTION: ONE PERSON
MONEY MANAGEMENT (EXPENSES/BILLS): TOTALLY DEPENDENT
CURRENT_FUNCTION: TWO PERSON
GROOMING ASSESSED: 1
DRESSING_LB_CURRENT_FUNCTION: MAXIMUM ASSIST
TOILETING: 1

## 2024-01-01 ASSESSMENT — PAIN SCALES - PAIN ASSESSMENT IN ADVANCED DEMENTIA (PAINAD)
FACIALEXPRESSION: 0 - SMILING OR INEXPRESSIVE.
FACIALEXPRESSION: 1 - SAD. FRIGHTENED. FROWN.
TOTALSCORE: 0
CONSOLABILITY: 0 - NO NEED TO CONSOLE.
NEGVOCALIZATION: 0 - NONE.
CONSOLABILITY: 0 - NO NEED TO CONSOLE.
CONSOLABILITY: 2 - UNABLE TO CONSOLE, DISTRACT OR REASSURE.
BODYLANGUAGE: 0 - RELAXED.
BODYLANGUAGE: 1 - TENSE. DISTRESSED PACING. FIDGETING.
NEGVOCALIZATION: 0 - NONE.
NEGVOCALIZATION: 0 - NONE.
FACIALEXPRESSION: 1 - SAD. FRIGHTENED. FROWN.
TOTALSCORE: 6
BODYLANGUAGE: 1 - TENSE. DISTRESSED PACING. FIDGETING.
TOTALSCORE: 3

## 2024-01-01 ASSESSMENT — FIBROSIS 4 INDEX: FIB4 SCORE: 1.91

## 2024-01-01 ASSESSMENT — PATIENT HEALTH QUESTIONNAIRE - PHQ9
CLINICAL INTERPRETATION OF PHQ2 SCORE: 0
CLINICAL INTERPRETATION OF PHQ2 SCORE: 0

## 2024-01-01 ASSESSMENT — SOCIAL DETERMINANTS OF HEALTH (SDOH): ACTIVE STRESSOR - NO STRESS FACTORS: 1

## 2024-01-29 ENCOUNTER — TELEPHONE (OUTPATIENT)
Dept: MEDICAL GROUP | Facility: PHYSICIAN GROUP | Age: 79
End: 2024-01-29
Payer: MEDICARE

## 2024-01-29 DIAGNOSIS — N39.0 FREQUENT UTI: ICD-10-CM

## 2024-01-29 NOTE — TELEPHONE ENCOUNTER
Caller Name: Anthony ()  Call Back Number: [phone] (home)     Anthony calls in for his wife, Tereza, stating that they would like a referral to the urologist for Tereza's urinary problems

## 2024-02-26 DIAGNOSIS — E78.5 DYSLIPIDEMIA: ICD-10-CM

## 2024-02-28 NOTE — TELEPHONE ENCOUNTER
Received request via: Pharmacy    Was the patient seen in the last year in this department? Yes    Does the patient have an active prescription (recently filled or refills available) for medication(s) requested? No    Pharm[facility] Name: [facility]    Does the patient have correction Plus and need 100 day supply (blood pressure, diabetes and cholesterol meds only)? Yes, quantity updated to 100 days

## 2024-03-04 DIAGNOSIS — I10 PRIMARY HYPERTENSION: Chronic | ICD-10-CM

## 2024-03-04 RX ORDER — ATORVASTATIN CALCIUM 10 MG/1
10 TABLET, FILM COATED ORAL NIGHTLY
Qty: 100 TABLET | Refills: 1 | Status: SHIPPED | OUTPATIENT
Start: 2024-03-04

## 2024-03-06 RX ORDER — ATENOLOL 50 MG/1
100 TABLET ORAL
Qty: 200 TABLET | Refills: 0 | Status: SHIPPED | OUTPATIENT
Start: 2024-03-06

## 2024-03-17 DIAGNOSIS — D50.9 IRON DEFICIENCY ANEMIA, UNSPECIFIED IRON DEFICIENCY ANEMIA TYPE: ICD-10-CM

## 2024-03-19 RX ORDER — FERROUS SULFATE 325(65) MG
325 TABLET ORAL DAILY
Qty: 90 TABLET | Refills: 2 | Status: SHIPPED | OUTPATIENT
Start: 2024-03-19

## 2024-03-19 NOTE — TELEPHONE ENCOUNTER
Received request via: Pharmacy    Was the patient seen in the last year in this department? Yes    Does the patient have an active prescription (recently filled or refills available) for medication(s) requested? No    Pharmacy Name: [facility]     Does the patient have half-way Plus and need 100 day supply (blood pressure, diabetes and cholesterol meds only)? Medication is not for cholesterol, blood pressure or diabetes

## 2024-04-10 ENCOUNTER — OFFICE VISIT (OUTPATIENT)
Dept: UROLOGY | Facility: MEDICAL CENTER | Age: 79
End: 2024-04-10
Payer: MEDICARE

## 2024-04-10 ENCOUNTER — HOSPITAL ENCOUNTER (OUTPATIENT)
Facility: MEDICAL CENTER | Age: 79
End: 2024-04-10
Payer: MEDICARE

## 2024-04-10 VITALS
HEART RATE: 74 BPM | SYSTOLIC BLOOD PRESSURE: 179 MMHG | OXYGEN SATURATION: 91 % | TEMPERATURE: 98.3 F | DIASTOLIC BLOOD PRESSURE: 149 MMHG

## 2024-04-10 DIAGNOSIS — R30.0 DYSURIA: ICD-10-CM

## 2024-04-10 DIAGNOSIS — R31.9 HEMATURIA, UNSPECIFIED TYPE: ICD-10-CM

## 2024-04-10 LAB
APPEARANCE UR: ABNORMAL
APPEARANCE UR: NORMAL
BACTERIA #/AREA URNS HPF: ABNORMAL /HPF
BILIRUB UR QL STRIP.AUTO: NEGATIVE
BILIRUB UR STRIP-MCNC: NORMAL MG/DL
COLOR UR AUTO: YELLOW
COLOR UR: YELLOW
EPI CELLS #/AREA URNS HPF: ABNORMAL /HPF
GLUCOSE UR STRIP.AUTO-MCNC: NEGATIVE MG/DL
GLUCOSE UR STRIP.AUTO-MCNC: NORMAL MG/DL
HYALINE CASTS #/AREA URNS LPF: ABNORMAL /LPF
KETONES UR STRIP.AUTO-MCNC: NEGATIVE MG/DL
KETONES UR STRIP.AUTO-MCNC: NORMAL MG/DL
LEUKOCYTE ESTERASE UR QL STRIP.AUTO: ABNORMAL
LEUKOCYTE ESTERASE UR QL STRIP.AUTO: NORMAL
MICRO URNS: ABNORMAL
NITRITE UR QL STRIP.AUTO: NEGATIVE
NITRITE UR QL STRIP.AUTO: NORMAL
PH UR STRIP.AUTO: 5.5 [PH] (ref 5–8)
PH UR STRIP.AUTO: 6 [PH] (ref 5–8)
POC POST-VOID: NORMAL
POC PRE-VOID: 34 ML
PROT UR QL STRIP: NEGATIVE MG/DL
PROT UR QL STRIP: NORMAL MG/DL
RBC # URNS HPF: ABNORMAL /HPF
RBC UR QL AUTO: ABNORMAL
RBC UR QL AUTO: NORMAL
SP GR UR STRIP.AUTO: 1.01
SP GR UR STRIP.AUTO: 1.02
UROBILINOGEN UR STRIP-MCNC: 0.2 MG/DL
UROBILINOGEN UR STRIP.AUTO-MCNC: 0.2 MG/DL
WBC #/AREA URNS HPF: ABNORMAL /HPF

## 2024-04-10 PROCEDURE — 99204 OFFICE O/P NEW MOD 45 MIN: CPT

## 2024-04-10 PROCEDURE — 81002 URINALYSIS NONAUTO W/O SCOPE: CPT

## 2024-04-10 PROCEDURE — 81001 URINALYSIS AUTO W/SCOPE: CPT

## 2024-04-10 PROCEDURE — 3080F DIAST BP >= 90 MM HG: CPT

## 2024-04-10 PROCEDURE — 51798 US URINE CAPACITY MEASURE: CPT

## 2024-04-10 PROCEDURE — 3077F SYST BP >= 140 MM HG: CPT

## 2024-04-10 RX ORDER — TAMSULOSIN HYDROCHLORIDE 0.4 MG/1
0.4 CAPSULE ORAL
Qty: 30 CAPSULE | Refills: 3 | Status: SHIPPED | OUTPATIENT
Start: 2024-04-10

## 2024-04-10 NOTE — PROGRESS NOTES
Subjective  Tereza Sánchez is a 78 y.o. female who presents today for evaluation of frequent urinary tract infections.  She had a urine culture done in December 2023 which was negative.  She had a urinalysis which was positive for blood leukocytes, and negative for nitrites.     She feels like she has a UTI now and this started 3 months, she has had painful urination. She decribes this pain when she has to urinate or when she is urinating.    She is taking a diuretic for BLE edema. Last time she took it was Monday.     She took Azo and that helped her.     She reports nocturia 4-5 times,    Patient denies hematuria, sensation of incomplete emptying.    She was hospitalized in October 2023 from bladder infection and she was septic.     Vaginal Dryness:no    Vaginal Itching: no    Dyspareunia: not sexually active    Dysuria: no- patient just describes pain     UTIs in the last year:No UTIs this year    Fever/chills:no  Flank pain/suprapubic pain: no  hematuria: no    Bowel Movements:BM every other day, she takes laxatives for this everyday-x lax one every morning.    Urgency with leakage: yes but not often    No history of kidney stones, bladder cancer, nonsmoker    Her most bothersome complaint is the pain when she urinates or cannot urinate.       Family History   Problem Relation Age of Onset    Hypertension Mother     Sleep Apnea Brother     Cancer Neg Hx     Diabetes Neg Hx     Stroke Neg Hx     Heart Disease Neg Hx     Ovarian Cancer Neg Hx     Tubal Cancer Neg Hx     Peritoneal Cancer Neg Hx     Colorectal Cancer Neg Hx     Breast Cancer Neg Hx        Social History     Socioeconomic History    Marital status:      Spouse name: Not on file    Number of children: Not on file    Years of education: Not on file    Highest education level: 12th grade   Occupational History    Not on file   Tobacco Use    Smoking status: Never    Smokeless tobacco: Never   Vaping Use    Vaping Use: Never used    Substance and Sexual Activity    Alcohol use: No     Alcohol/week: 0.0 oz    Drug use: No    Sexual activity: Not Currently     Comment:  - 51 years    Other Topics Concern    Not on file   Social History Narrative    Not on file     Social Determinants of Health     Financial Resource Strain: Low Risk  (9/28/2023)    Overall Financial Resource Strain (CARDIA)     Difficulty of Paying Living Expenses: Not very hard   Food Insecurity: No Food Insecurity (9/28/2023)    Hunger Vital Sign     Worried About Running Out of Food in the Last Year: Never true     Ran Out of Food in the Last Year: Never true   Transportation Needs: No Transportation Needs (9/28/2023)    PRAPARE - Transportation     Lack of Transportation (Medical): No     Lack of Transportation (Non-Medical): No   Physical Activity: Inactive (9/28/2023)    Exercise Vital Sign     Days of Exercise per Week: 0 days     Minutes of Exercise per Session: 0 min   Stress: Stress Concern Present (9/28/2023)    [place] [employer] - Occupational Stress Questionnaire     Feeling of Stress : Very much   Social Connections: Moderately Isolated (9/28/2023)    Social Connection and Isolation Panel [NHANES]     Frequency of Communication with Friends and Family: Once a week     Frequency of Social Gatherings with Friends and Family: Once a week     Attends Mu-ism Services: Never     Active Member of Clubs or Organizations: Yes     Attends Club or Organization Meetings: Never     Marital Status:    Intimate Partner Violence: Not At Risk (9/28/2023)    Humiliation, Afraid, Rape, and Kick questionnaire     Fear of Current or Ex-Partner: No     Emotionally Abused: No     Physically Abused: No     Sexually Abused: No   Housing Stability: Low Risk  (9/28/2023)    Housing Stability Vital Sign     Unable to Pay for Housing in the Last Year: No     Number of Places Lived in the Last Year: 1     Unstable Housing in the Last Year: No       Past  "Surgical History:   Procedure Laterality Date    PB RECONSTR TOTAL SHOULDER IMPLANT Left 5/4/2022    Procedure: LEFT REVERSE TOTAL SHOULDER ARTHROPLASTY;  Surgeon: Vinicius Whaley M.D.;  Location: SURGERY SAME DAY [facility];  Service: Orthopedics    HIP REVISION TOTAL Left 9/2/2021    Procedure: REVISION, TOTAL ARTHROPLASTY, HIP;  Surgeon: Daniel Allison M.D.;  Location: SURGERY [facility];  Service: Orthopedics    THYROID LOBECTOMY Left 8/17/2018    Procedure: THYROID LOBECTOMY;  Surgeon: Adelina Wilson M.D.;  Location: SURGERY SAME DAY [facility];  Service: General    THYROIDECTOMY TOTAL  8/17/2018    Procedure: NIMS RECURRENT LARYNGEAL NERVE MONITORING;  Surgeon: Adelina Wilson M.D.;  Location: SURGERY SAME DAY [facility];  Service: General    NODE BIOPSY SENTINEL  2/6/2015    Performed by Adelina Wilson M.D. at SURGERY [facility]    MASTECTOMY  2/6/2015    right-Performed by Adelina Wilson M.D. at SURGERY [facility]    HIP ARTHROPLASTY TOTAL  5/19/08    Performed by FARTUN ERAZO at SURGERY [facility]    SHOULDER ARTHROPLASTY TOTAL  2004    Right    JAN BY LAPAROSCOPY  2002    CYST EXCISION  1973    \"remove cyst    HIP ARTHROPLASTY TOTAL      HIP REPLACEMENT, TOTAL      LAMINOTOMY      OTHER ABDOMINAL SURGERY      SHUNT INSERTION      cerebral shunt    US-NEEDLE CORE BX-BREAST PANEL         Past Medical History:   Diagnosis Date    Anesthesia     PONV    Arthritis     Left hip, knees, ankles    Asthma     Inhaler use daily.    Bowel habit changes     Constipation    Breath shortness     asthma    Cancer (HCC)     Breast 2015    Chiari malformation 1970's    shunt  in place    Chickenpox     Chronic back pain     2/2 scoliosis and syringomyelia     Contracture of hand joint     left     Decreased lung capacity     Dental disorder     upper/lower    Disorder of thyroid     Heart burn     High cholesterol     Hypertension     Indigestion     Joint replacement     Right " shoulder, cervical    Obesity     Pain     Pain 08/06/2018    Back, neck and legs    Peripheral edema 06/06/2013    Pneumonia     PONV (postoperative nausea and vomiting)     Psychiatric problem     depression, anxiety    Scoliosis     Shortness of breath 08/06/2018    Chronic    Sleep apnea     uses O2 at night 2L    Sleep apnea 08/07/2018    Patient states having sleep study October 2018.    Snoring     No sleep study    sore throat 01/15/2015    Supplemental oxygen dependent     Syringomyelia (HCC)     surgery 1973, 1977       Current Outpatient Medications   Medication Sig Dispense Refill    ferrous sulfate 325 (65 Fe) MG tablet TAKE 1 TABLET BY MOUTH EVERY DAY 90 Tablet 2    atenolol (TENORMIN) 50 MG Tab TAKE 2 TABLETS BY MOUTH EVERY DAY. BLOOD PRESSURE 200 Tablet 0    atorvastatin (LIPITOR) 10 MG Tab TAKE 1 TABLET BY MOUTH EVERY EVENING. CHOLESTEROL 100 Tablet 1    ondansetron (ZOFRAN) 4 MG Tab tablet Take 4 mg by mouth every four hours as needed for Nausea/Vomiting.      baclofen (LIORESAL) 5 MG Tab Take 5 mg by mouth 2 times a day as needed (muscle spasm).      DULoxetine (CYMBALTA) 30 MG Cap DR Particles Take 30 mg by mouth every day.      alendronate (FOSAMAX) 70 MG Tab Take 70 mg by mouth every 7 days. Indications: Osteoporosis      Ascorbic Acid (VITAMIN C) 1000 MG Tab Take 1,000 mg by mouth every day.      B Complex-C-Folic Acid (HM SUPER VITAMIN B COMPLEX/C PO) Take 1 Tablet by mouth every day.      Fish Oil-Krill Oil (KRILL & FISH OIL BLEND) Cap Take 1 Caplet by mouth every day.      Zinc 50 MG Tab Take 1 Tablet by mouth every day.      Calcium Carb-Cholecalciferol (CALCIUM 600 + D PO) Take 1 Tablet by mouth every day.      Sennosides 25 MG Tab Take 1 Tablet by mouth 2 times a day as needed (constipation).      olopatadine (PATANOL) 0.1 % ophthalmic solution Administer 1 Drop into both eyes 2 times a day as needed for Allergies.      benazepril-hydrochlorthiazide (LOTENSIN HCT) 20-12.5 MG per tablet  "TAKE 1 TABLET BY MOUTH EVERY DAY. BLOOD PRESSURE 100 Tablet 1    meclizine (ANTIVERT) 12.5 MG Tab Take 1 Tablet by mouth 3 times a day. 30 Tablet 0    zonisamide (ZONEGRAN) 100 MG Cap TAKE 1 CAPSULE BY MOUTH AT BEDTIME FOR DIZZINESS PREVENTION      nystatin (MYCOSTATIN) powder Apply 1 Application topically 2 times a day. APPLY TO AFFECTED AREA TWICE A DAY      potassium chloride SA (KDUR) 20 MEQ Tab CR Take 1 Tablet by mouth 1 time a day as needed (whenever you take lasix). * take with furosemide* 30 Tablet 0    pregabalin (LYRICA) 150 MG Cap Take 150 mg by mouth 2 times a day.      amitriptyline (ELAVIL) 100 MG Tab Take 100 mg by mouth every evening.      furosemide (LASIX) 20 MG Tab Take 20 mg by mouth 1 time a day as needed. Indications: Edema      aspirin 81 MG EC tablet Take 81 mg by mouth every day.      QUEtiapine (SEROQUEL) 25 MG Tab Take 25 mg by mouth at bedtime.      esomeprazole (NEXIUM) 20 MG capsule TAKE 1 CAPSULE BY MOUTH EVERY DAY IN THE MORNING BEFORE BREAKFAST 30 capsule 0    HYDROcodone-acetaminophen (NORCO) 5-325 MG Tab per tablet TAKE 1 TABLET BY MOUTH 3 TIMES A DAY FOR 30 DAYS, 724.02  0     No current facility-administered medications for this visit.       Allergies   Allergen Reactions    Bactrim Ds Rash     Pt states \"I get a body rash\".    Morphine Vomiting     hallucinations    Sulfamethoxazole W-Trimethoprim Rash     * full body rash*>  10 years ago       Objective  There were no vitals taken for this visit.  Physical Exam  Constitutional:       Appearance: Normal appearance.   HENT:      Head: Normocephalic and atraumatic.   Eyes:      Extraocular Movements: Extraocular movements intact.   Pulmonary:      Effort: Pulmonary effort is normal.   Genitourinary:     Comments: Pvr 34  Musculoskeletal:         General: No tenderness.   Skin:     General: Skin is dry.   Neurological:      Mental Status: She is alert.   Psychiatric:         Mood and Affect: Mood normal.         Labs:   POCT UA " "  Lab Results   Component Value Date/Time    POCCOLOR yellow 04/10/2024 12:08 PM    POCAPPEAR cloudy 04/10/2024 12:08 PM    POCLEUKEST large 04/10/2024 12:08 PM    POCNITRITE neg 04/10/2024 12:08 PM    POCUROBILIGE 0.2 04/10/2024 12:08 PM    POCPROTEIN neg 04/10/2024 12:08 PM    POCURPH 5.5 04/10/2024 12:08 PM    POCBLOOD trace 04/10/2024 12:08 PM    POCSPGRV 1.015 04/10/2024 12:08 PM    POCKETONES neg 04/10/2024 12:08 PM    POCBILIRUBIN neg 04/10/2024 12:08 PM    POCGLUCUA neg 04/10/2024 12:08 PM            Imaging:   none    Assessment  For trace blood in urine, I have sent a sample for urinalysis, and micro urinalysis.  We may need to order a CT urogram to determine underlying etiology of hematuria.    For patient most bothersome symptom being pain when urinating, I have started her on Flomax 0.4 mg p.o. daily.  Patient does not have a history of kidney stones, it is unclear to me why she had pain versus burning.  Patient declines burning with urination but pain with urination.  This could possibly related to a urethral stricture?  We will see if Flomax make his a difference for her bothersome \"pain\" with urination\" and see if this helps.    Return to clinic in 6 weeks    Plan    Problem List Items Addressed This Visit       Dysuria    Relevant Orders    POCT Bladder Scan    POCT Urinalysis       "

## 2024-04-15 DIAGNOSIS — R31.29 OTHER MICROSCOPIC HEMATURIA: ICD-10-CM

## 2024-04-15 NOTE — PROGRESS NOTES
Call to patient regarding micro UA. I have ordered a CT Urogram to be completed before follow up in 6-8 weeks.    I have ordered a BMP before CT.    All questions answered.

## 2024-04-16 ENCOUNTER — HOSPITAL ENCOUNTER (OUTPATIENT)
Dept: LAB | Facility: MEDICAL CENTER | Age: 79
End: 2024-04-16
Payer: MEDICARE

## 2024-04-16 DIAGNOSIS — R31.29 OTHER MICROSCOPIC HEMATURIA: ICD-10-CM

## 2024-04-16 LAB
ANION GAP SERPL CALC-SCNC: 16 MMOL/L (ref 7–16)
BUN SERPL-MCNC: 30 MG/DL (ref 8–22)
CALCIUM SERPL-MCNC: 9.1 MG/DL (ref 8.5–10.5)
CHLORIDE SERPL-SCNC: 104 MMOL/L (ref 96–112)
CO2 SERPL-SCNC: 21 MMOL/L (ref 20–33)
CREAT SERPL-MCNC: 0.9 MG/DL (ref 0.5–1.4)
GFR SERPLBLD CREATININE-BSD FMLA CKD-EPI: 66 ML/MIN/1.73 M 2
GLUCOSE SERPL-MCNC: 85 MG/DL (ref 65–99)
POTASSIUM SERPL-SCNC: 3.7 MMOL/L (ref 3.6–5.5)
SODIUM SERPL-SCNC: 141 MMOL/L (ref 135–145)

## 2024-04-16 PROCEDURE — 80048 BASIC METABOLIC PNL TOTAL CA: CPT

## 2024-04-16 PROCEDURE — 36415 COLL VENOUS BLD VENIPUNCTURE: CPT

## 2024-04-20 ENCOUNTER — APPOINTMENT (OUTPATIENT)
Dept: RADIOLOGY | Facility: MEDICAL CENTER | Age: 79
DRG: 871 | End: 2024-04-20
Attending: EMERGENCY MEDICINE
Payer: MEDICARE

## 2024-04-20 ENCOUNTER — HOSPITAL ENCOUNTER (INPATIENT)
Facility: MEDICAL CENTER | Age: 79
LOS: 10 days | DRG: 871 | End: 2024-04-30
Attending: EMERGENCY MEDICINE | Admitting: EMERGENCY MEDICINE
Payer: MEDICARE

## 2024-04-20 ENCOUNTER — APPOINTMENT (OUTPATIENT)
Dept: RADIOLOGY | Facility: MEDICAL CENTER | Age: 79
DRG: 871 | End: 2024-04-20
Attending: INTERNAL MEDICINE
Payer: MEDICARE

## 2024-04-20 ENCOUNTER — APPOINTMENT (OUTPATIENT)
Dept: RADIOLOGY | Facility: MEDICAL CENTER | Age: 79
DRG: 871 | End: 2024-04-20
Attending: STUDENT IN AN ORGANIZED HEALTH CARE EDUCATION/TRAINING PROGRAM
Payer: MEDICARE

## 2024-04-20 DIAGNOSIS — U07.1 ACUTE HYPOXEMIC RESPIRATORY FAILURE DUE TO COVID-19 (HCC): ICD-10-CM

## 2024-04-20 DIAGNOSIS — A41.9 SEPTIC SHOCK (HCC): ICD-10-CM

## 2024-04-20 DIAGNOSIS — F41.0 PANIC ATTACKS: ICD-10-CM

## 2024-04-20 DIAGNOSIS — Z85.3 HISTORY OF BREAST CANCER: ICD-10-CM

## 2024-04-20 DIAGNOSIS — M54.9 CHRONIC BACK PAIN, UNSPECIFIED BACK LOCATION, UNSPECIFIED BACK PAIN LATERALITY: ICD-10-CM

## 2024-04-20 DIAGNOSIS — G89.29 CHRONIC LEFT SHOULDER PAIN: ICD-10-CM

## 2024-04-20 DIAGNOSIS — F11.20 UNCOMPLICATED OPIOID DEPENDENCE (HCC): ICD-10-CM

## 2024-04-20 DIAGNOSIS — N17.9 ACUTE RENAL FAILURE, UNSPECIFIED ACUTE RENAL FAILURE TYPE (HCC): ICD-10-CM

## 2024-04-20 DIAGNOSIS — I70.0 ATHEROSCLEROSIS OF AORTA (HCC): ICD-10-CM

## 2024-04-20 DIAGNOSIS — E66.3 OVERWEIGHT: ICD-10-CM

## 2024-04-20 DIAGNOSIS — R73.03 PREDIABETES: ICD-10-CM

## 2024-04-20 DIAGNOSIS — G47.34 NOCTURNAL HYPOXIA: ICD-10-CM

## 2024-04-20 DIAGNOSIS — R65.21 SEPTIC SHOCK (HCC): ICD-10-CM

## 2024-04-20 DIAGNOSIS — R79.89 ELEVATED TROPONIN: ICD-10-CM

## 2024-04-20 DIAGNOSIS — E87.8 ELECTROLYTE DEPLETION: ICD-10-CM

## 2024-04-20 DIAGNOSIS — Z98.2 INTRACRANIAL SHUNT: ICD-10-CM

## 2024-04-20 DIAGNOSIS — M25.512 CHRONIC LEFT SHOULDER PAIN: ICD-10-CM

## 2024-04-20 DIAGNOSIS — K21.9 GASTROESOPHAGEAL REFLUX DISEASE, UNSPECIFIED WHETHER ESOPHAGITIS PRESENT: ICD-10-CM

## 2024-04-20 DIAGNOSIS — R31.1 BENIGN ESSENTIAL MICROSCOPIC HEMATURIA: ICD-10-CM

## 2024-04-20 DIAGNOSIS — Z91.81 RISK FOR FALLS: ICD-10-CM

## 2024-04-20 DIAGNOSIS — I10 PRIMARY HYPERTENSION: ICD-10-CM

## 2024-04-20 DIAGNOSIS — D50.9 IRON DEFICIENCY ANEMIA, UNSPECIFIED IRON DEFICIENCY ANEMIA TYPE: ICD-10-CM

## 2024-04-20 DIAGNOSIS — G47.00 INSOMNIA, UNSPECIFIED TYPE: ICD-10-CM

## 2024-04-20 DIAGNOSIS — N10 ACUTE PYELONEPHRITIS: ICD-10-CM

## 2024-04-20 DIAGNOSIS — J96.11 CHRONIC RESPIRATORY FAILURE WITH HYPOXIA (HCC): ICD-10-CM

## 2024-04-20 DIAGNOSIS — R65.20 SEPSIS WITH ACUTE RENAL FAILURE, DUE TO UNSPECIFIED ORGANISM, UNSPECIFIED ACUTE RENAL FAILURE TYPE, UNSPECIFIED WHETHER SEPTIC SHOCK PRESENT (HCC): ICD-10-CM

## 2024-04-20 DIAGNOSIS — R00.0 TACHYCARDIA: ICD-10-CM

## 2024-04-20 DIAGNOSIS — I95.1 ORTHOSTASIS: ICD-10-CM

## 2024-04-20 DIAGNOSIS — U07.1 COVID-19: ICD-10-CM

## 2024-04-20 DIAGNOSIS — R10.33 ABDOMINAL PAIN, ACUTE, PERIUMBILICAL: ICD-10-CM

## 2024-04-20 DIAGNOSIS — A41.9 SEPSIS WITH ACUTE RENAL FAILURE, DUE TO UNSPECIFIED ORGANISM, UNSPECIFIED ACUTE RENAL FAILURE TYPE, UNSPECIFIED WHETHER SEPTIC SHOCK PRESENT (HCC): ICD-10-CM

## 2024-04-20 DIAGNOSIS — J96.01 ACUTE HYPOXEMIC RESPIRATORY FAILURE DUE TO COVID-19 (HCC): ICD-10-CM

## 2024-04-20 DIAGNOSIS — R54 AGE-RELATED PHYSICAL DEBILITY: Chronic | ICD-10-CM

## 2024-04-20 DIAGNOSIS — R30.0 DYSURIA: ICD-10-CM

## 2024-04-20 DIAGNOSIS — E89.0 H/O PARTIAL THYROIDECTOMY: ICD-10-CM

## 2024-04-20 DIAGNOSIS — F05: ICD-10-CM

## 2024-04-20 DIAGNOSIS — E83.59 CALCINOSIS: ICD-10-CM

## 2024-04-20 DIAGNOSIS — Z96.642 PRESENCE OF LEFT ARTIFICIAL HIP JOINT: ICD-10-CM

## 2024-04-20 DIAGNOSIS — G89.29 CHRONIC BACK PAIN, UNSPECIFIED BACK LOCATION, UNSPECIFIED BACK PAIN LATERALITY: ICD-10-CM

## 2024-04-20 DIAGNOSIS — Z79.899 POLYPHARMACY: ICD-10-CM

## 2024-04-20 DIAGNOSIS — R41.3 MEMORY CHANGES: ICD-10-CM

## 2024-04-20 DIAGNOSIS — M85.832 OSTEOPENIA OF LEFT FOREARM: ICD-10-CM

## 2024-04-20 DIAGNOSIS — E78.5 DYSLIPIDEMIA: ICD-10-CM

## 2024-04-20 DIAGNOSIS — F33.1 MODERATE EPISODE OF RECURRENT MAJOR DEPRESSIVE DISORDER (HCC): ICD-10-CM

## 2024-04-20 DIAGNOSIS — I95.9 HYPOTENSION, UNSPECIFIED HYPOTENSION TYPE: ICD-10-CM

## 2024-04-20 DIAGNOSIS — N17.9 SEPSIS WITH ACUTE RENAL FAILURE, DUE TO UNSPECIFIED ORGANISM, UNSPECIFIED ACUTE RENAL FAILURE TYPE, UNSPECIFIED WHETHER SEPTIC SHOCK PRESENT (HCC): ICD-10-CM

## 2024-04-20 DIAGNOSIS — G47.33 OSA (OBSTRUCTIVE SLEEP APNEA): ICD-10-CM

## 2024-04-20 LAB
ALBUMIN SERPL BCP-MCNC: 3.8 G/DL (ref 3.2–4.9)
ALBUMIN/GLOB SERPL: 1.4 G/DL
ALP SERPL-CCNC: 77 U/L (ref 30–99)
ALT SERPL-CCNC: 38 U/L (ref 2–50)
ANION GAP SERPL CALC-SCNC: 18 MMOL/L (ref 7–16)
ANION GAP SERPL CALC-SCNC: 19 MMOL/L (ref 7–16)
APPEARANCE UR: ABNORMAL
AST SERPL-CCNC: 107 U/L (ref 12–45)
B-OH-BUTYR SERPL-MCNC: 0.65 MMOL/L (ref 0.02–0.27)
BACTERIA #/AREA URNS HPF: ABNORMAL /HPF
BASE EXCESS BLDA CALC-SCNC: -7 MMOL/L (ref -4–3)
BASOPHILS # BLD AUTO: 0.3 % (ref 0–1.8)
BASOPHILS # BLD: 0.03 K/UL (ref 0–0.12)
BILIRUB SERPL-MCNC: 0.2 MG/DL (ref 0.1–1.5)
BILIRUB UR QL STRIP.AUTO: NEGATIVE
BODY TEMPERATURE: ABNORMAL DEGREES
BUN SERPL-MCNC: 52 MG/DL (ref 8–22)
BUN SERPL-MCNC: 54 MG/DL (ref 8–22)
CALCIUM ALBUM COR SERPL-MCNC: 8.4 MG/DL (ref 8.5–10.5)
CALCIUM SERPL-MCNC: 8 MG/DL (ref 8.5–10.5)
CALCIUM SERPL-MCNC: 8.2 MG/DL (ref 8.5–10.5)
CHLORIDE SERPL-SCNC: 102 MMOL/L (ref 96–112)
CHLORIDE SERPL-SCNC: 105 MMOL/L (ref 96–112)
CO2 BLDA-SCNC: 21 MMOL/L (ref 20–33)
CO2 SERPL-SCNC: 17 MMOL/L (ref 20–33)
CO2 SERPL-SCNC: 17 MMOL/L (ref 20–33)
COLOR UR: YELLOW
CREAT SERPL-MCNC: 3.9 MG/DL (ref 0.5–1.4)
CREAT SERPL-MCNC: 5.07 MG/DL (ref 0.5–1.4)
DELSYS IDSYS: ABNORMAL
EKG IMPRESSION: NORMAL
EOSINOPHIL # BLD AUTO: 0.03 K/UL (ref 0–0.51)
EOSINOPHIL NFR BLD: 0.3 % (ref 0–6.9)
EPI CELLS #/AREA URNS HPF: ABNORMAL /HPF
ERYTHROCYTE [DISTWIDTH] IN BLOOD BY AUTOMATED COUNT: 49.8 FL (ref 35.9–50)
FLUAV RNA SPEC QL NAA+PROBE: NEGATIVE
FLUBV RNA SPEC QL NAA+PROBE: NEGATIVE
GFR SERPLBLD CREATININE-BSD FMLA CKD-EPI: 11 ML/MIN/1.73 M 2
GFR SERPLBLD CREATININE-BSD FMLA CKD-EPI: 8 ML/MIN/1.73 M 2
GLOBULIN SER CALC-MCNC: 2.7 G/DL (ref 1.9–3.5)
GLUCOSE SERPL-MCNC: 101 MG/DL (ref 65–99)
GLUCOSE SERPL-MCNC: 99 MG/DL (ref 65–99)
GLUCOSE UR STRIP.AUTO-MCNC: NEGATIVE MG/DL
HCO3 BLDA-SCNC: 19.6 MMOL/L (ref 17–25)
HCT VFR BLD AUTO: 40.7 % (ref 37–47)
HGB BLD-MCNC: 13.6 G/DL (ref 12–16)
HYALINE CASTS #/AREA URNS LPF: ABNORMAL /LPF
IMM GRANULOCYTES # BLD AUTO: 0.03 K/UL (ref 0–0.11)
IMM GRANULOCYTES NFR BLD AUTO: 0.3 % (ref 0–0.9)
KETONES UR STRIP.AUTO-MCNC: ABNORMAL MG/DL
LACTATE SERPL-SCNC: 1.5 MMOL/L (ref 0.5–2)
LEUKOCYTE ESTERASE UR QL STRIP.AUTO: ABNORMAL
LPM ILPM: 1 LPM
LYMPHOCYTES # BLD AUTO: 0.97 K/UL (ref 1–4.8)
LYMPHOCYTES NFR BLD: 9.8 % (ref 22–41)
MCH RBC QN AUTO: 30.6 PG (ref 27–33)
MCHC RBC AUTO-ENTMCNC: 33.4 G/DL (ref 32.2–35.5)
MCV RBC AUTO: 91.5 FL (ref 81.4–97.8)
MICRO URNS: ABNORMAL
MONOCYTES # BLD AUTO: 0.81 K/UL (ref 0–0.85)
MONOCYTES NFR BLD AUTO: 8.1 % (ref 0–13.4)
NEUTROPHILS # BLD AUTO: 8.07 K/UL (ref 1.82–7.42)
NEUTROPHILS NFR BLD: 81.2 % (ref 44–72)
NITRITE UR QL STRIP.AUTO: NEGATIVE
NRBC # BLD AUTO: 0 K/UL
NRBC BLD-RTO: 0 /100 WBC (ref 0–0.2)
PCO2 BLDA: 41.7 MMHG (ref 26–37)
PCO2 TEMP ADJ BLDA: 41.4 MMHG (ref 26–37)
PH BLDA: 7.28 [PH] (ref 7.4–7.5)
PH TEMP ADJ BLDA: 7.28 [PH] (ref 7.4–7.5)
PH UR STRIP.AUTO: 5.5 [PH] (ref 5–8)
PLATELET # BLD AUTO: 213 K/UL (ref 164–446)
PMV BLD AUTO: 10.6 FL (ref 9–12.9)
PO2 BLDA: 65 MMHG (ref 64–87)
PO2 TEMP ADJ BLDA: 64 MMHG (ref 64–87)
POTASSIUM SERPL-SCNC: 3.4 MMOL/L (ref 3.6–5.5)
POTASSIUM SERPL-SCNC: 3.9 MMOL/L (ref 3.6–5.5)
PROT SERPL-MCNC: 6.5 G/DL (ref 6–8.2)
PROT UR QL STRIP: 100 MG/DL
RBC # BLD AUTO: 4.45 M/UL (ref 4.2–5.4)
RBC # URNS HPF: ABNORMAL /HPF
RBC UR QL AUTO: ABNORMAL
RSV RNA SPEC QL NAA+PROBE: NEGATIVE
SAO2 % BLDA: 89 % (ref 93–99)
SARS-COV-2 RNA RESP QL NAA+PROBE: DETECTED
SODIUM SERPL-SCNC: 138 MMOL/L (ref 135–145)
SODIUM SERPL-SCNC: 140 MMOL/L (ref 135–145)
SP GR UR STRIP.AUTO: >=1.03
SPECIMEN DRAWN FROM PATIENT: ABNORMAL
SPECIMEN SOURCE: ABNORMAL
T4 FREE SERPL-MCNC: 1.1 NG/DL (ref 0.93–1.7)
TRANS CELLS #/AREA URNS HPF: ABNORMAL /HPF
TROPONIN T SERPL-MCNC: 58 NG/L (ref 6–19)
TSH SERPL DL<=0.005 MIU/L-ACNC: 0.23 UIU/ML (ref 0.38–5.33)
UROBILINOGEN UR STRIP.AUTO-MCNC: 0.2 MG/DL
WBC # BLD AUTO: 9.9 K/UL (ref 4.8–10.8)
WBC #/AREA URNS HPF: ABNORMAL /HPF

## 2024-04-20 PROCEDURE — 81001 URINALYSIS AUTO W/SCOPE: CPT

## 2024-04-20 PROCEDURE — 80048 BASIC METABOLIC PNL TOTAL CA: CPT

## 2024-04-20 PROCEDURE — 99291 CRITICAL CARE FIRST HOUR: CPT | Mod: 25 | Performed by: EMERGENCY MEDICINE

## 2024-04-20 PROCEDURE — 85025 COMPLETE CBC W/AUTO DIFF WBC: CPT

## 2024-04-20 PROCEDURE — 700102 HCHG RX REV CODE 250 W/ 637 OVERRIDE(OP): Performed by: EMERGENCY MEDICINE

## 2024-04-20 PROCEDURE — 99291 CRITICAL CARE FIRST HOUR: CPT

## 2024-04-20 PROCEDURE — 92950 HEART/LUNG RESUSCITATION CPR: CPT

## 2024-04-20 PROCEDURE — 99152 MOD SED SAME PHYS/QHP 5/>YRS: CPT

## 2024-04-20 PROCEDURE — 700101 HCHG RX REV CODE 250: Performed by: EMERGENCY MEDICINE

## 2024-04-20 PROCEDURE — 82140 ASSAY OF AMMONIA: CPT

## 2024-04-20 PROCEDURE — 87086 URINE CULTURE/COLONY COUNT: CPT

## 2024-04-20 PROCEDURE — 36415 COLL VENOUS BLD VENIPUNCTURE: CPT

## 2024-04-20 PROCEDURE — 82010 KETONE BODYS QUAN: CPT

## 2024-04-20 PROCEDURE — 82803 BLOOD GASES ANY COMBINATION: CPT

## 2024-04-20 PROCEDURE — 83605 ASSAY OF LACTIC ACID: CPT

## 2024-04-20 PROCEDURE — 93005 ELECTROCARDIOGRAM TRACING: CPT | Performed by: EMERGENCY MEDICINE

## 2024-04-20 PROCEDURE — 87040 BLOOD CULTURE FOR BACTERIA: CPT

## 2024-04-20 PROCEDURE — 94002 VENT MGMT INPAT INIT DAY: CPT

## 2024-04-20 PROCEDURE — 94799 UNLISTED PULMONARY SVC/PX: CPT

## 2024-04-20 PROCEDURE — 770022 HCHG ROOM/CARE - ICU (200)

## 2024-04-20 PROCEDURE — 99292 CRITICAL CARE ADDL 30 MIN: CPT | Mod: 25 | Performed by: INTERNAL MEDICINE

## 2024-04-20 PROCEDURE — 84439 ASSAY OF FREE THYROXINE: CPT

## 2024-04-20 PROCEDURE — 700111 HCHG RX REV CODE 636 W/ 250 OVERRIDE (IP): Mod: JZ | Performed by: INTERNAL MEDICINE

## 2024-04-20 PROCEDURE — 84443 ASSAY THYROID STIM HORMONE: CPT

## 2024-04-20 PROCEDURE — 80076 HEPATIC FUNCTION PANEL: CPT

## 2024-04-20 PROCEDURE — 5A1945Z RESPIRATORY VENTILATION, 24-96 CONSECUTIVE HOURS: ICD-10-PCS | Performed by: INTERNAL MEDICINE

## 2024-04-20 PROCEDURE — 80053 COMPREHEN METABOLIC PANEL: CPT

## 2024-04-20 PROCEDURE — 31500 INSERT EMERGENCY AIRWAY: CPT

## 2024-04-20 PROCEDURE — 0BH17EZ INSERTION OF ENDOTRACHEAL AIRWAY INTO TRACHEA, VIA NATURAL OR ARTIFICIAL OPENING: ICD-10-PCS | Performed by: INTERNAL MEDICINE

## 2024-04-20 PROCEDURE — 84478 ASSAY OF TRIGLYCERIDES: CPT

## 2024-04-20 PROCEDURE — 36600 WITHDRAWAL OF ARTERIAL BLOOD: CPT

## 2024-04-20 PROCEDURE — 82962 GLUCOSE BLOOD TEST: CPT

## 2024-04-20 PROCEDURE — 700105 HCHG RX REV CODE 258: Performed by: EMERGENCY MEDICINE

## 2024-04-20 PROCEDURE — 8E0ZXY6 ISOLATION: ICD-10-PCS | Performed by: EMERGENCY MEDICINE

## 2024-04-20 PROCEDURE — 84484 ASSAY OF TROPONIN QUANT: CPT

## 2024-04-20 PROCEDURE — A9270 NON-COVERED ITEM OR SERVICE: HCPCS | Performed by: EMERGENCY MEDICINE

## 2024-04-20 PROCEDURE — 31500 INSERT EMERGENCY AIRWAY: CPT | Performed by: INTERNAL MEDICINE

## 2024-04-20 PROCEDURE — 0241U HCHG SARS-COV-2 COVID-19 NFCT DS RESP RNA 4 TRGT MIC: CPT

## 2024-04-20 PROCEDURE — 700101 HCHG RX REV CODE 250: Performed by: INTERNAL MEDICINE

## 2024-04-20 PROCEDURE — 700111 HCHG RX REV CODE 636 W/ 250 OVERRIDE (IP): Performed by: EMERGENCY MEDICINE

## 2024-04-20 RX ORDER — ONDANSETRON 4 MG/1
4 TABLET, ORALLY DISINTEGRATING ORAL EVERY 4 HOURS PRN
Status: DISCONTINUED | OUTPATIENT
Start: 2024-04-20 | End: 2024-04-21

## 2024-04-20 RX ORDER — ETOMIDATE 2 MG/ML
20 INJECTION INTRAVENOUS ONCE
Status: COMPLETED | OUTPATIENT
Start: 2024-04-21 | End: 2024-04-20

## 2024-04-20 RX ORDER — PREGABALIN 150 MG/1
150 CAPSULE ORAL 2 TIMES DAILY
Status: DISCONTINUED | OUTPATIENT
Start: 2024-04-20 | End: 2024-04-20

## 2024-04-20 RX ORDER — QUETIAPINE FUMARATE 25 MG/1
25 TABLET, FILM COATED ORAL
Status: DISCONTINUED | OUTPATIENT
Start: 2024-04-20 | End: 2024-04-21

## 2024-04-20 RX ORDER — GARLIC EXTRACT 500 MG
1 CAPSULE ORAL DAILY
COMMUNITY
End: 2024-05-04

## 2024-04-20 RX ORDER — SODIUM BICARBONATE IN D5W 150/1000ML
PLASTIC BAG, INJECTION (ML) INTRAVENOUS CONTINUOUS
Status: DISCONTINUED | OUTPATIENT
Start: 2024-04-21 | End: 2024-04-22

## 2024-04-20 RX ORDER — HEPARIN SODIUM 5000 [USP'U]/ML
5000 INJECTION, SOLUTION INTRAVENOUS; SUBCUTANEOUS EVERY 8 HOURS
Status: DISCONTINUED | OUTPATIENT
Start: 2024-04-20 | End: 2024-04-24

## 2024-04-20 RX ORDER — ONDANSETRON 2 MG/ML
4 INJECTION INTRAMUSCULAR; INTRAVENOUS EVERY 4 HOURS PRN
Status: DISCONTINUED | OUTPATIENT
Start: 2024-04-20 | End: 2024-04-30 | Stop reason: HOSPADM

## 2024-04-20 RX ORDER — ACETAMINOPHEN 325 MG/1
650 TABLET ORAL EVERY 6 HOURS PRN
Status: DISCONTINUED | OUTPATIENT
Start: 2024-04-20 | End: 2024-04-21

## 2024-04-20 RX ORDER — DEXAMETHASONE SODIUM PHOSPHATE 4 MG/ML
6 INJECTION, SOLUTION INTRA-ARTICULAR; INTRALESIONAL; INTRAMUSCULAR; INTRAVENOUS; SOFT TISSUE DAILY
Status: DISCONTINUED | OUTPATIENT
Start: 2024-04-21 | End: 2024-04-25

## 2024-04-20 RX ORDER — AMOXICILLIN 250 MG
2 CAPSULE ORAL EVERY EVENING
Status: DISCONTINUED | OUTPATIENT
Start: 2024-04-20 | End: 2024-04-20

## 2024-04-20 RX ORDER — AMOXICILLIN 250 MG
2 CAPSULE ORAL 2 TIMES DAILY
Status: DISCONTINUED | OUTPATIENT
Start: 2024-04-21 | End: 2024-04-22

## 2024-04-20 RX ORDER — SODIUM CHLORIDE, SODIUM LACTATE, POTASSIUM CHLORIDE, AND CALCIUM CHLORIDE .6; .31; .03; .02 G/100ML; G/100ML; G/100ML; G/100ML
500 INJECTION, SOLUTION INTRAVENOUS ONCE
Status: COMPLETED | OUTPATIENT
Start: 2024-04-20 | End: 2024-04-20

## 2024-04-20 RX ORDER — BISACODYL 10 MG
10 SUPPOSITORY, RECTAL RECTAL
Status: DISCONTINUED | OUTPATIENT
Start: 2024-04-20 | End: 2024-04-22

## 2024-04-20 RX ORDER — BACLOFEN 10 MG/1
5 TABLET ORAL 2 TIMES DAILY PRN
Status: DISCONTINUED | OUTPATIENT
Start: 2024-04-20 | End: 2024-04-20

## 2024-04-20 RX ORDER — DEXAMETHASONE SODIUM PHOSPHATE 4 MG/ML
6 INJECTION, SOLUTION INTRA-ARTICULAR; INTRALESIONAL; INTRAMUSCULAR; INTRAVENOUS; SOFT TISSUE DAILY
Status: DISCONTINUED | OUTPATIENT
Start: 2024-04-20 | End: 2024-04-20

## 2024-04-20 RX ORDER — NOREPINEPHRINE BITARTRATE 0.03 MG/ML
0-1 INJECTION, SOLUTION INTRAVENOUS CONTINUOUS
Status: DISCONTINUED | OUTPATIENT
Start: 2024-04-20 | End: 2024-04-24

## 2024-04-20 RX ORDER — NOREPINEPHRINE BITARTRATE 0.03 MG/ML
INJECTION, SOLUTION INTRAVENOUS
Status: DISPENSED
Start: 2024-04-20 | End: 2024-04-21

## 2024-04-20 RX ORDER — FAMOTIDINE 20 MG/1
20 TABLET, FILM COATED ORAL DAILY
Status: DISCONTINUED | OUTPATIENT
Start: 2024-04-21 | End: 2024-04-22

## 2024-04-20 RX ORDER — NOREPINEPHRINE BITARTRATE 0.03 MG/ML
0-1 INJECTION, SOLUTION INTRAVENOUS CONTINUOUS
Status: CANCELLED | OUTPATIENT
Start: 2024-04-20

## 2024-04-20 RX ORDER — AMITRIPTYLINE HYDROCHLORIDE 100 MG/1
100 TABLET ORAL NIGHTLY
Status: DISCONTINUED | OUTPATIENT
Start: 2024-04-20 | End: 2024-04-21

## 2024-04-20 RX ORDER — ASPIRIN 81 MG/1
81 TABLET ORAL DAILY
Status: DISCONTINUED | OUTPATIENT
Start: 2024-04-21 | End: 2024-04-21

## 2024-04-20 RX ORDER — SODIUM CHLORIDE, SODIUM LACTATE, POTASSIUM CHLORIDE, CALCIUM CHLORIDE 600; 310; 30; 20 MG/100ML; MG/100ML; MG/100ML; MG/100ML
1000 INJECTION, SOLUTION INTRAVENOUS ONCE
Status: COMPLETED | OUTPATIENT
Start: 2024-04-20 | End: 2024-04-20

## 2024-04-20 RX ORDER — POLYETHYLENE GLYCOL 3350 17 G/17G
1 POWDER, FOR SOLUTION ORAL
Status: DISCONTINUED | OUTPATIENT
Start: 2024-04-20 | End: 2024-04-22

## 2024-04-20 RX ORDER — OMEPRAZOLE 20 MG/1
20 CAPSULE, DELAYED RELEASE ORAL
Status: DISCONTINUED | OUTPATIENT
Start: 2024-04-21 | End: 2024-04-20

## 2024-04-20 RX ORDER — POLYETHYLENE GLYCOL 3350 17 G/17G
1 POWDER, FOR SOLUTION ORAL
Status: DISCONTINUED | OUTPATIENT
Start: 2024-04-20 | End: 2024-04-20

## 2024-04-20 RX ORDER — ROCURONIUM BROMIDE 10 MG/ML
50 INJECTION, SOLUTION INTRAVENOUS ONCE
Status: COMPLETED | OUTPATIENT
Start: 2024-04-21 | End: 2024-04-20

## 2024-04-20 RX ORDER — DULOXETIN HYDROCHLORIDE 30 MG/1
30 CAPSULE, DELAYED RELEASE ORAL DAILY
Status: DISCONTINUED | OUTPATIENT
Start: 2024-04-21 | End: 2024-04-20

## 2024-04-20 RX ORDER — CALCIUM CARBONATE/VITAMIN D3 600 MG-10
1 TABLET ORAL DAILY
COMMUNITY
End: 2024-05-04

## 2024-04-20 RX ADMIN — ROCURONIUM BROMIDE 50 MG: 50 INJECTION, SOLUTION INTRAVENOUS at 23:34

## 2024-04-20 RX ADMIN — DOCUSATE SODIUM 50 MG AND SENNOSIDES 8.6 MG 2 TABLET: 8.6; 5 TABLET, FILM COATED ORAL at 20:46

## 2024-04-20 RX ADMIN — SODIUM CHLORIDE, POTASSIUM CHLORIDE, SODIUM LACTATE AND CALCIUM CHLORIDE 1000 ML: 600; 310; 30; 20 INJECTION, SOLUTION INTRAVENOUS at 17:30

## 2024-04-20 RX ADMIN — HEPARIN SODIUM 5000 UNITS: 5000 INJECTION, SOLUTION INTRAVENOUS; SUBCUTANEOUS at 22:00

## 2024-04-20 RX ADMIN — AMITRIPTYLINE HYDROCHLORIDE 100 MG: 100 TABLET, FILM COATED ORAL at 20:46

## 2024-04-20 RX ADMIN — CEFTRIAXONE SODIUM 1000 MG: 10 INJECTION, POWDER, FOR SOLUTION INTRAVENOUS at 20:46

## 2024-04-20 RX ADMIN — SODIUM CHLORIDE, POTASSIUM CHLORIDE, SODIUM LACTATE AND CALCIUM CHLORIDE 500 ML: 600; 310; 30; 20 INJECTION, SOLUTION INTRAVENOUS at 16:15

## 2024-04-20 RX ADMIN — DEXAMETHASONE SODIUM PHOSPHATE 6 MG: 4 INJECTION INTRA-ARTICULAR; INTRALESIONAL; INTRAMUSCULAR; INTRAVENOUS; SOFT TISSUE at 20:46

## 2024-04-20 RX ADMIN — ETOMIDATE 20 MG: 2 INJECTION, SOLUTION INTRAVENOUS at 23:34

## 2024-04-20 RX ADMIN — NOREPINEPHRINE BITARTRATE 0.1 MCG/KG/MIN: 1 INJECTION, SOLUTION, CONCENTRATE INTRAVENOUS at 18:39

## 2024-04-20 ASSESSMENT — FIBROSIS 4 INDEX
FIB4 SCORE: 6.19
FIB4 SCORE: 6.19

## 2024-04-20 ASSESSMENT — ENCOUNTER SYMPTOMS
VOMITING: 0
NAUSEA: 0
FOCAL WEAKNESS: 0
FLANK PAIN: 0
SHORTNESS OF BREATH: 1

## 2024-04-20 ASSESSMENT — PAIN DESCRIPTION - PAIN TYPE: TYPE: ACUTE PAIN

## 2024-04-20 NOTE — ED PROVIDER NOTES
ER Provider Note    Scribed for Jaquelin Brewer M.D. by Johnathan Logan. 4/20/2024  3:41 PM    Primary Care Provider: Michelle Jim P.A.-C.    CHIEF COMPLAINT  Chief Complaint   Patient presents with    Syncope    Hypotension    Weakness     LIMITATION TO HISTORY   Select: Altered mental status / Confusion    HPI/ROS  OUTSIDE HISTORIAN(S):  EMS provides patient history.    EXTERNAL RECORDS REVIEWED  Outpatient Notes Patient's last echocardiogram was in 2021 and LVF was 70%. Normal diastolic function. Patient has a medical history of UTIs.       Tereza Sánchez is a 78 y.o. female who presents to the ED via EMS for evaluation of multiple syncopal events, onset 2 days ago. EMS reports the patient developed worsening weakness over the last few days in addition to her syncopal events. EMS notes the patient's blood pressure was 61/42 upon arrival. The patient also reports a moist productive cough. She states she is on 2 L oxygen at baseline, and reports a medical history of CHF and kyphosis.  Patient really unable to contribute much she says she is not in any pain and does not really have any complaints at this time.      PAST MEDICAL HISTORY  Past Medical History:   Diagnosis Date    Anesthesia     PONV    Arthritis     Left hip, knees, ankles    Asthma     Inhaler use daily.    Bowel habit changes     Constipation    Breath shortness     asthma    Cancer (HCC)     Breast 2015    Chiari malformation 1970's    shunt  in place    Chickenpox     Chronic back pain     2/2 scoliosis and syringomyelia     Contracture of hand joint     left     Decreased lung capacity     Dental disorder     upper/lower    Disorder of thyroid     Heart burn     High cholesterol     Hypertension     Indigestion     Joint replacement     Right shoulder, cervical    Obesity     Pain     Pain 08/06/2018    Back, neck and legs    Peripheral edema 06/06/2013    Pneumonia     PONV (postoperative nausea and vomiting)     Psychiatric problem      "depression, anxiety    Scoliosis     Shortness of breath 08/06/2018    Chronic    Sleep apnea     uses O2 at night 2L    Sleep apnea 08/07/2018    Patient states having sleep study October 2018.    Snoring     No sleep study    sore throat 01/15/2015    Supplemental oxygen dependent     Syringomyelia (HCC)     surgery 1973, 1977       SURGICAL HISTORY  Past Surgical History:   Procedure Laterality Date    PB RECONSTR TOTAL SHOULDER IMPLANT Left 5/4/2022    Procedure: LEFT REVERSE TOTAL SHOULDER ARTHROPLASTY;  Surgeon: Vinicius Whaley M.D.;  Location: SURGERY SAME DAY [facility];  Service: Orthopedics    HIP REVISION TOTAL Left 9/2/2021    Procedure: REVISION, TOTAL ARTHROPLASTY, HIP;  Surgeon: Daniel Allison M.D.;  Location: SURGERY [facility];  Service: Orthopedics    THYROID LOBECTOMY Left 8/17/2018    Procedure: THYROID LOBECTOMY;  Surgeon: Adelina Wilson M.D.;  Location: SURGERY SAME DAY [facility];  Service: General    THYROIDECTOMY TOTAL  8/17/2018    Procedure: NIMS RECURRENT LARYNGEAL NERVE MONITORING;  Surgeon: Adelina Wilson M.D.;  Location: SURGERY SAME DAY [facility];  Service: General    NODE BIOPSY SENTINEL  2/6/2015    Performed by Adelina Wilson M.D. at SURGERY [facility]    MASTECTOMY  2/6/2015    right-Performed by Adelina Wilson M.D. at SURGERY [facility]    HIP ARTHROPLASTY TOTAL  5/19/08    Performed by FARTUN ERAZO at SURGERY [facility]    SHOULDER ARTHROPLASTY TOTAL  2004    Right    JAN BY LAPAROSCOPY  2002    CYST EXCISION  1973    \"remove cyst    HIP ARTHROPLASTY TOTAL      HIP REPLACEMENT, TOTAL      LAMINOTOMY      OTHER ABDOMINAL SURGERY      SHUNT INSERTION      cerebral shunt    US-NEEDLE CORE BX-BREAST PANEL         FAMILY HISTORY  Family History   Problem Relation Age of Onset    Hypertension Mother     Sleep Apnea Brother     Cancer Neg Hx     Diabetes Neg Hx     Stroke Neg Hx     Heart Disease Neg Hx     Ovarian Cancer Neg Hx     Tubal " Cancer Neg Hx     Peritoneal Cancer Neg Hx     Colorectal Cancer Neg Hx     Breast Cancer Neg Hx        SOCIAL HISTORY   reports that she has never smoked. She has never used smokeless tobacco. She reports that she does not drink alcohol and does not use drugs.    CURRENT MEDICATIONS  Current Discharge Medication List        CONTINUE these medications which have NOT CHANGED    Details   B Complex-C (B COMPLEX-VITAMIN C) Cap Take 1 Capsule by mouth every day.      Calcium Carb-Cholecalciferol (CALCIUM + VITAMIN D3) 600-10 MG-MCG Tab Take 1 Tablet by mouth every day.      tamsulosin (FLOMAX) 0.4 MG capsule Take 1 Capsule by mouth 1/2 hour after breakfast.  Qty: 30 Capsule, Refills: 3    Associated Diagnoses: Dysuria      ferrous sulfate 325 (65 Fe) MG tablet TAKE 1 TABLET BY MOUTH EVERY DAY  Qty: 90 Tablet, Refills: 2    Associated Diagnoses: Iron deficiency anemia, unspecified iron deficiency anemia type      atenolol (TENORMIN) 50 MG Tab TAKE 2 TABLETS BY MOUTH EVERY DAY. BLOOD PRESSURE  Qty: 200 Tablet, Refills: 0    Associated Diagnoses: Primary hypertension      atorvastatin (LIPITOR) 10 MG Tab TAKE 1 TABLET BY MOUTH EVERY EVENING. CHOLESTEROL  Qty: 100 Tablet, Refills: 1    Associated Diagnoses: Dyslipidemia      ondansetron (ZOFRAN) 4 MG Tab tablet Take 4 mg by mouth every four hours as needed for Nausea/Vomiting.      baclofen (LIORESAL) 5 MG Tab Take 5 mg by mouth 2 times a day as needed (muscle spasm).      DULoxetine (CYMBALTA) 30 MG Cap DR Particles Take 30 mg by mouth every day.      Ascorbic Acid (VITAMIN C) 1000 MG Tab Take 1,000 mg by mouth every day.      Fish Oil-Krill Oil (KRILL & FISH OIL BLEND) Cap Take 1 Caplet by mouth every day.      Zinc 50 MG Tab Take 50 mg by mouth every day.      benazepril-hydrochlorthiazide (LOTENSIN HCT) 20-12.5 MG per tablet TAKE 1 TABLET BY MOUTH EVERY DAY. BLOOD PRESSURE  Qty: 100 Tablet, Refills: 1    Associated Diagnoses: Primary hypertension      meclizine  (ANTIVERT) 12.5 MG Tab Take 1 Tablet by mouth 3 times a day.  Qty: 30 Tablet, Refills: 0      zonisamide (ZONEGRAN) 100 MG Cap 100 mg at bedtime.      pregabalin (LYRICA) 150 MG Cap Take 150 mg by mouth 2 times a day.      amitriptyline (ELAVIL) 100 MG Tab Take 100 mg by mouth every evening.      aspirin 81 MG EC tablet Take 81 mg by mouth every day.      QUEtiapine (SEROQUEL) 25 MG Tab Take 25 mg by mouth at bedtime.      esomeprazole (NEXIUM) 20 MG capsule TAKE 1 CAPSULE BY MOUTH EVERY DAY IN THE MORNING BEFORE BREAKFAST  Qty: 30 capsule, Refills: 0    Comments: Pt to make appt prior to more refills.  Associated Diagnoses: Gastroesophageal reflux disease      HYDROcodone-acetaminophen (NORCO) 5-325 MG Tab per tablet TAKE 1 TABLET BY MOUTH 3 TIMES A DAY FOR 30 DAYS, 724.02  Refills: 0      alendronate (FOSAMAX) 70 MG Tab Take 70 mg by mouth every Monday. Indications: Osteoporosis      Sennosides 25 MG Tab Take 1 Tablet by mouth 2 times a day as needed (constipation).      olopatadine (PATANOL) 0.1 % ophthalmic solution Administer 1 Drop into both eyes 2 times a day as needed for Allergies.      nystatin (MYCOSTATIN) powder Apply 1 Application topically 2 times a day as needed.     Apply to rash      potassium chloride SA (KDUR) 20 MEQ Tab CR Take 1 Tablet by mouth 1 time a day as needed (whenever you take lasix). * take with furosemide*  Qty: 30 Tablet, Refills: 0    Associated Diagnoses: Pedal edema      furosemide (LASIX) 20 MG Tab Take 20 mg by mouth 1 time a day as needed. Indications: Edema             ALLERGIES  Bactrim ds, Morphine, and Sulfamethoxazole w-trimethoprim    PHYSICAL EXAM  BP (!) 147/67   Pulse 84   Temp 35.8 °C (96.5 °F) (Temporal)   Resp (!) 31   Ht 1.524 m (5')   Wt 79.4 kg (175 lb)   SpO2 96%   BMI 34.18 kg/m²   Constitutional: Alert, in no apparent distress.  Wet cough noted  HENT: No signs of trauma, Bilateral external ears normal, Nose normal.   Eyes: Pupils are equal and reactive,  Conjunctiva normal, Non-icteric.   Neck: Normal range of motion, No tenderness, Supple, No stridor.   Cardiovascular: Regular rate and rhythm, no murmurs.   Thorax & Lungs: Lungs difficult to auscultate, No respiratory distress, No chest tenderness.   Abdomen: Bowel sounds normal, Soft, No tenderness, No masses, No peritoneal signs.  Skin: Warm, Dry, No erythema, No rash.   Back: Significant kyphosis.   Musculoskeletal:  No major deformities noted. 2+ pitting edema of lower extremities. Chronic contracture of bilateral hands.   Neurologic: Alert, moving all extremities without difficulty, no focal deficits.      DIAGNOSTIC STUDIES & PROCEDURES    Labs:   Results for orders placed or performed during the hospital encounter of 04/20/24   CBC with Differential   Result Value Ref Range    WBC 9.9 4.8 - 10.8 K/uL    RBC 4.45 4.20 - 5.40 M/uL    Hemoglobin 13.6 12.0 - 16.0 g/dL    Hematocrit 40.7 37.0 - 47.0 %    MCV 91.5 81.4 - 97.8 fL    MCH 30.6 27.0 - 33.0 pg    MCHC 33.4 32.2 - 35.5 g/dL    RDW 49.8 35.9 - 50.0 fL    Platelet Count 213 164 - 446 K/uL    MPV 10.6 9.0 - 12.9 fL    Neutrophils-Polys 81.20 (H) 44.00 - 72.00 %    Lymphocytes 9.80 (L) 22.00 - 41.00 %    Monocytes 8.10 0.00 - 13.40 %    Eosinophils 0.30 0.00 - 6.90 %    Basophils 0.30 0.00 - 1.80 %    Immature Granulocytes 0.30 0.00 - 0.90 %    Nucleated RBC 0.00 0.00 - 0.20 /100 WBC    Neutrophils (Absolute) 8.07 (H) 1.82 - 7.42 K/uL    Lymphs (Absolute) 0.97 (L) 1.00 - 4.80 K/uL    Monos (Absolute) 0.81 0.00 - 0.85 K/uL    Eos (Absolute) 0.03 0.00 - 0.51 K/uL    Baso (Absolute) 0.03 0.00 - 0.12 K/uL    Immature Granulocytes (abs) 0.03 0.00 - 0.11 K/uL    NRBC (Absolute) 0.00 K/uL   Complete Metabolic Panel (CMP)   Result Value Ref Range    Sodium 138 135 - 145 mmol/L    Potassium 3.9 3.6 - 5.5 mmol/L    Chloride 102 96 - 112 mmol/L    Co2 17 (L) 20 - 33 mmol/L    Anion Gap 19.0 (H) 7.0 - 16.0    Glucose 99 65 - 99 mg/dL    Bun 54 (H) 8 - 22 mg/dL     Creatinine 5.07 (HH) 0.50 - 1.40 mg/dL    Calcium 8.2 (L) 8.5 - 10.5 mg/dL    Correct Calcium 8.4 (L) 8.5 - 10.5 mg/dL    AST(SGOT) 107 (H) 12 - 45 U/L    ALT(SGPT) 38 2 - 50 U/L    Alkaline Phosphatase 77 30 - 99 U/L    Total Bilirubin 0.2 0.1 - 1.5 mg/dL    Albumin 3.8 3.2 - 4.9 g/dL    Total Protein 6.5 6.0 - 8.2 g/dL    Globulin 2.7 1.9 - 3.5 g/dL    A-G Ratio 1.4 g/dL   Troponins NOW   Result Value Ref Range    Troponin T 58 (H) 6 - 19 ng/L   Urinalysis    Specimen: Urine   Result Value Ref Range    Color Yellow     Character Turbid (A)     Specific Gravity >=1.030 <1.035    Ph 5.5 5.0 - 8.0    Glucose Negative Negative mg/dL    Ketones Trace (A) Negative mg/dL    Protein 100 (A) Negative mg/dL    Bilirubin Negative Negative    Urobilinogen, Urine 0.2 Negative    Nitrite Negative Negative    Leukocyte Esterase Moderate (A) Negative    Occult Blood Small (A) Negative    Micro Urine Req Microscopic    CoV-2, Flu A/B, And RSV by PCR ([employer])    Specimen: Respirate   Result Value Ref Range    Influenza virus A RNA Negative Negative    Influenza virus B, PCR Negative Negative    RSV, PCR Negative Negative    SARS-CoV-2 by PCR DETECTED (AA)     SARS-CoV-2 Source NP Swab    URINE MICROSCOPIC (W/UA)   Result Value Ref Range    WBC Packed (A) /hpf    RBC 2-5 (A) /hpf    Bacteria Moderate (A) None /hpf    Epithelial Cells Few /hpf    Trans Epithelial Cells Few /hpf    Hyaline Cast 3-5 (A) /lpf   ESTIMATED GFR   Result Value Ref Range    GFR (CKD-EPI) 8 (A) >60 mL/min/1.73 m 2   LACTIC ACID   Result Value Ref Range    Lactic Acid 1.5 0.5 - 2.0 mmol/L   EKG   Result Value Ref Range    Report       [facility] Emergency Dept.    Test Date:  2024  Pt Name:    ODETTE NICKERSON                  Department: ER  MRN:        [id number]                      Room:       24  Gender:     Female                       Technician: [id number]  :        [id number]                   Requested By:ER TRIAGE PROTOCOL  Order  #:    [id number]                    Reading MD: TIARA GRIER    Measurements  Intervals                                Axis  Rate:       67                           P:          40  MD:         187                          QRS:        9  QRSD:       103                          T:          18  QT:         414  QTc:        437    Interpretive Statements  Sinus rhythm  Baseline wander in lead(s) V6  Compared to ECG 10/01/2023 15:00:14  No significant changes  Impression: Sinus rhythm with evidence of ischemia.  Electronically Signed On 04- 21:13:17 PDT by TIARA GRIER       All labs reviewed by me.    EKG:   I have independently interpreted this EKG as seen above.    Radiology:   The attending Emergency Physician has independently interpreted the diagnostic imaging associated with this visit and is awaiting the final reading from the radiologist, which will be displayed below.    Preliminary interpretation is a follows: Significant curvature of her spine noted on chest x-ray no obvious pneumonia  Radiologist interpretation:   DX-CHEST-PORTABLE (1 VIEW)   Final Result      Linear atelectasis within the right lung base.      US-RENAL    (Results Pending)        Point of Care Ultrasound    ED POINT OF CARE ULTRASOUND: LIMITED CARDIAC    Indication for exam: Volume status  LVEF: normal  Pericardial Effusion:not present  RV Strain:not present  Pulmonary B Lines:not present    Image retained through [employer] as seen below:         This study is a limited ultrasound examination performed and interpreted to evaluate for limited conditions as outlined above. There may be other clinically important information contained in the images that is outside this scope. When clinically warranted, a comprehensive ultrasound through the appropriate department is considered.      COURSE & MEDICAL DECISION MAKING    ED Observation Status? No; Patient does not meet criteria for ED Observation.     3:41 PM - Patient seen and evaluated at  bedside. Patient will be treated with LR infusion 500 mL for her symptoms. Ordered EKG, Troponins, CMP, CBC w/ diff, CoV-2, Flu A/B, and RSV by PCR, blood culture x2, UA, Lactic acid, and DX-Chest to evaluate. She understands and agrees to the plan of care. Differential diagnoses include but are not limited to: Sepsis vs dehydration vs pneumonia vs UTI     5:01 PM - Patient will be medicated with LR infusion.     5:21 PM - Patient was reevaluated at bedside. Patient is hypotensive and ICU physician is there at bedside.     5:29 PM - I discussed the patient's case and the above findings with Dr. Bloom (Internal Medicine Critical Care) who agreed to hospitalize the patient. Patient's care was transferred at this time.    CRITICAL CARE  The very real possibilty of a deterioration of this patient's condition required the highest level of my preparedness for sudden, emergent intervention.  I provided critical care services, which included medication orders, frequent reevaluations of the patient's condition and response to treatment, ordering and reviewing test results, and discussing the case with various consultants.  The critical care time associated with the care of the patient was 35 minutes. Review chart for interventions. This time is exclusive of any other billable procedures.         INITIAL ASSESSMENT AND PLAN  Care Narrative: This is a 78-year-old female who presents with hypotension.  Patient is essentially asymptomatic.  She does have a history of multiple UTIs and recurrent UTIs.  She has coarse wet cough noted she is needing some supplemental oxygen by nasal cannula.  She is afebrile.  She was fluid responsive initially by EMS she responded to 500 cc bolus.  There was a concern for possible heart failure and she does a lower extremity edema and therefore bedside Limited cardiac exam was performed by ultrasound which showed a normal EF.  She was given additional IV fluid resuscitation.  Labs are obtained she  has a normal white count with minimal left shift.  CMP is significant for acute renal failure with BUN of 54 creatinine of 5.  This is new when compared to prior labs.  Troponin is minimally elevated at 58 I do not think there is an acute cardiac process I suspect this is secondary to sepsis.  Urine appears grossly infected.  She is given empiric antibiotics for this.  She was persistently hypotensive and therefore I do think she requires ICU level care.  She was given fluid resuscitation and will be started on Levophed for blood pressure support.  I spoke with Dr. Bloom, intensivist who was at bedside and will take the patient to the ICU.    She was noted to be COVID-positive as well.      Sepsis: Infection was suspected 4:10 PM (Time). Sepsis pathway was initiated. Less than 30cc/kg because of concern for volume overload 500 mL of crystalloid was ordered. Antibiotics were given per protocol. Given less fluids because of concerns for heart failure.                    DISPOSITION AND DISCUSSIONS  I have discussed management of the patient with the following physicians and MERCED's: Dr. Bloom (Internal Medicine Critical Care)    Discussion of management with other [facility] or appropriate source(s): None     Barriers to care at this time, including but not limited to:  None .     DISPOSITION:  Patient will be hospitalized by Dr. Bloom in critical condition.      FINAL IMPRESSION   1. Sepsis with acute renal failure, due to unspecified organism, unspecified acute renal failure type, unspecified whether septic shock present (HCC)    2. Acute pyelonephritis    3. Hypotension, unspecified hypotension type    4.      Critical Care Time (35 minutes)     Johnathan GOYAL (Emelia), am scribing for, and in the presence of, Jaquelin Brewer M.D..    Electronically signed by: Johnathan Hughes), 4/20/2024    Jaquelin GOYAL M.D. personally performed the services described in this documentation, as scribed by Johnathan Logan in my presence, and  it is both accurate and complete.    The note accurately reflects work and decisions made by me.  Jaquelin Brewer M.D.  4/20/2024  9:20 PM

## 2024-04-21 ENCOUNTER — APPOINTMENT (OUTPATIENT)
Dept: RADIOLOGY | Facility: MEDICAL CENTER | Age: 79
DRG: 871 | End: 2024-04-21
Attending: INTERNAL MEDICINE
Payer: MEDICARE

## 2024-04-21 ENCOUNTER — APPOINTMENT (OUTPATIENT)
Dept: RADIOLOGY | Facility: MEDICAL CENTER | Age: 79
DRG: 871 | End: 2024-04-21
Attending: EMERGENCY MEDICINE
Payer: MEDICARE

## 2024-04-21 ENCOUNTER — APPOINTMENT (OUTPATIENT)
Dept: RADIOLOGY | Facility: MEDICAL CENTER | Age: 79
DRG: 871 | End: 2024-04-21
Attending: STUDENT IN AN ORGANIZED HEALTH CARE EDUCATION/TRAINING PROGRAM
Payer: MEDICARE

## 2024-04-21 PROBLEM — U07.1 COVID-19: Status: ACTIVE | Noted: 2024-04-21

## 2024-04-21 LAB
ALBUMIN SERPL BCP-MCNC: 3.8 G/DL (ref 3.2–4.9)
ALP SERPL-CCNC: 76 U/L (ref 30–99)
ALT SERPL-CCNC: 38 U/L (ref 2–50)
AMMONIA PLAS-SCNC: 18 UMOL/L (ref 11–45)
ANION GAP SERPL CALC-SCNC: 13 MMOL/L (ref 7–16)
ANION GAP SERPL CALC-SCNC: 14 MMOL/L (ref 7–16)
ANION GAP SERPL CALC-SCNC: 18 MMOL/L (ref 7–16)
ANION GAP SERPL CALC-SCNC: 19 MMOL/L (ref 7–16)
AST SERPL-CCNC: 102 U/L (ref 12–45)
BASE EXCESS BLDA CALC-SCNC: -3 MMOL/L (ref -4–3)
BASE EXCESS BLDA CALC-SCNC: -7 MMOL/L (ref -4–3)
BILIRUB CONJ SERPL-MCNC: <0.2 MG/DL (ref 0.1–0.5)
BILIRUB INDIRECT SERPL-MCNC: ABNORMAL MG/DL (ref 0–1)
BILIRUB SERPL-MCNC: 0.3 MG/DL (ref 0.1–1.5)
BODY TEMPERATURE: ABNORMAL DEGREES
BODY TEMPERATURE: ABNORMAL DEGREES
BREATHS SETTING VENT: 24
BREATHS SETTING VENT: 24
BUN SERPL-MCNC: 32 MG/DL (ref 8–22)
BUN SERPL-MCNC: 36 MG/DL (ref 8–22)
BUN SERPL-MCNC: 40 MG/DL (ref 8–22)
BUN SERPL-MCNC: 50 MG/DL (ref 8–22)
CA-I SERPL-SCNC: 1 MMOL/L (ref 1.1–1.3)
CALCIUM SERPL-MCNC: 5.2 MG/DL (ref 8.5–10.5)
CALCIUM SERPL-MCNC: 8.1 MG/DL (ref 8.5–10.5)
CALCIUM SERPL-MCNC: 8.2 MG/DL (ref 8.5–10.5)
CALCIUM SERPL-MCNC: 8.6 MG/DL (ref 8.5–10.5)
CHLORIDE SERPL-SCNC: 101 MMOL/L (ref 96–112)
CHLORIDE SERPL-SCNC: 103 MMOL/L (ref 96–112)
CHLORIDE SERPL-SCNC: 112 MMOL/L (ref 96–112)
CHLORIDE SERPL-SCNC: 99 MMOL/L (ref 96–112)
CO2 BLDA-SCNC: 17 MMOL/L (ref 20–33)
CO2 BLDA-SCNC: 22 MMOL/L (ref 20–33)
CO2 SERPL-SCNC: 16 MMOL/L (ref 20–33)
CO2 SERPL-SCNC: 17 MMOL/L (ref 20–33)
CO2 SERPL-SCNC: 17 MMOL/L (ref 20–33)
CO2 SERPL-SCNC: 22 MMOL/L (ref 20–33)
CREAT SERPL-MCNC: 1.39 MG/DL (ref 0.5–1.4)
CREAT SERPL-MCNC: 1.89 MG/DL (ref 0.5–1.4)
CREAT SERPL-MCNC: 1.99 MG/DL (ref 0.5–1.4)
CREAT SERPL-MCNC: 3.55 MG/DL (ref 0.5–1.4)
DELSYS IDSYS: ABNORMAL
DELSYS IDSYS: ABNORMAL
END TIDAL CARBON DIOXIDE IECO2: 28 MMHG
END TIDAL CARBON DIOXIDE IECO2: 28 MMHG
GFR SERPLBLD CREATININE-BSD FMLA CKD-EPI: 13 ML/MIN/1.73 M 2
GFR SERPLBLD CREATININE-BSD FMLA CKD-EPI: 25 ML/MIN/1.73 M 2
GFR SERPLBLD CREATININE-BSD FMLA CKD-EPI: 27 ML/MIN/1.73 M 2
GFR SERPLBLD CREATININE-BSD FMLA CKD-EPI: 39 ML/MIN/1.73 M 2
GLUCOSE BLD STRIP.AUTO-MCNC: 126 MG/DL (ref 65–99)
GLUCOSE BLD STRIP.AUTO-MCNC: 178 MG/DL (ref 65–99)
GLUCOSE SERPL-MCNC: 132 MG/DL (ref 65–99)
GLUCOSE SERPL-MCNC: 168 MG/DL (ref 65–99)
GLUCOSE SERPL-MCNC: 249 MG/DL (ref 65–99)
GLUCOSE SERPL-MCNC: 267 MG/DL (ref 65–99)
HCO3 BLDA-SCNC: 16.5 MMOL/L (ref 17–25)
HCO3 BLDA-SCNC: 20.8 MMOL/L (ref 17–25)
HOROWITZ INDEX BLDA+IHG-RTO: 130 MM[HG]
HOROWITZ INDEX BLDA+IHG-RTO: 200 MM[HG]
INR PPP: 0.97 (ref 0.87–1.13)
LACTATE BLD-SCNC: 0.6 MMOL/L (ref 0.5–2)
LACTATE BLD-SCNC: 0.6 MMOL/L (ref 0.5–2)
MAGNESIUM SERPL-MCNC: 1.3 MG/DL (ref 1.5–2.5)
MODE IMODE: ABNORMAL
MODE IMODE: ABNORMAL
O2/TOTAL GAS SETTING VFR VENT: 40 %
O2/TOTAL GAS SETTING VFR VENT: 40 %
PCO2 BLDA: 27.6 MMHG (ref 26–37)
PCO2 BLDA: 31.1 MMHG (ref 26–37)
PCO2 TEMP ADJ BLDA: 27 MMHG (ref 26–37)
PCO2 TEMP ADJ BLDA: 31.3 MMHG (ref 26–37)
PEEP END EXPIRATORY PRESSURE IPEEP: 8 CMH20
PEEP END EXPIRATORY PRESSURE IPEEP: 8 CMH20
PH BLDA: 7.38 [PH] (ref 7.4–7.5)
PH BLDA: 7.43 [PH] (ref 7.4–7.5)
PH TEMP ADJ BLDA: 7.39 [PH] (ref 7.4–7.5)
PH TEMP ADJ BLDA: 7.43 [PH] (ref 7.4–7.5)
PHOSPHATE SERPL-MCNC: 2.6 MG/DL (ref 2.5–4.5)
PO2 BLDA: 52 MMHG (ref 64–87)
PO2 BLDA: 80 MMHG (ref 64–87)
PO2 TEMP ADJ BLDA: 52 MMHG (ref 64–87)
PO2 TEMP ADJ BLDA: 77 MMHG (ref 64–87)
POTASSIUM SERPL-SCNC: 2.3 MMOL/L (ref 3.6–5.5)
POTASSIUM SERPL-SCNC: 3.3 MMOL/L (ref 3.6–5.5)
POTASSIUM SERPL-SCNC: 3.6 MMOL/L (ref 3.6–5.5)
POTASSIUM SERPL-SCNC: 3.8 MMOL/L (ref 3.6–5.5)
PROCALCITONIN SERPL-MCNC: 0.22 NG/ML
PROT SERPL-MCNC: 6.4 G/DL (ref 6–8.2)
PROTHROMBIN TIME: 13 SEC (ref 12–14.6)
SAO2 % BLDA: 88 % (ref 93–99)
SAO2 % BLDA: 96 % (ref 93–99)
SCCMEC + MECA PNL NOSE NAA+PROBE: NEGATIVE
SODIUM SERPL-SCNC: 135 MMOL/L (ref 135–145)
SODIUM SERPL-SCNC: 136 MMOL/L (ref 135–145)
SODIUM SERPL-SCNC: 139 MMOL/L (ref 135–145)
SODIUM SERPL-SCNC: 141 MMOL/L (ref 135–145)
SPECIMEN DRAWN FROM PATIENT: ABNORMAL
SPECIMEN DRAWN FROM PATIENT: ABNORMAL
TIDAL VOLUME IVT: 280 ML
TIDAL VOLUME IVT: 280 ML
TRIGL SERPL-MCNC: 76 MG/DL (ref 0–149)

## 2024-04-21 PROCEDURE — 700102 HCHG RX REV CODE 250 W/ 637 OVERRIDE(OP): Performed by: INTERNAL MEDICINE

## 2024-04-21 PROCEDURE — 82803 BLOOD GASES ANY COMBINATION: CPT | Mod: 91

## 2024-04-21 PROCEDURE — 84145 PROCALCITONIN (PCT): CPT

## 2024-04-21 PROCEDURE — 94799 UNLISTED PULMONARY SVC/PX: CPT

## 2024-04-21 PROCEDURE — 36600 WITHDRAWAL OF ARTERIAL BLOOD: CPT

## 2024-04-21 PROCEDURE — 85610 PROTHROMBIN TIME: CPT

## 2024-04-21 PROCEDURE — 99292 CRITICAL CARE ADDL 30 MIN: CPT | Performed by: INTERNAL MEDICINE

## 2024-04-21 PROCEDURE — 84100 ASSAY OF PHOSPHORUS: CPT | Mod: 91

## 2024-04-21 PROCEDURE — A9270 NON-COVERED ITEM OR SERVICE: HCPCS | Performed by: INTERNAL MEDICINE

## 2024-04-21 PROCEDURE — 700102 HCHG RX REV CODE 250 W/ 637 OVERRIDE(OP): Performed by: EMERGENCY MEDICINE

## 2024-04-21 PROCEDURE — 83605 ASSAY OF LACTIC ACID: CPT

## 2024-04-21 PROCEDURE — 700111 HCHG RX REV CODE 636 W/ 250 OVERRIDE (IP): Performed by: EMERGENCY MEDICINE

## 2024-04-21 PROCEDURE — 82962 GLUCOSE BLOOD TEST: CPT

## 2024-04-21 PROCEDURE — 99291 CRITICAL CARE FIRST HOUR: CPT | Performed by: INTERNAL MEDICINE

## 2024-04-21 PROCEDURE — 87641 MR-STAPH DNA AMP PROBE: CPT

## 2024-04-21 PROCEDURE — 82330 ASSAY OF CALCIUM: CPT

## 2024-04-21 PROCEDURE — 86140 C-REACTIVE PROTEIN: CPT

## 2024-04-21 PROCEDURE — 80053 COMPREHEN METABOLIC PANEL: CPT

## 2024-04-21 PROCEDURE — 700105 HCHG RX REV CODE 258: Performed by: INTERNAL MEDICINE

## 2024-04-21 PROCEDURE — 700102 HCHG RX REV CODE 250 W/ 637 OVERRIDE(OP): Performed by: STUDENT IN AN ORGANIZED HEALTH CARE EDUCATION/TRAINING PROGRAM

## 2024-04-21 PROCEDURE — 700105 HCHG RX REV CODE 258: Performed by: STUDENT IN AN ORGANIZED HEALTH CARE EDUCATION/TRAINING PROGRAM

## 2024-04-21 PROCEDURE — 83735 ASSAY OF MAGNESIUM: CPT | Mod: 91

## 2024-04-21 PROCEDURE — A9270 NON-COVERED ITEM OR SERVICE: HCPCS | Performed by: STUDENT IN AN ORGANIZED HEALTH CARE EDUCATION/TRAINING PROGRAM

## 2024-04-21 PROCEDURE — 80048 BASIC METABOLIC PNL TOTAL CA: CPT

## 2024-04-21 PROCEDURE — 84134 ASSAY OF PREALBUMIN: CPT

## 2024-04-21 PROCEDURE — 770022 HCHG ROOM/CARE - ICU (200)

## 2024-04-21 PROCEDURE — A9270 NON-COVERED ITEM OR SERVICE: HCPCS | Performed by: EMERGENCY MEDICINE

## 2024-04-21 PROCEDURE — 700111 HCHG RX REV CODE 636 W/ 250 OVERRIDE (IP): Performed by: STUDENT IN AN ORGANIZED HEALTH CARE EDUCATION/TRAINING PROGRAM

## 2024-04-21 PROCEDURE — 700101 HCHG RX REV CODE 250: Performed by: INTERNAL MEDICINE

## 2024-04-21 PROCEDURE — 84478 ASSAY OF TRIGLYCERIDES: CPT

## 2024-04-21 PROCEDURE — 700111 HCHG RX REV CODE 636 W/ 250 OVERRIDE (IP): Mod: JZ | Performed by: INTERNAL MEDICINE

## 2024-04-21 RX ORDER — LINEZOLID 2 MG/ML
600 INJECTION, SOLUTION INTRAVENOUS EVERY 12 HOURS
Status: DISCONTINUED | OUTPATIENT
Start: 2024-04-21 | End: 2024-04-22

## 2024-04-21 RX ORDER — QUETIAPINE FUMARATE 25 MG/1
25 TABLET, FILM COATED ORAL
Status: DISCONTINUED | OUTPATIENT
Start: 2024-04-21 | End: 2024-04-22

## 2024-04-21 RX ORDER — ACETAMINOPHEN 325 MG/1
650 TABLET ORAL EVERY 6 HOURS PRN
Status: DISCONTINUED | OUTPATIENT
Start: 2024-04-21 | End: 2024-04-22

## 2024-04-21 RX ORDER — MAGNESIUM SULFATE HEPTAHYDRATE 40 MG/ML
4 INJECTION, SOLUTION INTRAVENOUS ONCE
Status: COMPLETED | OUTPATIENT
Start: 2024-04-21 | End: 2024-04-21

## 2024-04-21 RX ORDER — ASPIRIN 81 MG/1
81 TABLET, CHEWABLE ORAL DAILY
Status: DISCONTINUED | OUTPATIENT
Start: 2024-04-22 | End: 2024-04-22

## 2024-04-21 RX ORDER — POTASSIUM CHLORIDE 20 MEQ/1
40 TABLET, EXTENDED RELEASE ORAL ONCE
Status: COMPLETED | OUTPATIENT
Start: 2024-04-21 | End: 2024-04-21

## 2024-04-21 RX ORDER — CALCIUM GLUCONATE 20 MG/ML
2 INJECTION, SOLUTION INTRAVENOUS ONCE
Status: COMPLETED | OUTPATIENT
Start: 2024-04-21 | End: 2024-04-21

## 2024-04-21 RX ORDER — AMITRIPTYLINE HYDROCHLORIDE 100 MG/1
100 TABLET ORAL NIGHTLY
Status: DISCONTINUED | OUTPATIENT
Start: 2024-04-21 | End: 2024-04-22

## 2024-04-21 RX ORDER — ASPIRIN 81 MG/1
81 TABLET, CHEWABLE ORAL DAILY
Status: DISCONTINUED | OUTPATIENT
Start: 2024-04-22 | End: 2024-04-21

## 2024-04-21 RX ORDER — DEXMEDETOMIDINE HYDROCHLORIDE 4 UG/ML
.1-1.5 INJECTION, SOLUTION INTRAVENOUS CONTINUOUS
Status: DISCONTINUED | OUTPATIENT
Start: 2024-04-21 | End: 2024-04-26

## 2024-04-21 RX ORDER — ONDANSETRON 4 MG/1
4 TABLET, ORALLY DISINTEGRATING ORAL EVERY 4 HOURS PRN
Status: DISCONTINUED | OUTPATIENT
Start: 2024-04-21 | End: 2024-04-22

## 2024-04-21 RX ADMIN — FENTANYL CITRATE 100 MCG: 50 INJECTION, SOLUTION INTRAMUSCULAR; INTRAVENOUS at 02:05

## 2024-04-21 RX ADMIN — INSULIN HUMAN 4 UNITS: 100 INJECTION, SOLUTION PARENTERAL at 23:44

## 2024-04-21 RX ADMIN — FENTANYL CITRATE 200 MCG: 50 INJECTION, SOLUTION INTRAMUSCULAR; INTRAVENOUS at 15:32

## 2024-04-21 RX ADMIN — FENTANYL CITRATE 100 MCG: 50 INJECTION, SOLUTION INTRAMUSCULAR; INTRAVENOUS at 06:25

## 2024-04-21 RX ADMIN — SENNOSIDES AND DOCUSATE SODIUM 2 TABLET: 50; 8.6 TABLET ORAL at 17:43

## 2024-04-21 RX ADMIN — CALCIUM GLUCONATE 2 G: 20 INJECTION, SOLUTION INTRAVENOUS at 07:46

## 2024-04-21 RX ADMIN — INSULIN HUMAN 3 UNITS: 100 INJECTION, SOLUTION PARENTERAL at 12:41

## 2024-04-21 RX ADMIN — DEXMEDETOMIDINE HYDROCHLORIDE 0.2 MCG/KG/HR: 100 INJECTION, SOLUTION INTRAVENOUS at 12:32

## 2024-04-21 RX ADMIN — FENTANYL CITRATE 200 MCG: 50 INJECTION, SOLUTION INTRAMUSCULAR; INTRAVENOUS at 09:42

## 2024-04-21 RX ADMIN — FENTANYL CITRATE 200 MCG: 50 INJECTION, SOLUTION INTRAMUSCULAR; INTRAVENOUS at 18:16

## 2024-04-21 RX ADMIN — LINEZOLID 600 MG: 600 INJECTION, SOLUTION INTRAVENOUS at 17:30

## 2024-04-21 RX ADMIN — HEPARIN SODIUM 5000 UNITS: 5000 INJECTION, SOLUTION INTRAVENOUS; SUBCUTANEOUS at 20:18

## 2024-04-21 RX ADMIN — ASPIRIN 81 MG: 81 TABLET, COATED ORAL at 05:09

## 2024-04-21 RX ADMIN — HEPARIN SODIUM 5000 UNITS: 5000 INJECTION, SOLUTION INTRAVENOUS; SUBCUTANEOUS at 05:09

## 2024-04-21 RX ADMIN — SENNOSIDES AND DOCUSATE SODIUM 2 TABLET: 50; 8.6 TABLET ORAL at 05:09

## 2024-04-21 RX ADMIN — CEFEPIME 1000 MG: 1 INJECTION, POWDER, FOR SOLUTION INTRAMUSCULAR; INTRAVENOUS at 01:01

## 2024-04-21 RX ADMIN — INSULIN HUMAN 3 UNITS: 100 INJECTION, SOLUTION PARENTERAL at 17:43

## 2024-04-21 RX ADMIN — FENTANYL CITRATE 200 MCG: 50 INJECTION, SOLUTION INTRAMUSCULAR; INTRAVENOUS at 22:41

## 2024-04-21 RX ADMIN — PROPOFOL 5 MCG/KG/MIN: 10 INJECTION, EMULSION INTRAVENOUS at 02:09

## 2024-04-21 RX ADMIN — FENTANYL CITRATE 200 MCG: 50 INJECTION, SOLUTION INTRAMUSCULAR; INTRAVENOUS at 17:27

## 2024-04-21 RX ADMIN — FENTANYL CITRATE 100 MCG: 50 INJECTION, SOLUTION INTRAMUSCULAR; INTRAVENOUS at 09:20

## 2024-04-21 RX ADMIN — HEPARIN SODIUM 5000 UNITS: 5000 INJECTION, SOLUTION INTRAVENOUS; SUBCUTANEOUS at 15:33

## 2024-04-21 RX ADMIN — SODIUM BICARBONATE: 84 INJECTION, SOLUTION INTRAVENOUS at 15:27

## 2024-04-21 RX ADMIN — PIPERACILLIN AND TAZOBACTAM 4.5 G: 4; .5 INJECTION, POWDER, FOR SOLUTION INTRAVENOUS at 15:29

## 2024-04-21 RX ADMIN — FAMOTIDINE 20 MG: 20 TABLET, FILM COATED ORAL at 05:09

## 2024-04-21 RX ADMIN — DEXMEDETOMIDINE HYDROCHLORIDE 1.2 MCG/KG/HR: 100 INJECTION, SOLUTION INTRAVENOUS at 19:39

## 2024-04-21 RX ADMIN — SODIUM BICARBONATE: 84 INJECTION, SOLUTION INTRAVENOUS at 00:54

## 2024-04-21 RX ADMIN — LINEZOLID 600 MG: 600 INJECTION, SOLUTION INTRAVENOUS at 12:03

## 2024-04-21 RX ADMIN — POTASSIUM CHLORIDE 40 MEQ: 1500 TABLET, EXTENDED RELEASE ORAL at 06:00

## 2024-04-21 RX ADMIN — MAGNESIUM SULFATE HEPTAHYDRATE 4 G: 4 INJECTION, SOLUTION INTRAVENOUS at 06:00

## 2024-04-21 RX ADMIN — FENTANYL CITRATE 200 MCG: 50 INJECTION, SOLUTION INTRAMUSCULAR; INTRAVENOUS at 19:58

## 2024-04-21 RX ADMIN — PIPERACILLIN AND TAZOBACTAM 4.5 G: 4; .5 INJECTION, POWDER, FOR SOLUTION INTRAVENOUS at 12:39

## 2024-04-21 RX ADMIN — DEXAMETHASONE SODIUM PHOSPHATE 6 MG: 4 INJECTION, SOLUTION INTRAMUSCULAR; INTRAVENOUS at 05:08

## 2024-04-21 RX ADMIN — FENTANYL CITRATE 200 MCG: 50 INJECTION, SOLUTION INTRAMUSCULAR; INTRAVENOUS at 23:28

## 2024-04-21 RX ADMIN — AMITRIPTYLINE HYDROCHLORIDE 100 MG: 100 TABLET, FILM COATED ORAL at 21:34

## 2024-04-21 RX ADMIN — PIPERACILLIN AND TAZOBACTAM 4.5 G: 4; .5 INJECTION, POWDER, FOR SOLUTION INTRAVENOUS at 20:17

## 2024-04-21 RX ADMIN — FENTANYL CITRATE 100 MCG: 50 INJECTION, SOLUTION INTRAMUSCULAR; INTRAVENOUS at 09:00

## 2024-04-21 ASSESSMENT — PAIN DESCRIPTION - PAIN TYPE
TYPE: ACUTE PAIN

## 2024-04-21 ASSESSMENT — FIBROSIS 4 INDEX: FIB4 SCORE: 5.9

## 2024-04-21 NOTE — PROCEDURES
Intubation    Date/Time: 4/20/2024 11:39 PM    Performed by: Conner Singh M.D.  Authorized by: Conner Singh M.D.    Consent:     Consent obtained:  Emergent situation    Risks discussed:  Aspiration, brain injury, dental trauma, laryngeal injury, death, hypoxia and pneumothorax    Alternatives discussed:  Delayed treatment, alternative treatment and observation  Pre-procedure details:     Patient status:  Unresponsive    Mallampati score:  III    Pretreatment meds: Etomidate.    Paralytics:  Rocuronium  Procedure details:     Preoxygenation:  Bag valve mask    CPR in progress: no      Intubation method:  Oral    Oral intubation technique:  Video-assisted    Laryngoscope type:  GlideScope    Laryngoscope blade:  Mac 3    Cormack-Lehane Classification:  Grade 1    Tube size (mm):  7.5    Tube type:  Cuffed    Number of attempts:  1    Ventilation between attempts: no      Cricoid pressure: no      Tube visualized through cords: yes    Placement assessment:     ETT to lip:  24    Tube secured with:  Adhesive tape and ETT reaves    Breath sounds:  Equal    Placement verification: chest rise, condensation, direct visualization, equal breath sounds, ETCO2 detector and tube exhalation    Post-procedure details:     Patient tolerance of procedure:  Tolerated well, no immediate complications  Comments:      CXR Awaited. Dr. June (resident physician) assisted with ET intubation. He needed assistance with stabilization of head and guiding the ET tube through the vocal cords.

## 2024-04-21 NOTE — CARE PLAN
The patient is Watcher - Medium risk of patient condition declining or worsening    Shift Goals  Clinical Goals: monitor neuro, titrate pressors  Patient Goals: NAHUM  Family Goals: NAHUM    Progress made toward(s) clinical / shift goals:    Problem: Safety - Medical Restraint  Goal: Remains free of injury from restraints (Restraint for Interference with Medical Device)  Description: INTERVENTIONS:  1. Determine that other, less restrictive measures have been tried or would not be effective before applying the restraint  2. Evaluate the patient's condition at the time of restraint application  3. Educate patient/family regarding the reason for restraint  4. Q2H: Monitor safety, psychosocial status, comfort, circulation, respiratory status, LOC, nutrition and hydration  Outcome: Progressing     Problem: Pain - Standard  Goal: Alleviation of pain or a reduction in pain to the patient’s comfort goal  Description: Target End Date:  Prior to discharge or change in level of care    Document on Vitals flowsheet    1.  Document pain using the appropriate pain scale per order or unit policy  2.  Educate and implement non-pharmacologic comfort measures (i.e. relaxation, distraction, massage, cold/heat therapy, etc.)  3.  Pain management medications as ordered  4.  Reassess pain after pain med administration per policy  5.  If opiods administered assess patient's response to pain medication is appropriate per POSS sedation scale  6.  Follow pain management plan developed in collaboration with patient and interdisciplinary team (including palliative care or pain specialists if applicable)  Outcome: Progressing

## 2024-04-21 NOTE — PROGRESS NOTES
2 attempts made at inserting a reveles catheter by 2 different RNs. Both attempts have been unsuccessful.

## 2024-04-21 NOTE — PROGRESS NOTES
4 Eyes Skin Assessment Completed by JIMMY kerns and JIMMY callejas.    Head WDL  Ears WDL  Nose WDL  Mouth WDL  Neck WDL  Breast/Chest Redness  Shoulder Blades WDL  Spine WDL  (R) Arm/Elbow/Hand Redness  (L) Arm/Elbow/Hand Redness  Abdomen Redness  Groin Redness and Excoriation  Scrotum/Coccyx/Buttocks Redness, blanching, discoloration  (R) Leg Edema  (L) Leg Edema  (R) Heel/Foot/Toe Blanching  (L) Heel/Foot/Toe Blanching          Devices In Places ECG, Blood Pressure Cuff, Pulse Ox, Tam, SCD's, and ET Tube      Interventions In Place Sacral Mepilex, Heel Float Boots, Pillows, Q2 Turns, Low Air Loss Mattress, Heels Loaded W/Pillows, and Pressure Redistribution Mattress    Possible Skin Injury No    Pictures Uploaded Into Epic Yes  Wound Consult Placed N/A  RN Wound Prevention Protocol Ordered Yes

## 2024-04-21 NOTE — CARE PLAN
The patient is Watcher - Medium risk of patient condition declining or worsening    Shift Goals  Clinical Goals: reveles [facility]    Progress made toward(s) clinical / shift goals:        Problem: Knowledge Deficit - Standard  Goal: Patient and family/care givers will demonstrate understanding of plan of care, disease process/condition, diagnostic tests and medications  4/20/2024 2248 by Juan Burns R.N.  Outcome: Progressing  4/20/2024 2247 by Juan Burns R.N.  Outcome: Progressing     Problem: Skin Integrity  Goal: Skin integrity is maintained or improved  4/20/2024 2248 by LEN LewisN.  Outcome: Progressing  4/20/2024 2247 by LEN LewisN.  Outcome: Progressing     Problem: Fall Risk  Goal: Patient will remain free from falls  4/20/2024 2248 by LEN LewisN.  Outcome: Progressing  4/20/2024 2247 by LEN LewisN.  Outcome: Progressing     Problem: Hemodynamics  Goal: Patient's hemodynamics, fluid balance and neurologic status will be stable or improve  Outcome: Progressing

## 2024-04-21 NOTE — DIETARY
Nutrition Support Assessment   Day 1 of admit.  Tereza Sánchez is a 78 y.o. female with admitting DX of septic shock.     Current problem list:  COVID-19  Septic shock  Acute renal failure  Acute hypoxemic respiratory failure due to COVID 19  Major depressive disorder  SELWYN (obstructive sleep apnea)     Assessment:  Estimated Nutritional Needs: based on:   Height: 152.4 cm (5')  Weight: 89.8 kg (197 lb 15.6 oz)  Weight to Use in Calculations: 89.8 kg (197 lb 15.6 oz) - most recent measured wt  Ideal Body Weight: 45.4 kg (100 lb)  Percent Ideal Body Weight: 198  Body mass index is 38.66 kg/m²., BMI classification: obese class II    Calculation/Equation: MSJ x 1.0 = 1311 kcals/day (65-70% = 852-918 kcals/day); RMR per PSU  (vent L/min: 6.2, Tmax/24 hrs: 37.6) = 1436 kcals/day (65-70% = 933-1005 kcals/day)  Total Calories / day: 988 - 1257 (Calories / k - 14)  Total Grams Protein / day: 91 (Grams Protein / kg IBW: 2.0+)     Evaluation:   Admitted with syncope, hypotension, weakness.  Pt is intubated and sedated.  Glucose 168, BUN 40, Creatinine 1.99  Deadron, Pepcid, SSI, Pericolace per MAR.  No propofol or pressors running at this time.  Hypo-caloric tube feeding indicated per SCCM guidelines in intubated pt with BMI >30.  Specialized, high protein tube feeding formula appropriate to meet estimated nutrition needs.      Malnutrition Risk: No criteria met at this time.     Recommendations/Plan:  Start Peptamen Intense VHP @ 25 mL/hr. Advance per protocol to final goal rate of 45 mL/hr, providing 1080 kcals, 101 grams of protein and 907 mL of free water per day.  Fluids per MD.  PO diet per SLP/MD when safe/appropriate and pt can adequately consume.    RD will follow.

## 2024-04-21 NOTE — PROGRESS NOTES
Critical Care Progress Note    Date of admission  4/20/2024    Chief Complaint  78 y.o. female BMI 39 with hx of HTN, HPL, SELWYN, chiari malformation, hx of UTI, hx of multiple falls in the past few days and weakness. +dysuria for several weeks, +cough, 2L oxygen at home. At ED,  SBP in 80-90s, HR in 60s, afebrile. On 2L NC. Lactate 1.5. Found to have COVID+. UA with + pyuria. Creatinine 3.9, BUN 52, HCO3 17. LFT with AST slightly elevated. TSH 0.23. ECG normal. Pt was fluid resuscitated 30cc/kg, started on NE gtt. Blood cultures obtained. Ceftriaxone/cefepime given.   CT head negative for acute abnromalities  US renal negative for obstruction, or hydro    Hospital Course  4/20 admitted to ICU    Interval Problem Update  Reviewed last 24 hour events:  Remains critically ill   Sedated with propofol and fentanyl   On full vent support. Was on 50%/8, but had to increase to 100% this am due to hypoxic to 87-88%. Some trach secretions.   7.43/31/52  COVID+, on dexamethasone  Creatinine 1.89, HCO3 16  UOP ~1000 cc since admission  K 2.3 s/p Kcl 40meq  Mg 1.3 s/p 4gr Mg  Calcium 10 s/p 2gr calcium gluconate   Will repeat BMP     Review of Systems  Review of Systems   Reason unable to perform ROS: intubated, sedated, RASS -4, unable to perform.   All other systems reviewed and are negative.       Vital Signs for last 24 hours   Temp:  [35.7 °C (96.3 °F)-36.9 °C (98.4 °F)] 36.9 °C (98.4 °F)  Pulse:  [51-89] 83  Resp:  [11-53] 39  BP: ()/(41-81) 124/59  SpO2:  [88 %-100 %] 91 %    Hemodynamic parameters for last 24 hours       Respiratory Information for the last 24 hours  Vent Mode: APVCMV  Rate (breaths/min): 22  Vt Target (mL): 280  PEEP/CPAP: 8  MAP: 10  Control VTE (exp VT): 399    Physical Exam   Physical Exam  Vitals and nursing note reviewed.   Constitutional:       General: She is not in acute distress.     Appearance: She is obese. She is ill-appearing and toxic-appearing. She is not diaphoretic.      Comments:  Intubated, sedated   HENT:      Head: Normocephalic and atraumatic.      Mouth/Throat:      Mouth: Mucous membranes are dry.      Comments: ET tube in place  Cardiovascular:      Rate and Rhythm: Normal rate and regular rhythm.      Pulses: Normal pulses.      Heart sounds: Normal heart sounds. No murmur heard.  Pulmonary:      Effort: No respiratory distress.      Breath sounds: Normal breath sounds. No wheezing, rhonchi or rales.   Abdominal:      General: There is no distension.      Palpations: Abdomen is soft.      Tenderness: There is no abdominal tenderness. There is no guarding.   Musculoskeletal:      Cervical back: Neck supple.      Right lower leg: No edema.      Left lower leg: No edema.   Skin:     Capillary Refill: Capillary refill takes less than 2 seconds. Warm extremiites     Coloration: Skin is not jaundiced.      Findings: No bruising or rash.         Medications  Current Facility-Administered Medications   Medication Dose Route Frequency Provider Last Rate Last Admin    magnesium sulfate IVPB premix 4 g  4 g Intravenous Once Montez June D.O. 25 mL/hr at 04/21/24 0800 Rate Verify at 04/21/24 0800    K+ Scale: Goal of 4.5  1 Each Intravenous Q6HRS Montez June D.OJulio        norepinephrine (Levophed) 8 mg in 250 mL NS infusion (premix)  0-1 mcg/kg/min (Ideal) Intravenous Continuous Jaquelin Brewer M.D.   Stopped at 04/21/24 0507    heparin injection 5,000 Units  5,000 Units Subcutaneous Q8HRS Aric Bloom M.D.   5,000 Units at 04/21/24 0509    acetaminophen (Tylenol) tablet 650 mg  650 mg Oral Q6HRS PRN Aric Bloom M.D.        ondansetron (Zofran) syringe/vial injection 4 mg  4 mg Intravenous Q4HRS PRN Aric Bloom M.D.        ondansetron (Zofran ODT) dispertab 4 mg  4 mg Oral Q4HRS PRN Aric Bloom M.D.        amitriptyline (Elavil) tablet 100 mg  100 mg Oral Nightly Aric Bloom M.D.   100 mg at 04/20/24 2046    aspirin EC tablet 81 mg  81 mg Oral DAILY Aric Bloom M.D.   81 mg  at 04/21/24 0509    QUEtiapine (SEROquel) tablet 25 mg  25 mg Oral QHS PRN Aric Bloom M.D.        fentaNYL (Sublimaze) injection 100 mcg  100 mcg Intravenous Q15 MIN PRN Montez June D.O.   100 mcg at 04/21/24 0920    And    fentaNYL (Sublimaze) injection 200 mcg  200 mcg Intravenous Q15 MIN PRN Montezisaias June D.O.   200 mcg at 04/21/24 0942    And    fentaNYL (SUBLIMAZE) 50 mcg/mL in 50mL (Continuous Infusion)   Intravenous Continuous Montezisaias June D.O.   Held at 04/21/24 0000    And    propofol (DIPRIVAN) injection  0-80 mcg/kg/min (Ideal) Intravenous Continuous Montezisaias June D.O.   Stopped at 04/21/24 0729    Respiratory Therapy Consult   Nebulization Continuous RT Montez June D.O.        famotidine (Pepcid) tablet 20 mg  20 mg Enteral Tube DAILY Montez June D.O.   20 mg at 04/21/24 0509    Or    famotidine (Pepcid) injection 20 mg  20 mg Intravenous DAILY Montez June D.O.        senna-docusate (Pericolace Or Senokot S) 8.6-50 MG per tablet 2 Tablet  2 Tablet Enteral Tube BID Montez June D.O.   2 Tablet at 04/21/24 0509    And    polyethylene glycol/lytes (Miralax) Packet 1 Packet  1 Packet Enteral Tube QDAY PRN GRETTA Castillo.O.        And    magnesium hydroxide (Milk Of Magnesia) suspension 30 mL  30 mL Enteral Tube QDAY PRN Montezisaias June D.O.        And    bisacodyl (Dulcolax) suppository 10 mg  10 mg Rectal QDAY PRN GRETTA Castillo.O.        MD Alert...ICU Electrolyte Replacement per Pharmacy   Other PHARMACY TO DOSE MontezGRETTA Kitchen.O.        lidocaine (Xylocaine) 1 % injection 2 mL  2 mL Tracheal Tube Q30 MIN PRN Montezisaias June D.O.        cefepime (Maxipime) 1,000 mg in  mL IVPB  1,000 mg Intravenous DAILY Montezisaias June D.O.   Stopped at 04/21/24 0129    sodium bicarbonate 150 mEq in D5W infusion (premix)   Intravenous Continuous Montez June D.O. 100 mL/hr at 04/21/24 0800 Rate Verify at 04/21/24 0800    dexamethasone (Decadron) injection 6 mg   6 mg Intravenous DAILY Aric Bloom M.D.   6 mg at 04/21/24 0508       Fluids    Intake/Output Summary (Last 24 hours) at 4/21/2024 0957  Last data filed at 4/21/2024 0800  Gross per 24 hour   Intake 2278.27 ml   Output 1060 ml   Net 1218.27 ml       Laboratory  Recent Labs     04/20/24  2253 04/21/24  0043 04/21/24  0533   ISTATAPH 7.280* 7.384* 7.432   ISTATAPCO2 41.7* 27.6 31.1   ISTATAPO2 65 80 52*   ISTATATCO2 21 17* 22   [id number] 89* 96 88*   ISTATARTHCO3 19.6 16.5* 20.8   ISTATARTBE -7* -7* -3   ISTATTEMP 36.8 C 36.5 C 37.1 C   ISTATFIO2  --  40 40   ISTATSPEC Arterial Arterial Arterial   ISTATAPHTC 7.283* 7.391* 7.431   [id number] 64 77 52*         Recent Labs     04/20/24 2040 04/20/24 2348 04/21/24  0400   SODIUM 140 139 141   POTASSIUM 3.4* 3.6 2.3*   CHLORIDE 105 103 112   CO2 17* 17* 16*   BUN 52* 50* 36*   CREATININE 3.90* 3.55* 1.89*   MAGNESIUM  --   --  1.3*   PHOSPHORUS  --   --  2.6   CALCIUM 8.0* 8.2* 5.2*     Recent Labs     04/20/24  1537 04/20/24 2040 04/20/24  2348 04/21/24  0400   ALTSGPT 38  --  38  --    ASTSGOT 107*  --  102*  --    ALKPHOSPHAT 77  --  76  --    TBILIRUBIN 0.2  --  0.3  --    DBILIRUBIN  --   --  <0.2  --    GLUCOSE 99 101* 132* 249*     Recent Labs     04/20/24 1537 04/20/24 2348   WBC 9.9  --    NEUTSPOLYS 81.20*  --    LYMPHOCYTES 9.80*  --    MONOCYTES 8.10  --    EOSINOPHILS 0.30  --    BASOPHILS 0.30  --    ASTSGOT 107* 102*   ALTSGPT 38 38   ALKPHOSPHAT 77 76   TBILIRUBIN 0.2 0.3     Recent Labs     04/20/24  1537   RBC 4.45   HEMOGLOBIN 13.6   HEMATOCRIT 40.7   PLATELETCT 213       Imaging  X-Ray:  I have personally reviewed the images and compared with prior images.  CT:    Reviewed    Assessment/Plan  * Septic shock (HCC)- (present on admission)  Assessment & Plan  Septic shock likely secondary to urinary tract infection.  She has had pyuria and dysuria for several weeks now.    Bedside echo demonstrated normal biventricular function.    US renal  negative for hydronephrosis or obstructive uropathy  UA + pyuria, pending urine culture.   COVID+    Pt got more lethargic this am, subsequently intubated.   TSH 0.23. BG ok  Lactate normal     Plan:   NE gtt to keep MAP >65  Change cefepime to piptazo/linezolid  Nasal MRSA screen   Follow up blood cultures  Urine culture  Continue bicarb gtt at 100cc/hr  Monitor for end organ perfusion       Acute renal failure (ARF) ([facility])  Assessment & Plan  Baseline creatinine 0.9-1.0.  Up to 5.  Likely ATN related to septic shock in conjunction with ACE inhibitor.  Some hyaline casts on UA.  No indications for renal replacement therapy at present.  HCO3 16, normal PH   US renal negative for hydronephrosis.     Monitor  UOP, strict I/Os  Tam   Monitor electrolytes. Keep K >4, Mg >2  Serial BMP      COVID-19  Assessment & Plan  On dexamethasone 6mg daily x 10 days    Acute hypoxemic respiratory failure due to COVID-19 ([facility])  Assessment & Plan  On home oxygen 2L at baseline, now requiring slightly more.  No significant infiltrate on CXR  Dexamethasone 6mg daily x 10 days   Intubated overnight due to worsening lethargy somnolent, rather than worsening hypoxia.   Continue full vent support  Change sedation to precedex/fentanyl.   Goal RASS 0  Pain control with fentanyl prn   Monitor UOP strcit I/Os.   Goal sat >90%        Major depressive disorder- (present on admission)  Assessment & Plan  Continue home amytriptylene, duloxetine    SELWYN (obstructive sleep apnea)- (present on admission)  Assessment & Plan  Not on home CPAP           VTE:  Lovenox  Ulcer: H2 Antagonist  Lines: Tam Catheter  Ongoing indication addressed    I have performed a physical exam and reviewed and updated ROS and Plan today (4/21/2024). In review of yesterday's note (4/20/2024), there are no changes except as documented above.     Discussed patient condition and risk of morbidity and/or mortality with Family, RN, RT, Pharmacy, Charge nurse / hot rounds, and  Patient  The patient remains critically ill.  Critical care time = 110 minutes in directly providing and coordinating critical care and extensive data review.  No time overlap and excludes procedures.

## 2024-04-21 NOTE — CONSULTS
"Critical Care Consultation    Date of consult: 4/20/2024    Referring Physician  Jaquelin Brewer M.D.    Reason for Consultation  Shock    History of Presenting Illness  78 y.o. female with history of hypertension on ACEi, hyperlipidemia, sleep apnea, Chiari malformation, scoliosis, obesity, mood disorder, who presented from home by EMS for several falls over the last several days.  She was found to be hypotensive to 60/40 by EMS.  She denies any traumatic injuries.  Additionally she is complaining of dysuria and cough.  She was seen by [facility] urology on 4/10, where UA revealed  white blood cells, large leuk esterase.  She had a CT urogram ordered to further evaluate 2-5 RBCs, but it does not appear she was initiated on antibiotics.  UA today revealed \"packed \"WBC, and moderate bacteria.    Her lab evaluation was notable for anion gap metabolic acidosis, creatinine 5.0, COVID-positive.    She was persistently hypotensive after receiving 30 mL/kg IVF so was initiated on norepinephrine and admitted to the ICU for further management.    She lives with her .  Ambulates with walker at baseline.     Code Status  Full Code    Review of Systems  Review of Systems   Constitutional:  Positive for malaise/fatigue.   Respiratory:  Positive for shortness of breath.    Cardiovascular:  Negative for chest pain.   Gastrointestinal:  Negative for nausea and vomiting.   Genitourinary:  Positive for dysuria and frequency. Negative for flank pain.   Neurological:  Negative for focal weakness.   All other systems reviewed and are negative.      Past Medical History   has a past medical history of Anesthesia, Arthritis, Asthma, Bowel habit changes, Breath shortness, Cancer (HCC), Chiari malformation (1970's), Chickenpox, Chronic back pain, Contracture of hand joint, Decreased lung capacity, Dental disorder, Disorder of thyroid, Heart burn, High cholesterol, Hypertension, Indigestion, Joint replacement, Obesity, Pain, Pain " (08/06/2018), Peripheral edema (06/06/2013), Pneumonia, PONV (postoperative nausea and vomiting), Psychiatric problem, Scoliosis, Shortness of breath (08/06/2018), Sleep apnea, Sleep apnea (08/07/2018), Snoring, sore throat (01/15/2015), Supplemental oxygen dependent, and Syringomyelia (HCC).    Surgical History   has a past surgical history that includes rubin by laparoscopy (2002); shoulder arthroplasty total (2004); hip arthroplasty total (5/19/08); hip arthroplasty total; us-needle core bx-breast panel; node biopsy sentinel (2/6/2015); thyroid lobectomy (Left, 8/17/2018); thyroidectomy total (8/17/2018); laminotomy; hip replacement, total; cyst excision (1973); shunt insertion; mastectomy (2/6/2015); hip revision total (Left, 9/2/2021); other abdominal surgery; and pr reconstr total shoulder implant (Left, 5/4/2022).    Family History  family history includes Hypertension in her mother; Sleep Apnea in her brother.    Social History   reports that she has never smoked. She has never used smokeless tobacco. She reports that she does not drink alcohol and does not use drugs.    Medications  Home Medications       Reviewed by Wilner Teixeira (Pharmacy Tech) on 04/20/24 at 1749  Med List Status: Complete     Medication Last Dose Status   alendronate (FOSAMAX) 70 MG Tab 4/15/2024 Active   amitriptyline (ELAVIL) 100 MG Tab 4/19/2024 Active   Ascorbic Acid (VITAMIN C) 1000 MG Tab 4/20/2024 Active   aspirin 81 MG EC tablet 4/20/2024 Active   atenolol (TENORMIN) 50 MG Tab 4/20/2024 Active   atorvastatin (LIPITOR) 10 MG Tab 4/19/2024 Active   B Complex-C (B COMPLEX-VITAMIN C) Cap 4/20/2024 Active   baclofen (LIORESAL) 5 MG Tab 4/19/2024 Active   benazepril-hydrochlorthiazide (LOTENSIN HCT) 20-12.5 MG per tablet 4/20/2024 Active   Calcium Carb-Cholecalciferol (CALCIUM + VITAMIN D3) 600-10 MG-MCG Tab 4/20/2024 Active   DULoxetine (CYMBALTA) 30 MG Cap DR Particles 4/20/2024 Active   esomeprazole (NEXIUM) 20 MG capsule  4/20/2024 Active   ferrous sulfate 325 (65 Fe) MG tablet 4/20/2024 Active   Fish Oil-Krill Oil (KRILL & FISH OIL BLEND) Cap 4/20/2024 Active   furosemide (LASIX) 20 MG Tab 4/16/2024 Active   HYDROcodone-acetaminophen (NORCO) 5-325 MG Tab per tablet 4/20/2024 Active   meclizine (ANTIVERT) 12.5 MG Tab 4/20/2024 Active   nystatin (MYCOSTATIN) powder > 1 week Active   olopatadine (PATANOL) 0.1 % ophthalmic solution > 1 week Active   ondansetron (ZOFRAN) 4 MG Tab tablet 4/19/2024 Active   potassium chloride SA (KDUR) 20 MEQ Tab CR 4/16/2024 Active   pregabalin (LYRICA) 150 MG Cap 4/20/2024 Active   QUEtiapine (SEROQUEL) 25 MG Tab 4/19/2024 Active   Sennosides 25 MG Tab > 1 week Active   tamsulosin (FLOMAX) 0.4 MG capsule 4/20/2024 Active   Zinc 50 MG Tab 4/20/2024 Active   zonisamide (ZONEGRAN) 100 MG Cap 4/19/2024 Active                  Current Facility-Administered Medications   Medication Dose Route Frequency Provider Last Rate Last Admin    norepinephrine (Levophed) 8 mg in 250 mL NS infusion (premix)  0-1 mcg/kg/min (Ideal) Intravenous Continuous Jaquelin Brewer M.D. 8.5 mL/hr at 04/20/24 1839 0.1 mcg/kg/min at 04/20/24 1839    NOREPINEPHRINE-SODIUM CHLORIDE 8-0.9 MG/250ML-% IV SOLN             heparin injection 5,000 Units  5,000 Units Subcutaneous Q8HRS Aric Bloom M.D.        acetaminophen (Tylenol) tablet 650 mg  650 mg Oral Q6HRS PRN Aric Bloom M.D.        senna-docusate (Pericolace Or Senokot S) 8.6-50 MG per tablet 2 Tablet  2 Tablet Oral Q EVENING Aric Bloom M.D.        And    polyethylene glycol/lytes (Miralax) Packet 1 Packet  1 Packet Oral QDAY PRN Aric Bloom M.D.        ondansetron (Zofran) syringe/vial injection 4 mg  4 mg Intravenous Q4HRS PRN Aric Bloom M.D.        ondansetron (Zofran ODT) dispertab 4 mg  4 mg Oral Q4HRS PRN Aric Bloom M.D.        cefTRIAXone (Rocephin) syringe 1,000 mg  1,000 mg Intravenous Q24HRS Aric Bloom M.D.        dexamethasone (Decadron) injection 6  mg  6 mg Intravenous DAILY Aric Bloom M.D.        amitriptyline (Elavil) tablet 100 mg  100 mg Oral Nightly Aric Bloom M.D.        [START ON 4/21/2024] aspirin EC tablet 81 mg  81 mg Oral DAILY Aric Bloom M.D.        baclofen (Lioresal) tablet 5 mg  5 mg Oral BID PRN Aric Bloom M.D.        [START ON 4/21/2024] DULoxetine (Cymbalta) capsule 30 mg  30 mg Oral DAILY Aric Bloom M.D.        [START ON 4/21/2024] esomeprazole (NexIUM) capsule 20 mg  20 mg Oral QAM AC Aric Bloom M.D.        pregabalin (Lyrica) capsule 150 mg  150 mg Oral BID Aric Bloom M.D.        QUEtiapine (SEROquel) tablet 25 mg  25 mg Oral QHS PRN Arci Bloom M.D.           Allergies  Allergies   Allergen Reactions    Morphine Vomiting     hallucinations    Sulfamethoxazole W-Trimethoprim Rash     * full body rash*>  10 years ago       Vital Signs last 24 hours  Temp:  [35.7 °C (96.3 °F)-36.4 °C (97.5 °F)] 35.7 °C (96.3 °F)  Pulse:  [61-80] 69  Resp:  [14-46] 25  BP: ()/(46-64) 99/49  SpO2:  [88 %-99 %] 88 %    Physical Exam  Physical Exam  Vitals and nursing note reviewed.   Constitutional:       General: She is not in acute distress.     Appearance: She is ill-appearing.   HENT:      Head: Normocephalic and atraumatic.   Cardiovascular:      Rate and Rhythm: Normal rate and regular rhythm.   Pulmonary:      Effort: No respiratory distress.   Abdominal:      General: There is no distension.      Palpations: Abdomen is soft.   Musculoskeletal:         General: Swelling present.      Comments: Pain-free range of motion of bilateral upper and lower extremities  Edematous and erythematous right first and second digits    R>L LE edema   Skin:     General: Skin is warm.   Neurological:      Mental Status: She is alert.      Comments: Awake, conversant, fluent speech  Moves all four extremities         Fluids    Intake/Output Summary (Last 24 hours) at 4/20/2024 1912  Last data filed at 4/20/2024 1757  Gross per 24 hour    Intake 1500 ml   Output --   Net 1500 ml       Laboratory  Recent Results (from the past 48 hour(s))   CBC with Differential    Collection Time: 04/20/24  3:37 PM   Result Value Ref Range    WBC 9.9 4.8 - 10.8 K/uL    RBC 4.45 4.20 - 5.40 M/uL    Hemoglobin 13.6 12.0 - 16.0 g/dL    Hematocrit 40.7 37.0 - 47.0 %    MCV 91.5 81.4 - 97.8 fL    MCH 30.6 27.0 - 33.0 pg    MCHC 33.4 32.2 - 35.5 g/dL    RDW 49.8 35.9 - 50.0 fL    Platelet Count 213 164 - 446 K/uL    MPV 10.6 9.0 - 12.9 fL    Neutrophils-Polys 81.20 (H) 44.00 - 72.00 %    Lymphocytes 9.80 (L) 22.00 - 41.00 %    Monocytes 8.10 0.00 - 13.40 %    Eosinophils 0.30 0.00 - 6.90 %    Basophils 0.30 0.00 - 1.80 %    Immature Granulocytes 0.30 0.00 - 0.90 %    Nucleated RBC 0.00 0.00 - 0.20 /100 WBC    Neutrophils (Absolute) 8.07 (H) 1.82 - 7.42 K/uL    Lymphs (Absolute) 0.97 (L) 1.00 - 4.80 K/uL    Monos (Absolute) 0.81 0.00 - 0.85 K/uL    Eos (Absolute) 0.03 0.00 - 0.51 K/uL    Baso (Absolute) 0.03 0.00 - 0.12 K/uL    Immature Granulocytes (abs) 0.03 0.00 - 0.11 K/uL    NRBC (Absolute) 0.00 K/uL   Complete Metabolic Panel (CMP)    Collection Time: 04/20/24  3:37 PM   Result Value Ref Range    Sodium 138 135 - 145 mmol/L    Potassium 3.9 3.6 - 5.5 mmol/L    Chloride 102 96 - 112 mmol/L    Co2 17 (L) 20 - 33 mmol/L    Anion Gap 19.0 (H) 7.0 - 16.0    Glucose 99 65 - 99 mg/dL    Bun 54 (H) 8 - 22 mg/dL    Creatinine 5.07 (HH) 0.50 - 1.40 mg/dL    Calcium 8.2 (L) 8.5 - 10.5 mg/dL    Correct Calcium 8.4 (L) 8.5 - 10.5 mg/dL    AST(SGOT) 107 (H) 12 - 45 U/L    ALT(SGPT) 38 2 - 50 U/L    Alkaline Phosphatase 77 30 - 99 U/L    Total Bilirubin 0.2 0.1 - 1.5 mg/dL    Albumin 3.8 3.2 - 4.9 g/dL    Total Protein 6.5 6.0 - 8.2 g/dL    Globulin 2.7 1.9 - 3.5 g/dL    A-G Ratio 1.4 g/dL   Troponins NOW    Collection Time: 04/20/24  3:37 PM   Result Value Ref Range    Troponin T 58 (H) 6 - 19 ng/L   ESTIMATED GFR    Collection Time: 04/20/24  3:37 PM   Result Value Ref Range     GFR (CKD-EPI) 8 (A) >60 mL/min/1.73 m 2   LACTIC ACID    Collection Time: 24  3:37 PM   Result Value Ref Range    Lactic Acid 1.5 0.5 - 2.0 mmol/L   EKG    Collection Time: 24  3:38 PM   Result Value Ref Range    Report       [facility] Emergency Dept.    Test Date:  2024  Pt Name:    ODETTE NICKERSON                  Department: ER  MRN:        [id number]                      Room:        03  Gender:     Female                       Technician: [id number]  :        [id number]                   Requested By:ER TRIAGE PROTOCOL  Order #:    [id number]                    Reading MD:    Measurements  Intervals                                Axis  Rate:       67                           P:          40  CA:         187                          QRS:        9  QRSD:       103                          T:          18  QT:         414  QTc:        437    Interpretive Statements  Sinus rhythm  Baseline wander in lead(s) V6  Compared to ECG 10/01/2023 15:00:14  No significant changes     Urinalysis    Collection Time: 24  4:09 PM    Specimen: Urine   Result Value Ref Range    Color Yellow     Character Turbid (A)     Specific Gravity >=1.030 <1.035    Ph 5.5 5.0 - 8.0    Glucose Negative Negative mg/dL    Ketones Trace (A) Negative mg/dL    Protein 100 (A) Negative mg/dL    Bilirubin Negative Negative    Urobilinogen, Urine 0.2 Negative    Nitrite Negative Negative    Leukocyte Esterase Moderate (A) Negative    Occult Blood Small (A) Negative    Micro Urine Req Microscopic    URINE MICROSCOPIC (W/UA)    Collection Time: 24  4:09 PM   Result Value Ref Range    WBC Packed (A) /hpf    RBC 2-5 (A) /hpf    Bacteria Moderate (A) None /hpf    Epithelial Cells Few /hpf    Trans Epithelial Cells Few /hpf    Hyaline Cast 3-5 (A) /lpf   CoV-2, Flu A/B, And RSV by PCR ([place])    Collection Time: 24  4:30 PM    Specimen: Respirate   Result Value Ref Range    Influenza virus A RNA Negative  Negative    Influenza virus B, PCR Negative Negative    RSV, PCR Negative Negative    SARS-CoV-2 by PCR DETECTED (AA)     SARS-CoV-2 Source NP Swab        Imaging  DX-CHEST-PORTABLE (1 VIEW)   Final Result      Linear atelectasis within the right lung base.      US-RENAL    (Results Pending)       Assessment/Plan  * Septic shock ([facility])- (present on admission)  Assessment & Plan  Septic shock likely secondary to urinary tract infection.  She has had pyuria and dysuria for several weeks now.  Bedside echo demonstrated normal biventricular function.  Renal ultrasound pending to evaluate for complicated urinary tract infection.  XR chest with atelectasis, no other obvious infiltrate.  Covid+.  Several falls likely related to hypotension - no apparent traumatic injury.  Norepinephrine for MAP>65mmHg  Empiric CTX  Blood and urine cultures  Monitor end organ perfusion      Acute hypoxemic respiratory failure due to COVID-19 ([facility])  Assessment & Plan  On home oxygen 2L at baseline, now requiring slightly more.  No significant infiltrate on XR chest.  titrate supplemental to saturation 92-96%  Dexamethasone per recovery trial      Acute renal failure (ARF) ([facility])  Assessment & Plan  Baseline creatinine 0.9-1.0.  Up to 5.  Likely ATN related to septic shock in conjunction with ACE inhibitor.  Some hyaline casts on UA.  No indications for renal replacement therapy at present.  monitor UOP and electrolytes   Renal ultrasound    Major depressive disorder- (present on admission)  Assessment & Plan  Continue home amytriptylene, duloxetine    SELWYN (obstructive sleep apnea)- (present on admission)  Assessment & Plan  Not on home CPAP        Updated  at bedside.    Critical care time: 70 minutes, excluding procedure    Aric Bloom MD  Critical Care Medicine  7:13 PM

## 2024-04-21 NOTE — PROGRESS NOTES
Pulmonary Critical Care Progress Note            This is a 78-year-old lady who has a past medical history significant for hypertension and on ACE inhibitor, dyslipidemia, sleep apnea, Chiari malformation, scoliosis, obesity, mood disorder who presented with a history of several falls over the last several days.  Patient was in acute shock state presumed likely secondary profound to be sepsis and was given IV fluid resuscitation followed by initiation of vasopressors.  He was also diagnosed with COVID-19 and her oxygen requirements mildly escalated over her baseline.  She was currently being treated with dexamethasone.  Her additional significant issue is metabolic acidosis in the setting of acute kidney injury.  Her bicarb was 17 on admission.    I was called to the bedside by resident physician as patient became increasingly lethargic, somnolent and now minimally responsive on sternal rub.    Airway: Threatened  Breathing: pH 7.28/pCO2 41.7/pO2 65; bilateral breath sounds sats 96% on supplemental oxygen.  Moderate respiratory distress.  Circulation: Temp 36.7, pulse 68, respiratory rate 30, sats 94%  Disability: Minimally responsive to sternal rub.  Exposure: Very cloudy appearing urine    Impression:  -Acute shock state likely secondary to sepsis secondary to urinary source  -Acute hypoxic respiratory failure  -Acute progressive encephalopathy    Plan:  Obtain point-of-care fingerstick to make sure blood glucose is okay.  Emergent RSI and intubation.  Obtain CT head.  Broaden antibiotic coverage to cefepime.  Avoid Zosyn given AKILAH.  Obtain MRSA in nares.  If positive consider adding Vanco.  Follow-up on cultures.  Low tidal volume ventilation and follow ABCD E and F bundles.  ARDSnet protocol.  Consider fentanyl and propofol for sedation.  Avoid Precedex given bradycardia arrhythmias.  Reach out to the family and explained them the change in clinical condition.  Monitor urine output and electrolytes  closely.  Continue norepinephrine for MAP greater than 65.    Discussed patient condition and risk of morbidity and/or mortality with RN, RT, Therapies, and Pharmacy.    The patient remains critically ill.  Critical care time = 41 minutes in directly providing and coordinating critical care and extensive data review.  No time overlap and excludes procedures.  This is in addition to Dr. Aric Bloom's time from earlier today.

## 2024-04-21 NOTE — PROGRESS NOTES
Patient suddenly become obtunded, unresponsive to pain. Intensivist notified, plan to intubate and then perform STAT head CT.    Time out @2333  20mg Etomidate @ 2334  Followed by  50mg Rocuronium @ 2334  Intubated @2336  + Color change   7.5 secured at 24

## 2024-04-21 NOTE — ED NOTES
Med rec updated and complete.  Allergies reviewed and updated. Confirmed name and date of birth. Interviewed family at bedside.   No outpatient antibiotic use in last 30 days. No anticoagulant medications.  Last ASA dose 04/20/24. Family (  ) stated that the patient took all her morning medications.    Home pharmacy  [facility] [phone]

## 2024-04-21 NOTE — ASSESSMENT & PLAN NOTE
Baseline creatinine 0.9-1.0.  Up to 5.  Likely ATN related to septic shock in conjunction with ACE inhibitor.  Some hyaline casts on UA.  No indications for renal replacement therapy at present.  HCO3 16, normal PH   US renal negative for hydronephrosis.     Monitor  UOP, strict I/Os  Tam   Monitor electrolytes. Keep K >4, Mg >2  Serial BMP

## 2024-04-21 NOTE — PROGRESS NOTES
4 Eyes Skin Assessment Completed by JIMMY Palacio and JIMMY Magdaleno.    Head WDL  Ears WDL  Nose WDL  Mouth WDL  Neck WDL  Breast/Chest WDL  Shoulder Blades WDL  Spine WDL  (R) Arm/Elbow/Hand Redness and Blanching  (L) Arm/Elbow/Hand Redness and Blanching  Abdomen WDL  Groin: excoriation in perineum  Scrotum/Coccyx/Buttocks Redness and Blanching left buttocks  (R) Leg swelling  (L) Leg Swelling  (R) Heel/Foot/Toe Redness and Blanching  (L) Heel/Foot/Toe Redness and Blanching          Devices In Places ECG, Blood Pressure Cuff, and Pulse Ox      Interventions In Place Heel Mepilex, Sacral Mepilex, Pillows, Q2 Turns, and Low Air Loss Mattress    Possible Skin Injury No    Pictures Uploaded Into Epic N/A  Wound Consult Placed N/A  RN Wound Prevention Protocol Ordered No

## 2024-04-22 ENCOUNTER — HOSPITAL ENCOUNTER (OUTPATIENT)
Dept: RADIOLOGY | Facility: MEDICAL CENTER | Age: 79
End: 2024-04-22
Attending: STUDENT IN AN ORGANIZED HEALTH CARE EDUCATION/TRAINING PROGRAM

## 2024-04-22 ENCOUNTER — APPOINTMENT (OUTPATIENT)
Dept: CARDIOLOGY | Facility: MEDICAL CENTER | Age: 79
DRG: 871 | End: 2024-04-22
Attending: INTERNAL MEDICINE
Payer: MEDICARE

## 2024-04-22 PROBLEM — E87.8 ELECTROLYTE DEPLETION: Status: ACTIVE | Noted: 2024-04-22

## 2024-04-22 LAB
ALBUMIN SERPL BCP-MCNC: 3.2 G/DL (ref 3.2–4.9)
ALBUMIN/GLOB SERPL: 1.2 G/DL
ALP SERPL-CCNC: 61 U/L (ref 30–99)
ALT SERPL-CCNC: 31 U/L (ref 2–50)
ANION GAP SERPL CALC-SCNC: 12 MMOL/L (ref 7–16)
ANION GAP SERPL CALC-SCNC: 15 MMOL/L (ref 7–16)
ANION GAP SERPL CALC-SCNC: 16 MMOL/L (ref 7–16)
AST SERPL-CCNC: 59 U/L (ref 12–45)
BACTERIA UR CULT: NORMAL
BASE EXCESS BLDA CALC-SCNC: 6 MMOL/L (ref -4–3)
BILIRUB SERPL-MCNC: 0.5 MG/DL (ref 0.1–1.5)
BODY TEMPERATURE: ABNORMAL DEGREES
BREATHS SETTING VENT: 18
BUN SERPL-MCNC: 26 MG/DL (ref 8–22)
BUN SERPL-MCNC: 30 MG/DL (ref 8–22)
BUN SERPL-MCNC: 31 MG/DL (ref 8–22)
CALCIUM ALBUM COR SERPL-MCNC: 8.7 MG/DL (ref 8.5–10.5)
CALCIUM SERPL-MCNC: 8.1 MG/DL (ref 8.5–10.5)
CHLORIDE SERPL-SCNC: 94 MMOL/L (ref 96–112)
CHLORIDE SERPL-SCNC: 97 MMOL/L (ref 96–112)
CHLORIDE SERPL-SCNC: 97 MMOL/L (ref 96–112)
CO2 BLDA-SCNC: 32 MMOL/L (ref 20–33)
CO2 SERPL-SCNC: 25 MMOL/L (ref 20–33)
CO2 SERPL-SCNC: 27 MMOL/L (ref 20–33)
CO2 SERPL-SCNC: 29 MMOL/L (ref 20–33)
CREAT SERPL-MCNC: 1.09 MG/DL (ref 0.5–1.4)
CREAT SERPL-MCNC: 1.23 MG/DL (ref 0.5–1.4)
CREAT SERPL-MCNC: 1.33 MG/DL (ref 0.5–1.4)
CRP SERPL HS-MCNC: 7.91 MG/DL (ref 0–0.75)
DELSYS IDSYS: ABNORMAL
END TIDAL CARBON DIOXIDE IECO2: 37 MMHG
GFR SERPLBLD CREATININE-BSD FMLA CKD-EPI: 41 ML/MIN/1.73 M 2
GFR SERPLBLD CREATININE-BSD FMLA CKD-EPI: 45 ML/MIN/1.73 M 2
GFR SERPLBLD CREATININE-BSD FMLA CKD-EPI: 52 ML/MIN/1.73 M 2
GLOBULIN SER CALC-MCNC: 2.6 G/DL (ref 1.9–3.5)
GLUCOSE BLD STRIP.AUTO-MCNC: 118 MG/DL (ref 65–99)
GLUCOSE BLD STRIP.AUTO-MCNC: 125 MG/DL (ref 65–99)
GLUCOSE BLD STRIP.AUTO-MCNC: 167 MG/DL (ref 65–99)
GLUCOSE BLD STRIP.AUTO-MCNC: 176 MG/DL (ref 65–99)
GLUCOSE BLD STRIP.AUTO-MCNC: 193 MG/DL (ref 65–99)
GLUCOSE BLD STRIP.AUTO-MCNC: 199 MG/DL (ref 65–99)
GLUCOSE BLD STRIP.AUTO-MCNC: 218 MG/DL (ref 65–99)
GLUCOSE BLD STRIP.AUTO-MCNC: 453 MG/DL (ref 65–99)
GLUCOSE SERPL-MCNC: 104 MG/DL (ref 65–99)
GLUCOSE SERPL-MCNC: 220 MG/DL (ref 65–99)
GLUCOSE SERPL-MCNC: 229 MG/DL (ref 65–99)
HCO3 BLDA-SCNC: 30.9 MMOL/L (ref 17–25)
HOROWITZ INDEX BLDA+IHG-RTO: 136 MM[HG]
LACTATE BLD-SCNC: 2.2 MMOL/L (ref 0.5–2)
LV EJECT FRACT  99904: 60
MAGNESIUM SERPL-MCNC: 2.3 MG/DL (ref 1.5–2.5)
MODE IMODE: ABNORMAL
O2/TOTAL GAS SETTING VFR VENT: 50 %
PCO2 BLDA: 44.5 MMHG (ref 26–37)
PCO2 TEMP ADJ BLDA: 42.9 MMHG (ref 26–37)
PEEP END EXPIRATORY PRESSURE IPEEP: 8 CMH20
PH BLDA: 7.45 [PH] (ref 7.4–7.5)
PH TEMP ADJ BLDA: 7.46 [PH] (ref 7.4–7.5)
PHOSPHATE SERPL-MCNC: 2.3 MG/DL (ref 2.5–4.5)
PO2 BLDA: 68 MMHG (ref 64–87)
PO2 TEMP ADJ BLDA: 65 MMHG (ref 64–87)
POTASSIUM SERPL-SCNC: 3.3 MMOL/L (ref 3.6–5.5)
POTASSIUM SERPL-SCNC: 3.5 MMOL/L (ref 3.6–5.5)
POTASSIUM SERPL-SCNC: 3.6 MMOL/L (ref 3.6–5.5)
PREALB SERPL-MCNC: 13.2 MG/DL (ref 18–38)
PROCALCITONIN SERPL-MCNC: 0.15 NG/ML
PROT SERPL-MCNC: 5.8 G/DL (ref 6–8.2)
SAO2 % BLDA: 94 % (ref 93–99)
SIGNIFICANT IND 70042: NORMAL
SITE SITE: NORMAL
SODIUM SERPL-SCNC: 137 MMOL/L (ref 135–145)
SODIUM SERPL-SCNC: 137 MMOL/L (ref 135–145)
SODIUM SERPL-SCNC: 138 MMOL/L (ref 135–145)
SOURCE SOURCE: NORMAL
SPECIMEN DRAWN FROM PATIENT: ABNORMAL
TIDAL VOLUME IVT: 280 ML
TRIGL SERPL-MCNC: 78 MG/DL (ref 0–149)

## 2024-04-22 PROCEDURE — 82803 BLOOD GASES ANY COMBINATION: CPT

## 2024-04-22 PROCEDURE — 93306 TTE W/DOPPLER COMPLETE: CPT | Mod: 26 | Performed by: INTERNAL MEDICINE

## 2024-04-22 PROCEDURE — 94640 AIRWAY INHALATION TREATMENT: CPT

## 2024-04-22 PROCEDURE — 92950 HEART/LUNG RESUSCITATION CPR: CPT

## 2024-04-22 PROCEDURE — 99291 CRITICAL CARE FIRST HOUR: CPT | Performed by: INTERNAL MEDICINE

## 2024-04-22 PROCEDURE — 94799 UNLISTED PULMONARY SVC/PX: CPT

## 2024-04-22 PROCEDURE — 700111 HCHG RX REV CODE 636 W/ 250 OVERRIDE (IP): Mod: JZ

## 2024-04-22 PROCEDURE — 83605 ASSAY OF LACTIC ACID: CPT

## 2024-04-22 PROCEDURE — 82962 GLUCOSE BLOOD TEST: CPT | Mod: 91

## 2024-04-22 PROCEDURE — 700105 HCHG RX REV CODE 258: Performed by: INTERNAL MEDICINE

## 2024-04-22 PROCEDURE — 84145 PROCALCITONIN (PCT): CPT

## 2024-04-22 PROCEDURE — 700111 HCHG RX REV CODE 636 W/ 250 OVERRIDE (IP): Performed by: STUDENT IN AN ORGANIZED HEALTH CARE EDUCATION/TRAINING PROGRAM

## 2024-04-22 PROCEDURE — 700101 HCHG RX REV CODE 250: Performed by: INTERNAL MEDICINE

## 2024-04-22 PROCEDURE — 700105 HCHG RX REV CODE 258

## 2024-04-22 PROCEDURE — 94003 VENT MGMT INPAT SUBQ DAY: CPT

## 2024-04-22 PROCEDURE — 93306 TTE W/DOPPLER COMPLETE: CPT

## 2024-04-22 PROCEDURE — 36600 WITHDRAWAL OF ARTERIAL BLOOD: CPT

## 2024-04-22 PROCEDURE — 700111 HCHG RX REV CODE 636 W/ 250 OVERRIDE (IP)

## 2024-04-22 PROCEDURE — 770022 HCHG ROOM/CARE - ICU (200)

## 2024-04-22 PROCEDURE — 700117 HCHG RX CONTRAST REV CODE 255: Performed by: INTERNAL MEDICINE

## 2024-04-22 PROCEDURE — 94150 VITAL CAPACITY TEST: CPT

## 2024-04-22 PROCEDURE — 700111 HCHG RX REV CODE 636 W/ 250 OVERRIDE (IP): Mod: JZ | Performed by: INTERNAL MEDICINE

## 2024-04-22 PROCEDURE — 700102 HCHG RX REV CODE 250 W/ 637 OVERRIDE(OP): Performed by: INTERNAL MEDICINE

## 2024-04-22 PROCEDURE — 700102 HCHG RX REV CODE 250 W/ 637 OVERRIDE(OP): Performed by: STUDENT IN AN ORGANIZED HEALTH CARE EDUCATION/TRAINING PROGRAM

## 2024-04-22 PROCEDURE — 71045 X-RAY EXAM CHEST 1 VIEW: CPT

## 2024-04-22 PROCEDURE — A9270 NON-COVERED ITEM OR SERVICE: HCPCS | Performed by: STUDENT IN AN ORGANIZED HEALTH CARE EDUCATION/TRAINING PROGRAM

## 2024-04-22 PROCEDURE — A9270 NON-COVERED ITEM OR SERVICE: HCPCS | Performed by: INTERNAL MEDICINE

## 2024-04-22 PROCEDURE — 700111 HCHG RX REV CODE 636 W/ 250 OVERRIDE (IP): Performed by: EMERGENCY MEDICINE

## 2024-04-22 PROCEDURE — 80048 BASIC METABOLIC PNL TOTAL CA: CPT | Mod: 91

## 2024-04-22 PROCEDURE — 700105 HCHG RX REV CODE 258: Performed by: STUDENT IN AN ORGANIZED HEALTH CARE EDUCATION/TRAINING PROGRAM

## 2024-04-22 RX ORDER — HYDROMORPHONE HYDROCHLORIDE 1 MG/ML
0.5 INJECTION, SOLUTION INTRAMUSCULAR; INTRAVENOUS; SUBCUTANEOUS ONCE
Status: COMPLETED | OUTPATIENT
Start: 2024-04-22 | End: 2024-04-22

## 2024-04-22 RX ORDER — HALOPERIDOL 5 MG/ML
INJECTION INTRAMUSCULAR
Status: COMPLETED
Start: 2024-04-22 | End: 2024-04-22

## 2024-04-22 RX ORDER — HYDROMORPHONE HYDROCHLORIDE 1 MG/ML
0.2 INJECTION, SOLUTION INTRAMUSCULAR; INTRAVENOUS; SUBCUTANEOUS
Status: COMPLETED | OUTPATIENT
Start: 2024-04-22 | End: 2024-04-22

## 2024-04-22 RX ORDER — DULOXETIN HYDROCHLORIDE 30 MG/1
30 CAPSULE, DELAYED RELEASE ORAL DAILY
Status: DISCONTINUED | OUTPATIENT
Start: 2024-04-22 | End: 2024-04-22

## 2024-04-22 RX ORDER — MIDAZOLAM HYDROCHLORIDE 1 MG/ML
INJECTION INTRAMUSCULAR; INTRAVENOUS
Status: COMPLETED
Start: 2024-04-22 | End: 2024-04-22

## 2024-04-22 RX ORDER — AMOXICILLIN 250 MG
2 CAPSULE ORAL 2 TIMES DAILY
Status: DISCONTINUED | OUTPATIENT
Start: 2024-04-22 | End: 2024-04-25

## 2024-04-22 RX ORDER — MIDAZOLAM HYDROCHLORIDE 1 MG/ML
1 INJECTION INTRAMUSCULAR; INTRAVENOUS ONCE
Status: COMPLETED | OUTPATIENT
Start: 2024-04-22 | End: 2024-04-22

## 2024-04-22 RX ORDER — POTASSIUM CHLORIDE 7.45 MG/ML
10 INJECTION INTRAVENOUS
Status: COMPLETED | OUTPATIENT
Start: 2024-04-22 | End: 2024-04-22

## 2024-04-22 RX ORDER — AMITRIPTYLINE HYDROCHLORIDE 100 MG/1
100 TABLET ORAL NIGHTLY
Status: DISCONTINUED | OUTPATIENT
Start: 2024-04-22 | End: 2024-04-25

## 2024-04-22 RX ORDER — PREGABALIN 75 MG/1
75 CAPSULE ORAL 2 TIMES DAILY
Status: DISCONTINUED | OUTPATIENT
Start: 2024-04-22 | End: 2024-04-22

## 2024-04-22 RX ORDER — DULOXETIN HYDROCHLORIDE 30 MG/1
30 CAPSULE, DELAYED RELEASE ORAL DAILY
Status: DISCONTINUED | OUTPATIENT
Start: 2024-04-23 | End: 2024-04-25

## 2024-04-22 RX ORDER — ASPIRIN 81 MG/1
81 TABLET, CHEWABLE ORAL DAILY
Status: DISCONTINUED | OUTPATIENT
Start: 2024-04-23 | End: 2024-04-25

## 2024-04-22 RX ORDER — MIDAZOLAM HYDROCHLORIDE 1 MG/ML
2 INJECTION INTRAMUSCULAR; INTRAVENOUS ONCE
Status: COMPLETED | OUTPATIENT
Start: 2024-04-22 | End: 2024-04-22

## 2024-04-22 RX ORDER — QUETIAPINE FUMARATE 25 MG/1
25 TABLET, FILM COATED ORAL
Status: DISCONTINUED | OUTPATIENT
Start: 2024-04-22 | End: 2024-04-23

## 2024-04-22 RX ORDER — HALOPERIDOL 5 MG/ML
2.5 INJECTION INTRAMUSCULAR ONCE
Status: COMPLETED | OUTPATIENT
Start: 2024-04-22 | End: 2024-04-22

## 2024-04-22 RX ORDER — OXYCODONE HYDROCHLORIDE 5 MG/1
2.5 TABLET ORAL EVERY 4 HOURS PRN
Status: DISCONTINUED | OUTPATIENT
Start: 2024-04-22 | End: 2024-04-25

## 2024-04-22 RX ORDER — OXYCODONE HYDROCHLORIDE 5 MG/1
2.5 TABLET ORAL EVERY 4 HOURS PRN
Status: DISCONTINUED | OUTPATIENT
Start: 2024-04-22 | End: 2024-04-22

## 2024-04-22 RX ORDER — HALOPERIDOL 5 MG/ML
2.5 INJECTION INTRAMUSCULAR ONCE
Status: DISCONTINUED | OUTPATIENT
Start: 2024-04-22 | End: 2024-04-22

## 2024-04-22 RX ORDER — ACETAMINOPHEN 325 MG/1
650 TABLET ORAL EVERY 6 HOURS PRN
Status: DISCONTINUED | OUTPATIENT
Start: 2024-04-22 | End: 2024-04-25

## 2024-04-22 RX ORDER — POLYETHYLENE GLYCOL 3350 17 G/17G
1 POWDER, FOR SOLUTION ORAL
Status: DISCONTINUED | OUTPATIENT
Start: 2024-04-22 | End: 2024-04-25

## 2024-04-22 RX ORDER — ONDANSETRON 4 MG/1
4 TABLET, ORALLY DISINTEGRATING ORAL EVERY 4 HOURS PRN
Status: DISCONTINUED | OUTPATIENT
Start: 2024-04-22 | End: 2024-04-25

## 2024-04-22 RX ORDER — OXYCODONE HYDROCHLORIDE 5 MG/1
5 TABLET ORAL EVERY 4 HOURS PRN
Status: DISCONTINUED | OUTPATIENT
Start: 2024-04-22 | End: 2024-04-25

## 2024-04-22 RX ORDER — OXYCODONE HYDROCHLORIDE 5 MG/1
5 TABLET ORAL EVERY 4 HOURS PRN
Status: DISCONTINUED | OUTPATIENT
Start: 2024-04-22 | End: 2024-04-22

## 2024-04-22 RX ORDER — HYDROMORPHONE HYDROCHLORIDE 1 MG/ML
0.2 INJECTION, SOLUTION INTRAMUSCULAR; INTRAVENOUS; SUBCUTANEOUS ONCE
Status: COMPLETED | OUTPATIENT
Start: 2024-04-22 | End: 2024-04-22

## 2024-04-22 RX ORDER — PREGABALIN 75 MG/1
75 CAPSULE ORAL 2 TIMES DAILY
Status: DISCONTINUED | OUTPATIENT
Start: 2024-04-22 | End: 2024-04-25

## 2024-04-22 RX ORDER — HALOPERIDOL 5 MG/ML
5 INJECTION INTRAMUSCULAR EVERY 4 HOURS PRN
Status: DISPENSED | OUTPATIENT
Start: 2024-04-22 | End: 2024-04-24

## 2024-04-22 RX ORDER — MIDAZOLAM HYDROCHLORIDE 1 MG/ML
1 INJECTION INTRAMUSCULAR; INTRAVENOUS ONCE
Status: DISCONTINUED | OUTPATIENT
Start: 2024-04-22 | End: 2024-04-22

## 2024-04-22 RX ORDER — BISACODYL 10 MG
10 SUPPOSITORY, RECTAL RECTAL
Status: DISCONTINUED | OUTPATIENT
Start: 2024-04-22 | End: 2024-04-25

## 2024-04-22 RX ADMIN — HUMAN ALBUMIN MICROSPHERES AND PERFLUTREN 3 ML: 10; .22 INJECTION, SOLUTION INTRAVENOUS at 09:55

## 2024-04-22 RX ADMIN — SENNOSIDES AND DOCUSATE SODIUM 2 TABLET: 50; 8.6 TABLET ORAL at 04:19

## 2024-04-22 RX ADMIN — MIDAZOLAM HYDROCHLORIDE 2 MG: 2 INJECTION, SOLUTION INTRAMUSCULAR; INTRAVENOUS at 23:13

## 2024-04-22 RX ADMIN — DEXMEDETOMIDINE HYDROCHLORIDE 1.5 MCG/KG/HR: 100 INJECTION, SOLUTION INTRAVENOUS at 21:30

## 2024-04-22 RX ADMIN — POTASSIUM CHLORIDE 10 MEQ: 149 INJECTION, SOLUTION, CONCENTRATE INTRAVENOUS at 15:40

## 2024-04-22 RX ADMIN — DEXMEDETOMIDINE HYDROCHLORIDE 1.5 MCG/KG/HR: 100 INJECTION, SOLUTION INTRAVENOUS at 15:28

## 2024-04-22 RX ADMIN — HALOPERIDOL LACTATE 5 MG: 5 INJECTION, SOLUTION INTRAMUSCULAR at 19:22

## 2024-04-22 RX ADMIN — DULOXETINE HYDROCHLORIDE 30 MG: 30 CAPSULE, DELAYED RELEASE ORAL at 10:45

## 2024-04-22 RX ADMIN — DEXAMETHASONE SODIUM PHOSPHATE 6 MG: 4 INJECTION, SOLUTION INTRAMUSCULAR; INTRAVENOUS at 04:15

## 2024-04-22 RX ADMIN — HEPARIN SODIUM 5000 UNITS: 5000 INJECTION, SOLUTION INTRAVENOUS; SUBCUTANEOUS at 04:20

## 2024-04-22 RX ADMIN — POTASSIUM CHLORIDE 10 MEQ: 149 INJECTION, SOLUTION, CONCENTRATE INTRAVENOUS at 19:26

## 2024-04-22 RX ADMIN — FENTANYL CITRATE 200 MCG: 50 INJECTION, SOLUTION INTRAMUSCULAR; INTRAVENOUS at 02:29

## 2024-04-22 RX ADMIN — POTASSIUM BICARBONATE 25 MEQ: 978 TABLET, EFFERVESCENT ORAL at 08:05

## 2024-04-22 RX ADMIN — HALOPERIDOL LACTATE 2.5 MG: 5 INJECTION, SOLUTION INTRAMUSCULAR at 15:16

## 2024-04-22 RX ADMIN — POTASSIUM CHLORIDE 10 MEQ: 149 INJECTION, SOLUTION, CONCENTRATE INTRAVENOUS at 16:39

## 2024-04-22 RX ADMIN — FAMOTIDINE 20 MG: 20 TABLET, FILM COATED ORAL at 04:20

## 2024-04-22 RX ADMIN — PIPERACILLIN AND TAZOBACTAM 4.5 G: 4; .5 INJECTION, POWDER, FOR SOLUTION INTRAVENOUS at 04:13

## 2024-04-22 RX ADMIN — POTASSIUM BICARBONATE 25 MEQ: 978 TABLET, EFFERVESCENT ORAL at 10:45

## 2024-04-22 RX ADMIN — HALOPERIDOL LACTATE 2.5 MG: 5 INJECTION, SOLUTION INTRAMUSCULAR at 15:27

## 2024-04-22 RX ADMIN — HYDROMORPHONE HYDROCHLORIDE 0.2 MG: 1 INJECTION, SOLUTION INTRAMUSCULAR; INTRAVENOUS; SUBCUTANEOUS at 17:30

## 2024-04-22 RX ADMIN — FENTANYL CITRATE 200 MCG: 50 INJECTION, SOLUTION INTRAMUSCULAR; INTRAVENOUS at 06:21

## 2024-04-22 RX ADMIN — PIPERACILLIN AND TAZOBACTAM 4.5 G: 4; .5 INJECTION, POWDER, FOR SOLUTION INTRAVENOUS at 13:07

## 2024-04-22 RX ADMIN — PREGABALIN 75 MG: 75 CAPSULE ORAL at 10:45

## 2024-04-22 RX ADMIN — PIPERACILLIN AND TAZOBACTAM 4.5 G: 4; .5 INJECTION, POWDER, FOR SOLUTION INTRAVENOUS at 21:29

## 2024-04-22 RX ADMIN — FENTANYL CITRATE 200 MCG: 50 INJECTION, SOLUTION INTRAMUSCULAR; INTRAVENOUS at 04:11

## 2024-04-22 RX ADMIN — DEXMEDETOMIDINE HYDROCHLORIDE 1.3 MCG/KG/HR: 100 INJECTION, SOLUTION INTRAVENOUS at 07:02

## 2024-04-22 RX ADMIN — HALOPERIDOL LACTATE 5 MG: 5 INJECTION, SOLUTION INTRAMUSCULAR at 15:30

## 2024-04-22 RX ADMIN — INSULIN HUMAN 14 UNITS: 100 INJECTION, SOLUTION PARENTERAL at 04:59

## 2024-04-22 RX ADMIN — ASPIRIN 81 MG: 81 TABLET, CHEWABLE ORAL at 06:00

## 2024-04-22 RX ADMIN — FENTANYL CITRATE 200 MCG: 50 INJECTION, SOLUTION INTRAMUSCULAR; INTRAVENOUS at 06:37

## 2024-04-22 RX ADMIN — HYDROMORPHONE HYDROCHLORIDE 0.5 MG: 1 INJECTION, SOLUTION INTRAMUSCULAR; INTRAVENOUS; SUBCUTANEOUS at 20:04

## 2024-04-22 RX ADMIN — HEPARIN SODIUM 5000 UNITS: 5000 INJECTION, SOLUTION INTRAVENOUS; SUBCUTANEOUS at 21:27

## 2024-04-22 RX ADMIN — Medication 100 MCG/HR: at 06:59

## 2024-04-22 RX ADMIN — FENTANYL CITRATE 200 MCG: 50 INJECTION, SOLUTION INTRAMUSCULAR; INTRAVENOUS at 04:33

## 2024-04-22 RX ADMIN — MIDAZOLAM HYDROCHLORIDE 1 MG: 2 INJECTION, SOLUTION INTRAMUSCULAR; INTRAVENOUS at 04:50

## 2024-04-22 RX ADMIN — MIDAZOLAM HYDROCHLORIDE 1 MG: 2 INJECTION, SOLUTION INTRAMUSCULAR; INTRAVENOUS at 21:27

## 2024-04-22 RX ADMIN — SODIUM BICARBONATE: 84 INJECTION, SOLUTION INTRAVENOUS at 02:45

## 2024-04-22 RX ADMIN — LINEZOLID 600 MG: 600 INJECTION, SOLUTION INTRAVENOUS at 04:25

## 2024-04-22 RX ADMIN — HEPARIN SODIUM 5000 UNITS: 5000 INJECTION, SOLUTION INTRAVENOUS; SUBCUTANEOUS at 14:24

## 2024-04-22 RX ADMIN — MAGNESIUM HYDROXIDE 30 ML: 1200 LIQUID ORAL at 04:20

## 2024-04-22 RX ADMIN — POTASSIUM CHLORIDE 10 MEQ: 149 INJECTION, SOLUTION, CONCENTRATE INTRAVENOUS at 18:10

## 2024-04-22 RX ADMIN — HYDROMORPHONE HYDROCHLORIDE 0.2 MG: 1 INJECTION, SOLUTION INTRAMUSCULAR; INTRAVENOUS; SUBCUTANEOUS at 14:24

## 2024-04-22 ASSESSMENT — PAIN DESCRIPTION - PAIN TYPE
TYPE: ACUTE PAIN

## 2024-04-22 ASSESSMENT — COPD QUESTIONNAIRES
DURING THE PAST 4 WEEKS HOW MUCH DID YOU FEEL SHORT OF BREATH: SOME OF THE TIME
HAVE YOU SMOKED AT LEAST 100 CIGARETTES IN YOUR ENTIRE LIFE: NO/DON'T KNOW
COPD SCREENING SCORE: 3
DO YOU EVER COUGH UP ANY MUCUS OR PHLEGM?: NO/ONLY WITH OCCASIONAL COLDS OR INFECTIONS

## 2024-04-22 ASSESSMENT — FIBROSIS 4 INDEX: FIB4 SCORE: 3.78

## 2024-04-22 ASSESSMENT — PULMONARY FUNCTION TESTS: FVC: 500

## 2024-04-22 NOTE — PROGRESS NOTES
Critical Care Progress Note    Date of admission  4/20/2024    Chief Complaint  78 y.o. female BMI 39 with hx of HTN, HPL, SELWYN, chiari malformation, hx of UTI, hx of multiple falls in the past few days and weakness. +dysuria for several weeks, +cough, 2L oxygen at home. At ED,  SBP in 80-90s, HR in 60s, afebrile. On 2L NC. Lactate 1.5. Found to have COVID+. UA with + pyuria. Creatinine 3.9, BUN 52, HCO3 17. LFT with AST slightly elevated. TSH 0.23. ECG normal. Pt was fluid resuscitated 30cc/kg, started on NE gtt. Blood cultures obtained. Ceftriaxone/cefepime given.   CT head negative for acute abnromalities  US renal negative for obstruction, or hydro    Hospital Course  4/20 VD 1 admitted to ICU  4/21 VD 2 intubated  4/22 VD 3 PEEP 8/45%    Interval Problem Update  Reviewed last 24 hour events:  Sedated on the ventilator   COVID+, on dexamethasone  Stop bicarb gtt  Repeat PCT  Replete electrolytes  SAT/SBT  VTE ppx/GI ppx    Review of Systems  Review of Systems   Reason unable to perform ROS: intubated, sedated, RASS -4, unable to perform.   All other systems reviewed and are negative.       Vital Signs for last 24 hours   Temp:  [35.9 °C (96.6 °F)-37.4 °C (99.3 °F)] 36.7 °C (98.1 °F)  Pulse:  [45-95] 80  Resp:  [13-36] 24  BP: ()/() 137/87  SpO2:  [90 %-96 %] 94 %    Hemodynamic parameters for last 24 hours       Respiratory Information for the last 24 hours  Vent Mode: Spont  Rate (breaths/min): 22  Vt Target (mL): 280  PEEP/CPAP: 8  P Support: 5  MAP: 13  Control VTE (exp VT): 413    Physical Exam   Physical Exam  Vitals and nursing note reviewed.   Constitutional:       General: She is not in acute distress.     Appearance: She is obese. She is ill-appearing. She is not diaphoretic.      Comments: Intubated, sedated   HENT:      Head: Normocephalic and atraumatic.      Right Ear: External ear normal.      Left Ear: External ear normal.      Mouth/Throat:      Mouth: Mucous membranes are dry.       Comments: ET tube in place  Eyes:      Pupils: Pupils are equal, round, and reactive to light.   Cardiovascular:      Rate and Rhythm: Normal rate and regular rhythm.      Pulses: Normal pulses.      Heart sounds: Normal heart sounds. No murmur heard.  Pulmonary:      Effort: No respiratory distress.      Breath sounds: Normal breath sounds. No wheezing, rhonchi or rales.   Abdominal:      General: Abdomen is flat. There is no distension.      Palpations: Abdomen is soft.      Tenderness: There is no abdominal tenderness. There is no guarding.   Musculoskeletal:      Cervical back: Neck supple.      Right lower leg: No edema.      Left lower leg: No edema.   Skin:     Capillary Refill: Capillary refill takes less than 2 seconds. Warm extremiites     Coloration: Skin is not jaundiced.      Findings: No bruising or rash.   Neurological:      Comments: Sedated         Medications  Current Facility-Administered Medications   Medication Dose Route Frequency Provider Last Rate Last Admin    potassium bicarbonate (Klyte) effervescent tablet 25 mEq  25 mEq Enteral Tube Q4HRS Brendne Zarco M.D.   25 mEq at 04/22/24 1045    oxyCODONE immediate-release (Roxicodone) tablet 2.5 mg  2.5 mg Enteral Tube Q4HRS PRN Brenden Zarco M.D.        Or    oxyCODONE immediate-release (Roxicodone) tablet 5 mg  5 mg Enteral Tube Q4HRS PRN Brenden Zarco M.D.        pregabalin (Lyrica) capsule 75 mg  75 mg Enteral Tube BID Brenden Zarco M.D.   75 mg at 04/22/24 1045    DULoxetine (Cymbalta) capsule 30 mg  30 mg Enteral Tube DAILY Brenden Zarco M.D.   30 mg at 04/22/24 1045    insulin regular (HumuLIN R,NovoLIN R) injection  3-14 Units Subcutaneous Q6HRS LUISA MendezO.   14 Units at 04/22/24 0459    And    dextrose 10 % BOLUS 25 g  25 g Intravenous Q15 MIN PRN Kan Bauman D.O.        piperacillin-tazobactam (Zosyn) 4.5 g in  mL IVPB  4.5 g Intravenous Q8HRS LUISA MendezO.   Stopped at 04/22/24 0813    dexmedetomidine  (PRECEDEX) 400 mcg/100mL NS premix infusion  0.1-1.5 mcg/kg/hr (Ideal) Intravenous Continuous LUISA MendezOJulio 14.8 mL/hr at 04/22/24 0702 1.3 mcg/kg/hr at 04/22/24 0702    Pharmacy Consult: Enteral tube insertion - review meds/change route/product selection  1 Each Other PHARMACY TO DOSE Kan Bauman D.O.        amitriptyline (Elavil) tablet 100 mg  100 mg Enteral Tube Nightly LUISA MendezOJulio   100 mg at 04/21/24 2134    acetaminophen (Tylenol) tablet 650 mg  650 mg Enteral Tube Q6HRS PRN LUISA MendezO.        ondansetron (Zofran ODT) dispertab 4 mg  4 mg Enteral Tube Q4HRS PRN GRETTA Mendez.OJulio        QUEtiapine (SEROquel) tablet 25 mg  25 mg Enteral Tube QHS PRN LUISA MendezO.        aspirin (Asa) chewable tab 81 mg  81 mg Enteral Tube DAILY LUISA MendezO.   81 mg at 04/22/24 0600    norepinephrine (Levophed) 8 mg in 250 mL NS infusion (premix)  0-1 mcg/kg/min (Ideal) Intravenous Continuous Jaquelin Brewer M.D.   Stopped at 04/21/24 1338    heparin injection 5,000 Units  5,000 Units Subcutaneous Q8HRS Aric Bloom M.D.   5,000 Units at 04/22/24 0420    ondansetron (Zofran) syringe/vial injection 4 mg  4 mg Intravenous Q4HRS PRN Aric Bloom M.D.        Respiratory Therapy Consult   Nebulization Continuous RT GRETTA Castillo.O.        senna-docusate (Pericolace Or Senokot S) 8.6-50 MG per tablet 2 Tablet  2 Tablet Enteral Tube BID GRETTA Castillo.O.   2 Tablet at 04/22/24 0419    And    polyethylene glycol/lytes (Miralax) Packet 1 Packet  1 Packet Enteral Tube QDAY PRN Montez June D.OJulio        And    magnesium hydroxide (Milk Of Magnesia) suspension 30 mL  30 mL Enteral Tube QDAY PRN GRETTA Castillo.O.   30 mL at 04/22/24 0420    And    bisacodyl (Dulcolax) suppository 10 mg  10 mg Rectal QDAY PRN Montez June D.O.        MD Alert...ICU Electrolyte Replacement per Pharmacy   Other PHARMACY TO DOSE Montez June D.O.        dexamethasone (Decadron) injection 6 mg  6 mg Intravenous DAILY  Aric Bloom M.D.   6 mg at 04/22/24 0415       Fluids    Intake/Output Summary (Last 24 hours) at 4/22/2024 1218  Last data filed at 4/22/2024 1052  Gross per 24 hour   Intake 3034.66 ml   Output 1115 ml   Net 1919.66 ml       Laboratory  Recent Labs     04/21/24  0043 04/21/24  0533 04/22/24  0518   ISTATAPH 7.384* 7.432 7.449   ISTATAPCO2 27.6 31.1 44.5*   ISTATAPO2 80 52* 68   ISTATATCO2 17* 22 32   MEQHTYM4MJE 96 88* 94   ISTATARTHCO3 16.5* 20.8 30.9*   ISTATARTBE -7* -3 6*   ISTATTEMP 36.5 C 37.1 C 36.2 C   ISTATFIO2 40 40 50   ISTATSPEC Arterial Arterial Arterial   ISTATAPHTC 7.391* 7.431 7.461   [id number] 77 52* 65         Recent Labs     04/21/24  0400 04/21/24  1153 04/21/24  1745 04/21/24  2345 04/22/24  0450   SODIUM 141   < > 135 137 138   POTASSIUM 2.3*   < > 3.3* 3.5* 3.3*   CHLORIDE 112   < > 99 97 97   CO2 16*   < > 22 25 29   BUN 36*   < > 32* 30* 31*   CREATININE 1.89*   < > 1.39 1.33 1.23   MAGNESIUM 1.3*  --   --  2.3  --    PHOSPHORUS 2.6  --   --  2.3*  --    CALCIUM 5.2*   < > 8.1* 8.1* 8.1*    < > = values in this interval not displayed.     Recent Labs     04/20/24  1537 04/20/24  2040 04/20/24  2348 04/21/24  0400 04/21/24  1745 04/21/24  2345 04/22/24  0450   ALTSGPT 38  --  38  --   --  31  --    ASTSGOT 107*  --  102*  --   --  59*  --    ALKPHOSPHAT 77  --  76  --   --  61  --    TBILIRUBIN 0.2  --  0.3  --   --  0.5  --    DBILIRUBIN  --   --  <0.2  --   --   --   --    PREALBUMIN  --   --   --   --   --  13.2*  --    GLUCOSE 99   < > 132*   < > 267* 229* 220*    < > = values in this interval not displayed.     Recent Labs     04/20/24  1537 04/20/24  2348 04/21/24  2345   WBC 9.9  --   --    NEUTSPOLYS 81.20*  --   --    LYMPHOCYTES 9.80*  --   --    MONOCYTES 8.10  --   --    EOSINOPHILS 0.30  --   --    BASOPHILS 0.30  --   --    ASTSGOT 107* 102* 59*   ALTSGPT 38 38 31   ALKPHOSPHAT 77 76 61   TBILIRUBIN 0.2 0.3 0.5     Recent Labs     04/20/24  1537 04/21/24  1153   RBC 4.45   --    HEMOGLOBIN 13.6  --    HEMATOCRIT 40.7  --    PLATELETCT 213  --    PROTHROMBTM  --  13.0   INR  --  0.97       Imaging  X-Ray:  I have personally reviewed the images and compared with prior images.  CT:    Reviewed    Assessment/Plan  * Septic shock ([facility])- (present on admission)  Assessment & Plan  Septic shock likely secondary to urinary tract infection.  She has had pyuria and dysuria for several weeks now.    Bedside echo demonstrated normal biventricular function.    US renal negative for hydronephrosis or obstructive uropathy  UA + pyuria, negative urine culture.   COVID+    Empiric antibiotics, stop if repeat PCT is negative  Vasopressors for MAP > 65  UC reassuring      Electrolyte depletion  Assessment & Plan  Potassium goal 4  Phosphorus goal 2.5  Magnesium goal 2    Replete and trend      COVID-19  Assessment & Plan  On dexamethasone 6mg daily x 10 days    Acute hypoxemic respiratory failure due to COVID-19 ([facility])  Assessment & Plan  On home oxygen 2L at baseline, now requiring slightly more.  No significant infiltrate on CXR  Dexamethasone 6mg daily x 10 days   Intubated overnight due to worsening lethargy somnolent, rather than worsening hypoxia.     Lung protective ventilation strategies  Optimize oxygenation, ventilation, and acid base balance  ABCDEF Bundle       Acute renal failure (ARF) ([facility])  Assessment & Plan  Baseline creatinine 0.9-1.0.  Up to 5.  Likely ATN related to septic shock in conjunction with ACE inhibitor.  Some hyaline casts on UA.  No indications for renal replacement therapy at present.  HCO3 16, normal PH   US renal negative for hydronephrosis.     Monitor  UOP, strict I/Os  Tam   Monitor electrolytes. Keep K >4, Mg >2  Serial BMP      Major depressive disorder- (present on admission)  Assessment & Plan  Continue home amytriptylene, duloxetine    SELWYN (obstructive sleep apnea)- (present on admission)  Assessment & Plan  Not on home CPAP           VTE:  Heparin SQ  Ulcer: H2  Antagonist  Lines: Tam Catheter  Ongoing indication addressed    I have performed a physical exam and reviewed and updated ROS and Plan today (4/22/2024). In review of yesterday's note (4/21/2024), there are no changes except as documented above.     Discussed patient condition and risk of morbidity and/or mortality with Family, RN, RT, Pharmacy, Charge nurse / hot rounds, and Patient  The patient remains critically ill.  Critical care time = 61 minutes in directly providing and coordinating critical care and extensive data review.  No time overlap and excludes procedures.

## 2024-04-22 NOTE — CARE PLAN
The patient is Watcher - Medium risk of patient condition declining or worsening    Shift Goals  Clinical Goals: decreased agitation  Patient Goals: NAHUM  Family Goals: NHAUM    Progress made toward(s) clinical / shift goals:        Problem: Skin Integrity  Goal: Skin integrity is maintained or improved  Outcome: Progressing     Problem: Fall Risk  Goal: Patient will remain free from falls  Outcome: Progressing     Problem: Hemodynamics  Goal: Patient's hemodynamics, fluid balance and neurologic status will be stable or improve  Outcome: Progressing     Problem: Urinary Elimination  Goal: Establish and maintain regular urinary output  Outcome: Progressing     Problem: Safety - Medical Restraint  Goal: Remains free of injury from restraints (Restraint for Interference with Medical Device)  Outcome: Progressing     Problem: Pain - Standard  Goal: Alleviation of pain or a reduction in pain to the patient’s comfort goal  Outcome: Progressing

## 2024-04-22 NOTE — CARE PLAN
Problem: Ventilation  Goal: Ability to achieve and maintain unassisted ventilation or tolerate decreased levels of ventilator support  Description: Target End Date:  4 days     Document on Vent flowsheet    1.  Support and monitor invasive and noninvasive mechanical ventilation  2.  Monitor ventilator weaning response  3.  Perform ventilator associated pneumonia prevention interventions  4.  Manage ventilation therapy by monitoring diagnostic test results  Outcome: Progressing   APV/CMV 22/280/+8/60%

## 2024-04-22 NOTE — RESPIRATORY CARE
PATIENT BECAME COMBATIVE RIPPED HHFNC OFF FACE. PATIENT GAVE BLOW BY O2 VIA SELF INFLATING BAG. PATIENT PLACED BACK ON FNC ONCE SEDATION WAS GIVEN. SPO2 92% HR 82 RR 22. BREATH SOUNDS ARE DIMINISHED TO CLEAR. RIGHT AND LEFT SIDE.

## 2024-04-22 NOTE — PROGRESS NOTES
4 Eyes Skin Assessment Completed by JIMMY kerns and JIMMY arredondo.    Head WDL  Ears WDL  Nose WDL  Mouth WDL  Neck WDL  Breast/Chest Redness  Shoulder Blades WDL  Spine WDL  (R) Arm/Elbow/Hand Redness  (L) Arm/Elbow/Hand Redness  Abdomen Redness and Bruising  Groin Redness and Excoriation  Scrotum/Coccyx/Buttocks Redness and Blanching  (R) Leg Edema  (L) Leg Edema  (R) Heel/Foot/Toe Blanching  (L) Heel/Foot/Toe Blanching          Devices In Places ECG, Blood Pressure Cuff, Pulse Ox, Tam, SCD's, ET Tube, and OG/NG      Interventions In Place Sacral Mepilex, Pillows, Q2 Turns, Low Air Loss Mattress, Heels Loaded W/Pillows, and Pressure Redistribution Mattress    Possible Skin Injury No    Pictures Uploaded Into Epic N/A  Wound Consult Placed N/A  RN Wound Prevention Protocol Ordered Yes    Is This A New Presentation, Or A Follow-Up?: Skin Lesions Have Your Skin Lesions Been Treated?: not been treated

## 2024-04-22 NOTE — PROGRESS NOTES
"[facility] ICU Progress Note      Admit Date: 4/20/2024    Resident(s): Cecil Noonan M.D.   Attending:  Dr. Zarco    Patient ID:    Name:  Tereza Sánchez   YOB: 1945  Age:  78 y.o.  female   MRN:  [id number]    Hospital Course (carried forward and updated):  \"Tereza Sánchez is a 78 y.o. female  BMI 39 with hx of HTN, HPL, SELWYN, chiari malformation, hx of UTI, hx of multiple falls in the past few days and weakness. +dysuria for several weeks, +cough, 2L oxygen at home. At ED,  SBP in 80-90s, HR in 60s, afebrile. On 2L NC. Lactate 1.5. Found to have COVID+. UA with + pyuria. Creatinine 3.9, BUN 52, HCO3 17. LFT with AST slightly elevated. TSH 0.23. ECG normal. Pt was fluid resuscitated 30cc/kg, started on NE gtt. Blood cultures obtained. Ceftriaxone/cefepime given.   CT head negative for acute abnromalities  US renal negative for obstruction, or hydro\"    -04/20: VD 1, admitted to ICU  -04/21: VD 2  -04/23: Extubated today    Interval Events:  04/22 - extubated today to [facility]  On precedex to help with agitation with PRN haldol  Continue Decadron for Covid infection (04/21-04/29)  Significant improvement in kidney function  Repleting electrolytes  Some concern for dysphagia, pending SLP evaluation tomorrow morning  Oral meds held till SLP eval      Vitals Range last 24h:  Temp:  [35.9 °C (96.6 °F)-37.4 °C (99.3 °F)] 37.2 °C (99 °F)  Pulse:  [45-95] 84  Resp:  [13-64] 31  BP: ()/(52-92) 153/80  SpO2:  [89 %-96 %] 90 %      Intake/Output Summary (Last 24 hours) at 4/22/2024 1554  Last data filed at 4/22/2024 1052  Gross per 24 hour   Intake 2438.72 ml   Output 815 ml   Net 1623.72 ml        Review of Systems   Unable to perform ROS: Intubated        PHYSICAL EXAM:  Vitals:    04/22/24 1415 04/22/24 1424 04/22/24 1430 04/22/24 1445   BP: (!) 144/70 (!) 144/70 (!) 153/80    Pulse: 90 84 80 84   Resp: (!) 31 (!) 29 (!) 22 (!) 31   Temp:       TempSrc:       SpO2: 90% 90% 90% 90%   Weight:       Height:     "    Body mass index is 38.84 kg/m².    O2 therapy: Pulse Oximetry: 90 %, O2 (LPM): 30, O2 Delivery Device: Heated High Flow Nasal Cannula    Date 04/22/24 0700 - 04/23/24 0659   Shift 7448-3610 2157-6384 0144-5291 24 Hour Total   INTAKE   NG/GT 0   0     Intake (mL) ([REMOVED] Enteral Tube 04/21/24 Orogastric 04/22/24 1205) 0   0   Enteral 30   30     Free Water / Tube Flush 30   30   Shift Total 30   30   OUTPUT   Emesis/NG output 0   0     Output (mL) ([REMOVED] Enteral Tube 04/21/24 Orogastric 04/22/24 1205) 0   0   Shift Total 0   0   NET 30   30        Physical Exam  Constitutional:       Appearance: She is ill-appearing.   HENT:      Head: Normocephalic and atraumatic.      Mouth/Throat:      Mouth: Mucous membranes are moist.   Eyes:      Conjunctiva/sclera: Conjunctivae normal.   Cardiovascular:      Rate and Rhythm: Bradycardia present.      Pulses: Normal pulses.   Pulmonary:      Effort: No respiratory distress.   Abdominal:      General: Bowel sounds are normal.      Palpations: Abdomen is soft.   Musculoskeletal:      Right lower leg: No edema.      Left lower leg: No edema.   Skin:     General: Skin is warm.   Neurological:      General: No focal deficit present.         Recent Labs     04/21/24  0043 04/21/24  0533 04/22/24  0518   ISTATAPH 7.384* 7.432 7.449   ISTATAPCO2 27.6 31.1 44.5*   ISTATAPO2 80 52* 68   ISTATATCO2 17* 22 32   [id number] 96 88* 94   ISTATARTHCO3 16.5* 20.8 30.9*   ISTATARTBE -7* -3 6*   ISTATTEMP 36.5 C 37.1 C 36.2 C   ISTATFIO2 40 40 50   ISTATSPEC Arterial Arterial Arterial   ISTATAPHTC 7.391* 7.431 7.461   [id number] 77 52* 65     Recent Labs     04/21/24  0400 04/21/24  1153 04/21/24  2345 04/22/24  0450 04/22/24  1305   SODIUM 141   < > 137 138 137   POTASSIUM 2.3*   < > 3.5* 3.3* 3.6   CHLORIDE 112   < > 97 97 94*   CO2 16*   < > 25 29 27   BUN 36*   < > 30* 31* 26*   CREATININE 1.89*   < > 1.33 1.23 1.09   MAGNESIUM 1.3*  --  2.3  --   --    PHOSPHORUS 2.6  --  2.3*  --    --    CALCIUM 5.2*   < > 8.1* 8.1* 8.1*    < > = values in this interval not displayed.     Recent Labs     04/20/24  1537 04/20/24  2040 04/20/24  2348 04/21/24  0400 04/21/24  2345 04/22/24  0450 04/22/24  1305   ALTSGPT 38  --  38  --  31  --   --    ASTSGOT 107*  --  102*  --  59*  --   --    ALKPHOSPHAT 77  --  76  --  61  --   --    TBILIRUBIN 0.2  --  0.3  --  0.5  --   --    DBILIRUBIN  --   --  <0.2  --   --   --   --    PREALBUMIN  --   --   --   --  13.2*  --   --    GLUCOSE 99   < > 132*   < > 229* 220* 104*    < > = values in this interval not displayed.     Recent Labs     04/20/24 1537 04/21/24  1153   RBC 4.45  --    HEMOGLOBIN 13.6  --    HEMATOCRIT 40.7  --    PLATELETCT 213  --    PROTHROMBTM  --  13.0   INR  --  0.97     Recent Labs     04/20/24 1537 04/20/24 2348 04/21/24  2345   WBC 9.9  --   --    NEUTSPOLYS 81.20*  --   --    LYMPHOCYTES 9.80*  --   --    MONOCYTES 8.10  --   --    EOSINOPHILS 0.30  --   --    BASOPHILS 0.30  --   --    ASTSGOT 107* 102* 59*   ALTSGPT 38 38 31   ALKPHOSPHAT 77 76 61   TBILIRUBIN 0.2 0.3 0.5       Meds:   acetaminophen  650 mg      [START ON 4/23/2024] aspirin  81 mg      oxyCODONE immediate-release  2.5 mg      Or    oxyCODONE immediate-release  5 mg      amitriptyline  100 mg      [START ON 4/23/2024] DULoxetine  30 mg      QUEtiapine  25 mg      ondansetron  4 mg      senna-docusate  2 Tablet      And    polyethylene glycol/lytes  1 Packet      And    magnesium hydroxide  30 mL      And    bisacodyl  10 mg      pregabalin  75 mg      potassium chloride  10 mEq 10 mEq (04/22/24 1540)    haloperidol lactate  5 mg      insulin regular  3-14 Units      And    dextrose bolus  25 g      piperacillin-tazobactam  4.5 g 4.5 g (04/22/24 1307)    dexmedetomidine (Precedex) infusion  0.1-1.5 mcg/kg/hr (Ideal) 1.5 mcg/kg/hr (04/22/24 1528)    NORepinephrine  0-1 mcg/kg/min (Ideal) Stopped (04/21/24 1338)    heparin  5,000 Units      ondansetron  4 mg       Respiratory Therapy Consult        MD Alert...Adult ICU Electrolyte Replacement per Pharmacy        dexamethasone  6 mg            Imaging:  EC-ECHOCARDIOGRAM COMPLETE W/ CONT   Final Result      DX-CHEST-PORTABLE (1 VIEW)   Final Result         1.  Pulmonary edema and/or infiltrates are identified, which are stable since the prior exam.   2.  Small layering bilateral pleural effusion   3.  Cardiomegaly      DX-ABDOMEN FOR TUBE PLACEMENT   Final Result         1.  Air-filled distended loops of bowel are seen in the upper abdomen, appearance suggests ileus or enteritis versus evolving obstructive changes. Recommend radiographic followup to resolution to exclude progression to obstruction.   2.  Nasogastric tube tip terminates overlying the expected location of the pylorus or first duodenal segment.   3.  Pulmonary edema and/or infiltrates.      DX-ABDOMEN FOR TUBE PLACEMENT   Final Result      NG tube tip is in the left upper abdomen but the side-port in the distal esophagus. Recommend advancing at least 7 cm.      DX-CHEST-LIMITED (1 VIEW)   Final Result      1.  Endotracheal tube tip located 2 cm above the devon.      2.  NG tube extends into the gastric fundus.      3.  Bibasal atelectasis/consolidation.         US-RENAL   Final Result      1.  Limited visualization the right kidney due to obscuration by bowel gas.      2.  Otherwise normal exam.      DX-ABDOMEN FOR TUBE PLACEMENT   Final Result         Gastric drainage tube with tip projecting over the expected area of the stomach. Sidehole is in the GE junction.      CT-HEAD W/O   Final Result         1. No acute intracranial abnormality. No evidence of acute intracranial hemorrhage or mass lesion.                     DX-CHEST-PORTABLE (1 VIEW)   Final Result         Elevation of the right hemidiaphragm and right basilar atelectasis.      DX-CHEST-PORTABLE (1 VIEW)   Final Result      Linear atelectasis within the right lung base.          ASSESSEMENT and  PLAN:    * Septic shock ([facility])- (present on admission)  Assessment & Plan  Septic shock likely secondary to urinary tract infection.  She has had pyuria and dysuria for several weeks now.    Bedside echo demonstrated normal biventricular function.    US renal negative for hydronephrosis or obstructive uropathy  UA + pyuria, negative urine culture.   COVID+    Empiric antibiotics, stop if repeat PCT is negative  Vasopressors for MAP > 65  UC reassuring    Acute renal failure (ARF) ([facility])  Assessment & Plan  Baseline creatinine 0.9-1.0.  Up to 5.  Likely ATN related to septic shock in conjunction with ACE inhibitor.  Some hyaline casts on UA.  No indications for renal replacement therapy at present.  US renal negative for hydronephrosis.     Monitor  UOP, strict I/Os  Tam   Monitor electrolytes. Keep K >4, Mg >2  Serial BMP  Improving    Acute hypoxemic respiratory failure due to COVID-19 ([facility])  Assessment & Plan  On home oxygen 2L at baseline, now requiring slightly more.  No significant infiltrate on CXR  Dexamethasone 6mg daily x 10 days   Intubated 04/20 due to worsening lethargy somnolent, rather than worsening hypoxia.   -04/22: extubated today, on precedex for agitation with PRN haldol      Electrolyte depletion  Assessment & Plan  Potassium goal 4  Phosphorus goal 2.5  Magnesium goal 2    Replete and trend      COVID-19  Assessment & Plan  On dexamethasone 6mg daily x 10 days      Major depressive disorder- (present on admission)  Assessment & Plan  Continue home amytriptylene, duloxetine    SELWYN (obstructive sleep apnea)- (present on admission)  Assessment & Plan  Not on home CPAP          CODE STATUS: FULL        Please note that this dictation was created using voice recognition software. I have made every reasonable attempt to correct obvious errors, but there may be errors of grammar and possibly content that I did not discover before finalizing the note.     Cecil Noonan  R Internal Medicine Resident

## 2024-04-22 NOTE — CARE PLAN
Problem: Ventilation  Goal: Ability to achieve and maintain unassisted ventilation or tolerate decreased levels of ventilator support  Description: Target End Date:  4 days     Document on Vent flowsheet    1.  Support and monitor invasive and noninvasive mechanical ventilation  2.  Monitor ventilator weaning response  3.  Perform ventilator associated pneumonia prevention interventions  4.  Manage ventilation therapy by monitoring diagnostic test results  Outcome: Progressing     Ventilator Daily Summary    Vent Day #3  Airway: 7.5@24    Ventilator settings: apvcmv 22/280/8/50      Plan: Continue current ventilator settings and wean mechanical ventilation as tolerated per physician orders.

## 2024-04-23 ENCOUNTER — APPOINTMENT (OUTPATIENT)
Dept: RADIOLOGY | Facility: MEDICAL CENTER | Age: 79
DRG: 871 | End: 2024-04-23
Attending: INTERNAL MEDICINE
Payer: MEDICARE

## 2024-04-23 ENCOUNTER — APPOINTMENT (OUTPATIENT)
Dept: RADIOLOGY | Facility: MEDICAL CENTER | Age: 79
End: 2024-04-23
Payer: MEDICARE

## 2024-04-23 PROBLEM — F05: Status: ACTIVE | Noted: 2024-04-23

## 2024-04-23 LAB
ANION GAP SERPL CALC-SCNC: 13 MMOL/L (ref 7–16)
BUN SERPL-MCNC: 28 MG/DL (ref 8–22)
CALCIUM SERPL-MCNC: 8.3 MG/DL (ref 8.5–10.5)
CHLORIDE SERPL-SCNC: 103 MMOL/L (ref 96–112)
CO2 SERPL-SCNC: 26 MMOL/L (ref 20–33)
CREAT SERPL-MCNC: 1.04 MG/DL (ref 0.5–1.4)
GFR SERPLBLD CREATININE-BSD FMLA CKD-EPI: 55 ML/MIN/1.73 M 2
GLUCOSE BLD STRIP.AUTO-MCNC: 127 MG/DL (ref 65–99)
GLUCOSE BLD STRIP.AUTO-MCNC: 129 MG/DL (ref 65–99)
GLUCOSE BLD STRIP.AUTO-MCNC: 129 MG/DL (ref 65–99)
GLUCOSE BLD STRIP.AUTO-MCNC: 144 MG/DL (ref 65–99)
GLUCOSE SERPL-MCNC: 125 MG/DL (ref 65–99)
MAGNESIUM SERPL-MCNC: 2.3 MG/DL (ref 1.5–2.5)
PHOSPHATE SERPL-MCNC: 1.5 MG/DL (ref 2.5–4.5)
POTASSIUM SERPL-SCNC: 3.9 MMOL/L (ref 3.6–5.5)
SODIUM SERPL-SCNC: 142 MMOL/L (ref 135–145)

## 2024-04-23 PROCEDURE — 82962 GLUCOSE BLOOD TEST: CPT

## 2024-04-23 PROCEDURE — 92610 EVALUATE SWALLOWING FUNCTION: CPT

## 2024-04-23 PROCEDURE — 84100 ASSAY OF PHOSPHORUS: CPT

## 2024-04-23 PROCEDURE — 700111 HCHG RX REV CODE 636 W/ 250 OVERRIDE (IP): Mod: JZ | Performed by: INTERNAL MEDICINE

## 2024-04-23 PROCEDURE — A9270 NON-COVERED ITEM OR SERVICE: HCPCS | Performed by: INTERNAL MEDICINE

## 2024-04-23 PROCEDURE — 700101 HCHG RX REV CODE 250: Performed by: INTERNAL MEDICINE

## 2024-04-23 PROCEDURE — 94640 AIRWAY INHALATION TREATMENT: CPT

## 2024-04-23 PROCEDURE — 83735 ASSAY OF MAGNESIUM: CPT

## 2024-04-23 PROCEDURE — 700102 HCHG RX REV CODE 250 W/ 637 OVERRIDE(OP): Performed by: INTERNAL MEDICINE

## 2024-04-23 PROCEDURE — A9270 NON-COVERED ITEM OR SERVICE: HCPCS

## 2024-04-23 PROCEDURE — 700105 HCHG RX REV CODE 258: Performed by: INTERNAL MEDICINE

## 2024-04-23 PROCEDURE — 99291 CRITICAL CARE FIRST HOUR: CPT | Performed by: INTERNAL MEDICINE

## 2024-04-23 PROCEDURE — 700111 HCHG RX REV CODE 636 W/ 250 OVERRIDE (IP): Performed by: EMERGENCY MEDICINE

## 2024-04-23 PROCEDURE — 700102 HCHG RX REV CODE 250 W/ 637 OVERRIDE(OP)

## 2024-04-23 PROCEDURE — 700111 HCHG RX REV CODE 636 W/ 250 OVERRIDE (IP): Performed by: INTERNAL MEDICINE

## 2024-04-23 PROCEDURE — 71045 X-RAY EXAM CHEST 1 VIEW: CPT

## 2024-04-23 PROCEDURE — 80048 BASIC METABOLIC PNL TOTAL CA: CPT

## 2024-04-23 PROCEDURE — 770022 HCHG ROOM/CARE - ICU (200)

## 2024-04-23 PROCEDURE — 93005 ELECTROCARDIOGRAM TRACING: CPT

## 2024-04-23 RX ORDER — FUROSEMIDE 10 MG/ML
20 INJECTION INTRAMUSCULAR; INTRAVENOUS ONCE
Status: COMPLETED | OUTPATIENT
Start: 2024-04-23 | End: 2024-04-23

## 2024-04-23 RX ORDER — QUETIAPINE FUMARATE 25 MG/1
25 TABLET, FILM COATED ORAL 3 TIMES DAILY
Status: DISCONTINUED | OUTPATIENT
Start: 2024-04-23 | End: 2024-04-25

## 2024-04-23 RX ORDER — QUETIAPINE FUMARATE 25 MG/1
25 TABLET, FILM COATED ORAL ONCE
Status: COMPLETED | OUTPATIENT
Start: 2024-04-23 | End: 2024-04-23

## 2024-04-23 RX ORDER — FUROSEMIDE 10 MG/ML
20 INJECTION INTRAMUSCULAR; INTRAVENOUS
Status: DISCONTINUED | OUTPATIENT
Start: 2024-04-23 | End: 2024-04-26

## 2024-04-23 RX ORDER — TRAZODONE HYDROCHLORIDE 50 MG/1
50 TABLET ORAL
Status: DISCONTINUED | OUTPATIENT
Start: 2024-04-23 | End: 2024-04-25

## 2024-04-23 RX ADMIN — PREGABALIN 75 MG: 75 CAPSULE ORAL at 17:05

## 2024-04-23 RX ADMIN — DULOXETINE HYDROCHLORIDE 30 MG: 30 CAPSULE, DELAYED RELEASE ORAL at 05:19

## 2024-04-23 RX ADMIN — DEXMEDETOMIDINE HYDROCHLORIDE 1.5 MCG/KG/HR: 100 INJECTION, SOLUTION INTRAVENOUS at 10:01

## 2024-04-23 RX ADMIN — HEPARIN SODIUM 5000 UNITS: 5000 INJECTION, SOLUTION INTRAVENOUS; SUBCUTANEOUS at 05:19

## 2024-04-23 RX ADMIN — DEXAMETHASONE SODIUM PHOSPHATE 6 MG: 4 INJECTION, SOLUTION INTRAMUSCULAR; INTRAVENOUS at 05:19

## 2024-04-23 RX ADMIN — PREGABALIN 75 MG: 75 CAPSULE ORAL at 05:19

## 2024-04-23 RX ADMIN — ASPIRIN 81 MG: 81 TABLET, CHEWABLE ORAL at 05:19

## 2024-04-23 RX ADMIN — FUROSEMIDE 20 MG: 10 INJECTION INTRAMUSCULAR; INTRAVENOUS at 07:44

## 2024-04-23 RX ADMIN — DEXMEDETOMIDINE HYDROCHLORIDE 1.5 MCG/KG/HR: 100 INJECTION, SOLUTION INTRAVENOUS at 03:04

## 2024-04-23 RX ADMIN — AMITRIPTYLINE HYDROCHLORIDE 100 MG: 100 TABLET, FILM COATED ORAL at 21:32

## 2024-04-23 RX ADMIN — FUROSEMIDE 20 MG: 10 INJECTION INTRAMUSCULAR; INTRAVENOUS at 17:05

## 2024-04-23 RX ADMIN — HEPARIN SODIUM 5000 UNITS: 5000 INJECTION, SOLUTION INTRAVENOUS; SUBCUTANEOUS at 13:00

## 2024-04-23 RX ADMIN — QUETIAPINE FUMARATE 25 MG: 25 TABLET ORAL at 17:06

## 2024-04-23 RX ADMIN — HEPARIN SODIUM 5000 UNITS: 5000 INJECTION, SOLUTION INTRAVENOUS; SUBCUTANEOUS at 21:32

## 2024-04-23 RX ADMIN — POTASSIUM PHOSPHATE, MONOBASIC AND POTASSIUM PHOSPHATE, DIBASIC 15 MMOL: 224; 236 INJECTION, SOLUTION, CONCENTRATE INTRAVENOUS at 10:36

## 2024-04-23 RX ADMIN — QUETIAPINE FUMARATE 25 MG: 25 TABLET ORAL at 13:00

## 2024-04-23 RX ADMIN — QUETIAPINE FUMARATE 25 MG: 25 TABLET ORAL at 21:32

## 2024-04-23 RX ADMIN — PIPERACILLIN AND TAZOBACTAM 4.5 G: 4; .5 INJECTION, POWDER, FOR SOLUTION INTRAVENOUS at 05:18

## 2024-04-23 ASSESSMENT — FIBROSIS 4 INDEX: FIB4 SCORE: 3.78

## 2024-04-23 ASSESSMENT — PAIN DESCRIPTION - PAIN TYPE
TYPE: ACUTE PAIN

## 2024-04-23 NOTE — PROGRESS NOTES
4 Eyes Skin Assessment Completed by JIMMY Hernandez and JIMMY Hood.    Head WDL  Ears WDL  Nose WDL  Mouth WDL  Neck WDL  Breast/Chest Redness  Shoulder Blades Redness  Spine WDL  (R) Arm/Elbow/Hand Redness and Blanching  (L) Arm/Elbow/Hand Redness and Blanching  Abdomen redness and bruising   Groin Redness and Excoriation  Scrotum/Coccyx/Buttocks Redness and Blanching  (R) Leg Edema  (L) Leg Edema  (R) Heel/Foot/Toe Redness and blanching   (L) Heel/Foot/Toe Redness and Blanching          Devices In Places ECG, Tele Box, Blood Pressure Cuff, Tam, SCD's, and HFNC      Interventions In Place Gray Ear Foams, Heel Mepilex, Sacral Mepilex, TAP System, Pillows, Q2 Turns, Low Air Loss Mattress, and Pressure Redistribution Mattress    Possible Skin Injury No    Pictures Uploaded Into Epic N/A  Wound Consult Placed N/A  RN Wound Prevention Protocol Ordered No

## 2024-04-23 NOTE — PROGRESS NOTES
"[facility] ICU Progress Note      Admit Date: 4/20/2024    Resident(s): Cecil Noonan M.D.   Attending:  Dr. Zarco    Patient ID:    Name:  Tereza Sánchez   YOB: 1945  Age:  78 y.o.  female   MRN:  [id number]    Hospital Course (carried forward and updated):  \"Tereza Sánchez is a 78 y.o. female  BMI 39 with hx of HTN, HPL, SELWYN, chiari malformation, hx of UTI, hx of multiple falls in the past few days and weakness. +dysuria for several weeks, +cough, 2L oxygen at home. At ED,  SBP in 80-90s, HR in 60s, afebrile. On 2L NC. Lactate 1.5. Found to have COVID+. UA with + pyuria. Creatinine 3.9, BUN 52, HCO3 17. LFT with AST slightly elevated. TSH 0.23. ECG normal. Pt was fluid resuscitated 30cc/kg, started on NE gtt. Blood cultures obtained. Ceftriaxone/cefepime given.   CT head negative for acute abnromalities  US renal negative for obstruction, or hydro\"    -04/20: VD 1, admitted to ICU  -04/21: VD 2  -04/22: Extubated to HFNC    Interval Events:  04/23 -Continues to be on HFNC  Patient has been agitated overnight despite max Precedex. Received haldol and versed  Started on seraquel to help control agitation  Continue Decadron for Covid infection (04/21-04/29)  Significant improvement in kidney function  Repleting electrolytes  Some concern for dysphagia, Evaluated by SLP. NPO pending re-evaluation 04/24. Ok for PO meds with small sips    Vitals Range last 24h:  Temp:  [36.7 °C (98.1 °F)-37.4 °C (99.3 °F)] 36.7 °C (98.1 °F)  Pulse:  [49-95] 77  Resp:  [13-64] 41  BP: (107-193)/() 152/93  SpO2:  [89 %-96 %] 91 %      Intake/Output Summary (Last 24 hours) at 4/23/2024 1017  Last data filed at 4/23/2024 0400  Gross per 24 hour   Intake 284.51 ml   Output 2100 ml   Net -1815.49 ml        Review of Systems   Unable to perform ROS: Mental acuity        PHYSICAL EXAM:  Vitals:    04/23/24 0700 04/23/24 0745 04/23/24 0800 04/23/24 0847   BP: (!) 159/99 (!) 145/95 (!) 152/93    Pulse: 77 74 71 77   Resp: (!) 39 " (!) 42 (!) 33 (!) 41   Temp:       TempSrc:       SpO2: 91% 90% 92% 91%   Weight:       Height:        Body mass index is 38.75 kg/m².    O2 therapy: Pulse Oximetry: 91 %, O2 (LPM): 60, O2 Delivery Device: Heated High Flow Nasal Cannula           Physical Exam  Constitutional:       Appearance: She is not ill-appearing.   HENT:      Head: Normocephalic and atraumatic.      Mouth/Throat:      Mouth: Mucous membranes are moist.   Eyes:      Conjunctiva/sclera: Conjunctivae normal.   Cardiovascular:      Rate and Rhythm: Bradycardia present.      Pulses: Normal pulses.   Pulmonary:      Effort: No respiratory distress.   Abdominal:      General: Bowel sounds are normal.      Palpations: Abdomen is soft.   Musculoskeletal:      Right lower leg: No edema.      Left lower leg: No edema.   Skin:     General: Skin is warm.   Neurological:      General: No focal deficit present.         Recent Labs     04/21/24  0043 04/21/24  0533 04/22/24  0518   ISTATAPH 7.384* 7.432 7.449   ISTATAPCO2 27.6 31.1 44.5*   ISTATAPO2 80 52* 68   ISTATATCO2 17* 22 32   [id number] 96 88* 94   ISTATARTHCO3 16.5* 20.8 30.9*   ISTATARTBE -7* -3 6*   ISTATTEMP 36.5 C 37.1 C 36.2 C   ISTATFIO2 40 40 50   ISTATSPEC Arterial Arterial Arterial   ISTATAPHTC 7.391* 7.431 7.461   [id number] 77 52* 65     Recent Labs     04/21/24  0400 04/21/24  1153 04/21/24  2345 04/22/24  0450 04/22/24  1305 04/23/24  0400   SODIUM 141   < > 137 138 137 142   POTASSIUM 2.3*   < > 3.5* 3.3* 3.6 3.9   CHLORIDE 112   < > 97 97 94* 103   CO2 16*   < > 25 29 27 26   BUN 36*   < > 30* 31* 26* 28*   CREATININE 1.89*   < > 1.33 1.23 1.09 1.04   MAGNESIUM 1.3*  --  2.3  --   --  2.3   PHOSPHORUS 2.6  --  2.3*  --   --  1.5*   CALCIUM 5.2*   < > 8.1* 8.1* 8.1* 8.3*    < > = values in this interval not displayed.     Recent Labs     04/20/24  1537 04/20/24  2040 04/20/24  2348 04/21/24  0400 04/21/24  2345 04/22/24  0450 04/22/24  1305 04/23/24  0400   ALTSGPT 38  --  38  --  31   --   --   --    ASTSGOT 107*  --  102*  --  59*  --   --   --    ALKPHOSPHAT 77  --  76  --  61  --   --   --    TBILIRUBIN 0.2  --  0.3  --  0.5  --   --   --    DBILIRUBIN  --   --  <0.2  --   --   --   --   --    PREALBUMIN  --   --   --   --  13.2*  --   --   --    GLUCOSE 99   < > 132*   < > 229* 220* 104* 125*    < > = values in this interval not displayed.     Recent Labs     04/20/24  1537 04/21/24  1153   RBC 4.45  --    HEMOGLOBIN 13.6  --    HEMATOCRIT 40.7  --    PLATELETCT 213  --    PROTHROMBTM  --  13.0   INR  --  0.97     Recent Labs     04/20/24  1537 04/20/24  2348 04/21/24  2345   WBC 9.9  --   --    NEUTSPOLYS 81.20*  --   --    LYMPHOCYTES 9.80*  --   --    MONOCYTES 8.10  --   --    EOSINOPHILS 0.30  --   --    BASOPHILS 0.30  --   --    ASTSGOT 107* 102* 59*   ALTSGPT 38 38 31   ALKPHOSPHAT 77 76 61   TBILIRUBIN 0.2 0.3 0.5       Meds:   potassium phosphate 15 mmol in dextrose 5% 250 mL ivpb  15 mmol      QUEtiapine  25 mg      acetaminophen  650 mg      aspirin  81 mg      oxyCODONE immediate-release  2.5 mg      Or    oxyCODONE immediate-release  5 mg      amitriptyline  100 mg      DULoxetine  30 mg      ondansetron  4 mg      senna-docusate  2 Tablet      And    polyethylene glycol/lytes  1 Packet      And    magnesium hydroxide  30 mL      And    bisacodyl  10 mg      pregabalin  75 mg      haloperidol lactate  5 mg      insulin regular  3-14 Units      And    dextrose bolus  25 g      dexmedetomidine (Precedex) infusion  0.1-1.5 mcg/kg/hr (Ideal) 1.5 mcg/kg/hr (04/23/24 1001)    NORepinephrine  0-1 mcg/kg/min (Ideal) Stopped (04/21/24 1338)    heparin  5,000 Units      ondansetron  4 mg      Respiratory Therapy Consult        MD Alert...Adult ICU Electrolyte Replacement per Pharmacy        dexamethasone  6 mg            Imaging:  DX-CHEST-PORTABLE (1 VIEW)   Final Result         1.  Pulmonary edema and/or infiltrates, slightly increased compared to prior study.      EC-ECHOCARDIOGRAM  COMPLETE W/ CONT   Final Result      DX-CHEST-PORTABLE (1 VIEW)   Final Result         1.  Pulmonary edema and/or infiltrates are identified, which are stable since the prior exam.   2.  Small layering bilateral pleural effusion   3.  Cardiomegaly      DX-ABDOMEN FOR TUBE PLACEMENT   Final Result         1.  Air-filled distended loops of bowel are seen in the upper abdomen, appearance suggests ileus or enteritis versus evolving obstructive changes. Recommend radiographic followup to resolution to exclude progression to obstruction.   2.  Nasogastric tube tip terminates overlying the expected location of the pylorus or first duodenal segment.   3.  Pulmonary edema and/or infiltrates.      DX-ABDOMEN FOR TUBE PLACEMENT   Final Result      NG tube tip is in the left upper abdomen but the side-port in the distal esophagus. Recommend advancing at least 7 cm.      DX-CHEST-LIMITED (1 VIEW)   Final Result      1.  Endotracheal tube tip located 2 cm above the devon.      2.  NG tube extends into the gastric fundus.      3.  Bibasal atelectasis/consolidation.         US-RENAL   Final Result      1.  Limited visualization the right kidney due to obscuration by bowel gas.      2.  Otherwise normal exam.      DX-ABDOMEN FOR TUBE PLACEMENT   Final Result         Gastric drainage tube with tip projecting over the expected area of the stomach. Sidehole is in the GE junction.      CT-HEAD W/O   Final Result         1. No acute intracranial abnormality. No evidence of acute intracranial hemorrhage or mass lesion.                     DX-CHEST-PORTABLE (1 VIEW)   Final Result         Elevation of the right hemidiaphragm and right basilar atelectasis.      DX-CHEST-PORTABLE (1 VIEW)   Final Result      Linear atelectasis within the right lung base.          ASSESSEMENT and PLAN:    * Septic shock   Septic shock likely secondary to urinary tract infection.  She has had pyuria and dysuria for several weeks now.    Bedside echo  demonstrated normal biventricular function.    US renal negative for hydronephrosis or obstructive uropathy  UA + pyuria, negative urine culture.   COVID+    Blood and urine cultures negative.  Antibiotics discontinued  No longer on vasopressor support      Acute renal failure (ARF) (HCC)       Baseline creatinine 0.9-1.0.  Up to 5 on admission.  Likely ATN related to septic shock in conjunction with ACE inhibitor.  Some hyaline casts on UA.  No indications for renal replacement therapy at present.        US renal negative for hydronephrosis.           Monitor  UOP, strict I/Os        Tam         Monitor electrolytes. Keep K >4, Mg >2        Serial BMP        Significantly improved and close to baseline    Risk for oropharyngeal dysphagia post intubation         Some concern for dysphagia, Evaluated by SLP. NPO pending re-evaluation 04/24. Ok for PO meds with small sips    Acute hypoxemic respiratory failure due to COVID-19 (HCC)  On home oxygen 2L at baseline, now requiring slightly more.  No significant infiltrate on CXR  Dexamethasone 6mg daily x 10 days   Intubated 04/20 due to worsening lethargy somnolent, rather than worsening hypoxia.   -04/22: extubated to HFNC  -04/23:Continues to be on HFNC.   -For agitation: precedex(maxed out at 1.5), seraquel 25 mg TID and prn haldol      Electrolyte depletion  Potassium goal 4  Phosphorus goal 2.5  Magnesium goal 2    Replete and trend    COVID-19  On dexamethasone 6mg daily x 10 days(04/21-04/29)    Major depressive disorder  Continue home amytriptylene, duloxetine    SELWYN (obstructive sleep apnea)  Not on home CPAP          CODE STATUS: FULL        Please note that this dictation was created using voice recognition software. I have made every reasonable attempt to correct obvious errors, but there may be errors of grammar and possibly content that I did not discover before finalizing the note.     Cecil Noonan  R Internal Medicine Resident

## 2024-04-23 NOTE — DOCUMENTATION QUERY
Critical access hospital                                                                       Query Response Note      PATIENT:               ODETTE NICKERSON  ACCT #:                  [id number]  MRN:                     [id number]  :                      1945  ADMIT DATE:       2024 3:32 PM  DISCH DATE:          RESPONDING  PROVIDER #:        [id number]           QUERY TEXT:    Sepsis and septic shock documented in the medical record. Only 1/4 SIRS on admission with RR >20.  WBC and lactic acid WNL. Blood and urine cultures negative. Antibiotics discontinued    Please clarify the status of sepsis after study.      Sepsis - real or suspected infection plus 2 or more SIRS criteria + organ dysfunction related to sepsis    Temp <96.8 or >101  HR >90  RR >20  WBC <4,000 or > 12,000 or >10% bandemia         The patient's Clinical Indicators include:  Clinical Findings:  Labs: WBC 9.9; Lactic acid 1.5  Vital signs: T 97.5; HR 80; RR 25;     UA + pyuria; Urine Cx NEG; Blood Cx NGTD; COVID+    Risk Factors: COVID +, advanced age  Treatments: IV fluids, vasopressor support, mechanical ventilation, IV abx, cultures, labs      Contact me with any questions.    Thank you for your time and attention,  Colleen Solis RN, CDI  [email or web address]  Connect via email, Voalte or messenger.  Options provided:   -- Sepsis with septic shock exists, (provide additional SIRS criteria and confirmed/suspected source)   -- Sepsis does not exist   -- Other explanation, Please specify      Query created by: Colleen Solis on 2024 11:37 AM    RESPONSE TEXT:    Septic shock secondary to COVID-19          Electronically signed by:  ALAN AVENDAÑO MD 2024 1:08 PM

## 2024-04-23 NOTE — DISCHARGE PLANNING
Case Management Discharge Planning    Admission Date: 4/20/2024  GMLOS: 5.1  ALOS: 3    6-Clicks ADL Score:    6-Clicks Mobility Score:        Anticipated Discharge Dispo: Discharge Disposition: Discharged to home/self care (01)    In the case of an emergency, pt's legal NOK is her , Anthony Sánchez [phone]    DME Needed: No    Action(s) Taken: Updated Provider/Nurse on Discharge Plan    Cart review complete; Pt was discussed in IDT rounds today; Pt admitted after being brought in for multiple falls and weakness; Pt was extubated 4/22; Pt currently on High Flow 60L, FiO2 70%; Pt remains critically ill at this time; Pt on isolation for COVID-19; LSW will continue to follow to assist with any CM needs.      Escalations Completed: None    Medically Clear: No    Next Steps: f/u with pt and medical team to discuss dc needs and barriers.     Barriers to Discharge: Medical clearance    Is the patient up for discharge tomorrow: No

## 2024-04-23 NOTE — CARE PLAN
The patient is Watcher - Medium risk of patient condition declining or worsening    Shift Goals  Clinical Goals: pain management, improve respiratory status  Patient Goals: pain mangement  Family Goals: NAHUM    Progress made toward(s) clinical / shift goals:        Problem: Knowledge Deficit - Standard  Goal: Patient and family/care givers will demonstrate understanding of plan of care, disease process/condition, diagnostic tests and medications  Outcome: Progressing     Problem: Skin Integrity  Goal: Skin integrity is maintained or improved  Outcome: Progressing     Problem: Fall Risk  Goal: Patient will remain free from falls  Outcome: Progressing     Problem: Safety - Medical Restraint  Goal: Remains free of injury from restraints (Restraint for Interference with Medical Device)  Outcome: Progressing  Goal: Free from restraint(s) (Restraint for Interference with Medical Device)  Outcome: Progressing     Problem: Pain - Standard  Goal: Alleviation of pain or a reduction in pain to the patient’s comfort goal  Outcome: Progressing

## 2024-04-23 NOTE — CARE PLAN
Problem: Humidified High Flow Nasal Cannula  Goal: Maintain adequate oxygenation dependent on patient condition  Description: Target End Date:  resolve prior to discharge or when underlying condition is resolved/stabilized    1.  Implement humidified high flow oxygen therapy  2.  Titrate high flow oxygen to maintain appropriate SpO2  Outcome: Progressing      Respiratory Update    Treatment modality: HHFNC   60LPM/100%  Frequency: Q4HRS    Pt tolerating current treatments well with no adverse reactions.

## 2024-04-23 NOTE — CARE PLAN
Problem: Knowledge Deficit - Standard  Goal: Patient and family/care givers will demonstrate understanding of plan of care, disease process/condition, diagnostic tests and medications  Outcome: Not Progressing     Problem: Psychosocial  Goal: Patient's level of anxiety will decrease  Outcome: Not Progressing  Goal: Patient's ability to verbalize feelings about condition will improve  Outcome: Not Progressing  Goal: Patient's ability to re-evaluate and adapt role responsibilities will improve  Outcome: Not Progressing  Goal: Patient and family will demonstrate ability to cope with life altering diagnosis and/or procedure  Outcome: Not Progressing  Goal: Spiritual and cultural needs incorporated into hospitalization  Outcome: Not Progressing     Problem: Safety - Medical Restraint  Goal: Remains free of injury from restraints (Restraint for Interference with Medical Device)  Outcome: Progressing     Problem: Pain - Standard  Goal: Alleviation of pain or a reduction in pain to the patient’s comfort goal  Outcome: Progressing   The patient is Stable - Low risk of patient condition declining or worsening    Shift Goals  Clinical Goals: monitor neuro, decrease vent  Patient Goals: erich  Family Goals: erich    Progress made toward(s) clinical / shift goals:  pt extubated per order. Pt extremely agitated A&Ox2-3, pulling at lines, remove oxygen. Pt intermittently follows commands. Medicated multiple times throughout shift for pain and agitation. No signs of understanding despite multiple times pt and family provided education. Discussed POC with Mds.     Patient is not progressing towards the following goals:      Problem: Knowledge Deficit - Standard  Goal: Patient and family/care givers will demonstrate understanding of plan of care, disease process/condition, diagnostic tests and medications  Outcome: Not Progressing     Problem: Psychosocial  Goal: Patient's level of anxiety will decrease  Outcome: Not Progressing  Goal:  Patient's ability to verbalize feelings about condition will improve  Outcome: Not Progressing  Goal: Patient's ability to re-evaluate and adapt role responsibilities will improve  Outcome: Not Progressing  Goal: Patient and family will demonstrate ability to cope with life altering diagnosis and/or procedure  Outcome: Not Progressing  Goal: Spiritual and cultural needs incorporated into hospitalization  Outcome: Not Progressing

## 2024-04-23 NOTE — CARE PLAN
Problem: Humidified High Flow Nasal Cannula  Goal: Maintain adequate oxygenation dependent on patient condition  Description: Target End Date:  resolve prior to discharge or when underlying condition is resolved/stabilized    1.  Implement humidified high flow oxygen therapy  2.  Titrate high flow oxygen to maintain appropriate SpO2  Outcome: Progressing       HHFNC 50L, 60%.    SpO2 keep 88% and above per Dr. Zarco.

## 2024-04-23 NOTE — PROGRESS NOTES
Critical Care Progress Note    Date of admission  4/20/2024    Chief Complaint  78 y.o. female BMI 39 with hx of HTN, HPL, SELWYN, chiari malformation, hx of UTI, hx of multiple falls in the past few days and weakness. +dysuria for several weeks, +cough, 2L oxygen at home. At ED,  SBP in 80-90s, HR in 60s, afebrile. On 2L NC. Lactate 1.5. Found to have COVID+. UA with + pyuria. Creatinine 3.9, BUN 52, HCO3 17. LFT with AST slightly elevated. TSH 0.23. ECG normal. Pt was fluid resuscitated 30cc/kg, started on NE gtt. Blood cultures obtained. Ceftriaxone/cefepime given.   CT head negative for acute abnromalities  US renal negative for obstruction, or hydro    Hospital Course  4/20 VD 1 admitted to ICU  4/21 VD 2 intubated  4/22 VD 3 PEEP 8/45%  4/23 Remains extubated on HFNC    Interval Problem Update  Reviewed last 24 hour events:  Intermittent hyperactive delirium  Precedex titrated to RASS 0  Lasix 20 mg IVP  HFNC 60/70%  COVID+, on dexamethasone  Repeat PCT negative; cultures NGTD, stop abx  Replete electrolytes  VTE ppx/GI ppx     confirms full code status    Review of Systems  Review of Systems   Unable to perform ROS: Critical illness        Vital Signs for last 24 hours   Temp:  [36.7 °C (98.1 °F)-37.4 °C (99.3 °F)] 36.7 °C (98.1 °F)  Pulse:  [49-92] 78  Resp:  [14-64] 52  BP: (118-193)/() 162/87  SpO2:  [83 %-96 %] 92 %    Hemodynamic parameters for last 24 hours       Respiratory Information for the last 24 hours       Physical Exam   Physical Exam  Vitals and nursing note reviewed.   Constitutional:       General: She is not in acute distress.     Appearance: She is obese. She is ill-appearing. She is not diaphoretic.   HENT:      Head: Normocephalic and atraumatic.      Right Ear: External ear normal.      Left Ear: External ear normal.      Mouth/Throat:      Mouth: Mucous membranes are moist.   Eyes:      Pupils: Pupils are equal, round, and reactive to light.   Cardiovascular:      Rate and  Rhythm: Normal rate and regular rhythm.      Pulses: Normal pulses.      Heart sounds: Normal heart sounds. No murmur heard.  Pulmonary:      Breath sounds: Normal breath sounds. No wheezing, rhonchi or rales.   Abdominal:      General: Abdomen is flat. There is no distension.      Palpations: Abdomen is soft.      Tenderness: There is no abdominal tenderness. There is no guarding.   Musculoskeletal:      Cervical back: Neck supple.      Right lower leg: No edema.      Left lower leg: No edema.   Skin:     Capillary Refill: Capillary refill takes less than 2 seconds. Warm extremiites     Coloration: Skin is not jaundiced.      Findings: No bruising or rash.   Neurological:      Comments: Awake, alert, agitated, moves all 4 extremities          Medications  Current Facility-Administered Medications   Medication Dose Route Frequency Provider Last Rate Last Admin    potassium phosphate 15 mmol in dextrose 5% 250 mL ivpb  15 mmol Intravenous Once Brenden Zarco M.D. 62.5 mL/hr at 04/23/24 1036 15 mmol at 04/23/24 1036    QUEtiapine (SEROquel) tablet 25 mg  25 mg Oral TID Brenden Zarco M.D.        acetaminophen (Tylenol) tablet 650 mg  650 mg Oral Q6HRS PRN Brenden Zarco M.D.        aspirin (Asa) chewable tab 81 mg  81 mg Oral DAILY Brenden Zarco M.D.   81 mg at 04/23/24 0519    oxyCODONE immediate-release (Roxicodone) tablet 2.5 mg  2.5 mg Oral Q4HRS PRN Brenden Zarco M.D.        Or    oxyCODONE immediate-release (Roxicodone) tablet 5 mg  5 mg Oral Q4HRS PRN Brenden Zarco M.D.        amitriptyline (Elavil) tablet 100 mg  100 mg Oral Nightly Brenden Zarco M.D.        DULoxetine (Cymbalta) capsule 30 mg  30 mg Oral DAILY Brenden Zarco M.D.   30 mg at 04/23/24 0519    ondansetron (Zofran ODT) dispertab 4 mg  4 mg Oral Q4HRS PRN Brenden Zarco M.D.        senna-docusate (Pericolace Or Senokot S) 8.6-50 MG per tablet 2 Tablet  2 Tablet Oral BID Brenden Zarco M.D.        And     polyethylene glycol/lytes (Miralax) Packet 1 Packet  1 Packet Oral QDAY PRN Brenden Zarco M.D.        And    magnesium hydroxide (Milk Of Magnesia) suspension 30 mL  30 mL Oral QDAY PRN Brenden Zarco M.D.        And    bisacodyl (Dulcolax) suppository 10 mg  10 mg Rectal QDAY PRN Brenden Zarco M.D.        pregabalin (Lyrica) capsule 75 mg  75 mg Oral BID Brenden Zarco M.D.   75 mg at 04/23/24 0519    haloperidol lactate (Haldol) injection 5 mg  5 mg Intravenous Q4HRS PRN Cecil Noonan M.D.   5 mg at 04/22/24 1922    insulin regular (HumuLIN R,NovoLIN R) injection  3-14 Units Subcutaneous Q6HRS LUISA MendezOJulio   14 Units at 04/22/24 0459    And    dextrose 10 % BOLUS 25 g  25 g Intravenous Q15 MIN PRN LUISA MendezOJulio        dexmedetomidine (PRECEDEX) 400 mcg/100mL NS premix infusion  0.1-1.5 mcg/kg/hr (Ideal) Intravenous Continuous Kan Bauman D.O. 17.1 mL/hr at 04/23/24 1001 1.5 mcg/kg/hr at 04/23/24 1001    norepinephrine (Levophed) 8 mg in 250 mL NS infusion (premix)  0-1 mcg/kg/min (Ideal) Intravenous Continuous Jaquelin Brewer M.D.   Stopped at 04/21/24 1338    heparin injection 5,000 Units  5,000 Units Subcutaneous Q8HRS Aric Bloom M.D.   5,000 Units at 04/23/24 0519    ondansetron (Zofran) syringe/vial injection 4 mg  4 mg Intravenous Q4HRS PRN Aric Bloom M.D.        Respiratory Therapy Consult   Nebulization Continuous RT Montez June D.O.        MD Alert...ICU Electrolyte Replacement per Pharmacy   Other PHARMACY TO DOSE Montez June D.O.        dexamethasone (Decadron) injection 6 mg  6 mg Intravenous DAILY Aric Bloom M.D.   6 mg at 04/23/24 0519       Fluids    Intake/Output Summary (Last 24 hours) at 4/23/2024 1202  Last data filed at 4/23/2024 0400  Gross per 24 hour   Intake 254.51 ml   Output 2100 ml   Net -1845.49 ml       Laboratory  Recent Labs     04/21/24  0043 04/21/24  0533 04/22/24  0518   ISTATAPH 7.384* 7.432 7.449   ISTATAPCO2 27.6 31.1 44.5*   ISTATAPO2  80 52* 68   ISTATATCO2 17* 22 32   OADKZPB4JPE 96 88* 94   ISTATARTHCO3 16.5* 20.8 30.9*   ISTATARTBE -7* -3 6*   ISTATTEMP 36.5 C 37.1 C 36.2 C   ISTATFIO2 40 40 50   ISTATSPEC Arterial Arterial Arterial   ISTATAPHTC 7.391* 7.431 7.461   XHLZUIBJ1DQ 77 52* 65         Recent Labs     04/21/24  0400 04/21/24  1153 04/21/24  2345 04/22/24  0450 04/22/24  1305 04/23/24  0400   SODIUM 141   < > 137 138 137 142   POTASSIUM 2.3*   < > 3.5* 3.3* 3.6 3.9   CHLORIDE 112   < > 97 97 94* 103   CO2 16*   < > 25 29 27 26   BUN 36*   < > 30* 31* 26* 28*   CREATININE 1.89*   < > 1.33 1.23 1.09 1.04   MAGNESIUM 1.3*  --  2.3  --   --  2.3   PHOSPHORUS 2.6  --  2.3*  --   --  1.5*   CALCIUM 5.2*   < > 8.1* 8.1* 8.1* 8.3*    < > = values in this interval not displayed.     Recent Labs     04/20/24 1537 04/20/24  2040 04/20/24  2348 04/21/24  0400 04/21/24  2345 04/22/24  0450 04/22/24  1305 04/23/24  0400   ALTSGPT 38  --  38  --  31  --   --   --    ASTSGOT 107*  --  102*  --  59*  --   --   --    ALKPHOSPHAT 77  --  76  --  61  --   --   --    TBILIRUBIN 0.2  --  0.3  --  0.5  --   --   --    DBILIRUBIN  --   --  <0.2  --   --   --   --   --    PREALBUMIN  --   --   --   --  13.2*  --   --   --    GLUCOSE 99   < > 132*   < > 229* 220* 104* 125*    < > = values in this interval not displayed.     Recent Labs     04/20/24 1537 04/20/24  2348 04/21/24  2345   WBC 9.9  --   --    NEUTSPOLYS 81.20*  --   --    LYMPHOCYTES 9.80*  --   --    MONOCYTES 8.10  --   --    EOSINOPHILS 0.30  --   --    BASOPHILS 0.30  --   --    ASTSGOT 107* 102* 59*   ALTSGPT 38 38 31   ALKPHOSPHAT 77 76 61   TBILIRUBIN 0.2 0.3 0.5     Recent Labs     04/20/24  1537 04/21/24  1153   RBC 4.45  --    HEMOGLOBIN 13.6  --    HEMATOCRIT 40.7  --    PLATELETCT 213  --    PROTHROMBTM  --  13.0   INR  --  0.97       Imaging  X-Ray:  I have personally reviewed the images and compared with prior images.  CT:    Reviewed    Assessment/Plan  * Septic shock (HCC)-  (present on admission)  Assessment & Plan  Septic shock likely secondary to urinary tract infection.  She has had pyuria and dysuria for several weeks now.    Bedside echo demonstrated normal biventricular function.    US renal negative for hydronephrosis or obstructive uropathy  UA + pyuria, negative urine culture.   COVID+    Cultures negative  Observe off antibiotics       Persistent hyperactive delirium due to multiple etiologies  Assessment & Plan  Delirium bundle  Reinitiate home medications  Precedex titrated to RASS 0  Avoid benzodiazepines     Electrolyte depletion  Assessment & Plan  Potassium goal 4  Phosphorus goal 2.5  Magnesium goal 2    Replete and trend      COVID-19  Assessment & Plan  On dexamethasone 6mg daily x 10 days    Acute hypoxemic respiratory failure due to COVID-19 ([facility])  Assessment & Plan  Extubated 4/22    Decadron x 10 days  Pulmonary hygiene  Maintain euvolemia  SpO2 > 90%      Acute renal failure (ARF) ([facility])  Assessment & Plan  Baseline creatinine 0.9-1.0.  Up to 5.  Likely ATN related to septic shock in conjunction with ACE inhibitor.  Some hyaline casts on UA.  No indications for renal replacement therapy at present.  HCO3 16, normal PH   US renal negative for hydronephrosis.     Monitor  UOP, strict I/Os  Tam   Monitor electrolytes. Keep K >4, Mg >2  Serial BMP      Major depressive disorder- (present on admission)  Assessment & Plan  Continue home amytriptylene, duloxetine    SELWYN (obstructive sleep apnea)- (present on admission)  Assessment & Plan  Not on home CPAP           VTE:  Heparin SQ  Ulcer: H2 Antagonist  Lines: Tam Catheter  Ongoing indication addressed    I have performed a physical exam and reviewed and updated ROS and Plan today (4/23/2024). In review of yesterday's note (4/22/2024), there are no changes except as documented above.     Discussed patient condition and risk of morbidity and/or mortality with Family, RN, RT, Pharmacy, Charge nurse / hot rounds, and  Patient  The patient remains critically ill.  Critical care time = 70 minutes in directly providing and coordinating critical care and extensive data review.  No time overlap and excludes procedures.

## 2024-04-23 NOTE — THERAPY
"Speech Language Pathology   Clinical Swallow Evaluation     Patient Name: Tereza Sánchez  AGE:  78 y.o., SEX:  female  Medical Record #: [id number]  Date of Service: 4/23/2024      History of Present Illness  79 y/o female admitted 4/20 following multiple syncopal events over several days. Found to be hypotensive, complaining of dysuria and cough. Noted to be COVID positive. Intubated 4/20-4/22.     CMHx: Septic shock, ARF, acute respiratory failure, COVID-19, delirium  PMHx: Kyphosis, CHF, GERD, hx partial thyroidectomy, chronic respiratory failure. HLD, sleep apnea, Chiari malformation, obesity    No hx SLP in [facility].    CT Head 4/21:  \"1. No acute intracranial abnormality. No evidence of acute intracranial hemorrhage or mass lesion.\"    CXR 4/23:  \"1.  Pulmonary edema and/or infiltrates, slightly increased compared to prior study.\"    General Information:  Vitals  Pulse: 77  Respiration: (!) 41  Pulse Oximetry: 91 %  O2 (LPM): 60  O2 Delivery Device: Heated High Flow Nasal Cannula  Vitals Comments: 70% FiO2; RR in ths 70s upon SLP arrival  Level of Consciousness: Alert, Awake  Patient Behaviors: Anxious, Restless  Orientation: Oriented to all but reason for admission, stating generally \"I'm getting sick\"  Follows Directives: Yes      Prior Living Situation & Level of Function:  Lives with - Patient's Self Care Capacity: Spouse  Comments: Baseline mobile with walker per EMR  Communication: WFL  Swallowing: WFL       Oral Mechanism Evaluation:  Dentition: Edentulous, Upper denture, Denture(s) not available at time of evaluation   Facial Symmetry: Equal  Facial Sensation: Equal     Labial Observations: WFL   Lingual Observations: Midline  Motor Speech: WFL    Laryngeal Function:  Secretion Management: Adequate  Voice Quality: WFL  Cough: Perceptually weak  Comments: Pain reported with cough but not with speech or swallow      Subjective  Pt somewhat agreeable and cooperative with SLP evaluation tasks. Self-limiting, " "reported she felt \"So full\" after single ice chip and begging SLP to not provide further PO. Restless throughout, reporting some nausea, but she did have any spit up or emesis during session      Assessment  Current Method of Nutrition: NPO until cleared by speech pathology  Positioning: Schroeder's (60-90 degrees)  Bolus Administration: SLP  O2 (LPM): 60 O2 Delivery Device: Heated High Flow Nasal Cannula  Factor(s) Affecting Performance: Impaired endurance, Impaired mental status  Tracheostomy : No    Swallowing Trials:  Ice: Impaired  Thin Liquid (TN0): Impaired    Comments:   Dried secretions removed from lips prior to trials. Patient requiring max encouragement from SLP to participate in PO. Incomplete bolus stripping from spoon with patient spluttering and expectorating majority of ice chip bolus, stated \"That's too much.\" Agreeable to take small single ice chip with total bolus stripping and adequate containment. Appreciable swallow. Following single ice chip, patient became increasingly restless, stating that she felt so full and could not do any more. After max encouragement she was willing to take single straw sip of TN0 water. Increased restlessness after this as well; she also had cough response concerning for airway invasion.    Clarified feeling of \"fullness\" with patient - she did not report globus, nausea, or heartburn. She only reported severe early satiety/inability to eat or drink more. She reported she has not felt this way before getting ill.       Clinical Impressions  Pt presents with clinical indicators of and is at elevated risk for oropharyngeal dysphagia given acute on chronic respiratory failure requiring intubation, COVID-19, and delirium. Pt may benefit from further evaluation of swallow prior to meaningful initiation of PO; however, she is not appropriate for diagnostic evaluation this date given poor participation and anxiety/early satiety with very limited PO. Will plan for SLP " "re-evaluation on 4/24.      Recommendations  NPO pending SLP re-evaluation on 4/24   - Ice chips appropriate as desired   - Likely appropriate for PO meds with small sips, defer to MD  - Consider replacement of NGT should patient have difficulty with or refuse PO medications  Instrumentation:  Likely to need FEES; recommend to hold this date given poor participation  Supervision: Careful 1:1 hand feeding  Oral Care: Q2h       SLP Treatment Plan  Treatment Plan: Dysphagia Treatment, Patient/Family/Caregiver Training  SLP Frequency: 4x Per Week  Estimated Duration: Until Therapy Goals Met      Anticipated Discharge Needs  Discharge Recommendations: Recommend post-acute placement for additional speech therapy services prior to discharge home   Therapy Recommendations Upon DC: Dysphagia Training, Patient / Family / Caregiver Education, Community Re-Integration        Patient / Family Goals  Patient / Family Goal #1: \"I'm so full; please no more.\"  Short Term Goals  Short Term Goal # 1: Pt will participate in prefeeding to determine readiness for PO diet vs. diagnostic evaluation.      Fani Gamez, SLP   "

## 2024-04-23 NOTE — DIETARY
Nutrition Update:    Day 3 of admit.  Tereza Sánchez is a 78 y.o. female with admitting DX of Septic shock (HCC) [A41.9, R65.21].    Current Diet: NPO strict. Pt's OGT was removed w/ extubation yesterday; TF only up to 10 mL/hr per flowsheets. Agitated overnight requiring precedex, haldol, seroquel. SLP eval failed this a.m.; pt spitting ice chips out, poor participation. SLP to follow up tomorrow for re-evaluation per chart notes. If pt unable to safely start PO diet tomorrow, recommend re-establishing enteral access and starting tube feeds until pt able to take adequate nutrition PO.    RD continues to follow.

## 2024-04-23 NOTE — PROGRESS NOTES
1945 updated MD regarding patient remaining RASS +3 regarding medications provided in MAR.     Intervention: one time dose of Dilaudid 0.5mg    2030 updated MD patient still RASS +3 despite multiple attempts to reposition, mobilize, and adjust HFNC for patient    Intervention:  1 mg Versed IV    2146: updated MD regarding patient still RASS +3 regarding prn interventions and repositioning.    Invervention: 2 mg versed IV

## 2024-04-23 NOTE — PROGRESS NOTES
4 Eyes Skin Assessment Completed by Oleksandr RN and Miguel RN.    Head WDL  Ears WDL  Nose WDL  Mouth WDL  Neck WDL  Breast/Chest Redness  Shoulder Blades Redness  Spine WDL  (R) Arm/Elbow/Hand Redness and Blanching  (L) Arm/Elbow/Hand Redness and Blanching  Abdomen Redness and Bruising  Groin Redness and Excoriation  Scrotum/Coccyx/Buttocks Redness and Blanching  (R) Leg Edema  (L) Leg Edema  (R) Heel/Foot/Toe Redness and Blanching  (L) Heel/Foot/Toe Redness and Blanching          Devices In Places ECG, Blood Pressure Cuff, Pulse Ox, Tam, SCD's, and HFNC      Interventions In Place NC Cheek Stickers, TAP System, Pillows, Optifoam, Q2 Turns, Low Air Loss Mattress, Barrier Cream, Heels Loaded W/Pillows, and Pressure Redistribution Mattress    Possible Skin Injury No    Pictures Uploaded Into Epic N/A  Wound Consult Placed N/A  RN Wound Prevention Protocol Ordered No

## 2024-04-24 ENCOUNTER — APPOINTMENT (OUTPATIENT)
Dept: RADIOLOGY | Facility: MEDICAL CENTER | Age: 79
DRG: 871 | End: 2024-04-24
Attending: INTERNAL MEDICINE
Payer: MEDICARE

## 2024-04-24 LAB
ANION GAP SERPL CALC-SCNC: 18 MMOL/L (ref 7–16)
BASOPHILS # BLD AUTO: 0.2 % (ref 0–1.8)
BASOPHILS # BLD: 0.02 K/UL (ref 0–0.12)
BUN SERPL-MCNC: 29 MG/DL (ref 8–22)
CALCIUM SERPL-MCNC: 8.3 MG/DL (ref 8.5–10.5)
CHLORIDE SERPL-SCNC: 100 MMOL/L (ref 96–112)
CO2 SERPL-SCNC: 25 MMOL/L (ref 20–33)
CREAT SERPL-MCNC: 0.95 MG/DL (ref 0.5–1.4)
EKG IMPRESSION: NORMAL
EOSINOPHIL # BLD AUTO: 0 K/UL (ref 0–0.51)
EOSINOPHIL NFR BLD: 0 % (ref 0–6.9)
ERYTHROCYTE [DISTWIDTH] IN BLOOD BY AUTOMATED COUNT: 44.3 FL (ref 35.9–50)
GFR SERPLBLD CREATININE-BSD FMLA CKD-EPI: 62 ML/MIN/1.73 M 2
GLUCOSE BLD STRIP.AUTO-MCNC: 121 MG/DL (ref 65–99)
GLUCOSE BLD STRIP.AUTO-MCNC: 130 MG/DL (ref 65–99)
GLUCOSE BLD STRIP.AUTO-MCNC: 156 MG/DL (ref 65–99)
GLUCOSE BLD STRIP.AUTO-MCNC: 156 MG/DL (ref 65–99)
GLUCOSE SERPL-MCNC: 127 MG/DL (ref 65–99)
HCT VFR BLD AUTO: 41.7 % (ref 37–47)
HGB BLD-MCNC: 14.4 G/DL (ref 12–16)
IMM GRANULOCYTES # BLD AUTO: 0.12 K/UL (ref 0–0.11)
IMM GRANULOCYTES NFR BLD AUTO: 1 % (ref 0–0.9)
LYMPHOCYTES # BLD AUTO: 0.49 K/UL (ref 1–4.8)
LYMPHOCYTES NFR BLD: 4.2 % (ref 22–41)
MAGNESIUM SERPL-MCNC: 2 MG/DL (ref 1.5–2.5)
MCH RBC QN AUTO: 29.9 PG (ref 27–33)
MCHC RBC AUTO-ENTMCNC: 34.5 G/DL (ref 32.2–35.5)
MCV RBC AUTO: 86.5 FL (ref 81.4–97.8)
MONOCYTES # BLD AUTO: 1.39 K/UL (ref 0–0.85)
MONOCYTES NFR BLD AUTO: 12 % (ref 0–13.4)
NEUTROPHILS # BLD AUTO: 9.57 K/UL (ref 1.82–7.42)
NEUTROPHILS NFR BLD: 82.6 % (ref 44–72)
NRBC # BLD AUTO: 0 K/UL
NRBC BLD-RTO: 0 /100 WBC (ref 0–0.2)
PHOSPHATE SERPL-MCNC: 1.5 MG/DL (ref 2.5–4.5)
PLATELET # BLD AUTO: 286 K/UL (ref 164–446)
PMV BLD AUTO: 11.4 FL (ref 9–12.9)
POTASSIUM SERPL-SCNC: 3.2 MMOL/L (ref 3.6–5.5)
RBC # BLD AUTO: 4.82 M/UL (ref 4.2–5.4)
SODIUM SERPL-SCNC: 143 MMOL/L (ref 135–145)
WBC # BLD AUTO: 11.6 K/UL (ref 4.8–10.8)

## 2024-04-24 PROCEDURE — 92526 ORAL FUNCTION THERAPY: CPT

## 2024-04-24 PROCEDURE — 700111 HCHG RX REV CODE 636 W/ 250 OVERRIDE (IP): Mod: JZ

## 2024-04-24 PROCEDURE — 700105 HCHG RX REV CODE 258: Performed by: INTERNAL MEDICINE

## 2024-04-24 PROCEDURE — 700111 HCHG RX REV CODE 636 W/ 250 OVERRIDE (IP): Performed by: INTERNAL MEDICINE

## 2024-04-24 PROCEDURE — 85025 COMPLETE CBC W/AUTO DIFF WBC: CPT

## 2024-04-24 PROCEDURE — 99497 ADVNCD CARE PLAN 30 MIN: CPT | Performed by: STUDENT IN AN ORGANIZED HEALTH CARE EDUCATION/TRAINING PROGRAM

## 2024-04-24 PROCEDURE — A9270 NON-COVERED ITEM OR SERVICE: HCPCS | Performed by: INTERNAL MEDICINE

## 2024-04-24 PROCEDURE — 87040 BLOOD CULTURE FOR BACTERIA: CPT | Mod: 91

## 2024-04-24 PROCEDURE — 700101 HCHG RX REV CODE 250: Performed by: INTERNAL MEDICINE

## 2024-04-24 PROCEDURE — 82962 GLUCOSE BLOOD TEST: CPT | Mod: 91

## 2024-04-24 PROCEDURE — 80048 BASIC METABOLIC PNL TOTAL CA: CPT

## 2024-04-24 PROCEDURE — 93010 ELECTROCARDIOGRAM REPORT: CPT | Performed by: INTERNAL MEDICINE

## 2024-04-24 PROCEDURE — 99291 CRITICAL CARE FIRST HOUR: CPT | Performed by: INTERNAL MEDICINE

## 2024-04-24 PROCEDURE — 83735 ASSAY OF MAGNESIUM: CPT

## 2024-04-24 PROCEDURE — 84100 ASSAY OF PHOSPHORUS: CPT

## 2024-04-24 PROCEDURE — 99222 1ST HOSP IP/OBS MODERATE 55: CPT | Mod: 25 | Performed by: STUDENT IN AN ORGANIZED HEALTH CARE EDUCATION/TRAINING PROGRAM

## 2024-04-24 PROCEDURE — 700111 HCHG RX REV CODE 636 W/ 250 OVERRIDE (IP): Mod: JZ | Performed by: INTERNAL MEDICINE

## 2024-04-24 PROCEDURE — 700102 HCHG RX REV CODE 250 W/ 637 OVERRIDE(OP): Performed by: INTERNAL MEDICINE

## 2024-04-24 PROCEDURE — 700111 HCHG RX REV CODE 636 W/ 250 OVERRIDE (IP): Performed by: EMERGENCY MEDICINE

## 2024-04-24 PROCEDURE — 770000 HCHG ROOM/CARE - INTERMEDIATE ICU *

## 2024-04-24 PROCEDURE — 94640 AIRWAY INHALATION TREATMENT: CPT

## 2024-04-24 RX ORDER — HYDRALAZINE HYDROCHLORIDE 20 MG/ML
20 INJECTION INTRAMUSCULAR; INTRAVENOUS EVERY 6 HOURS PRN
Status: DISCONTINUED | OUTPATIENT
Start: 2024-04-24 | End: 2024-04-24

## 2024-04-24 RX ORDER — LABETALOL HYDROCHLORIDE 5 MG/ML
10 INJECTION, SOLUTION INTRAVENOUS EVERY 4 HOURS PRN
Status: DISCONTINUED | OUTPATIENT
Start: 2024-04-24 | End: 2024-04-30 | Stop reason: HOSPADM

## 2024-04-24 RX ORDER — HYDRALAZINE HYDROCHLORIDE 20 MG/ML
10 INJECTION INTRAMUSCULAR; INTRAVENOUS EVERY 6 HOURS PRN
Status: DISCONTINUED | OUTPATIENT
Start: 2024-04-24 | End: 2024-04-30 | Stop reason: HOSPADM

## 2024-04-24 RX ORDER — ENOXAPARIN SODIUM 100 MG/ML
40 INJECTION SUBCUTANEOUS DAILY
Status: DISCONTINUED | OUTPATIENT
Start: 2024-04-24 | End: 2024-04-30 | Stop reason: HOSPADM

## 2024-04-24 RX ADMIN — QUETIAPINE FUMARATE 25 MG: 25 TABLET ORAL at 23:02

## 2024-04-24 RX ADMIN — FUROSEMIDE 20 MG: 10 INJECTION INTRAMUSCULAR; INTRAVENOUS at 15:55

## 2024-04-24 RX ADMIN — DEXMEDETOMIDINE HYDROCHLORIDE 0.2 MCG/KG/HR: 100 INJECTION, SOLUTION INTRAVENOUS at 01:14

## 2024-04-24 RX ADMIN — HYDRALAZINE HYDROCHLORIDE 10 MG: 20 INJECTION, SOLUTION INTRAMUSCULAR; INTRAVENOUS at 20:49

## 2024-04-24 RX ADMIN — FENTANYL CITRATE 25 MCG: 50 INJECTION, SOLUTION INTRAMUSCULAR; INTRAVENOUS at 00:00

## 2024-04-24 RX ADMIN — DEXMEDETOMIDINE HYDROCHLORIDE 0.8 MCG/KG/HR: 100 INJECTION, SOLUTION INTRAVENOUS at 17:49

## 2024-04-24 RX ADMIN — DEXMEDETOMIDINE HYDROCHLORIDE 0.2 MCG/KG/HR: 100 INJECTION, SOLUTION INTRAVENOUS at 01:12

## 2024-04-24 RX ADMIN — INSULIN HUMAN 3 UNITS: 100 INJECTION, SOLUTION PARENTERAL at 17:45

## 2024-04-24 RX ADMIN — ENOXAPARIN SODIUM 40 MG: 100 INJECTION SUBCUTANEOUS at 17:46

## 2024-04-24 RX ADMIN — TRAZODONE HYDROCHLORIDE 50 MG: 50 TABLET ORAL at 23:03

## 2024-04-24 RX ADMIN — POTASSIUM PHOSPHATE, MONOBASIC AND POTASSIUM PHOSPHATE, DIBASIC 30 MMOL: 224; 236 INJECTION, SOLUTION, CONCENTRATE INTRAVENOUS at 08:31

## 2024-04-24 RX ADMIN — INSULIN HUMAN 3 UNITS: 100 INJECTION, SOLUTION PARENTERAL at 13:06

## 2024-04-24 RX ADMIN — POTASSIUM PHOSPHATE, MONOBASIC AND POTASSIUM PHOSPHATE, DIBASIC 15 MMOL: 224; 236 INJECTION, SOLUTION, CONCENTRATE INTRAVENOUS at 14:43

## 2024-04-24 RX ADMIN — HEPARIN SODIUM 5000 UNITS: 5000 INJECTION, SOLUTION INTRAVENOUS; SUBCUTANEOUS at 06:14

## 2024-04-24 RX ADMIN — AMITRIPTYLINE HYDROCHLORIDE 100 MG: 100 TABLET, FILM COATED ORAL at 23:02

## 2024-04-24 RX ADMIN — FUROSEMIDE 20 MG: 10 INJECTION INTRAMUSCULAR; INTRAVENOUS at 06:14

## 2024-04-24 RX ADMIN — DEXAMETHASONE SODIUM PHOSPHATE 6 MG: 4 INJECTION, SOLUTION INTRAMUSCULAR; INTRAVENOUS at 06:15

## 2024-04-24 RX ADMIN — PREGABALIN 75 MG: 75 CAPSULE ORAL at 23:03

## 2024-04-24 ASSESSMENT — COGNITIVE AND FUNCTIONAL STATUS - GENERAL
MOVING FROM LYING ON BACK TO SITTING ON SIDE OF FLAT BED: A LOT
EATING MEALS: A LOT
MOVING TO AND FROM BED TO CHAIR: TOTAL
DRESSING REGULAR LOWER BODY CLOTHING: TOTAL
DAILY ACTIVITIY SCORE: 7
HELP NEEDED FOR BATHING: TOTAL
STANDING UP FROM CHAIR USING ARMS: TOTAL
SUGGESTED CMS G CODE MODIFIER MOBILITY: CM
CLIMB 3 TO 5 STEPS WITH RAILING: TOTAL
MOBILITY SCORE: 8
WALKING IN HOSPITAL ROOM: TOTAL
TURNING FROM BACK TO SIDE WHILE IN FLAT BAD: A LOT
SUGGESTED CMS G CODE MODIFIER DAILY ACTIVITY: CM
DRESSING REGULAR UPPER BODY CLOTHING: TOTAL
PERSONAL GROOMING: TOTAL
TOILETING: TOTAL

## 2024-04-24 ASSESSMENT — PAIN DESCRIPTION - PAIN TYPE
TYPE: ACUTE PAIN

## 2024-04-24 ASSESSMENT — LIFESTYLE VARIABLES
EVER FELT BAD OR GUILTY ABOUT YOUR DRINKING: NO
AVERAGE NUMBER OF DAYS PER WEEK YOU HAVE A DRINK CONTAINING ALCOHOL: 0
HAVE YOU EVER FELT YOU SHOULD CUT DOWN ON YOUR DRINKING: NO
TOTAL SCORE: 0
ON A TYPICAL DAY WHEN YOU DRINK ALCOHOL HOW MANY DRINKS DO YOU HAVE: 0
TOTAL SCORE: 0
HAVE PEOPLE ANNOYED YOU BY CRITICIZING YOUR DRINKING: NO
ALCOHOL_USE: NO
TOTAL SCORE: 0
HOW MANY TIMES IN THE PAST YEAR HAVE YOU HAD 5 OR MORE DRINKS IN A DAY: 0
EVER HAD A DRINK FIRST THING IN THE MORNING TO STEADY YOUR NERVES TO GET RID OF A HANGOVER: NO
DOES PATIENT WANT TO STOP DRINKING: NO
CONSUMPTION TOTAL: NEGATIVE

## 2024-04-24 ASSESSMENT — FIBROSIS 4 INDEX: FIB4 SCORE: 2.82

## 2024-04-24 ASSESSMENT — PATIENT HEALTH QUESTIONNAIRE - PHQ9
1. LITTLE INTEREST OR PLEASURE IN DOING THINGS: NOT AT ALL
SUM OF ALL RESPONSES TO PHQ9 QUESTIONS 1 AND 2: 0
2. FEELING DOWN, DEPRESSED, IRRITABLE, OR HOPELESS: NOT AT ALL

## 2024-04-24 ASSESSMENT — ENCOUNTER SYMPTOMS: MYALGIAS: 1

## 2024-04-24 NOTE — PROGRESS NOTES
4 Eyes Skin Assessment Completed by Renetta RN and JIMMY Rivera.    Head WDL  Ears Redness and Blanching  Nose Redness and Blanching  Mouth WDL  Neck WDL  Breast/Chest Excoriation  Shoulder Blades WDL  Spine WDL  (R) Arm/Elbow/Hand Redness, Blanching, and Edema, Blanching Bruising   (L) Arm/Elbow/Hand Redness, Blanching, Bruising, and Edema  Abdomen WDL  Groin Redness and Excoriation  Scrotum/Coccyx/Buttocks Redness, Blanching  (R) Leg WDL  (L) Leg WDL  (R) Heel/Foot/Toe Boggy  (L) Heel/Foot/Toe Boggy          Devices In Places ECG, Blood Pressure Cuff, Pulse Ox, and Tam      Interventions In Place Sacral Mepilex, Heel Float Boots, Pillows, Elbow Mepilex, Optifoam, Q2 Turns, Barrier Cream, ZFlo Pillow, Heels Loaded W/Pillows, and Pressure Redistribution Mattress    Possible Skin Injury No    Pictures Uploaded Into Epic N/A  Wound Consult Placed N/A  RN Wound Prevention Protocol Ordered Yes

## 2024-04-24 NOTE — CONSULTS
Inpatient Palliative Medicine Evaluation (SIGN OFF)     Tereza Sánchez  78 y.o. female  [id number]    Location = [facility]  Referral Source = Mary Mallory M.d.    PCP = Michelle Jim P.A.-C.    Reason for palliative medicine consultation and/or visit: ACP         Assessment and Plan:     GOAL(S) OF CARE = LONGEVITY    SYMPTOMS ETIOLOGY/CAUSES =  encephalopathy due to recovering COVID-19 infection    PROGNOSIS = GUARDED  NOTE - There is not yet a progressive terminal disease diagnosis at this time.  Clinically well and breathing better with lower O2 requirement, sp COVID-19 vaccination last dose 2022.    CODE STATUS = FULL    ADVANCE CARE PLANNING =   Relevant history reviewed.  Medical updates.  Advance directive introduction and encouragement for patient & her  Anthony to complete one.    PALLIATIVE CARE TEAM INTERVENTIONS =   ACP  GOC unchanged  Clinically improved, and not yet with a definitive terminal disease diagnosis, despite comorbid chronic diseases & mood disorders.  Encouraged pt and  Anthony to complete an advance directive when she is home recovering again.    Inpatient palliative care will sign off for now. Please reconsult again when new medical indications arise.          Summary =   Tereza Sánchez is a 78 y.o. obese female with HTN, HPL, SELWYN, chiari malformation admitted 4/20/2024 for new onset COVID19 infection, also with pyuria. Patient became acutely obtunded 4/20/2024 late night and was urgently intubated. She was extubated 4/23/2024 and was requiring HFNC 50-60 LPM throughout the day with continued confusion and agitation requiring restraints.  ----------------------------------------------------------------------------------------------------------------------    Met with patient and her  Anthony bedside. Introduced myself and [facility] PGY3 Dr. Mary Sanchez, our roles and reasons for this consult. Family were agreeable to proceed.    Patient still endorsed fatigue,  but was much more interactive and requiring 4LPM at this time.She was technically A&Ox3 and able to tell me about her 60-year marriage with Anthony, and that they have lived here all their lives. Denied pain nor discomfort.    According to Anthony, patient at baseline can ambulate short distances at home with a walker. She is ADL-independent. Because of her SELWYN, she needs 2LPM at night to help her sleep.    Tereza and Anthony have a son in [place], [place], and a daughter living in [place]. They feel supported overall.    Updated patient and Anthony about current clinical situation, and the fact that swallow evaluation remains an important part of care going forward, in order to start diet again.    GOC unchanged and clarified. Encouraged family to complete an advance care directive later when able to. Anthony was amenable.           Medical Decision Making =    Patient is of high medical complexity with incurable disease = HPL, Chiari malformation  Care complexity further complicated by secondary conditions = SELWYN, HTN, UTI's, frequent fall    Extensive data reviewed & coordination performed = notes, labs, imaging, ICU team communication 4/23/2024    High risk of morbidity from additional interventions &  studies = increased O2 demand, anticoagulation use, elevated age.        Advance care planning  =   The patient and/or legal decision maker has provided voluntary consent to discuss advance care planning. We discussed code status.     The following documents were discussed: advance directive completion at home, once patient is discharged & recovering    Total time spent in ACP discussion 30 minutes, which is separate from the time spent completing the evaluation and management visit.     Thank you for allowing me the opportunity to participate in the care of Tereza Sánchez     I spent a total of 60 minutes reviewing medical records, direct face-to-face time with the patient and/or family, documentation and  coordination of care. This is separate from the time spent on advance care planning, which is documented above.         NAZ (GIANNI) DO [facility]   [address] [place]  [id number]  P: [phone]  F: [phone]

## 2024-04-24 NOTE — PROGRESS NOTES
Critical Care Progress Note    Date of admission  4/20/2024    Chief Complaint  78 y.o. female BMI 39 with hx of HTN, HPL, SELWYN, chiari malformation, hx of UTI, hx of multiple falls in the past few days and weakness. +dysuria for several weeks, +cough, 2L oxygen at home. At ED,  SBP in 80-90s, HR in 60s, afebrile. On 2L NC. Lactate 1.5. Found to have COVID+. UA with + pyuria. Creatinine 3.9, BUN 52, HCO3 17. LFT with AST slightly elevated. TSH 0.23. ECG normal. Pt was fluid resuscitated 30cc/kg, started on NE gtt. Blood cultures obtained. Ceftriaxone/cefepime given.   CT head negative for acute abnromalities  US renal negative for obstruction, or hydro    Hospital Course  4/20 VD 1 admitted to ICU  4/21 VD 2 intubated  4/22 VD 3 PEEP 8/45%  4/23 Remains extubated on HFNC  4/24 HFNC, improving     Interval Problem Update  Reviewed last 24 hour events:  Intermittent hyperactive delirium  Precedex titrated to RASS 0  Lasix 20 mg IVP qd  HFNC 60/70% --> NC  COVID+, on dexamethasone  PCT negative; cultures NGTD, off abx  Replete electrolytes  VTE ppx     4/23  confirms full code status    Review of Systems  Review of Systems   Unable to perform ROS: Critical illness        Vital Signs for last 24 hours   Temp:  [36.9 °C (98.4 °F)] 36.9 °C (98.4 °F)  Pulse:  [] 90  Resp:  [23-54] 36  BP: (135-186)/() 135/82  SpO2:  [83 %-96 %] 92 %    Hemodynamic parameters for last 24 hours       Respiratory Information for the last 24 hours       Physical Exam   Physical Exam  Vitals and nursing note reviewed.   Constitutional:       General: She is not in acute distress.     Appearance: She is obese. She is ill-appearing. She is not diaphoretic.   HENT:      Head: Normocephalic and atraumatic.      Right Ear: External ear normal.      Left Ear: External ear normal.      Mouth/Throat:      Mouth: Mucous membranes are moist.   Eyes:      Pupils: Pupils are equal, round, and reactive to light.   Cardiovascular:      Rate  and Rhythm: Normal rate and regular rhythm.      Pulses: Normal pulses.      Heart sounds: Normal heart sounds. No murmur heard.  Pulmonary:      Breath sounds: Normal breath sounds. No wheezing, rhonchi or rales.   Abdominal:      General: Abdomen is flat. There is no distension.      Palpations: Abdomen is soft.      Tenderness: There is no abdominal tenderness. There is no guarding.   Musculoskeletal:      Cervical back: Neck supple.      Right lower leg: Edema present.      Left lower leg: Edema present.   Skin:     Capillary Refill: Capillary refill takes less than 2 seconds.      Coloration: Skin is not jaundiced.      Findings: No bruising or rash.   Neurological:      Comments: Awake, alert, agitated, moves all 4 extremities          Medications  Current Facility-Administered Medications   Medication Dose Route Frequency Provider Last Rate Last Admin    potassium phosphate IVPB 30 mmol in 500 mL D5W (premix)  30 mmol Intravenous Once Brenden Zarco M.D.        Followed by    potassium phosphate 15 mmol in dextrose 5% 250 mL ivpb  15 mmol Intravenous Once Brenden Zarco M.D.        enoxaparin (Lovenox) inj 40 mg  40 mg Subcutaneous DAILY AT 1800 Brenden Zarco M.D.        QUEtiapine (SEROquel) tablet 25 mg  25 mg Oral TID Brenden Zarco M.D.   25 mg at 04/23/24 1706    furosemide (Lasix) injection 20 mg  20 mg Intravenous BID DIURETIC Brenden Zarco M.D.   20 mg at 04/24/24 0614    traZODone (Desyrel) tablet 50 mg  50 mg Oral QHS Rocky Diggs Jr., D.O.        acetaminophen (Tylenol) tablet 650 mg  650 mg Oral Q6HRS PRN Brenden Zarco M.D.        aspirin (Asa) chewable tab 81 mg  81 mg Oral DAILY Brenden Zarco M.D.   81 mg at 04/23/24 0519    oxyCODONE immediate-release (Roxicodone) tablet 2.5 mg  2.5 mg Oral Q4HRS PRN Brenden Zarco M.D.        Or    oxyCODONE immediate-release (Roxicodone) tablet 5 mg  5 mg Oral Q4HRS PRN Brenden Zarco M.D.        amitriptyline (Elavil) tablet  100 mg  100 mg Oral Nightly Brenden Zarco M.D.   100 mg at 04/23/24 2132    DULoxetine (Cymbalta) capsule 30 mg  30 mg Oral DAILY Brenden Zarco M.D.   30 mg at 04/23/24 0519    ondansetron (Zofran ODT) dispertab 4 mg  4 mg Oral Q4HRS PRN Brenden Zarco M.D.        senna-docusate (Pericolace Or Senokot S) 8.6-50 MG per tablet 2 Tablet  2 Tablet Oral BID Brenden Zarco M.D.        And    polyethylene glycol/lytes (Miralax) Packet 1 Packet  1 Packet Oral QDAY PRN Brenden Zarco M.D.        And    magnesium hydroxide (Milk Of Magnesia) suspension 30 mL  30 mL Oral QDAY PRN Brenden Zarco M.D.        And    bisacodyl (Dulcolax) suppository 10 mg  10 mg Rectal QDAY PRN Brenden Zarco M.D.        pregabalin (Lyrica) capsule 75 mg  75 mg Oral BID Brenden Zarco M.D.   75 mg at 04/23/24 1705    haloperidol lactate (Haldol) injection 5 mg  5 mg Intravenous Q4HRS PRN Cecil Noonan M.D.   5 mg at 04/22/24 1922    insulin regular (HumuLIN R,NovoLIN R) injection  3-14 Units Subcutaneous Q6HRS LUISA MendezOJulio   14 Units at 04/22/24 0459    And    dextrose 10 % BOLUS 25 g  25 g Intravenous Q15 MIN PRN Kan Bauman D.O.        dexmedetomidine (PRECEDEX) 400 mcg/100mL NS premix infusion  0.1-1.5 mcg/kg/hr (Ideal) Intravenous Continuous Kan Bauman D.O. 4.6 mL/hr at 04/24/24 0157 0.4 mcg/kg/hr at 04/24/24 0157    ondansetron (Zofran) syringe/vial injection 4 mg  4 mg Intravenous Q4HRS PRN Aric Bloom M.D.        Respiratory Therapy Consult   Nebulization Continuous RT Montez June D.O.        MD Alert...ICU Electrolyte Replacement per Pharmacy   Other PHARMACY TO DOSE Montez June D.O.        dexamethasone (Decadron) injection 6 mg  6 mg Intravenous DAILY Aric Bloom M.D.   6 mg at 04/24/24 0615       Fluids    Intake/Output Summary (Last 24 hours) at 4/24/2024 0829  Last data filed at 4/24/2024 0600  Gross per 24 hour   Intake 170.41 ml   Output 2850 ml   Net -2679.59 ml        Laboratory  Recent Labs     04/22/24  0518   ISTATAPH 7.449   ISTATAPCO2 44.5*   ISTATAPO2 68   ISTATATCO2 32   [id number] 94   ISTATARTHCO3 30.9*   ISTATARTBE 6*   ISTATTEMP 36.2 C   ISTATFIO2 50   ISTATSPEC Arterial   ISTATAPHTC 7.461   [id number] 65         Recent Labs     04/21/24  2345 04/22/24  0450 04/22/24  1305 04/23/24  0400 04/24/24  0330   SODIUM 137   < > 137 142 143   POTASSIUM 3.5*   < > 3.6 3.9 3.2*   CHLORIDE 97   < > 94* 103 100   CO2 25   < > 27 26 25   BUN 30*   < > 26* 28* 29*   CREATININE 1.33   < > 1.09 1.04 0.95   MAGNESIUM 2.3  --   --  2.3 2.0   PHOSPHORUS 2.3*  --   --  1.5* 1.5*   CALCIUM 8.1*   < > 8.1* 8.3* 8.3*    < > = values in this interval not displayed.     Recent Labs     04/21/24 2345 04/22/24 0450 04/22/24  1305 04/23/24  0400 04/24/24  0330   ALTSGPT 31  --   --   --   --    ASTSGOT 59*  --   --   --   --    ALKPHOSPHAT 61  --   --   --   --    TBILIRUBIN 0.5  --   --   --   --    PREALBUMIN 13.2*  --   --   --   --    GLUCOSE 229*   < > 104* 125* 127*    < > = values in this interval not displayed.     Recent Labs     04/21/24 2345 04/24/24  0330   WBC  --  11.6*   NEUTSPOLYS  --  82.60*   LYMPHOCYTES  --  4.20*   MONOCYTES  --  12.00   EOSINOPHILS  --  0.00   BASOPHILS  --  0.20   ASTSGOT 59*  --    ALTSGPT 31  --    ALKPHOSPHAT 61  --    TBILIRUBIN 0.5  --      Recent Labs     04/21/24  1153 04/24/24  0330   RBC  --  4.82   HEMOGLOBIN  --  14.4   HEMATOCRIT  --  41.7   PLATELETCT  --  286   PROTHROMBTM 13.0  --    INR 0.97  --        Imaging  X-Ray:  I have personally reviewed the images and compared with prior images.  CT:    Reviewed    Assessment/Plan  * Septic shock (HCC)- (present on admission)  Assessment & Plan  Secondary to COVID-19  Shock has resolved  Cultures are NGTD      Persistent hyperactive delirium due to multiple etiologies  Assessment & Plan  Delirium bundle  Reinitiate home medications  Precedex titrated to RASS 0  Avoid benzodiazepines      Electrolyte depletion  Assessment & Plan  Potassium goal 4  Phosphorus goal 2.5  Magnesium goal 2    Replete and trend      COVID-19  Assessment & Plan  On dexamethasone 6mg daily x 10 days    Acute hypoxemic respiratory failure due to COVID-19 ([facility])  Assessment & Plan  Extubated 4/22    Decadron x 10 days  Pulmonary hygiene  Maintain euvolemia  SpO2 > 90%      Acute renal failure (ARF) ([facility])  Assessment & Plan  Baseline creatinine 0.9-1.0.  Up to 5.  Likely ATN related to septic shock in conjunction with ACE inhibitor.  Some hyaline casts on UA.  No indications for renal replacement therapy at present.  HCO3 16, normal PH   US renal negative for hydronephrosis.     Monitor  UOP, strict I/Os  Tam   Monitor electrolytes. Keep K >4, Mg >2  Serial BMP      Major depressive disorder- (present on admission)  Assessment & Plan  Continue home amytriptylene, duloxetine    SELWYN (obstructive sleep apnea)- (present on admission)  Assessment & Plan  Not on home CPAP           VTE:  Heparin SQ  Ulcer: Not required  Lines: Tam Catheter  Ongoing indication addressed    I have performed a physical exam and reviewed and updated ROS and Plan today (4/24/2024). In review of yesterday's note (4/23/2024), there are no changes except as documented above.     Discussed patient condition and risk of morbidity and/or mortality with Family, RN, RT, Pharmacy, Charge nurse / hot rounds, and Patient    Transfer out of ICU

## 2024-04-24 NOTE — CARE PLAN
The patient is Stable - Low risk of patient condition declining or worsening    Shift Goals  Clinical Goals: Reduce agitation and non-compliance, hemodynamic stability  Patient Goals: NAHUM  Family Goals: NAHUM    Progress made toward(s) clinical / shift goals:    Problem: Hemodynamics  Goal: Patient's hemodynamics, fluid balance and neurologic status will be stable or improve  Outcome: Progressing     Problem: Safety - Medical Restraint  Goal: Remains free of injury from restraints (Restraint for Interference with Medical Device)  Outcome: Progressing     Problem: Pain - Standard  Goal: Alleviation of pain or a reduction in pain to the patient’s comfort goal  Outcome: Progressing       Patient is not progressing towards the following goals:

## 2024-04-24 NOTE — CARE PLAN
"  Problem: Knowledge Deficit - Standard  Goal: Patient and family/care givers will demonstrate understanding of plan of care, disease process/condition, diagnostic tests and medications  Outcome: Progressing     Problem: Safety - Medical Restraint  Goal: Remains free of injury from restraints (Restraint for Interference with Medical Device)  Outcome: Progressing     Problem: Pain - Standard  Goal: Alleviation of pain or a reduction in pain to the patient’s comfort goal  Outcome: Progressing   The patient is Watcher - Medium risk of patient condition declining or worsening    Shift Goals  Clinical Goals: decrease agitation  Patient Goals: \"go home\"  Family Goals: erich    Progress made toward(s) clinical / shift goals:  medicated per mar for 2 days, precedex with no changes to agitation. Precedex turned off. Pt hands turn red intermittently from agitation otherwise she does not have injury from restraints. Possible NG tube in am for meds and feeds unless pt is agreeable to take meds     Patient is not progressing towards the following goals:      "

## 2024-04-24 NOTE — PROGRESS NOTES
2130: Pt continues to be tachycardic, hypertensive. Refusing to take oral medications, spits out applesauce with crushed meds. Notified resident and requested NG placement orders, approved by resident.  Attempted to place NG without success. Pt states she will eat the applesauce with meds. Pt again spit some applesauce out, unk how much of her meds she actually received.    2350: Pt still tachycardic, hypertensive, febrile. Attempted to administer trazodone and tylenol crushed in applesauce again. Pt refuses, education provided without learning evidence. Reattempted NG placement without success. Resident notified. Orders to restart precedex.    Passive and active cooling began to address pt's fever.

## 2024-04-24 NOTE — PROGRESS NOTES
4 Eyes Skin Assessment Completed by JIMMY Jeong and JIMMY Ch.    Head WDL  Ears Redness and Blanching  Nose WDL  Mouth dry  Neck Scar  Breast/Chest Redness and Excoriation  Shoulder Blades Redness and Blanching  Spine Redness and Blanching  (R) Arm/Elbow/Hand Redness, Blanching, Discoloration, and Edema  (L) Arm/Elbow/Hand Redness, Blanching, and Bruising, l fingers contracted   Abdomen Bruising  Groin WDL  Scrotum/Coccyx/Buttocks Redness and Blanching  bruising, skin tags  (R) Leg WDL  (L) Leg WDL  (R) Heel/Foot/Toe Redness and Blanching  (L) Heel/Foot/Toe Redness and Blanching, abrasion l third toe          Devices In Places ECG, Tele Box, Blood Pressure Cuff, Pulse Ox, Tam, and Nasal Cannula      Interventions In Place Gray Ear Foams, Heel Float Boots, Low Air Loss Mattress, and Barrier Cream    Possible Skin Injury Yes    Pictures Uploaded Into Epic Yes  Wound Consult Placed Yes  RN Wound Prevention Protocol Ordered Yes

## 2024-04-24 NOTE — THERAPY
"Speech Language Pathology   Daily Treatment     Patient Name: Tereza Sánchez  AGE:  78 y.o., SEX:  female  Medical Record #: [id number]  Date of Service: 4/24/2024      Precautions:  Precautions: Fall Risk, Swallow Precautions       Subjective  Pt continues to have poor participation, although is was slightly improved this date. \"Applesauce.\"  at bedside.     From RN note at 2130: \"Refusing to take oral medications, spits out applesauce with crushed meds. Notified resident and requested NG placement orders, approved by resident. Attempted to place NG without success. Pt states she will eat the applesauce with meds. Pt again spit some applesauce out, unk how much of her meds she actually received. \"    Assessment  Pt seen for dysphagia management. On 4L NC satting in 90s. Trials of LQ3 and TN0 provided. Very incomplete bolus stripping from spoon, with patient taking less than 25% on first and then ~25-50% on subsequent boluses with MAX encouragement from SLP. No anterior loss, adequate straw suction, swallow appreciated throughout. Congested, immediate cough following sips of TN0 x2/2. Patient then became restless and anxious, refusing to take any further PO from SLP or family. Discussed importance of nutrition in healing and recommendation for NGT; pt agreeable but she did not demonstrate adequate learning evidence and will likely need reinforcement.       Clinical Impressions  Pt presents with clinical indicators of and is at elevated risk for oropharyngeal dysphagia given acute on chronic respiratory failure requiring intubation, COVID-19, and delirium. Pt may benefit from further evaluation of swallow prior to meaningful initiation of PO; however, she is not appropriate for diagnostic evaluation this date given poor participation and anxiety/early satiety with very limited PO. Recommend consideration for non-oral nutrition source.     Pt should have ice chips after oral care with assistance from trained " "staff and family to mitigate the impacts of xerostomia and disuse atrophy as well as to provide comfort and aid with secretion management.       Recommendations  NPO with consideration for non-oral nutrition source  Instrumentation: Instrumental swallow study pending clinical progress  Medication:  Defer to MD - patient is likely appropriate for oral meds with sips  Supervision: Careful 1:1 hand feeding  Oral Care: Q2h      SLP Treatment Plan  Treatment Plan: Dysphagia Treatment, Patient/Family/Caregiver Training  SLP Frequency: 4x Per Week  Estimated Duration: Until Therapy Goals Met      Anticipated Discharge Needs  Discharge Recommendations: Recommend post-acute placement for additional speech therapy services prior to discharge home  Therapy Recommendations Upon DC: Dysphagia Training, Patient / Family / Caregiver Education, Community Re-Integration      Patient / Family Goals  Patient / Family Goal #1: \"I'm so full; please no more.\"  Goal #1 Outcome: Progressing slower than expected  Short Term Goals  Short Term Goal # 1: Pt will participate in prefeeding to determine readiness for PO diet vs. diagnostic evaluation.  Goal Outcome # 1: Progressing slower than expected      Fani Gamez, SLP  "

## 2024-04-24 NOTE — CARE PLAN
The patient is Stable - Low risk of patient condition declining or worsening    Shift Goals  Clinical Goals: Oxygenation, remain calm, neuro  Patient Goals: NAHUM  Family Goals: NAHUM    Progress made toward(s) clinical / shift goals:    Problem: Knowledge Deficit - Standard  Goal: Patient and family/care givers will demonstrate understanding of plan of care, disease process/condition, diagnostic tests and medications  Outcome: Progressing     Problem: Skin Integrity  Goal: Skin integrity is maintained or improved  Outcome: Progressing     Problem: Urinary Elimination  Goal: Establish and maintain regular urinary output  Outcome: Progressing     Problem: Safety - Medical Restraint  Goal: Remains free of injury from restraints (Restraint for Interference with Medical Device)  Outcome: Progressing     Problem: Pain - Standard  Goal: Alleviation of pain or a reduction in pain to the patient’s comfort goal  Outcome: Progressing       Patient is not progressing towards the following goals:

## 2024-04-24 NOTE — CARE PLAN
Problem: Humidified High Flow Nasal Cannula  Goal: Maintain adequate oxygenation dependent on patient condition  Description: Target End Date:  resolve prior to discharge or when underlying condition is resolved/stabilized    1.  Implement humidified high flow oxygen therapy  2.  Titrate high flow oxygen to maintain appropriate SpO2  Outcome: Progressing       Respiratory Update    Treatment modality: HHFNC  50LPM/60%  Frequency: Q4HRS    Pt tolerating current treatments well with no adverse reactions.

## 2024-04-24 NOTE — CONSULTS
Hospital Medicine Consultation    Date of Service  4/24/2024    Referring Physician    Brenden Zarco M.D.     Consulting Physician  Mary Mallory M.D.    Reason for Consultation  Transfer of care    History of Presenting Illness  78 y.o. female with past medical history of hypertension, SELWYN, chiari malformation, history of UTI, history of multiple falls who presented to the hospital with complaint of weakness dysuria for several weeks.  She also reported cough On 4/20/2024.  Patient found to have acute hypoxemic respiratory failure secondary to COVID-19 virus infection.  She was also diagnosed with septic shock and she was placed on Levophed for Target MAP of greater than 65 mmHg.  She also found to have acute renal failure.    She was intubated on April 20, 2024 and she was extubated on April 22, 2024 and she was placed on high flow nasal cannula.    On April 24, 2024 intensivist requested to transfer care to hospitalist service.  I waited and examined at the bedside.  She was alert and oriented but mildly confused.  She was able reorganize her .  She was able to tell me month and year.  I discussed with her that she is n.p.o. as per speech therapy recommendation and I recommended her to receive feeding tube surgery she can get nutrition.  I discussed plan of care with registered dietitian.  At the time of evaluation she is on Precedex gtt.  I am continuing titrating Precedex gtt. as per RASS of -1 to close 1.  I discussed plan of care with bedside RN in ICU.  I discussed plan of care with patient's  at the bedside.    Review of Systems  Review of Systems   Constitutional:  Positive for malaise/fatigue.   Musculoskeletal:  Positive for myalgias.   Review of systems are limited due to patient medical condition    Past Medical History   has a past medical history of Anesthesia, Arthritis, Asthma, Bowel habit changes, Breath shortness, Cancer (HCC), Chiari malformation (1970's), Chickenpox,  Chronic back pain, Contracture of hand joint, Decreased lung capacity, Dental disorder, Disorder of thyroid, Heart burn, High cholesterol, Hypertension, Indigestion, Joint replacement, Obesity, Pain, Pain (08/06/2018), Peripheral edema (06/06/2013), Pneumonia, PONV (postoperative nausea and vomiting), Psychiatric problem, Scoliosis, Shortness of breath (08/06/2018), Sleep apnea, Sleep apnea (08/07/2018), Snoring, sore throat (01/15/2015), Supplemental oxygen dependent, and Syringomyelia (HCC).    Surgical History   has a past surgical history that includes rubin by laparoscopy (2002); shoulder arthroplasty total (2004); hip arthroplasty total (5/19/08); hip arthroplasty total; us-needle core bx-breast panel; node biopsy sentinel (2/6/2015); thyroid lobectomy (Left, 8/17/2018); thyroidectomy total (8/17/2018); laminotomy; hip replacement, total; cyst excision (1973); shunt insertion; mastectomy (2/6/2015); hip revision total (Left, 9/2/2021); other abdominal surgery; and pr reconstr total shoulder implant (Left, 5/4/2022).    Family History  family history includes Hypertension in her mother; Sleep Apnea in her brother.    Social History   reports that she has never smoked. She has never used smokeless tobacco. She reports that she does not drink alcohol and does not use drugs.    Medications  Prior to Admission Medications   Prescriptions Last Dose Informant Patient Reported? Taking?   Ascorbic Acid (VITAMIN C) 1000 MG Tab 4/20/2024 at 0930 Family Member Yes Yes   Sig: Take 1,000 mg by mouth every day.   B Complex-C (B COMPLEX-VITAMIN C) Cap 4/20/2024 at 0930 Family Member Yes Yes   Sig: Take 1 Capsule by mouth every day.   Calcium Carb-Cholecalciferol (CALCIUM + VITAMIN D3) 600-10 MG-MCG Tab 4/20/2024 at 0930 Family Member Yes Yes   Sig: Take 1 Tablet by mouth every day.   DULoxetine (CYMBALTA) 30 MG Cap DR Particles 4/20/2024 at 0930 Family Member Yes Yes   Sig: Take 30 mg by mouth every day.   Fish Oil-Krill Oil  (KRILL & FISH OIL BLEND) Cap 4/20/2024 at 0930 Family Member Yes Yes   Sig: Take 1 Caplet by mouth every day.   HYDROcodone-acetaminophen (NORCO) 5-325 MG Tab per tablet 4/20/2024 at 0930 Family Member Yes Yes   Sig: TAKE 1 TABLET BY MOUTH 3 TIMES A DAY FOR 30 DAYS, 724.02   QUEtiapine (SEROQUEL) 25 MG Tab 4/19/2024 at 1930 Family Member Yes Yes   Sig: Take 25 mg by mouth at bedtime.   Sennosides 25 MG Tab > 1 week at unknown Family Member Yes No   Sig: Take 1 Tablet by mouth 2 times a day as needed (constipation).   Zinc 50 MG Tab 4/20/2024 at 0930 Family Member Yes Yes   Sig: Take 50 mg by mouth every day.   alendronate (FOSAMAX) 70 MG Tab 4/15/2024 at 0930 Family Member Yes No   Sig: Take 70 mg by mouth every Monday. Indications: Osteoporosis   amitriptyline (ELAVIL) 100 MG Tab 4/19/2024 at 1930 Family Member Yes Yes   Sig: Take 100 mg by mouth every evening.   aspirin 81 MG EC tablet 4/20/2024 at 0930 Family Member Yes Yes   Sig: Take 81 mg by mouth every day.   atenolol (TENORMIN) 50 MG Tab 4/20/2024 at 0930 Family Member No Yes   Sig: TAKE 2 TABLETS BY MOUTH EVERY DAY. BLOOD PRESSURE   atorvastatin (LIPITOR) 10 MG Tab 4/19/2024 at 1930 Family Member No Yes   Sig: TAKE 1 TABLET BY MOUTH EVERY EVENING. CHOLESTEROL   baclofen (LIORESAL) 5 MG Tab 4/19/2024 at 1930 Family Member Yes Yes   Sig: Take 5 mg by mouth 2 times a day as needed (muscle spasm).   benazepril-hydrochlorthiazide (LOTENSIN HCT) 20-12.5 MG per tablet 4/20/2024 at 0930 Family Member No Yes   Sig: TAKE 1 TABLET BY MOUTH EVERY DAY. BLOOD PRESSURE   esomeprazole (NEXIUM) 20 MG capsule 4/20/2024 at 0800 Family Member No Yes   Sig: TAKE 1 CAPSULE BY MOUTH EVERY DAY IN THE MORNING BEFORE BREAKFAST   ferrous sulfate 325 (65 Fe) MG tablet 4/20/2024 at 0930 Family Member No Yes   Sig: TAKE 1 TABLET BY MOUTH EVERY DAY   furosemide (LASIX) 20 MG Tab 4/16/2024 at 0930 Family Member Yes No   Sig: Take 20 mg by mouth 1 time a day as needed. Indications: Edema    meclizine (ANTIVERT) 12.5 MG Tab 2024 at 0930 Family Member No Yes   Sig: Take 1 Tablet by mouth 3 times a day.   nystatin (MYCOSTATIN) powder > 1 week at unknown Family Member Yes No   Sig: Apply 1 Application topically 2 times a day as needed.     Apply to rash   olopatadine (PATANOL) 0.1 % ophthalmic solution > 1 week at nknown Family Member Yes No   Sig: Administer 1 Drop into both eyes 2 times a day as needed for Allergies.   ondansetron (ZOFRAN) 4 MG Tab tablet 2024 at 1930 Family Member Yes Yes   Sig: Take 4 mg by mouth every four hours as needed for Nausea/Vomiting.   potassium chloride SA (KDUR) 20 MEQ Tab CR 2024 at 0930 Family Member No No   Sig: Take 1 Tablet by mouth 1 time a day as needed (whenever you take lasix). * take with furosemide*   pregabalin (LYRICA) 150 MG Cap 2024 at 0930 Family Member Yes Yes   Sig: Take 150 mg by mouth 2 times a day.   tamsulosin (FLOMAX) 0.4 MG capsule 2024 at 0930 Family Member No Yes   Sig: Take 1 Capsule by mouth 1/2 hour after breakfast.   zonisamide (ZONEGRAN) 100 MG Cap 2024 at 1930 Family Member Yes Yes   Si mg at bedtime.      Facility-Administered Medications: None       Allergies  Allergies   Allergen Reactions    Morphine Vomiting     hallucinations    Sulfamethoxazole W-Trimethoprim Rash     * full body rash*>  10 years ago       Physical Exam  Temp:  [36.9 °C (98.4 °F)] 36.9 °C (98.4 °F)  Pulse:  [] 92  Resp:  [23-48] 31  BP: (135-186)/() 166/92  SpO2:  [88 %-96 %] 96 %    Physical Exam  Vitals reviewed.   Constitutional:       General: She is not in acute distress.     Appearance: Normal appearance. She is obese. She is not ill-appearing.      Comments: In soft restraints   HENT:      Head: Normocephalic and atraumatic.      Nose: No congestion.      Comments: Oxygen nasal cannula  Eyes:      General:         Right eye: No discharge.         Left eye: No discharge.      Pupils: Pupils are equal, round, and  reactive to light.   Cardiovascular:      Rate and Rhythm: Normal rate and regular rhythm.      Pulses: Normal pulses.      Heart sounds: Normal heart sounds. No murmur heard.  Pulmonary:      Effort: Pulmonary effort is normal. No respiratory distress.      Breath sounds: Normal breath sounds. No stridor.   Abdominal:      General: Bowel sounds are normal. There is no distension.      Palpations: Abdomen is soft.      Tenderness: There is no abdominal tenderness.   Musculoskeletal:         General: No swelling or tenderness. Normal range of motion.      Cervical back: Normal range of motion. No rigidity.   Skin:     General: Skin is warm.      Capillary Refill: Capillary refill takes less than 2 seconds.      Coloration: Skin is not jaundiced or pale.      Findings: No bruising.   Neurological:      General: No focal deficit present.      Mental Status: She is alert and oriented to person, place, and time.      Cranial Nerves: No cranial nerve deficit.      Comments: Oriented and following commands  Decreased range of motion of lower extremities.     Psychiatric:         Mood and Affect: Mood normal.         Behavior: Behavior normal.         Fluids  Date 04/24/24 0700 - 04/25/24 0659   Shift 8641-2319 9882-6717 1880-4977 24 Hour Total   INTAKE   P.O. 0   0   I.V. 9   9   Shift Total 9   9   OUTPUT   Urine 625   625   Shift Total 625   625   Weight (kg) 90.8 90.8 90.8 90.8       Laboratory  Recent Labs     04/24/24  0330   WBC 11.6*   RBC 4.82   HEMOGLOBIN 14.4   HEMATOCRIT 41.7   MCV 86.5   MCH 29.9   MCHC 34.5   RDW 44.3   PLATELETCT 286   MPV 11.4     Recent Labs     04/22/24  1305 04/23/24  0400 04/24/24  0330   SODIUM 137 142 143   POTASSIUM 3.6 3.9 3.2*   CHLORIDE 94* 103 100   CO2 27 26 25   GLUCOSE 104* 125* 127*   BUN 26* 28* 29*   CREATININE 1.09 1.04 0.95   CALCIUM 8.1* 8.3* 8.3*              Recent Labs     04/21/24  2345   TRIGLYCERIDE 78        Imaging  DX-CHEST-PORTABLE (1 VIEW)   Final Result          1.  Pulmonary edema and/or infiltrates, slightly increased compared to prior study.      EC-ECHOCARDIOGRAM COMPLETE W/ CONT   Final Result      DX-CHEST-PORTABLE (1 VIEW)   Final Result         1.  Pulmonary edema and/or infiltrates are identified, which are stable since the prior exam.   2.  Small layering bilateral pleural effusion   3.  Cardiomegaly      DX-ABDOMEN FOR TUBE PLACEMENT   Final Result         1.  Air-filled distended loops of bowel are seen in the upper abdomen, appearance suggests ileus or enteritis versus evolving obstructive changes. Recommend radiographic followup to resolution to exclude progression to obstruction.   2.  Nasogastric tube tip terminates overlying the expected location of the pylorus or first duodenal segment.   3.  Pulmonary edema and/or infiltrates.      DX-ABDOMEN FOR TUBE PLACEMENT   Final Result      NG tube tip is in the left upper abdomen but the side-port in the distal esophagus. Recommend advancing at least 7 cm.      DX-CHEST-LIMITED (1 VIEW)   Final Result      1.  Endotracheal tube tip located 2 cm above the devon.      2.  NG tube extends into the gastric fundus.      3.  Bibasal atelectasis/consolidation.         US-RENAL   Final Result      1.  Limited visualization the right kidney due to obscuration by bowel gas.      2.  Otherwise normal exam.      DX-ABDOMEN FOR TUBE PLACEMENT   Final Result         Gastric drainage tube with tip projecting over the expected area of the stomach. Sidehole is in the GE junction.      CT-HEAD W/O   Final Result         1. No acute intracranial abnormality. No evidence of acute intracranial hemorrhage or mass lesion.                     DX-CHEST-PORTABLE (1 VIEW)   Final Result         Elevation of the right hemidiaphragm and right basilar atelectasis.      DX-CHEST-PORTABLE (1 VIEW)   Final Result      Linear atelectasis within the right lung base.          Assessment/Plan  * Septic shock (HCC)- (present on  admission)  Assessment & Plan  Secondary to COVID-19  Shock has resolved  Cultures are NGTD  Continue monitor closely.    Persistent hyperactive delirium due to multiple etiologies  Assessment & Plan  Delirium bundle  Reinitiate home medications  Plan continue Anturane Precedex gtt. as per RASS of -1 to +1 replacement  Avoid benzodiazepines  I discussed out of care with patient's  and bedside RN in ICU.    Electrolyte depletion  Assessment & Plan  Continue to monitor and replace as needed.    COVID-19  Assessment & Plan  On dexamethasone 6mg daily x 10 days.  Continue enhanced droplet, contact and eye protection precautions.    Acute hypoxemic respiratory failure due to COVID-19 (HCC)  Assessment & Plan  Extubated 4/22    Decadron x 10 days  Pulmonary hygiene  Maintain euvolemia  SpO2 > 90%  Currently she is requiring 6 L of oxygen to maintain oxygen saturation  Continue to monitor closely and titrate down oxygen as tolerated.    Acute renal failure (ARF) (HCC)  Assessment & Plan  Baseline creatinine 0.9-1.0.  Up to 5.  Likely ATN related to septic shock in conjunction with ACE inhibitor.  Some hyaline casts on UA.  No indications for renal replacement therapy at present.  HCO3 16, normal PH   US renal negative for hydronephrosis.     No renal function is significantly improved  Ordered lab workup tomorrow.    Major depressive disorder- (present on admission)  Assessment & Plan  Continue home amytriptylene, duloxetine.      SELWYN (obstructive sleep apnea)- (present on admission)  Assessment & Plan  Not on home CPAP.  She will need close outpatient follow-up.        I discussed plan of care with bedside RN in ICU    I reviewed patient hospitalization and summarized in this document.    I discussed plan of care with critical care team.    Patient is critically ill.   The patient continues to have: Ongoing agitation  The vital organ system that is affected is the: Central nervous system  If untreated there is a  high chance of deterioration into: Worsening of his symptoms  And eventually death.   The critical care that I am providing today is: I am titrating Precedex gtt. as per target RASS of -1 to +1  The critical that has been undertaken is medically complex.   There has been no overlap in critical care time.   Critical Care Time not including procedures: 42 minutes

## 2024-04-24 NOTE — PROGRESS NOTES
4 Eyes Skin Assessment Completed by JIMMY Hernandez and JIMMY Andrade.    Head WDL  Ears WDL  Nose WDL  Mouth WDL  Neck Redness and Blanching  Breast/Chest Redness and Blanching  Shoulder Blades WDL  Spine WDL  (R) Arm/Elbow/Hand Redness and Blanching  (L) Arm/Elbow/Hand Redness and Blanching  Abdomen Redness, Blanching, and Bruising  Groin Redness and Excoriation  Scrotum/Coccyx/Buttocks Redness and Blanching  (R) Leg Edema  (L) Leg Edema  (R) Heel/Foot/Toe Redness and Blanching  (L) Heel/Foot/Toe Redness and Blanching          Devices In Places ECG, Tele Box, Blood Pressure Cuff, Pulse Ox, and Tam      Interventions In Place Gray Ear Foams, NC W/Ear Foams, Heel Mepilex, Sacral Mepilex, TAP System, Pillows, and Q2 Turns    Possible Skin Injury No    Pictures Uploaded Into Epic N/A  Wound Consult Placed N/A  RN Wound Prevention Protocol Ordered No

## 2024-04-24 NOTE — DIETARY
Nutrition Support Assessment   Day 4 of admit.  Tereza Sánchez is a 78 y.o. female with admitting DX of Septic shock (HCC) [A41.9, R65.21]     Current problem list:    Septic shock (HCC) (POA: Yes)    SELWYN (obstructive sleep apnea) (POA: Yes)    Major depressive disorder (POA: Yes)     Acute renal failure (ARF) (HCC) (POA: Unknown)    Acute hypoxemic respiratory failure due to COVID-19 (HCC) (POA: Unknown)    COVID-19 (POA: Unknown)    Electrolyte depletion (POA: Unknown)    Persistent hyperactive delirium due to multiple etiologies (POA: Unknown)     Assessment:  Estimated Nutritional Needs: based on:   Height: 152.4 cm (5')  Weight: 90.8 kg (200 lb 2.8 oz)  Weight to Use in Calculations: 79.4 kg (175 lb 0.7 oz)  Ideal Body Weight: 45.4 kg (100 lb)  *Initial wt 78.5 kg () and stated wt of 79.4kg (BMI>30) on admit are likely more accurate to pt's dry wt given wts have otherwise been consistently 90-91 kg despite improvement in edema (which is ongoing) and lasix on MAR. I/Os are -2.5L since admit    Calculation/Equation: MSJ (1207 kcal) x 1.1 - 1.2 = 1328 - 1448 kcal/day  Total Calories / day: 1350 - 1509  (Calories / k - 19)  Total Grams Protein / day: 87 - 103 (Grams Protein / k.1 - 1.3)  Gm/day protein based on IBW: 81 - 90 gm/day (1.8 - 2.0 g/kg IBW)     Evaluation:   Indication for nutrition support: pt previously on TF, discontinued when pt extubated. However, unable to pass SLP evaluation. Per MD Mallory, plan per discussion w/ pt and pt's  is to proceed w/ TF.  Enteral access pending.  +2L O2 via nasal cannula  Labs: K+ 3.2, 24hr POC glu: 121 -156, BUN 29, GFR 62, Ca 8.3, Phos 1.5, pre-alb (): 13.2, CRP (): 7.91  MAR: decadron, precedex, lasix, SSI, ICU pharmacy electrolyte replacement. Completed (): Kphos in D5  Skin: No documented wounds. Edema 1+ to BLE. Edema improved: was 3+ to BLEs on .   Last BM: , type 7  Moderate-protein indicated for renal considerations.  CHO-controlled formula indicated to promote glycemic control for pt on decadron requiring SSI     Malnutrition Risk: At risk w/ x4 days inadequate nutrition     Recommendations/Plan:  Once enteral tube placed and confirmed by KUB, initiate TF Pivot 1.5 at 20 mL/hr and advance by 10 mL q12hrs to goal rate 40 ml/hr, which will provide 1440 kcals, 90 grams protein, 720 mL free water, 162 g CHO daily.   Additional fluids per MD.  PO diet when safe to swallow, per judgement of MD/SLP.  Monitor weight trends.    RD following.

## 2024-04-25 ENCOUNTER — APPOINTMENT (OUTPATIENT)
Dept: RADIOLOGY | Facility: MEDICAL CENTER | Age: 79
DRG: 871 | End: 2024-04-25
Attending: INTERNAL MEDICINE
Payer: MEDICARE

## 2024-04-25 LAB
ALBUMIN SERPL BCP-MCNC: 3.4 G/DL (ref 3.2–4.9)
ALBUMIN/GLOB SERPL: 1.1 G/DL
ALP SERPL-CCNC: 62 U/L (ref 30–99)
ALT SERPL-CCNC: 30 U/L (ref 2–50)
ANION GAP SERPL CALC-SCNC: 16 MMOL/L (ref 7–16)
AST SERPL-CCNC: 30 U/L (ref 12–45)
BACTERIA BLD CULT: NORMAL
BACTERIA BLD CULT: NORMAL
BILIRUB SERPL-MCNC: 1 MG/DL (ref 0.1–1.5)
BUN SERPL-MCNC: 27 MG/DL (ref 8–22)
CALCIUM ALBUM COR SERPL-MCNC: 9 MG/DL (ref 8.5–10.5)
CALCIUM SERPL-MCNC: 8.5 MG/DL (ref 8.5–10.5)
CHLORIDE SERPL-SCNC: 103 MMOL/L (ref 96–112)
CO2 SERPL-SCNC: 24 MMOL/L (ref 20–33)
CREAT SERPL-MCNC: 0.66 MG/DL (ref 0.5–1.4)
EKG IMPRESSION: NORMAL
GFR SERPLBLD CREATININE-BSD FMLA CKD-EPI: 90 ML/MIN/1.73 M 2
GLOBULIN SER CALC-MCNC: 3 G/DL (ref 1.9–3.5)
GLUCOSE BLD STRIP.AUTO-MCNC: 109 MG/DL (ref 65–99)
GLUCOSE BLD STRIP.AUTO-MCNC: 129 MG/DL (ref 65–99)
GLUCOSE BLD STRIP.AUTO-MCNC: 227 MG/DL (ref 65–99)
GLUCOSE SERPL-MCNC: 123 MG/DL (ref 65–99)
MAGNESIUM SERPL-MCNC: 1.9 MG/DL (ref 1.5–2.5)
PHOSPHATE SERPL-MCNC: 1.9 MG/DL (ref 2.5–4.5)
PHOSPHATE SERPL-MCNC: 2.2 MG/DL (ref 2.5–4.5)
POTASSIUM SERPL-SCNC: 3.3 MMOL/L (ref 3.6–5.5)
PROT SERPL-MCNC: 6.4 G/DL (ref 6–8.2)
SIGNIFICANT IND 70042: NORMAL
SIGNIFICANT IND 70042: NORMAL
SITE SITE: NORMAL
SITE SITE: NORMAL
SODIUM SERPL-SCNC: 143 MMOL/L (ref 135–145)
SOURCE SOURCE: NORMAL
SOURCE SOURCE: NORMAL

## 2024-04-25 PROCEDURE — 80053 COMPREHEN METABOLIC PANEL: CPT

## 2024-04-25 PROCEDURE — 700105 HCHG RX REV CODE 258: Performed by: INTERNAL MEDICINE

## 2024-04-25 PROCEDURE — A9270 NON-COVERED ITEM OR SERVICE: HCPCS | Performed by: INTERNAL MEDICINE

## 2024-04-25 PROCEDURE — 700111 HCHG RX REV CODE 636 W/ 250 OVERRIDE (IP): Mod: JZ | Performed by: INTERNAL MEDICINE

## 2024-04-25 PROCEDURE — 93010 ELECTROCARDIOGRAM REPORT: CPT | Performed by: INTERNAL MEDICINE

## 2024-04-25 PROCEDURE — 97163 PT EVAL HIGH COMPLEX 45 MIN: CPT

## 2024-04-25 PROCEDURE — 99291 CRITICAL CARE FIRST HOUR: CPT | Performed by: INTERNAL MEDICINE

## 2024-04-25 PROCEDURE — 700111 HCHG RX REV CODE 636 W/ 250 OVERRIDE (IP): Performed by: EMERGENCY MEDICINE

## 2024-04-25 PROCEDURE — 700102 HCHG RX REV CODE 250 W/ 637 OVERRIDE(OP): Performed by: INTERNAL MEDICINE

## 2024-04-25 PROCEDURE — 770000 HCHG ROOM/CARE - INTERMEDIATE ICU *

## 2024-04-25 PROCEDURE — 700101 HCHG RX REV CODE 250: Performed by: INTERNAL MEDICINE

## 2024-04-25 PROCEDURE — 84100 ASSAY OF PHOSPHORUS: CPT | Mod: 91

## 2024-04-25 PROCEDURE — 83735 ASSAY OF MAGNESIUM: CPT

## 2024-04-25 PROCEDURE — 82962 GLUCOSE BLOOD TEST: CPT | Mod: 91

## 2024-04-25 PROCEDURE — 97167 OT EVAL HIGH COMPLEX 60 MIN: CPT

## 2024-04-25 PROCEDURE — 93005 ELECTROCARDIOGRAM TRACING: CPT | Performed by: INTERNAL MEDICINE

## 2024-04-25 PROCEDURE — 99223 1ST HOSP IP/OBS HIGH 75: CPT | Performed by: PHYSICAL MEDICINE & REHABILITATION

## 2024-04-25 RX ORDER — METOPROLOL TARTRATE 1 MG/ML
5 INJECTION, SOLUTION INTRAVENOUS
Status: DISCONTINUED | OUTPATIENT
Start: 2024-04-25 | End: 2024-04-30 | Stop reason: HOSPADM

## 2024-04-25 RX ORDER — AMOXICILLIN 250 MG
2 CAPSULE ORAL 2 TIMES DAILY
Status: DISCONTINUED | OUTPATIENT
Start: 2024-04-25 | End: 2024-04-28

## 2024-04-25 RX ORDER — ACETAMINOPHEN 325 MG/1
650 TABLET ORAL EVERY 6 HOURS PRN
Status: DISCONTINUED | OUTPATIENT
Start: 2024-04-25 | End: 2024-04-28

## 2024-04-25 RX ORDER — BISACODYL 10 MG
10 SUPPOSITORY, RECTAL RECTAL
Status: DISCONTINUED | OUTPATIENT
Start: 2024-04-25 | End: 2024-04-28

## 2024-04-25 RX ORDER — ASPIRIN 81 MG/1
81 TABLET, CHEWABLE ORAL DAILY
Status: DISCONTINUED | OUTPATIENT
Start: 2024-04-26 | End: 2024-04-28

## 2024-04-25 RX ORDER — ONDANSETRON 4 MG/1
4 TABLET, ORALLY DISINTEGRATING ORAL EVERY 4 HOURS PRN
Status: DISCONTINUED | OUTPATIENT
Start: 2024-04-25 | End: 2024-04-28

## 2024-04-25 RX ORDER — POLYETHYLENE GLYCOL 3350 17 G/17G
1 POWDER, FOR SOLUTION ORAL
Status: DISCONTINUED | OUTPATIENT
Start: 2024-04-25 | End: 2024-04-28

## 2024-04-25 RX ORDER — PREGABALIN 75 MG/1
75 CAPSULE ORAL 2 TIMES DAILY
Status: DISCONTINUED | OUTPATIENT
Start: 2024-04-25 | End: 2024-04-25

## 2024-04-25 RX ORDER — PREGABALIN 150 MG/1
150 CAPSULE ORAL 2 TIMES DAILY
Status: DISCONTINUED | OUTPATIENT
Start: 2024-04-25 | End: 2024-04-28

## 2024-04-25 RX ORDER — AMITRIPTYLINE HYDROCHLORIDE 50 MG/1
100 TABLET, FILM COATED ORAL NIGHTLY
Status: DISCONTINUED | OUTPATIENT
Start: 2024-04-25 | End: 2024-04-28

## 2024-04-25 RX ORDER — OXYCODONE HYDROCHLORIDE 5 MG/1
5 TABLET ORAL EVERY 4 HOURS PRN
Status: DISCONTINUED | OUTPATIENT
Start: 2024-04-25 | End: 2024-04-28

## 2024-04-25 RX ORDER — QUETIAPINE FUMARATE 25 MG/1
25 TABLET, FILM COATED ORAL 3 TIMES DAILY
Status: DISCONTINUED | OUTPATIENT
Start: 2024-04-25 | End: 2024-04-25

## 2024-04-25 RX ORDER — DULOXETIN HYDROCHLORIDE 30 MG/1
30 CAPSULE, DELAYED RELEASE ORAL DAILY
Status: DISCONTINUED | OUTPATIENT
Start: 2024-04-26 | End: 2024-04-28

## 2024-04-25 RX ORDER — OXYCODONE HYDROCHLORIDE 5 MG/1
2.5 TABLET ORAL EVERY 4 HOURS PRN
Status: DISCONTINUED | OUTPATIENT
Start: 2024-04-25 | End: 2024-04-28

## 2024-04-25 RX ORDER — TRAZODONE HYDROCHLORIDE 50 MG/1
50 TABLET ORAL
Status: DISCONTINUED | OUTPATIENT
Start: 2024-04-25 | End: 2024-04-28

## 2024-04-25 RX ORDER — HALOPERIDOL 5 MG/ML
2 INJECTION INTRAMUSCULAR EVERY 4 HOURS PRN
Status: DISCONTINUED | OUTPATIENT
Start: 2024-04-25 | End: 2024-04-30 | Stop reason: HOSPADM

## 2024-04-25 RX ORDER — QUETIAPINE FUMARATE 25 MG/1
25 TABLET, FILM COATED ORAL NIGHTLY
Status: DISCONTINUED | OUTPATIENT
Start: 2024-04-25 | End: 2024-04-28

## 2024-04-25 RX ORDER — DEXAMETHASONE 4 MG/1
6 TABLET ORAL DAILY
Status: DISCONTINUED | OUTPATIENT
Start: 2024-04-26 | End: 2024-04-28

## 2024-04-25 RX ORDER — HYDROMORPHONE HYDROCHLORIDE 1 MG/ML
0.25 INJECTION, SOLUTION INTRAMUSCULAR; INTRAVENOUS; SUBCUTANEOUS ONCE
Status: ACTIVE | OUTPATIENT
Start: 2024-04-25 | End: 2024-04-26

## 2024-04-25 RX ORDER — MAGNESIUM SULFATE HEPTAHYDRATE 40 MG/ML
2 INJECTION, SOLUTION INTRAVENOUS ONCE
Status: COMPLETED | OUTPATIENT
Start: 2024-04-25 | End: 2024-04-25

## 2024-04-25 RX ORDER — ACETAMINOPHEN 325 MG/1
650 TABLET ORAL EVERY 6 HOURS PRN
Status: DISCONTINUED | OUTPATIENT
Start: 2024-04-25 | End: 2024-04-25

## 2024-04-25 RX ADMIN — DULOXETINE HYDROCHLORIDE 30 MG: 30 CAPSULE, DELAYED RELEASE ORAL at 05:36

## 2024-04-25 RX ADMIN — LABETALOL HYDROCHLORIDE 10 MG: 5 INJECTION INTRAVENOUS at 21:09

## 2024-04-25 RX ADMIN — INSULIN HUMAN 4 UNITS: 100 INJECTION, SOLUTION PARENTERAL at 17:36

## 2024-04-25 RX ADMIN — METOPROLOL TARTRATE 5 MG: 5 INJECTION INTRAVENOUS at 17:25

## 2024-04-25 RX ADMIN — FENTANYL CITRATE 25 MCG: 50 INJECTION, SOLUTION INTRAMUSCULAR; INTRAVENOUS at 16:28

## 2024-04-25 RX ADMIN — OXYCODONE HYDROCHLORIDE 5 MG: 5 TABLET ORAL at 21:09

## 2024-04-25 RX ADMIN — QUETIAPINE FUMARATE 25 MG: 25 TABLET ORAL at 05:36

## 2024-04-25 RX ADMIN — POTASSIUM PHOSPHATE, MONOBASIC AND POTASSIUM PHOSPHATE, DIBASIC 30 MMOL: 224; 236 INJECTION, SOLUTION, CONCENTRATE INTRAVENOUS at 09:10

## 2024-04-25 RX ADMIN — INSULIN HUMAN 4 UNITS: 100 INJECTION, SOLUTION PARENTERAL at 12:59

## 2024-04-25 RX ADMIN — ASPIRIN 81 MG: 81 TABLET, CHEWABLE ORAL at 05:36

## 2024-04-25 RX ADMIN — DEXMEDETOMIDINE HYDROCHLORIDE 1 MCG/KG/HR: 100 INJECTION, SOLUTION INTRAVENOUS at 04:00

## 2024-04-25 RX ADMIN — FENTANYL CITRATE 25 MCG: 50 INJECTION, SOLUTION INTRAMUSCULAR; INTRAVENOUS at 17:00

## 2024-04-25 RX ADMIN — ACETAMINOPHEN 650 MG: 325 TABLET, FILM COATED ORAL at 15:51

## 2024-04-25 RX ADMIN — AMITRIPTYLINE HYDROCHLORIDE 100 MG: 100 TABLET, FILM COATED ORAL at 21:09

## 2024-04-25 RX ADMIN — DEXAMETHASONE SODIUM PHOSPHATE 6 MG: 4 INJECTION, SOLUTION INTRAMUSCULAR; INTRAVENOUS at 05:36

## 2024-04-25 RX ADMIN — POTASSIUM BICARBONATE 25 MEQ: 978 TABLET, EFFERVESCENT ORAL at 08:19

## 2024-04-25 RX ADMIN — FUROSEMIDE 20 MG: 10 INJECTION INTRAMUSCULAR; INTRAVENOUS at 05:36

## 2024-04-25 RX ADMIN — MAGNESIUM SULFATE HEPTAHYDRATE 2 G: 2 INJECTION, SOLUTION INTRAVENOUS at 13:05

## 2024-04-25 RX ADMIN — FUROSEMIDE 20 MG: 10 INJECTION INTRAMUSCULAR; INTRAVENOUS at 15:40

## 2024-04-25 RX ADMIN — TRAZODONE HYDROCHLORIDE 50 MG: 50 TABLET ORAL at 21:09

## 2024-04-25 RX ADMIN — QUETIAPINE FUMARATE 25 MG: 25 TABLET ORAL at 21:09

## 2024-04-25 RX ADMIN — ENOXAPARIN SODIUM 40 MG: 100 INJECTION SUBCUTANEOUS at 17:25

## 2024-04-25 RX ADMIN — PREGABALIN 75 MG: 75 CAPSULE ORAL at 05:36

## 2024-04-25 ASSESSMENT — PAIN DESCRIPTION - PAIN TYPE
TYPE: ACUTE PAIN

## 2024-04-25 ASSESSMENT — ENCOUNTER SYMPTOMS
SPUTUM PRODUCTION: 0
PALPITATIONS: 0
CONSTIPATION: 0
MYALGIAS: 1
WEIGHT LOSS: 0
CHILLS: 0
DOUBLE VISION: 0
HALLUCINATIONS: 0
ORTHOPNEA: 0
TINGLING: 0
FOCAL WEAKNESS: 0
SPEECH CHANGE: 0
HEADACHES: 0
EYE PAIN: 0
BLURRED VISION: 0
ABDOMINAL PAIN: 0
SENSORY CHANGE: 0
VOMITING: 0
NAUSEA: 0
BACK PAIN: 0
SHORTNESS OF BREATH: 0
COUGH: 0
DIARRHEA: 0
WEAKNESS: 1
FEVER: 0
PHOTOPHOBIA: 0
DIZZINESS: 0
TREMORS: 0
NECK PAIN: 0

## 2024-04-25 ASSESSMENT — COGNITIVE AND FUNCTIONAL STATUS - GENERAL
DRESSING REGULAR LOWER BODY CLOTHING: TOTAL
WALKING IN HOSPITAL ROOM: TOTAL
SUGGESTED CMS G CODE MODIFIER DAILY ACTIVITY: CM
TURNING FROM BACK TO SIDE WHILE IN FLAT BAD: A LOT
CLIMB 3 TO 5 STEPS WITH RAILING: TOTAL
HELP NEEDED FOR BATHING: TOTAL
TOILETING: TOTAL
MOVING TO AND FROM BED TO CHAIR: A LOT
MOVING FROM LYING ON BACK TO SITTING ON SIDE OF FLAT BED: A LOT
STANDING UP FROM CHAIR USING ARMS: A LOT
PERSONAL GROOMING: TOTAL
SUGGESTED CMS G CODE MODIFIER MOBILITY: CL
MOBILITY SCORE: 10
DAILY ACTIVITIY SCORE: 7
EATING MEALS: A LOT
DRESSING REGULAR UPPER BODY CLOTHING: TOTAL

## 2024-04-25 ASSESSMENT — LIFESTYLE VARIABLES: SUBSTANCE_ABUSE: 0

## 2024-04-25 ASSESSMENT — FIBROSIS 4 INDEX: FIB4 SCORE: 1.46

## 2024-04-25 ASSESSMENT — GAIT ASSESSMENTS: GAIT LEVEL OF ASSIST: UNABLE TO PARTICIPATE

## 2024-04-25 ASSESSMENT — ACTIVITIES OF DAILY LIVING (ADL): TOILETING: INDEPENDENT

## 2024-04-25 NOTE — THERAPY
Physical Therapy   Initial Evaluation     Patient Name: Tereza Sánchez  Age:  78 y.o., Sex:  female  Medical Record #: [id number]  Today's Date: 4/25/2024     Precautions  Precautions: Fall Risk;Swallow Precautions;Nasogastric Tube    Assessment  Patient is 78 y.o. female that presented to acute with complaints of weakness and dysuria as well as medical dx of COVID. She required intubation during acute stay and was extubated 4/23. PMHx significant for HTN, SELWYN, recurrent UTI, multiple falls. She presented to PT with impaired balance, functional weakness, and decreased activity tolerance which are limiting her ability to safely perform mobility at PLOF. She mobilized as detailed below; required max A x2 to stand from EOB, facilitation provided for hip extension and knee block. Provided education regarding importance of OOB activity and recommend patient mobilize to EOB at least 1x/day with RN staff to progress strength and tolerance. Will follow.    Plan    Physical Therapy Initial Treatment Plan   Treatment Plan : Bed Mobility, Equipment, Family / Caregiver Training, Gait Training, Manual Therapy, Neuro Re-Education / Balance, Self Care / Home Evaluation, Therapeutic Activities, Stair Training, Therapeutic Exercise  Treatment Frequency: 4 Times per Week  Duration: Until Therapy Goals Met    DC Equipment Recommendations: Unable to determine at this time  Discharge Recommendations: Recommend post-acute placement for additional physical therapy services prior to discharge home       Subjective    RN cleared patient for therapy, received in bed, agreeable     Objective       04/25/24 1001   Charge Group   PT Evaluation PT Evaluation High   Total Time Spent   PT Total Time Yes   PT Evaluation Time Spent (Mins) 27   PT Total Time Spent (Calculated) 27   Initial Contact Note    Initial Contact Note Order Received and Verified, Physical Therapy Evaluation in Progress with Full Report to Follow.   Precautions   Precautions  Fall Risk;Swallow Precautions;Nasogastric Tube   Vitals   Pulse (!) 150   O2 Delivery Device Silicone Nasal Cannula   Vitals Comments elevated HR with activity   Pain 0 - 10 Group   Location Back   Therapist Pain Assessment During Activity;Nurse Notified  (patient reported back pain with return to supine)   Prior Living Situation   Prior Services Intermittent Physical Support for ADL Per Family   Housing / Facility 1 Story House   Steps Into Home 3   Steps In Home 0   Rail Right Rail (Steps into Home)   Equipment Owned 4-Wheel Walker   Lives with - Patient's Self Care Capacity Spouse   Comments Spouse at bedside and supportive, is able to assist as needed   Prior Level of Functional Mobility   Bed Mobility Independent   Transfer Status Independent   Ambulation Independent   Ambulation Distance community   Assistive Devices Used 4-Wheel Walker   Stairs Required Assist   Comments Patient reported she holds onto spouse for stairs for safety   History of Falls   History of Falls Yes  (reported 4 in the last 6 months)   Cognition    Cognition / Consciousness X   Speech/ Communication Delayed Responses   Level of Consciousness Alert   Attention Impaired   Comments flat affect, cooperative. Spouse reported patient would not typically know date   Active ROM Upper Body   Comments spouse reported gross deficits in B hands that have been exacerbated during hospital stay; patient with limited ability to grasp objects; reach (shoulder and elbow ROM) appeared grossly intact   Passive ROM Lower Body   Comments functional for mobility   Active ROM Lower Body    Comments gross deficits related to weakness   Strength Lower Body   Comments grossly 2+/5 RLE 3/5 LLE   Coordination Lower Body    Comments increased time, effort, and cueing for all mobility   Balance Assessment   Sitting Balance (Static) Fair -   Sitting Balance (Dynamic) Fair -   Standing Balance (Static) Trace +   Standing Balance (Dynamic) Trace   Weight Shift Sitting  Poor   Weight Shift Standing Absent   Comments no overt LOB sitting EOB once B feet on floor. HHA x2 to stand, patient required facilitation from therapist to maintain standing   Bed Mobility    Supine to Sit Maximal Assist   Sit to Supine Maximal Assist   Scooting Maximal Assist   Gait Analysis   Gait Level Of Assist Unable to Participate   Functional Mobility   Sit to Stand Maximal Assist  (x2)   Bed, Chair, Wheelchair Transfer Unable to Participate   ICU Target Mobility Level   ICU Mobility - Targeted Level Level 3A   6 Clicks Assessment - How much HELP from from another person do you currently need... (If the patient hasn't done an activity recently, how much help from another person do you think he/she would need if he/she tried?)   Turning from your back to your side while in a flat bed without using bedrails? 2   Moving from lying on your back to sitting on the side of a flat bed without using bedrails? 2   Moving to and from a bed to a chair (including a wheelchair)? 2   Standing up from a chair using your arms (e.g., wheelchair, or bedside chair)? 2   Walking in hospital room? 1   Climbing 3-5 steps with a railing? 1   6 clicks Mobility Score 10   Patient / Family Goals    Patient / Family Goal #1 none stated   Short Term Goals    Short Term Goal # 1 Patient will move supine <> sitting EOB from flat bed without bed rail with min A within 6tx in order to get in/out of bed   Short Term Goal # 2 Patient will move sitting <> standing with LRAD and min A within 6tx in order to initiate transfers and gait   Short Term Goal # 3 Patient will ambulate 15ft with LRAD and min A within 6tx in order to access environment   Short Term Goal # 4 Patient will ascend/descend 1 step with unilateral UE support and min A within 6tx in order to enter/exit home   Education Group   Education Provided Role of Physical Therapist   Role of Physical Therapist Patient Response Patient;Significant Other;Acceptance;Explanation;Verbal  Demonstration   Physical Therapy Initial Treatment Plan    Treatment Plan  Bed Mobility;Equipment;Family / Caregiver Training;Gait Training;Manual Therapy;Neuro Re-Education / Balance;Self Care / Home Evaluation;Therapeutic Activities;Stair Training;Therapeutic Exercise   Treatment Frequency 4 Times per Week   Duration Until Therapy Goals Met   Problem List    Problems Pain;Impaired Bed Mobility;Impaired Transfers;Impaired Ambulation;Functional ROM Deficit;Functional Strength Deficit;Impaired Balance;Decreased Activity Tolerance   Anticipated Discharge Equipment and Recommendations   DC Equipment Recommendations Unable to determine at this time   Discharge Recommendations Recommend post-acute placement for additional physical therapy services prior to discharge home   Interdisciplinary Plan of Care Collaboration   IDT Collaboration with  Nursing;Family / Caregiver   Patient Position at End of Therapy Call Light within Reach;In Bed;Wrist Restraints Applied;Family / Friend in Room   Collaboration Comments RN aware of visit, response   Session Information   Date / Session Number  4/25-1 (1/4, 5/1)

## 2024-04-25 NOTE — ASSESSMENT & PLAN NOTE
Improving oxygen requirement, patient is doing intubational high flow oxygen  She is on 4 L nasal cannula  Discontinue fluid and IV Lasix as needed  Respiratory hygiene  PT and OT placement:

## 2024-04-25 NOTE — CARE PLAN
The patient is Stable - Low risk of patient condition declining or worsening    Shift Goals  Clinical Goals: decrease O2 needs, wean precedex  Patient Goals: NAHUM  Family Goals: NAHUM    Progress made toward(s) clinical / shift goals:    Problem: Safety - Medical Restraint  Goal: Remains free of injury from restraints (Restraint for Interference with Medical Device)  Outcome: Progressing     Problem: Pain - Standard  Goal: Alleviation of pain or a reduction in pain to the patient’s comfort goal  Outcome: Progressing  Note: Pt assessed for pain regularly and medicated PRN per MAR.        Patient is not progressing towards the following goals:      Problem: Knowledge Deficit - Standard  Goal: Patient and family/care givers will demonstrate understanding of plan of care, disease process/condition, diagnostic tests and medications  Outcome: Not Progressing  Note: Pt educated regarding plan of care and medications. All questions answered.      Problem: Skin Integrity  Goal: Skin integrity is maintained or improved  Outcome: Not Progressing     Problem: Fall Risk  Goal: Patient will remain free from falls  Outcome: Not Progressing     Problem: Safety - Medical Restraint  Goal: Free from restraint(s) (Restraint for Interference with Medical Device)  Outcome: Not Progressing

## 2024-04-25 NOTE — PROGRESS NOTES
Hospital Medicine Daily Progress Note    Date of Service  4/25/2024    Chief Complaint  Tereza Sánchez is a 78 y.o. female admitted 4/20/2024 with Weakness    Hospital Course    78 y.o. female with past medical history of hypertension, SELWYN, chiari malformation, history of UTI, history of multiple falls who presented to the hospital with complaint of weakness dysuria for several weeks.  She also reported cough On 4/20/2024.  Patient found to have acute hypoxemic respiratory failure secondary to COVID-19 virus infection.  She was also diagnosed with septic shock and she was placed on Levophed for Target MAP of greater than 65 mmHg.  She also found to have acute renal failure.     She was intubated on April 20, 2024 and she was extubated on April 22, 2024 and she was placed on high flow nasal cannula.     On April 24, 2024 intensivist requested to transfer care to hospitalist service.  I waited and examined at the bedside.  She was alert and oriented but mildly confused.  She was able reorganize her .      Interval Problem Update    04/25/24    I evaluated and examined her at the bedside  She denies complaint of nausea, vomiting, diarrhea and constipation.  She was more alert and able to answer my questions and she is following commands.  Due to her agitation she is still on Precedex gtt.  I am continuing and titrating Precedex gtt. as per RASS of -1 to +1.  Plan is to wean her off from Precedex gtt.  Now she has been receiving tube feed.  I discussed plan of care with patient and her  at the bedside.  I discussed plan of care with bedside RN in IMCU.      I have discussed this patient's plan of care and discharge plan at IDT rounds today with Case Management, Nursing, Nursing leadership, and other members of the IDT team.    Consultants/Specialty  critical care    Code Status  Full Code    Disposition  The patient is not medically cleared for discharge to home or a post-acute facility.      I have  placed the appropriate orders for post-discharge needs.    Review of Systems  Review of Systems   Constitutional:  Positive for malaise/fatigue. Negative for chills, fever and weight loss.   HENT:  Negative for hearing loss and tinnitus.    Eyes:  Negative for blurred vision, double vision, photophobia and pain.   Respiratory:  Negative for cough, sputum production and shortness of breath.    Cardiovascular:  Negative for chest pain, palpitations, orthopnea and leg swelling.   Gastrointestinal:  Negative for abdominal pain, constipation, diarrhea, nausea and vomiting.   Genitourinary:  Negative for dysuria, frequency and urgency.   Musculoskeletal:  Positive for myalgias. Negative for back pain, joint pain and neck pain.   Skin:  Negative for rash.   Neurological:  Positive for weakness. Negative for dizziness, tingling, tremors, sensory change, speech change, focal weakness and headaches.   Psychiatric/Behavioral:  Negative for hallucinations and substance abuse.    All other systems reviewed and are negative.       Physical Exam  Temp:  [36.6 °C (97.9 °F)-37 °C (98.6 °F)] 36.6 °C (97.9 °F)  Pulse:  [] 105  Resp:  [18-57] 23  BP: (128-184)/() 151/86  SpO2:  [89 %-96 %] 90 %    Physical Exam  Vitals reviewed.   Constitutional:       General: She is not in acute distress.     Appearance: Normal appearance. She is ill-appearing.   HENT:      Head: Normocephalic and atraumatic.      Nose: No congestion.      Comments: Oxygen nasal cannula  Feeding tube  Eyes:      General:         Right eye: No discharge.         Left eye: No discharge.      Pupils: Pupils are equal, round, and reactive to light.   Cardiovascular:      Rate and Rhythm: Normal rate and regular rhythm.      Pulses: Normal pulses.      Heart sounds: Normal heart sounds. No murmur heard.  Pulmonary:      Effort: Pulmonary effort is normal. No respiratory distress.      Breath sounds: Normal breath sounds. No stridor.   Abdominal:      General:  Bowel sounds are normal. There is no distension.      Palpations: Abdomen is soft.      Tenderness: There is no abdominal tenderness.   Musculoskeletal:         General: No swelling or tenderness. Normal range of motion.      Cervical back: Normal range of motion. No rigidity.   Skin:     General: Skin is warm.      Capillary Refill: Capillary refill takes less than 2 seconds.      Coloration: Skin is not jaundiced or pale.      Findings: No bruising.   Neurological:      General: No focal deficit present.      Mental Status: She is alert and oriented to person, place, and time.      Cranial Nerves: No cranial nerve deficit.      Comments: She is significantly more alert today and able to answer my questions and she is following commands.   Psychiatric:         Mood and Affect: Mood normal.         Behavior: Behavior normal.         Fluids    Intake/Output Summary (Last 24 hours) at 4/25/2024 0759  Last data filed at 4/25/2024 0600  Gross per 24 hour   Intake 795.04 ml   Output 2275 ml   Net -1479.96 ml       Laboratory  Recent Labs     04/24/24  0330   WBC 11.6*   RBC 4.82   HEMOGLOBIN 14.4   HEMATOCRIT 41.7   MCV 86.5   MCH 29.9   MCHC 34.5   RDW 44.3   PLATELETCT 286   MPV 11.4     Recent Labs     04/23/24  0400 04/24/24  0330 04/25/24  0406   SODIUM 142 143 143   POTASSIUM 3.9 3.2* 3.3*   CHLORIDE 103 100 103   CO2 26 25 24   GLUCOSE 125* 127* 123*   BUN 28* 29* 27*   CREATININE 1.04 0.95 0.66   CALCIUM 8.3* 8.3* 8.5                   Imaging  DX-ABDOMEN FOR TUBE PLACEMENT   Final Result         1.  Nonspecific bowel gas pattern in the upper abdomen.   2.  Dobbhoff tube is coiled within the stomach, the tip terminates overlying the expected location of the gastric fundus, recommend partial withdrawal given extent of coiling.   3.  Hazy interstitial pulmonary opacities, consider chronic underlying lung disease versus interstitial edema and/or infiltrates.      DX-CHEST-PORTABLE (1 VIEW)   Final Result         1.   Pulmonary edema and/or infiltrates, slightly increased compared to prior study.      EC-ECHOCARDIOGRAM COMPLETE W/ CONT   Final Result      DX-CHEST-PORTABLE (1 VIEW)   Final Result         1.  Pulmonary edema and/or infiltrates are identified, which are stable since the prior exam.   2.  Small layering bilateral pleural effusion   3.  Cardiomegaly      DX-ABDOMEN FOR TUBE PLACEMENT   Final Result         1.  Air-filled distended loops of bowel are seen in the upper abdomen, appearance suggests ileus or enteritis versus evolving obstructive changes. Recommend radiographic followup to resolution to exclude progression to obstruction.   2.  Nasogastric tube tip terminates overlying the expected location of the pylorus or first duodenal segment.   3.  Pulmonary edema and/or infiltrates.      DX-ABDOMEN FOR TUBE PLACEMENT   Final Result      NG tube tip is in the left upper abdomen but the side-port in the distal esophagus. Recommend advancing at least 7 cm.      DX-CHEST-LIMITED (1 VIEW)   Final Result      1.  Endotracheal tube tip located 2 cm above the devon.      2.  NG tube extends into the gastric fundus.      3.  Bibasal atelectasis/consolidation.         US-RENAL   Final Result      1.  Limited visualization the right kidney due to obscuration by bowel gas.      2.  Otherwise normal exam.      DX-ABDOMEN FOR TUBE PLACEMENT   Final Result         Gastric drainage tube with tip projecting over the expected area of the stomach. Sidehole is in the GE junction.      CT-HEAD W/O   Final Result         1. No acute intracranial abnormality. No evidence of acute intracranial hemorrhage or mass lesion.                     DX-CHEST-PORTABLE (1 VIEW)   Final Result         Elevation of the right hemidiaphragm and right basilar atelectasis.      DX-CHEST-PORTABLE (1 VIEW)   Final Result      Linear atelectasis within the right lung base.           Assessment/Plan  * Septic shock (HCC)- (present on admission)  Assessment &  Plan  Secondary to COVID-19  Shock has resolved  Cultures are NGTD  Continue monitor closely.  Shock now resolved.    Persistent hyperactive delirium due to multiple etiologies  Assessment & Plan  Delirium bundle  Reinitiate home medications  Now improving  I added IV Haldol as needed for agitation.  I am continuing the titrating Precedex gtt. as per RASS of -1 to +1.  Plan is to wean her off from Precedex.    Electrolyte depletion  Assessment & Plan  Continue to monitor and replace as needed.    COVID-19  Assessment & Plan  On dexamethasone 6mg daily x 10 days.  Continue enhanced droplet, contact and eye protection precautions.  Currently she is requiring 4 L of oxygen and maintaining oxygen saturation.    Acute hypoxemic respiratory failure due to COVID-19 (HCC)  Assessment & Plan  Extubated 4/22    Decadron x 10 days  Pulmonary hygiene  Maintain euvolemia  SpO2 > 90%  Oxygen requirement is trending down currently she is requiring 4 L of oxygen and she is maintaining oxygen saturation.  Titrate down oxygen as tolerated.    Acute renal failure (ARF) ([facility])  Assessment & Plan  4/25/2024   Baseline creatinine 0.9-1.0.  Up to 5.  Likely ATN related to septic shock in conjunction with ACE inhibitor.  Some hyaline casts on UA.  No indications for renal replacement therapy at present.  HCO3 16, normal PH   US renal negative for hydronephrosis.     No renal function is significantly improved  Ordered lab workup tomorrow.    Major depressive disorder- (present on admission)  Assessment & Plan  Continue home amytriptylene, duloxetine.      SELWYN (obstructive sleep apnea)- (present on admission)  Assessment & Plan  Not on home CPAP.  She will need close outpatient follow-up.        I discussed plan of care during multidisciplinary rounds regarding patient's current medical condition and plan of care.      Patient is critically ill.   The patient continues to have: Agitation  The vital organ system that is affected is the:  Central nervous system  If untreated there is a high chance of deterioration into: Worsening of agitation  And eventually death.   The critical care that I am providing today is: At the time of evaluation she is on Precedex.  I am continuing and titrating Precedex gtt. as per RASS of -1 to +1.  The critical that has been undertaken is medically complex.   There has been no overlap in critical care time.   Critical Care Time not including procedures: 37 minutes       VTE prophylaxis:    enoxaparin ppx      I have performed a physical exam and reviewed and updated ROS and Plan today (4/25/2024). In review of yesterday's note (4/24/2024), there are no changes except as documented above.

## 2024-04-25 NOTE — DISCHARGE PLANNING
Care Transition Team Assessment    Admission Date: 4/20/2024  GMLOS: 5.1  ALOS: 5    6-Clicks ADL Score: 7  6-Clicks Mobility Score: 10  PT and/or OT Eval ordered: Yes  Post-acute Referrals Ordered: Yes  Post-acute Choice Obtained: NA  Has referral(s) been sent to post-acute provider:  Yes    Anticipated Discharge Dispo: Discharge Disposition: D/T to SNF with Medicare cert in anticipation of skilled care (03)    DME Needed: No    Action(s) Taken:     Pt discussed in IDT rounds. Pt is currently A&Ox3, on 4 L NC, on iso precautions for COVID-19, has IRIS (on tube feeds) and on bilat wrist restraints. Plan is to wean off precedex, work with SLP for dysphagia tx, and PT/OT for mobility/strength.     RN YAKOV spoke with spouse Anthony [phone] over telephone. Confirmed information on factsheet including address and emergency contacts. Pt currently lives in a single-story house (3 steps to enter) with spouse (d/c support) in [place], [place]. Pt states no AD or DPOA. Pt does not have a license ( drives pt around to the store and appointments). Pt is currently retired and insured with [facility]. Pt's PCP is RACHEL Guerrero. Prior to hospitalization, pt needed with ADLs and IADLs such as ambulating. Pt own DMEs a walker, cane, and wheelchair. Pt has home oxygen (2L serviced with [employer]). Pt has hx of depression (currently on meds).     PT/OT/SLP-recommending post-acute.   PMR consult placed per protocol  Sent SNF referrals to local facilities in [facility]    PASRR# [id number]    Escalations Completed: None    Medically Clear: No    Next Steps: Pt is not MC at this time, pending MC    Barriers to Discharge: Medical clearance and Pending Placement    Information Source  Orientation Level: Oriented to place, Oriented to time, Oriented to person, Disoriented to situation  Information Given By: Spouse  Informant's Name: Anthony Sánchez  Who is responsible for making decisions for patient? : Other    Readmission Evaluation  Is  this a readmission?: No    Elopement Risk  Legal Hold: No  Ambulatory or Self Mobile in Wheelchair: No-Not an Elopement Risk  Elopement Risk: Not at Risk for Elopement    Interdisciplinary Discharge Planning  Lives with - Patient's Self Care Capacity: Spouse  Patient or legal guardian wants to designate a caregiver: No  Housing / Facility: [address] [place] [address]  Prior Services: Intermittent Physical Support for ADL Per Family    Discharge Preparedness  What is your plan after discharge?: Uncertain - pending medical team collaboration  What are your discharge supports?: Spouse  Prior Functional Level: Needs Assist with Medication Management, Uses Walker, Uses Wheelchair, Uses Cane, Needs Assist with Activities of Daily Living    Functional Assesment  Prior Functional Level: Needs Assist with Medication Management, Uses Walker, Uses Wheelchair, Uses Cane, Needs Assist with Activities of Daily Living    Finances  Financial Barriers to Discharge: No  Prescription Coverage: Yes    Vision / Hearing Impairment  Vision Impairment : Yes  Right Eye Vision: Impaired  Left Eye Vision: Impaired  Hearing Impairment : No    Advance Directive  Advance Directive?: None    Domestic Abuse  Have you ever been the victim of abuse or violence?: No  Physical Abuse or Sexual Abuse: No  Verbal Abuse or Emotional Abuse: No  Possible Abuse/Neglect Reported to:: Not Applicable    Psychological Assessment  History of Substance Abuse: None  History of Psychiatric Problems: Yes (depression)  Newly Diagnosed Illness: No    Discharge Risks or Barriers  Discharge risks or barriers?: Post-acute placement / services, Complex medical needs  Patient risk factors: Cognitive / sensory / physical deficit, Complex medical needs, In restraints    Anticipated Discharge Information  Discharge Disposition: D/T to SNF with Medicare cert in anticipation of skilled care (03)

## 2024-04-25 NOTE — DIETARY
Nutrition Services Brief Update:    Problem: Nutritional:  Goal: Nutrition support tolerated and meeting greater than 85% of estimated needs  Outcome: Progressing    IRIS placed late last night, KUB confirming gastric placement completed. TF orders in per MD. See full RD note 4/24.    RD continues to follow.

## 2024-04-25 NOTE — DISCHARGE PLANNING
PM&R referral from Dr. Mallory. Sepsis debility with ongoing medical management as well as therapy need. Potential spouse support. SCP provider. Physiatry to consult per protocol.

## 2024-04-25 NOTE — PROGRESS NOTES
Iris Placement    Tube Team verified patient name and medical record number prior to tube placement. Iris feeding tube (55 inches, 10 Ethiopian) placed at 100 cm in left nare. Per Iris picture, tube appears to be in the stomach.    Nursing Instructions: Await KUB to confirm placement before use for medications or feeding. Stylet, may be removed, please place in labeled bag with insufflation bulb and save in patient medication drawer.     Feeding tube offloaded from the nose

## 2024-04-25 NOTE — ASSESSMENT & PLAN NOTE
Metabolic encephalopathy due to hypoxia and infection  Avoid sedation medication, avoid benzo or antihistamine  Encouraged the patient to get out of bed  Nonpharmacological treatment  Improved and patient is alert oriented x 4

## 2024-04-25 NOTE — THERAPY
Occupational Therapy   Initial Evaluation     Patient Name: Tereza Sánchez  Age:  78 y.o., Sex:  female  Medical Record #: [id number]  Today's Date: 4/25/2024     Precautions  Precautions: Fall Risk, Swallow Precautions, Nasogastric Tube    Assessment    Patient is 78 y.o. female admitted for weakness, multiple falls, dysuria, COVID (+). Pt states normally independent with functional mobility and ADLs living in a [facility] with spouse who assists as needed. Pt with chronic BUE deficits, unable to extend fingers fully at baseline. Pt required maxAx2 for bed mobility, maxA for all ADLs due to weakness. Will continue to see for skilled therapy while admitted as well as recommend post-acute placement.      Plan    Occupational Therapy Initial Treatment Plan   Treatment Interventions: (P) Self Care / Activities of Daily Living, Adaptive Equipment, Manual Therapy Techniques, Neuro Re-Education / Balance, Therapeutic Exercises, Therapeutic Activity  Treatment Frequency: (P) 3 Times per Week  Duration: (P) Until Therapy Goals Met    DC Equipment Recommendations: (P) Unable to determine at this time  Discharge Recommendations: (P) Recommend post-acute placement for additional occupational therapy services prior to discharge home     Objective       04/25/24 0958   Prior Living Situation   Prior Services Intermittent Physical Support for ADL Per Family   Housing / Facility 1 Story House   Steps Into Home 3   Steps In Home 0   Rail Both Rail (Steps into Home)   Bathroom Set up Walk In Shower;Shower Chair   Equipment Owned 4-Wheel Walker   Lives with - Patient's Self Care Capacity Spouse   Prior Level of ADL Function   Self Feeding Independent   Grooming / Hygiene Independent   Bathing Independent   Dressing Independent   Toileting Independent   Prior Level of IADL Function   Medication Management Requires Assist   Laundry Requires Assist   Kitchen Mobility Requires Assist   Finances Requires Assist   Home Management Requires Assist    Shopping Requires Assist   Prior Level Of Mobility Independent With Device in Home   Driving / Transportation Relatives / Others Provide Transportation   History of Falls   History of Falls Yes   Date of Last Fall   (4 in the last 6 months)   Precautions   Precautions Fall Risk;Swallow Precautions;Nasogastric Tube   Pain 0 - 10 Group   Therapist Pain Assessment Post Activity Pain Same as Prior to Activity;Nurse Notified   Cognition    Cognition / Consciousness X   Speech/ Communication Delayed Responses;Incomprehensible Words   Level of Consciousness Alert   Attention Impaired   Initiation Impaired   Comments Flat, lethargic, delayed responses   Active ROM Upper Body   Active ROM Upper Body  X   Dominant Hand Right   Comments chronic BUE deficits per spouse, elbow/shoulder WFL, fingers unable to extend fully   Strength Upper Body   Upper Body Strength  X   Gross Strength Generalized Weakness, Equal Bilaterally.    Sensation Upper Body   Upper Extremity Sensation  WDL   Upper Body Muscle Tone   Upper Body Muscle Tone  WDL   Balance Assessment   Sitting Balance (Static) Fair -   Sitting Balance (Dynamic) Fair -   Standing Balance (Static) Trace +   Standing Balance (Dynamic) Trace   Weight Shift Sitting Poor   Weight Shift Standing Absent   Bed Mobility    Supine to Sit Maximal Assist   Sit to Supine Maximal Assist   Scooting Maximal Assist   Rolling Maximal Assist to Rt.;Maximum Assist to Lt.   Comments max x2   ADL Assessment   Grooming Maximal Assist;Seated   Upper Body Dressing Maximal Assist   Lower Body Dressing Maximal Assist   Toileting   (NT-refused need, cath in place)   How much help from another person does the patient currently need...   Putting on and taking off regular lower body clothing? 1   Bathing (including washing, rinsing, and drying)? 1   Toileting, which includes using a toilet, bedpan, or urinal? 1   Putting on and taking off regular upper body clothing? 1   Taking care of personal grooming  such as brushing teeth? 1   Eating meals? 2   6 Clicks Daily Activity Score 7   Functional Mobility   Sit to Stand Maximal Assist  (x2)   Bed, Chair, Wheelchair Transfer Unable to Participate   Mobility bed mobility, seated ADLs at EOB, STS, returned to supine   Comments w/ FWW   Activity Tolerance   Sitting Edge of Bed 10 min   Standing 2 min total   Short Term Goals   Short Term Goal # 1 Pt will complete ADL transfers with modA   Short Term Goal # 2 Pt will complete LB dressing with modA   Short Term Goal # 3 Pt will complete toileting with modA   Education Group   Education Provided Role of Occupational Therapist   Role of Occupational Therapist Patient Response Patient;Acceptance;Explanation;Reinforcement Needed   Occupational Therapy Initial Treatment Plan    Treatment Interventions Self Care / Activities of Daily Living;Adaptive Equipment;Manual Therapy Techniques;Neuro Re-Education / Balance;Therapeutic Exercises;Therapeutic Activity   Treatment Frequency 3 Times per Week   Duration Until Therapy Goals Met   Problem List   Problem List Decreased Active Daily Living Skills;Decreased Homemaking Skills;Decreased Upper Extremity Strength Right;Decreased Upper Extremity Strength Left;Decreased Upper Extremity AROM Right;Decreased Upper Extremity AROM Left;Decreased Functional Mobility;Decreased Activity Tolerance;Safety Awareness Deficits / Cognition;Impaired Postural Control / Balance;Impaired Cognitive Function   Anticipated Discharge Equipment and Recommendations   DC Equipment Recommendations Unable to determine at this time   Discharge Recommendations Recommend post-acute placement for additional occupational therapy services prior to discharge home   Interdisciplinary Plan of Care Collaboration   IDT Collaboration with  Nursing;Physical Therapist;Family / Caregiver   Patient Position at End of Therapy In Bed;Bed Alarm On;Call Light within Reach;Tray Table within Reach;Phone within Reach;Family / Friend in  Room   Collaboration Comments RN updated

## 2024-04-25 NOTE — CARE PLAN
The patient is Watcher - Medium risk of patient condition declining or worsening    Shift Goals  Clinical Goals: Pt will be hemodynamically stable  Patient Goals: NAHUM  Family Goals: NAHUM    Progress made toward(s) clinical / shift goals:    Problem: Fall Risk  Goal: Patient will remain free from falls  Outcome: Progressing     Problem: Hemodynamics  Goal: Patient's hemodynamics, fluid balance and neurologic status will be stable or improve  Outcome: Progressing     Problem: Safety - Medical Restraint  Goal: Remains free of injury from restraints (Restraint for Interference with Medical Device)  Outcome: Progressing     Problem: Pain - Standard  Goal: Alleviation of pain or a reduction in pain to the patient’s comfort goal  Outcome: Progressing       Patient is not progressing towards the following goals:

## 2024-04-25 NOTE — WOUND TEAM
[facility]  Inpatient Services  Wound and Skin Care Brief Evaluation    Admission Date: 4/20/2024     Last order of IP CONSULT TO WOUND CARE was found on 4/24/2024 from Hospital Encounter on 4/20/2024     HPI, PMH, SH: Reviewed    Chief Complaint   Patient presents with    Syncope    Hypotension    Weakness     Diagnosis: Septic shock (HCC) [A41.9, R65.21]    Unit where seen by Wound Team: [id number]     Wound consult placed regarding toes, fingers, breasts, sacrum, arm. Chart and images reviewed. This discussed with bedside RN Adenike. This clinician in to assess patient. Patient pleasant and agreeable. Patient left arm with skin tear, RN protocol in place for mepitel one. Patient left finger with intact abrasion noted, kept open to air. Patient under left breast with some MASD noted. Interdry can be applied. Patient sacrum with small moisture fissure, is painful to touch, but all surrounding areas blanching. Bilateral heels intact and blanching.     No pressure injuries or advanced wound care needs identified. Wound consult completed. No further follow up unless indicated and consulted.     Moisture Associated Skin Damage 04/25/24 Breast;Buttock (Active)   First Observed Date/First Observed Time: 04/25/24 1215   Present on Original Admission: Yes  Laterality: Bilateral  Wound Location : Breast;Buttock      Assessments 4/25/2024  9:00 AM   Wound Image      NEXT Weekly Photo (Inpatient Only) 05/02/24   Drainage Amount None   Periwound Assessment Pink;Intact;Dry;Clean   IAD Cleansing Foam Cleanser/Washcloth   Periwound Protectant Not Applicable   WOUND NURSE ONLY - Time Spent with Patient (mins) 30       PREVENTATIVE INTERVENTIONS:    Q shift Logan - performed per nursing policy  Q shift pressure point assessments - performed per nursing policy    Surface/Positioning  ICU Low Airloss - Currently in Place  Reposition q 2 hours - Currently in Place    Offloading/Redistribution  Heel float boots  (Prevalon boot) - Currently in Place  Float Heels off Bed with Pillows - Currently in Place           Respiratory  Silicone O2 tubing - Currently in Place  Gray Foam Ear protectors - Currently in Place    Containment/Moisture Prevention    Tam Catheter - Currently in Place

## 2024-04-25 NOTE — PROGRESS NOTES
4 Eyes Skin Assessment Completed by JIMMY palma and joel RN.    Head WDL  Ears Redness and Blanching  Nose WDL  Mouth dry  Neck Scar  Breast/Chest Redness and Excoriation  Shoulder Blades Redness and Blanching  Spine Redness and Blanching  (R) Arm/Elbow/Hand Redness, Blanching, Discoloration, and Edema  (L) Arm/Elbow/Hand Redness, Blanching, and Bruising, skin tear  Abdomen Bruising  Groin Redness  Scrotum/Coccyx/Buttocks Redness and Blanching  (R) Leg WDL  (L) Leg WDL  (R) Heel/Foot/Toe Redness and Blanching  (L) Heel/Foot/Toe Redness and Blanching abrasion          Devices In Places ECG, Tele Box, Pulse Ox, Tam, SCD's, and Oxy Mask      Interventions In Place Heel Mepilex, Heel Float Boots, TAP System, Pillows, Q2 Turns, and Low Air Loss Mattress    Possible Skin Injury Yes    Pictures Uploaded Into Epic Yes  Wound Consult Placed Yes  RN Wound Prevention Protocol Ordered Yes

## 2024-04-25 NOTE — DISCHARGE PLANNING
Received Choice form at [facility]  Agency/Facility Name: [facility] blanket  Referral sent per Choice form @ 1445   Per CM Elvis sent [facility] referrals.     @1445  Received call from [facility] checking on pt if she is medically clear to d/c . Checked with CM Elvis advised [facility] most likely it will be weekend d/c or on Monday. [facility] accepted the pt. Notified Elvis NAGY

## 2024-04-25 NOTE — ASSESSMENT & PLAN NOTE
Due to COVID-19 infection  Patient needed intubation  Improving and she is on 4 L nasal cannula  Will order CTA to rule out PE  Discontinue fluid and Lasix as needed  Decadron x 10 days  Pulmonary hygiene  Maintain euvolemia  SpO2 > 90%

## 2024-04-26 PROBLEM — R00.0 TACHYCARDIA: Status: ACTIVE | Noted: 2024-04-26

## 2024-04-26 LAB
ALBUMIN SERPL BCP-MCNC: 3.2 G/DL (ref 3.2–4.9)
ALBUMIN/GLOB SERPL: 1.2 G/DL
ALP SERPL-CCNC: 59 U/L (ref 30–99)
ALT SERPL-CCNC: 26 U/L (ref 2–50)
ANION GAP SERPL CALC-SCNC: 12 MMOL/L (ref 7–16)
AST SERPL-CCNC: 23 U/L (ref 12–45)
BILIRUB SERPL-MCNC: 0.6 MG/DL (ref 0.1–1.5)
BUN SERPL-MCNC: 31 MG/DL (ref 8–22)
CALCIUM ALBUM COR SERPL-MCNC: 8.8 MG/DL (ref 8.5–10.5)
CALCIUM SERPL-MCNC: 8.2 MG/DL (ref 8.5–10.5)
CHLORIDE SERPL-SCNC: 101 MMOL/L (ref 96–112)
CO2 SERPL-SCNC: 26 MMOL/L (ref 20–33)
CREAT SERPL-MCNC: 0.71 MG/DL (ref 0.5–1.4)
CRP SERPL HS-MCNC: 1.21 MG/DL (ref 0–0.75)
ERYTHROCYTE [DISTWIDTH] IN BLOOD BY AUTOMATED COUNT: 44.9 FL (ref 35.9–50)
GFR SERPLBLD CREATININE-BSD FMLA CKD-EPI: 88 ML/MIN/1.73 M 2
GLOBULIN SER CALC-MCNC: 2.7 G/DL (ref 1.9–3.5)
GLUCOSE BLD STRIP.AUTO-MCNC: 163 MG/DL (ref 65–99)
GLUCOSE BLD STRIP.AUTO-MCNC: 172 MG/DL (ref 65–99)
GLUCOSE BLD STRIP.AUTO-MCNC: 180 MG/DL (ref 65–99)
GLUCOSE BLD STRIP.AUTO-MCNC: 183 MG/DL (ref 65–99)
GLUCOSE BLD STRIP.AUTO-MCNC: 219 MG/DL (ref 65–99)
GLUCOSE SERPL-MCNC: 180 MG/DL (ref 65–99)
HCT VFR BLD AUTO: 36.8 % (ref 37–47)
HGB BLD-MCNC: 12.8 G/DL (ref 12–16)
MAGNESIUM SERPL-MCNC: 2.3 MG/DL (ref 1.5–2.5)
MCH RBC QN AUTO: 30.5 PG (ref 27–33)
MCHC RBC AUTO-ENTMCNC: 34.8 G/DL (ref 32.2–35.5)
MCV RBC AUTO: 87.6 FL (ref 81.4–97.8)
PHOSPHATE SERPL-MCNC: 2.8 MG/DL (ref 2.5–4.5)
PLATELET # BLD AUTO: 277 K/UL (ref 164–446)
PMV BLD AUTO: 11.3 FL (ref 9–12.9)
POTASSIUM SERPL-SCNC: 3.6 MMOL/L (ref 3.6–5.5)
PREALB SERPL-MCNC: 19.6 MG/DL (ref 18–38)
PROT SERPL-MCNC: 5.9 G/DL (ref 6–8.2)
RBC # BLD AUTO: 4.2 M/UL (ref 4.2–5.4)
SODIUM SERPL-SCNC: 139 MMOL/L (ref 135–145)
WBC # BLD AUTO: 15.5 K/UL (ref 4.8–10.8)

## 2024-04-26 PROCEDURE — 83735 ASSAY OF MAGNESIUM: CPT

## 2024-04-26 PROCEDURE — 700102 HCHG RX REV CODE 250 W/ 637 OVERRIDE(OP): Performed by: INTERNAL MEDICINE

## 2024-04-26 PROCEDURE — 84100 ASSAY OF PHOSPHORUS: CPT

## 2024-04-26 PROCEDURE — 770000 HCHG ROOM/CARE - INTERMEDIATE ICU *

## 2024-04-26 PROCEDURE — 82962 GLUCOSE BLOOD TEST: CPT | Mod: 91

## 2024-04-26 PROCEDURE — A9270 NON-COVERED ITEM OR SERVICE: HCPCS | Performed by: INTERNAL MEDICINE

## 2024-04-26 PROCEDURE — 700111 HCHG RX REV CODE 636 W/ 250 OVERRIDE (IP): Performed by: INTERNAL MEDICINE

## 2024-04-26 PROCEDURE — 86140 C-REACTIVE PROTEIN: CPT

## 2024-04-26 PROCEDURE — 84134 ASSAY OF PREALBUMIN: CPT

## 2024-04-26 PROCEDURE — 99233 SBSQ HOSP IP/OBS HIGH 50: CPT | Performed by: INTERNAL MEDICINE

## 2024-04-26 PROCEDURE — 85027 COMPLETE CBC AUTOMATED: CPT

## 2024-04-26 PROCEDURE — 80053 COMPREHEN METABOLIC PANEL: CPT

## 2024-04-26 RX ORDER — LISINOPRIL 5 MG/1
5 TABLET ORAL
Status: DISCONTINUED | OUTPATIENT
Start: 2024-04-27 | End: 2024-04-28

## 2024-04-26 RX ORDER — LISINOPRIL 5 MG/1
5 TABLET ORAL
Status: DISCONTINUED | OUTPATIENT
Start: 2024-04-26 | End: 2024-04-26

## 2024-04-26 RX ORDER — CARVEDILOL 3.12 MG/1
3.12 TABLET ORAL 2 TIMES DAILY WITH MEALS
Status: DISCONTINUED | OUTPATIENT
Start: 2024-04-26 | End: 2024-04-27

## 2024-04-26 RX ADMIN — INSULIN HUMAN 3 UNITS: 100 INJECTION, SOLUTION PARENTERAL at 00:17

## 2024-04-26 RX ADMIN — ENOXAPARIN SODIUM 40 MG: 100 INJECTION SUBCUTANEOUS at 17:18

## 2024-04-26 RX ADMIN — FUROSEMIDE 20 MG: 10 INJECTION INTRAMUSCULAR; INTRAVENOUS at 05:34

## 2024-04-26 RX ADMIN — SENNOSIDES AND DOCUSATE SODIUM 2 TABLET: 50; 8.6 TABLET ORAL at 05:42

## 2024-04-26 RX ADMIN — POTASSIUM BICARBONATE 50 MEQ: 978 TABLET, EFFERVESCENT ORAL at 09:18

## 2024-04-26 RX ADMIN — ACETAMINOPHEN 650 MG: 325 TABLET, FILM COATED ORAL at 05:35

## 2024-04-26 RX ADMIN — CARVEDILOL 3.12 MG: 3.12 TABLET, FILM COATED ORAL at 12:31

## 2024-04-26 RX ADMIN — INSULIN HUMAN 3 UNITS: 100 INJECTION, SOLUTION PARENTERAL at 12:36

## 2024-04-26 RX ADMIN — DEXAMETHASONE 6 MG: 4 TABLET ORAL at 05:35

## 2024-04-26 RX ADMIN — INSULIN HUMAN 3 UNITS: 100 INJECTION, SOLUTION PARENTERAL at 05:41

## 2024-04-26 RX ADMIN — LABETALOL HYDROCHLORIDE 10 MG: 5 INJECTION INTRAVENOUS at 10:56

## 2024-04-26 RX ADMIN — PREGABALIN 150 MG: 150 CAPSULE ORAL at 17:18

## 2024-04-26 RX ADMIN — SENNOSIDES AND DOCUSATE SODIUM 2 TABLET: 50; 8.6 TABLET ORAL at 17:18

## 2024-04-26 RX ADMIN — PREGABALIN 150 MG: 150 CAPSULE ORAL at 05:35

## 2024-04-26 RX ADMIN — LISINOPRIL 5 MG: 5 TABLET ORAL at 08:30

## 2024-04-26 RX ADMIN — INSULIN HUMAN 3 UNITS: 100 INJECTION, SOLUTION PARENTERAL at 17:37

## 2024-04-26 RX ADMIN — DULOXETINE HYDROCHLORIDE 30 MG: 30 CAPSULE, DELAYED RELEASE ORAL at 05:35

## 2024-04-26 RX ADMIN — CARVEDILOL 3.12 MG: 3.12 TABLET, FILM COATED ORAL at 17:18

## 2024-04-26 RX ADMIN — ASPIRIN 81 MG: 81 TABLET, CHEWABLE ORAL at 05:34

## 2024-04-26 ASSESSMENT — ENCOUNTER SYMPTOMS
FOCAL WEAKNESS: 0
DOUBLE VISION: 0
ABDOMINAL PAIN: 0
CHILLS: 0
TINGLING: 0
HEADACHES: 0
SENSORY CHANGE: 0
PHOTOPHOBIA: 0
DIARRHEA: 0
PALPITATIONS: 0
FEVER: 0
VOMITING: 0
SPUTUM PRODUCTION: 0
DIZZINESS: 0
TREMORS: 0
NECK PAIN: 0
EYE PAIN: 0
ORTHOPNEA: 0
WEAKNESS: 1
WEIGHT LOSS: 0
BLURRED VISION: 0
BACK PAIN: 0
MYALGIAS: 1
SPEECH CHANGE: 0
SHORTNESS OF BREATH: 0
CONSTIPATION: 0
HALLUCINATIONS: 0
NAUSEA: 0
COUGH: 0

## 2024-04-26 ASSESSMENT — PAIN DESCRIPTION - PAIN TYPE
TYPE: ACUTE PAIN
TYPE: ACUTE PAIN
TYPE: CHRONIC PAIN
TYPE: ACUTE PAIN

## 2024-04-26 ASSESSMENT — FIBROSIS 4 INDEX
FIB4 SCORE: 1.46
FIB4 SCORE: 1.24

## 2024-04-26 ASSESSMENT — LIFESTYLE VARIABLES: SUBSTANCE_ABUSE: 0

## 2024-04-26 NOTE — CARE PLAN
The patient is Stable - Low risk of patient condition declining or worsening    Shift Goals  Clinical Goals: stable HR and BP  Patient Goals: NAHUM  Family Goals: NAHUM    Progress made toward(s) clinical / shift goals:  vital signs stable, medications added for blood pressure and heart rate. Improved vital signs, free from falls, skin intact.     Patient is not progressing towards the following goals patient's orientation status waxes and wanes, restraints removed at this time. Continue to reinforce the importance of leaving wires and equipment alone.       Problem: Knowledge Deficit - Standard  Goal: Patient and family/care givers will demonstrate understanding of plan of care, disease process/condition, diagnostic tests and medications  Description: Target End Date:  1-3 days or as soon as patient condition allows    Document in Patient Education    1.  Patient and family/caregiver oriented to unit, equipment, visitation policy and means for communicating concern  2.  Complete/review Learning Assessment  3.  Assess knowledge level of disease process/condition, treatment plan, diagnostic tests and medications  4.  Explain disease process/condition, treatment plan, diagnostic tests and medications  Outcome: Not Progressing

## 2024-04-26 NOTE — CARE PLAN
The patient is Watcher - Medium risk of patient condition declining or worsening    Shift Goals  Clinical Goals: stable HR and BP  Patient Goals: NAHUM  Family Goals: NAHUM    Progress made toward(s) clinical / shift goals:    Problem: Skin Integrity  Goal: Skin integrity is maintained or improved  Outcome: Progressing     Problem: Urinary Elimination  Goal: Establish and maintain regular urinary output  Outcome: Progressing     Problem: Safety - Medical Restraint  Goal: Remains free of injury from restraints (Restraint for Interference with Medical Device)  Outcome: Progressing       Patient is not progressing towards the following goals:      Problem: Knowledge Deficit - Standard  Goal: Patient and family/care givers will demonstrate understanding of plan of care, disease process/condition, diagnostic tests and medications  Outcome: Not Progressing  Note: Pt educated regarding plan of care and medications. All questions answered.      Problem: Fall Risk  Goal: Patient will remain free from falls  Outcome: Not Progressing  Note: Fall precautions in place. Bed in lowest position. Non-skid socks in place. Personal possessions within reach. Mobility sign on door. Bed-alarm on. Call light within reach. Pt educated regarding fall prevention and states understanding.      Problem: Hemodynamics  Goal: Patient's hemodynamics, fluid balance and neurologic status will be stable or improve  Outcome: Not Progressing  Note: BP stabilized, VS taken Q4 hours, pt on continuous cardiac monitoring. Daily labs drawn.      Problem: Safety - Medical Restraint  Goal: Free from restraint(s) (Restraint for Interference with Medical Device)  Outcome: Not Progressing     Problem: Pain - Standard  Goal: Alleviation of pain or a reduction in pain to the patient’s comfort goal  Outcome: Not Progressing  Note: Pt assessed for pain regularly and medicated PRN per MAR.

## 2024-04-26 NOTE — THERAPY
Speech Language Therapy Contact Note    Patient Name: Tereza Sánchez  Age:  78 y.o., Sex:  female  Medical Record #: [id number]  Today's Date: 4/26/2024    Attempted to reassess pt's swallow per RN request. Pt was A&Ox3-4, [facility] watching TV. Attempted to provide oral care though pt refused, stating she doesn't want to be forced to do anything. Attempted to provide ice chips, water, coffee and solids, offering pt choices though she refused regardless. Pt kept stating she won't do it, she will eat when she goes home. SLP and RN provided extensive education and encouragement geared towards pt's personal goal of going home, though she was not willing to take anything oral, even if she were to feed herself. Pt swatted SLP's hand away during attempts to inspect oral cavity as well. Will reattempt as pt is willing to participate. MD aware.        04/26/24 6304   Interdisciplinary Plan of Care Collaboration   Collaboration Comments pt refusing any PO intake

## 2024-04-26 NOTE — PROGRESS NOTES
4 Eyes Skin Assessment Completed by JIMMY palma and JIMMY laboy.    Head WDL  Ears Redness and Blanching  Nose NGT  Mouth WDL  Neck Scar  Breast/Chest Redness and Excoriation  Shoulder Blades Redness and Blanching  Spine Redness and Blanching  (R) Arm/Elbow/Hand Redness, Bruising, Discoloration, and Edema  (L) Arm/Elbow/Hand Redness, Blanching, and Bruising, skin tear  Abdomen Bruising  Groin Redness  Scrotum/Coccyx/Buttocks Redness and Blanching  (R) Leg WDL  (L) Leg WDL  (R) Heel/Foot/Toe Boggy  (L) Heel/Foot/Toe Boggy          Devices In Places ECG, Blood Pressure Cuff, Pulse Ox, Tam, SCD's, and Nasal Cannula      Interventions In Place NC W/Ear Foams, Heel Mepilex, Heel Float Boots, TAP System, Pillows, Elbow Mepilex, Q2 Turns, Low Air Loss Mattress, and Heels Loaded W/Pillows    Possible Skin Injury No    Pictures Uploaded Into Epic Yes  Wound Consult Placed Yes  RN Wound Prevention Protocol Ordered Yes

## 2024-04-26 NOTE — CONSULTS
Physical Medicine and Rehabilitation Consultation          Chart Review     Date of initial consultation: 4/25/2024  Requesting provider: ordered by Mary Mallory M.D. at 04/25/24 1120    Consulting provider: Seema Zambrano D.O.  Reason for consultation: assess for acute inpatient rehab appropriateness  LOS: 5 Day(s)    Chief complaint: generalized weakness     HPI: The patient is a 78 y.o.  female with a past medical history of HTN, SELWYN, chiari malformation and history of multiple falls;  who presented on 4/20/2024  3:32 PM with generalized weakness and cough. Per documentation, patient reported feeling weak and having a cough for approx 1 week. Upon eval in the ED, patient was noted to Cr 5.0, K 2.3, COVID +, an CXR with evidence of atelectasis. Patient was admitted with sepsis and respiratory failure, requiring intubation. Patient was extubated on 4/22.  patient was started on dexamethasone x 10 days. Patient's hospital course has been notable for agitation, she did require a precedex drip and seroquel .   Patient's Cr has improved to 1.04 and her encephalopathy due to sepsis has improved enough to work with therapy, but was Max A x 2 people for all mobility including bed mobility, and Max A for seated grooming. Patient lack endurance for therapy, anticipate SNF level therapy for prolonged rehab course.     Social Hx:  Patient lives with spouse in a 1 story house with 3 ELE    3 ELE  At prior level of function required assistance for IADLs Mod I with 4 WW, required assistance on stairs       THERAPY:  Restrictions: Fall Risk, Swallow Precautions   PT: Functional mobility   4/25 Max A x 2 people for sit to stand, unable to tolerate transfers, unable to participate in gait     OT: ADLs  4/25  Max A x 2 for bed mobility, Max A upper body dressing, Max A seated grooming     SLP:   4/24 NPO pending repeat swallow eval     IMAGING:  DX-ABDOMEN FOR TUBE PLACEMENT  Narrative: 4/25/2024 4:23  PM    HISTORY/REASON FOR EXAM:  Line evaluation.    TECHNIQUE/EXAM DESCRIPTION AND NUMBER OF VIEWS:  1 view(s) of the abdomen.    COMPARISON:  4/24/2024    FINDINGS:  Enteric tube has been placed.    The tip projects over the upper mid abdomen.    The bowel gas pattern is within normal limits.    Bony structures are unchanged.  Impression: 1.  Enteric tube projects over the proximal stomach.        PROCEDURES:  None     PMH:  Past Medical History:   Diagnosis Date    Anesthesia     PONV    Arthritis     Left hip, knees, ankles    Asthma     Inhaler use daily.    Bowel habit changes     Constipation    Breath shortness     asthma    Cancer (HCC)     Breast 2015    Chiari malformation 1970's    shunt  in place    Chickenpox     Chronic back pain     2/2 scoliosis and syringomyelia     Contracture of hand joint     left     Decreased lung capacity     Dental disorder     upper/lower    Disorder of thyroid     Heart burn     High cholesterol     Hypertension     Indigestion     Joint replacement     Right shoulder, cervical    Obesity     Pain     Pain 08/06/2018    Back, neck and legs    Peripheral edema 06/06/2013    Pneumonia     PONV (postoperative nausea and vomiting)     Psychiatric problem     depression, anxiety    Scoliosis     Shortness of breath 08/06/2018    Chronic    Sleep apnea     uses O2 at night 2L    Sleep apnea 08/07/2018    Patient states having sleep study October 2018.    Snoring     No sleep study    sore throat 01/15/2015    Supplemental oxygen dependent     Syringomyelia (HCC)     surgery 1973, 1977       PSH:  Past Surgical History:   Procedure Laterality Date    PB RECONSTR TOTAL SHOULDER IMPLANT Left 5/4/2022    Procedure: LEFT REVERSE TOTAL SHOULDER ARTHROPLASTY;  Surgeon: Vinicius Whaley M.D.;  Location: SURGERY SAME DAY [facility];  Service: Orthopedics    HIP REVISION TOTAL Left 9/2/2021    Procedure: REVISION, TOTAL ARTHROPLASTY, HIP;  Surgeon: Daniel Allison M.D.;  Location:  "[facility]GERY [facility];  Service: Orthopedics    THYROID LOBECTOMY Left 8/17/2018    Procedure: THYROID LOBECTOMY;  Surgeon: Adelina Wilson M.D.;  Location: SURGERY SAME DAY [facility];  Service: General    THYROIDECTOMY TOTAL  8/17/2018    Procedure: NIMS RECURRENT LARYNGEAL NERVE MONITORING;  Surgeon: Adelina Wilson M.D.;  Location: SURGERY SAME DAY [facility];  Service: General    NODE BIOPSY SENTINEL  2/6/2015    Performed by Adelina Wilson M.D. at SURGERY [facility]    MASTECTOMY  2/6/2015    right-Performed by Adelina Wilson M.D. at SURGERY [facility]    HIP ARTHROPLASTY TOTAL  5/19/08    Performed by FARTUN ERAZO at SURGERY [facility]    SHOULDER ARTHROPLASTY TOTAL  2004    Right    JAN BY LAPAROSCOPY  2002    CYST EXCISION  1973    \"remove cyst    HIP ARTHROPLASTY TOTAL      HIP REPLACEMENT, TOTAL      LAMINOTOMY      OTHER ABDOMINAL SURGERY      SHUNT INSERTION      cerebral shunt    US-NEEDLE CORE BX-BREAST PANEL         FHX:  Family History   Problem Relation Age of Onset    Hypertension Mother     Sleep Apnea Brother     Cancer Neg Hx     Diabetes Neg Hx     Stroke Neg Hx     Heart Disease Neg Hx     Ovarian Cancer Neg Hx     Tubal Cancer Neg Hx     Peritoneal Cancer Neg Hx     Colorectal Cancer Neg Hx     Breast Cancer Neg Hx        Medications:  Current Facility-Administered Medications   Medication Dose    Pharmacy Consult: Enteral tube insertion - review meds/change route/product selection  1 Each    amitriptyline (Elavil) tablet 100 mg  100 mg    [START ON 4/26/2024] aspirin (Asa) chewable tab 81 mg  81 mg    [START ON 4/26/2024] DULoxetine (Cymbalta) capsule 30 mg  30 mg    senna-docusate (Pericolace Or Senokot S) 8.6-50 MG per tablet 2 Tablet  2 Tablet    And    polyethylene glycol/lytes (Miralax) Packet 1 Packet  1 Packet    And    magnesium hydroxide (Milk Of Magnesia) suspension 30 mL  30 mL    And    bisacodyl (Dulcolax) suppository 10 mg  10 mg    traZODone " (Desyrel) tablet 50 mg  50 mg    ondansetron (Zofran ODT) dispertab 4 mg  4 mg    oxyCODONE immediate-release (Roxicodone) tablet 2.5 mg  2.5 mg    Or    oxyCODONE immediate-release (Roxicodone) tablet 5 mg  5 mg    haloperidol lactate (Haldol) injection 2 mg  2 mg    pregabalin (Lyrica) capsule 150 mg  150 mg    QUEtiapine (SEROquel) tablet 25 mg  25 mg    [START ON 4/26/2024] dexamethasone (Decadron) tablet 6 mg  6 mg    acetaminophen (Tylenol) tablet 650 mg  650 mg    HYDROmorphone (Dilaudid) injection 0.25 mg  0.25 mg    Metoprolol Tartrate (Lopressor) injection 5 mg  5 mg    enoxaparin (Lovenox) inj 40 mg  40 mg    labetalol (Normodyne/Trandate) injection 10 mg  10 mg    hydrALAZINE (Apresoline) injection 10 mg  10 mg    furosemide (Lasix) injection 20 mg  20 mg    insulin regular (HumuLIN R,NovoLIN R) injection  3-14 Units    And    dextrose 10 % BOLUS 25 g  25 g    dexmedetomidine (PRECEDEX) 400 mcg/100mL NS premix infusion  0.1-1.5 mcg/kg/hr (Ideal)    ondansetron (Zofran) syringe/vial injection 4 mg  4 mg    Respiratory Therapy Consult      MD Alert...ICU Electrolyte Replacement per Pharmacy         Allergies:  Allergies   Allergen Reactions    Morphine Vomiting     hallucinations    Sulfamethoxazole W-Trimethoprim Rash     * full body rash*>  10 years ago         Physical Exam:  Vitals: BP (!) 169/93   Pulse (!) 110   Temp 36.6 °C (97.9 °F) (Temporal)   Resp (!) 23   Ht 1.524 m (5')   Wt 88 kg (194 lb 0.1 oz)   SpO2 94%     Labs: Reviewed and significant for   Recent Labs     04/24/24  0330   RBC 4.82   HEMOGLOBIN 14.4   HEMATOCRIT 41.7   PLATELETCT 286     Recent Labs     04/23/24  0400 04/24/24  0330 04/25/24  0406   SODIUM 142 143 143   POTASSIUM 3.9 3.2* 3.3*   CHLORIDE 103 100 103   CO2 26 25 24   GLUCOSE 125* 127* 123*   BUN 28* 29* 27*   CREATININE 1.04 0.95 0.66   CALCIUM 8.3* 8.3* 8.5     Recent Results (from the past 24 hour(s))   POCT glucose device results    Collection Time: 04/25/24  12:22 AM   Result Value Ref Range    POC Glucose, Blood 129 (H) 65 - 99 mg/dL   Phosphorus    Collection Time: 24 12:25 AM   Result Value Ref Range    Phosphorus 1.9 (L) 2.5 - 4.5 mg/dL   Magnesium    Collection Time: 24  4:06 AM   Result Value Ref Range    Magnesium 1.9 1.5 - 2.5 mg/dL   Phosphorus    Collection Time: 24  4:06 AM   Result Value Ref Range    Phosphorus 2.2 (L) 2.5 - 4.5 mg/dL   Comp Metabolic Panel    Collection Time: 24  4:06 AM   Result Value Ref Range    Sodium 143 135 - 145 mmol/L    Potassium 3.3 (L) 3.6 - 5.5 mmol/L    Chloride 103 96 - 112 mmol/L    Co2 24 20 - 33 mmol/L    Anion Gap 16.0 7.0 - 16.0    Glucose 123 (H) 65 - 99 mg/dL    Bun 27 (H) 8 - 22 mg/dL    Creatinine 0.66 0.50 - 1.40 mg/dL    Calcium 8.5 8.5 - 10.5 mg/dL    Correct Calcium 9.0 8.5 - 10.5 mg/dL    AST(SGOT) 30 12 - 45 U/L    ALT(SGPT) 30 2 - 50 U/L    Alkaline Phosphatase 62 30 - 99 U/L    Total Bilirubin 1.0 0.1 - 1.5 mg/dL    Albumin 3.4 3.2 - 4.9 g/dL    Total Protein 6.4 6.0 - 8.2 g/dL    Globulin 3.0 1.9 - 3.5 g/dL    A-G Ratio 1.1 g/dL   ESTIMATED GFR    Collection Time: 24  4:06 AM   Result Value Ref Range    GFR (CKD-EPI) 90 >60 mL/min/1.73 m 2   POCT glucose device results    Collection Time: 24  5:35 AM   Result Value Ref Range    POC Glucose, Blood 109 (H) 65 - 99 mg/dL   POCT glucose device results    Collection Time: 24 12:55 PM   Result Value Ref Range    POC Glucose, Blood 227 (H) 65 - 99 mg/dL   EKG    Collection Time: 24  3:55 PM   Result Value Ref Range    Report       [facility]    Test Date:  2024  Pt Name:    ODETTE RESENDEZT                  Department: [facility]  MRN:        [id number]                      [place]       [facility] [place]:     [place]                       Technician: LOIS  :        [id number]                   Requested By:BAYLEE KIRKLAND  Order #:    [id number]                    Reading MD:    Measurements  Intervals                                 Axis  Rate:       135                          P:          37  ME:         125                          QRS:        1  QRSD:       79                           T:          44  QT:         307  QTc:        461    Interpretive Statements  Sinus tachycardia  Ventricular trigeminy  Aberrant complex  Minimal ST depression  Compared to ECG 04/23/2024 22:33:57  Ventricular premature complex(es) now present  Aberrant conduction of supraventricular beat(s) now present  Possible ischemia no longer present  ST (T wave) deviation still present           ASSESSMENT:  Patient is a 78 y.o. female admitted with sepsis due to Covid     [facility] Code / Diagnosis to Support: 0016 - Debility (Non-Cardiac, Non-Pulmonary)  See DISPO details below for recommendations on appropriate level of rehab for this diagnosis.    Barriers to transfer include: Insurance authorization, TCCs to verify disposition, medical clearance and bed availability     Assessment and Plan:  Sepsis   COVID 19   - admitted with septic shock found to be COVID +  - required intubation, was extubated 4/22   - was empirically on zosyn, now stopped   - on dexamethasone x 10 days   - weaned down to 4 L   - continue with PT/OT, is significantly limited by poor endurance his Max A x 2 people for mobility and ADLs   - at current level of function, patient is unlikely tolerate IRF, anticipate patient will need SNF level rehab for prolonged access to therapy     Encephalopathy   - required precedex drip for agitation   - now on scheduled trazodone on seroquel to improve sleep     AKILAH   - admitted with Cr of 5.0, likely ATN due to septic shock   - Cr improving   - 4/25 Cr 1.04       DISPO:  - patient is currently functioning below their level of baseline, recommend post acute rehab  - patient is not a candidate for IRF level therapy due to poor endurance   - patient is unlikely to tolerate 3 hrs of therapy 5 days per week   - recommend SNF for prolonged rehab course    - PM&R will sign off       Medical Complexity:  Sepsis   COVID 19   Encephalopathy   AKILAH   Impaired mobility an ADLs       DVT PPX: Lovenox       Thank you for allowing us to participate in the care of this patient.       Seema Zambrano D.O.   Physical Medicine and Rehabilitation     Please note that this dictation was created using voice recognition software. I have made every reasonable attempt to correct obvious errors, but there may be errors of grammar and possibly content that I did not discover before finalizing the note.

## 2024-04-26 NOTE — PROGRESS NOTES
4 Eyes Skin Assessment Completed by JIMMY Goodman and JIMMY Mott.    Head WDL  Ears WDL  Nose dried blood in right nostril, iris in place   Mouth WDL  Neck WDL  Breast/Chest Redness and Excoriation moisture fissures under bilateral breast areas   Shoulder Blades WDL  Spine WDL  (R) Arm/Elbow/Hand Redness, Blanching, and Scab  (L) Arm/Elbow/Hand Redness, Blanching, and Scab  Abdomen Bruising lower abdomen completely bruised   Groin WDL  Scrotum/Coccyx/Buttocks Redness, Blanching, and Excoriation  (R) Leg WDL  (L) Leg WDL  (R) Heel/Foot/Toe Boggy  (L) Heel/Foot/Toe Boggy          Devices In Places Tele Box, Blood Pressure Cuff, Pulse Ox, Tam, and Nasal Cannula      Interventions In Place InterDry, Sacral Mepilex, Heel Float Boots, TAP System, Pillows, Q2 Turns, Low Air Loss Mattress, and Barrier Cream    Possible Skin Injury Yes    Pictures Uploaded Into Epic N/A  Wound Consult Placed Yes  RN Wound Prevention Protocol Ordered No

## 2024-04-26 NOTE — PROGRESS NOTES
Hospital Medicine Daily Progress Note    Date of Service  4/26/2024    Chief Complaint  Tereza Sánchez is a 78 y.o. female admitted 4/20/2024 with Weakness    Hospital Course    78 y.o. female with past medical history of hypertension, SELWYN, chiari malformation, history of UTI, history of multiple falls who presented to the hospital with complaint of weakness dysuria for several weeks.  She also reported cough On 4/20/2024.  Patient found to have acute hypoxemic respiratory failure secondary to COVID-19 virus infection.  She was also diagnosed with septic shock and she was placed on Levophed for Target MAP of greater than 65 mmHg.  She also found to have acute renal failure.     She was intubated on April 20, 2024 and she was extubated on April 22, 2024 and she was placed on high flow nasal cannula.     On April 24, 2024 intensivist requested to transfer care to hospitalist service.  I waited and examined at the bedside.  She was alert and oriented but mildly confused.  She was able reorganize her .      Interval Problem Update    04/25/24    I evaluated and examined her at the bedside  She denies complaint of nausea, vomiting, diarrhea and constipation.  She was more alert and able to answer my questions and she is following commands.  Due to her agitation she is still on Precedex gtt.  I am continuing and titrating Precedex gtt. as per RASS of -1 to +1.  Plan is to wean her off from Precedex gtt.  Now she has been receiving tube feed.  I discussed plan of care with patient and her  at the bedside.  I discussed plan of care with bedside RN in IMCU.    04/26/24    I routed and examined her at the bedside.  She reported that she is feeling better.  She was sitting in recliner without restraints  She continued to have tachycardia and her blood pressure is running high I started her on Coreg 3.125 mg.  I discussed plan of care with patient and answered all questions.  Discussed plan of care with  patient's  at the bedside.      I have discussed this patient's plan of care and discharge plan at IDT rounds today with Case Management, Nursing, Nursing leadership, and other members of the IDT team.    Consultants/Specialty  critical care    Code Status  Full Code    Disposition  The patient is not medically cleared for discharge to home or a post-acute facility.      I have placed the appropriate orders for post-discharge needs.    Review of Systems  Review of Systems   Constitutional:  Positive for malaise/fatigue. Negative for chills, fever and weight loss.   HENT:  Negative for hearing loss and tinnitus.    Eyes:  Negative for blurred vision, double vision, photophobia and pain.   Respiratory:  Negative for cough, sputum production and shortness of breath.    Cardiovascular:  Negative for chest pain, palpitations, orthopnea and leg swelling.   Gastrointestinal:  Negative for abdominal pain, constipation, diarrhea, nausea and vomiting.   Genitourinary:  Negative for dysuria, frequency and urgency.   Musculoskeletal:  Positive for myalgias. Negative for back pain, joint pain and neck pain.   Skin:  Negative for rash.   Neurological:  Positive for weakness. Negative for dizziness, tingling, tremors, sensory change, speech change, focal weakness and headaches.   Psychiatric/Behavioral:  Negative for hallucinations and substance abuse.    All other systems reviewed and are negative.       Physical Exam  Pulse:  [] 114  Resp:  [14-67] 19  BP: (125-199)/() 153/78  SpO2:  [91 %-98 %] 97 %    Physical Exam  Vitals reviewed.   Constitutional:       General: She is not in acute distress.     Appearance: Normal appearance. She is ill-appearing.      Comments: Clinically she looks improved.  She was sitting in recliner   HENT:      Head: Normocephalic and atraumatic.      Nose: No congestion.      Comments: Oxygen nasal cannula  Feeding tube  Eyes:      General:         Right eye: No discharge.          Left eye: No discharge.      Pupils: Pupils are equal, round, and reactive to light.   Cardiovascular:      Rate and Rhythm: Regular rhythm. Tachycardia present.      Pulses: Normal pulses.      Heart sounds: Normal heart sounds. No murmur heard.  Pulmonary:      Effort: Pulmonary effort is normal. No respiratory distress.      Breath sounds: Normal breath sounds. No stridor.   Abdominal:      General: Bowel sounds are normal. There is no distension.      Palpations: Abdomen is soft.      Tenderness: There is no abdominal tenderness.   Musculoskeletal:         General: No swelling or tenderness. Normal range of motion.      Cervical back: Normal range of motion. No rigidity.   Skin:     General: Skin is warm.      Capillary Refill: Capillary refill takes less than 2 seconds.      Coloration: Skin is not jaundiced or pale.      Findings: No bruising.   Neurological:      General: No focal deficit present.      Mental Status: She is alert and oriented to person, place, and time.      Cranial Nerves: No cranial nerve deficit.      Comments: She is significantly more alert today and able to answer my questions and she is following commands.   Psychiatric:         Mood and Affect: Mood normal.         Behavior: Behavior normal.         Fluids    Intake/Output Summary (Last 24 hours) at 4/26/2024 1641  Last data filed at 4/26/2024 1301  Gross per 24 hour   Intake 180.2 ml   Output 525 ml   Net -344.8 ml       Laboratory  Recent Labs     04/24/24  0330 04/26/24  0222   WBC 11.6* 15.5*   RBC 4.82 4.20   HEMOGLOBIN 14.4 12.8   HEMATOCRIT 41.7 36.8*   MCV 86.5 87.6   MCH 29.9 30.5   MCHC 34.5 34.8   RDW 44.3 44.9   PLATELETCT 286 277   MPV 11.4 11.3     Recent Labs     04/24/24  0330 04/25/24  0406 04/26/24  0222   SODIUM 143 143 139   POTASSIUM 3.2* 3.3* 3.6   CHLORIDE 100 103 101   CO2 25 24 26   GLUCOSE 127* 123* 180*   BUN 29* 27* 31*   CREATININE 0.95 0.66 0.71   CALCIUM 8.3* 8.5 8.2*                    Imaging  DX-ABDOMEN FOR TUBE PLACEMENT   Final Result      1.  Enteric tube projects over the proximal stomach.      DX-ABDOMEN FOR TUBE PLACEMENT   Final Result         1.  Nonspecific bowel gas pattern in the upper abdomen.   2.  Dobbhoff tube is coiled within the stomach, the tip terminates overlying the expected location of the gastric fundus, recommend partial withdrawal given extent of coiling.   3.  Hazy interstitial pulmonary opacities, consider chronic underlying lung disease versus interstitial edema and/or infiltrates.      DX-CHEST-PORTABLE (1 VIEW)   Final Result         1.  Pulmonary edema and/or infiltrates, slightly increased compared to prior study.      EC-ECHOCARDIOGRAM COMPLETE W/ CONT   Final Result      DX-CHEST-PORTABLE (1 VIEW)   Final Result         1.  Pulmonary edema and/or infiltrates are identified, which are stable since the prior exam.   2.  Small layering bilateral pleural effusion   3.  Cardiomegaly      DX-ABDOMEN FOR TUBE PLACEMENT   Final Result         1.  Air-filled distended loops of bowel are seen in the upper abdomen, appearance suggests ileus or enteritis versus evolving obstructive changes. Recommend radiographic followup to resolution to exclude progression to obstruction.   2.  Nasogastric tube tip terminates overlying the expected location of the pylorus or first duodenal segment.   3.  Pulmonary edema and/or infiltrates.      DX-ABDOMEN FOR TUBE PLACEMENT   Final Result      NG tube tip is in the left upper abdomen but the side-port in the distal esophagus. Recommend advancing at least 7 cm.      DX-CHEST-LIMITED (1 VIEW)   Final Result      1.  Endotracheal tube tip located 2 cm above the devon.      2.  NG tube extends into the gastric fundus.      3.  Bibasal atelectasis/consolidation.         US-RENAL   Final Result      1.  Limited visualization the right kidney due to obscuration by bowel gas.      2.  Otherwise normal exam.      DX-ABDOMEN FOR TUBE PLACEMENT    Final Result         Gastric drainage tube with tip projecting over the expected area of the stomach. Sidehole is in the GE junction.      CT-HEAD W/O   Final Result         1. No acute intracranial abnormality. No evidence of acute intracranial hemorrhage or mass lesion.                     DX-CHEST-PORTABLE (1 VIEW)   Final Result         Elevation of the right hemidiaphragm and right basilar atelectasis.      DX-CHEST-PORTABLE (1 VIEW)   Final Result      Linear atelectasis within the right lung base.           Assessment/Plan  * Septic shock (HCC)- (present on admission)  Assessment & Plan  Secondary to COVID-19  Shock has resolved  Cultures are NGTD  Continue monitor closely.  Shock now resolved.  Continue to monitor.    Tachycardia  Assessment & Plan  She has been having sinus tachycardia.  I started her on carvedilol 3.125 mg with holding parameters as her blood pressure is also running high.      Persistent hyperactive delirium due to multiple etiologies  Assessment & Plan  4/26/2024   Delirium bundle  Reinitiate home medications  Now improving  I added IV Haldol as needed for agitation.  Now she is off from Precedex gtt. I discontinued it.    Electrolyte depletion  Assessment & Plan  Continue to monitor and replace as needed.    COVID-19  Assessment & Plan  4/26/2024   On dexamethasone 6mg daily x 10 days.  Continue enhanced droplet, contact and eye protection precautions.  Currently she is requiring 4 L of oxygen and maintaining oxygen saturation.    Acute hypoxemic respiratory failure due to COVID-19 ([facility])  Assessment & Plan  Extubated 4/22    Decadron x 10 days  Pulmonary hygiene  Maintain euvolemia  SpO2 > 90%  She remains on 4 L of oxygen and maintaining oxygen saturation.  Titrate down oxygen as tolerated.    Acute renal failure (ARF) ([facility])  Assessment & Plan  4/26/2024   Baseline creatinine 0.9-1.0.  Up to 5.  Likely ATN related to septic shock in conjunction with ACE inhibitor.  Some hyaline casts  on UA.  No indications for renal replacement therapy at present.  HCO3 16, normal PH   US renal negative for hydronephrosis.     No renal function is significantly improved  Ordered lab workup tomorrow.    Major depressive disorder- (present on admission)  Assessment & Plan  Continue home amytriptylene, duloxetine.      SELWYN (obstructive sleep apnea)- (present on admission)  Assessment & Plan  Not on home CPAP.  She will need close outpatient follow-up.          I discussed plan of care during multidisciplinary rounds regarding patient's current medical condition and plan of care.    >51 minutes total time spent in records review, lab/radiology assessment, patient history and assessment, and directing plan of care with high level medical decision making complexity. See orders.      VTE prophylaxis:    enoxaparin ppx      I have performed a physical exam and reviewed and updated ROS and Plan today (4/26/2024). In review of yesterday's note (4/25/2024), there are no changes except as documented above.

## 2024-04-27 PROBLEM — Z71.89 ADVANCE CARE PLANNING: Status: ACTIVE | Noted: 2024-04-27

## 2024-04-27 LAB
ALBUMIN SERPL BCP-MCNC: 3.3 G/DL (ref 3.2–4.9)
ALBUMIN/GLOB SERPL: 1.3 G/DL
ALP SERPL-CCNC: 51 U/L (ref 30–99)
ALT SERPL-CCNC: 31 U/L (ref 2–50)
ANION GAP SERPL CALC-SCNC: 13 MMOL/L (ref 7–16)
AST SERPL-CCNC: 23 U/L (ref 12–45)
BILIRUB SERPL-MCNC: 0.6 MG/DL (ref 0.1–1.5)
BUN SERPL-MCNC: 36 MG/DL (ref 8–22)
CALCIUM ALBUM COR SERPL-MCNC: 9.1 MG/DL (ref 8.5–10.5)
CALCIUM SERPL-MCNC: 8.5 MG/DL (ref 8.5–10.5)
CHLORIDE SERPL-SCNC: 104 MMOL/L (ref 96–112)
CO2 SERPL-SCNC: 24 MMOL/L (ref 20–33)
CREAT SERPL-MCNC: 0.66 MG/DL (ref 0.5–1.4)
ERYTHROCYTE [DISTWIDTH] IN BLOOD BY AUTOMATED COUNT: 46.5 FL (ref 35.9–50)
GFR SERPLBLD CREATININE-BSD FMLA CKD-EPI: 90 ML/MIN/1.73 M 2
GLOBULIN SER CALC-MCNC: 2.5 G/DL (ref 1.9–3.5)
GLUCOSE BLD STRIP.AUTO-MCNC: 102 MG/DL (ref 65–99)
GLUCOSE BLD STRIP.AUTO-MCNC: 109 MG/DL (ref 65–99)
GLUCOSE BLD STRIP.AUTO-MCNC: 110 MG/DL (ref 65–99)
GLUCOSE BLD STRIP.AUTO-MCNC: 116 MG/DL (ref 65–99)
GLUCOSE BLD STRIP.AUTO-MCNC: 161 MG/DL (ref 65–99)
GLUCOSE SERPL-MCNC: 155 MG/DL (ref 65–99)
HCT VFR BLD AUTO: 40 % (ref 37–47)
HGB BLD-MCNC: 13.4 G/DL (ref 12–16)
MAGNESIUM SERPL-MCNC: 2.1 MG/DL (ref 1.5–2.5)
MCH RBC QN AUTO: 30 PG (ref 27–33)
MCHC RBC AUTO-ENTMCNC: 33.5 G/DL (ref 32.2–35.5)
MCV RBC AUTO: 89.5 FL (ref 81.4–97.8)
PHOSPHATE SERPL-MCNC: 2.7 MG/DL (ref 2.5–4.5)
PLATELET # BLD AUTO: 398 K/UL (ref 164–446)
PMV BLD AUTO: 11.7 FL (ref 9–12.9)
POTASSIUM SERPL-SCNC: 4.2 MMOL/L (ref 3.6–5.5)
PROT SERPL-MCNC: 5.8 G/DL (ref 6–8.2)
RBC # BLD AUTO: 4.47 M/UL (ref 4.2–5.4)
SODIUM SERPL-SCNC: 141 MMOL/L (ref 135–145)
WBC # BLD AUTO: 18.6 K/UL (ref 4.8–10.8)

## 2024-04-27 PROCEDURE — 770020 HCHG ROOM/CARE - TELE (206)

## 2024-04-27 PROCEDURE — 92526 ORAL FUNCTION THERAPY: CPT

## 2024-04-27 PROCEDURE — 83735 ASSAY OF MAGNESIUM: CPT

## 2024-04-27 PROCEDURE — 700105 HCHG RX REV CODE 258: Performed by: INTERNAL MEDICINE

## 2024-04-27 PROCEDURE — 99497 ADVNCD CARE PLAN 30 MIN: CPT | Mod: 25 | Performed by: INTERNAL MEDICINE

## 2024-04-27 PROCEDURE — 82962 GLUCOSE BLOOD TEST: CPT | Mod: 91

## 2024-04-27 PROCEDURE — 700102 HCHG RX REV CODE 250 W/ 637 OVERRIDE(OP): Performed by: INTERNAL MEDICINE

## 2024-04-27 PROCEDURE — 99233 SBSQ HOSP IP/OBS HIGH 50: CPT | Performed by: INTERNAL MEDICINE

## 2024-04-27 PROCEDURE — 85027 COMPLETE CBC AUTOMATED: CPT

## 2024-04-27 PROCEDURE — 84100 ASSAY OF PHOSPHORUS: CPT

## 2024-04-27 PROCEDURE — 700111 HCHG RX REV CODE 636 W/ 250 OVERRIDE (IP): Mod: JZ | Performed by: INTERNAL MEDICINE

## 2024-04-27 PROCEDURE — 80053 COMPREHEN METABOLIC PANEL: CPT

## 2024-04-27 PROCEDURE — A9270 NON-COVERED ITEM OR SERVICE: HCPCS | Performed by: INTERNAL MEDICINE

## 2024-04-27 RX ORDER — CARVEDILOL 3.12 MG/1
3.12 TABLET ORAL 2 TIMES DAILY WITH MEALS
Status: DISCONTINUED | OUTPATIENT
Start: 2024-04-27 | End: 2024-04-28

## 2024-04-27 RX ORDER — SODIUM CHLORIDE, SODIUM LACTATE, POTASSIUM CHLORIDE, CALCIUM CHLORIDE 600; 310; 30; 20 MG/100ML; MG/100ML; MG/100ML; MG/100ML
INJECTION, SOLUTION INTRAVENOUS CONTINUOUS
Status: DISCONTINUED | OUTPATIENT
Start: 2024-04-27 | End: 2024-04-28

## 2024-04-27 RX ADMIN — QUETIAPINE FUMARATE 25 MG: 25 TABLET ORAL at 21:00

## 2024-04-27 RX ADMIN — CARVEDILOL 3.12 MG: 3.12 TABLET, FILM COATED ORAL at 17:38

## 2024-04-27 RX ADMIN — SENNOSIDES AND DOCUSATE SODIUM 2 TABLET: 50; 8.6 TABLET ORAL at 17:38

## 2024-04-27 RX ADMIN — ENOXAPARIN SODIUM 40 MG: 100 INJECTION SUBCUTANEOUS at 17:38

## 2024-04-27 RX ADMIN — PREGABALIN 150 MG: 150 CAPSULE ORAL at 12:26

## 2024-04-27 RX ADMIN — DEXAMETHASONE 6 MG: 4 TABLET ORAL at 12:26

## 2024-04-27 RX ADMIN — PREGABALIN 150 MG: 150 CAPSULE ORAL at 17:38

## 2024-04-27 RX ADMIN — AMITRIPTYLINE HYDROCHLORIDE 100 MG: 100 TABLET, FILM COATED ORAL at 21:00

## 2024-04-27 RX ADMIN — ASPIRIN 81 MG: 81 TABLET, CHEWABLE ORAL at 12:26

## 2024-04-27 RX ADMIN — SENNOSIDES AND DOCUSATE SODIUM 2 TABLET: 50; 8.6 TABLET ORAL at 12:26

## 2024-04-27 RX ADMIN — DULOXETINE HYDROCHLORIDE 30 MG: 30 CAPSULE, DELAYED RELEASE ORAL at 12:26

## 2024-04-27 RX ADMIN — TRAZODONE HYDROCHLORIDE 50 MG: 50 TABLET ORAL at 20:59

## 2024-04-27 RX ADMIN — LISINOPRIL 5 MG: 5 TABLET ORAL at 12:26

## 2024-04-27 RX ADMIN — SODIUM CHLORIDE, POTASSIUM CHLORIDE, SODIUM LACTATE AND CALCIUM CHLORIDE: 600; 310; 30; 20 INJECTION, SOLUTION INTRAVENOUS at 18:18

## 2024-04-27 ASSESSMENT — ENCOUNTER SYMPTOMS
PHOTOPHOBIA: 0
HEADACHES: 0
DOUBLE VISION: 0
PALPITATIONS: 0
MYALGIAS: 1
FOCAL WEAKNESS: 0
TINGLING: 0
BACK PAIN: 0
CONSTIPATION: 0
SPEECH CHANGE: 0
SENSORY CHANGE: 0
HALLUCINATIONS: 0
TREMORS: 0
EYE PAIN: 0
VOMITING: 0
WEIGHT LOSS: 0
DIARRHEA: 0
FEVER: 0
CHILLS: 0
COUGH: 0
WEAKNESS: 1
ABDOMINAL PAIN: 0
SPUTUM PRODUCTION: 0
SHORTNESS OF BREATH: 0
NAUSEA: 0
DIZZINESS: 0
ORTHOPNEA: 0
NECK PAIN: 0
BLURRED VISION: 0

## 2024-04-27 ASSESSMENT — LIFESTYLE VARIABLES: SUBSTANCE_ABUSE: 0

## 2024-04-27 ASSESSMENT — PAIN DESCRIPTION - PAIN TYPE
TYPE: ACUTE PAIN
TYPE: CHRONIC PAIN

## 2024-04-27 ASSESSMENT — FIBROSIS 4 INDEX: FIB4 SCORE: 0.79

## 2024-04-27 NOTE — PROGRESS NOTES
Patient resting in room, alert and oriented. Allowed me to assess her and get her blood glucose. Talked to her about allowing us to do oral care and assessing her swallow. Patient is refusing at this time. MD made aware.     0907 patient monitor check called. Upon entering room patient was asymptomatic and back to her ST, talked with monitor tech and said patient was in Afib RVR in the 180s, came back out of it. Dr. Mallory at bedside and made aware of rhythm change

## 2024-04-27 NOTE — CARE PLAN
The patient is Watcher - Medium risk of patient condition declining or worsening    Shift Goals  Clinical Goals: VSS stable, Tam Care,  Patient Goals: Rest  Family Goals: N/A    Progress made toward(s) clinical / shift goals:  patient vital signs stablem free from falls, pain managed.     Patient is not progressing towards the following goals:patient is continuing to refuse oral care, refuses iris and would only participate in some of her speech consult. Alert and oriented but refuses a lot.  at bedside and she still refused. Talked to Dr. Mallory with  at bedside. Continue without iris at this time. Will monitor for nutrition status and hydration status.       Problem: Knowledge Deficit - Standard  Goal: Patient and family/care givers will demonstrate understanding of plan of care, disease process/condition, diagnostic tests and medications  Description: Target End Date:  1-3 days or as soon as patient condition allows    Document in Patient Education    1.  Patient and family/caregiver oriented to unit, equipment, visitation policy and means for communicating concern  2.  Complete/review Learning Assessment  3.  Assess knowledge level of disease process/condition, treatment plan, diagnostic tests and medications  4.  Explain disease process/condition, treatment plan, diagnostic tests and medications  Outcome: Not Progressing

## 2024-04-27 NOTE — PROGRESS NOTES
Report received. Resumed care ~2230. Upon arrival ET tube obstructed. 3 RN's attempted to salvage ET tube but were unsuccessful. Pt refusing any further attempts or reinsertion. MD notified of situation. TF stopped d/t no access. Unable to administer meds per MAR d/t no access available. Q6H sugar checks still obtained for re-assurance.      AM: Patient refusing AM labs. Q2H turns. Patient educated. Patient anxious too much stimulation might cause discomfort.

## 2024-04-27 NOTE — THERAPY
"Speech Language Pathology   Daily Treatment     Patient Name: Tereza Sánchez  AGE:  78 y.o., SEX:  female  Medical Record #: [id number]  Date of Service: 4/27/2024      Precautions:  Precautions: Swallow Precautions, Fall Risk         Subjective  Patient is alert and  is at bedside.  Rn called as patient agreed to participate with  present.        Assessment  Patient was seen for dysphagia reassessment with  present.  Patient is alert and oriented x4, however refusing to brush teeth or allow  to brush her teeth.  She repeatedly states \"I'll do it at home\" or \"I will when I go to the bathroom\", even with explanations that she is not safe to go to the bathroom or home yet.  Patient eventually agreed to take small trials from .  With ice chips, patient tolerated x1 ice chip trial without overt s/s of aspiration.  She agreed to x4 sips of water by straw, but had an immediate wet cough x2 of 4 trials with suspected delayed onset of swallow given timing and oral holding.  Patient was agreeable to 1 bite of applesauce and 1 bite of jello, both without overt s/s of aspiration.  She took 4 bites of chocolate pudding with suspected timely swallow and no overt s/s of airway compromise.  MD asked to try mildly thick liquids.  Patient agreeable to trial x2 sips of apple juice thickened to mildly thick without overt s/s of airway compromise.  Patient refuses any further evaluation at this time (I.e. instrumental assessment).            Clinical Impressions  Patient presents with clinical indicators of oropharyngeal dysphagia and is at risk given acute on chronic respiratory failure, requiring intubation as well as COVID-19 and some delirium.  Patient would benefit from further evaluation of swallow, however limited at this time. Per discussion with MD, he would like to start her on a puree with mildly thick liquid diet for po medication and to see how much intake she will take before replacing " "NGT.  Patient is at risk for inadequate nutrition/hydration given self-limited intake.      Recommend ice chips after oral care with assistance from trained staff or family to mitigate impacts of xerostomia and disuse atrophy as well as provide comfort and aid with secretion management.        Recommendations    Dysphagia Treatment  Diet Consistency: Puree with Mildly thick liquids per MD preference to start po in lieu of participation in instrumental assessment.--PLEASE DISCONTINUE PO WITH CHANGE IN RESPIRATORY STATUS OR OVERT S/S OF ASPIRATION WITH THIS DIET.  Instrumentation: Instrumental swallow study pending clinical progress-if/when patient agreeable to participate.  Medication: Crush with pudding/puree, as appropriate  Supervision: 1:1 feeding with constant supervision  Oral Care: Q2h       SLP Treatment Plan  Treatment Plan: Dysphagia Treatment, Patient/Family/Caregiver Training  SLP Frequency: 4x Per Week  Estimated Duration: Until Therapy Goals Met      Anticipated Discharge Needs  Discharge Recommendations: Recommend post-acute placement for additional speech therapy services prior to discharge home  Therapy Recommendations Upon DC: Dysphagia Training, Patient / Family / Caregiver Education      Patient / Family Goals  Patient / Family Goal #1: \"I'm so full; please no more.\"  Goal #1 Outcome: Progressing slower than expected  Short Term Goals  Short Term Goal # 1: Pt will participate in prefeeding to determine readiness for PO diet vs. diagnostic evaluation.  Goal Outcome # 1: Progressing slower than expected  Short Term Goal # 2: Patient will consume puree with mildly thick liquids without overt s/s of aspiration.      Elaine Mehta, SLP  "

## 2024-04-27 NOTE — PROGRESS NOTES
Hospital Medicine Daily Progress Note    Date of Service  4/27/2024    Chief Complaint  Tereza Sánchez is a 78 y.o. female admitted 4/20/2024 with Weakness    Hospital Course    78 y.o. female with past medical history of hypertension, SELWYN, chiari malformation, history of UTI, history of multiple falls who presented to the hospital with complaint of weakness dysuria for several weeks.  She also reported cough On 4/20/2024.  Patient found to have acute hypoxemic respiratory failure secondary to COVID-19 virus infection.  She was also diagnosed with septic shock and she was placed on Levophed for Target MAP of greater than 65 mmHg.  She also found to have acute renal failure.     She was intubated on April 20, 2024 and she was extubated on April 22, 2024 and she was placed on high flow nasal cannula.     On April 24, 2024 intensivist requested to transfer care to hospitalist service.  I waited and examined at the bedside.  She was alert and oriented but mildly confused.  She was able reorganize her .      Interval Problem Update    04/25/24    I evaluated and examined her at the bedside  She denies complaint of nausea, vomiting, diarrhea and constipation.  She was more alert and able to answer my questions and she is following commands.  Due to her agitation she is still on Precedex gtt.  I am continuing and titrating Precedex gtt. as per RASS of -1 to +1.  Plan is to wean her off from Precedex gtt.  Now she has been receiving tube feed.  I discussed plan of care with patient and her  at the bedside.  I discussed plan of care with bedside RN in IMCU.    04/26/24    I routed and examined her at the bedside.  She reported that she is feeling better.  She was sitting in recliner without restraints  She continued to have tachycardia and her blood pressure is running high I started her on Coreg 3.125 mg.  I discussed plan of care with patient and answered all questions.  Discussed plan of care with  patient's  at the bedside.    04/27/24    I evaluated and examined at the bedside.  She has been refusing oral care, medications and speech therapy evaluation.  I discussed with her at the bedside along with her .    Patient was alert and oriented and she knows why she is here in the hospital.  I discussed CODE STATUS with her that if she does not want any of these interventions including speech therapy evaluation-right and if she would like we can transition her to hospice/comfort care.  I explained to patient and her  at the bedside regarding hospice and comfort care.  After talking to the patient and  patient expressed that she would like to try medications with applesauce.  I evaluated her multiple times throughout the day.  I discussed plan of care with the speech therapy at the bedside with patient and with bedside RN.      I have discussed this patient's plan of care and discharge plan at IDT rounds today with Case Management, Nursing, Nursing leadership, and other members of the IDT team.    Consultants/Specialty  critical care    Code Status  Full Code    Disposition  The patient is not medically cleared for discharge to home or a post-acute facility.      I have placed the appropriate orders for post-discharge needs.    Review of Systems  Review of Systems   Constitutional:  Positive for malaise/fatigue. Negative for chills, fever and weight loss.   HENT:  Negative for hearing loss and tinnitus.    Eyes:  Negative for blurred vision, double vision, photophobia and pain.   Respiratory:  Negative for cough, sputum production and shortness of breath.    Cardiovascular:  Negative for chest pain, palpitations, orthopnea and leg swelling.   Gastrointestinal:  Negative for abdominal pain, constipation, diarrhea, nausea and vomiting.   Genitourinary:  Negative for dysuria, frequency and urgency.   Musculoskeletal:  Positive for myalgias. Negative for back pain, joint pain and neck pain.    Skin:  Negative for rash.   Neurological:  Positive for weakness. Negative for dizziness, tingling, tremors, sensory change, speech change, focal weakness and headaches.   Psychiatric/Behavioral:  Negative for hallucinations and substance abuse.    All other systems reviewed and are negative.       Physical Exam  Temp:  [36.3 °C (97.3 °F)-37.4 °C (99.3 °F)] 36.3 °C (97.3 °F)  Pulse:  [100-126] 121  Resp:  [18-41] 21  BP: (117-169)/(70-90) 117/76  SpO2:  [92 %-98 %] 96 %    Physical Exam  Vitals reviewed.   Constitutional:       General: She is not in acute distress.     Appearance: Normal appearance. She is ill-appearing.      Comments: Clinically she looks improved.  She was sitting in recliner   HENT:      Head: Normocephalic and atraumatic.      Nose: No congestion.      Comments: Oxygen nasal cannula  Feeding tube  Eyes:      General:         Right eye: No discharge.         Left eye: No discharge.      Pupils: Pupils are equal, round, and reactive to light.   Cardiovascular:      Rate and Rhythm: Regular rhythm. Tachycardia present.      Pulses: Normal pulses.      Heart sounds: Normal heart sounds. No murmur heard.  Pulmonary:      Effort: Pulmonary effort is normal. No respiratory distress.      Breath sounds: Normal breath sounds. No stridor.   Abdominal:      General: Bowel sounds are normal. There is no distension.      Palpations: Abdomen is soft.      Tenderness: There is no abdominal tenderness.   Musculoskeletal:         General: No swelling or tenderness. Normal range of motion.      Cervical back: Normal range of motion. No rigidity.   Skin:     General: Skin is warm.      Capillary Refill: Capillary refill takes less than 2 seconds.      Coloration: Skin is not jaundiced or pale.      Findings: No bruising.   Neurological:      General: No focal deficit present.      Mental Status: She is alert and oriented to person, place, and time.      Cranial Nerves: No cranial nerve deficit.      Comments:  She is alert and oriented and able to answer my questions.   Psychiatric:         Mood and Affect: Mood normal.         Behavior: Behavior normal.         Fluids    Intake/Output Summary (Last 24 hours) at 4/27/2024 1502  Last data filed at 4/27/2024 1200  Gross per 24 hour   Intake 30 ml   Output 735 ml   Net -705 ml       Laboratory  Recent Labs     04/26/24  0222   WBC 15.5*   RBC 4.20   HEMOGLOBIN 12.8   HEMATOCRIT 36.8*   MCV 87.6   MCH 30.5   MCHC 34.8   RDW 44.9   PLATELETCT 277   MPV 11.3     Recent Labs     04/25/24  0406 04/26/24  0222   SODIUM 143 139   POTASSIUM 3.3* 3.6   CHLORIDE 103 101   CO2 24 26   GLUCOSE 123* 180*   BUN 27* 31*   CREATININE 0.66 0.71   CALCIUM 8.5 8.2*                   Imaging  DX-ABDOMEN FOR TUBE PLACEMENT   Final Result      1.  Enteric tube projects over the proximal stomach.      DX-ABDOMEN FOR TUBE PLACEMENT   Final Result         1.  Nonspecific bowel gas pattern in the upper abdomen.   2.  Dobbhoff tube is coiled within the stomach, the tip terminates overlying the expected location of the gastric fundus, recommend partial withdrawal given extent of coiling.   3.  Hazy interstitial pulmonary opacities, consider chronic underlying lung disease versus interstitial edema and/or infiltrates.      DX-CHEST-PORTABLE (1 VIEW)   Final Result         1.  Pulmonary edema and/or infiltrates, slightly increased compared to prior study.      EC-ECHOCARDIOGRAM COMPLETE W/ CONT   Final Result      DX-CHEST-PORTABLE (1 VIEW)   Final Result         1.  Pulmonary edema and/or infiltrates are identified, which are stable since the prior exam.   2.  Small layering bilateral pleural effusion   3.  Cardiomegaly      DX-ABDOMEN FOR TUBE PLACEMENT   Final Result         1.  Air-filled distended loops of bowel are seen in the upper abdomen, appearance suggests ileus or enteritis versus evolving obstructive changes. Recommend radiographic followup to resolution to exclude progression to  obstruction.   2.  Nasogastric tube tip terminates overlying the expected location of the pylorus or first duodenal segment.   3.  Pulmonary edema and/or infiltrates.      DX-ABDOMEN FOR TUBE PLACEMENT   Final Result      NG tube tip is in the left upper abdomen but the side-port in the distal esophagus. Recommend advancing at least 7 cm.      DX-CHEST-LIMITED (1 VIEW)   Final Result      1.  Endotracheal tube tip located 2 cm above the devon.      2.  NG tube extends into the gastric fundus.      3.  Bibasal atelectasis/consolidation.         US-RENAL   Final Result      1.  Limited visualization the right kidney due to obscuration by bowel gas.      2.  Otherwise normal exam.      DX-ABDOMEN FOR TUBE PLACEMENT   Final Result         Gastric drainage tube with tip projecting over the expected area of the stomach. Sidehole is in the GE junction.      CT-HEAD W/O   Final Result         1. No acute intracranial abnormality. No evidence of acute intracranial hemorrhage or mass lesion.                     DX-CHEST-PORTABLE (1 VIEW)   Final Result         Elevation of the right hemidiaphragm and right basilar atelectasis.      DX-CHEST-PORTABLE (1 VIEW)   Final Result      Linear atelectasis within the right lung base.           Assessment/Plan  * Septic shock (HCC)- (present on admission)  Assessment & Plan  Secondary to COVID-19  Cultures are NGTD  Now resolved    Advance care planning  Assessment & Plan  On April 27, 2024 patient was alert and oriented and she knows why she is here in the hospital.  I discussed CODE STATUS with her that if she does not want any of these interventions including speech therapy evaluation-right and if she would like we can transition her to hospice/comfort care.  I explained to patient and her  at the bedside regarding hospice and comfort care.  After talking to the patient and  patient expressed that she would like to try medications with applesauce.  I evaluated her  multiple times throughout the day.  I discussed plan of care with the speech therapy at the bedside with patient and with bedside RN.  Time spent 16 minutes    Tachycardia  Assessment & Plan  She remains tachycardic.  She did not take her medication this morning.  I discussed with her  at the bedside.  I started her on carvedilol 3.125 mg with holding parameters as her blood pressure is also running high.  If she will remain tachycardic despite taking medication plan is to uptitrate her carvedilol.      Persistent hyperactive delirium due to multiple etiologies  Assessment & Plan  4/27/2024   Delirium bundle  Reinitiate home medications  Now significantly improved.  She was able to tell me why she is here in the hospital.    Electrolyte depletion  Assessment & Plan  Continue to monitor and replace as needed.    COVID-19  Assessment & Plan  4/27/2024   On dexamethasone 6mg daily x 10 days.  Continue enhanced droplet, contact and eye protection precautions.  She remains on 4 L of oxygen to maintain oxygen saturation.    Acute hypoxemic respiratory failure due to COVID-19 (HCC)  Assessment & Plan  4/27/2024     Extubated 4/22    Decadron x 10 days  Pulmonary hygiene  Maintain euvolemia  SpO2 > 90%  She remains on 4 L of oxygen.  Continue to monitor closely.  Titrate down oxygen as tolerated.    Acute renal failure (ARF) (HCC)  Assessment & Plan  4/27/2024   Baseline creatinine 0.9-1.0.  Up to 5.  Likely ATN related to septic shock in conjunction with ACE inhibitor.  Some hyaline casts on UA.  No indications for renal replacement therapy at present.  HCO3 16, normal PH   US renal negative for hydronephrosis.     No renal function is significantly improved  Ordered lab workup tomorrow.    Major depressive disorder- (present on admission)  Assessment & Plan  Continue home amytriptylene, duloxetine.      SELWYN (obstructive sleep apnea)- (present on admission)  Assessment & Plan  Not on home CPAP.  She will need close  outpatient follow-up.          I evaluated multiple times throughout the day as she has been refusing medications and speech therapy evaluation.    I discussed plan of care with patient and her  at the bedside and answered all of their questions.      I discussed plan of care during multidisciplinary rounds regarding patient's current medical condition and plan of care.          VTE prophylaxis:    enoxaparin ppx      I have performed a physical exam and reviewed and updated ROS and Plan today (4/27/2024). In review of yesterday's note (4/26/2024), there are no changes except as documented above.

## 2024-04-27 NOTE — PROGRESS NOTES
4 Eyes Skin Assessment Completed by JIMMY Goodman and JIMMY Valle.    Head WDL  Ears Redness and Blanching  Nose WDL  Mouth unable to assess, patient is refusing oral care   Neck WDL  Breast/Chest Redness, Rash, and Excoriation under breasts   Shoulder Blades WDL  Spine WDL  (R) Arm/Elbow/Hand Redness, Blanching, Bruising, and Scab  (L) Arm/Elbow/Hand Redness, Blanching, Bruising, and Scab  Abdomen Bruising lower abdomen lower completely bruised   Groin WDL  Scrotum/Coccyx/Buttocks Redness and Blanching  (R) Leg WDL  (L) Leg WDL  (R) Heel/Foot/Toe Boggy  (L) Heel/Foot/Toe Boggy          Devices In Places ECG, Tele Box, Blood Pressure Cuff, Pulse Ox, Tam, and Nasal Cannula      Interventions In Place Sacral Mepilex, Heel Float Boots, TAP System, Pillows, Q2 Turns, Low Air Loss Mattress, and Barrier Cream    Possible Skin Injury Yes    Pictures Uploaded Into Epic N/A  Wound Consult Placed Yes  RN Wound Prevention Protocol Ordered Yes

## 2024-04-27 NOTE — ASSESSMENT & PLAN NOTE
On April 27, 2024 patient was alert and oriented and she knows why she is here in the hospital.  I discussed CODE STATUS with her that if she does not want any of these interventions including speech therapy evaluation-right and if she would like we can transition her to hospice/comfort care.  I explained to patient and her  at the bedside regarding hospice and comfort care.  After talking to the patient and  patient expressed that she would like to try medications with applesauce.  I evaluated her multiple times throughout the day.  I discussed plan of care with the speech therapy at the bedside with patient and with bedside RN.  Time spent 16 minutes

## 2024-04-27 NOTE — CARE PLAN
The patient is Watcher - Medium risk of patient condition declining or worsening    Shift Goals  Clinical Goals: VSS stable, Tam Care,  Patient Goals: Rest  Family Goals: N/A      Progress made toward(s) clinical / shift goals:  VSS, Tam Care completed.     Problem: Fall Risk  Goal: Patient will remain free from falls  Outcome: Progressing  Problem: Hemodynamics  Goal: Patient's hemodynamics, fluid balance and neurologic status will be stable or improve  Outcome: Progressing  Problem: Pain - Standard  Goal: Alleviation of pain or a reduction in pain to the patient’s comfort goal  Outcome: Progressing    Patient is not progressing towards the following goals: Pt refusing ET intervention after ET tube present w/ obstruction. Reporting pain and discomfort.     Problem: Psychosocial  Goal: Patient's level of anxiety will decrease  Outcome: Progressing

## 2024-04-28 ENCOUNTER — APPOINTMENT (OUTPATIENT)
Dept: RADIOLOGY | Facility: MEDICAL CENTER | Age: 79
DRG: 871 | End: 2024-04-28
Attending: INTERNAL MEDICINE
Payer: MEDICARE

## 2024-04-28 LAB
ALBUMIN SERPL BCP-MCNC: 3.4 G/DL (ref 3.2–4.9)
ALBUMIN/GLOB SERPL: 1.6 G/DL
ALP SERPL-CCNC: 49 U/L (ref 30–99)
ALT SERPL-CCNC: 24 U/L (ref 2–50)
ANION GAP SERPL CALC-SCNC: 9 MMOL/L (ref 7–16)
AST SERPL-CCNC: 16 U/L (ref 12–45)
BILIRUB SERPL-MCNC: 0.5 MG/DL (ref 0.1–1.5)
BUN SERPL-MCNC: 40 MG/DL (ref 8–22)
CALCIUM ALBUM COR SERPL-MCNC: 9 MG/DL (ref 8.5–10.5)
CALCIUM SERPL-MCNC: 8.5 MG/DL (ref 8.5–10.5)
CHLORIDE SERPL-SCNC: 105 MMOL/L (ref 96–112)
CO2 SERPL-SCNC: 29 MMOL/L (ref 20–33)
CREAT SERPL-MCNC: 0.64 MG/DL (ref 0.5–1.4)
GFR SERPLBLD CREATININE-BSD FMLA CKD-EPI: 91 ML/MIN/1.73 M 2
GLOBULIN SER CALC-MCNC: 2.1 G/DL (ref 1.9–3.5)
GLUCOSE BLD STRIP.AUTO-MCNC: 131 MG/DL (ref 65–99)
GLUCOSE BLD STRIP.AUTO-MCNC: 146 MG/DL (ref 65–99)
GLUCOSE BLD STRIP.AUTO-MCNC: 158 MG/DL (ref 65–99)
GLUCOSE BLD STRIP.AUTO-MCNC: 162 MG/DL (ref 65–99)
GLUCOSE SERPL-MCNC: 149 MG/DL (ref 65–99)
MAGNESIUM SERPL-MCNC: 2.1 MG/DL (ref 1.5–2.5)
PHOSPHATE SERPL-MCNC: 2.8 MG/DL (ref 2.5–4.5)
POTASSIUM SERPL-SCNC: 4.1 MMOL/L (ref 3.6–5.5)
PROT SERPL-MCNC: 5.5 G/DL (ref 6–8.2)
SODIUM SERPL-SCNC: 143 MMOL/L (ref 135–145)

## 2024-04-28 PROCEDURE — 700105 HCHG RX REV CODE 258: Performed by: INTERNAL MEDICINE

## 2024-04-28 PROCEDURE — 36415 COLL VENOUS BLD VENIPUNCTURE: CPT

## 2024-04-28 PROCEDURE — 51798 US URINE CAPACITY MEASURE: CPT

## 2024-04-28 PROCEDURE — 700102 HCHG RX REV CODE 250 W/ 637 OVERRIDE(OP): Performed by: INTERNAL MEDICINE

## 2024-04-28 PROCEDURE — 700117 HCHG RX CONTRAST REV CODE 255: Performed by: INTERNAL MEDICINE

## 2024-04-28 PROCEDURE — 770020 HCHG ROOM/CARE - TELE (206)

## 2024-04-28 PROCEDURE — 80053 COMPREHEN METABOLIC PANEL: CPT

## 2024-04-28 PROCEDURE — 82962 GLUCOSE BLOOD TEST: CPT | Mod: 91

## 2024-04-28 PROCEDURE — 83735 ASSAY OF MAGNESIUM: CPT

## 2024-04-28 PROCEDURE — A9270 NON-COVERED ITEM OR SERVICE: HCPCS | Performed by: INTERNAL MEDICINE

## 2024-04-28 PROCEDURE — 84100 ASSAY OF PHOSPHORUS: CPT

## 2024-04-28 PROCEDURE — 700111 HCHG RX REV CODE 636 W/ 250 OVERRIDE (IP): Mod: JZ | Performed by: INTERNAL MEDICINE

## 2024-04-28 PROCEDURE — 99233 SBSQ HOSP IP/OBS HIGH 50: CPT | Performed by: INTERNAL MEDICINE

## 2024-04-28 RX ORDER — BISACODYL 10 MG
10 SUPPOSITORY, RECTAL RECTAL
Status: DISCONTINUED | OUTPATIENT
Start: 2024-04-28 | End: 2024-04-30 | Stop reason: HOSPADM

## 2024-04-28 RX ORDER — DEXAMETHASONE 4 MG/1
6 TABLET ORAL DAILY
Status: COMPLETED | OUTPATIENT
Start: 2024-04-29 | End: 2024-04-29

## 2024-04-28 RX ORDER — AMITRIPTYLINE HYDROCHLORIDE 50 MG/1
100 TABLET, FILM COATED ORAL NIGHTLY
Status: DISCONTINUED | OUTPATIENT
Start: 2024-04-28 | End: 2024-04-30 | Stop reason: HOSPADM

## 2024-04-28 RX ORDER — AMOXICILLIN 250 MG
2 CAPSULE ORAL 2 TIMES DAILY
Status: DISCONTINUED | OUTPATIENT
Start: 2024-04-28 | End: 2024-04-30 | Stop reason: HOSPADM

## 2024-04-28 RX ORDER — PREGABALIN 150 MG/1
150 CAPSULE ORAL 2 TIMES DAILY
Status: DISCONTINUED | OUTPATIENT
Start: 2024-04-28 | End: 2024-04-30 | Stop reason: HOSPADM

## 2024-04-28 RX ORDER — POLYETHYLENE GLYCOL 3350 17 G/17G
1 POWDER, FOR SOLUTION ORAL DAILY
Status: DISCONTINUED | OUTPATIENT
Start: 2024-04-28 | End: 2024-04-30 | Stop reason: HOSPADM

## 2024-04-28 RX ORDER — ONDANSETRON 4 MG/1
4 TABLET, ORALLY DISINTEGRATING ORAL EVERY 4 HOURS PRN
Status: DISCONTINUED | OUTPATIENT
Start: 2024-04-28 | End: 2024-04-30 | Stop reason: HOSPADM

## 2024-04-28 RX ORDER — QUETIAPINE FUMARATE 25 MG/1
25 TABLET, FILM COATED ORAL NIGHTLY
Status: DISCONTINUED | OUTPATIENT
Start: 2024-04-28 | End: 2024-04-28

## 2024-04-28 RX ORDER — CARVEDILOL 3.12 MG/1
3.12 TABLET ORAL 2 TIMES DAILY WITH MEALS
Status: DISCONTINUED | OUTPATIENT
Start: 2024-04-28 | End: 2024-04-29

## 2024-04-28 RX ORDER — LISINOPRIL 5 MG/1
5 TABLET ORAL
Status: DISCONTINUED | OUTPATIENT
Start: 2024-04-29 | End: 2024-04-30 | Stop reason: HOSPADM

## 2024-04-28 RX ORDER — DULOXETIN HYDROCHLORIDE 30 MG/1
30 CAPSULE, DELAYED RELEASE ORAL DAILY
Status: DISCONTINUED | OUTPATIENT
Start: 2024-04-29 | End: 2024-04-30 | Stop reason: HOSPADM

## 2024-04-28 RX ORDER — OXYCODONE HYDROCHLORIDE 5 MG/1
5 TABLET ORAL EVERY 4 HOURS PRN
Status: DISCONTINUED | OUTPATIENT
Start: 2024-04-28 | End: 2024-04-30 | Stop reason: HOSPADM

## 2024-04-28 RX ORDER — ACETAMINOPHEN 325 MG/1
650 TABLET ORAL EVERY 6 HOURS PRN
Status: DISCONTINUED | OUTPATIENT
Start: 2024-04-28 | End: 2024-04-30 | Stop reason: HOSPADM

## 2024-04-28 RX ORDER — OMEPRAZOLE 20 MG/1
20 CAPSULE, DELAYED RELEASE ORAL DAILY
Status: DISCONTINUED | OUTPATIENT
Start: 2024-04-28 | End: 2024-04-30 | Stop reason: HOSPADM

## 2024-04-28 RX ORDER — BISACODYL 10 MG
10 SUPPOSITORY, RECTAL RECTAL
Status: DISCONTINUED | OUTPATIENT
Start: 2024-04-28 | End: 2024-04-28

## 2024-04-28 RX ORDER — QUETIAPINE FUMARATE 25 MG/1
12.5 TABLET, FILM COATED ORAL NIGHTLY
Status: DISCONTINUED | OUTPATIENT
Start: 2024-04-28 | End: 2024-04-30 | Stop reason: HOSPADM

## 2024-04-28 RX ORDER — TRAZODONE HYDROCHLORIDE 50 MG/1
50 TABLET ORAL
Status: DISCONTINUED | OUTPATIENT
Start: 2024-04-28 | End: 2024-04-30 | Stop reason: HOSPADM

## 2024-04-28 RX ORDER — POLYETHYLENE GLYCOL 3350 17 G/17G
1 POWDER, FOR SOLUTION ORAL
Status: DISCONTINUED | OUTPATIENT
Start: 2024-04-28 | End: 2024-04-28

## 2024-04-28 RX ORDER — OXYCODONE HYDROCHLORIDE 5 MG/1
2.5 TABLET ORAL EVERY 4 HOURS PRN
Status: DISCONTINUED | OUTPATIENT
Start: 2024-04-28 | End: 2024-04-30 | Stop reason: HOSPADM

## 2024-04-28 RX ORDER — AMOXICILLIN 250 MG
2 CAPSULE ORAL 2 TIMES DAILY
Status: DISCONTINUED | OUTPATIENT
Start: 2024-04-28 | End: 2024-04-28

## 2024-04-28 RX ORDER — ASPIRIN 81 MG/1
81 TABLET, CHEWABLE ORAL DAILY
Status: DISCONTINUED | OUTPATIENT
Start: 2024-04-29 | End: 2024-04-30 | Stop reason: HOSPADM

## 2024-04-28 RX ORDER — FUROSEMIDE 10 MG/ML
40 INJECTION INTRAMUSCULAR; INTRAVENOUS ONCE
Status: DISPENSED | OUTPATIENT
Start: 2024-04-28 | End: 2024-04-29

## 2024-04-28 RX ADMIN — QUETIAPINE FUMARATE 12.5 MG: 25 TABLET ORAL at 20:40

## 2024-04-28 RX ADMIN — SODIUM CHLORIDE, POTASSIUM CHLORIDE, SODIUM LACTATE AND CALCIUM CHLORIDE: 600; 310; 30; 20 INJECTION, SOLUTION INTRAVENOUS at 04:58

## 2024-04-28 RX ADMIN — PREGABALIN 150 MG: 150 CAPSULE ORAL at 05:04

## 2024-04-28 RX ADMIN — CARVEDILOL 3.12 MG: 3.12 TABLET, FILM COATED ORAL at 18:07

## 2024-04-28 RX ADMIN — DEXAMETHASONE 6 MG: 4 TABLET ORAL at 05:03

## 2024-04-28 RX ADMIN — ASPIRIN 81 MG: 81 TABLET, CHEWABLE ORAL at 05:04

## 2024-04-28 RX ADMIN — AMITRIPTYLINE HYDROCHLORIDE 100 MG: 50 TABLET, FILM COATED ORAL at 20:40

## 2024-04-28 RX ADMIN — IOHEXOL 75 ML: 350 INJECTION, SOLUTION INTRAVENOUS at 16:22

## 2024-04-28 RX ADMIN — LISINOPRIL 5 MG: 5 TABLET ORAL at 05:04

## 2024-04-28 RX ADMIN — TRAZODONE HYDROCHLORIDE 50 MG: 50 TABLET ORAL at 20:40

## 2024-04-28 RX ADMIN — SENNOSIDES AND DOCUSATE SODIUM 2 TABLET: 50; 8.6 TABLET ORAL at 05:03

## 2024-04-28 RX ADMIN — DULOXETINE HYDROCHLORIDE 30 MG: 30 CAPSULE, DELAYED RELEASE ORAL at 05:03

## 2024-04-28 RX ADMIN — INSULIN HUMAN 3 UNITS: 100 INJECTION, SOLUTION PARENTERAL at 05:08

## 2024-04-28 RX ADMIN — PREGABALIN 150 MG: 150 CAPSULE ORAL at 18:08

## 2024-04-28 RX ADMIN — INSULIN HUMAN 3 UNITS: 100 INJECTION, SOLUTION PARENTERAL at 00:14

## 2024-04-28 RX ADMIN — ENOXAPARIN SODIUM 40 MG: 100 INJECTION SUBCUTANEOUS at 18:07

## 2024-04-28 ASSESSMENT — ENCOUNTER SYMPTOMS
DIZZINESS: 0
FOCAL WEAKNESS: 0
PALPITATIONS: 0
SPUTUM PRODUCTION: 0
TINGLING: 0
VOMITING: 0
SENSORY CHANGE: 0
DIARRHEA: 0
ORTHOPNEA: 0
CONSTIPATION: 0
PHOTOPHOBIA: 0
NAUSEA: 0
COUGH: 0
BLURRED VISION: 0
EYE PAIN: 0
WEAKNESS: 1
CHILLS: 0
NECK PAIN: 0
FEVER: 0
BACK PAIN: 0
WEIGHT LOSS: 0
TREMORS: 0
SPEECH CHANGE: 0
DOUBLE VISION: 0
ABDOMINAL PAIN: 0
HEADACHES: 0
SHORTNESS OF BREATH: 0
MYALGIAS: 1
HALLUCINATIONS: 0

## 2024-04-28 ASSESSMENT — COGNITIVE AND FUNCTIONAL STATUS - GENERAL
SUGGESTED CMS G CODE MODIFIER DAILY ACTIVITY: CK
HELP NEEDED FOR BATHING: A LOT
DRESSING REGULAR LOWER BODY CLOTHING: A LOT
TURNING FROM BACK TO SIDE WHILE IN FLAT BAD: A LOT
SUGGESTED CMS G CODE MODIFIER MOBILITY: CL
TOILETING: A LOT
DRESSING REGULAR UPPER BODY CLOTHING: A LITTLE
MOBILITY SCORE: 11
EATING MEALS: A LITTLE
STANDING UP FROM CHAIR USING ARMS: A LOT
PERSONAL GROOMING: A LITTLE
CLIMB 3 TO 5 STEPS WITH RAILING: TOTAL
DAILY ACTIVITIY SCORE: 15
MOVING FROM LYING ON BACK TO SITTING ON SIDE OF FLAT BED: A LOT
MOVING TO AND FROM BED TO CHAIR: A LOT
WALKING IN HOSPITAL ROOM: A LOT

## 2024-04-28 ASSESSMENT — LIFESTYLE VARIABLES: SUBSTANCE_ABUSE: 0

## 2024-04-28 ASSESSMENT — PAIN DESCRIPTION - PAIN TYPE: TYPE: ACUTE PAIN

## 2024-04-28 NOTE — PROGRESS NOTES
Hospital Medicine Daily Progress Note    Date of Service  4/28/2024    Chief Complaint  Tereza Sánchez is a 78 y.o. female admitted 4/20/2024 with Weakness    Hospital Course    78-year-old female with past medical history of hypertension, SELWYN, chiari malformation, history of UTI, history of multiple falls who presented 4/26 to the hospital with complaint of weakness dysuria for several weeks.  She also reported cough On 4/20/2024.  Patient found to have acute hypoxemic respiratory failure secondary to COVID-19 virus infection.  She was also diagnosed with septic shock and she was placed on Levophed for Target MAP of greater than 65 mmHg.  She also found to have acute renal failure.    During the hospitalization patient needed Precedex drip.  Also patient developed tachycardia start with Coreg 3.125 mg twice daily     She was intubated on April 20, 2024 and she was extubated on April 22, 2024 and she was placed on high flow nasal cannula.  Patient was improved and transferred out of the ICU, patient is on nasal cannula 4 L nasal cannula.    Goals of care were discussed with the patient and patient  many times and she wants to be full code         Interval Problem Update  -Evaluated and examined the patient at bedside, patient is lethargic, patient has crackles, patient is on 4 L nasal cannula, will discontinue IV fluid and give 1 dose of IV Lasix.  -Patient still having tachycardia, some improvement, patient had COVID infection and tachycardia,, will order CTA be.  -Case was discussed with the patient and her , answered all their questions  -Decrease Seroquel to 12.5 mg at night to avoid lethargy during the day.  -Labs reviewed around baseline  -PT and OT evaluation for possible placement        I have discussed this patient's plan of care and discharge plan at IDT rounds today with Case Management, Nursing, Nursing leadership, and other members of the IDT team.    Consultants/Specialty  critical  care    Code Status  Full Code    Disposition  The patient is not medically cleared for discharge to home or a post-acute facility.  Anticipate discharge to: skilled nursing facility    I have placed the appropriate orders for post-discharge needs.    Review of Systems  Review of Systems   Constitutional:  Positive for malaise/fatigue. Negative for chills, fever and weight loss.   HENT:  Negative for hearing loss and tinnitus.    Eyes:  Negative for blurred vision, double vision, photophobia and pain.   Respiratory:  Negative for cough, sputum production and shortness of breath.    Cardiovascular:  Negative for chest pain, palpitations, orthopnea and leg swelling.   Gastrointestinal:  Negative for abdominal pain, constipation, diarrhea, nausea and vomiting.   Genitourinary:  Negative for dysuria, frequency and urgency.   Musculoskeletal:  Positive for myalgias. Negative for back pain, joint pain and neck pain.   Skin:  Negative for rash.   Neurological:  Positive for weakness. Negative for dizziness, tingling, tremors, sensory change, speech change, focal weakness and headaches.   Psychiatric/Behavioral:  Negative for hallucinations and substance abuse.    All other systems reviewed and are negative.       Physical Exam  Temp:  [36.2 °C (97.2 °F)] 36.2 °C (97.2 °F)  Pulse:  [] 98  Resp:  [15-39] 16  BP: (117-152)/(67-89) 140/75  SpO2:  [94 %-98 %] 97 %    Physical Exam  Vitals reviewed.   Constitutional:       General: She is not in acute distress.     Appearance: Normal appearance. She is ill-appearing.      Comments: Clinically she looks improved.  She was sitting in recliner   HENT:      Head: Normocephalic and atraumatic.      Nose: No congestion.      Comments: Oxygen nasal cannula  Feeding tube  Eyes:      General:         Right eye: No discharge.         Left eye: No discharge.      Pupils: Pupils are equal, round, and reactive to light.   Cardiovascular:      Rate and Rhythm: Regular rhythm.  Tachycardia present.      Pulses: Normal pulses.      Heart sounds: Normal heart sounds. No murmur heard.  Pulmonary:      Effort: Pulmonary effort is normal. No respiratory distress.      Breath sounds: No stridor. Rales present.   Abdominal:      General: Bowel sounds are normal. There is no distension.      Palpations: Abdomen is soft.      Tenderness: There is no abdominal tenderness.   Musculoskeletal:         General: No swelling or tenderness. Normal range of motion.      Cervical back: Normal range of motion. No rigidity.      Right lower leg: No edema.      Left lower leg: No edema.   Skin:     General: Skin is warm.      Capillary Refill: Capillary refill takes less than 2 seconds.      Coloration: Skin is not jaundiced or pale.      Findings: No bruising.   Neurological:      General: No focal deficit present.      Mental Status: She is alert and oriented to person, place, and time.      Cranial Nerves: No cranial nerve deficit.      Comments: She is alert and oriented and able to answer my questions.   Psychiatric:         Mood and Affect: Mood normal.         Behavior: Behavior normal.         Fluids    Intake/Output Summary (Last 24 hours) at 4/28/2024 1051  Last data filed at 4/27/2024 2000  Gross per 24 hour   Intake 0 ml   Output 325 ml   Net -325 ml       Laboratory  Recent Labs     04/26/24  0222 04/27/24  1815   WBC 15.5* 18.6*   RBC 4.20 4.47   HEMOGLOBIN 12.8 13.4   HEMATOCRIT 36.8* 40.0   MCV 87.6 89.5   MCH 30.5 30.0   MCHC 34.8 33.5   RDW 44.9 46.5   PLATELETCT 277 398   MPV 11.3 11.7     Recent Labs     04/26/24  0222 04/27/24  2145 04/28/24  0639   SODIUM 139 141 143   POTASSIUM 3.6 4.2 4.1   CHLORIDE 101 104 105   CO2 26 24 29   GLUCOSE 180* 155* 149*   BUN 31* 36* 40*   CREATININE 0.71 0.66 0.64   CALCIUM 8.2* 8.5 8.5                   Imaging  DX-ABDOMEN FOR TUBE PLACEMENT   Final Result      1.  Enteric tube projects over the proximal stomach.      DX-ABDOMEN FOR TUBE PLACEMENT   Final  Result         1.  Nonspecific bowel gas pattern in the upper abdomen.   2.  Dobbhoff tube is coiled within the stomach, the tip terminates overlying the expected location of the gastric fundus, recommend partial withdrawal given extent of coiling.   3.  Hazy interstitial pulmonary opacities, consider chronic underlying lung disease versus interstitial edema and/or infiltrates.      DX-CHEST-PORTABLE (1 VIEW)   Final Result         1.  Pulmonary edema and/or infiltrates, slightly increased compared to prior study.      EC-ECHOCARDIOGRAM COMPLETE W/ CONT   Final Result      DX-CHEST-PORTABLE (1 VIEW)   Final Result         1.  Pulmonary edema and/or infiltrates are identified, which are stable since the prior exam.   2.  Small layering bilateral pleural effusion   3.  Cardiomegaly      DX-ABDOMEN FOR TUBE PLACEMENT   Final Result         1.  Air-filled distended loops of bowel are seen in the upper abdomen, appearance suggests ileus or enteritis versus evolving obstructive changes. Recommend radiographic followup to resolution to exclude progression to obstruction.   2.  Nasogastric tube tip terminates overlying the expected location of the pylorus or first duodenal segment.   3.  Pulmonary edema and/or infiltrates.      DX-ABDOMEN FOR TUBE PLACEMENT   Final Result      NG tube tip is in the left upper abdomen but the side-port in the distal esophagus. Recommend advancing at least 7 cm.      DX-CHEST-LIMITED (1 VIEW)   Final Result      1.  Endotracheal tube tip located 2 cm above the devon.      2.  NG tube extends into the gastric fundus.      3.  Bibasal atelectasis/consolidation.         US-RENAL   Final Result      1.  Limited visualization the right kidney due to obscuration by bowel gas.      2.  Otherwise normal exam.      DX-ABDOMEN FOR TUBE PLACEMENT   Final Result         Gastric drainage tube with tip projecting over the expected area of the stomach. Sidehole is in the GE junction.      CT-HEAD W/O    Final Result         1. No acute intracranial abnormality. No evidence of acute intracranial hemorrhage or mass lesion.                     DX-CHEST-PORTABLE (1 VIEW)   Final Result         Elevation of the right hemidiaphragm and right basilar atelectasis.      DX-CHEST-PORTABLE (1 VIEW)   Final Result      Linear atelectasis within the right lung base.      CT-CTA CHEST PULMONARY ARTERY W/ RECONS    (Results Pending)        Assessment/Plan  * Septic shock (HCC)- (present on admission)  Assessment & Plan  Secondary to COVID-19  Cultures are NGTD  Now resolved    Tachycardia  Assessment & Plan  Will order CTA to rule out PE  Continue telemetry  Continue Coreg improving      Persistent hyperactive delirium due to multiple etiologies  Assessment & Plan  Metabolic encephalopathy due to hypoxia and infection  Avoid sedation medication, avoid benzo or antihistamine  Encouraged the patient to get out of bed  Nonpharmacological treatment    Acute hypoxemic respiratory failure due to COVID-19 (HCC)  Assessment & Plan  Due to COVID-19 infection  Patient needed intubation  Improving and she is on 4 L nasal cannula  Will order CTA to rule out PE  Discontinue fluid and Lasix as needed  Decadron x 10 days  Pulmonary hygiene  Maintain euvolemia  SpO2 > 90%      Electrolyte depletion  Assessment & Plan  Continue to monitor and replace as needed.    COVID-19  Assessment & Plan  Improving oxygen requirement, patient is doing intubational high flow oxygen  She is on 4 L nasal cannula  Discontinue fluid and IV Lasix as needed  Respiratory hygiene  PT and OT    Acute renal failure (ARF) ([facility])  Assessment & Plan  Resolved  Avoid nephrotoxic medications  Labs daily    Major depressive disorder- (present on admission)  Assessment & Plan  Continue home amytriptylene, duloxetine.      SELWYN (obstructive sleep apnea)- (present on admission)  Assessment & Plan  Not on home CPAP.  She will need close outpatient follow-up.    Advance care  planning  Assessment & Plan  On April 27, 2024 patient was alert and oriented and she knows why she is here in the hospital.  I discussed CODE STATUS with her that if she does not want any of these interventions including speech therapy evaluation-right and if she would like we can transition her to hospice/comfort care.  I explained to patient and her  at the bedside regarding hospice and comfort care.  After talking to the patient and  patient expressed that she would like to try medications with applesauce.  I evaluated her multiple times throughout the day.  I discussed plan of care with the speech therapy at the bedside with patient and with bedside RN.  Time spent 16 minutes                  VTE prophylaxis:   SCDs/TEDs   heparin ppx      I have performed a physical exam and reviewed and updated ROS and Plan today (4/28/2024). In review of yesterday's note (4/27/2024), there are no changes except as documented above.      Greater than 51 minutes spent prepping to see patient (e.g. review of tests) obtaining and/or reviewing separately obtained history. Performing a medically appropriate examination and/ evaluation.  Counseling and educating the patient/family/caregiver.  Ordering medications, tests, or procedures.  Referring and communicating with other health care professionals.  Documenting clinical information in EPIC.  Independently interpreting results and communicating results to patient/family/caregiver.  Care coordination

## 2024-04-28 NOTE — PROGRESS NOTES
Handoff report received from night shift nurse. Pt care assumed. Pt is currently resting in bed. POC discussed with Pt and Pt verbalizes no questions at this time. Pt is AAOx3- disoriented to situation, on 4L nasal cannula, on Tele monitoring, and VSS. Call light and belongings within reach, bed in lowest and locked position, and Pt educated on use of call light.

## 2024-04-28 NOTE — DISCHARGE PLANNING
Received choice form @: 1450  Agency/Facility name: [facility]  Sent referral per choice form @: No referral sent.  Pending orders.    Received choice form @: 3930  Agency/Facility name: [facility]  Sent referral per choice form @: No referral sent. Pending orders.

## 2024-04-28 NOTE — PROGRESS NOTES
4 Eyes Skin Assessment Completed by JIMMY Foster and JIMMY Brooks.    Head WDL  Ears Redness and Blanching    Nose WDL  Mouth WDL  Neck WDL  Breast/Chest Redness, mpisture associated under breasts  Shoulder Blades WDL  Spine WDL  (R) Arm/Elbow/Hand Redness and Blanching  (L) Arm/Elbow/Hand Redness and Blanching  Abdomen Bruising, mpisture associated redness on pannus  Groin Redness and Excoriation, moisture associated redness on groin R>L  Scrotum/Coccyx/Buttocks Redness, Blanching, and Moisture Fissure  (R) Leg WDL  (L) Leg WDL  (R) Heel/Foot/Toe Redness and Blanching  (L) Heel/Foot/Toe Redness and Blanching          Devices In Places ECG, Blood Pressure Cuff, Pulse Ox, Tam, SCD's, and Nasal Cannula      Interventions In Place Gray Ear Foams, InterDry, Heel Mepilex, Sacral Mepilex, Heel Float Boots, TAP System, Pillows, Elbow Mepilex, Q2 Turns, Low Air Loss Mattress, and Barrier Cream    Possible Skin Injury No    Pictures Uploaded Into Epic Yes  Wound Consult Placed N/A  RN Wound Prevention Protocol Ordered Yes

## 2024-04-28 NOTE — PROGRESS NOTES
EKG Interpretation-HR is 110-132 normal sinus rhythm, sinus tachycardia, atrial fibrillation -180s 2 episodes

## 2024-04-28 NOTE — PROGRESS NOTES
Patient arrived to unit. Not fully alert but responsive to voice. Slight delay with verbal response. Oriented x 3. Able to follow simple commands. Vss. On 4LNC. Tele on and heart rhythm and rate checked on monitor. Continuous spo2 monitoring on. Denies pain. Repositioned for comfort.    Safety precautions in place are side rails up x 3, bed to lowest level, alarms on. Is not appropriate to use call light for needs and assistance, but instructed the use of it and placed within reach. Will round hourly and as needed.

## 2024-04-28 NOTE — DISCHARGE PLANNING
HTH/SCP TCN chart review completed. Collaborated with YAKOV Henry prior to meeting with the pt. The most current review of medical record, knowledge of pt's PLOF and social support, LACE+ score of 67, 6 clicks scores of 7 ADL's and 10 mobility were considered.      Pt seen at bedside. Introduced TCN program. Provided education regarding post acute levels of care. Education provided regarding case management policy for blanket SNF referrals. Discussed HTH/SCP plan benefits. Pt verbalizes understanding.  Per chart review, she is positive for COVID.      Patient states she lives with her spouse and was modified independent with ADL's, IADL's, and mobility  (used 4WW indoors/outdoors) at baseline.  Her spouse completed all medication management and transportation.  Spouse states he can provide transportation home at discharge if needed.  She reports that she s not at her baseline level of function and would like to discharge home with Home Health however understands that therapy is currently recommending post-acute placement.  She would like to see what the updated therapy recommendations are for tomorrow and is agreeable to [facility] but is hopeful that she will improve to Home with Home Health.  She is not medically cleared at this time.   Per chart review, PT/OT recommendations are for post-acute placement.  She has sleep apnea was on 2 L/min O2 with [facility] at her baseline.  She has a concentrator and portable at home.  She is currently on 4 L/min O2.      Per chart review, she has been accepted to [facility].  [facility] declined.  [facility]  .      Choice proactively obtained for HH ([facility]), DME ([facility]). & SNF( [facility] - [facility]), faxed to [facility] and given to YAKOV.   TCN will continue to follow and collaborate with discharge planning team as additional post acute needs arise. Thank you.     Completed today:  PT/OT with  recommendations for post-acute placement on   Choice obtained: HH ([facility]), DME ([facility]). & SNF( [facility]).    SCP with [facility] PCP.  Patient will likely need post-acute placement.

## 2024-04-28 NOTE — PROGRESS NOTES
4 Eyes Skin Assessment Completed by JIMMY TOUSSAINT and JIMMY VILLEGAS.    Head WDL  Ears Redness and Blanching  Nose WDL  Mouth WDL  Neck WDL  Breast/Chest Redness, Rash, and Excoriation  Shoulder Blades WDL  Spine WDL  (R) Arm/Elbow/Hand Redness, Blanching, Bruising, and Scab  (L) Arm/Elbow/Hand Redness, Blanching, Bruising, and Scab  Abdomen Bruising  Groin Redness and Blanching  Scrotum/Coccyx/Buttocks Redness and Blanching  (R) Leg WDL  (L) Leg WDL  (R) Heel/Foot/Toe Boggy  (L) Heel/Foot/Toe Boggy          Devices In Places ECG, Tele Box, Blood Pressure Cuff, Pulse Ox, and Nasal Cannula      Interventions In Place Gray Ear Foams, Sacral Mepilex, Heel Float Boots, Pillows, Q2 Turns, and Barrier Cream    Possible Skin Injury Yes    Pictures Uploaded Into Epic Yes  Wound Consult Placed Yes  RN Wound Prevention Protocol Ordered Yes

## 2024-04-29 LAB
ANION GAP SERPL CALC-SCNC: 12 MMOL/L (ref 7–16)
ANISOCYTOSIS BLD QL SMEAR: ABNORMAL
BACTERIA BLD CULT: NORMAL
BACTERIA BLD CULT: NORMAL
BASOPHILS # BLD AUTO: 0.9 % (ref 0–1.8)
BASOPHILS # BLD: 0.11 K/UL (ref 0–0.12)
BUN SERPL-MCNC: 46 MG/DL (ref 8–22)
CALCIUM SERPL-MCNC: 8.5 MG/DL (ref 8.5–10.5)
CHLORIDE SERPL-SCNC: 108 MMOL/L (ref 96–112)
CO2 SERPL-SCNC: 24 MMOL/L (ref 20–33)
CREAT SERPL-MCNC: 0.64 MG/DL (ref 0.5–1.4)
CRP SERPL HS-MCNC: <0.3 MG/DL (ref 0–0.75)
EOSINOPHIL # BLD AUTO: 0 K/UL (ref 0–0.51)
EOSINOPHIL NFR BLD: 0 % (ref 0–6.9)
ERYTHROCYTE [DISTWIDTH] IN BLOOD BY AUTOMATED COUNT: 48.1 FL (ref 35.9–50)
EXTRA TUBE BLU BLU: NORMAL
GFR SERPLBLD CREATININE-BSD FMLA CKD-EPI: 91 ML/MIN/1.73 M 2
GLUCOSE BLD STRIP.AUTO-MCNC: 114 MG/DL (ref 65–99)
GLUCOSE BLD STRIP.AUTO-MCNC: 127 MG/DL (ref 65–99)
GLUCOSE SERPL-MCNC: 124 MG/DL (ref 65–99)
HCT VFR BLD AUTO: 33.7 % (ref 37–47)
HGB BLD-MCNC: 11.1 G/DL (ref 12–16)
LYMPHOCYTES # BLD AUTO: 0.33 K/UL (ref 1–4.8)
LYMPHOCYTES NFR BLD: 2.6 % (ref 22–41)
MAGNESIUM SERPL-MCNC: 2.1 MG/DL (ref 1.5–2.5)
MANUAL DIFF BLD: NORMAL
MCH RBC QN AUTO: 30.3 PG (ref 27–33)
MCHC RBC AUTO-ENTMCNC: 32.9 G/DL (ref 32.2–35.5)
MCV RBC AUTO: 92.1 FL (ref 81.4–97.8)
MICROCYTES BLD QL SMEAR: ABNORMAL
MONOCYTES # BLD AUTO: 2.18 K/UL (ref 0–0.85)
MONOCYTES NFR BLD AUTO: 17.4 % (ref 0–13.4)
MORPHOLOGY BLD-IMP: NORMAL
MYELOCYTES NFR BLD MANUAL: 1.7 %
NEUTROPHILS # BLD AUTO: 9.68 K/UL (ref 1.82–7.42)
NEUTROPHILS NFR BLD: 77.4 % (ref 44–72)
NRBC # BLD AUTO: 0.02 K/UL
NRBC BLD-RTO: 0.2 /100 WBC (ref 0–0.2)
PLATELET # BLD AUTO: 336 K/UL (ref 164–446)
PLATELET BLD QL SMEAR: NORMAL
PMV BLD AUTO: 10.8 FL (ref 9–12.9)
POLYCHROMASIA BLD QL SMEAR: NORMAL
POTASSIUM SERPL-SCNC: 4 MMOL/L (ref 3.6–5.5)
RBC # BLD AUTO: 3.66 M/UL (ref 4.2–5.4)
RBC BLD AUTO: PRESENT
SIGNIFICANT IND 70042: NORMAL
SIGNIFICANT IND 70042: NORMAL
SITE SITE: NORMAL
SITE SITE: NORMAL
SODIUM SERPL-SCNC: 144 MMOL/L (ref 135–145)
SOURCE SOURCE: NORMAL
SOURCE SOURCE: NORMAL
WBC # BLD AUTO: 12.5 K/UL (ref 4.8–10.8)

## 2024-04-29 PROCEDURE — 97110 THERAPEUTIC EXERCISES: CPT

## 2024-04-29 PROCEDURE — 700111 HCHG RX REV CODE 636 W/ 250 OVERRIDE (IP): Mod: JZ | Performed by: INTERNAL MEDICINE

## 2024-04-29 PROCEDURE — 36415 COLL VENOUS BLD VENIPUNCTURE: CPT

## 2024-04-29 PROCEDURE — 86140 C-REACTIVE PROTEIN: CPT

## 2024-04-29 PROCEDURE — 97530 THERAPEUTIC ACTIVITIES: CPT

## 2024-04-29 PROCEDURE — 97535 SELF CARE MNGMENT TRAINING: CPT

## 2024-04-29 PROCEDURE — 770001 HCHG ROOM/CARE - MED/SURG/GYN PRIV*

## 2024-04-29 PROCEDURE — 83735 ASSAY OF MAGNESIUM: CPT

## 2024-04-29 PROCEDURE — 80048 BASIC METABOLIC PNL TOTAL CA: CPT

## 2024-04-29 PROCEDURE — 82962 GLUCOSE BLOOD TEST: CPT

## 2024-04-29 PROCEDURE — A9270 NON-COVERED ITEM OR SERVICE: HCPCS | Performed by: INTERNAL MEDICINE

## 2024-04-29 PROCEDURE — 700102 HCHG RX REV CODE 250 W/ 637 OVERRIDE(OP): Performed by: INTERNAL MEDICINE

## 2024-04-29 PROCEDURE — 99232 SBSQ HOSP IP/OBS MODERATE 35: CPT | Performed by: INTERNAL MEDICINE

## 2024-04-29 PROCEDURE — 85027 COMPLETE CBC AUTOMATED: CPT

## 2024-04-29 PROCEDURE — 85007 BL SMEAR W/DIFF WBC COUNT: CPT

## 2024-04-29 RX ORDER — CARVEDILOL 6.25 MG/1
6.25 TABLET ORAL 2 TIMES DAILY WITH MEALS
Status: DISCONTINUED | OUTPATIENT
Start: 2024-04-29 | End: 2024-04-30 | Stop reason: HOSPADM

## 2024-04-29 RX ORDER — FUROSEMIDE 10 MG/ML
40 INJECTION INTRAMUSCULAR; INTRAVENOUS ONCE
Status: COMPLETED | OUTPATIENT
Start: 2024-04-29 | End: 2024-04-29

## 2024-04-29 RX ORDER — FUROSEMIDE 10 MG/ML
40 INJECTION INTRAMUSCULAR; INTRAVENOUS
Status: DISCONTINUED | OUTPATIENT
Start: 2024-04-29 | End: 2024-04-30 | Stop reason: HOSPADM

## 2024-04-29 RX ADMIN — LISINOPRIL 5 MG: 5 TABLET ORAL at 05:11

## 2024-04-29 RX ADMIN — DULOXETINE HYDROCHLORIDE 30 MG: 30 CAPSULE, DELAYED RELEASE ORAL at 05:11

## 2024-04-29 RX ADMIN — AMITRIPTYLINE HYDROCHLORIDE 100 MG: 50 TABLET, FILM COATED ORAL at 21:43

## 2024-04-29 RX ADMIN — PREGABALIN 150 MG: 150 CAPSULE ORAL at 05:11

## 2024-04-29 RX ADMIN — TRAZODONE HYDROCHLORIDE 50 MG: 50 TABLET ORAL at 21:43

## 2024-04-29 RX ADMIN — DEXAMETHASONE 6 MG: 4 TABLET ORAL at 05:10

## 2024-04-29 RX ADMIN — OMEPRAZOLE 20 MG: 20 CAPSULE, DELAYED RELEASE ORAL at 05:11

## 2024-04-29 RX ADMIN — QUETIAPINE FUMARATE 12.5 MG: 25 TABLET ORAL at 21:43

## 2024-04-29 RX ADMIN — ACETAMINOPHEN 650 MG: 325 TABLET ORAL at 16:38

## 2024-04-29 RX ADMIN — FUROSEMIDE 40 MG: 10 INJECTION INTRAMUSCULAR; INTRAVENOUS at 16:35

## 2024-04-29 RX ADMIN — CARVEDILOL 6.25 MG: 6.25 TABLET, FILM COATED ORAL at 16:36

## 2024-04-29 RX ADMIN — ENOXAPARIN SODIUM 40 MG: 100 INJECTION SUBCUTANEOUS at 16:35

## 2024-04-29 RX ADMIN — ASPIRIN 81 MG 81 MG: 81 TABLET ORAL at 05:11

## 2024-04-29 RX ADMIN — CARVEDILOL 6.25 MG: 6.25 TABLET, FILM COATED ORAL at 07:46

## 2024-04-29 RX ADMIN — FUROSEMIDE 40 MG: 10 INJECTION INTRAMUSCULAR; INTRAVENOUS at 12:03

## 2024-04-29 RX ADMIN — PREGABALIN 150 MG: 150 CAPSULE ORAL at 16:36

## 2024-04-29 ASSESSMENT — ENCOUNTER SYMPTOMS
COUGH: 0
WEIGHT LOSS: 0
SPEECH CHANGE: 0
NAUSEA: 0
SPUTUM PRODUCTION: 0
DOUBLE VISION: 0
FEVER: 0
ORTHOPNEA: 0
DIARRHEA: 0
HALLUCINATIONS: 0
EYE PAIN: 0
BACK PAIN: 0
NECK PAIN: 0
CHILLS: 0
PHOTOPHOBIA: 0
SHORTNESS OF BREATH: 0
BLURRED VISION: 0
FOCAL WEAKNESS: 0
CONSTIPATION: 0
TINGLING: 0
TREMORS: 0
MYALGIAS: 1
ABDOMINAL PAIN: 0
HEADACHES: 0
PALPITATIONS: 0
VOMITING: 0
DIZZINESS: 0
WEAKNESS: 1
SENSORY CHANGE: 0

## 2024-04-29 ASSESSMENT — COGNITIVE AND FUNCTIONAL STATUS - GENERAL
STANDING UP FROM CHAIR USING ARMS: A LOT
SUGGESTED CMS G CODE MODIFIER MOBILITY: CL
MOVING FROM LYING ON BACK TO SITTING ON SIDE OF FLAT BED: A LOT
TURNING FROM BACK TO SIDE WHILE IN FLAT BAD: A LOT
MOVING TO AND FROM BED TO CHAIR: A LOT
DAILY ACTIVITIY SCORE: 12
SUGGESTED CMS G CODE MODIFIER DAILY ACTIVITY: CL
TOILETING: A LOT
CLIMB 3 TO 5 STEPS WITH RAILING: TOTAL
EATING MEALS: A LOT
DRESSING REGULAR LOWER BODY CLOTHING: A LOT
HELP NEEDED FOR BATHING: A LOT
MOBILITY SCORE: 11
DRESSING REGULAR UPPER BODY CLOTHING: A LOT
WALKING IN HOSPITAL ROOM: A LOT
PERSONAL GROOMING: A LOT

## 2024-04-29 ASSESSMENT — GAIT ASSESSMENTS: GAIT LEVEL OF ASSIST: UNABLE TO PARTICIPATE

## 2024-04-29 ASSESSMENT — PAIN DESCRIPTION - PAIN TYPE: TYPE: ACUTE PAIN

## 2024-04-29 ASSESSMENT — LIFESTYLE VARIABLES: SUBSTANCE_ABUSE: 0

## 2024-04-29 ASSESSMENT — FIBROSIS 4 INDEX: FIB4 SCORE: 0.74

## 2024-04-29 NOTE — PROGRESS NOTES
Hospital Medicine Daily Progress Note    Date of Service  4/29/2024    Chief Complaint  Tereza Sánchez is a 78 y.o. female admitted 4/20/2024 with Weakness    Hospital Course    78-year-old female with past medical history of hypertension, SELWYN, chiari malformation, history of UTI, history of multiple falls who presented 4/26 to the hospital with complaint of weakness dysuria for several weeks.  She also reported cough On 4/20/2024.  Patient found to have acute hypoxemic respiratory failure secondary to COVID-19 virus infection.  She was also diagnosed with septic shock and she was placed on Levophed for Target MAP of greater than 65 mmHg.  She also found to have acute renal failure.  Her kidney function was improved and back to baseline 0.6.     During the hospitalization patient needed Precedex drip.  Also patient developed tachycardia start with Coreg 3.125 mg twice daily, later was increased for tachycardia and improving hypertension.     She was intubated on April 20, 2024 and she was extubated on April 22, 2024 and she was placed on high flow nasal cannula.  Patient was improved and transferred out of the ICU, patient is on nasal cannula 4 L nasal cannula.  CTA was done and did not show any signs of PE.  More alert and oriented, patient is on    Goals of care were discussed with the patient and patient  many times and she wants to be full code.         Interval Problem Update  -Evaluated and examined the patient at bedside, patient is more alert and oriented today, patient is on 4 L nasal cannula, she still having crackles, will order 1 dose of IV Lasix  -CTA did not show any PE  -increase Coreg for hypertension and tachycardia  Planning discharge the patient to SNF-  -plan of care was discussed in detail with the patient and her , answered all their questions.  -Improving her leukocytosis      I have discussed this patient's plan of care and discharge plan at IDT rounds today with Case  Management, Nursing, Nursing leadership, and other members of the IDT team.    Consultants/Specialty  critical care    Code Status  Full Code    Disposition  The patient is medically cleared for discharge to home or a post-acute facility.  Anticipate discharge to: skilled nursing facility    I have placed the appropriate orders for post-discharge needs.    Review of Systems  Review of Systems   Constitutional:  Positive for malaise/fatigue. Negative for chills, fever and weight loss.   HENT:  Negative for hearing loss and tinnitus.    Eyes:  Negative for blurred vision, double vision, photophobia and pain.   Respiratory:  Negative for cough, sputum production and shortness of breath.    Cardiovascular:  Negative for chest pain, palpitations, orthopnea and leg swelling.   Gastrointestinal:  Negative for abdominal pain, constipation, diarrhea, nausea and vomiting.   Genitourinary:  Negative for dysuria, frequency and urgency.   Musculoskeletal:  Positive for myalgias. Negative for back pain, joint pain and neck pain.   Skin:  Negative for rash.   Neurological:  Positive for weakness. Negative for dizziness, tingling, tremors, sensory change, speech change, focal weakness and headaches.   Psychiatric/Behavioral:  Negative for hallucinations and substance abuse.    All other systems reviewed and are negative.       Physical Exam  Temp:  [36.1 °C (97 °F)-36.5 °C (97.7 °F)] 36.3 °C (97.3 °F)  Pulse:  [] 95  Resp:  [14-18] 17  BP: (130-154)/(76-99) 130/86  SpO2:  [94 %-96 %] 96 %    Physical Exam  Vitals reviewed.   Constitutional:       General: She is not in acute distress.     Appearance: Normal appearance. She is not ill-appearing.      Comments: Clinically she looks improved.  She was sitting in recliner   HENT:      Head: Normocephalic and atraumatic.      Nose: No congestion.      Comments: Oxygen nasal cannula  Feeding tube  Eyes:      General:         Right eye: No discharge.         Left eye: No discharge.       Pupils: Pupils are equal, round, and reactive to light.   Cardiovascular:      Rate and Rhythm: Regular rhythm. Tachycardia present.      Pulses: Normal pulses.      Heart sounds: Normal heart sounds. No murmur heard.  Pulmonary:      Effort: Pulmonary effort is normal. No respiratory distress.      Breath sounds: No stridor. Rales present.   Abdominal:      General: Bowel sounds are normal. There is no distension.      Palpations: Abdomen is soft.      Tenderness: There is no abdominal tenderness.   Musculoskeletal:         General: No swelling or tenderness. Normal range of motion.      Cervical back: Normal range of motion. No rigidity.      Right lower leg: No edema.      Left lower leg: No edema.   Skin:     General: Skin is warm.      Capillary Refill: Capillary refill takes less than 2 seconds.      Coloration: Skin is not jaundiced or pale.      Findings: No bruising.   Neurological:      General: No focal deficit present.      Mental Status: She is alert and oriented to person, place, and time.      Cranial Nerves: No cranial nerve deficit.      Sensory: No sensory deficit.      Comments: She is alert and oriented and able to answer my questions.   Psychiatric:         Mood and Affect: Mood normal.         Behavior: Behavior normal.         Fluids    Intake/Output Summary (Last 24 hours) at 4/29/2024 1441  Last data filed at 4/29/2024 0400  Gross per 24 hour   Intake 475 ml   Output 0 ml   Net 475 ml       Laboratory  Recent Labs     04/27/24  1815 04/29/24  0636   WBC 18.6* 12.5*   RBC 4.47 3.66*   HEMOGLOBIN 13.4 11.1*   HEMATOCRIT 40.0 33.7*   MCV 89.5 92.1   MCH 30.0 30.3   MCHC 33.5 32.9   RDW 46.5 48.1   PLATELETCT 398 336   MPV 11.7 10.8     Recent Labs     04/27/24  2145 04/28/24  0639 04/29/24  0636   SODIUM 141 143 144   POTASSIUM 4.2 4.1 4.0   CHLORIDE 104 105 108   CO2 24 29 24   GLUCOSE 155* 149* 124*   BUN 36* 40* 46*   CREATININE 0.66 0.64 0.64   CALCIUM 8.5 8.5 8.5                    Imaging  CT-CTA CHEST PULMONARY ARTERY W/ RECONS   Final Result      1.  There is no CT evidence of acute pulmonary embolism.   2.  There is right lower lobe airspace disease which could be pneumonitis or atelectasis.   3.  There is right middle lobe and left lower lobe atelectasis.   4.  Patchy groundglass opacities could be related to edema or nonspecific alveolitis.   5.  There are trace amounts of bilateral pleural fluid.            DX-ABDOMEN FOR TUBE PLACEMENT   Final Result      1.  Enteric tube projects over the proximal stomach.      DX-ABDOMEN FOR TUBE PLACEMENT   Final Result         1.  Nonspecific bowel gas pattern in the upper abdomen.   2.  Dobbhoff tube is coiled within the stomach, the tip terminates overlying the expected location of the gastric fundus, recommend partial withdrawal given extent of coiling.   3.  Hazy interstitial pulmonary opacities, consider chronic underlying lung disease versus interstitial edema and/or infiltrates.      DX-CHEST-PORTABLE (1 VIEW)   Final Result         1.  Pulmonary edema and/or infiltrates, slightly increased compared to prior study.      EC-ECHOCARDIOGRAM COMPLETE W/ CONT   Final Result      DX-CHEST-PORTABLE (1 VIEW)   Final Result         1.  Pulmonary edema and/or infiltrates are identified, which are stable since the prior exam.   2.  Small layering bilateral pleural effusion   3.  Cardiomegaly      DX-ABDOMEN FOR TUBE PLACEMENT   Final Result         1.  Air-filled distended loops of bowel are seen in the upper abdomen, appearance suggests ileus or enteritis versus evolving obstructive changes. Recommend radiographic followup to resolution to exclude progression to obstruction.   2.  Nasogastric tube tip terminates overlying the expected location of the pylorus or first duodenal segment.   3.  Pulmonary edema and/or infiltrates.      DX-ABDOMEN FOR TUBE PLACEMENT   Final Result      NG tube tip is in the left upper abdomen but the side-port in the  distal esophagus. Recommend advancing at least 7 cm.      DX-CHEST-LIMITED (1 VIEW)   Final Result      1.  Endotracheal tube tip located 2 cm above the devon.      2.  NG tube extends into the gastric fundus.      3.  Bibasal atelectasis/consolidation.         US-RENAL   Final Result      1.  Limited visualization the right kidney due to obscuration by bowel gas.      2.  Otherwise normal exam.      DX-ABDOMEN FOR TUBE PLACEMENT   Final Result         Gastric drainage tube with tip projecting over the expected area of the stomach. Sidehole is in the GE junction.      CT-HEAD W/O   Final Result         1. No acute intracranial abnormality. No evidence of acute intracranial hemorrhage or mass lesion.                     DX-CHEST-PORTABLE (1 VIEW)   Final Result         Elevation of the right hemidiaphragm and right basilar atelectasis.      DX-CHEST-PORTABLE (1 VIEW)   Final Result      Linear atelectasis within the right lung base.           Assessment/Plan  * COVID-19  Assessment & Plan  Improving oxygen requirement, patient is doing intubational high flow oxygen  She is on 4 L nasal cannula  Discontinue fluid and IV Lasix as needed  Respiratory hygiene  PT and OT placement:     Tachycardia  Assessment & Plan  CTA did not show PE  Continue telemetry  Continue Coreg, increase the dose if needed      Persistent hyperactive delirium due to multiple etiologies  Assessment & Plan  Metabolic encephalopathy due to hypoxia and infection  Avoid sedation medication, avoid benzo or antihistamine  Encouraged the patient to get out of bed  Nonpharmacological treatment  Improved and patient is alert oriented x 4    Acute hypoxemic respiratory failure due to COVID-19 (HCC)  Assessment & Plan  Due to COVID-19 infection  Patient needed intubation  Improving and she is on 4 L nasal cannula  Will order CTA to rule out PE  Discontinue fluid and Lasix as needed  Decadron x 10 days  Pulmonary hygiene  Maintain euvolemia  SpO2 >  90%      Electrolyte depletion  Assessment & Plan  Continue to monitor and replace as needed.    Acute renal failure (ARF) (HCC)  Assessment & Plan  Resolved  Avoid nephrotoxic medications  Labs daily    Septic shock (HCC)- (present on admission)  Assessment & Plan  Secondary to COVID-19  Cultures are NGTD  Now resolved    Major depressive disorder- (present on admission)  Assessment & Plan  Continue home amytriptylene, duloxetine.      SELWYN (obstructive sleep apnea)- (present on admission)  Assessment & Plan  Not on home CPAP.  She will need close outpatient follow-up.    Advance care planning  Assessment & Plan  On April 27, 2024 patient was alert and oriented and she knows why she is here in the hospital.  I discussed CODE STATUS with her that if she does not want any of these interventions including speech therapy evaluation-right and if she would like we can transition her to hospice/comfort care.  I explained to patient and her  at the bedside regarding hospice and comfort care.  After talking to the patient and  patient expressed that she would like to try medications with applesauce.  I evaluated her multiple times throughout the day.  I discussed plan of care with the speech therapy at the bedside with patient and with bedside RN.  Time spent 16 minutes                  VTE prophylaxis:   SCDs/TEDs   enoxaparin ppx      I have performed a physical exam and reviewed and updated ROS and Plan today (4/29/2024). In review of yesterday's note (4/28/2024), there are no changes except as documented above.      Greater than 51 minutes spent prepping to see patient (e.g. review of tests) obtaining and/or reviewing separately obtained history. Performing a medically appropriate examination and/ evaluation.  Counseling and educating the patient/family/caregiver.  Ordering medications, tests, or procedures.  Referring and communicating with other health care professionals.  Documenting clinical information  in EPIC.  Independently interpreting results and communicating results to patient/family/caregiver.  Care coordination

## 2024-04-29 NOTE — DISCHARGE PLANNING
@ 0830: Discussed pt during morning rounds. Per MD, pt is medically cleared. SW asked DPA to f/u with [facility] for bed availability.    SW attempted to met with pt at bedside to discuss DCP. Per prior notes, pt's first choice is [facility]. No second choice obtained. [facility] pt. Per DPA, [facility] has ISO bed available. Pt asked this SW to come back later as she is eating.    @ 1350: SW met with pt at bedside to discuss DCP; SW explained that pt's first choice has no bed availability today. MD stated pt is cleared. Per pt, she does not want to go to [facility]. SW presented pt with IMM and explained pt has the right to appeal her dc. Per pt, if she is to dc today, she would like to appeal. YOSEF faxed completed IMM to DPA.    YOSEF asked MD to place dc order and dc summary for pt to appeal dc.    YOSEF pre-emptively set up transport for pt tomorrow to go to [facility]. Notified MD CELESITNE, pt, and pt's spouse. [phone] transport.

## 2024-04-29 NOTE — DIETARY
Nutrition Update:    Day 9 of admit.  Tereza Sánchez is a 78 y.o. female with admitting DX of Septic shock (HCC) [A41.9, R65.21].  Patient being followed to optimize nutrition.    Pt currently on enhanced droplet, contact, eye protection isolation for +COVID-29. RD not entering enhanced droplet isolation per department policy.     Current Diet: Level 4 - pureed, Level 2 - mildly thick fluids, 1:1 supervision by nursing. Pt had NGT w/ TF started 4/25. However, NGT clogged and removed late evening of 4/26. PO diet in place since 4/27, refused x3 meals on 4/27. Nursing team unable to report on yesterday's PO intake, but report breakfast intake was <25% and lunch declined by pt today. Due to inadequate oral intake, RD to trial thick high-kcal supplements TID w/ meals.    Problem: Nutritional:  Goal: Achieve adequate nutritional intake  Description: Patient will consume >50% of meals and supplements  Outcome: Progressing slower than expected    RD continues to follow.

## 2024-04-29 NOTE — CARE PLAN
Problem: Knowledge Deficit - Standard  Goal: Patient and family/care givers will demonstrate understanding of plan of care, disease process/condition, diagnostic tests and medications  Outcome: Progressing     Problem: Skin Integrity  Goal: Skin integrity is maintained or improved  Outcome: Progressing     Problem: Fall Risk  Goal: Patient will remain free from falls  Outcome: Progressing     Problem: Psychosocial  Goal: Patient's level of anxiety will decrease  Outcome: Progressing  Goal: Patient's ability to verbalize feelings about condition will improve  Outcome: Progressing  Goal: Patient's ability to re-evaluate and adapt role responsibilities will improve  Outcome: Progressing  Goal: Patient and family will demonstrate ability to cope with life altering diagnosis and/or procedure  Outcome: Progressing  Goal: Spiritual and cultural needs incorporated into hospitalization  Outcome: Progressing     Problem: Hemodynamics  Goal: Patient's hemodynamics, fluid balance and neurologic status will be stable or improve  Outcome: Progressing     Problem: Urinary Elimination  Goal: Establish and maintain regular urinary output  Outcome: Progressing     Problem: Bowel Elimination  Goal: Establish and maintain regular bowel function  Outcome: Progressing     Problem: Pain - Standard  Goal: Alleviation of pain or a reduction in pain to the patient’s comfort goal  Outcome: Progressing   The patient is Stable - Low risk of patient condition declining or worsening    Shift Goals  Clinical Goals: vitals wnl, monitor hr, rest, safety  Patient Goals: comfort  Family Goals: erich    Progress made toward(s) clinical / shift goals:      Patient is not progressing towards the following goals:

## 2024-04-29 NOTE — CARE PLAN
The patient is Stable - Low risk of patient condition declining or worsening    Shift Goals  Clinical Goals: hemodynamic stability & adequate oxygenation  Patient Goals: sleep  Family Goals: NAHUM    Progress made toward(s) clinical / shift goals:    Problem: Fall Risk  Goal: Patient will remain free from falls  Outcome: Progressing  Educated pt on utilizing call light for needing to get up. Assisted pt with getting up to the bathroom x2 assist and pt remained free from falls.    Problem: Bowel Elimination  Goal: Establish and maintain regular bowel function  Outcome: Progressing  Assisted pt up to the bathroom 2x throughout shift. Each time she passed stool.     Problem: Pain - Standard  Goal: Alleviation of pain or a reduction in pain to the patient’s comfort goal  Outcome: Progressing  Assessed pt's pain level at beginning of shift which was 0/10. Pt rested comfortably, no interventions were needed.       Patient is not progressing towards the following goals:

## 2024-04-29 NOTE — DISCHARGE PLANNING
HTH/SCP TCN chart review completed. Collaborated with YAKOV Solis. Discussed current discharge considerations noted to post-acute placement at SNF level of care. Appreciate CM following for facility acceptance and patient choice for SNF at this time.    TCN will continue to follow and collaborate with discharge planning team as additional post acute needs arise. Thank you.    Completed  PT/OT with recommendations for post-acute placement on 4/29/2024  Choice obtained: HH ([facility]), DME ([facility]). & SNF( [facility]).  Appreciate CM following for patient choice of dc facility at this time

## 2024-04-29 NOTE — DISCHARGE PLANNING
DC Transport Scheduled    Transport Company Scheduled:  [facility]    Scheduled Date: 4/30/2024  Scheduled Time: [id number]    Destination: [facility]   [address] [facility] [place]    Notified care team of scheduled transport via [employer].     If there are any changes needed to the DC transportation scheduled, please contact [facility] at ext. [phone] between the hours of [phone] Mon-Fri. If outside those hours, contact the ED Case Manager at ext. [phone].

## 2024-04-29 NOTE — THERAPY
Physical Therapy   Daily Treatment     Patient Name: Tereza Sánchez  Age:  78 y.o., Sex:  female  Medical Record #: [id number]  Today's Date: 4/29/2024     Precautions  Precautions: (P) Fall Risk;Swallow Precautions  Comments: (P) Droplet, contact and eye protection.    Assessment    Pt seen for PT treatment including BLE exercises, sit to stands and standing balance with FWW. Please see below for status and progression towards goals. Recommend post acute placement at this time.     Plan    Treatment Plan Status: (P) Continue Current Treatment Plan  Type of Treatment: Bed Mobility, Equipment, Family / Caregiver Training, Gait Training, Manual Therapy, Neuro Re-Education / Balance, Self Care / Home Evaluation, Therapeutic Activities, Stair Training, Therapeutic Exercise  Treatment Frequency: 4 Times per Week  Treatment Duration: Until Therapy Goals Met    DC Equipment Recommendations: (P) Unable to determine at this time  Discharge Recommendations: (P) Recommend post-acute placement for additional physical therapy services prior to discharge home        Precautions   Precautions Fall Risk;Swallow Precautions   Comments Droplet, contact and eye protection.   Pain 0 - 10 Group   Location Hip;Back   Location Orientation Right   Description Aching   Therapist Pain Assessment During Activity;6   Cognition    Level of Consciousness Alert   Comments pleasant and cooperative.   Lower Body Muscle Tone   Lower Body Muscle Tone  WDL   Sitting Lower Body Exercises   Sitting Lower Body Exercises Yes   Ankle Pumps 2 sets of 10;Bilateral   Long Arc Quad 2 sets of 10;Bilateral   Marching 2 sets of 10;Reciprocal   Sit to Stand   (x3)   Neuro-Muscular Treatments   Neuro-Muscular Treatments Anterior weight shift;Tactile Cuing;Weight Shift Right;Weight Shift Left;Postural Facilitation   Other Treatments   Other Treatments Provided pt expressing fear of falling due to right knee buckling this am when ambulating from bathroom to bed. Pt  reports no injury. Pt states she has a right knee brace, recommend pt use knee brace in post acute setting until stronger.   Balance   Sitting Balance (Static) Fair +   Sitting Balance (Dynamic) Fair   Standing Balance (Static) Fair   Standing Balance (Dynamic) Fair -   Weight Shift Sitting Fair   Weight Shift Standing Poor   Skilled Intervention Tactile Cuing;Postural Facilitation   Comments w/FWW ( FWW height adjusted lower )   Bed Mobility    Comments up in chair pre and post session.   Gait Analysis   Gait Level Of Assist Unable to Participate   Comments pt agreeable to sit to stand only. fearful of falling. Heavy reliance on FWW in standing. Recommend knee brace next session.   Functional Mobility   Sit to Stand Moderate Assist   Bed, Chair, Wheelchair Transfer Moderate Assist   Transfer Method Stand Step   Comments w/FWW   6 Clicks Assessment - How much HELP from another person do you currently need... (If the patient hasn't done an activity recently, how much help from another person do you think he/she would need if he/she tried?)   Turning from your back to your side while in a flat bed without using bedrails? 2   Moving from lying on your back to sitting on the side of a flat bed without using bedrails? 2   Moving to and from a bed to a chair (including a wheelchair)? 2   Standing up from a chair using your arms (e.g., wheelchair, or bedside chair)? 2   Walking in hospital room? 2   Climbing 3-5 steps with a railing? 1   6 clicks Mobility Score 11   Short Term Goals    Short Term Goal # 1 Patient will move supine <> sitting EOB from flat bed without bed rail with min A within 6tx in order to get in/out of bed   Goal Outcome # 1 Progressing slower than expected   Short Term Goal # 2 Patient will move sitting <> standing with LRAD and min A within 6tx in order to initiate transfers and gait   Goal Outcome # 2 Progressing as expected   Short Term Goal # 3 Patient will ambulate 15ft with LRAD and min A within  6tx in order to access environment   Goal Outcome # 3 Progressing slower than expected   Short Term Goal # 4 Patient will ascend/descend 1 step with unilateral UE support and min A within 6tx in order to enter/exit home   Goal Outcome # 4 Goal not met   Education Group   Role of Physical Therapist Patient Response Patient;Acceptance;Explanation;Verbal Demonstration   Use of Assistive Device Patient Response Patient;Acceptance;Explanation;Demonstration;Reinforcement Needed   Exercises - Seated Patient Response Patient;Acceptance;Explanation;Demonstration;Reinforcement Needed   Physical Therapy Treatment Plan   Physical Therapy Treatment Plan Continue Current Treatment Plan   Anticipated Discharge Equipment and Recommendations   DC Equipment Recommendations Unable to determine at this time   Discharge Recommendations Recommend post-acute placement for additional physical therapy services prior to discharge home   Interdisciplinary Plan of Care Collaboration   IDT Collaboration with  Family / Caregiver;Nursing   Patient Position at End of Therapy In Bed;Call Light within Reach;Tray Table within Reach;Phone within Reach   Collaboration Comments Staff updated   Session Information   Date / Session Number  4/29-2 ( 2/4, 5/1)

## 2024-04-29 NOTE — PROGRESS NOTES
Monitor Summary  Rhythm: ST/SR   Rate:   Ectopy: increase to 235 when ambulating  Measurements: 0.13/0.06/0.31                        ---12 hr Chart Review---

## 2024-04-29 NOTE — DISCHARGE PLANNING
0932  Agency/Facility Name: [facility]   Spoke To: Fernando   Outcome: KIT called on bed availability, per Fernando bed is not available as of yet but Fernando will call KIT back later this morning if there is an open bed today.     NUHA Solis notified.

## 2024-04-29 NOTE — THERAPY
Occupational Therapy  Daily Treatment     Patient Name: Tereza Sánchez  Age:  78 y.o., Sex:  female  Medical Record #: [id number]  Today's Date: 4/29/2024     Precautions  Precautions: Fall Risk, Swallow Precautions    Assessment    Pt seen for OT treatment. Pt required max A for bed mobility, mod A to stand/perform ADL transfer, max A for seated g/h, and max A to don/doff socks. Per RN, pt had an assisted fall on her way to the bathroom this date. Per RN, no imaging to be completed at this time. Pt reported no pain throughout session. Pt fearful of falling but motivated to work with therapy. Therapeutic listening provided throughout session and additional support provided as needed to aid in increasing pt's confidence again. Pt noted to have limitations to ROM in B UE. Pt and spouse educated regarding importance of AROM/SROM/PROM to prevention contracture/maintain joint health and pt provided with rolled washcloths to prevent flexion contracture to digits. Pt current functional performance limited by impaired activity tolerance, impaired balance, generalized weakness, impaired cognition, fear of falling, and impaired UE ROM. Pt will continue to benefit from skilled OT while admitted to acute care.     Plan    Treatment Plan Status: Continue Current Treatment Plan  Type of Treatment: Self Care / Activities of Daily Living, Adaptive Equipment, Manual Therapy Techniques, Neuro Re-Education / Balance, Therapeutic Exercises, Therapeutic Activity  Treatment Frequency: 3 Times per Week  Treatment Duration: Until Therapy Goals Met    DC Equipment Recommendations: Unable to determine at this time  Discharge Recommendations: Recommend post-acute placement for additional occupational therapy services prior to discharge home     Objective     04/29/24 1048   Treatment Charges   Charges Yes   Total Time Spent   OT Time Spent Yes   OT Self Care / ADL (Minutes) 20   OT Total Time Spent (Calculated) 20    Services   Is  "patient using  services for this encounter? No   Precautions   Precautions Fall Risk;Swallow Precautions   Vitals   Vitals Comments BP checked sitting and standing and remained stable   Pain   Pain Scales 0 to 10 Scale    Pain 0 - 10 Group   Therapist Pain Assessment Post Activity Pain Same as Prior to Activity;Nurse Notified  (not rated, agreeable to session)   Cognition    Cognition / Consciousness X   Speech/ Communication Delayed Responses   Level of Consciousness Alert   Attention Impaired   Initiation Impaired   Comments pleasant, cooperative, flat affect, demonstrated fear of falling (per RN, pt had an assisted fall with x2 assist to the floor on the way to the bathroom this morning which scared pt but she was still motivated to participate)   Sitting Upper Body Exercises   Comments Discussed AROM and PROM for UE. Pt's digits on B UE noted to have flexion contractures starting. Discussed with pt/spouse the importance of daily stretching and provided pt with washcloth rolls to help prevent full contractures. With PROM, pt unable to achieve full extension in digits. Limitations noted to other UE ROM as well in shoulder and elbow ROM.   Other Treatments   Other Treatments Provided Per RN, pt had an assisted fall on the way to the bathroom this morning. Therapeutic listening provided to pt and her spouse. Pt described that her R leg \"went out\" during the event. Pt reported fear of falling but was still motivated to work with therapy. Pt reported no new pain after the fall and RN reported no new imaging will be completed at this time.  (Distance of activity shortened this date and additional time/support provided to help pt work on building confidence again.)   Balance   Sitting Balance (Static) Fair -   Sitting Balance (Dynamic) Fair -   Standing Balance (Static) Trace +   Standing Balance (Dynamic) Trace   Weight Shift Sitting Fair   Weight Shift Standing Poor   Skilled Intervention Verbal " Cuing;Tactile Cuing;Sequencing;Postural Facilitation;Facilitation   Comments w/ FWW and HHA x2 for safety   Bed Mobility    Supine to Sit Maximal Assist   Sit to Supine   (up to chair post)   Scooting Maximal Assist   Rolling Minimal Assist to Rt.   Skilled Intervention Verbal Cuing;Sequencing;Tactile Cuing;Facilitation   Comments x2 assist for safety   Activities of Daily Living   Grooming Maximal Assist;Seated   Lower Body Dressing Maximal Assist  (don socks)   Toileting   (declined the need)   Skilled Intervention Verbal Cuing;Facilitation   Functional Mobility   Sit to Stand Moderate Assist   Bed, Chair, Wheelchair Transfer Moderate Assist   Transfer Method Stand Step   Mobility EOB>stand for BP>EOB>chair   Skilled Intervention Tactile Cuing;Verbal Cuing;Facilitation;Sequencing;Postural Facilitation   Comments w/ FWW and HHA x2   Visual Perception   Visual Perception  Not Tested   Activity Tolerance   Sitting in Chair up to chair post   Sitting Edge of Bed >5 min   Standing <2 min total   Comments limited by weakness, fear of falling   Patient / Family Goals   Patient / Family Goal #1 to go home   Goal #1 Outcome Progressing as expected   Short Term Goals   Short Term Goal # 1 Pt will complete ADL transfers with modA   Goal Outcome # 1 Goal met, new goal added   Short Term Goal # 1 B  Pt will complete ADL transfers w/ min A   Short Term Goal # 2 Pt will complete LB dressing with modA   Goal Outcome # 2 Progressing slower than expected   Short Term Goal # 3 Pt will complete toileting with modA   Goal Outcome # 3 Goal not met   Short Term Goal # 4 Pt/spouse will participate in HEP for ROM to B UE w/ provided handout to maintain functional use of UE   Education Group   Education Provided Role of Occupational Therapist;Activities of Daily Living;Pathology of bedrest;Transfers   Role of Occupational Therapist Patient Response Patient;Acceptance;Explanation;Verbal Demonstration   Transfers Patient Response  Patient;Acceptance;Explanation;Demonstration;Verbal Demonstration;Action Demonstration   ADL Patient Response Patient;Acceptance;Explanation;Verbal Demonstration;Demonstration;Action Demonstration   Pathology of Bedrest Patient Response Patient;Acceptance;Explanation;Verbal Demonstration

## 2024-04-30 VITALS
HEART RATE: 110 BPM | BODY MASS INDEX: 33.72 KG/M2 | WEIGHT: 171.74 LBS | RESPIRATION RATE: 18 BRPM | SYSTOLIC BLOOD PRESSURE: 97 MMHG | HEIGHT: 60 IN | DIASTOLIC BLOOD PRESSURE: 78 MMHG | OXYGEN SATURATION: 92 % | TEMPERATURE: 97.5 F

## 2024-04-30 LAB — MAGNESIUM SERPL-MCNC: 2.2 MG/DL (ref 1.5–2.5)

## 2024-04-30 PROCEDURE — 700111 HCHG RX REV CODE 636 W/ 250 OVERRIDE (IP): Mod: JZ | Performed by: INTERNAL MEDICINE

## 2024-04-30 PROCEDURE — A9270 NON-COVERED ITEM OR SERVICE: HCPCS | Performed by: INTERNAL MEDICINE

## 2024-04-30 PROCEDURE — 99239 HOSP IP/OBS DSCHRG MGMT >30: CPT | Performed by: STUDENT IN AN ORGANIZED HEALTH CARE EDUCATION/TRAINING PROGRAM

## 2024-04-30 PROCEDURE — 700102 HCHG RX REV CODE 250 W/ 637 OVERRIDE(OP): Performed by: INTERNAL MEDICINE

## 2024-04-30 RX ORDER — CARVEDILOL 6.25 MG/1
6.25 TABLET ORAL 2 TIMES DAILY WITH MEALS
Qty: 60 TABLET | Refills: 1 | Status: ON HOLD | OUTPATIENT
Start: 2024-04-30 | End: 2024-05-10

## 2024-04-30 RX ADMIN — LISINOPRIL 5 MG: 5 TABLET ORAL at 06:29

## 2024-04-30 RX ADMIN — ACETAMINOPHEN 650 MG: 325 TABLET ORAL at 11:12

## 2024-04-30 RX ADMIN — ASPIRIN 81 MG 81 MG: 81 TABLET ORAL at 06:29

## 2024-04-30 RX ADMIN — FUROSEMIDE 40 MG: 10 INJECTION INTRAMUSCULAR; INTRAVENOUS at 06:29

## 2024-04-30 RX ADMIN — POLYETHYLENE GLYCOL 3350 1 PACKET: 17 POWDER, FOR SOLUTION ORAL at 06:28

## 2024-04-30 RX ADMIN — OMEPRAZOLE 20 MG: 20 CAPSULE, DELAYED RELEASE ORAL at 06:28

## 2024-04-30 RX ADMIN — DULOXETINE HYDROCHLORIDE 30 MG: 30 CAPSULE, DELAYED RELEASE ORAL at 06:29

## 2024-04-30 RX ADMIN — CARVEDILOL 6.25 MG: 6.25 TABLET, FILM COATED ORAL at 08:38

## 2024-04-30 RX ADMIN — PREGABALIN 150 MG: 150 CAPSULE ORAL at 06:29

## 2024-04-30 RX ADMIN — SENNOSIDES AND DOCUSATE SODIUM 2 TABLET: 50; 8.6 TABLET ORAL at 06:28

## 2024-04-30 ASSESSMENT — FIBROSIS 4 INDEX: FIB4 SCORE: 0.74

## 2024-04-30 ASSESSMENT — PAIN DESCRIPTION - PAIN TYPE
TYPE: ACUTE PAIN
TYPE: CHRONIC PAIN

## 2024-04-30 NOTE — DISCHARGE SUMMARY
Discharge Summary    CHIEF COMPLAINT ON ADMISSION  Chief Complaint   Patient presents with    Syncope    Hypotension    Weakness       Reason for Admission  EMS    Admission Date  4/20/2024     CODE STATUS  Full Code    HPI & HOSPITAL COURSE  78-year-old female with past medical history of hypertension, SELWYN, chiari malformation, history of UTI, history of multiple falls who presented 4/26 to the hospital with complaint of weakness dysuria for several weeks.  She also reported cough On 4/20/2024.  Patient found to have acute hypoxemic respiratory failure secondary to COVID-19 virus infection.  She was also diagnosed with septic shock and she was placed on Levophed for Target MAP of greater than 65 mmHg.  She also found to have acute renal failure.  Her kidney function was improved and back to baseline 0.6.      During the hospitalization patient needed Precedex drip.  Also patient developed tachycardia start with Coreg 3.125 mg twice daily, later was increased for tachycardia and improving hypertension.     She was intubated on April 20, 2024 and she was extubated on April 22, 2024 and she was placed on high flow nasal cannula.  Patient was improved and transferred out of the ICU, patient is on nasal cannula 4 L nasal cannula.  CTA was done and did not show any signs of PE.  More alert and oriented.   Goals of care were discussed with the patient and patient  many times and she wants to be full code.  Oxygen weaned down to 2.5, HR in last 24 hours from 80's to 110's, RR normal, SBP from .  PT/OT recommending SNF     Therefore, she is discharged in fair and stable condition to skilled nursing facility.    The patient met 2-midnight criteria for an inpatient stay at the time of discharge.      FOLLOW UP ITEMS POST DISCHARGE  Follow up with SNF doctor and PCP for intermittent tachycardia, started on Coreg in the hospital following resolution of sepsis/hypotension.  Will need repeat CBC.CMP with PCP in 1-2  weeks as well as a visit for post hospital discharge follow up.    DISCHARGE DIAGNOSES  Principal Problem:    COVID-19 (POA: Unknown)  Active Problems:    SELWYN (obstructive sleep apnea) (POA: Yes)      Overview: Chronic, controlled.      Was told she doesn't need a CPAP/BiPAP.      Just does oxygen at night.      Has not seen pulmonology in a few years.    Major depressive disorder (POA: Yes)      Overview: Chronic, controlled.      Tolerating duloxetine 30mg daily for the last few years.       No therapy. No pyschiatry.          Septic shock (HCC) (POA: Yes)    Acute renal failure (ARF) (HCC) (POA: Unknown)    Acute hypoxemic respiratory failure due to COVID-19 (HCC) (POA: Unknown)    Electrolyte depletion (POA: Unknown)    Persistent hyperactive delirium due to multiple etiologies (POA: Unknown)    Tachycardia (POA: Unknown)    Advance care planning (POA: Unknown)  Resolved Problems:    * No resolved hospital problems. *      FOLLOW UP  Future Appointments   Date Time Provider Department Center   5/22/2024  9:30 AM ERWIN Rowley None   6/5/2024  7:00 AM JOHN Cotto [facility]     [facility] [address] [id number] [phone]          MEDICATIONS ON DISCHARGE     Medication List        START taking these medications        Instructions   carvedilol 6.25 MG Tabs  Commonly known as: Coreg   Take 1 Tablet by mouth 2 times a day with meals.  Dose: 6.25 mg            CONTINUE taking these medications        Instructions   alendronate 70 MG Tabs  Commonly known as: Fosamax   Take 70 mg by mouth every Monday. Indications: Osteoporosis  Dose: 70 mg     amitriptyline 100 MG Tabs  Commonly known as: Elavil   Take 100 mg by mouth every evening.  Dose: 100 mg     aspirin 81 MG EC tablet   Take 81 mg by mouth every day.  Dose: 81 mg     atorvastatin 10 MG Tabs  Commonly known as: Lipitor   TAKE 1 TABLET BY MOUTH EVERY EVENING. CHOLESTEROL  Dose: 10  mg     B Complex-Vitamin C Caps   Take 1 Capsule by mouth every day.  Dose: 1 Capsule     baclofen 5 MG Tabs  Commonly known as: Lioresal   Take 5 mg by mouth 2 times a day as needed (muscle spasm).  Dose: 5 mg     benazepril-hydrochlorthiazide 20-12.5 MG per tablet  Commonly known as: Lotensin HCT   TAKE 1 TABLET BY MOUTH EVERY DAY. BLOOD PRESSURE  Dose: 1 Tablet     Calcium + Vitamin D3 600-10 MG-MCG Tabs  Generic drug: Calcium Carb-Cholecalciferol   Take 1 Tablet by mouth every day.  Dose: 1 Tablet     DULoxetine 30 MG Cpep  Commonly known as: Cymbalta   Take 30 mg by mouth every day.  Dose: 30 mg     esomeprazole 20 MG capsule  Commonly known as: NexIUM   Doctor's comments: Pt to make appt prior to more refills.  TAKE 1 CAPSULE BY MOUTH EVERY DAY IN THE MORNING BEFORE BREAKFAST     ferrous sulfate 325 (65 Fe) MG tablet   TAKE 1 TABLET BY MOUTH EVERY DAY  Dose: 325 mg     furosemide 20 MG Tabs  Commonly known as: Lasix   Take 20 mg by mouth 1 time a day as needed. Indications: Edema  Dose: 20 mg     HYDROcodone-acetaminophen 5-325 MG Tabs per tablet  Commonly known as: Norco   TAKE 1 TABLET BY MOUTH 3 TIMES A DAY FOR 30 DAYS, 724.02     Krill & Fish Oil Blend Caps   Take 1 Caplet by mouth every day.  Dose: 1 Caplet     meclizine 12.5 MG Tabs  Commonly known as: Antivert   Take 1 Tablet by mouth 3 times a day.  Dose: 12.5 mg     nystatin powder  Commonly known as: Mycostatin   Apply 1 Application topically 2 times a day as needed.     Apply to rash  Dose: 1 Application     olopatadine 0.1 % ophthalmic solution  Commonly known as: Patanol   Administer 1 Drop into both eyes 2 times a day as needed for Allergies.  Dose: 1 Drop     ondansetron 4 MG Tabs tablet  Commonly known as: Zofran   Take 4 mg by mouth every four hours as needed for Nausea/Vomiting.  Dose: 4 mg     potassium chloride SA 20 MEQ Tbcr  Commonly known as: Kdur   Take 1 Tablet by mouth 1 time a day as needed (whenever you take lasix). * take with  furosemide*  Dose: 20 mEq     pregabalin 150 MG Caps  Commonly known as: Lyrica   Take 150 mg by mouth 2 times a day.  Dose: 150 mg     QUEtiapine 25 MG Tabs  Commonly known as: SEROquel   Take 25 mg by mouth at bedtime.  Dose: 25 mg     Sennosides 25 MG Tabs   Take 1 Tablet by mouth 2 times a day as needed (constipation).  Dose: 1 Tablet     tamsulosin 0.4 MG capsule  Commonly known as: Flomax   Take 1 Capsule by mouth 1/2 hour after breakfast.  Dose: 0.4 mg     Vitamin C 1000 MG Tabs   Take 1,000 mg by mouth every day.  Dose: 1,000 mg     Zinc 50 MG Tabs   Take 50 mg by mouth every day.  Dose: 50 mg     zonisamide 100 MG Caps  Commonly known as: Zonegran   100 mg at bedtime.  Dose: 100 mg            STOP taking these medications      atenolol 50 MG Tabs  Commonly known as: Tenormin              Allergies  Allergies   Allergen Reactions    Morphine Vomiting     hallucinations    Sulfamethoxazole W-Trimethoprim Rash     * full body rash*>  10 years ago       DIET  Orders Placed This Encounter   Procedures    Diet Order Diet: Level 4 - Pureed; Liquid level: Level 2 - Mildly Thick; Tray Modifications (optional): SLP - 1:1 Supervision by Nursing     Standing Status:   Standing     Number of Occurrences:   1     Order Specific Question:   Diet:     Answer:   Level 4 - Pureed [25]     Order Specific Question:   Liquid level     Answer:   Level 2 - Mildly Thick     Order Specific Question:   Tray Modifications (optional)     Answer:   SLP - 1:1 Supervision by Nursing       ACTIVITY  As tolerated.  Weight bearing as tolerated    LINES, DRAINS, AND WOUNDS  This is an automated list. Peripheral IVs will be removed prior to discharge.  Peripheral IV 04/29/24 20 G Anterior;Right Forearm (Active)   Site Assessment Clean;Dry;Intact 04/29/24 2130   Dressing Type Transparent 04/29/24 2130   Line Status Flushed;Blood return noted;Saline locked 04/29/24 2130   Dressing Status Clean;Dry;Intact 04/29/24 2130   Dressing Intervention  N/A 04/29/24 0800   Infiltration Grading ([facility], [facility]) 0 04/29/24 2130   Phlebitis Scale ([facility] Only) 0 04/29/24 2130       Moisture Associated Skin Damage 04/25/24 Breast;Buttock (Active)   Wound Image    04/25/24 0900   NEXT Weekly Photo (Inpatient Only) 05/02/24 04/25/24 0900   Drainage Amount None 04/29/24 2130   Drainage Description Serous 04/27/24 0900   Periwound Assessment Dry;Intact 04/27/24 0900   IAD Cleansing Soap and Water 04/28/24 0000   Periwound Protectant Not Applicable 04/26/24 2300   IAD Containment Device None 04/26/24 2300   WOUND NURSE ONLY - Time Spent with Patient (mins) 30 04/25/24 0900       Wound 04/24/24 Skin Tear Arm Distal Left (Active)   Wound Image   04/24/24 2000   Site Assessment Clean;Dry;Intact 04/28/24 2000   Periwound Assessment [place] 04/28/24 2000   Margins Defined edges 04/29/24 2130   Closure Open to air 04/29/24 2130   Drainage Amount None 04/28/24 2000   Drainage Description Sanguineous 04/25/24 0800   Treatments Site care;Offloading 04/27/24 2000   Offloading/DME Other (comment) 04/25/24 0800   Wound Cleansing Soap and Water 04/27/24 2000   Dressing Status Open to Air 04/28/24 2000   Dressing Changed Observed 04/28/24 2000       Peripheral IV 04/29/24 20 G Anterior;Right Forearm (Active)   Site Assessment Clean;Dry;Intact 04/29/24 2130   Dressing Type Transparent 04/29/24 2130   Line Status Flushed;Blood return noted;Saline locked 04/29/24 2130   Dressing Status Clean;Dry;Intact 04/29/24 2130   Dressing Intervention N/A 04/29/24 0800   Infiltration Grading ([facility]) 0 04/29/24 2130   Phlebitis Scale ([facility] Only) 0 04/29/24 2130                 CONSULTATIONS  Pulmonary, PM&R, Palliative, critical care    PROCEDURES  Intubation 4/20/2024-4/22/2024    LABORATORY  Lab Results   Component Value Date    SODIUM 144 04/29/2024    POTASSIUM 4.0 04/29/2024    CHLORIDE 108 04/29/2024    CO2 24 04/29/2024    GLUCOSE 124 (H) 04/29/2024    BUN 46 (H) 04/29/2024    CREATININE 0.64  04/29/2024    CREATININE 1.0 05/20/2008        Lab Results   Component Value Date    WBC 12.5 (H) 04/29/2024    HEMOGLOBIN 11.1 (L) 04/29/2024    HEMATOCRIT 33.7 (L) 04/29/2024    PLATELETCT 336 04/29/2024        Total time of the discharge process exceeds 35 minutes.

## 2024-04-30 NOTE — DISCHARGE PLANNING
HTH/SCP TCN chart review completed. Per review, noted patient is an anticipated discharge to [facility], today.     As SCP auth complete for anticipated discharge today, no further acute TCN needs anticipated in patient discharge planning at this time. Thank you.     Completed  PT/OT with recommendations for post-acute placement on 4/29/2024  Choice obtained: HH ([facility]), DME ([facility]). & SNF( [facility]) with anticipated discharge noted to [facility], today

## 2024-04-30 NOTE — CARE PLAN
The patient is Watcher - Medium risk of patient condition declining or worsening    Shift Goals  Clinical Goals: PT/OT, improved resp status  Patient Goals: comfort  Family Goals: erich    Progress made toward(s) clinical / shift goals:      Patient is not progressing towards the following goals:    Problem: Knowledge Deficit - Standard  Goal: Patient and family/care givers will demonstrate understanding of plan of care, disease process/condition, diagnostic tests and medications  Outcome: Progressing     Problem: Skin Integrity  Goal: Skin integrity is maintained or improved  Outcome: Progressing     Problem: Fall Risk  Goal: Patient will remain free from falls  Outcome: Progressing     Problem: Psychosocial  Goal: Patient's level of anxiety will decrease  Outcome: Progressing  Goal: Patient's ability to verbalize feelings about condition will improve  Outcome: Progressing  Goal: Patient's ability to re-evaluate and adapt role responsibilities will improve  Outcome: Progressing  Goal: Patient and family will demonstrate ability to cope with life altering diagnosis and/or procedure  Outcome: Progressing  Goal: Spiritual and cultural needs incorporated into hospitalization  Outcome: Progressing     Problem: Hemodynamics  Goal: Patient's hemodynamics, fluid balance and neurologic status will be stable or improve  Outcome: Progressing     Problem: Urinary Elimination  Goal: Establish and maintain regular urinary output  Outcome: Progressing     Problem: Bowel Elimination  Goal: Establish and maintain regular bowel function  Outcome: Progressing     Problem: Pain - Standard  Goal: Alleviation of pain or a reduction in pain to the patient’s comfort goal  Outcome: Progressing

## 2024-04-30 NOTE — CARE PLAN
The patient is Stable - Low risk of patient condition declining or worsening    Shift Goals  Clinical Goals: discharge  Patient Goals: discharge  Family Goals: erich    Progress made toward(s) clinical / shift goals:      Patient is not progressing towards the following goals:      Problem: Knowledge Deficit - Standard  Goal: Patient and family/care givers will demonstrate understanding of plan of care, disease process/condition, diagnostic tests and medications  Outcome: Met     Problem: Skin Integrity  Goal: Skin integrity is maintained or improved  Outcome: Met     Problem: Fall Risk  Goal: Patient will remain free from falls  Outcome: Met     Problem: Psychosocial  Goal: Patient's level of anxiety will decrease  Outcome: Met  Goal: Patient's ability to verbalize feelings about condition will improve  Outcome: Met  Goal: Patient's ability to re-evaluate and adapt role responsibilities will improve  Outcome: Met  Goal: Patient and family will demonstrate ability to cope with life altering diagnosis and/or procedure  Outcome: Met  Goal: Spiritual and cultural needs incorporated into hospitalization  Outcome: Met     Problem: Hemodynamics  Goal: Patient's hemodynamics, fluid balance and neurologic status will be stable or improve  Outcome: Met     Problem: Urinary Elimination  Goal: Establish and maintain regular urinary output  Outcome: Met     Problem: Bowel Elimination  Goal: Establish and maintain regular bowel function  Outcome: Met     Problem: Pain - Standard  Goal: Alleviation of pain or a reduction in pain to the patient’s comfort goal  Outcome: Met

## 2024-04-30 NOTE — CARE PLAN
The patient is Stable - Low risk of patient condition declining or worsening    Shift Goals  Clinical Goals: d/c tm  Patient Goals: comfort  Family Goals: erich    Progress made toward(s) clinical / shift goals:    Problem: Knowledge Deficit - Standard  Goal: Patient and family/care givers will demonstrate understanding of plan of care, disease process/condition, diagnostic tests and medications  Description: Target End Date:  1-3 days or as soon as patient condition allows    Document in Patient Education    1.  Patient and family/caregiver oriented to unit, equipment, visitation policy and means for communicating concern  2.  Complete/review Learning Assessment  3.  Assess knowledge level of disease process/condition, treatment plan, diagnostic tests and medications  4.  Explain disease process/condition, treatment plan, diagnostic tests and medications  Outcome: Progressing     Problem: Skin Integrity  Goal: Skin integrity is maintained or improved  Description: Target End Date:  Prior to discharge or change in level of care    Document interventions on Skin Risk/Logan flowsheet groups and corresponding LDA    1.  Assess and monitor skin integrity, appearance and/or temperature  2.  Assess risk factors for impaired skin integrity and/or pressures ulcers  3.  Implement precautions to protect skin integrity in collaboration with interdisciplinary team  4.  Implement pressure ulcer prevention protocol if at risk for skin breakdown  5.  Confirm wound care consult if at risk for skin breakdown  6.  Ensure patient use of pressure relieving devices  (Low air loss bed, waffle overlay, heel protectors, ROHO cushion, etc)  Outcome: Progressing     Problem: Fall Risk  Goal: Patient will remain free from falls  Description: Target End Date:  Prior to discharge or change in level of care    Document interventions on the Ana Reyes Fall Risk Assessment    1.  Assess for fall risk factors  2.  Implement fall  precautions  Outcome: Progressing     Problem: Hemodynamics  Goal: Patient's hemodynamics, fluid balance and neurologic status will be stable or improve  Description: Target End Date:  Prior to discharge or change in level of care    Document on Assessment and I/O flowsheet templates    1.  Monitor vital signs, pulse oximetry and cardiac monitor per provider order and/or policy  2.  Maintain blood pressure per provider order  3.  Hemodynamic monitoring per provider order  4.  Manage IV fluids and IV infusions  5.  Monitor intake and output  6.  Daily weights per unit policy or provider order  7.  Assess peripheral pulses and capillary refill  8.  Assess color and body temperature  9.  Position patient for maximum circulation/cardiac output  10. Monitor for signs/symptoms of excessive bleeding  11. Assess mental status, restlessness and changes in level of consciousness  12. Monitor temperature and report fever or hypothermia to provider immediately. Consideration of targeted temperature management.  Outcome: Progressing     Problem: Urinary Elimination  Goal: Establish and maintain regular urinary output  Description: Target End Date:  Prior to discharge or change in level of care    Document on I/O and Assessment flowsheets    1.  Evaluate need to continue indwelling catheter every shift  2.  Assess signs and symptoms of urinary retention  3.  Assess post-void residual volumes  4.  Implement bladder training program  5.  Encourage scheduled voidings  6.  Assist patient to sit on bedside commode or toilet for voiding  7.  Educate patient and family/caregiver on use and purpose of urine collection devices (document in Patient Education)  Outcome: Progressing     Problem: Pain - Standard  Goal: Alleviation of pain or a reduction in pain to the patient’s comfort goal  Description: Target End Date:  Prior to discharge or change in level of care    Document on Vitals flowsheet    1.  Document pain using the appropriate  pain scale per order or unit policy  2.  Educate and implement non-pharmacologic comfort measures (i.e. relaxation, distraction, massage, cold/heat therapy, etc.)  3.  Pain management medications as ordered  4.  Reassess pain after pain med administration per policy  5.  If opiods administered assess patient's response to pain medication is appropriate per POSS sedation scale  6.  Follow pain management plan developed in collaboration with patient and interdisciplinary team (including palliative care or pain specialists if applicable)  Outcome: Progressing       Patient is not progressing towards the following goals:

## 2024-04-30 NOTE — PROGRESS NOTES
Nurse received call from [employer] stating heart rate 160's. Nurse went and assessed pt. Monitor check called. Pt asymptomatic - denies SOB, dizziness, vision change. VSS. 146/70 BP 96% 1L NC, a&ox1. EKG ordered - pt converted into afib RVR. MD notified and received telephone orders. See MAR. 's-130's. Continues to be asymptomatic. Charge nurse aware.

## 2024-05-01 ENCOUNTER — PATIENT OUTREACH (OUTPATIENT)
Dept: HEALTH INFORMATION MANAGEMENT | Facility: OTHER | Age: 79
End: 2024-05-01
Payer: MEDICARE

## 2024-05-04 ENCOUNTER — APPOINTMENT (OUTPATIENT)
Dept: RADIOLOGY | Facility: MEDICAL CENTER | Age: 79
DRG: 163 | End: 2024-05-04
Attending: EMERGENCY MEDICINE
Payer: MEDICARE

## 2024-05-04 ENCOUNTER — APPOINTMENT (OUTPATIENT)
Dept: RADIOLOGY | Facility: MEDICAL CENTER | Age: 79
DRG: 163 | End: 2024-05-04
Attending: INTERNAL MEDICINE
Payer: MEDICARE

## 2024-05-04 ENCOUNTER — HOSPITAL ENCOUNTER (INPATIENT)
Facility: MEDICAL CENTER | Age: 79
LOS: 6 days | DRG: 163 | End: 2024-05-10
Attending: EMERGENCY MEDICINE | Admitting: HOSPITALIST
Payer: MEDICARE

## 2024-05-04 ENCOUNTER — APPOINTMENT (OUTPATIENT)
Dept: CARDIOLOGY | Facility: MEDICAL CENTER | Age: 79
DRG: 163 | End: 2024-05-04
Attending: EMERGENCY MEDICINE
Payer: MEDICARE

## 2024-05-04 ENCOUNTER — APPOINTMENT (OUTPATIENT)
Dept: RADIOLOGY | Facility: MEDICAL CENTER | Age: 79
DRG: 163 | End: 2024-05-04
Payer: MEDICARE

## 2024-05-04 DIAGNOSIS — E83.39 HYPOPHOSPHATEMIA: ICD-10-CM

## 2024-05-04 DIAGNOSIS — I26.02 ACUTE SADDLE PULMONARY EMBOLISM WITH ACUTE COR PULMONALE (HCC): ICD-10-CM

## 2024-05-04 DIAGNOSIS — I48.0 PAROXYSMAL ATRIAL FIBRILLATION WITH RVR (HCC): ICD-10-CM
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
## 2024-05-04 DIAGNOSIS — K68.3 RETROPERITONEAL HEMATOMA: ICD-10-CM

## 2024-05-04 DIAGNOSIS — J96.11 CHRONIC RESPIRATORY FAILURE WITH HYPOXIA (HCC): ICD-10-CM

## 2024-05-04 DIAGNOSIS — D50.8 IRON DEFICIENCY ANEMIA SECONDARY TO INADEQUATE DIETARY IRON INTAKE: ICD-10-CM

## 2024-05-04 DIAGNOSIS — I26.99 OTHER ACUTE PULMONARY EMBOLISM, UNSPECIFIED WHETHER ACUTE COR PULMONALE PRESENT (HCC): ICD-10-CM

## 2024-05-04 DIAGNOSIS — D50.9 IRON DEFICIENCY ANEMIA, UNSPECIFIED IRON DEFICIENCY ANEMIA TYPE: ICD-10-CM

## 2024-05-04 PROBLEM — R00.0 TACHYCARDIA: Status: RESOLVED | Noted: 2024-04-26 | Resolved: 2024-05-04

## 2024-05-04 PROBLEM — R57.0 CARDIOGENIC SHOCK (HCC): Status: ACTIVE | Noted: 2024-05-04

## 2024-05-04 PROBLEM — R65.10 SIRS (SYSTEMIC INFLAMMATORY RESPONSE SYNDROME) (HCC): Status: ACTIVE | Noted: 2024-05-04

## 2024-05-04 PROBLEM — I82.4Y1 ACUTE DEEP VEIN THROMBOSIS (DVT) OF PROXIMAL VEIN OF RIGHT LOWER EXTREMITY (HCC): Status: ACTIVE | Noted: 2024-05-04

## 2024-05-04 PROBLEM — I47.10 PAROXYSMAL SUPRAVENTRICULAR TACHYCARDIA (HCC): Status: ACTIVE | Noted: 2024-05-04

## 2024-05-04 LAB
ABO + RH BLD: NORMAL
ABO GROUP BLD: NORMAL
ACT BLD: 109 SEC (ref 74–137)
ALBUMIN SERPL BCP-MCNC: 2.7 G/DL (ref 3.2–4.9)
ALBUMIN SERPL BCP-MCNC: 3.4 G/DL (ref 3.2–4.9)
ALBUMIN/GLOB SERPL: 1.4 G/DL
ALBUMIN/GLOB SERPL: 1.4 G/DL
ALP SERPL-CCNC: 83 U/L (ref 30–99)
ALP SERPL-CCNC: 84 U/L (ref 30–99)
ALT SERPL-CCNC: 34 U/L (ref 2–50)
ALT SERPL-CCNC: 35 U/L (ref 2–50)
ANION GAP SERPL CALC-SCNC: 14 MMOL/L (ref 7–16)
ANION GAP SERPL CALC-SCNC: 14 MMOL/L (ref 7–16)
APTT PPP: 25.3 SEC (ref 24.7–36)
APTT PPP: 26 SEC (ref 24.7–36)
APTT PPP: 36.1 SEC (ref 24.7–36)
AST SERPL-CCNC: 35 U/L (ref 12–45)
AST SERPL-CCNC: 43 U/L (ref 12–45)
BASOPHILS # BLD AUTO: 0.3 % (ref 0–1.8)
BASOPHILS # BLD: 0.05 K/UL (ref 0–0.12)
BILIRUB SERPL-MCNC: 1.4 MG/DL (ref 0.1–1.5)
BILIRUB SERPL-MCNC: 1.5 MG/DL (ref 0.1–1.5)
BLD GP AB SCN SERPL QL: NORMAL
BUN SERPL-MCNC: 36 MG/DL (ref 8–22)
BUN SERPL-MCNC: 42 MG/DL (ref 8–22)
CALCIUM ALBUM COR SERPL-MCNC: 9 MG/DL (ref 8.5–10.5)
CALCIUM ALBUM COR SERPL-MCNC: 9.1 MG/DL (ref 8.5–10.5)
CALCIUM SERPL-MCNC: 8 MG/DL (ref 8.5–10.5)
CALCIUM SERPL-MCNC: 8.6 MG/DL (ref 8.5–10.5)
CHLORIDE SERPL-SCNC: 98 MMOL/L (ref 96–112)
CHLORIDE SERPL-SCNC: 99 MMOL/L (ref 96–112)
CO2 SERPL-SCNC: 20 MMOL/L (ref 20–33)
CO2 SERPL-SCNC: 22 MMOL/L (ref 20–33)
CREAT SERPL-MCNC: 0.6 MG/DL (ref 0.5–1.4)
CREAT SERPL-MCNC: 0.62 MG/DL (ref 0.5–1.4)
EKG IMPRESSION: NORMAL
EKG IMPRESSION: NORMAL
EOSINOPHIL # BLD AUTO: 0.24 K/UL (ref 0–0.51)
EOSINOPHIL NFR BLD: 1.3 % (ref 0–6.9)
ERYTHROCYTE [DISTWIDTH] IN BLOOD BY AUTOMATED COUNT: 48 FL (ref 35.9–50)
ERYTHROCYTE [DISTWIDTH] IN BLOOD BY AUTOMATED COUNT: 49.1 FL (ref 35.9–50)
ETHANOL BLD-MCNC: <10.1 MG/DL
GFR SERPLBLD CREATININE-BSD FMLA CKD-EPI: 92 ML/MIN/1.73 M 2
GFR SERPLBLD CREATININE-BSD FMLA CKD-EPI: 93 ML/MIN/1.73 M 2
GLOBULIN SER CALC-MCNC: 2 G/DL (ref 1.9–3.5)
GLOBULIN SER CALC-MCNC: 2.4 G/DL (ref 1.9–3.5)
GLUCOSE BLD STRIP.AUTO-MCNC: 101 MG/DL (ref 65–99)
GLUCOSE BLD STRIP.AUTO-MCNC: 114 MG/DL (ref 65–99)
GLUCOSE BLD STRIP.AUTO-MCNC: 172 MG/DL (ref 65–99)
GLUCOSE BLD STRIP.AUTO-MCNC: 237 MG/DL (ref 65–99)
GLUCOSE SERPL-MCNC: 167 MG/DL (ref 65–99)
GLUCOSE SERPL-MCNC: 251 MG/DL (ref 65–99)
HCG SERPL QL: NEGATIVE
HCT VFR BLD AUTO: 30.7 % (ref 37–47)
HCT VFR BLD AUTO: 35.1 % (ref 37–47)
HGB BLD-MCNC: 10.2 G/DL (ref 12–16)
HGB BLD-MCNC: 10.5 G/DL (ref 12–16)
HGB BLD-MCNC: 11.6 G/DL (ref 12–16)
IMM GRANULOCYTES # BLD AUTO: 0.34 K/UL (ref 0–0.11)
IMM GRANULOCYTES NFR BLD AUTO: 1.9 % (ref 0–0.9)
INR PPP: 1.05 (ref 0.87–1.13)
INR PPP: 1.08 (ref 0.87–1.13)
LACTATE SERPL-SCNC: 1.3 MMOL/L (ref 0.5–2)
LACTATE SERPL-SCNC: 1.4 MMOL/L (ref 0.5–2)
LACTATE SERPL-SCNC: 2.6 MMOL/L (ref 0.5–2)
LYMPHOCYTES # BLD AUTO: 1.31 K/UL (ref 1–4.8)
LYMPHOCYTES NFR BLD: 7.3 % (ref 22–41)
MAGNESIUM SERPL-MCNC: 1.7 MG/DL (ref 1.5–2.5)
MCH RBC QN AUTO: 30.3 PG (ref 27–33)
MCH RBC QN AUTO: 30.4 PG (ref 27–33)
MCHC RBC AUTO-ENTMCNC: 33 G/DL (ref 32.2–35.5)
MCHC RBC AUTO-ENTMCNC: 33.2 G/DL (ref 32.2–35.5)
MCV RBC AUTO: 91.6 FL (ref 81.4–97.8)
MCV RBC AUTO: 91.6 FL (ref 81.4–97.8)
MONOCYTES # BLD AUTO: 1.24 K/UL (ref 0–0.85)
MONOCYTES NFR BLD AUTO: 6.9 % (ref 0–13.4)
NEUTROPHILS # BLD AUTO: 14.76 K/UL (ref 1.82–7.42)
NEUTROPHILS NFR BLD: 82.3 % (ref 44–72)
NRBC # BLD AUTO: 0 K/UL
NRBC BLD-RTO: 0 /100 WBC (ref 0–0.2)
NT-PROBNP SERPL IA-MCNC: 47 PG/ML (ref 0–125)
PLATELET # BLD AUTO: 358 K/UL (ref 164–446)
PLATELET # BLD AUTO: 399 K/UL (ref 164–446)
PMV BLD AUTO: 10.7 FL (ref 9–12.9)
PMV BLD AUTO: 10.9 FL (ref 9–12.9)
POTASSIUM SERPL-SCNC: 3.3 MMOL/L (ref 3.6–5.5)
POTASSIUM SERPL-SCNC: 4.2 MMOL/L (ref 3.6–5.5)
PROCALCITONIN SERPL-MCNC: 0.05 NG/ML
PROT SERPL-MCNC: 4.7 G/DL (ref 6–8.2)
PROT SERPL-MCNC: 5.8 G/DL (ref 6–8.2)
PROTHROMBIN TIME: 13.8 SEC (ref 12–14.6)
PROTHROMBIN TIME: 14.1 SEC (ref 12–14.6)
RBC # BLD AUTO: 3.35 M/UL (ref 4.2–5.4)
RBC # BLD AUTO: 3.83 M/UL (ref 4.2–5.4)
RH BLD: NORMAL
SODIUM SERPL-SCNC: 132 MMOL/L (ref 135–145)
SODIUM SERPL-SCNC: 135 MMOL/L (ref 135–145)
TROPONIN T SERPL-MCNC: 82 NG/L (ref 6–19)
UFH PPP CHRO-ACNC: <0.1 IU/ML
WBC # BLD AUTO: 17.9 K/UL (ref 4.8–10.8)
WBC # BLD AUTO: 24.4 K/UL (ref 4.8–10.8)

## 2024-05-04 PROCEDURE — 99222 1ST HOSP IP/OBS MODERATE 55: CPT | Performed by: SURGERY

## 2024-05-04 PROCEDURE — 99223 1ST HOSP IP/OBS HIGH 75: CPT | Mod: AI | Performed by: HOSPITALIST

## 2024-05-04 PROCEDURE — 93010 ELECTROCARDIOGRAM REPORT: CPT | Performed by: INTERNAL MEDICINE

## 2024-05-04 PROCEDURE — 99291 CRITICAL CARE FIRST HOUR: CPT | Mod: 25 | Performed by: INTERNAL MEDICINE

## 2024-05-04 PROCEDURE — 02HV33Z INSERTION OF INFUSION DEVICE INTO SUPERIOR VENA CAVA, PERCUTANEOUS APPROACH: ICD-10-PCS | Performed by: INTERNAL MEDICINE

## 2024-05-04 PROCEDURE — 02CR3ZZ EXTIRPATION OF MATTER FROM LEFT PULMONARY ARTERY, PERCUTANEOUS APPROACH: ICD-10-PCS | Performed by: RADIOLOGY

## 2024-05-04 PROCEDURE — 36620 INSERTION CATHETER ARTERY: CPT | Performed by: INTERNAL MEDICINE

## 2024-05-04 PROCEDURE — 36556 INSERT NON-TUNNEL CV CATH: CPT | Performed by: INTERNAL MEDICINE

## 2024-05-04 PROCEDURE — 99292 CRITICAL CARE ADDL 30 MIN: CPT | Mod: 25 | Performed by: INTERNAL MEDICINE

## 2024-05-04 PROCEDURE — 93308 TTE F-UP OR LMTD: CPT | Mod: 26 | Performed by: INTERNAL MEDICINE

## 2024-05-04 PROCEDURE — 03HY32Z INSERTION OF MONITORING DEVICE INTO UPPER ARTERY, PERCUTANEOUS APPROACH: ICD-10-PCS | Performed by: INTERNAL MEDICINE

## 2024-05-04 RX ORDER — ACETAMINOPHEN 325 MG/1
650 TABLET ORAL EVERY 6 HOURS PRN
Status: DISCONTINUED | OUTPATIENT
Start: 2024-05-04 | End: 2024-05-10 | Stop reason: HOSPADM

## 2024-05-04 RX ORDER — POTASSIUM CHLORIDE 20 MEQ/1
20 TABLET, EXTENDED RELEASE ORAL DAILY
COMMUNITY

## 2024-05-04 RX ORDER — PREGABALIN 150 MG/1
150 CAPSULE ORAL 2 TIMES DAILY
Status: DISCONTINUED | OUTPATIENT
Start: 2024-05-04 | End: 2024-05-10 | Stop reason: HOSPADM

## 2024-05-04 RX ORDER — HEPARIN SODIUM 1000 [USP'U]/ML
INJECTION, SOLUTION INTRAVENOUS; SUBCUTANEOUS
Status: COMPLETED
Start: 2024-05-04 | End: 2024-05-04

## 2024-05-04 RX ORDER — MIDAZOLAM HYDROCHLORIDE 1 MG/ML
.5-2 INJECTION INTRAMUSCULAR; INTRAVENOUS PRN
Status: DISCONTINUED | OUTPATIENT
Start: 2024-05-04 | End: 2024-05-04

## 2024-05-04 RX ORDER — NOREPINEPHRINE BITARTRATE 1 MG/ML
INJECTION, SOLUTION INTRAVENOUS
Status: COMPLETED
Start: 2024-05-04 | End: 2024-05-04

## 2024-05-04 RX ORDER — DULOXETIN HYDROCHLORIDE 30 MG/1
30 CAPSULE, DELAYED RELEASE ORAL DAILY
Status: DISCONTINUED | OUTPATIENT
Start: 2024-05-05 | End: 2024-05-10 | Stop reason: HOSPADM

## 2024-05-04 RX ORDER — SODIUM CHLORIDE, SODIUM LACTATE, POTASSIUM CHLORIDE, AND CALCIUM CHLORIDE .6; .31; .03; .02 G/100ML; G/100ML; G/100ML; G/100ML
500 INJECTION, SOLUTION INTRAVENOUS ONCE
Status: COMPLETED | OUTPATIENT
Start: 2024-05-04 | End: 2024-05-04

## 2024-05-04 RX ORDER — ONDANSETRON 2 MG/ML
4 INJECTION INTRAMUSCULAR; INTRAVENOUS EVERY 6 HOURS PRN
Status: DISCONTINUED | OUTPATIENT
Start: 2024-05-04 | End: 2024-05-04

## 2024-05-04 RX ORDER — ACETAMINOPHEN 325 MG/1
650 TABLET ORAL EVERY 6 HOURS PRN
COMMUNITY
End: 2024-05-28

## 2024-05-04 RX ORDER — SODIUM CHLORIDE, SODIUM LACTATE, POTASSIUM CHLORIDE, AND CALCIUM CHLORIDE .6; .31; .03; .02 G/100ML; G/100ML; G/100ML; G/100ML
500 INJECTION, SOLUTION INTRAVENOUS ONCE
Status: ACTIVE | OUTPATIENT
Start: 2024-05-04 | End: 2024-05-05

## 2024-05-04 RX ORDER — PHENYLEPHRINE HCL IN 0.9% NACL 1 MG/10 ML
SYRINGE (ML) INTRAVENOUS
Status: COMPLETED
Start: 2024-05-04 | End: 2024-05-04

## 2024-05-04 RX ORDER — HEPARIN SODIUM 1000 [USP'U]/ML
4000 INJECTION, SOLUTION INTRAVENOUS; SUBCUTANEOUS ONCE
Status: COMPLETED | OUTPATIENT
Start: 2024-05-04 | End: 2024-05-04

## 2024-05-04 RX ORDER — TAMSULOSIN HYDROCHLORIDE 0.4 MG/1
0.4 CAPSULE ORAL
COMMUNITY

## 2024-05-04 RX ORDER — AMOXICILLIN 250 MG
1 CAPSULE ORAL
COMMUNITY

## 2024-05-04 RX ORDER — MAGNESIUM SULFATE HEPTAHYDRATE 40 MG/ML
4 INJECTION, SOLUTION INTRAVENOUS ONCE
Status: COMPLETED | OUTPATIENT
Start: 2024-05-04 | End: 2024-05-04

## 2024-05-04 RX ORDER — SODIUM CHLORIDE 9 MG/ML
500 INJECTION, SOLUTION INTRAVENOUS
Status: DISCONTINUED | OUTPATIENT
Start: 2024-05-04 | End: 2024-05-04

## 2024-05-04 RX ORDER — PHENYLEPHRINE HCL IN 0.9% NACL 1 MG/10 ML
300 SYRINGE (ML) INTRAVENOUS ONCE
Status: COMPLETED | OUTPATIENT
Start: 2024-05-04 | End: 2024-05-04

## 2024-05-04 RX ORDER — PHENYLEPHRINE HCL IN 0.9% NACL 1 MG/10 ML
200 SYRINGE (ML) INTRAVENOUS ONCE
Status: COMPLETED | OUTPATIENT
Start: 2024-05-04 | End: 2024-05-04

## 2024-05-04 RX ORDER — BENAZEPRIL HYDROCHLORIDE 20 MG/1
20 TABLET ORAL DAILY
Status: ON HOLD | COMMUNITY
End: 2024-05-10

## 2024-05-04 RX ORDER — DEXTROSE MONOHYDRATE 50 MG/ML
INJECTION, SOLUTION INTRAVENOUS CONTINUOUS
Status: DISCONTINUED | OUTPATIENT
Start: 2024-05-04 | End: 2024-05-05

## 2024-05-04 RX ORDER — NOREPINEPHRINE BITARTRATE 0.03 MG/ML
0-1 INJECTION, SOLUTION INTRAVENOUS CONTINUOUS
Status: DISCONTINUED | OUTPATIENT
Start: 2024-05-04 | End: 2024-05-05

## 2024-05-04 RX ORDER — SODIUM CHLORIDE 9 MG/ML
500 INJECTION, SOLUTION INTRAVENOUS ONCE
Status: COMPLETED | OUTPATIENT
Start: 2024-05-04 | End: 2024-05-04

## 2024-05-04 RX ORDER — HYDROCHLOROTHIAZIDE 12.5 MG/1
12.5 CAPSULE, GELATIN COATED ORAL DAILY
Status: ON HOLD | COMMUNITY
End: 2024-05-10

## 2024-05-04 RX ORDER — HEPARIN SODIUM 5000 [USP'U]/100ML
0-30 INJECTION, SOLUTION INTRAVENOUS CONTINUOUS
Status: DISCONTINUED | OUTPATIENT
Start: 2024-05-04 | End: 2024-05-05

## 2024-05-04 RX ORDER — QUETIAPINE FUMARATE 25 MG/1
25 TABLET, FILM COATED ORAL
Status: DISCONTINUED | OUTPATIENT
Start: 2024-05-04 | End: 2024-05-10 | Stop reason: HOSPADM

## 2024-05-04 RX ORDER — MIDAZOLAM HYDROCHLORIDE 1 MG/ML
INJECTION INTRAMUSCULAR; INTRAVENOUS
Status: COMPLETED
Start: 2024-05-04 | End: 2024-05-04

## 2024-05-04 RX ADMIN — NOREPINEPHRINE BITARTRATE 0.75 MCG/KG/MIN: 1 INJECTION, SOLUTION, CONCENTRATE INTRAVENOUS at 11:23

## 2024-05-04 RX ADMIN — SODIUM CHLORIDE, POTASSIUM CHLORIDE, SODIUM LACTATE AND CALCIUM CHLORIDE 500 ML: 600; 310; 30; 20 INJECTION, SOLUTION INTRAVENOUS at 12:03

## 2024-05-04 RX ADMIN — Medication 100 MCG: at 11:12

## 2024-05-04 RX ADMIN — KETAMINE HYDROCHLORIDE 80 MG: 50 INJECTION INTRAMUSCULAR; INTRAVENOUS at 12:25

## 2024-05-04 RX ADMIN — VASOPRESSIN 0.03 UNITS/MIN: 20 INJECTION INTRAVENOUS at 12:10

## 2024-05-04 RX ADMIN — QUETIAPINE FUMARATE 25 MG: 25 TABLET ORAL at 20:31

## 2024-05-04 RX ADMIN — AMIODARONE HYDROCHLORIDE 1 MG/MIN: 1.8 INJECTION, SOLUTION INTRAVENOUS at 15:23

## 2024-05-04 RX ADMIN — MAGNESIUM SULFATE HEPTAHYDRATE 4 G: 4 INJECTION, SOLUTION INTRAVENOUS at 15:21

## 2024-05-04 RX ADMIN — MIDAZOLAM HYDROCHLORIDE 0.5 MG: 2 INJECTION, SOLUTION INTRAMUSCULAR; INTRAVENOUS at 10:39

## 2024-05-04 RX ADMIN — IOHEXOL 100 ML: 350 INJECTION, SOLUTION INTRAVENOUS at 06:30

## 2024-05-04 RX ADMIN — MIDAZOLAM HYDROCHLORIDE 0.5 MG: 2 INJECTION, SOLUTION INTRAMUSCULAR; INTRAVENOUS at 10:48

## 2024-05-04 RX ADMIN — Medication 300 MCG: at 12:28

## 2024-05-04 RX ADMIN — AMIODARONE HYDROCHLORIDE 150 MG: 1.5 INJECTION, SOLUTION INTRAVENOUS at 11:59

## 2024-05-04 RX ADMIN — IOHEXOL 50 ML: 300 INJECTION, SOLUTION INTRAVENOUS at 12:30

## 2024-05-04 RX ADMIN — HEPARIN SODIUM 26 UNITS/KG/HR: 5000 INJECTION, SOLUTION INTRAVENOUS at 21:28

## 2024-05-04 RX ADMIN — HEPARIN SODIUM 4000 UNITS: 1000 INJECTION, SOLUTION INTRAVENOUS; SUBCUTANEOUS at 10:54

## 2024-05-04 RX ADMIN — Medication 100 MCG: at 11:08

## 2024-05-04 RX ADMIN — INSULIN HUMAN 2 UNITS: 100 INJECTION, SOLUTION PARENTERAL at 15:29

## 2024-05-04 RX ADMIN — SODIUM CHLORIDE 500 ML: 9 INJECTION, SOLUTION INTRAVENOUS at 06:49

## 2024-05-04 RX ADMIN — PREGABALIN 150 MG: 150 CAPSULE ORAL at 18:38

## 2024-05-04 RX ADMIN — Medication 100 MCG: at 11:10

## 2024-05-04 RX ADMIN — Medication 300 MCG: at 11:59

## 2024-05-04 RX ADMIN — AMIODARONE HYDROCHLORIDE 0.5 MG/MIN: 1.8 INJECTION, SOLUTION INTRAVENOUS at 20:29

## 2024-05-04 RX ADMIN — HEPARIN SODIUM 18 UNITS/KG/HR: 5000 INJECTION, SOLUTION INTRAVENOUS at 07:56

## 2024-05-04 ASSESSMENT — COGNITIVE AND FUNCTIONAL STATUS - GENERAL
TURNING FROM BACK TO SIDE WHILE IN FLAT BAD: TOTAL
SUGGESTED CMS G CODE MODIFIER DAILY ACTIVITY: CM
MOBILITY SCORE: 6
TOILETING: TOTAL
WALKING IN HOSPITAL ROOM: TOTAL
DRESSING REGULAR LOWER BODY CLOTHING: TOTAL
MOVING TO AND FROM BED TO CHAIR: TOTAL
CLIMB 3 TO 5 STEPS WITH RAILING: TOTAL
DAILY ACTIVITIY SCORE: 8
HELP NEEDED FOR BATHING: TOTAL
MOVING FROM LYING ON BACK TO SITTING ON SIDE OF FLAT BED: TOTAL
SUGGESTED CMS G CODE MODIFIER MOBILITY: CN
EATING MEALS: A LITTLE
PERSONAL GROOMING: TOTAL
STANDING UP FROM CHAIR USING ARMS: TOTAL
DRESSING REGULAR UPPER BODY CLOTHING: TOTAL

## 2024-05-04 ASSESSMENT — PAIN DESCRIPTION - PAIN TYPE
TYPE: ACUTE PAIN

## 2024-05-04 ASSESSMENT — FIBROSIS 4 INDEX: FIB4 SCORE: 0.74

## 2024-05-04 NOTE — CONSULTS
DATE OF CONSULTATION:  5/4/2024     REFERRING PHYSICIAN:   Jerri Vergara M.D.     CONSULTING PHYSICIAN:  Carl Machuca M.D.     REASON FOR CONSULTATION:  I have been asked by  to see the patient in surgical consultation for evaluation of psoas hematoma.    TRIAGE CATEGORY: The patient was triaged as a Non Trauma Activation. Preliminary evaluation was conducted by the emergency department physician. See Trauma Narrator for full details.    HISTORY OF PRESENT ILLNESS: The patient is a 78 year-old White elderly woman who was injured in a fall. The patient suffered a mechanical ground-level fall yesterday at her care facility. She had no loss of consciousness but does not recall if she struck her head. This morning she had a witnessed syncopal episode and was brought to [facility] for further evaluation. She was caught by staff at her care facility this morning and did not fall. CT scan at [facility] demonstrated a saddle pulmonary embolism as well as a right psoas hematoma. Imaging is concerning for right heart strain and there are plans to take her for a thrombectomy. The patient chronically takes aspirin and was reported to take Xarelto by EMS however this is not on her medication list at her care facility. Trauma was consulted given the finding of her psoas hematoma and the need for therapeutic anticoagulation for her saddle pulmonary embolus.    PAST MEDICAL HISTORY:  has a past medical history of Anesthesia, Arthritis, Asthma, Bowel habit changes, Breath shortness, Cancer (HCC), Chiari malformation (1970's), Chickenpox, Chronic back pain, Contracture of hand joint, Decreased lung capacity, Dental disorder, Disorder of thyroid, Heart burn, High cholesterol, Hypertension, Indigestion, Joint replacement, Obesity, Pain, Pain (08/06/2018), Peripheral edema (06/06/2013), Pneumonia, PONV (postoperative nausea and vomiting), Psychiatric problem, Scoliosis, Shortness of breath (08/06/2018),  Sleep apnea, Sleep apnea (08/07/2018), Snoring, sore throat (01/15/2015), Supplemental oxygen dependent, and Syringomyelia (HCC).    PAST SURGICAL HISTORY:  has a past surgical history that includes rubin by laparoscopy (2002); shoulder arthroplasty total (2004); hip arthroplasty total (5/19/08); hip arthroplasty total; us-needle core bx-breast panel; node biopsy sentinel (2/6/2015); thyroid lobectomy (Left, 8/17/2018); thyroidectomy total (8/17/2018); laminotomy; hip replacement, total; cyst excision (1973); shunt insertion; mastectomy (2/6/2015); hip revision total (Left, 9/2/2021); other abdominal surgery; and pr reconstr total shoulder implant (Left, 5/4/2022).    ALLERGIES:   Allergies   Allergen Reactions    Morphine Vomiting     hallucinations    Sulfamethoxazole W-Trimethoprim Rash     * full body rash*>  10 years ago       CURRENT MEDICATIONS:   Home Medications       Reviewed by Anuradha Auguste R.N. (Registered Nurse) on 05/04/24 at 05[employer]  Med List Status: Partial     Medication Last Dose Status   alendronate (FOSAMAX) 70 MG Tab  Active   amitriptyline (ELAVIL) 100 MG Tab  Active   Ascorbic Acid (VITAMIN C) 1000 MG Tab  Active   aspirin 81 MG EC tablet  Active   atorvastatin (LIPITOR) 10 MG Tab  Active   B Complex-C (B COMPLEX-VITAMIN C) Cap  Active   baclofen (LIORESAL) 5 MG Tab  Active   benazepril-hydrochlorthiazide (LOTENSIN HCT) 20-12.5 MG per tablet  Active   Calcium Carb-Cholecalciferol (CALCIUM + VITAMIN D3) 600-10 MG-MCG Tab  Active   carvedilol (COREG) 6.25 MG Tab  Active   DULoxetine (CYMBALTA) 30 MG Cap DR Particles  Active   esomeprazole (NEXIUM) 20 MG capsule  Active   ferrous sulfate 325 (65 Fe) MG tablet  Active   Fish Oil-Krill Oil (KRILL & FISH OIL BLEND) Cap  Active   furosemide (LASIX) 20 MG Tab  Active   HYDROcodone-acetaminophen (NORCO) 5-325 MG Tab per tablet  Active   meclizine (ANTIVERT) 12.5 MG Tab  Active   nystatin (MYCOSTATIN) powder  Active   olopatadine (PATANOL)  "0.1 % ophthalmic solution  Active   ondansetron (ZOFRAN) 4 MG Tab tablet  Active   potassium chloride SA (KDUR) 20 MEQ Tab CR  Active   pregabalin (LYRICA) 150 MG Cap  Active   QUEtiapine (SEROQUEL) 25 MG Tab  Active   Sennosides 25 MG Tab  Active   tamsulosin (FLOMAX) 0.4 MG capsule  Active   Zinc 50 MG Tab  Active   zonisamide (ZONEGRAN) 100 MG Cap  Active                  FAMILY HISTORY: family history includes Hypertension in her mother; Sleep Apnea in her brother.    SOCIAL HISTORY:  reports that she has never smoked. She has never used smokeless tobacco. She reports that she does not drink alcohol and does not use drugs.    REVIEW OF SYSTEMS: Comprehensive review of systems is negative with the exception of the aforementioned HPI, PMH, and PSH bullets in accordance with CMS guidelines.    PHYSICAL EXAMINATION:      Vital Signs: BP 95/54   Pulse (!) 118   Temp 36.8 °C (98.3 °F)   Resp 20   Ht 1.626 m (5' 4\")   Wt 77.1 kg (170 lb)   SpO2 93%   Physical Exam  Vitals and nursing note reviewed.   Constitutional:       Appearance: She is not toxic-appearing.      Interventions: Nasal cannula in place.   HENT:      Head: Normocephalic and atraumatic.      Right Ear: External ear normal.      Left Ear: External ear normal.      Nose: Nose normal.      Mouth/Throat:      Mouth: Mucous membranes are moist.      Pharynx: Oropharynx is clear.   Eyes:      General: No scleral icterus.     Pupils: Pupils are equal, round, and reactive to light.   Cardiovascular:      Rate and Rhythm: Tachycardia present.   Pulmonary:      Effort: Pulmonary effort is normal.   Abdominal:      General: There is no distension.      Palpations: Abdomen is soft.      Tenderness: There is no abdominal tenderness. There is no guarding or rebound.      Comments: Ecchymosis across inferior anterior abdomen, consistent with injection sites.   Genitourinary:     Comments: Pelvis stable.  Musculoskeletal:         General: No tenderness or " deformity.      Cervical back: Normal range of motion and neck supple.   Skin:     General: Skin is warm and dry.      Coloration: Skin is pale. Skin is not jaundiced.   Neurological:      General: No focal deficit present.      Mental Status: She is alert.      Comments: Poor historian.   Psychiatric:         Behavior: Behavior is cooperative.         LABORATORY VALUES:   Recent Labs     05/04/24 0519   WBC 17.9*   RBC 3.83*   HEMOGLOBIN 11.6*   HEMATOCRIT 35.1*   MCV 91.6   MCH 30.3   MCHC 33.0   RDW 48.0   PLATELETCT 358   MPV 10.7     Recent Labs     05/04/24  0519   SODIUM 135   POTASSIUM 3.3*   CHLORIDE 99   CO2 22   GLUCOSE 167*   BUN 42*   CREATININE 0.60   CALCIUM 8.6     Recent Labs     05/04/24 0519   ASTSGOT 35   ALTSGPT 34   TBILIRUBIN 1.4   ALKPHOSPHAT 84   GLOBULIN 2.4   INR 1.05     Recent Labs     05/04/24 0519   APTT 26.0   INR 1.05        IMAGING:   CT-LSPINE W/O PLUS RECONS   Final Result         1.  No acute traumatic bony injury of the lumbar spine.   2.  Right psoas muscle and retroperitoneal hematoma      CT-TSPINE W/O PLUS RECONS   Final Result         1.  No acute traumatic bony injury of the thoracic spine.      CT-ABDOMEN-PELVIS WITH   Final Result         1.  Right retroperitoneal and psoas muscle hematoma      These findings were discussed with the patient's clinician, JERRI VERGARA, on 5/4/2024 7:06 AM.      CT-CTA CHEST PULMONARY ARTERY W/ RECONS   Final Result         1.  Large saddle pulmonary embolus with changes of right heart strain   2.  Hazy bilateral pulmonary infiltrates      These findings were discussed with the patient's clinician, Jerri Vergara, on 5/4/2024 7:02 AM.      CT-CSPINE WITHOUT PLUS RECONS   Final Result         1.  No acute traumatic bony injury of the cervical spine is apparent.      CT-HEAD W/O   Final Result         1.  No acute intracranial abnormality.   2.  Atherosclerosis.         DX-CHEST-PORTABLE (1 VIEW)   Final Result         1.  Bibasilar  atelectasis and/or subtle infiltrates.   2.  Atherosclerosis      EC-ECHOCARDIOGRAM COMPLETE W/O CONT    (Results Pending)   US-EXTREMITY VENOUS LOWER BILAT    (Results Pending)       ASSESSMENT AND PLAN:     78 year-old woman with a saddle pulmonary embolism with signs of right heart strain on imaging as well as a right psoas hematoma presumably the result of a fall more than 24 hours ago.    Given the concerns for right heart strain on imaging the benefits of therapeutic anticoagulation outweigh the risks.     Recommend monitoring serial hemoglobin levels, will follow abdominal exam.    Psoas/retroperitoneal hematomas are generally treated by ceasing anticoagulation and observation. In her case angiography and embolization would likely be the preferred intervention if one is necessary.      DISPOSITION: Medical evaluation and admission for heart failure and submassive pulmonary embolism. [facility] will follow. Interval Trauma tertiary survey.       ____________________________________     Cral Machuca M.D.    DD: 5/4/2024  7:45 AM

## 2024-05-04 NOTE — CODE DOCUMENTATION
Levophed drip mixed and started in IR 1.  Levophed drip started at 0.4mcg/kg  per IR Doctor at bedside.

## 2024-05-04 NOTE — OR SURGEON
Immediate Post- Operative Note        Findings: L>>R submassive PE      Procedure(s): L lung thrombectomy      Estimated Blood Loss: Less than 5 ml        Complications: None            5/4/2024     11:33 AM     Donell Amezquita M.D.

## 2024-05-04 NOTE — PROGRESS NOTES
Rapid Response Summary     Rapid response called at 1110 for: Hypotension  and Tachycardia     VS: Tachycardia  and Hypotensive (See Vitals Flowsheet)  Additional info: Pt just finished IR procedure for saddle PE  MD Paged: Rubina  Interventions:    Imaging/Tests: N/A   Labs: N/A   Medications:  levophed drip   Other: N/A  Disposition: Improved with rapid response team interventions. Primary RN updated on plan of care. Patient transferred to ICU.

## 2024-05-04 NOTE — PROGRESS NOTES
4 Eyes Skin Assessment Completed by JIMMY Hood and JIMMY Kim.    Head WDL  Ears WDL  Nose {Nose:20876}  Mouth {Mouth:78390}  Neck {Neck:73204}  Breast/Chest {Breast/Chest:45619}  Shoulder Blades {Shoulder Blades:14756}  Spine {Spine:35190}  (R) Arm/Elbow/Hand Elbow: Redness, Blanching, and Scab  (L) Arm/Elbow/Hand Scab left outer arm  Abdomen {Abdomen:92128}  Groin {Groin:47650}  Scrotum/Coccyx/Buttocks Redness and Blanching  (R) Leg {(R) Le}  (L) Leg {(L) Le}  (R) Heel/Foot/Toe {(R) Heel/Foot/Toe:82253}  (L) Heel/Foot/Toe {(L) Heel/Foot/Toe:16976}          Devices In Places {Devices In Place:33893}      Interventions In Place {Interventions In Place:26685}    Possible Skin Injury {Possible Skin Injury:32263}    Pictures Uploaded Into Epic {Pictures Uploaded Into Epic:86090}  Wound Consult Placed {Wound Consult Placed:23121}  RN Wound Prevention Protocol Ordered {YES/NO:}

## 2024-05-04 NOTE — PROGRESS NOTES
Patient brought to T626     While settling patient in room patient converted to -200.  MD at bedside     Medicated per MAR     Time out for CVC 1219  Medicated for procedure per MAR.   Time out for Art line 1255

## 2024-05-04 NOTE — PROGRESS NOTES
Per MD. Covid infection test was positive 4/20. Patient here for PE not Respiratory symptoms. No ISOLATION at this time.

## 2024-05-04 NOTE — PROGRESS NOTES
Pulmonary and Critical Care Medicine Progress Note    I had the pleasure of being asked to see this lady in Red 9 in the ED for admission disposition.  She has an acute saddle pulmonary embolism with a Joycelyn score of 5 and bilateral lower extremity DVT.  Imaging also reveals a right psoas and retroperitoneal hematoma.  There does not appear to be active extravasation in the region of this hematoma.  Dr. Machuca has evaluated this lady from Trauma and agrees with the plan to fully anticoagulate this lady with heparin.  I recommend pulmonary artery thrombectomy in IR and full anticoagulation with heparin.  I would trend her hemoglobin every 6 hours.  If this lady demonstrates evidence of bleeding, then her heparin will need to be stopped and reversed and she will require an IVC filter.    She has a history of primary hypertension, SELWYN, Chiari malformation and recent hospitalization for respiratory failure, pneumonia, septic shock and SARS-CoV-2.    At this time, she may be safely admitted to the IMCU.  Please do not hesitate to contact the Critical Care service if she develops any deterioration.    Harvey Hargrove MD  Pulmonary and Critical Care Medicine

## 2024-05-04 NOTE — PROCEDURES
Date of service:  5/4/2024    Title:  Central venous catheter placement - internal jugular vein    Indication: Cardiogenic shock    Narrative:    A time out was performed identifying the correct patient, correct procedure and correct location prior to this procedure.    The right neck was prepped with chlorhexidine and draped in the usual sterile fashion.  1% Xylocaine solution was used for topical anesthesia.  A triple lumen central venous catheter was placed into the right internal jugular vein under ultrasound guidance using the technique described by Chiquita without difficulty or apparent complication.  The guidewire was removed intact.  The line was sutured into place and a sterile dressing was placed over the line.  All 3 ports flush and return venous blood easily.  The patient tolerated the procedure quite nicely.  No complications are apparent.  A STAT CXR is ordered to confirm placement.  The catheter may be safely used for infusion and medication administration.    This was an EMERGENT procedure.  It was medically necessary to perform this procedure emergently to prevent life threatening deterioration.      Harvey Hargrove MD  Pulmonary and Critical Care Medicine

## 2024-05-04 NOTE — PROGRESS NOTES
Pt presents to IR-1.   Assumed care of pt, pertient history and labs reviewed  Pt was consented by MD at bedside, confirmed by this RN.   Pt transferred to IR table in supine position.   Pt placed on monitor, prepped and draped in a sterile fashion.   Patient underwent a thrombectomy of PE by Dr. Amezquita.   Procedure site was marked by MD and verified using imaging guidance.   Vitals were taken every 5 minutes (see doc flow sheet for results). CO2 waveform capnography was monitored throughout procedure.     1054 4000units heparin given for  per Dr Amezquita (see eMar)    Pt having trouble keeping SpO2 > 90%- O2 titrated up eventually requiring 100% NRB (see flowsheets)    1105: pt's SBP dropped to 65, BP retaken for BP 66/47- 100mcg karina IVP given, pt's BP did not respond adequately to a SBP >90 or return to baseline so RRT called @ 1108 for hypotension and increased O2 demands    Pt received another 2 doses of 100mcg IVP karina for hypotension and levo gtt started (see eMar)    Pt arousable, responsive and able to follow commands during entire event    RRT RN Marcia updated  while transporting pt CICU    Bedside report given to Nakia MARQUEZ. Pt transported by stretcher on monitor, on 100% NRB with multiple RN's to T626.     Pt came to IR suite with lower abdominal bruising, B-hands contracted, fingers cool and slightly cyanotic.

## 2024-05-04 NOTE — PROCEDURES
Date of service:  5/4/2024    Title:  Arterial catheter placement - radial artery    Indication: Cardiogenic shock    Narrative:    A time out was performed identifying the correct patient, correct procedure and correct location prior to this procedure.    The right wrist was prepped with chlorhexidine and draped in the usual sterile fashion.  A 20 gauge arterial catheter was placed into the right radial artery under ultrasound guidance using the technique described by Chiquita without difficulty or apparent complication.  The guidewire was removed intact.  The line was sutured into place and a sterile dressing was placed over the line.  An outstanding arterial waveform is present on the monitor.  The patient tolerated the procedure quite nicely.  No complications were apparent.    This was an EMERGENT procedure.  It was medically necessary to perform this procedure emergently to prevent life threatening deterioration.      Harvey Hargrove MD  Pulmonary and Critical Care Medicine

## 2024-05-04 NOTE — H&P
Hospital Medicine History & Physical Note    Date of Service  5/4/2024    Primary Care Physician  Michelle Jim P.A.-C.    Consultants  critical care    Specialist Names: Dr. Lin    Code Status  Full Code    Chief Complaint  Chief Complaint   Patient presents with    GLF     BIBA from skilled nursing facility for GLF yesterday and syncopal episode this am. Unknown LOC, unknown head strike, +thinners. Pt arrives with extensive bruising to lower abdomen.        History of Presenting Illness  Tereza Sánchez is a 78 y.o. female who presented 5/4/2024 with weakness.  Mrs. Sánchez has a past medical history of hypertension Chiari malformation that was most recently admitted to this hospital 4/20/2024 through 4/30/2024 with COVID infection septic shock requiring intubation and IV pressors.  She had a CTA of the chest at that point in time which is negative for pulmonary embolism.  She was discharged to skilled nursing facility.  She was brought back this morning for somewhat cryptic reasons perhaps weakness perhaps shortness of breath not really clear.  Regardless in the emergency room extensive workup was done and she was found to have a DVT, saddle pulmonary embolism, and retroperitoneal hematoma as well as psoas hematoma.  An IV heparin drip has been initiated without bolus and interventional radiology will be consulted for thrombectomy of saddle pulmonary embolism and she will be monitored closely in the IMCU.    I discussed the plan of care with her  at bedside. She notes they have been  for 60 years and he is quite adamant that he wants her to be full code and intubated if indicated..    Review of Systems  Review of Systems   Unable to perform ROS: Medical condition       Past Medical History   has a past medical history of Anesthesia, Arthritis, Asthma, Bowel habit changes, Breath shortness, Cancer (HCC), Chiari malformation (1970's), Chickenpox, Chronic back pain, Contracture of hand joint,  Decreased lung capacity, Dental disorder, Disorder of thyroid, Heart burn, High cholesterol, Hypertension, Indigestion, Joint replacement, Obesity, Pain, Pain (08/06/2018), Peripheral edema (06/06/2013), Pneumonia, PONV (postoperative nausea and vomiting), Psychiatric problem, Scoliosis, Shortness of breath (08/06/2018), Sleep apnea, Sleep apnea (08/07/2018), Snoring, sore throat (01/15/2015), Supplemental oxygen dependent, and Syringomyelia (HCC).    Surgical History   has a past surgical history that includes rubin by laparoscopy (2002); shoulder arthroplasty total (2004); hip arthroplasty total (5/19/08); hip arthroplasty total; us-needle core bx-breast panel; node biopsy sentinel (2/6/2015); thyroid lobectomy (Left, 8/17/2018); thyroidectomy total (8/17/2018); laminotomy; hip replacement, total; cyst excision (1973); shunt insertion; mastectomy (2/6/2015); hip revision total (Left, 9/2/2021); other abdominal surgery; and pr reconstr total shoulder implant (Left, 5/4/2022).     Family History  No family history of blood clots to patient or her 's knowledge    Social History   reports that she has never smoked. She has never used smokeless tobacco. She reports that she does not drink alcohol and does not use drugs.  They have 1 son that lives in [facility] that was not currently in [place] and an offspring that lives across country  Allergies  Allergies   Allergen Reactions    Sulfamethoxazole W-Trimethoprim Rash     * full body rash*>  10 years ago    Morphine Vomiting     hallucinations       Medications  Prior to Admission Medications   Prescriptions Last Dose Informant Patient Reported? Taking?   DULoxetine (CYMBALTA) 30 MG Cap DR [employer] ANDERS at [facility] Other Facility Yes No   Sig: Take 30 mg by mouth every day.   HYDROcodone-acetaminophen (NORCO) 5-325 MG Tab per tablet [facility] at [facility] Other Facility Yes No   Sig: Take 1 Tablet by mouth every 6 hours as needed. Indications: Pain   QUEtiapine  (SEROQUEL) 25 MG Tab UNK at [facility] Facility Yes No   Sig: Take 25 mg by mouth at bedtime.   acetaminophen (TYLENOL) 325 MG Tab UNK at [facility] Yes Yes   Sig: Take 650 mg by mouth every 6 hours as needed. Indications: Fever, Pain   alendronate (FOSAMAX) 70 MG Tab UNK at [facility] [place] Yes No   Sig: Take 70 mg by mouth every Monday. Indications: Osteoporosis   amitriptyline (ELAVIL) 100 MG Tab UNK at [facility] Yes No   Sig: Take 100 mg by mouth every evening.   aspirin 81 MG EC tablet UNK at [facility] Yes No   Sig: Take 81 mg by mouth every day.   atorvastatin (LIPITOR) 10 MG Tab UNK at [facility] No No   Sig: TAKE 1 TABLET BY MOUTH EVERY EVENING. CHOLESTEROL   benazepril (LOTENSIN) 20 MG Tab UNK at [facility] Yes Yes   Sig: Take 20 mg by mouth every day. Hold for SPB less than 100 and HR less than 60   carvedilol (COREG) 6.25 MG Tab UNK at [facility] No No   Sig: Take 1 Tablet by mouth 2 times a day with meals.   ferrous sulfate 325 (65 Fe) MG tablet UNK at [facility] No No   Sig: TAKE 1 TABLET BY MOUTH EVERY DAY   furosemide (LASIX) 20 MG Tab UNK at [facility] Yes No   Sig: Take 20 mg by mouth every day. Hold for SBP <100  Indications: Edema   hydrochlorothiazide (MICROZIDE) 12.5 MG capsule UNK at [facility] Yes Yes   Sig: Take 12.5 mg by mouth every day. Hold for SBP less than 100 or HR less than 60   potassium chloride SA (KDUR) 20 MEQ Tab CR UNK at [facility] Yes Yes   Sig: Take 20 mEq by mouth every day. Hold if lasix is held   pregabalin (LYRICA) 150 MG Cap UNK at [facility] Yes No   Sig: Take 150 mg by mouth 2 times a day.   senna-docusate (PERICOLACE OR SENOKOT S) 8.6-50 MG Tab UNK at [facility] Yes Yes   Sig: Take 2 Tablets by mouth at bedtime.   tamsulosin (FLOMAX) 0.4 MG capsule UNK at [facility] Yes Yes   Sig: Take 0.4 mg by mouth at bedtime.      Facility-Administered Medications: None  "      Physical Exam  Temp:  [36.8 °C (98.3 °F)] 36.8 °C (98.3 °F)  Pulse:  [112-122] 112  Resp:  [16-22] 16  BP: ()/(54-78) 118/65  SpO2:  [92 %-96 %] 95 %  Blood Pressure : 118/65   Temperature: 36.8 °C (98.3 °F)   Pulse: (!) 112   Respiration: 16   Pulse Oximetry: 95 %       Physical Exam  Vitals and nursing note reviewed.   Constitutional:       General: She is not in acute distress.  Cardiovascular:      Rate and Rhythm: Tachycardia present.   Pulmonary:      Effort: Pulmonary effort is normal.      Breath sounds: Normal breath sounds.   Abdominal:      General: There is no distension.      Tenderness: There is no abdominal tenderness.      Comments: Extensive bruises across her anterior abdomen (likely due to Lovenox)   Musculoskeletal:      Cervical back: Neck supple.   Skin:     Coloration: Skin is pale.   Neurological:      Mental Status: She is alert.      Comments: She is a poor historian   Psychiatric:         Behavior: Behavior normal.         Laboratory:  Recent Labs     05/04/24  0519   WBC 17.9*   RBC 3.83*   HEMOGLOBIN 11.6*   HEMATOCRIT 35.1*   MCV 91.6   MCH 30.3   MCHC 33.0   RDW 48.0   PLATELETCT 358   MPV 10.7     Recent Labs     05/04/24  0519   SODIUM 135   POTASSIUM 3.3*   CHLORIDE 99   CO2 22   GLUCOSE 167*   BUN 42*   CREATININE 0.60   CALCIUM 8.6     Recent Labs     05/04/24  0519   ALTSGPT 34   ASTSGOT 35   ALKPHOSPHAT 84   TBILIRUBIN 1.4   GLUCOSE 167*     Recent Labs     05/04/24  0519 05/04/24  0730   APTT 26.0 25.3   INR 1.05 1.08     No results for input(s): \"NTPROBNP\" in the last 72 hours.      Recent Labs     05/04/24  0519   TROPONINT 82*       Imaging:  US-EXTREMITY VENOUS LOWER BILAT   Final Result      EC-ECHOCARDIOGRAM LTD W/O CONT         CT-LSPINE W/O PLUS RECONS   Final Result         1.  No acute traumatic bony injury of the lumbar spine.   2.  Right psoas muscle and retroperitoneal hematoma      CT-TSPINE W/O PLUS RECONS   Final Result         1.  No acute traumatic " bony injury of the thoracic spine.      CT-ABDOMEN-PELVIS WITH   Final Result         1.  Right retroperitoneal and psoas muscle hematoma      These findings were discussed with the patient's clinician, JERRI VERGARA, on 5/4/2024 7:06 AM.      CT-CTA CHEST PULMONARY ARTERY W/ RECONS   Final Result         1.  Large saddle pulmonary embolus with changes of right heart strain   2.  Hazy bilateral pulmonary infiltrates      These findings were discussed with the patient's clinician, Jerri Vergara, on 5/4/2024 7:02 AM.      CT-CSPINE WITHOUT PLUS RECONS   Final Result         1.  No acute traumatic bony injury of the cervical spine is apparent.      CT-HEAD W/O   Final Result         1.  No acute intracranial abnormality.   2.  Atherosclerosis.         DX-CHEST-PORTABLE (1 VIEW)   Final Result         1.  Bibasilar atelectasis and/or subtle infiltrates.   2.  Atherosclerosis      IR-THROMBO MECHANICAL ARTERY,INIT    (Results Pending)           Assessment/Plan:  Justification for Admission Status  I anticipate this patient will require at least two midnights for appropriate medical management, necessitating inpatient admission because thrombectomy and IV heparin drip    Patient will need a Intermediate Care (Adult and Pediatrics) bed on MEDICAL service .  The need is secondary to as above.    * Acute saddle pulmonary embolism with acute cor pulmonale (HCC)- (present on admission)  Assessment & Plan  Noted on CT scan  IV heparin has been initiated  Interventional radiology consultation for thrombectomy as she is clearly not a candidate for thrombolysis given her retroperitoneal hematoma  Admit to the IMCU in guarded condition    Retroperitoneal hematoma- (present on admission)  Assessment & Plan  She has a right psoas and retroperitoneal hematoma  She denies recent trauma though is a poor historian.   She has not been on blood thinners except aspirin 81 mg daily  She will be given IV heparin and close monitoring of  this hematoma with low threshold for CT scan  Follow her hemoglobin levels closely with serial hemoglobins in the IMCU    Acute deep vein thrombosis (DVT) of proximal vein of right lower extremity (HCC)- (present on admission)  Assessment & Plan  Noted on duplex.  IV heparin drip and if she develops bleeding and cannot tolerate anticoagulation then inferior vena cava filter will be placed    SIRS (systemic inflammatory response syndrome) (HCC)- (present on admission)  Assessment & Plan  SIRS criteria identified on my evaluation include:  Leukocytosis, with WBC greater than 12,000 her white blood count 17,000 and she is tachycardic in the 120s  SIRS is non-infectious, the patient does not have sepsis  Secondary to acute pulmonary embolism.  She does not appear to have source of a bacterial infection.      Chronic respiratory failure with hypoxia (HCC)- (present on admission)  Assessment & Plan  She has been on 4 L nasal cannula after her last discharge and remains on it  Continuous pulse oximetry    HTN (hypertension)- (present on admission)  Assessment & Plan  Her outpatient regimen will be held given current hypotension and restarted accordingly        VTE prophylaxis: IV heparin drip

## 2024-05-04 NOTE — ED NOTES
Med rec completed per med list from [facility]   Allergies reviewed     Unknown last doses of medications taken   No MAR was sent with pt, just a med list   Called facility to have a MAR faxed over

## 2024-05-04 NOTE — PROGRESS NOTES
[place] at bedside.   1159: 300 Wong   1201: Amio bolus started  1202: BP not reading left arm, cuff on right leg used  1203 BP 99/54,  02 97% 4L   1204 91/53 hr 128

## 2024-05-04 NOTE — ED PROVIDER NOTES
ED Provider Note    Scribed for Jerri Vergara M.D. by Yanira Tang. 5/4/2024, 5:12 AM.    Primary care provider: Michelle Jim P.A.-C.  Means of arrival: EMS  History obtained from: Patient  History limited by: None    CHIEF COMPLAINT  Chief Complaint   Patient presents with    GLF     BIBA from skilled nursing facility for GLF yesterday and syncopal episode this am. Unknown LOC, unknown head strike, +thinners. Pt arrives with extensive bruising to lower abdomen.        HPI/ROS  Tereza Sánchez is a 78 y.o. female who presents to the Emergency Department for evaluation of injuries sustained during a fall. Patient is a resident at [facility] who reportedly called EMS for GLF that occurred yesterday and large bruise on left lower abdomen. She additionally had a syncopal episode this morning witnessed by nursing staff, however, the patient did not fall at that time. Patient is unable to recall the fall and is unsure whether or not she hit her head. The patient believes she receives medicine through abdominal injections, but is uncertain.  She thinks her bruising may be from this, however she cannot recall.  She denies any acute pain at this time.  She denies chest pain, shortness of breath, nausea, vomiting.  Per MAR at outside facility, patient is only on Asprin, however, EMS reported a history of Xarelto use. She is unsure whether or not she is currently on Xarelto. Initial glucose on arrival was 228. She denies any pain, chest pain, or shortness of breath. She is on 4L oxygen at baseline since hospital discharge. No additional pain or symptoms noted at this time.     EXTERNAL RECORDS REVIEWED  Patient was admitted on 4/20/24 for 10 days presenting with syncope, hypotension, weakness she tested positive for COVID-19 at that time. She was intubated for 2 days.     LIMITATION TO HISTORY   Select: : None    OUTSIDE HISTORIAN(S):  Family   and EMS      PAST MEDICAL HISTORY   has a past medical  history of Anesthesia, Arthritis, Asthma, Bowel habit changes, Breath shortness, Cancer (HCC), Chiari malformation (1970's), Chickenpox, Chronic back pain, Contracture of hand joint, Decreased lung capacity, Dental disorder, Disorder of thyroid, Heart burn, High cholesterol, Hypertension, Indigestion, Joint replacement, Obesity, Pain, Pain (08/06/2018), Peripheral edema (06/06/2013), Pneumonia, PONV (postoperative nausea and vomiting), Psychiatric problem, Scoliosis, Shortness of breath (08/06/2018), Sleep apnea, Sleep apnea (08/07/2018), Snoring, sore throat (01/15/2015), Supplemental oxygen dependent, and Syringomyelia (HCC).    SURGICAL HISTORY   has a past surgical history that includes rubin by laparoscopy (2002); shoulder arthroplasty total (2004); hip arthroplasty total (5/19/08); hip arthroplasty total; us-needle core bx-breast panel; node biopsy sentinel (2/6/2015); thyroid lobectomy (Left, 8/17/2018); thyroidectomy total (8/17/2018); laminotomy; hip replacement, total; cyst excision (1973); shunt insertion; mastectomy (2/6/2015); hip revision total (Left, 9/2/2021); other abdominal surgery; and reconstr total shoulder implant (Left, 5/4/2022).    SOCIAL HISTORY  Social History     Tobacco Use    Smoking status: Never    Smokeless tobacco: Never   Vaping Use    Vaping Use: Never used   Substance Use Topics    Alcohol use: No     Alcohol/week: 0.0 oz    Drug use: No      Social History     Substance and Sexual Activity   Drug Use No       FAMILY HISTORY  Family History   Problem Relation Age of Onset    Hypertension Mother     Sleep Apnea Brother     Cancer Neg Hx     Diabetes Neg Hx     Stroke Neg Hx     Heart Disease Neg Hx     Ovarian Cancer Neg Hx     Tubal Cancer Neg Hx     Peritoneal Cancer Neg Hx     Colorectal Cancer Neg Hx     Breast Cancer Neg Hx        CURRENT MEDICATIONS  Home Medications       Reviewed by Wilner Easley (Pharmacy Tech) on 05/04/24 at 0839  Med List Status: Complete  "    Medication Last Dose Status   acetaminophen (TYLENOL) 325 MG Tab UNK Active   alendronate (FOSAMAX) 70 MG Tab UNK Active   amitriptyline (ELAVIL) 100 MG Tab UNK Active   aspirin 81 MG EC tablet UNK Active   atorvastatin (LIPITOR) 10 MG Tab UNK Active   benazepril (LOTENSIN) 20 MG Tab UNK Active   carvedilol (COREG) 6.25 MG Tab UNK Active   DULoxetine (CYMBALTA) 30 MG Cap DR Particles UNK Active   ferrous sulfate 325 (65 Fe) MG tablet UNK Active   furosemide (LASIX) 20 MG Tab UNK Active   hydrochlorothiazide (MICROZIDE) 12.5 MG capsule UNK Active   HYDROcodone-acetaminophen (NORCO) 5-325 MG Tab per tablet UNK Active   potassium chloride SA (KDUR) 20 MEQ Tab CR UNK Active   pregabalin (LYRICA) 150 MG Cap UNK Active   QUEtiapine (SEROQUEL) 25 MG Tab UNK Active   senna-docusate (PERICOLACE OR SENOKOT S) 8.6-50 MG Tab UNK Active   tamsulosin (FLOMAX) 0.4 MG capsule UNK Active                    ALLERGIES  Allergies   Allergen Reactions    Sulfamethoxazole W-Trimethoprim Rash     * full body rash*>  10 years ago    Morphine Vomiting     hallucinations       PHYSICAL EXAM  VITAL SIGNS: /76   Pulse (!) 117   Temp 36.8 °C (98.3 °F)   Resp 18 Comment: RA  Ht 1.626 m (5' 4\")   Wt 77.1 kg (170 lb)   SpO2 94% Comment: 4lpm O2 via NC  BMI 29.18 kg/m²   Nursing note and vitals reviewed.  Constitutional: Well-developed and well-nourished.  Ill-appearing  HENT: Head is normocephalic and atraumatic.  Eyes: extra-ocular movements intact  Cardiovascular: Tachycardic rate and regular rhythm. No murmur heard.  Pulmonary/Chest: Breath sounds normal. No wheezes or rales.   Abdominal: Soft and non-tender. No distention.  Bruising to abdominal wall.  No tenderness to palpation of the abdominal wall  Musculoskeletal: Extremities exhibit normal range of motion without edema or tenderness. No spinal tenderness.   Neurological: Awake and alert  Skin: Skin is warm and dry. No rash.     DIAGNOSTIC STUDIES:  LABS  Labs Reviewed "   TROPONIN - Abnormal; Notable for the following components:       Result Value    Troponin T 82 (*)     All other components within normal limits   CBC WITH DIFFERENTIAL - Abnormal; Notable for the following components:    WBC 17.9 (*)     RBC 3.83 (*)     Hemoglobin 11.6 (*)     Hematocrit 35.1 (*)     Neutrophils-Polys 82.30 (*)     Lymphocytes 7.30 (*)     Immature Granulocytes 1.90 (*)     Neutrophils (Absolute) 14.76 (*)     Monos (Absolute) 1.24 (*)     Immature Granulocytes (abs) 0.34 (*)     All other components within normal limits   COMP METABOLIC PANEL - Abnormal; Notable for the following components:    Potassium 3.3 (*)     Glucose 167 (*)     Bun 42 (*)     Total Protein 5.8 (*)     All other components within normal limits   LACTIC ACID   LACTIC ACID   PROCALCITONIN   DIAGNOSTIC ALCOHOL   PROTHROMBIN TIME   APTT   HCG QUAL SERUM   COD (ADULT)   ABO RH CONFIRM   ESTIMATED GFR   APTT   PROTHROMBIN TIME   HEPARIN XA (UNFRACTIONATED)   URINALYSIS,CULTURE IF INDICATED   LACTIC ACID   URINE CULTURE(NEW)   BLOOD CULTURE   BLOOD CULTURE   PROBRAIN NATRIURETIC PEPTIDE, NT       All labs reviewed and independently interpreted by myself    EKG Interpretation:    12 Lead EKG independently interpreted by myself to show:  EKG at 5:17 AM: Sinus tachycardia at a rate of 115, normal axis,  , QRS prolonged at 132 with associated right bundle branch block, QTc 482, no acute ST-T segment changes, no dynamic changes when compared to prior EKG from 4/25/2024    RADIOLOGY  Images independently interpreted by myself prior to radiologist review:  -CXR with atelectasis in right lung   -CT PE notable for  saddle pulmonary embolism  -CT head demonstrates no acute intracranial hemorrhage    Final interpretation by radiology demonstrates:    US-EXTREMITY VENOUS LOWER BILAT   Final Result      EC-ECHOCARDIOGRAM LTD W/O CONT         CT-LSPINE W/O PLUS RECONS   Final Result         1.  No acute traumatic bony injury of the  lumbar spine.   2.  Right psoas muscle and retroperitoneal hematoma      CT-TSPINE W/O PLUS RECONS   Final Result         1.  No acute traumatic bony injury of the thoracic spine.      CT-ABDOMEN-PELVIS WITH   Final Result         1.  Right retroperitoneal and psoas muscle hematoma      These findings were discussed with the patient's clinician, JERRI VERGARA, on 5/4/2024 7:06 AM.      CT-CTA CHEST PULMONARY ARTERY W/ RECONS   Final Result         1.  Large saddle pulmonary embolus with changes of right heart strain   2.  Hazy bilateral pulmonary infiltrates      These findings were discussed with the patient's clinician, Jerri Vergara, on 5/4/2024 7:02 AM.      CT-CSPINE WITHOUT PLUS RECONS   Final Result         1.  No acute traumatic bony injury of the cervical spine is apparent.      CT-HEAD W/O   Final Result         1.  No acute intracranial abnormality.   2.  Atherosclerosis.         DX-CHEST-PORTABLE (1 VIEW)   Final Result         1.  Bibasilar atelectasis and/or subtle infiltrates.   2.  Atherosclerosis      IR-THROMBO MECHANICAL ARTERY,INIT    (Results Pending)     The radiologist's interpretation of all radiological studies have been reviewed by me.          COURSE & MEDICAL DECISION MAKING    Hydration: Based on the patient's presentation of Hypotension the patient was given IV fluids. IV Hydration was used because oral hydration was not adequate alone. Upon recheck following hydration, the patient was improved.    INITIAL ASSESSMENT, ED COURSE AND PLAN    Patient is a 78-year-old female who presents for evaluation of falls and possible syncope.  Differential diagnosis includes closed head injury, intrathoracic injury, intracranial hemorrhage, intra-abdominal injury, spinal injury, syncope, arrhythmia, pulmonary embolism, electrolyte derangement.  Diagnostic workup includes labs and pan scan trauma CT given patient's poor historian and possible anticoagulation.  I will obtain the chest CT a part of  the trauma scan as a PE study as patient is high risk for pulmonary embolism with recent hospitalization.    Patient's initial vitals notable for tachycardia, patient is treated with gentle IV fluid bolus.  Chest x-ray returns and demonstrates no acute abnormalities, possible subtle right sided atelectasis.  EKG returns and demonstrates no evidence of acute arrhythmia or ischemia, sinus tachycardia noted.  Imaging reviewed and patient is noted to have a large saddle pulmonary embolism.  I have not initiated heparin therapy yet as she has had multiple falls and I am concerned about possible bleeding.  I do not see any obvious intracranial hemorrhage on head CT, will discuss with radiology regarding abdominal CT.    Patient's labs returned are notable for leukocytosis of 17.9, procalcitonin is within normal limits.  At this time leukocytosis likely reactive to acute illness and saddle pulmonary embolism.  No evidence of infection at this time.  Troponin is elevated at 82 consistent with heart strain from pulmonary embolism.  Patient does have slight anemia with a hemoglobin of 11.6.      Case discussed with radiologist and patient was noted to have a small psoas muscle hematoma on the right side extending into the retroperitoneal space.  At this time case was discussed with intensivist Dr. Gonzalez and trauma surgeon Dr. Machuca.  After discussion we feel that pulmonary embolism is causing critical illness at this time and patient could quickly decompensate, she has had borderline hypotension anticoagulation is indicated.  Will not give thrombolysis however will monitor closely for signs of bleeding.  Intensivist also recommended thrombectomy.  Stat echocardiogram and DVT studies are ordered at this time.    Case was discussed with interventional radiologist Dr. Amezquita who will plan for thrombectomy as soon as possible.  Patient and  updated on plan of care and are amenable to this plan of care.  Case discussed  with Dr. Gonzalez and Dr. Proctor, patient will be placed in the IMCU for close monitoring.  Patient is hospitalized in critical condition.    CRITICAL CARE  The very real possibilty of a deterioration of this patient's condition required the highest level of my preparedness for sudden, emergent intervention.  I provided critical care services, which included medication orders, frequent reevaluations of the patient's condition and response to treatment, ordering and reviewing test results, and discussing the case with various consultants.  The critical care time associated with the care of the patient was 48minutes. Review chart for interventions. This time is exclusive of any other billable procedures.        REASSESSMENTS   5:12 AM - Patient seen and examined at bedside. Ordered for labs and radiology to evaluate her symptoms. Discussed plan of care. Patient agrees to the plan. Patient's blood pressure on initial evaluation was 106/70. Requested nurse inform me if patient's blood pressure decreases.      6:44 AM - Nurse informed me patient's blood pressure has decreased to 90s systolic. She will be started on 500 mL bolus infusion.     6:50 AM - Upon reassessment, patient is resting comfortably with stable vital signs. No new complaints at this time. Discussed results with patient and family. Repeat blood pressure at 111/600.    7:07 AM - Informed of CT-head results at this time.     7:24 AM - I discussed the patient's case and the above findings with Dr. Hargrove (ICU) who recommended discussing case with trauma surgery prior to initiating heparin due to psoas hematoma and consulting IR for thrombectomy.     7:44 AM - I discussed the patient's case and the above findings with Dr. Machuca (Trauma Surgery) who agreed with plan to start heparin.     8:03 AM - I discussed the patient's case and the above findings with Dr. Amezquita (IR) who agreed to evaluate for thrombectomy.     8:04 AM - Upon reassessment,  patient continues to be resting comfortably with stable vital signs. Her blood pressure is 118/65. No new complaints at this time. Discussed results with patient and family as well as consultation with ICU, trauma surgery, and IR. She is agreeable to this plan.      DISPOSITION AND DISCUSSIONS  I have discussed management of the patient with the following physicians and MERCED's:  Dr. Hargrove, Dr. Machuca, and Dr. Amezquita. Dr. Proctor    Discussion of management with other [facility] or appropriate source(s): None     Escalation of care considered, and ultimately not performed:see above    Barriers to care at this time, including but not limited to: none.     Decision tools and prescription drugs considered including, but not limited to: see above.    DISPOSITION:  Patient will be hospitalized by Dr. Proctor in critical condition.    FINAL IMPRESSION  1. Other acute pulmonary embolism, unspecified whether acute cor pulmonale present (HCC)    2. Fall, initial encounter    3. Retroperitoneal hematoma          Yanira GOYAL (Emelia), am scribing for, and in the presence of, Jerri Vergara M.D..    Electronically signed by: Yanira Tang (Emelia), 5/4/2024    IJerri M.D. personally performed the services described in this documentation, as scribed by Yanira Tang in my presence, and it is both accurate and complete.    The note accurately reflects work and decisions made by me.  Jerri Vergara M.D.  5/4/2024  1:25 PM

## 2024-05-04 NOTE — CONSULTS
PULMONARY AND CRITICAL CARE MEDICINE CONSULTATION    Date of Consultation:  5/4/2024    Requesting Physician:  Navneet Proctor MD    Consulting Physician:  Harvey Hargrove MD    Reason for Consultation: Cardiogenic shock in the setting of acute saddle pulmonary embolism    Chief Complaint: Cardiogenic shock    History of Present Illness:    I was kindly asked to see and evaluate Tereza Sánchez, a 78 y.o. female for evaluation and management of the above problem.    This lady has a history of primary hypertension, SELWYN, Chiari malformation and chronic hypoxemic respiratory failure.  She was recently hospitalized at [facility] from on or about 4/20/2024 to 4/30/2024 with respiratory failure, SARS-CoV-2 and septic shock.  She was brought into the ED this morning with syncope.  Evaluation revealed a saddle pulmonary embolism with acute right heart failure as well as bilateral lower extremity deep venous thrombosis.  She was also found to have a right psoas/retroperitoneal hematoma.  She was evaluated by Dr. Machuca from trauma surgery and at this time, he feels she is safe for anticoagulation with heparin.  She underwent emergent pulmonary artery thrombectomy in IR.  Following this procedure she developed shock and was transferred to the ICU.    Medications Prior to Admission:    No current facility-administered medications on file prior to encounter.     Current Outpatient Medications on File Prior to Encounter   Medication Sig Dispense Refill    hydrochlorothiazide (MICROZIDE) 12.5 MG capsule Take 12.5 mg by mouth every day. Hold for SBP less than 100 or HR less than 60      potassium chloride SA (KDUR) 20 MEQ Tab CR Take 20 mEq by mouth every day. Hold if lasix is held      benazepril (LOTENSIN) 20 MG Tab Take 20 mg by mouth every day. Hold for SPB less than 100 and HR less than 60      senna-docusate (PERICOLACE OR SENOKOT S) 8.6-50 MG Tab Take 2 Tablets by mouth at bedtime.      tamsulosin (FLOMAX) 0.4 MG capsule  Take 0.4 mg by mouth at bedtime.      acetaminophen (TYLENOL) 325 MG Tab Take 650 mg by mouth every 6 hours as needed. Indications: Fever, Pain      carvedilol (COREG) 6.25 MG Tab Take 1 Tablet by mouth 2 times a day with meals. 60 Tablet 1    ferrous sulfate 325 (65 Fe) MG tablet TAKE 1 TABLET BY MOUTH EVERY DAY 90 Tablet 2    atorvastatin (LIPITOR) 10 MG Tab TAKE 1 TABLET BY MOUTH EVERY EVENING. CHOLESTEROL 100 Tablet 1    DULoxetine (CYMBALTA) 30 MG Cap DR Particles Take 30 mg by mouth every day.      alendronate (FOSAMAX) 70 MG Tab Take 70 mg by mouth every Monday. Indications: Osteoporosis      pregabalin (LYRICA) 150 MG Cap Take 150 mg by mouth 2 times a day.      amitriptyline (ELAVIL) 100 MG Tab Take 100 mg by mouth every evening.      furosemide (LASIX) 20 MG Tab Take 20 mg by mouth every day. Hold for SBP <100  Indications: Edema      aspirin 81 MG EC tablet Take 81 mg by mouth every day.      QUEtiapine (SEROQUEL) 25 MG Tab Take 25 mg by mouth at bedtime.      HYDROcodone-acetaminophen (NORCO) 5-325 MG Tab per tablet Take 1 Tablet by mouth every 6 hours as needed. Indications: Pain  0       Current Medications:      Current Facility-Administered Medications:     heparin infusion 25,000 units in 500 mL 0.45% NACL, 0-30 Units/kg/hr (Adjusted), Intravenous, Continuous, Navneet Proctor M.D., Last Rate: 22.9 mL/hr at 05/04/24 0756, 18 Units/kg/hr at 05/04/24 0756    [START ON 5/5/2024] DULoxetine (Cymbalta) capsule 30 mg, 30 mg, Oral, DAILY, Navneet Proctor M.D.    pregabalin (Lyrica) capsule 150 mg, 150 mg, Oral, BID, Navneet Proctor M.D.    QUEtiapine (SEROquel) tablet 25 mg, 25 mg, Oral, QHS, Navneet Proctor M.D.    acetaminophen (Tylenol) tablet 650 mg, 650 mg, Oral, Q6HRS PRN, Navneet Proctor M.D.    norepinephrine (Levophed) 8 mg in 250 mL NS infusion (premix), 0-1 mcg/kg/min (Ideal), Intravenous, Continuous, Kevin Cespedes M.D., Last Rate: 102.6 mL/hr at 05/04/24 1313, 1 mcg/kg/min at 05/04/24 1313     vasopressin (Vasostrict) 20 Units in  mL Infusion, 0.03 Units/min, Intravenous, Continuous, Kevin Cespedes M.D., Last Rate: 9 mL/hr at 05/04/24 1210, 0.03 Units/min at 05/04/24 1210    LR (Bolus) infusion 500 mL, 500 mL, Intravenous, Once, Harvey Hargrove M.D.    dextrose 5% infusion, , Intravenous, Continuous, Harvey Hargrove M.D.    amiodarone (Nexterone) 360 mg/200 mL infusion, 1 mg/min, Intravenous, Once **FOLLOWED BY** amiodarone (Nexterone) 360 mg/200 mL infusion, 0.5 mg/min, Intravenous, Continuous, Harvey Hargrove M.D.    phenylephrine 40 mg/250 mL NS premix, 0-5 mcg/kg/min (Ideal), Intravenous, Continuous, Harvey Hargrove M.D.    magnesium sulfate IVPB premix 4 g, 4 g, Intravenous, Once, Harvey Hargrove M.D. MD Alert...ICU Electrolyte Replacement per Pharmacy, , Other, PHARMACY TO DOSE, Harvey Hargrove M.D.    insulin regular (HumuLIN R,NovoLIN R) injection, 2-9 Units, Subcutaneous, Q6HRS **AND** POC blood glucose manual result, , , Q6H **AND** NOTIFY MD and PharmD, , , Once **AND** Administer 20 grams of glucose (approximately 8 ounces of fruit juice) every 15 minutes PRN FSBG less than 70 mg/dL, , , PRN **AND** dextrose 10 % BOLUS 25 g, 25 g, Intravenous, Q15 MIN PRN, Harvey Hargrove M.D.    Allergies:    Sulfamethoxazole w-trimethoprim and Morphine    Past Surgical History:    Past Surgical History:   Procedure Laterality Date    PB RECONSTR TOTAL SHOULDER IMPLANT Left 5/4/2022    Procedure: LEFT REVERSE TOTAL SHOULDER ARTHROPLASTY;  Surgeon: Vinicius Whaley M.D.;  Location: SURGERY SAME DAY [facility];  Service: Orthopedics    HIP REVISION TOTAL Left 9/2/2021    Procedure: REVISION, TOTAL ARTHROPLASTY, HIP;  Surgeon: Daniel Allisno M.D.;  Location: SURGERY [facility];  Service: Orthopedics    THYROID LOBECTOMY Left 8/17/2018    Procedure: THYROID LOBECTOMY;  Surgeon: Adelina Wilson M.D.;  Location: SURGERY SAME DAY [facility];   "Service: General    THYROIDECTOMY TOTAL  8/17/2018    Procedure: NIMS RECURRENT LARYNGEAL NERVE MONITORING;  Surgeon: Adelina Wilson M.D.;  Location: SURGERY SAME DAY [facility];  Service: General    NODE BIOPSY SENTINEL  2/6/2015    Performed by Adelina Wilson M.D. at SURGERY [facility]    MASTECTOMY  2/6/2015    right-Performed by Adelina Wilson M.D. at SURGERY [facility]    HIP ARTHROPLASTY TOTAL  5/19/08    Performed by FARTUN ERAZO at SURGERY [facility]    SHOULDER ARTHROPLASTY TOTAL  2004    Right    JAN BY LAPAROSCOPY  2002    CYST EXCISION  1973    \"remove cyst    HIP ARTHROPLASTY TOTAL      HIP REPLACEMENT, TOTAL      LAMINOTOMY      OTHER ABDOMINAL SURGERY      SHUNT INSERTION      cerebral shunt    US-NEEDLE CORE BX-BREAST PANEL         Past Medical History:    Past Medical History:   Diagnosis Date    Anesthesia     PONV    Arthritis     Left hip, knees, ankles    Asthma     Inhaler use daily.    Bowel habit changes     Constipation    Breath shortness     asthma    Cancer (HCC)     Breast 2015    Chiari malformation 1970's    shunt  in place    Chickenpox     Chronic back pain     2/2 scoliosis and syringomyelia     Contracture of hand joint     left     Decreased lung capacity     Dental disorder     upper/lower    Disorder of thyroid     Heart burn     High cholesterol     Hypertension     Indigestion     Joint replacement     Right shoulder, cervical    Obesity     Pain     Pain 08/06/2018    Back, neck and legs    Peripheral edema 06/06/2013    Pneumonia     PONV (postoperative nausea and vomiting)     Psychiatric problem     depression, anxiety    Scoliosis     Shortness of breath 08/06/2018    Chronic    Sleep apnea     uses O2 at night 2L    Sleep apnea 08/07/2018    Patient states having sleep study October 2018.    Snoring     No sleep study    sore throat 01/15/2015    Supplemental oxygen dependent     Syringomyelia (HCC)     surgery 1973, 1977       Social " History:    Social History     Socioeconomic History    Marital status:      Spouse name: Not on file    Number of children: Not on file    Years of education: Not on file    Highest education level: 12th grade   Occupational History    Not on file   Tobacco Use    Smoking status: Never    Smokeless tobacco: Never   Vaping Use    Vaping Use: Never used   Substance and Sexual Activity    Alcohol use: No     Alcohol/week: 0.0 oz    Drug use: No    Sexual activity: Not Currently     Comment:  - 51 years    Other Topics Concern    Not on file   Social History Narrative    Not on file     Social Determinants of Health     Financial Resource Strain: Low Risk  (9/28/2023)    Overall Financial Resource Strain (CARDIA)     Difficulty of Paying Living Expenses: Not very hard   Food Insecurity: No Food Insecurity (9/28/2023)    Hunger Vital Sign     Worried About Running Out of Food in the Last Year: Never true     Ran Out of Food in the Last Year: Never true   Transportation Needs: No Transportation Needs (9/28/2023)    PRAPARE - Transportation     Lack of Transportation (Medical): No     Lack of Transportation (Non-Medical): No   Physical Activity: Inactive (9/28/2023)    Exercise Vital Sign     Days of Exercise per Week: 0 days     Minutes of Exercise per Session: 0 min   Stress: Stress Concern Present (9/28/2023)    [employer] [place] [employer] - Occupational Stress Questionnaire     Feeling of Stress : Very much   Social Connections: Moderately Isolated (9/28/2023)    Social Connection and Isolation Panel [NHANES]     Frequency of Communication with Friends and Family: Once a week     Frequency of Social Gatherings with Friends and Family: Once a week     Attends Religion Services: Never     Active Member of Clubs or Organizations: Yes     Attends Club or Organization Meetings: Never     Marital Status:    Intimate Partner Violence: Not At Risk (9/28/2023)    Humiliation, Afraid, Rape,  "and Kick questionnaire     Fear of Current or Ex-Partner: No     Emotionally Abused: No     Physically Abused: No     Sexually Abused: No   Housing Stability: Low Risk  (9/28/2023)    Housing Stability Vital Sign     Unable to Pay for Housing in the Last Year: No     Number of Places Lived in the Last Year: 1     Unstable Housing in the Last Year: No       Family History:    Family History   Problem Relation Age of Onset    Hypertension Mother     Sleep Apnea Brother     Cancer Neg Hx     Diabetes Neg Hx     Stroke Neg Hx     Heart Disease Neg Hx     Ovarian Cancer Neg Hx     Tubal Cancer Neg Hx     Peritoneal Cancer Neg Hx     Colorectal Cancer Neg Hx     Breast Cancer Neg Hx        Review of System:    Review of Systems   Unable to perform ROS: Acuity of condition       Physical Examination:    BP 93/57   Pulse (!) 134   Temp (!) 35.6 °C (96 °F) (Temporal)   Resp (!) 28   Ht 1.626 m (5' 4\")   Wt 77.1 kg (170 lb)   SpO2 100%   BMI 29.18 kg/m²   Physical Exam  Constitutional:       Appearance: She is not diaphoretic.   HENT:      Head: Normocephalic.   Eyes:      Pupils: Pupils are equal, round, and reactive to light.   Cardiovascular:      Comments: Supraventricular tachycardia  Pulmonary:      Breath sounds: Rales (Few crackles) present. No wheezing.   Abdominal:      General: There is no distension.      Tenderness: There is no abdominal tenderness.   Musculoskeletal:      Right lower leg: Edema (Greater than left) present.      Left lower leg: Edema present.   Skin:     General: Skin is warm.      Capillary Refill: Capillary refill takes less than 2 seconds.   Neurological:      Comments: She is slowly answering questions appropriately         Laboratory Data:        Recent Labs     05/04/24  0519 05/04/24  1230   WBC 17.9* 24.4*   RBC 3.83* 3.35*   HEMOGLOBIN 11.6* 10.2*   HEMATOCRIT 35.1* 30.7*   MCV 91.6 91.6   MCH 30.3 30.4   MCHC 33.0 33.2   RDW 48.0 49.1   PLATELETCT 358 399   MPV 10.7 10.9 "     Recent Labs     05/04/24  0519 05/04/24  1230   SODIUM 135 132*   POTASSIUM 3.3* 4.2   CHLORIDE 99 98   CO2 22 20   GLUCOSE 167* 251*   BUN 42* 36*   CREATININE 0.60 0.62   CALCIUM 8.6 8.0*                   Imaging:    I personally viewed all the available CXR and CT scan images as well as reviewed the radiology interpretation reports.    DX-CHEST-PORTABLE (1 VIEW)   Final Result      Right IJ catheter, tip projects over cavoatrial junction region. No pneumothorax.      IR-THROMBO MECHANICAL ARTERY,INIT   Final Result      1.  Ultrasound guided access RIGHT common femoral vein.   2.  BILATERAL selective pulmonary arteriogram demonstrating bilateral pulmonary emboli, much more extensive on the LEFT THAN THE RIGHT.   3.  LEFT pulmonary thrombectomy.   4.  Pursestring suture should be removed in 6-8 hours.         US-EXTREMITY VENOUS LOWER BILAT   Final Result      EC-ECHOCARDIOGRAM LTD W/O CONT   Final Result      CT-LSPINE W/O PLUS RECONS   Final Result         1.  No acute traumatic bony injury of the lumbar spine.   2.  Right psoas muscle and retroperitoneal hematoma      CT-TSPINE W/O PLUS RECONS   Final Result         1.  No acute traumatic bony injury of the thoracic spine.      CT-ABDOMEN-PELVIS WITH   Final Result         1.  Right retroperitoneal and psoas muscle hematoma      These findings were discussed with the patient's clinician, JERRI VERGARA, on 5/4/2024 7:06 AM.      CT-CTA CHEST PULMONARY ARTERY W/ RECONS   Final Result         1.  Large saddle pulmonary embolus with changes of right heart strain   2.  Hazy bilateral pulmonary infiltrates      These findings were discussed with the patient's clinician, Jerri Vergara, on 5/4/2024 7:02 AM.      CT-CSPINE WITHOUT PLUS RECONS   Final Result         1.  No acute traumatic bony injury of the cervical spine is apparent.      CT-HEAD W/O   Final Result         1.  No acute intracranial abnormality.   2.  Atherosclerosis.         DX-CHEST-PORTABLE  (1 VIEW)   Final Result         1.  Bibasilar atelectasis and/or subtle infiltrates.   2.  Atherosclerosis          Assessment and Plan:    * Acute saddle pulmonary embolism with acute cor pulmonale (HCC)- (present on admission)  Assessment & Plan  Joycelyn score 5  Echocardiogram with RV dysfunction  S/P emergent pulmonary artery thrombectomy on 5/4  Full anticoagulation with heparin - I am titrating a heparin infusion based upon serial anti-Xa monitoring    Cardiogenic shock (HCC)  Assessment & Plan  I am titrating norepinephrine and vasopressin to keep mean arterial pressure greater than 65    Paroxysmal supraventricular tachycardia (HCC)  Assessment & Plan  Developed following pulmonary artery thrombectomy  Spontaneously converted into sinus tachycardia without intervention  Load with amiodarone and begin amiodarone infusion  Optimize potassium and magnesium    Acute deep vein thrombosis (DVT) of proximal vein of right lower extremity (HCC)- (present on admission)  Assessment & Plan  Bilateral lower extremity DVT  Full anticoagulation with heparin - I am titrating a heparin infusion based upon serial anti-Xa determinations    Retroperitoneal hematoma- (present on admission)  Assessment & Plan  CT imaging confirms a right psoas and retroperitoneal hematoma without evidence of active extravasation  Evaluated by Dr. Machuca from trauma surgery - okay for full anticoagulation with heparin at this time  Trend hemoglobin every 6 hours  If this lady develops any evidence of hemorrhage on heparin, then the heparin will need to be discontinued and reversed with protamine and she will require an IVC filter    Chronic respiratory failure with hypoxia (HCC)- (present on admission)  Assessment & Plan  On domiciliary oxygen    Primary hypertension- (present on admission)  Assessment & Plan  Currently in cardiogenic shock on vasopressor support        High risk of deterioration and worsening vital organ dysfunction and death without  the above critical care interventions.    I have assessed and reassessed her respiratory status, blood pressure, hemodynamics and cardiovascular status with titration of norepinephrine and vasopressin.  She is at increased risk for worsening respiratory and cardiovascular system dysfunction.    Thank you for allowing me to participate in the care of this lady.  I will continue to follow her with great interest.    The patient is critically ill.  Critical care time = 135 minutes in directly providing and coordinating critical care and extensive data review.  No time overlap and excludes procedures.    Discussed with RN    Harvey Hargrove MD  Pulmonary and Critical Care Medicine

## 2024-05-04 NOTE — ED TRIAGE NOTES
"Chief Complaint   Patient presents with    GLF     BIBA from skilled nursing facility for GLF yesterday and syncopal episode this am. Unknown LOC, unknown head strike, +thinners. Pt arrives with extensive bruising to lower abdomen.      Per EMS, pt had syncopal episode this AM when getting up to restroom. Skilled nursing facility staff reported pt was unresponsive for approx 5 mins - cyanotic during this time. BGL on scene 228. Pt arrives GCS 15, spo2 94% on 4lpm O2 via NC (baseline use per pt). EMS reports skilled nursing facility staff was unable to clarify why pt wasn't taken to hospital for GLF sooner or cause of bruising to pts lower abdomen, they denied pt being on lovenox sub q injections.      Pt seen by ERP on arrival. Upgraded to trauma GRN by RUI Vergara.      /76   Pulse (!) 117   Temp 36.8 °C (98.3 °F)   Resp 18 Comment: RA  Ht 1.626 m (5' 4\")   Wt 77.1 kg (170 lb)   SpO2 94% Comment: 4lpm O2 via NC  BMI 29.18 kg/m²      "

## 2024-05-05 ENCOUNTER — HOSPITAL ENCOUNTER (OUTPATIENT)
Dept: RADIOLOGY | Facility: MEDICAL CENTER | Age: 79
End: 2024-05-05
Attending: INTERNAL MEDICINE
Payer: MEDICARE

## 2024-05-05 PROBLEM — R57.0 CARDIOGENIC SHOCK (HCC): Status: RESOLVED | Noted: 2024-05-04 | Resolved: 2024-05-05

## 2024-05-05 LAB
ALBUMIN SERPL BCP-MCNC: 3.2 G/DL (ref 3.2–4.9)
ALBUMIN/GLOB SERPL: 1.5 G/DL
ALP SERPL-CCNC: 119 U/L (ref 30–99)
ALT SERPL-CCNC: 243 U/L (ref 2–50)
ANION GAP SERPL CALC-SCNC: 14 MMOL/L (ref 7–16)
APTT PPP: 26.3 SEC (ref 24.7–36)
APTT PPP: 29.3 SEC (ref 24.7–36)
AST SERPL-CCNC: 168 U/L (ref 12–45)
BASOPHILS # BLD AUTO: 0.2 % (ref 0–1.8)
BASOPHILS # BLD: 0.04 K/UL (ref 0–0.12)
BILIRUB SERPL-MCNC: 0.9 MG/DL (ref 0.1–1.5)
BUN SERPL-MCNC: 33 MG/DL (ref 8–22)
CALCIUM ALBUM COR SERPL-MCNC: 8.6 MG/DL (ref 8.5–10.5)
CALCIUM SERPL-MCNC: 8 MG/DL (ref 8.5–10.5)
CHLORIDE SERPL-SCNC: 99 MMOL/L (ref 96–112)
CO2 SERPL-SCNC: 22 MMOL/L (ref 20–33)
CREAT SERPL-MCNC: 0.46 MG/DL (ref 0.5–1.4)
EOSINOPHIL # BLD AUTO: 0.01 K/UL (ref 0–0.51)
EOSINOPHIL NFR BLD: 0.1 % (ref 0–6.9)
ERYTHROCYTE [DISTWIDTH] IN BLOOD BY AUTOMATED COUNT: 47.8 FL (ref 35.9–50)
FIBRINOGEN PPP-MCNC: 406 MG/DL (ref 215–460)
GFR SERPLBLD CREATININE-BSD FMLA CKD-EPI: 99 ML/MIN/1.73 M 2
GLOBULIN SER CALC-MCNC: 2.1 G/DL (ref 1.9–3.5)
GLUCOSE BLD STRIP.AUTO-MCNC: 104 MG/DL (ref 65–99)
GLUCOSE BLD STRIP.AUTO-MCNC: 112 MG/DL (ref 65–99)
GLUCOSE BLD STRIP.AUTO-MCNC: 116 MG/DL (ref 65–99)
GLUCOSE BLD STRIP.AUTO-MCNC: 141 MG/DL (ref 65–99)
GLUCOSE SERPL-MCNC: 115 MG/DL (ref 65–99)
HCT VFR BLD AUTO: 29.5 % (ref 37–47)
HGB BLD-MCNC: 8.9 G/DL (ref 12–16)
HGB BLD-MCNC: 9.6 G/DL (ref 12–16)
HGB BLD-MCNC: 9.8 G/DL (ref 12–16)
HGB BLD-MCNC: 9.9 G/DL (ref 12–16)
IMM GRANULOCYTES # BLD AUTO: 0.19 K/UL (ref 0–0.11)
IMM GRANULOCYTES NFR BLD AUTO: 1.1 % (ref 0–0.9)
INR PPP: 1.19 (ref 0.87–1.13)
LYMPHOCYTES # BLD AUTO: 1.22 K/UL (ref 1–4.8)
LYMPHOCYTES NFR BLD: 7 % (ref 22–41)
MAGNESIUM SERPL-MCNC: 2.9 MG/DL (ref 1.5–2.5)
MCH RBC QN AUTO: 30.2 PG (ref 27–33)
MCHC RBC AUTO-ENTMCNC: 33.6 G/DL (ref 32.2–35.5)
MCV RBC AUTO: 89.9 FL (ref 81.4–97.8)
MONOCYTES # BLD AUTO: 1.45 K/UL (ref 0–0.85)
MONOCYTES NFR BLD AUTO: 8.3 % (ref 0–13.4)
NEUTROPHILS # BLD AUTO: 14.62 K/UL (ref 1.82–7.42)
NEUTROPHILS NFR BLD: 83.3 % (ref 44–72)
NRBC # BLD AUTO: 0 K/UL
NRBC BLD-RTO: 0 /100 WBC (ref 0–0.2)
PHOSPHATE SERPL-MCNC: 1.9 MG/DL (ref 2.5–4.5)
PLATELET # BLD AUTO: 333 K/UL (ref 164–446)
PMV BLD AUTO: 11 FL (ref 9–12.9)
POTASSIUM SERPL-SCNC: 3.4 MMOL/L (ref 3.6–5.5)
PROT SERPL-MCNC: 5.3 G/DL (ref 6–8.2)
PROTHROMBIN TIME: 15.2 SEC (ref 12–14.6)
RBC # BLD AUTO: 3.28 M/UL (ref 4.2–5.4)
SODIUM SERPL-SCNC: 135 MMOL/L (ref 135–145)
TRIGL SERPL-MCNC: 104 MG/DL (ref 0–149)
UFH PPP CHRO-ACNC: <0.1 IU/ML
UFH PPP CHRO-ACNC: <0.1 IU/ML
UFH PPP CHRO-ACNC: >1.1 IU/ML
WBC # BLD AUTO: 17.5 K/UL (ref 4.8–10.8)

## 2024-05-05 PROCEDURE — 99291 CRITICAL CARE FIRST HOUR: CPT | Performed by: INTERNAL MEDICINE

## 2024-05-05 RX ORDER — HEPARIN SODIUM 5000 [USP'U]/100ML
0-34 INJECTION, SOLUTION INTRAVENOUS CONTINUOUS
Status: DISCONTINUED | OUTPATIENT
Start: 2024-05-05 | End: 2024-05-10

## 2024-05-05 RX ORDER — HEPARIN SODIUM 1000 [USP'U]/ML
40 INJECTION, SOLUTION INTRAVENOUS; SUBCUTANEOUS PRN
Status: DISCONTINUED | OUTPATIENT
Start: 2024-05-05 | End: 2024-05-10

## 2024-05-05 RX ADMIN — QUETIAPINE FUMARATE 25 MG: 25 TABLET ORAL at 20:01

## 2024-05-05 RX ADMIN — HEPARIN SODIUM 34 UNITS/KG/HR: 5000 INJECTION, SOLUTION INTRAVENOUS at 15:53

## 2024-05-05 RX ADMIN — HEPARIN SODIUM 2500 UNITS: 1000 INJECTION INTRAVENOUS; SUBCUTANEOUS at 13:51

## 2024-05-05 RX ADMIN — ACETAMINOPHEN 650 MG: 325 TABLET ORAL at 08:39

## 2024-05-05 RX ADMIN — HEPARIN SODIUM 30 UNITS/KG/HR: 5000 INJECTION, SOLUTION INTRAVENOUS at 04:22

## 2024-05-05 RX ADMIN — DULOXETINE HYDROCHLORIDE 30 MG: 30 CAPSULE, DELAYED RELEASE ORAL at 05:25

## 2024-05-05 RX ADMIN — PREGABALIN 150 MG: 150 CAPSULE ORAL at 05:25

## 2024-05-05 RX ADMIN — PREGABALIN 150 MG: 150 CAPSULE ORAL at 17:55

## 2024-05-05 RX ADMIN — POTASSIUM PHOSPHATE, MONOBASIC AND POTASSIUM PHOSPHATE, DIBASIC 30 MMOL: 224; 236 INJECTION, SOLUTION, CONCENTRATE INTRAVENOUS at 08:32

## 2024-05-05 ASSESSMENT — ENCOUNTER SYMPTOMS
VOMITING: 0
CONSTIPATION: 0
ORTHOPNEA: 0
FEVER: 0
MYALGIAS: 1
HEADACHES: 0
WEAKNESS: 1
SPUTUM PRODUCTION: 0
COUGH: 0
FOCAL WEAKNESS: 0
BACK PAIN: 1
HEMOPTYSIS: 0
SHORTNESS OF BREATH: 0
MYALGIAS: 0
WEAKNESS: 0
DEPRESSION: 0
DIZZINESS: 0
SPEECH CHANGE: 0
SHORTNESS OF BREATH: 1
NECK PAIN: 1
CLAUDICATION: 0
SENSORY CHANGE: 0
SORE THROAT: 0
NAUSEA: 0
BLOOD IN STOOL: 0
ABDOMINAL PAIN: 0
HEARTBURN: 0

## 2024-05-05 ASSESSMENT — PAIN DESCRIPTION - PAIN TYPE
TYPE: ACUTE PAIN

## 2024-05-05 ASSESSMENT — COPD QUESTIONNAIRES
HAVE YOU SMOKED AT LEAST 100 CIGARETTES IN YOUR ENTIRE LIFE: NO/DON'T KNOW
DO YOU EVER COUGH UP ANY MUCUS OR PHLEGM?: NO/ONLY WITH OCCASIONAL COLDS OR INFECTIONS
COPD SCREENING SCORE: 2
DURING THE PAST 4 WEEKS HOW MUCH DID YOU FEEL SHORT OF BREATH: NONE/LITTLE OF THE TIME

## 2024-05-05 ASSESSMENT — LIFESTYLE VARIABLES: SUBSTANCE_ABUSE: 0

## 2024-05-05 NOTE — PROGRESS NOTES
Trauma / Surgical Daily Progress Note    Date of Service  5/5/2024    Chief Complaint  78 y.o. female admitted 5/4/2024 as a non-trauma activation status post GLF at care facility. CT scan demonstrated a saddle pulmonary embolism as well as a right psoas and right retroperitoneal hematoma. Concern for right heart strain. Medical admission with trauma consult.     5/4 Left lung thrombectomy.    Interval Events    Tertiary exam completed.   Hemogram stable on heparin drip.   Abdomen soft. Lower anterior abdominal ecchymosis.     - Okay to continue full anticoagulation. Trauma services will continue to follow.     Review of Systems  Review of Systems   Constitutional:  Positive for malaise/fatigue. Negative for fever.        Deconditioned and chronically ill in appearance.    HENT: Negative.     Respiratory:  Positive for shortness of breath (Improved).    Cardiovascular:  Negative for chest pain.   Gastrointestinal:  Negative for abdominal pain, nausea and vomiting.   Genitourinary: Negative.    Musculoskeletal:  Positive for back pain (Baseline), joint pain (Baseline), myalgias (Baseline) and neck pain (Baseline).   Neurological:  Negative for speech change.   Psychiatric/Behavioral:  Negative for depression and substance abuse.         Vital Signs  Temp:  [35.6 °C (96 °F)-36.9 °C (98.5 °F)] 36.7 °C (98.1 °F)  Pulse:  [] 102  Resp:  [9-54] 26  BP: ()/(44-97) 126/69  SpO2:  [86 %-100 %] 96 %    Physical Exam  Physical Exam  Vitals and nursing note reviewed.   Constitutional:       Appearance: She is not toxic-appearing.      Interventions: Nasal cannula in place.   HENT:      Head: Normocephalic and atraumatic.      Right Ear: External ear normal.      Left Ear: External ear normal.      Nose: Nose normal.      Mouth/Throat:      Mouth: Mucous membranes are moist.      Pharynx: Oropharynx is clear.   Eyes:      General: No scleral icterus.     Pupils: Pupils are equal, round, and reactive to light.    Cardiovascular:      Rate and Rhythm: Tachycardia present.   Pulmonary:      Effort: Pulmonary effort is normal. No tachypnea, accessory muscle usage or respiratory distress.   Chest:      Chest wall: No tenderness.   Abdominal:      General: There is no distension.      Palpations: Abdomen is soft.      Tenderness: There is no abdominal tenderness. There is no guarding or rebound.      Comments: Ecchymosis across anterior abdomen   Musculoskeletal:         General: Deformity (Baseline contractures) present. No tenderness.      Cervical back: Normal range of motion and neck supple.   Skin:     General: Skin is warm and dry.      Coloration: Skin is pale. Skin is not jaundiced.   Neurological:      General: No focal deficit present.      Mental Status: She is alert.      Comments: Conversant   Psychiatric:         Mood and Affect: Mood normal.         Behavior: Behavior normal. Behavior is cooperative.         Laboratory  Recent Results (from the past 24 hour(s))   POCT activated clotting time device results    Collection Time: 24 10:52 AM   Result Value Ref Range    Istat Activated Clotting Time 109 74 - 137 sec   EKG    Collection Time: 24 12:04 PM   Result Value Ref Range    Report       [facility]    Test Date:  2024  Pt Name:    ODETTE NICKERSON                  Department: 161  MRN:        [id number]                      Room:       [facility]  Gender:     Female                       Technician: [facility]  :        [id number]                   Requested By:KAT MORAES  Order #:    [id number]                    Reading MD: Navdeep Chaidez MD    Measurements  Intervals                                Axis  Rate:       126                          P:          61  ME:         132                          QRS:        8  QRSD:       116                          T:          27  QT:         321  QTc:        465    Interpretive Statements  Sinus tachycardia  Right bundle branch block  Compared to ECG  05/04/2024 05:17:21  No significant changes  Electronically Signed On 05- 23:58:37 PDT by Navdeep Chaidez MD     CBC WITHOUT DIFFERENTIAL    Collection Time: 05/04/24 12:30 PM   Result Value Ref Range    WBC 24.4 (H) 4.8 - 10.8 K/uL    RBC 3.35 (L) 4.20 - 5.40 M/uL    Hemoglobin 10.2 (L) 12.0 - 16.0 g/dL    Hematocrit 30.7 (L) 37.0 - 47.0 %    MCV 91.6 81.4 - 97.8 fL    MCH 30.4 27.0 - 33.0 pg    MCHC 33.2 32.2 - 35.5 g/dL    RDW 49.1 35.9 - 50.0 fL    Platelet Count 399 164 - 446 K/uL    MPV 10.9 9.0 - 12.9 fL   Comp Metabolic Panel    Collection Time: 05/04/24 12:30 PM   Result Value Ref Range    Sodium 132 (L) 135 - 145 mmol/L    Potassium 4.2 3.6 - 5.5 mmol/L    Chloride 98 96 - 112 mmol/L    Co2 20 20 - 33 mmol/L    Anion Gap 14.0 7.0 - 16.0    Glucose 251 (H) 65 - 99 mg/dL    Bun 36 (H) 8 - 22 mg/dL    Creatinine 0.62 0.50 - 1.40 mg/dL    Calcium 8.0 (L) 8.5 - 10.5 mg/dL    Correct Calcium 9.0 8.5 - 10.5 mg/dL    AST(SGOT) 43 12 - 45 U/L    ALT(SGPT) 35 2 - 50 U/L    Alkaline Phosphatase 83 30 - 99 U/L    Total Bilirubin 1.5 0.1 - 1.5 mg/dL    Albumin 2.7 (L) 3.2 - 4.9 g/dL    Total Protein 4.7 (L) 6.0 - 8.2 g/dL    Globulin 2.0 1.9 - 3.5 g/dL    A-G Ratio 1.4 g/dL   MAGNESIUM    Collection Time: 05/04/24 12:30 PM   Result Value Ref Range    Magnesium 1.7 1.5 - 2.5 mg/dL   APTT    Collection Time: 05/04/24 12:30 PM   Result Value Ref Range    APTT 36.1 (H) 24.7 - 36.0 sec   Heparin Anti-Xa    Collection Time: 05/04/24 12:30 PM   Result Value Ref Range    Heparin Xa (UFH) <0.10 IU/mL   ESTIMATED GFR    Collection Time: 05/04/24 12:30 PM   Result Value Ref Range    GFR (CKD-EPI) 92 >60 mL/min/1.73 m 2   POCT glucose device results    Collection Time: 05/04/24 12:38 PM   Result Value Ref Range    POC Glucose, Blood 237 (H) 65 - 99 mg/dL   Lactic Acid    Collection Time: 05/04/24  1:21 PM   Result Value Ref Range    Lactic Acid 2.6 (H) 0.5 - 2.0 mmol/L   POCT glucose device results    Collection Time:  05/04/24  3:26 PM   Result Value Ref Range    POC Glucose, Blood 172 (H) 65 - 99 mg/dL   POCT glucose device results    Collection Time: 05/04/24  6:50 PM   Result Value Ref Range    POC Glucose, Blood 101 (H) 65 - 99 mg/dL   HGB    Collection Time: 05/04/24  8:30 PM   Result Value Ref Range    Hemoglobin 10.5 (L) 12.0 - 16.0 g/dL   Heparin Xa (Unfractionated)    Collection Time: 05/04/24  8:30 PM   Result Value Ref Range    Heparin Xa (UFH) <0.10 IU/mL   POCT glucose device results    Collection Time: 05/04/24 11:36 PM   Result Value Ref Range    POC Glucose, Blood 114 (H) 65 - 99 mg/dL   CBC with Differential    Collection Time: 05/05/24  3:05 AM   Result Value Ref Range    WBC 17.5 (H) 4.8 - 10.8 K/uL    RBC 3.28 (L) 4.20 - 5.40 M/uL    Hemoglobin 9.9 (L) 12.0 - 16.0 g/dL    Hematocrit 29.5 (L) 37.0 - 47.0 %    MCV 89.9 81.4 - 97.8 fL    MCH 30.2 27.0 - 33.0 pg    MCHC 33.6 32.2 - 35.5 g/dL    RDW 47.8 35.9 - 50.0 fL    Platelet Count 333 164 - 446 K/uL    MPV 11.0 9.0 - 12.9 fL    Neutrophils-Polys 83.30 (H) 44.00 - 72.00 %    Lymphocytes 7.00 (L) 22.00 - 41.00 %    Monocytes 8.30 0.00 - 13.40 %    Eosinophils 0.10 0.00 - 6.90 %    Basophils 0.20 0.00 - 1.80 %    Immature Granulocytes 1.10 (H) 0.00 - 0.90 %    Nucleated RBC 0.00 0.00 - 0.20 /100 WBC    Neutrophils (Absolute) 14.62 (H) 1.82 - 7.42 K/uL    Lymphs (Absolute) 1.22 1.00 - 4.80 K/uL    Monos (Absolute) 1.45 (H) 0.00 - 0.85 K/uL    Eos (Absolute) 0.01 0.00 - 0.51 K/uL    Baso (Absolute) 0.04 0.00 - 0.12 K/uL    Immature Granulocytes (abs) 0.19 (H) 0.00 - 0.11 K/uL    NRBC (Absolute) 0.00 K/uL   Comp Metabolic Panel (CMP)    Collection Time: 05/05/24  3:05 AM   Result Value Ref Range    Sodium 135 135 - 145 mmol/L    Potassium 3.4 (L) 3.6 - 5.5 mmol/L    Chloride 99 96 - 112 mmol/L    Co2 22 20 - 33 mmol/L    Anion Gap 14.0 7.0 - 16.0    Glucose 115 (H) 65 - 99 mg/dL    Bun 33 (H) 8 - 22 mg/dL    Creatinine 0.46 (L) 0.50 - 1.40 mg/dL    Calcium 8.0 (L)  8.5 - 10.5 mg/dL    Correct Calcium 8.6 8.5 - 10.5 mg/dL    AST(SGOT) 168 (H) 12 - 45 U/L    ALT(SGPT) 243 (H) 2 - 50 U/L    Alkaline Phosphatase 119 (H) 30 - 99 U/L    Total Bilirubin 0.9 0.1 - 1.5 mg/dL    Albumin 3.2 3.2 - 4.9 g/dL    Total Protein 5.3 (L) 6.0 - 8.2 g/dL    Globulin 2.1 1.9 - 3.5 g/dL    A-G Ratio 1.5 g/dL   MAGNESIUM    Collection Time: 05/05/24  3:05 AM   Result Value Ref Range    Magnesium 2.9 (H) 1.5 - 2.5 mg/dL   PHOSPHORUS    Collection Time: 05/05/24  3:05 AM   Result Value Ref Range    Phosphorus 1.9 (L) 2.5 - 4.5 mg/dL   Heparin Anti-Xa    Collection Time: 05/05/24  3:05 AM   Result Value Ref Range    Heparin Xa (UFH) <0.10 IU/mL   ESTIMATED GFR    Collection Time: 05/05/24  3:05 AM   Result Value Ref Range    GFR (CKD-EPI) 99 >60 mL/min/1.73 m 2   Triglyceride    Collection Time: 05/05/24  3:05 AM   Result Value Ref Range    Triglycerides 104 0 - 149 mg/dL   APTT    Collection Time: 05/05/24  3:05 AM   Result Value Ref Range    APTT 26.3 24.7 - 36.0 sec       Fluids    Intake/Output Summary (Last 24 hours) at 5/5/2024 0735  Last data filed at 5/5/2024 0600  Gross per 24 hour   Intake 3023.86 ml   Output 400 ml   Net 2623.86 ml       Core Measures & Quality Metrics  Labs reviewed, Medications reviewed and Radiology images reviewed  Tam catheter: No Tam      DVT: Heparin Drip.  DVT prophylaxis - mechanical: SCDs  Ulcer prophylaxis: Not indicated    Assessed for rehab: Patient was assess for and/or received rehabilitation services during this hospitalization    RAP Score Total: 8    CAGE Results: not completed Blood Alcohol>0.08: no   Denies alcohol use    Mental status adequate for full examination?: Yes    Spine cleared (radiologically and/or clinically): Yes    All current laboratory studies/radiology exams reviewed: Yes    Medications reconciliation has been reviewed: Yes    Completed Consultations:  Trauma services     Pending Consultations:  None    Newly identified diagnoses,  injuries and/or co-morbidities:  None    PDI score not completed.    Assessment/Plan  Trend CBC.  Okay to continue full anticoagulation       Discussed patient condition with Patient and trauma surgery, Dr. Carl Machuca.

## 2024-05-05 NOTE — CARE PLAN
The patient is Watcher - Medium risk of patient condition declining or worsening    Shift Goals  Clinical Goals: stable hemodynamics  Family Goals: a bigger bed to cuddle    Progress made toward(s) clinical / shift goals:        Problem: Knowledge Deficit - Standard  Goal: Patient and family/care givers will demonstrate understanding of plan of care, disease process/condition, diagnostic tests and medications  5/4/2024 2139 by LEN LewisN.  Outcome: Progressing  5/4/2024 2138 by LEN LewisN.  Outcome: Progressing     Problem: Skin Integrity  Goal: Skin integrity is maintained or improved  5/4/2024 2139 by LEN LewisN.  Outcome: Progressing  5/4/2024 2138 by Juan Burns R.N.  Outcome: Progressing     Problem: Fall Risk  Goal: Patient will remain free from falls  5/4/2024 2139 by LEN LewisN.  Outcome: Progressing  5/4/2024 2138 by LEN LewisN.  Outcome: Progressing     Problem: Pain - Standard  Goal: Alleviation of pain or a reduction in pain to the patient’s comfort goal  Outcome: Progressing

## 2024-05-05 NOTE — PROGRESS NOTES
4 Eyes Skin Assessment Completed by JIMMY Robles and JIMMY Mcfarland.    Head WDL  Ears Redness and Blanching  Nose WDL  Mouth WDL  Neck WDL central line  Breast/Chest WDL  Shoulder Blades Redness and Blanching  Spine Redness and Blanching  (R) Arm/Elbow/Hand Redness, Blanching, Bruising, and Edema  (L) Arm/Elbow/Hand Redness, Blanching, and Edema  Abdomen Bruising  Groin Excoriation  Scrotum/Coccyx/Buttocks Redness and Blanching  (R) Leg Edema  (L) Leg Edema  (R) Heel/Foot/Toe Redness and Blanching  (L) Heel/Foot/Toe Redness and Blanching          Devices In Places ECG, Blood Pressure Cuff, Pulse Ox, Arterial Line, SCD's, and Central Line      Interventions In Place Gray Ear Foams, InterDry, Pillows, Q2 Turns, and Heels Loaded W/Pillows    Possible Skin Injury No    Pictures Uploaded Into Epic No, needs to be completed  Wound Consult Placed N/A  RN Wound Prevention Protocol Ordered Yes

## 2024-05-05 NOTE — CARE PLAN
The patient is Unstable - High likelihood or risk of patient condition declining or worsening    Shift Goals  Clinical Goals: stable hemodynamics  Family Goals: a bigger bed to cuddle    Progress made toward(s) clinical / shift goals:    Problem: Knowledge Deficit - Standard  Goal: Patient and family/care givers will demonstrate understanding of plan of care, disease process/condition, diagnostic tests and medications  Outcome: Progressing       Patient is not progressing towards the following goals:

## 2024-05-05 NOTE — PROGRESS NOTES
Critical Care Progress Note    Date of admission  5/4/2024    Chief Complaint  78 y.o. female admitted 5/4/2024 with history of primary hypertension, SELWYN, Chiari malformation and chronic hypoxemic respiratory failure.  She was recently hospitalized at [facility] from on or about 4/20/2024 to 4/30/2024 with respiratory failure, SARS-CoV-2 and septic shock.  She was brought into the ED this morning with syncope.  Evaluation revealed a saddle pulmonary embolism with acute right heart failure as well as bilateral lower extremity deep venous thrombosis.  She was also found to have a right psoas/retroperitoneal hematoma.  She was evaluated by Dr. Machuca from trauma surgery and at this time, he feels she is safe for anticoagulation with heparin.  She underwent emergent pulmonary artery thrombectomy in IR.  Following this procedure she developed shock and was transferred to the ICU.     Hospital Course  5/4 transferred to ICU post thrombectomy with shock on norepinephrine gtt and SVT    Interval Problem Update  Reviewed last 24 hour events:  Neuro: Awake following commands no pain  HR: 100  SBP: 100-140 off pressors  Tmax: afebrile  GI: diabetic diet  UOP: 400ml  Lines: Central line, artline, pruwick  Resp: 2l n/c   Vte: heparin gtt  PPI/H2:n/a  Antibx: none  Stop amio gtt   +1.9L net   Replace k and phos  Mobility  Keep in ICU for close monitoring with anticoagulation and retroperitoneal hemorrhage    Review of Systems  Review of Systems   Constitutional:  Positive for malaise/fatigue. Negative for fever.   HENT:  Negative for sore throat.    Respiratory:  Negative for cough and shortness of breath.    Cardiovascular:  Negative for chest pain, orthopnea, claudication and leg swelling.   Gastrointestinal:  Negative for abdominal pain, blood in stool, constipation and nausea.   Musculoskeletal:  Negative for myalgias.   Neurological:  Negative for sensory change, speech change, focal weakness and weakness.    Psychiatric/Behavioral:  Negative for depression and substance abuse.         Vital Signs for last 24 hours   Temp:  [35.6 °C (96 °F)-36.9 °C (98.5 °F)] 36.7 °C (98.1 °F)  Pulse:  [] 102  Resp:  [9-54] 26  BP: ()/(44-97) 126/69  SpO2:  [86 %-100 %] 96 %    Hemodynamic parameters for last 24 hours       Respiratory Information for the last 24 hours       Physical Exam   Physical Exam  Vitals and nursing note reviewed.   Constitutional:       General: She is not in acute distress.     Appearance: She is obese. She is ill-appearing.      Comments: Laying in bed with  at bedside   HENT:      Head: Normocephalic.      Mouth/Throat:      Mouth: Mucous membranes are moist.   Eyes:      Pupils: Pupils are equal, round, and reactive to light.   Cardiovascular:      Rate and Rhythm: Tachycardia present.      Heart sounds: No murmur heard.  Pulmonary:      Effort: No respiratory distress.      Breath sounds: No stridor. No wheezing or rhonchi.   Abdominal:      General: There is no distension.      Palpations: There is no mass.      Tenderness: There is no abdominal tenderness. There is no guarding or rebound.      Hernia: No hernia is present.      Comments: Bruising to low abdomen and left flank area   Musculoskeletal:         General: Deformity present. No swelling.      Comments: Bilateral RA like deformities to hand, mild cold hand but warm knees and feet   Skin:     Coloration: Skin is not jaundiced or pale.      Findings: Bruising present.   Neurological:      General: No focal deficit present.      Mental Status: She is alert and oriented to person, place, and time.      Cranial Nerves: No cranial nerve deficit.      Sensory: No sensory deficit.      Motor: No weakness.      Coordination: Coordination normal.   Psychiatric:         Mood and Affect: Mood normal.         Medications  Current Facility-Administered Medications   Medication Dose Route Frequency Provider Last Rate Last Admin    potassium  phosphate IVPB 30 mmol in 500 mL D5W (premix)  30 mmol Intravenous Once Kevin Cespedes M.D.        heparin infusion 25,000 units in 500 mL 0.45% NACL  0-30 Units/kg/hr (Adjusted) Intravenous Continuous Navneet Proctor M.D. 38.2 mL/hr at 05/05/24 0713 30 Units/kg/hr at 05/05/24 0713    DULoxetine (Cymbalta) capsule 30 mg  30 mg Oral DAILY Navneet Proctor M.D.   30 mg at 05/05/24 0525    pregabalin (Lyrica) capsule 150 mg  150 mg Oral BID Navneet Proctor M.D.   150 mg at 05/05/24 0525    QUEtiapine (SEROquel) tablet 25 mg  25 mg Oral QHS Navneet Proctor M.D.   25 mg at 05/04/24 2031    acetaminophen (Tylenol) tablet 650 mg  650 mg Oral Q6HRS PRN Navneet Proctor M.D.        LR (Bolus) infusion 500 mL  500 mL Intravenous Once Harvey Hargrove M.D.        MD Alert...ICU Electrolyte Replacement per Pharmacy   Other PHARMACY TO DOSE Harvey Hargrove M.D.        insulin regular (HumuLIN R,NovoLIN R) injection  2-9 Units Subcutaneous Q6HRS Harvey Hargrove M.D.   2 Units at 05/04/24 1529    And    dextrose 10 % BOLUS 25 g  25 g Intravenous Q15 MIN PRN Harvey Hargrove M.D.           Fluids    Intake/Output Summary (Last 24 hours) at 5/5/2024 0743  Last data filed at 5/5/2024 0600  Gross per 24 hour   Intake 3023.86 ml   Output 400 ml   Net 2623.86 ml       Laboratory          Recent Labs     05/04/24 0519 05/04/24  1230 05/05/24  0305   SODIUM 135 132* 135   POTASSIUM 3.3* 4.2 3.4*   CHLORIDE 99 98 99   CO2 22 20 22   BUN 42* 36* 33*   CREATININE 0.60 0.62 0.46*   MAGNESIUM  --  1.7 2.9*   PHOSPHORUS  --   --  1.9*   CALCIUM 8.6 8.0* 8.0*     Recent Labs     05/04/24 0519 05/04/24  1230 05/05/24  0305   ALTSGPT 34 35 243*   ASTSGOT 35 43 168*   ALKPHOSPHAT 84 83 119*   TBILIRUBIN 1.4 1.5 0.9   GLUCOSE 167* 251* 115*     Recent Labs     05/04/24  0519 05/04/24  1230 05/05/24  0305   WBC 17.9* 24.4* 17.5*   NEUTSPOLYS 82.30*  --  83.30*   LYMPHOCYTES 7.30*  --  7.00*   MONOCYTES 6.90  --  8.30    EOSINOPHILS 1.30  --  0.10   BASOPHILS 0.30  --  0.20   ASTSGOT 35 43 168*   ALTSGPT 34 35 243*   ALKPHOSPHAT 84 83 119*   TBILIRUBIN 1.4 1.5 0.9     Recent Labs     05/04/24  0519 05/04/24  0730 05/04/24  1230 05/04/24  2030 05/05/24  0305   RBC 3.83*  --  3.35*  --  3.28*   HEMOGLOBIN 11.6*  --  10.2* 10.5* 9.9*   HEMATOCRIT 35.1*  --  30.7*  --  29.5*   PLATELETCT 358  --  399  --  333   PROTHROMBTM 13.8 14.1  --   --   --    APTT 26.0 25.3 36.1*  --  26.3   INR 1.05 1.08  --   --   --        Imaging  X-Ray:  I have personally reviewed the images and compared with prior images.  CT:    Reviewed  Echo:   Reviewed    Assessment/Plan  * Acute saddle pulmonary embolism with acute cor pulmonale (HCC)- (present on admission)  Assessment & Plan  Joycelyn score 5  Echocardiogram with RV dysfunction  S/P emergent pulmonary artery thrombectomy on 5/4  S/p post thrombectomy SIRS response and profound shock 5/4   Heparin gtt with Xa monitoring    Paroxysmal supraventricular tachycardia (HCC)  Assessment & Plan  Developed following pulmonary artery thrombectomy due to SIRS response  Spontaneously converted into sinus tachycardia without intervention  Load with amiodarone and begin amiodarone infusion -> stop amiodarone today 5/5 and monitor off  Optimize potassium and magnesium    Acute deep vein thrombosis (DVT) of proximal vein of right lower extremity (HCC)- (present on admission)  Assessment & Plan  Bilateral lower extremity DVT  Heparin gtt w/ Xa monitoring    Retroperitoneal hematoma- (present on admission)  Assessment & Plan  CT imaging confirms a right psoas and retroperitoneal hematoma without evidence of active extravasation  Evaluated by Dr. Machuca from trauma surgery - okay for full anticoagulation with heparin at this time  Trend hemoglobin every 6 hours keep in ICU for close monitoring    If unable to tolerate anticoagulation will need IVC filter    Chronic respiratory failure with hypoxia (HCC)- (present on  admission)  Assessment & Plan  On domiciliary oxygen    Primary hypertension- (present on admission)  Assessment & Plan  Currently in cardiogenic shock on vasopressor support         VTE:  Heparin  Ulcer: Not Indicated  Lines: Central Line  Ongoing indication addressed and Arterial Line  Ongoing indication addressed    I have performed a physical exam and reviewed and updated ROS and Plan today (5/5/2024). In review of yesterday's note (5/4/2024), there are no changes except as documented above.     Discussed patient condition and risk of morbidity and/or mortality with Family, RN, RT, Pharmacy, Charge nurse / hot rounds, and Patient    The patient remains critically ill titration of heparin gtt while following CBC closely for bleeding.  Critical care time = 50 minutes in directly providing and coordinating critical care and extensive data review.  No time overlap and excludes procedures.

## 2024-05-05 NOTE — CARE PLAN
The patient is Watcher - Medium risk of patient condition declining or worsening    Shift Goals  Clinical Goals: therapeutic Xa  Patient Goals: rest  Family Goals: updates    Progress made toward(s) clinical / shift goals:    Problem: Pain - Standard  Goal: Alleviation of pain or a reduction in pain to the patient’s comfort goal  Outcome: Progressing     Problem: Risk for Bleeding  Goal: Patient will take measures to prevent bleeding and recognizes signs of bleeding that need to be reported immediately to a health care professional  Outcome: Progressing  Goal: Patient will not experience bleeding as evidenced by normal blood pressure, stable hematocrit and hemoglobin levels and desired ranges for coagulation profiles  Outcome: Progressing       Patient is not progressing towards the following goals:

## 2024-05-05 NOTE — PROGRESS NOTES
Radiology Progress Note   Author: ERWIN Myrick Date & Time created: 5/5/2024  9:16 AM   Date of admission  5/4/2024  Note to reader: this note follows the APSO format rather than the historical SOAP format. Assessment and plan located at the top of the note for ease of use.    Chief Complaint  78 y.o. female admitted 5/4/2024 with   Chief Complaint   Patient presents with    GLF     BIBA from skilled nursing facility for GLF yesterday and syncopal episode this am. Unknown LOC, unknown head strike, +thinners. Pt arrives with extensive bruising to lower abdomen.          HPI  Tereza Sánchez is a 78-year-old female with a past medical history of hypertension, Chiari malformation with recent hospitalization 4/20/2024 - 4/30/2024 complicated by COVID and septic shock requiring intubation and IV pressors who presented to [facility] 5/4/2024 complaining of syncope, weakness, shortness of breath.  Upon further workup she was found to have bilateral lower extremity DVTs, saddle pulmonary embolism with acute right heart failure, and retroperitoneal hematoma as well as psoas hematoma.  IV heparin was initiated (no bolus given) and interventional radiology was consulted for evaluation of thrombectomy of saddle PE.  On 05/04/2024 Dr. Amezquita (IR) performed emergent pulmonary artery  thrombectomy.  Postop she was transferred to ICU requiring vasopressor support.    Interval History:   05/05/2024-I reviewed today's labs: WBC 17.5; Hgb 9.6, Cr 0.46; Coags heparin Xa <.10,  Micro BC-NTD.  She remains afebrile, titrating off of vasopressors.  Requiring 2 L nasal cannula with SpO2's in the high 90s  Right groin access site soft, non-tender, without ecchymosis, active bleeding; dressing cdi. No Flowstasis device noted         Assessment/Plan     Principal Problem:    Acute saddle pulmonary embolism with acute cor pulmonale (HCC)  Active Problems:    Primary hypertension    Chronic respiratory failure with hypoxia (HCC)     Retroperitoneal hematoma    SIRS (systemic inflammatory response syndrome) (HCC)    Acute deep vein thrombosis (DVT) of proximal vein of right lower extremity (HCC)    Paroxysmal supraventricular tachycardia (HCC)      Plan IR   Post IR groin access site instructions: no lifting greater than 5 lbs and no baths/swimming/soaking in tub for 7-10 days. Shower OK. OK to change dressings/band aid as needed.  -I discussed post IR groin access site instructions with patient and patient  at bedside    IR signing off.     Thank you for allowing Interventional Radiology team to participate in the patients care, if any additional care or requests are needed in the future please do not hesitate call or place IR order.              Review of Systems  Physical Exam   Review of Systems   Constitutional:  Positive for malaise/fatigue.   HENT:  Positive for hearing loss.    Respiratory:  Negative for cough, hemoptysis, sputum production and shortness of breath.    Cardiovascular:  Negative for chest pain.   Gastrointestinal:  Negative for abdominal pain, heartburn, nausea and vomiting.   Musculoskeletal:  Positive for myalgias.   Neurological:  Positive for weakness. Negative for dizziness and headaches.        Generalized weakness      Vitals:    05/05/24 0900   BP: 118/69   Pulse: (!) 101   Resp: (!) 23   Temp:    SpO2: 96%        Physical Exam  Vitals and nursing note reviewed.   Constitutional:       General: She is awake.      Appearance: She is obese.   HENT:      Head: Normocephalic and atraumatic.   Cardiovascular:      Rate and Rhythm: Regular rhythm. Tachycardia present.      Pulses:           Radial pulses are 2+ on the right side and 2+ on the left side.        Dorsalis pedis pulses are 2+ on the right side and 2+ on the left side.      Comments: Right groin access site soft, non-tender, without ecchymosis; dressing cdi.     Pulmonary:      Effort: No respiratory distress.      Comments: 2 L nasal cannula in  place-sats on monitor 6%  Abdominal:      Comments: Lower abdominal ecchymosis noted   Skin:     General: Skin is warm and dry.      Capillary Refill: Capillary refill takes 2 to 3 seconds.   Neurological:      Mental Status: She is alert and oriented to person, place, and time.   Psychiatric:         Attention and Perception: Attention normal.         Mood and Affect: Mood normal.         Speech: Speech normal.         Behavior: Behavior normal. Behavior is cooperative.             Labs    Recent Labs     05/04/24 0519 05/04/24  1230 05/04/24  2030 05/05/24  0305 05/05/24  0849   WBC 17.9* 24.4*  --  17.5*  --    RBC 3.83* 3.35*  --  3.28*  --    HEMOGLOBIN 11.6* 10.2* 10.5* 9.9* 9.6*   HEMATOCRIT 35.1* 30.7*  --  29.5*  --    MCV 91.6 91.6  --  89.9  --    MCH 30.3 30.4  --  30.2  --    MCHC 33.0 33.2  --  33.6  --    RDW 48.0 49.1  --  47.8  --    PLATELETCT 358 399  --  333  --    MPV 10.7 10.9  --  11.0  --      Recent Labs     05/04/24 0519 05/04/24  1230 05/05/24  0305   SODIUM 135 132* 135   POTASSIUM 3.3* 4.2 3.4*   CHLORIDE 99 98 99   CO2 22 20 22   GLUCOSE 167* 251* 115*   BUN 42* 36* 33*   CREATININE 0.60 0.62 0.46*   CALCIUM 8.6 8.0* 8.0*     Recent Labs     05/04/24 0519 05/04/24  1230 05/05/24  0305   ALBUMIN 3.4 2.7* 3.2   TBILIRUBIN 1.4 1.5 0.9   ALKPHOSPHAT 84 83 119*   TOTPROTEIN 5.8* 4.7* 5.3*   ALTSGPT 34 35 243*   ASTSGOT 35 43 168*   CREATININE 0.60 0.62 0.46*     DX-CHEST-PORTABLE (1 VIEW)   Final Result      No significant interval change.      DX-CHEST-PORTABLE (1 VIEW)   Final Result      Right IJ catheter, tip projects over cavoatrial junction region. No pneumothorax.      IR-THROMBO MECHANICAL ARTERY,INIT   Final Result      1.  Ultrasound guided access RIGHT common femoral vein.   2.  BILATERAL selective pulmonary arteriogram demonstrating bilateral pulmonary emboli, much more extensive on the LEFT THAN THE RIGHT.   3.  LEFT pulmonary thrombectomy.   4.  Pursestring suture should be  "removed in 6-8 hours.         US-EXTREMITY VENOUS LOWER BILAT   Final Result      EC-ECHOCARDIOGRAM LTD W/O CONT   Final Result      CT-LSPINE W/O PLUS RECONS   Final Result         1.  No acute traumatic bony injury of the lumbar spine.   2.  Right psoas muscle and retroperitoneal hematoma      CT-TSPINE W/O PLUS RECONS   Final Result         1.  No acute traumatic bony injury of the thoracic spine.      CT-ABDOMEN-PELVIS WITH   Final Result         1.  Right retroperitoneal and psoas muscle hematoma      These findings were discussed with the patient's clinician, JERRI VERGARA, on 5/4/2024 7:06 AM.      CT-CTA CHEST PULMONARY ARTERY W/ RECONS   Final Result         1.  Large saddle pulmonary embolus with changes of right heart strain   2.  Hazy bilateral pulmonary infiltrates      These findings were discussed with the patient's clinician, Jerri Vergara, on 5/4/2024 7:02 AM.      CT-CSPINE WITHOUT PLUS RECONS   Final Result         1.  No acute traumatic bony injury of the cervical spine is apparent.      CT-HEAD W/O   Final Result         1.  No acute intracranial abnormality.   2.  Atherosclerosis.         DX-CHEST-PORTABLE (1 VIEW)   Final Result         1.  Bibasilar atelectasis and/or subtle infiltrates.   2.  Atherosclerosis        INR   Date Value Ref Range Status   05/04/2024 1.08 0.87 - 1.13 Final     Comment:     INR - Non-therapeutic Reference Range: 0.87-1.13  INR - Therapeutic Reference Range: 2.0-4.0       No results found for: \"POCINR\"     Intake/Output Summary (Last 24 hours) at 5/5/2024 0916  Last data filed at 5/5/2024 0600  Gross per 24 hour   Intake 3023.86 ml   Output 400 ml   Net 2623.86 ml      I have personally reviewed the above labs and imaging      I have performed a physical exam and reviewed and updated ROS and Plan today (5/5/2024).     40 minutes in directly providing and coordinating care and extensive data review.  No time overlap and excludes procedures.      "

## 2024-05-05 NOTE — PROGRESS NOTES
4 Eyes Skin Assessment Completed by JIMMY kerns and JIMMY schmitz.    Head WDL  Ears WDL  Nose WDL  Mouth WDL  Neck WDL  Breast/Chest WDL  Shoulder Blades WDL  Spine WDL  (R) Arm/Elbow/Hand Scab  (L) Arm/Elbow/Hand WDL  Abdomen Bruising  Groin Redness and Excoriation, right and left access sites  Scrotum/Coccyx/Buttocks WDL  (R) Leg WDL  (L) Leg WDL  (R) Heel/Foot/Toe Blanching  (L) Heel/Foot/Toe Blanching          Devices In Places ECG, Blood Pressure Cuff, Pulse Ox, Central Line, and Nasal Cannula      Interventions In Place Gray Ear Foams, Pillows, Q2 Turns, Low Air Loss Mattress, Heels Loaded W/Pillows, and Pressure Redistribution Mattress    Possible Skin Injury No    Pictures Uploaded Into Epic yes  Wound Consult Placed N/A  RN Wound Prevention Protocol Ordered Yes

## 2024-05-06 ENCOUNTER — APPOINTMENT (OUTPATIENT)
Dept: RADIOLOGY | Facility: MEDICAL CENTER | Age: 79
DRG: 163 | End: 2024-05-06
Attending: HOSPITALIST
Payer: MEDICARE

## 2024-05-06 PROBLEM — R65.10 SIRS (SYSTEMIC INFLAMMATORY RESPONSE SYNDROME) (HCC): Status: RESOLVED | Noted: 2024-05-04 | Resolved: 2024-05-06

## 2024-05-06 PROBLEM — R57.9 SHOCK (HCC): Status: ACTIVE | Noted: 2024-05-06

## 2024-05-06 LAB
ALBUMIN SERPL BCP-MCNC: 2.8 G/DL (ref 3.2–4.9)
ALBUMIN/GLOB SERPL: 1.3 G/DL
ALP SERPL-CCNC: 92 U/L (ref 30–99)
ALT SERPL-CCNC: 140 U/L (ref 2–50)
ANION GAP SERPL CALC-SCNC: 9 MMOL/L (ref 7–16)
APTT PPP: 156.8 SEC (ref 24.7–36)
APTT PPP: >240 SEC (ref 24.7–36)
APTT PPP: >240 SEC (ref 24.7–36)
AST SERPL-CCNC: 37 U/L (ref 12–45)
BASOPHILS # BLD AUTO: 0.1 % (ref 0–1.8)
BASOPHILS # BLD AUTO: 0.2 % (ref 0–1.8)
BASOPHILS # BLD: 0.02 K/UL (ref 0–0.12)
BASOPHILS # BLD: 0.03 K/UL (ref 0–0.12)
BILIRUB SERPL-MCNC: 1.4 MG/DL (ref 0.1–1.5)
BUN SERPL-MCNC: 23 MG/DL (ref 8–22)
CALCIUM ALBUM COR SERPL-MCNC: 8.8 MG/DL (ref 8.5–10.5)
CALCIUM SERPL-MCNC: 7.8 MG/DL (ref 8.5–10.5)
CHLORIDE SERPL-SCNC: 101 MMOL/L (ref 96–112)
CO2 SERPL-SCNC: 24 MMOL/L (ref 20–33)
CREAT SERPL-MCNC: 0.45 MG/DL (ref 0.5–1.4)
EKG IMPRESSION: NORMAL
EOSINOPHIL # BLD AUTO: 0.17 K/UL (ref 0–0.51)
EOSINOPHIL # BLD AUTO: 0.18 K/UL (ref 0–0.51)
EOSINOPHIL NFR BLD: 1.3 % (ref 0–6.9)
EOSINOPHIL NFR BLD: 1.4 % (ref 0–6.9)
ERYTHROCYTE [DISTWIDTH] IN BLOOD BY AUTOMATED COUNT: 47.9 FL (ref 35.9–50)
ERYTHROCYTE [DISTWIDTH] IN BLOOD BY AUTOMATED COUNT: 48.6 FL (ref 35.9–50)
FACT VIII ACT/NOR PPP: 375 % (ref 45–145)
GFR SERPLBLD CREATININE-BSD FMLA CKD-EPI: 99 ML/MIN/1.73 M 2
GLOBULIN SER CALC-MCNC: 2.2 G/DL (ref 1.9–3.5)
GLUCOSE BLD STRIP.AUTO-MCNC: 108 MG/DL (ref 65–99)
GLUCOSE BLD STRIP.AUTO-MCNC: 117 MG/DL (ref 65–99)
GLUCOSE BLD STRIP.AUTO-MCNC: 71 MG/DL (ref 65–99)
GLUCOSE SERPL-MCNC: 100 MG/DL (ref 65–99)
HCT VFR BLD AUTO: 23.9 % (ref 37–47)
HCT VFR BLD AUTO: 24.4 % (ref 37–47)
HGB BLD-MCNC: 8 G/DL (ref 12–16)
HGB BLD-MCNC: 8.2 G/DL (ref 12–16)
HGB BLD-MCNC: 8.3 G/DL (ref 12–16)
IMM GRANULOCYTES # BLD AUTO: 0.13 K/UL (ref 0–0.11)
IMM GRANULOCYTES # BLD AUTO: 0.16 K/UL (ref 0–0.11)
IMM GRANULOCYTES NFR BLD AUTO: 1 % (ref 0–0.9)
IMM GRANULOCYTES NFR BLD AUTO: 1.2 % (ref 0–0.9)
INHIBITOR INDICATED 1863: NO
LACTATE SERPL-SCNC: 0.9 MMOL/L (ref 0.5–2)
LYMPHOCYTES # BLD AUTO: 0.94 K/UL (ref 1–4.8)
LYMPHOCYTES # BLD AUTO: 1.14 K/UL (ref 1–4.8)
LYMPHOCYTES NFR BLD: 7.5 % (ref 22–41)
LYMPHOCYTES NFR BLD: 8.5 % (ref 22–41)
MAGNESIUM SERPL-MCNC: 2.2 MG/DL (ref 1.5–2.5)
MCH RBC QN AUTO: 30.3 PG (ref 27–33)
MCH RBC QN AUTO: 31 PG (ref 27–33)
MCHC RBC AUTO-ENTMCNC: 33.6 G/DL (ref 32.2–35.5)
MCHC RBC AUTO-ENTMCNC: 34.7 G/DL (ref 32.2–35.5)
MCV RBC AUTO: 89.2 FL (ref 81.4–97.8)
MCV RBC AUTO: 90 FL (ref 81.4–97.8)
MONOCYTES # BLD AUTO: 1.06 K/UL (ref 0–0.85)
MONOCYTES # BLD AUTO: 1.15 K/UL (ref 0–0.85)
MONOCYTES NFR BLD AUTO: 8.5 % (ref 0–13.4)
MONOCYTES NFR BLD AUTO: 8.6 % (ref 0–13.4)
NEUTROPHILS # BLD AUTO: 10.16 K/UL (ref 1.82–7.42)
NEUTROPHILS # BLD AUTO: 10.73 K/UL (ref 1.82–7.42)
NEUTROPHILS NFR BLD: 80.3 % (ref 44–72)
NEUTROPHILS NFR BLD: 81.4 % (ref 44–72)
NRBC # BLD AUTO: 0 K/UL
NRBC # BLD AUTO: 0.03 K/UL
NRBC BLD-RTO: 0 /100 WBC (ref 0–0.2)
NRBC BLD-RTO: 0.2 /100 WBC (ref 0–0.2)
PHOSPHATE SERPL-MCNC: 1.7 MG/DL (ref 2.5–4.5)
PLATELET # BLD AUTO: 259 K/UL (ref 164–446)
PLATELET # BLD AUTO: 266 K/UL (ref 164–446)
PMV BLD AUTO: 10.6 FL (ref 9–12.9)
PMV BLD AUTO: 10.9 FL (ref 9–12.9)
POTASSIUM SERPL-SCNC: 3.1 MMOL/L (ref 3.6–5.5)
PROT SERPL-MCNC: 5 G/DL (ref 6–8.2)
RBC # BLD AUTO: 2.68 M/UL (ref 4.2–5.4)
RBC # BLD AUTO: 2.71 M/UL (ref 4.2–5.4)
SODIUM SERPL-SCNC: 134 MMOL/L (ref 135–145)
UFH PPP CHRO-ACNC: 0.9 IU/ML
UFH PPP CHRO-ACNC: >1.1 IU/ML
UFH PPP CHRO-ACNC: >1.1 IU/ML
WBC # BLD AUTO: 12.5 K/UL (ref 4.8–10.8)
WBC # BLD AUTO: 13.4 K/UL (ref 4.8–10.8)

## 2024-05-06 PROCEDURE — 99233 SBSQ HOSP IP/OBS HIGH 50: CPT | Performed by: HOSPITALIST

## 2024-05-06 PROCEDURE — 99231 SBSQ HOSP IP/OBS SF/LOW 25: CPT | Performed by: NURSE PRACTITIONER

## 2024-05-06 PROCEDURE — 99233 SBSQ HOSP IP/OBS HIGH 50: CPT | Mod: GC | Performed by: INTERNAL MEDICINE

## 2024-05-06 RX ORDER — AMOXICILLIN 250 MG
2 CAPSULE ORAL EVERY EVENING
Status: DISCONTINUED | OUTPATIENT
Start: 2024-05-06 | End: 2024-05-10 | Stop reason: HOSPADM

## 2024-05-06 RX ORDER — ATORVASTATIN CALCIUM 10 MG/1
10 TABLET, FILM COATED ORAL NIGHTLY
Status: DISCONTINUED | OUTPATIENT
Start: 2024-05-06 | End: 2024-05-10 | Stop reason: HOSPADM

## 2024-05-06 RX ORDER — SODIUM CHLORIDE 9 MG/ML
1000 INJECTION, SOLUTION INTRAVENOUS ONCE
Status: COMPLETED | OUTPATIENT
Start: 2024-05-06 | End: 2024-05-06

## 2024-05-06 RX ORDER — POTASSIUM CHLORIDE 20 MEQ/1
40 TABLET, EXTENDED RELEASE ORAL ONCE
Status: COMPLETED | OUTPATIENT
Start: 2024-05-06 | End: 2024-05-06

## 2024-05-06 RX ORDER — AMITRIPTYLINE HYDROCHLORIDE 50 MG/1
100 TABLET, FILM COATED ORAL NIGHTLY
Status: DISCONTINUED | OUTPATIENT
Start: 2024-05-06 | End: 2024-05-10 | Stop reason: HOSPADM

## 2024-05-06 RX ORDER — HYDROCODONE BITARTRATE AND ACETAMINOPHEN 5; 325 MG/1; MG/1
1 TABLET ORAL EVERY 6 HOURS PRN
Status: DISCONTINUED | OUTPATIENT
Start: 2024-05-06 | End: 2024-05-10 | Stop reason: HOSPADM

## 2024-05-06 RX ORDER — POLYETHYLENE GLYCOL 3350 17 G/17G
1 POWDER, FOR SOLUTION ORAL
Status: DISCONTINUED | OUTPATIENT
Start: 2024-05-06 | End: 2024-05-10 | Stop reason: HOSPADM

## 2024-05-06 RX ORDER — METOPROLOL TARTRATE 1 MG/ML
5 INJECTION, SOLUTION INTRAVENOUS
Status: COMPLETED | OUTPATIENT
Start: 2024-05-06 | End: 2024-05-08

## 2024-05-06 RX ORDER — FERROUS SULFATE 325(65) MG
325 TABLET ORAL DAILY
Status: DISCONTINUED | OUTPATIENT
Start: 2024-05-06 | End: 2024-05-07

## 2024-05-06 RX ADMIN — QUETIAPINE FUMARATE 25 MG: 25 TABLET ORAL at 20:43

## 2024-05-06 RX ADMIN — POTASSIUM CHLORIDE 40 MEQ: 1500 TABLET, EXTENDED RELEASE ORAL at 08:15

## 2024-05-06 RX ADMIN — PREGABALIN 150 MG: 150 CAPSULE ORAL at 17:02

## 2024-05-06 RX ADMIN — DIBASIC SODIUM PHOSPHATE, MONOBASIC POTASSIUM PHOSPHATE AND MONOBASIC SODIUM PHOSPHATE 500 MG: 852; 155; 130 TABLET ORAL at 17:02

## 2024-05-06 RX ADMIN — FERROUS SULFATE TAB 325 MG (65 MG ELEMENTAL FE) 325 MG: 325 (65 FE) TAB at 11:02

## 2024-05-06 RX ADMIN — DULOXETINE HYDROCHLORIDE 30 MG: 30 CAPSULE, DELAYED RELEASE ORAL at 04:32

## 2024-05-06 RX ADMIN — DIBASIC SODIUM PHOSPHATE, MONOBASIC POTASSIUM PHOSPHATE AND MONOBASIC SODIUM PHOSPHATE 500 MG: 852; 155; 130 TABLET ORAL at 05:24

## 2024-05-06 RX ADMIN — ATORVASTATIN CALCIUM 10 MG: 10 TABLET, FILM COATED ORAL at 20:43

## 2024-05-06 RX ADMIN — AMITRIPTYLINE HYDROCHLORIDE 100 MG: 50 TABLET, FILM COATED ORAL at 20:43

## 2024-05-06 RX ADMIN — POTASSIUM PHOSPHATE, MONOBASIC AND POTASSIUM PHOSPHATE, DIBASIC 30 MMOL: 224; 236 INJECTION, SOLUTION, CONCENTRATE INTRAVENOUS at 08:25

## 2024-05-06 RX ADMIN — SODIUM CHLORIDE 1000 ML: 9 INJECTION, SOLUTION INTRAVENOUS at 21:36

## 2024-05-06 RX ADMIN — SENNOSIDES AND DOCUSATE SODIUM 2 TABLET: 50; 8.6 TABLET ORAL at 17:02

## 2024-05-06 RX ADMIN — PREGABALIN 150 MG: 150 CAPSULE ORAL at 04:33

## 2024-05-06 ASSESSMENT — ENCOUNTER SYMPTOMS
SHORTNESS OF BREATH: 1
NECK PAIN: 1
SHORTNESS OF BREATH: 0
BRUISES/BLEEDS EASILY: 1
CHILLS: 0
BLOOD IN STOOL: 0
SPEECH CHANGE: 0
FEVER: 0
MYALGIAS: 1
ABDOMINAL PAIN: 0
BACK PAIN: 1
NAUSEA: 0
VOMITING: 0
DEPRESSION: 0

## 2024-05-06 ASSESSMENT — PAIN DESCRIPTION - PAIN TYPE
TYPE: ACUTE PAIN

## 2024-05-06 ASSESSMENT — LIFESTYLE VARIABLES: SUBSTANCE_ABUSE: 0

## 2024-05-06 NOTE — PROGRESS NOTES
Trauma / Surgical Daily Progress Note    Date of Service  5/6/2024    Chief Complaint  78 y.o. female admitted 5/4/2024 as a non-trauma activation status post GLF at care facility. CT scan demonstrated a saddle pulmonary embolism as well as a right psoas and right retroperitoneal hematoma. Concern for right heart strain. Medical admission with trauma consult.      5/4 Left lung thrombectomy.     Interval Events  Hgb 8.2 (8.9)  On Heparin gtt  No overt signs of bleeding       Nothing further from trauma perspective  Please reach out to [facility] on ICU (located on the intranet) for any questions or issues   Trauma signing off    Review of Systems  Review of Systems   Constitutional:  Positive for malaise/fatigue. Negative for fever.        Deconditioned and chronically ill in appearance.    HENT: Negative.     Respiratory:  Positive for shortness of breath (Improved).    Cardiovascular:  Negative for chest pain.   Gastrointestinal:  Negative for abdominal pain, nausea and vomiting.   Genitourinary: Negative.    Musculoskeletal:  Positive for back pain (Baseline), joint pain (Baseline), myalgias (Baseline) and neck pain (Baseline).   Neurological:  Negative for speech change.   Psychiatric/Behavioral:  Negative for depression and substance abuse.         Vital Signs  Temp:  [36.3 °C (97.4 °F)-37.1 °C (98.7 °F)] 36.4 °C (97.6 °F)  Pulse:  [] 107  Resp:  [16-43] 20  BP: (106-155)/(56-85) 118/61  SpO2:  [96 %-99 %] 96 %    Physical Exam  Physical Exam  Vitals and nursing note reviewed.   Constitutional:       Appearance: She is not toxic-appearing.      Interventions: Nasal cannula in place.   HENT:      Head: Normocephalic and atraumatic.      Right Ear: External ear normal.      Left Ear: External ear normal.      Nose: Nose normal.      Mouth/Throat:      Mouth: Mucous membranes are moist.      Pharynx: Oropharynx is clear.   Eyes:      General:         Right eye: No discharge.         Left eye: No  discharge.      Extraocular Movements: Extraocular movements intact.   Cardiovascular:      Rate and Rhythm: Tachycardia present.   Pulmonary:      Effort: Pulmonary effort is normal. No tachypnea, accessory muscle usage or respiratory distress.   Chest:      Chest wall: No tenderness.   Abdominal:      General: There is no distension.      Palpations: Abdomen is soft.      Tenderness: There is no abdominal tenderness.      Comments: Ecchymosis across anterior abdomen - non tender    Musculoskeletal:         General: Deformity (Baseline contractures) present. No tenderness.      Cervical back: Normal range of motion.   Skin:     General: Skin is warm and dry.      Coloration: Skin is pale. Skin is not jaundiced.   Neurological:      General: No focal deficit present.      Mental Status: She is alert.      Comments: Conversant   Psychiatric:         Mood and Affect: Mood normal.         Behavior: Behavior normal. Behavior is cooperative.         Core Measures & Quality Metrics  Labs reviewed, Medications reviewed and Radiology images reviewed  Tam catheter: No Tam      DVT Prophylaxis: Heparin (Heparin Drip)  DVT prophylaxis - mechanical: SCDs  Ulcer prophylaxis: Not indicated    Assessed for rehab: Patient was assess for and/or received rehabilitation services during this hospitalization    RAP Score Total: 8    CAGE Results: not completed Blood Alcohol>0.08: no       Assessment/Plan  Okay to continue full anticoagulation.   Cleared from trauma perspective   Further care per medicine team.      Discussed patient condition with RN, Patient, and Dr. Machuca .

## 2024-05-06 NOTE — PROGRESS NOTES
Pt to [id number] with all belongings. All questions answered. MD Proctor was notified of critical labs when they were received to this RN. Awaiting further poc with heparin.

## 2024-05-06 NOTE — CARE PLAN
The patient is Stable - Low risk of patient condition declining or worsening    Shift Goals  Clinical Goals: therapeutic Xa  Patient Goals: rest  Family Goals: updates    Progress made toward(s) clinical / shift goals:        Problem: Knowledge Deficit - Standard  Goal: Patient and family/care givers will demonstrate understanding of plan of care, disease process/condition, diagnostic tests and medications  Outcome: Progressing     Problem: Skin Integrity  Goal: Skin integrity is maintained or improved  Outcome: Progressing     Problem: Fall Risk  Goal: Patient will remain free from falls  Outcome: Progressing     Problem: Pain - Standard  Goal: Alleviation of pain or a reduction in pain to the patient’s comfort goal  Outcome: Progressing     Problem: Risk for Bleeding  Goal: Patient will take measures to prevent bleeding and recognizes signs of bleeding that need to be reported immediately to a health care professional  Outcome: Progressing  Goal: Patient will not experience bleeding as evidenced by normal blood pressure, stable hematocrit and hemoglobin levels and desired ranges for coagulation profiles  Outcome: Progressing

## 2024-05-06 NOTE — PROGRESS NOTES
Update on factor VIII results from lab: per lab need to call in specialist to analyze, lab will not result until tomorrow

## 2024-05-06 NOTE — HOSPITAL COURSE
"\"78 y.o. female admitted 5/4/2024 with history of primary hypertension, SELWYN, Chiari malformation and chronic hypoxemic respiratory failure.  She was recently hospitalized at [facility] from on or about 4/20/2024 to 4/30/2024 with respiratory failure, SARS-CoV-2 and septic shock.  She was brought into the ED this morning with syncope.  Evaluation revealed a saddle pulmonary embolism with acute right heart failure as well as bilateral lower extremity deep venous thrombosis.  She was also found to have a right psoas/retroperitoneal hematoma.  She was evaluated by Dr. Machuca from trauma surgery and at this time, he feels she is safe for anticoagulation with heparin.  She underwent emergent pulmonary artery thrombectomy in IR.  Following this procedure she developed shock and was transferred to the ICU.\"    5/4 transferred to ICU post thrombectomy with shock on norepinephrine gtt and SVT   5/5 Off NE, starting heparin  "

## 2024-05-06 NOTE — PROGRESS NOTES
4 Eyes Skin Assessment Completed by JIMMY kerns and     Head WDL  Ears WDL  Nose WDL  Mouth WDL  Neck WDL  Breast/Chest WDL  Shoulder Blades WDL  Spine WDL  (R) Arm/Elbow/Hand Scab  (L) Arm/Elbow/Hand WDL  Abdomen Bruising  Groin Redness and Excoriation  Scrotum/Coccyx/Buttocks Blanching  (R) Leg WDL  (L) Leg WDL  (R) Heel/Foot/Toe Blanching  (L) Heel/Foot/Toe Blanching          Devices In Places ECG, Blood Pressure Cuff, Pulse Ox, Arterial Line, SCD's, Central Line, and Nasal Cannula      Interventions In Place Gray Ear Foams, Pillows, Q2 Turns, Low Air Loss Mattress, Heels Loaded W/Pillows, and Pressure Redistribution Mattress    Possible Skin Injury No    Pictures Uploaded Into Epic N/A  Wound Consult Placed N/A  RN Wound Prevention Protocol Ordered Yes

## 2024-05-06 NOTE — PROGRESS NOTES
[facility] Progress Note      Admit Date: 5/4/2024    Resident(s): Marcia Marie M.D.   Attending:  LIBBY HUTCHINSON/ Dr. Diggs    Patient ID:    Name:  Tereza Sánchez   YOB: 1945  Age:  78 y.o.  female   MRN:  [id number]    Hospital Course (carried forward and updated):  Tereza Sánchez is a 78 y.o. female with a PMH of chronic hypoxic respiratory failure, SELWYN, Chiari malformation, and HTN, admitted 05/04/2024 for saddle pulmonary embolism with acute right heart failure and bilateral lower extremity deep vein thromboses s/p emergent pulmonary artery thrombectomy 05/04 with hospital course c/b development of cardiogenic shock requiring pressor therapy, also found to have right psoas and right retroperitoneal hematoma.      Consultants:  Critical Care  Trauma surgery     05/04 - S/p emergent pulmonary artery thrombectomy. Shortly after procedure, patient developed cardiogenic shock, started on pressor therapy and transferred to ICU. Central line and arterial line placed.    05/05 - Continued on pressor support and heparin GTT for acute saddle pulmonary embolism.     Interval Events:  05/06 - Supratherapeutic Xa and APTT, heparin GTT held at 04:00. Downtrending Hb 9.8, 8.9, 8.2 -> 8.0, no obvious signs of bleeding or hemorrhage. Continuing to hold heparin GTT.    Vitals Range last 24h:  Temp:  [36.4 °C (97.6 °F)-37.1 °C (98.7 °F)] 37 °C (98.6 °F)  Pulse:  [] 104  Resp:  [15-43] 19  BP: (106-155)/(56-85) 113/73  SpO2:  [92 %-99 %] 92 %      Intake/Output Summary (Last 24 hours) at 5/6/2024 1305  Last data filed at 5/6/2024 0800  Gross per 24 hour   Intake 1580 ml   Output 450 ml   Net 1130 ml        Review of Systems   Constitutional:  Negative for chills and fever.   HENT:  Negative for nosebleeds.    Respiratory:  Negative for shortness of breath.    Cardiovascular:  Negative for chest pain.   Gastrointestinal:  Negative for blood in stool, nausea and vomiting.   Genitourinary:  Negative for  hematuria.   Endo/Heme/Allergies:  Bruises/bleeds easily.       PHYSICAL EXAM:  Vitals:    05/06/24 0815 05/06/24 0830 05/06/24 0845 05/06/24 1301   BP:    113/73   Pulse: (!) 102 (!) 107 (!) 103 (!) 104   Resp: (!) 21 (!) 21 (!) 30 19   Temp:    37 °C (98.6 °F)   TempSrc:    Temporal   SpO2: 98% 98% 98% 92%   Weight:       Height:        Body mass index is 29.18 kg/m².    O2 therapy: Pulse Oximetry: 92 %, O2 (LPM): 0, O2 Delivery Device: Room air w/o2 available    Date 05/06/24 0700 - 05/07/24 0659   Shift 7330-9305 4349-7482 9935-2643 24 Hour Total   INTAKE   Shift Total       OUTPUT   Urine 100   100     Output (mL) (External Urinary Catheter) 100   100   Shift Total 100   100   NET -100   -100        Physical Exam  Constitutional:       General: She is not in acute distress.     Appearance: Normal appearance.   HENT:      Head: Normocephalic and atraumatic.   Eyes:      Extraocular Movements: Extraocular movements intact.      Conjunctiva/sclera: Conjunctivae normal.   Cardiovascular:      Rate and Rhythm: Normal rate and regular rhythm.      Heart sounds: No murmur heard.     No friction rub. No gallop.   Pulmonary:      Effort: Pulmonary effort is normal. No respiratory distress.   Abdominal:      General: Abdomen is flat.      Palpations: Abdomen is soft.      Tenderness: There is no abdominal tenderness.   Musculoskeletal:      Right lower leg: Edema present.      Left lower leg: Edema present.   Skin:     General: Skin is warm and dry.      Findings: Bruising present. No erythema, lesion or rash.   Neurological:      General: No focal deficit present.      Mental Status: She is alert and oriented to person, place, and time.   Psychiatric:         Mood and Affect: Mood normal.         Behavior: Behavior normal.             Recent Labs     05/04/24  1230 05/05/24  0305 05/06/24  0350   SODIUM 132* 135 134*   POTASSIUM 4.2 3.4* 3.1*   CHLORIDE 98 99 101   CO2 20 22 24   BUN 36* 33* 23*   CREATININE 0.62 0.46*  0.45*   MAGNESIUM 1.7 2.9* 2.2   PHOSPHORUS  --  1.9* 1.7*   CALCIUM 8.0* 8.0* 7.8*     Recent Labs     05/04/24 1230 05/05/24 0305 05/06/24  0350   ALTSGPT 35 243* 140*   ASTSGOT 43 168* 37   ALKPHOSPHAT 83 119* 92   TBILIRUBIN 1.5 0.9 1.4   GLUCOSE 251* 115* 100*     Recent Labs     05/04/24 0519 05/04/24 0730 05/04/24 1230 05/04/24  2030 05/05/24 0305 05/05/24  0849 05/05/24  1036 05/05/24  1434 05/05/24 1955 05/06/24 0350 05/06/24  0640 05/06/24  0815 05/06/24  1109   RBC 3.83*  --  3.35*  --  3.28*  --   --   --   --  2.71*  --   --   --    HEMOGLOBIN 11.6*  --  10.2*   < > 9.9*   < >  --    < > 8.9* 8.2*  --  8.0*  --    HEMATOCRIT 35.1*  --  30.7*  --  29.5*  --   --   --   --  24.4*  --   --   --    PLATELETCT 358  --  399  --  333  --   --   --   --  259  --   --   --    PROTHROMBTM 13.8 14.1  --   --   --   --  15.2*  --   --   --   --   --   --    APTT 26.0 25.3 36.1*  --  26.3  --  29.3  --   --  >240.0* >240.0*  --  156.8*   INR 1.05 1.08  --   --   --   --  1.19*  --   --   --   --   --   --     < > = values in this interval not displayed.     Recent Labs     05/04/24 0519 05/04/24 1230 05/05/24 0305 05/06/24  0350   WBC 17.9* 24.4* 17.5* 13.4*   NEUTSPOLYS 82.30*  --  83.30* 80.30*   LYMPHOCYTES 7.30*  --  7.00* 8.50*   MONOCYTES 6.90  --  8.30 8.60   EOSINOPHILS 1.30  --  0.10 1.30   BASOPHILS 0.30  --  0.20 0.10   ASTSGOT 35 43 168* 37   ALTSGPT 34 35 243* 140*   ALKPHOSPHAT 84 83 119* 92   TBILIRUBIN 1.4 1.5 0.9 1.4       Meds:   phosphorus  500 mg      potassium phosphate  30 mmol 30 mmol (05/06/24 0825)    amitriptyline  100 mg      atorvastatin  10 mg      ferrous sulfate  325 mg      HYDROcodone-acetaminophen  1 Tablet      senna-docusate  2 Tablet      And    polyethylene glycol/lytes  1 Packet      Respiratory Therapy Consult        heparin  0-34 Units/kg/hr (Adjusted) Stopped (05/06/24 4357)    heparin  40 Units/kg (Adjusted)      DULoxetine  30 mg      pregabalin  150 mg       QUEtiapine  25 mg      acetaminophen  650 mg      MD Alert...Adult ICU Electrolyte Replacement per Pharmacy        insulin regular  2-9 Units      And    dextrose bolus  25 g          Procedures:  05/04 - Pulmonary artery thrombectomy  05/04 - Central venous catheter placement, internal jugular vein.  05/04 - Arterial catheter placement, radial artery.    Imaging:  DX-CHEST-PORTABLE (1 VIEW)   Final Result      No significant interval change.      DX-CHEST-PORTABLE (1 VIEW)   Final Result      Right IJ catheter, tip projects over cavoatrial junction region. No pneumothorax.      IR-THROMBO MECHANICAL ARTERY,INIT   Final Result      1.  Ultrasound guided access RIGHT common femoral vein.   2.  BILATERAL selective pulmonary arteriogram demonstrating bilateral pulmonary emboli, much more extensive on the LEFT THAN THE RIGHT.   3.  LEFT pulmonary thrombectomy.   4.  Pursestring suture should be removed in 6-8 hours.         US-EXTREMITY VENOUS LOWER BILAT   Final Result      EC-ECHOCARDIOGRAM LTD W/O CONT   Final Result      CT-LSPINE W/O PLUS RECONS   Final Result         1.  No acute traumatic bony injury of the lumbar spine.   2.  Right psoas muscle and retroperitoneal hematoma      CT-TSPINE W/O PLUS RECONS   Final Result         1.  No acute traumatic bony injury of the thoracic spine.      CT-ABDOMEN-PELVIS WITH   Final Result         1.  Right retroperitoneal and psoas muscle hematoma      These findings were discussed with the patient's clinician, JERRI VERGAAR, on 5/4/2024 7:06 AM.      CT-CTA CHEST PULMONARY ARTERY W/ RECONS   Final Result         1.  Large saddle pulmonary embolus with changes of right heart strain   2.  Hazy bilateral pulmonary infiltrates      These findings were discussed with the patient's clinician, Jerri Vergara, on 5/4/2024 7:02 AM.      CT-CSPINE WITHOUT PLUS RECONS   Final Result         1.  No acute traumatic bony injury of the cervical spine is apparent.      CT-HEAD W/O    Final Result         1.  No acute intracranial abnormality.   2.  Atherosclerosis.         DX-CHEST-PORTABLE (1 VIEW)   Final Result         1.  Bibasilar atelectasis and/or subtle infiltrates.   2.  Atherosclerosis      US-EXTREMITY VENOUS LOWER BILAT    (Results Pending)       ASSESSEMENT and PLAN:  Patient is a 79 yo F with a PMH of chronic hypoxic respiratory failure, SELWYN, Chiari malformation, and HTN, admitted 05/04/2024 for saddle pulmonary embolism with acute right heart failure and bilateral lower extremity deep vein thromboses s/p emergent pulmonary artery thrombectomy 05/04 with hospital course c/b development of cardiogenic shock requiring pressor therapy, also found to have right psoas and right retroperitoneal hematoma.     * Acute saddle pulmonary embolism with acute cor pulmonale (HCC)- (present on admission)  Assessment & Plan  Joycelyn score 5, stage III, high risk, 42% risk of PE-related complications, 10% PE-related mortality. ECHO 05/04: EF 55%, reduced RV systolic function, akinesis of inferior wall.   S/p emergent pulmonary artery thrombectomy 05/04.   -Holding heparin GTT in the setting of supratherapeutic Xa and APTT    Acute deep vein thrombosis (DVT) of proximal vein of right lower extremity (HCC)- (present on admission)  Assessment & Plan  U/S venous BLE with evidence of bilateral DVTs.  -See assessment and plan for acute saddle pulmonary embolism with acute cor pulmonale    Retroperitoneal hematoma- (present on admission)  Assessment & Plan  Hb downtrending, 8.0. Confirmed on CT imaging without active extravasation.   Seen by trauma surgery DAMION Montero for anticoagulation with heparin.   -H&H Q6H   -Monitor for signs of bleeding and hemorrhage     Chronic respiratory failure with hypoxia (HCC)- (present on admission)  Assessment & Plan  On 3L O2 NC at nighttime only at baseline.   -Supplemental O2 as needed    Electrolyte abnormality- (present on admission)  Assessment & Plan  Hypokalemia  3.1 and hypophosphatemia 1.7.  -Monitor, replete as necessary    Primary hypertension- (present on admission)  Assessment & Plan  -Holding home benazepril, carvedilol, furosemide, hydrochlorothiazide in the setting of normotensive pressures and recent cardiogenic shock       DISPO: Transfer to telemetry floors.    CODE STATUS: FULL CODE    Quality Measures:  Feeding: PO  Analgesia: None  Sedation: None  Thromboprophylaxis: Apixaban  Head of bed: >30 degrees  Ulcer prophylaxis: None  Glycemic control: Correctional: Insulin regular / Basal: None  Bowel care: Senna-docusate, polyethylene glycol  Indwelling lines: None  Deescalation of antibiotics: None      Marcia Marie M.D.

## 2024-05-06 NOTE — PROGRESS NOTES
Hospital Medicine Daily Progress Note    Date of Service  5/6/2024    Chief Complaint  Tereza Sánchez is a 78 y.o. female admitted 5/4/2024 with weakness    Hospital Course  Mrs. Sánchez has a past medical history of hypertension Chiari malformation that was most recently admitted to this hospital 4/20/2024 through 4/30/2024 with COVID infection septic shock requiring intubation and IV pressors.  She had a CTA of the chest at that point in time which is negative for pulmonary embolism.  She was discharged to skilled nursing facility.  She was brought back this morning for somewhat cryptic reasons perhaps weakness perhaps shortness of breath not really clear.  Regardless in the emergency room extensive workup was done and she was found to have a DVT, saddle pulmonary embolism, and retroperitoneal hematoma as well as psoas hematoma.  An IV heparin drip has been initiated without bolus and interventional radiology will be consulted for thrombectomy of saddle pulmonary embolism and she will be monitored closely in the IMCU.   I discussed the plan of care with her  at bedside. She notes they have been  for 60 years and he is quite adamant that he wants her to be full code and intubated if indicated.  After the thrombectomy she developed hypotension with shock therefore was transferred to the ICU and was initiated on intravenous pressors after a right IJ central line was placed.    Interval Problem Update  5/6: Mrs. Sánchez was evaluated in the ICU.  She is on IV heparin drip now with a PTT severely elevated 156 therefore stop the drip and restart at a lower rate (from 30 units/kg/h down to 15 units/kg/h and changed to the Anti-Xa.  She has had serial hemoglobins and her hemoglobin now is 8.  Her  is at bedside.    I have discussed this patient's plan of care and discharge plan at IDT rounds today with Case Management, Nursing, Nursing leadership, and other members of the IDT  team.    Consultants/Specialty  Critical care. I discussed with Dr. Diggs in person.     Code Status  Full Code    Disposition  The patient is not medically cleared for discharge to home or a post-acute facility.  Anticipate discharge to: skilled nursing facility    I have placed the appropriate orders for post-discharge needs.    Review of Systems  Review of Systems   Unable to perform ROS: Mental acuity        Physical Exam  Temp:  [36.4 °C (97.6 °F)-37.1 °C (98.7 °F)] 36.4 °C (97.6 °F)  Pulse:  [] 103  Resp:  [15-43] 30  BP: (106-155)/(56-85) 110/68  SpO2:  [96 %-99 %] 98 %    Physical Exam  Vitals and nursing note reviewed.   Constitutional:       Appearance: She is ill-appearing.   Cardiovascular:      Rate and Rhythm: Tachycardia present.   Pulmonary:      Effort: Pulmonary effort is normal.      Breath sounds: Normal breath sounds.   Abdominal:      General: There is no distension.      Tenderness: There is no abdominal tenderness.   Skin:     Coloration: Skin is pale.   Neurological:      Mental Status: She is alert.      Comments: She is a moderate though not excellent historian         Fluids    Intake/Output Summary (Last 24 hours) at 5/6/2024 1212  Last data filed at 5/6/2024 0800  Gross per 24 hour   Intake 1580 ml   Output 450 ml   Net 1130 ml       Laboratory  Recent Labs     05/04/24  1230 05/04/24  2030 05/05/24  0305 05/05/24  0849 05/05/24  1955 05/06/24  0350 05/06/24  0815   WBC 24.4*  --  17.5*  --   --  13.4*  --    RBC 3.35*  --  3.28*  --   --  2.71*  --    HEMOGLOBIN 10.2*   < > 9.9*   < > 8.9* 8.2* 8.0*   HEMATOCRIT 30.7*  --  29.5*  --   --  24.4*  --    MCV 91.6  --  89.9  --   --  90.0  --    MCH 30.4  --  30.2  --   --  30.3  --    MCHC 33.2  --  33.6  --   --  33.6  --    RDW 49.1  --  47.8  --   --  48.6  --    PLATELETCT 399  --  333  --   --  259  --    MPV 10.9  --  11.0  --   --  10.9  --     < > = values in this interval not displayed.     Recent Labs     05/04/24  1194  05/05/24  0305 05/06/24  0350   SODIUM 132* 135 134*   POTASSIUM 4.2 3.4* 3.1*   CHLORIDE 98 99 101   CO2 20 22 24   GLUCOSE 251* 115* 100*   BUN 36* 33* 23*   CREATININE 0.62 0.46* 0.45*   CALCIUM 8.0* 8.0* 7.8*     Recent Labs     05/04/24  0519 05/04/24  0730 05/04/24  1230 05/05/24  1036 05/06/24  0350 05/06/24  0640 05/06/24  1109   APTT 26.0 25.3   < > 29.3 >240.0* >240.0* 156.8*   INR 1.05 1.08  --  1.19*  --   --   --     < > = values in this interval not displayed.         Recent Labs     05/05/24  0305   TRIGLYCERIDE 104       Imaging  DX-CHEST-PORTABLE (1 VIEW)   Final Result      No significant interval change.      DX-CHEST-PORTABLE (1 VIEW)   Final Result      Right IJ catheter, tip projects over cavoatrial junction region. No pneumothorax.      IR-THROMBO MECHANICAL ARTERY,INIT   Final Result      1.  Ultrasound guided access RIGHT common femoral vein.   2.  BILATERAL selective pulmonary arteriogram demonstrating bilateral pulmonary emboli, much more extensive on the LEFT THAN THE RIGHT.   3.  LEFT pulmonary thrombectomy.   4.  Pursestring suture should be removed in 6-8 hours.         US-EXTREMITY VENOUS LOWER BILAT   Final Result      EC-ECHOCARDIOGRAM LTD W/O CONT   Final Result      CT-LSPINE W/O PLUS RECONS   Final Result         1.  No acute traumatic bony injury of the lumbar spine.   2.  Right psoas muscle and retroperitoneal hematoma      CT-TSPINE W/O PLUS RECONS   Final Result         1.  No acute traumatic bony injury of the thoracic spine.      CT-ABDOMEN-PELVIS WITH   Final Result         1.  Right retroperitoneal and psoas muscle hematoma      These findings were discussed with the patient's clinician, LISA CRAMER, on 5/4/2024 7:06 AM.      CT-CTA CHEST PULMONARY ARTERY W/ RECONS   Final Result         1.  Large saddle pulmonary embolus with changes of right heart strain   2.  Hazy bilateral pulmonary infiltrates      These findings were discussed with the patient's clinician,  Jerri HARRIS Jai, on 5/4/2024 7:02 AM.      CT-CSPINE WITHOUT PLUS RECONS   Final Result         1.  No acute traumatic bony injury of the cervical spine is apparent.      CT-HEAD W/O   Final Result         1.  No acute intracranial abnormality.   2.  Atherosclerosis.         DX-CHEST-PORTABLE (1 VIEW)   Final Result         1.  Bibasilar atelectasis and/or subtle infiltrates.   2.  Atherosclerosis      US-EXTREMITY VENOUS LOWER BILAT    (Results Pending)   IR-US GUIDED PIV    (Results Pending)        Assessment/Plan  * Acute saddle pulmonary embolism with acute cor pulmonale (HCC)- (present on admission)  Assessment & Plan  Joycelyn score 5, stage III, high risk, 42% risk of PE-related complications, 10% PE-related mortality. ECHO 05/04: EF 55%, reduced RV systolic function, akinesis of inferior wall.   She underwent thrombectomy by interventional radiology on 5/4/2024  IV heparin drip and if there is no further drop in hemoglobin by 5/7/2024 consider transitioning to Lovenox    Retroperitoneal hematoma- (present on admission)  Assessment & Plan  Hb downtrending, 8.0. Confirmed on CT imaging without active extravasation.   She also had psoas hematoma  Continue to follow her hemoglobin and if she develops bleeding while on heparin consider interventional radiology for embolization    Shock (HCC)- (present on admission)  Assessment & Plan  After thrombectomy she developed severe hypotension and shock requiring intravenous pressors in the ICU    Acute deep vein thrombosis (DVT) of proximal vein of right lower extremity (HCC)- (present on admission)  Assessment & Plan  U/S venous BLE with evidence of bilateral DVTs.      Electrolyte abnormality- (present on admission)  Assessment & Plan  Potassium has been replaced  Check a basic metabolic panel in the morning    Chronic respiratory failure with hypoxia (HCC)- (present on admission)  Assessment & Plan  She had been on nocturnal oxygen though after her last hospitalization  was on 4 L nasal cannula oxygen    Primary hypertension- (present on admission)  Assessment & Plan  She is on Lotensin 20 mg daily, Coreg 6.25 mg twice daily, Lasix 20 mg daily, hydrochlorothiazide 12.5 mg daily as an outpatient.  Due to shock all these have been stopped  Continue to monitor blood pressure and restart medications accordingly         VTE prophylaxis:    therapeutic anticoagulation with weight-based heparin gtt, pharmacy to adjust PRN      I have performed a physical exam and reviewed and updated ROS and Plan today (5/6/2024). In review of yesterday's note (5/5/2024), there are no changes except as documented above.

## 2024-05-06 NOTE — PROGRESS NOTES
Patient xA resulted >1.1 and aPTT >240. Spoke to pharmacy and resident. Plan to hold heparin gtt for 2 hours and repeat labs prior to restarting.

## 2024-05-06 NOTE — CARE PLAN
Problem: Hyperinflation  Goal: Prevent or improve atelectasis  Description: Target End Date:  3 to 4 days    1. Instruct incentive spirometry usage  2.  Perform hyperinflation therapy as indicated  Outcome: Progressing      Respiratory Update    Treatment modality: PEP  Frequency: QID    Pt tolerating current treatments well with no adverse reactions.

## 2024-05-06 NOTE — PROGRESS NOTES
4 Eyes Skin Assessment Completed by Darien RN and Kaley RN.    Head WDL  Ears WDL  Nose WDL  Mouth WDL  Neck WDL  Breast/Chest WDL  Shoulder Blades WDL  Spine WDL  (R) Arm/Elbow/Hand Scab, edema  (L) Arm/Elbow/Hand Edema  Abdomen Bruising  Groin  bilateral groin sites, skin tear under panus  Scrotum/Coccyx/Buttocks WDL  (R) Leg WDL  (L) Leg WDL  (R) Heel/Foot/Toe WDL  (L) Heel/Foot/Toe WDL          Devices In Places Tele Box, Pulse Ox, and Nasal Cannula      Interventions In Place Gray Ear Foams, TAP System, and Q2 Turns    Possible Skin Injury No    Pictures Uploaded Into Epic Yes  Wound Consult Placed N/A  RN Wound Prevention Protocol Ordered Yes

## 2024-05-06 NOTE — PROGRESS NOTES
PIV running heparin infiltrated. US trained RN as well as VAT team RN unable to locate a new peripheral access point. Patient has one currently working peripheral IV running heparin. Per VAT team if a second access site is needed a central line will most likely be necessary. Dr Proctor notified.

## 2024-05-06 NOTE — PROGRESS NOTES
Critical lab called with APTT/XA, discussed with resident physician and pharmacy, this RN to recheck APTT and XA in 4 hours.

## 2024-05-07 PROBLEM — E83.39 HYPOPHOSPHATEMIA: Status: ACTIVE | Noted: 2024-05-07

## 2024-05-07 PROBLEM — E87.6 HYPOKALEMIA: Status: ACTIVE | Noted: 2024-05-07

## 2024-05-07 PROBLEM — I47.29 VENTRICULAR TACHYCARDIA (PAROXYSMAL) (HCC): Status: ACTIVE | Noted: 2024-05-07

## 2024-05-07 LAB
ALBUMIN SERPL BCP-MCNC: 2.8 G/DL (ref 3.2–4.9)
ALBUMIN/GLOB SERPL: 1.5 G/DL
ALP SERPL-CCNC: 81 U/L (ref 30–99)
ALT SERPL-CCNC: 86 U/L (ref 2–50)
ANION GAP SERPL CALC-SCNC: 10 MMOL/L (ref 7–16)
AST SERPL-CCNC: 24 U/L (ref 12–45)
AT III ACT/NOR PPP CHRO: 108 % (ref 76–128)
BILIRUB SERPL-MCNC: 1.2 MG/DL (ref 0.1–1.5)
BUN SERPL-MCNC: 19 MG/DL (ref 8–22)
CALCIUM ALBUM COR SERPL-MCNC: 8.7 MG/DL (ref 8.5–10.5)
CALCIUM SERPL-MCNC: 7.7 MG/DL (ref 8.5–10.5)
CHLORIDE SERPL-SCNC: 106 MMOL/L (ref 96–112)
CO2 SERPL-SCNC: 22 MMOL/L (ref 20–33)
CREAT SERPL-MCNC: 0.44 MG/DL (ref 0.5–1.4)
EKG IMPRESSION: NORMAL
EKG IMPRESSION: NORMAL
GFR SERPLBLD CREATININE-BSD FMLA CKD-EPI: 100 ML/MIN/1.73 M 2
GLOBULIN SER CALC-MCNC: 1.9 G/DL (ref 1.9–3.5)
GLUCOSE SERPL-MCNC: 80 MG/DL (ref 65–99)
HCT VFR BLD AUTO: 24.8 % (ref 37–47)
HCT VFR BLD AUTO: 27.1 % (ref 37–47)
HGB BLD-MCNC: 7.5 G/DL (ref 12–16)
HGB BLD-MCNC: 8.1 G/DL (ref 12–16)
HGB BLD-MCNC: 8.5 G/DL (ref 12–16)
MAGNESIUM SERPL-MCNC: 2 MG/DL (ref 1.5–2.5)
PHOSPHATE SERPL-MCNC: 2 MG/DL (ref 2.5–4.5)
POTASSIUM SERPL-SCNC: 3.6 MMOL/L (ref 3.6–5.5)
PROT SERPL-MCNC: 4.7 G/DL (ref 6–8.2)
SODIUM SERPL-SCNC: 138 MMOL/L (ref 135–145)
UFH PPP CHRO-ACNC: 0.43 IU/ML
UFH PPP CHRO-ACNC: 0.47 IU/ML

## 2024-05-07 PROCEDURE — 93010 ELECTROCARDIOGRAM REPORT: CPT | Performed by: INTERNAL MEDICINE

## 2024-05-07 PROCEDURE — 99291 CRITICAL CARE FIRST HOUR: CPT | Performed by: STUDENT IN AN ORGANIZED HEALTH CARE EDUCATION/TRAINING PROGRAM

## 2024-05-07 RX ORDER — FERROUS SULFATE 325(65) MG
325 TABLET ORAL
Status: DISCONTINUED | OUTPATIENT
Start: 2024-05-09 | End: 2024-05-10 | Stop reason: HOSPADM

## 2024-05-07 RX ORDER — POTASSIUM CHLORIDE 20 MEQ/1
40 TABLET, EXTENDED RELEASE ORAL EVERY 6 HOURS
Status: COMPLETED | OUTPATIENT
Start: 2024-05-07 | End: 2024-05-08

## 2024-05-07 RX ADMIN — HEPARIN SODIUM 15 UNITS/KG/HR: 5000 INJECTION, SOLUTION INTRAVENOUS at 19:08

## 2024-05-07 RX ADMIN — DIBASIC SODIUM PHOSPHATE, MONOBASIC POTASSIUM PHOSPHATE AND MONOBASIC SODIUM PHOSPHATE 500 MG: 852; 155; 130 TABLET ORAL at 17:16

## 2024-05-07 RX ADMIN — PREGABALIN 150 MG: 150 CAPSULE ORAL at 17:16

## 2024-05-07 RX ADMIN — DIBASIC SODIUM PHOSPHATE, MONOBASIC POTASSIUM PHOSPHATE AND MONOBASIC SODIUM PHOSPHATE 500 MG: 852; 155; 130 TABLET ORAL at 04:27

## 2024-05-07 RX ADMIN — AMITRIPTYLINE HYDROCHLORIDE 100 MG: 50 TABLET, FILM COATED ORAL at 21:48

## 2024-05-07 RX ADMIN — DULOXETINE HYDROCHLORIDE 30 MG: 30 CAPSULE, DELAYED RELEASE ORAL at 04:27

## 2024-05-07 RX ADMIN — QUETIAPINE FUMARATE 25 MG: 25 TABLET ORAL at 21:48

## 2024-05-07 RX ADMIN — ATORVASTATIN CALCIUM 10 MG: 10 TABLET, FILM COATED ORAL at 21:49

## 2024-05-07 RX ADMIN — PREGABALIN 150 MG: 150 CAPSULE ORAL at 04:27

## 2024-05-07 RX ADMIN — FERROUS SULFATE TAB 325 MG (65 MG ELEMENTAL FE) 325 MG: 325 (65 FE) TAB at 04:27

## 2024-05-07 RX ADMIN — POTASSIUM CHLORIDE 40 MEQ: 1500 TABLET, EXTENDED RELEASE ORAL at 17:15

## 2024-05-07 RX ADMIN — SENNOSIDES AND DOCUSATE SODIUM 2 TABLET: 50; 8.6 TABLET ORAL at 17:16

## 2024-05-07 ASSESSMENT — ENCOUNTER SYMPTOMS
ABDOMINAL PAIN: 0
DIZZINESS: 0
SHORTNESS OF BREATH: 0
CHILLS: 0
PALPITATIONS: 0
VOMITING: 0
MYALGIAS: 0
HEADACHES: 0
NAUSEA: 0
FEVER: 0
COUGH: 0

## 2024-05-07 ASSESSMENT — PAIN DESCRIPTION - PAIN TYPE: TYPE: ACUTE PAIN

## 2024-05-07 ASSESSMENT — FIBROSIS 4 INDEX: FIB4 SCORE: 0.89

## 2024-05-07 NOTE — PROGRESS NOTES
Hospital Medicine Daily Progress Note    Date of Service  5/7/2024    Chief Complaint  Tereza Sánchez is a 78 y.o. female admitted 5/4/2024 with weakness    Hospital Course  Mrs. Sánchez has a past medical history of hypertension Chiari malformation that was most recently admitted to this hospital 4/20/2024 through 4/30/2024 with COVID infection septic shock requiring intubation and IV pressors.  She had a CTA of the chest at that point in time which is negative for pulmonary embolism.  She was discharged to skilled nursing facility.  She was brought back this morning for somewhat cryptic reasons perhaps weakness perhaps shortness of breath not really clear.  Regardless in the emergency room extensive workup was done and she was found to have a DVT, saddle pulmonary embolism, and retroperitoneal hematoma as well as psoas hematoma.  An IV heparin drip has been initiated without bolus and interventional radiology will be consulted for thrombectomy of saddle pulmonary embolism and she will be monitored closely in the IMCU.   I discussed the plan of care with her  at bedside. She notes they have been  for 60 years and he is quite adamant that he wants her to be full code and intubated if indicated.  After the thrombectomy she developed hypotension with shock therefore was transferred to the ICU and was initiated on intravenous pressors after a right IJ central line was placed.    Interval Problem Update  5/6: Mrs. Sánchez was evaluated in the ICU.  She is on IV heparin drip now with a PTT severely elevated 156 therefore stop the drip and restart at a lower rate (from 30 units/kg/h down to 15 units/kg/h and changed to the Anti-Xa.  She has had serial hemoglobins and her hemoglobin now is 8.  Her  is at bedside.    Above per previous hospitalist.    5/7/2024  Patient was seen and examined on telemetry 4.  She continues on continuous cardiac monitoring.  Nonsustained ventricular tachycardia of 10 beats.   Asymptomatic.  Hemoglobin dropping to 7.5 from 8.3.  Continues on a heparin drip.  Potassium of 3.6 being replaced.  Requiring 1 LPM oxygen via nasal cannula.  Discussed plan of care with the patient and son at bedside.    I have discussed this patient's plan of care and discharge plan at IDT rounds today with Case Management, Nursing, Nursing leadership, and other members of the IDT team.    Consultants/Specialty  Critical care. I discussed with Dr. Diggs in person.     Code Status  Full Code    Disposition  The patient is not medically cleared for discharge to home or a post-acute facility.  Anticipate discharge to: skilled nursing facility    I have placed the appropriate orders for post-discharge needs.    Review of Systems  Review of Systems   Constitutional:  Positive for malaise/fatigue. Negative for chills and fever.   Respiratory:  Negative for cough and shortness of breath.    Cardiovascular:  Positive for leg swelling. Negative for chest pain and palpitations.   Gastrointestinal:  Negative for abdominal pain, nausea and vomiting.   Musculoskeletal:  Negative for joint pain and myalgias.   Neurological:  Negative for dizziness and headaches.        Physical Exam  Temp:  [36.1 °C (97 °F)-37.2 °C (99 °F)] 37.2 °C (99 °F)  Pulse:  [109-158] 123  Resp:  [18-24] 20  BP: ()/(44-88) 109/67  SpO2:  [90 %-95 %] 94 %    Physical Exam  Vitals and nursing note reviewed. Exam conducted with a chaperone present.   Constitutional:       Appearance: She is normal weight. She is ill-appearing. She is not diaphoretic.   HENT:      Head: Normocephalic and atraumatic.      Mouth/Throat:      Mouth: Mucous membranes are moist.      Pharynx: Oropharynx is clear. No oropharyngeal exudate.   Eyes:      General:         Right eye: No discharge.         Left eye: No discharge.      Conjunctiva/sclera: Conjunctivae normal.      Pupils: Pupils are equal, round, and reactive to light.   Cardiovascular:      Rate and Rhythm:  Regular rhythm. Tachycardia present.      Pulses: Normal pulses.      Heart sounds: Normal heart sounds. No murmur heard.  Pulmonary:      Effort: Pulmonary effort is normal. No respiratory distress.      Breath sounds: Normal breath sounds.   Abdominal:      General: Abdomen is flat. Bowel sounds are normal. There is no distension.      Palpations: Abdomen is soft.      Tenderness: There is no abdominal tenderness.   Musculoskeletal:      Cervical back: Neck supple. No tenderness.      Right lower leg: Edema present.      Left lower leg: Edema present.   Skin:     Coloration: Skin is pale.   Neurological:      Mental Status: She is alert and oriented to person, place, and time.      Motor: Weakness present.         Fluids  No intake or output data in the 24 hours ending 05/07/24 1640      Laboratory  Recent Labs     05/05/24  0305 05/05/24  0849 05/06/24  0350 05/06/24  0815 05/06/24  2152 05/07/24  0349 05/07/24  1223   WBC 17.5*  --  13.4*  --  12.5*  --   --    RBC 3.28*  --  2.71*  --  2.68*  --   --    HEMOGLOBIN 9.9*   < > 8.2*   < > 8.3* 7.5* 8.1*   HEMATOCRIT 29.5*  --  24.4*  --  23.9*  --  24.8*   MCV 89.9  --  90.0  --  89.2  --   --    MCH 30.2  --  30.3  --  31.0  --   --    MCHC 33.6  --  33.6  --  34.7  --   --    RDW 47.8  --  48.6  --  47.9  --   --    PLATELETCT 333  --  259  --  266  --   --    MPV 11.0  --  10.9  --  10.6  --   --     < > = values in this interval not displayed.     Recent Labs     05/05/24  0305 05/06/24  0350 05/07/24  0349   SODIUM 135 134* 138   POTASSIUM 3.4* 3.1* 3.6   CHLORIDE 99 101 106   CO2 22 24 22   GLUCOSE 115* 100* 80   BUN 33* 23* 19   CREATININE 0.46* 0.45* 0.44*   CALCIUM 8.0* 7.8* 7.7*     Recent Labs     05/05/24  1036 05/06/24  0350 05/06/24  0640 05/06/24  1109   APTT 29.3 >240.0* >240.0* 156.8*   INR 1.19*  --   --   --          Recent Labs     05/05/24  0305   TRIGLYCERIDE 104       Imaging  DX-CHEST-PORTABLE (1 VIEW)   Final Result      No significant  interval change.      DX-CHEST-PORTABLE (1 VIEW)   Final Result      Right IJ catheter, tip projects over cavoatrial junction region. No pneumothorax.      IR-THROMBO MECHANICAL ARTERY,INIT   Final Result      1.  Ultrasound guided access RIGHT common femoral vein.   2.  BILATERAL selective pulmonary arteriogram demonstrating bilateral pulmonary emboli, much more extensive on the LEFT THAN THE RIGHT.   3.  LEFT pulmonary thrombectomy.   4.  Pursestring suture should be removed in 6-8 hours.         US-EXTREMITY VENOUS LOWER BILAT   Final Result      EC-ECHOCARDIOGRAM LTD W/O CONT   Final Result      CT-LSPINE W/O PLUS RECONS   Final Result         1.  No acute traumatic bony injury of the lumbar spine.   2.  Right psoas muscle and retroperitoneal hematoma      CT-TSPINE W/O PLUS RECONS   Final Result         1.  No acute traumatic bony injury of the thoracic spine.      CT-ABDOMEN-PELVIS WITH   Final Result         1.  Right retroperitoneal and psoas muscle hematoma      These findings were discussed with the patient's clinician, JERRI VERGARA, on 5/4/2024 7:06 AM.      CT-CTA CHEST PULMONARY ARTERY W/ RECONS   Final Result         1.  Large saddle pulmonary embolus with changes of right heart strain   2.  Hazy bilateral pulmonary infiltrates      These findings were discussed with the patient's clinician, Jerri Vergara, on 5/4/2024 7:02 AM.      CT-CSPINE WITHOUT PLUS RECONS   Final Result         1.  No acute traumatic bony injury of the cervical spine is apparent.      CT-HEAD W/O   Final Result         1.  No acute intracranial abnormality.   2.  Atherosclerosis.         DX-CHEST-PORTABLE (1 VIEW)   Final Result         1.  Bibasilar atelectasis and/or subtle infiltrates.   2.  Atherosclerosis      US-EXTREMITY VENOUS LOWER BILAT    (Results Pending)        Assessment/Plan  * Acute saddle pulmonary embolism with acute cor pulmonale (HCC)- (present on admission)  Assessment & Plan  Joycelyn score 5, stage III,  high risk, 42% risk of PE-related complications, 10% PE-related mortality. ECHO 05/04: EF 55%, reduced RV systolic function, akinesis of inferior wall.   She underwent thrombectomy by interventional radiology on 5/4/2024  IV heparin drip and if there is no further drop in hemoglobin by 5/7/2024 consider transitioning to Lovenox    5/7/2024  Patient having mild drop in hemoglobin to 7.5 from 8.3.  Continue heparin drip    Hypokalemia  Assessment & Plan  5/7/2024  Potassium 3.6 today.  I have ordered potassium chloride for replacement goal greater than 4.0.  Magnesium is at goal at 2.0.    Ventricular tachycardia (paroxysmal) ([facility])  Assessment & Plan  5/7/2024  10 beats of ventricular tachycardia.  Nonsustained.  Correct electrolytes.  Potassium 3.6 replaced for goal greater than 4.  Magnesium at goal at 2.  Continuous cardiac monitoring to monitor for arrhythmias    Tachycardia- (present on admission)  Assessment & Plan  5/7/2024  Worsening sinus tachycardia now with nonsustained ventricular tachycardia  Continuous cardiac monitoring in setting of saddle pulmonary embolism  Continue to hold on beta-blocker    Hypophosphatemia- (present on admission)  Assessment & Plan  5/7/2024  Phosphorus are low at 2.0.  Likely due to poor oral intake.  Of increased Neutra-Phos to 400 mg 3 times a day  Encourage oral intake    Shock (HCC)- (present on admission)  Assessment & Plan  After thrombectomy she developed severe hypotension and shock requiring intravenous pressors in the ICU    Acute deep vein thrombosis (DVT) of proximal vein of right lower extremity (HCC)- (present on admission)  Assessment & Plan  U/S venous BLE with evidence of bilateral DVTs.      Retroperitoneal hematoma- (present on admission)  Assessment & Plan  Hb downtrending, 8.0. Confirmed on CT imaging without active extravasation.   She also had psoas hematoma  Continue to follow her hemoglobin and if she develops bleeding while on heparin consider  interventional radiology for embolization    Electrolyte abnormality- (present on admission)  Assessment & Plan  Potassium has been replaced  Check a basic metabolic panel in the morning    Chronic respiratory failure with hypoxia (HCC)- (present on admission)  Assessment & Plan  She had been on nocturnal oxygen though after her last hospitalization was on 4 L nasal cannula oxygen    5/7/2024  Continues on 1 LPM oxygen via nasal cannula.    Primary hypertension- (present on admission)  Assessment & Plan  She is on Lotensin 20 mg daily, Coreg 6.25 mg twice daily, Lasix 20 mg daily, hydrochlorothiazide 12.5 mg daily as an outpatient.  Due to shock all these have been stopped  Continue to monitor blood pressure and restart medications accordingly    5/7/2024  Blood pressures are controlled  Holding Lasix and hydrochlorothiazide  Continue to hold home carvedilol 6.25 mg twice daily and benazepril 20 mg daily         VTE prophylaxis:   SCDs/TEDs   therapeutic anticoagulation with weight-based heparin gtt, pharmacy to adjust PRN      I have performed a physical exam and reviewed and updated ROS and Plan today (5/7/2024). In review of yesterday's note (5/6/2024), there are no changes except as documented above.      Patient is critically ill.   The patient continues to have: Saddle pulmonary embolism with increasing tachycardia and nonsustained ventricular tachycardia.  Acute blood loss anemia.  The vital organ system that is affected is the: Cardiovascular and pulmonary  If untreated there is a high chance of deterioration into: PEA cardiac arrest, cardiovascular collapse, and eventually death.   The critical care that I am providing today is: Extensive data review of frequent monitoring of patient's vital signs and clinical assessment at bedside.  I continued the patient on a heparin drip and replaced her electrolytes.  The critical that has been undertaken is medically complex.   There has been no overlap in critical  care time.   Critical Care Time not including procedures: 38 minutes

## 2024-05-07 NOTE — PROGRESS NOTES
Report received from night RN at 0700. PT seen at bedside and pt care assumed. Pt is A&Ox4, on 1L NC, denies pain. PIV flushed and intact. PT is ST on telemetry monitor. PT is x2 assist. Pt on heparin drip, rate veriifed with night RN.     Plan of care reviewed with pt, call light, phone, and personal belongings within reach. Bed alarm on, and bed in low locked position. All pt's needs met at this time.    Bedside report received from off going RN/tech: JIMMY Rodriguez, assumed care of patient.     Fall Risk Score: MODERATE RISK  Fall risk interventions in place: Place yellow fall risk ID band on patient, Provide patient/family education based on risk assessment, Educate patient/family to call staff for assistance when getting out of bed, Place fall precaution signage outside patient door, Place patient in room close to nursing station, Utilize bed/chair fall alarm, and Bed alarm connected correctly  Bed type: Regular (Logan Score less than 17 interventions in place)  Patient on cardiac monitor: Yes  IVF/IV medications: Infusion per MAR (List Med(s)) heparin  Oxygen: How many liters 1L, Traced the line to wall oxygen, and No oxygen tank in room  Bedside sitter: Not Applicable   Isolation: Not applicable

## 2024-05-07 NOTE — DISCHARGE PLANNING
-08  Per LMSW, DPA sent SNF referral to [facility].    Date last seen: 7/5/21 for TCM  Date of next visit: 10/25/21    Medication Requested:     Outpatient Current Medications as of as of 8/4/2021       Disp Refills Start End    gabapentin (NEURONTIN) 300 MG capsule 360 capsule 1 3/19/2021     Sig - Route: by Per G Tube route as directed. Dose: Take 2 capsules = (600mg) in the morning  Dose: Take 3 capsules = (900mg) at bedtime - Per G Tube    Class: Historical Med

## 2024-05-07 NOTE — CARE PLAN
Problem: Knowledge Deficit - Standard  Goal: Patient and family/care givers will demonstrate understanding of plan of care, disease process/condition, diagnostic tests and medications  Outcome: Progressing     Problem: Skin Integrity  Goal: Skin integrity is maintained or improved  Outcome: Progressing     Problem: Fall Risk  Goal: Patient will remain free from falls  Outcome: Progressing     Problem: Pain - Standard  Goal: Alleviation of pain or a reduction in pain to the patient’s comfort goal  Outcome: Progressing   The patient is Stable - Low risk of patient condition declining or worsening    Shift Goals  Clinical Goals: remain free of falls, no skin breakdown  Patient Goals: Rest  Family Goals: NAHUM    Progress made toward(s) clinical / shift goals:     Pt educated on plan of care, pt verbalizes understanding, reinforcement needed. Pt educated on pain/anxiety scales, alleviating factors, pain/anxiety medications, and side effects, and need for pain/anxiety reassessment, pt pain/anxiety controlled at this time, reinforcement needed. Pt educated on the need to reposition frequently and remain dry to avoid skin breakdown, reinforcement needed, no further skin breakdown during shift. Fall risk education provided to pt, pt educated about the need to call for assistance while attempting to ambulate, reinforcement needed, pt remains free of falls this shift.

## 2024-05-07 NOTE — PROGRESS NOTES
Telemetry shift summary    Rhythm: st/afibb  HR: 112-156  Ectopy: o pvc    Measurements: .16 / .06 / .35    Normal values  Rhythm SR  HR   Measurements: 0.12-0.20/0.08-0.10/0.30-0.52

## 2024-05-07 NOTE — PROGRESS NOTES
NOC CROSSCOVER    8:45pmRN reported irregular heart rate up to 170.  Ordered EKG which revealed afib. Patient does not have a history of afib that I can see.  Discussed with pharmacy possibly starting diltiazem drip.  They were concerned about masking the blood loss anemia.  I have ordered IV metoprolol if heart rate sustains over 130.      9:29pm now patient is hypotensive.  Checking CBC, lactic and ordered bolus.    ALLIE Mcnulty

## 2024-05-07 NOTE — PROGRESS NOTES
MD moreland notified of situation  2042- Pt hr going up to 170/s, bounching between , MD made aware and diltiazem drip ordered. Pt only has one IV access with heparin drip running and per VAT team will need a central line if other access is needed. MD made aware of this. Diltiazem dc'd before given. EKG ordered pt in afibb RVR  2113- IV metoprolol ordered, could not be given d/t bp 89/53. MD made aware. Pt still sustaining hr >130 at this time  2136- fluid bolus started. Running at 500/hr d/t limited IV access.   2150- MD at bedside to discuss pt. States to let bolus finish before ordering anything else at this time   Pt asymptomatic throughout situation

## 2024-05-07 NOTE — PROGRESS NOTES
Pt converted back to sinus tachy at 2310. MD bronson at bedside to evaluate pt. No new orders at this time. Pt asymptomatic and resting comfortably in bed

## 2024-05-07 NOTE — CARE PLAN
Problem: Knowledge Deficit - Standard  Goal: Patient and family/care givers will demonstrate understanding of plan of care, disease process/condition, diagnostic tests and medications  Outcome: Progressing     Problem: Skin Integrity  Goal: Skin integrity is maintained or improved  Outcome: Progressing     Problem: Fall Risk  Goal: Patient will remain free from falls  Outcome: Progressing   The patient is Stable - Low risk of patient condition declining or worsening    Shift Goals  Clinical Goals: montior labs, plan of care  Patient Goals: rest  Family Goals: updaes    Progress made toward(s) clinical / shift goals:  pt progressing toward goals    Patient is not progressing towards the following goals:

## 2024-05-07 NOTE — DISCHARGE PLANNING
HTH/SCP TCN chart review completed. Collaborated with YAKOV Ly prior to meeting with the pt. The most current review of medical record, knowledge of pt's PLOF and social support, LACE+ score of 64, 6 clicks scores of 8 ADL's and 6 mobility were considered.  Patient is a re-admit and discharged to [facility] for on 4/30/24.  Per chart review, patient admitted with an acute saddle pulmonary embolism.      Pt seen at bedside. Introduced TCN program. Provided education regarding post acute levels of care. Education provided regarding case management policy for blanket SNF referrals. Discussed [facility] plan benefits. Pt verbalizes understanding.     Patient states she lives with her spouse and was modified independent with ADL's, IADL's and mobility (with use of a 4WW) at her baseline.  She used 2 L/min O2 at night only for her sleep apnea with [facility].  Since her last admit she was using 4 L/min at night and is currently on 1 L/min.  Her spouse provides all transportation and manages her medications.  She requests to return to [facility] if therapy recommends post-acute placement.  Patients 6 clicks is an 8 for mobility and will likely need post-acute placement.  Patient is currently on strict bedrest but will need PT/OT consults.     Choice proactively obtained for SNF ([facility]), faxed to [facility] and given to YAKOV. TCN will continue to follow and collaborate with discharge planning team as additional post acute needs arise. Thank you.     Completed today:  Choice obtained:  SNF ([facility]).

## 2024-05-07 NOTE — DISCHARGE PLANNING
Care Transition Team Assessment  LMSW conducted assessment and collaborated with TCN. Pt is a readmit from 4/20/24 and was discharged to [facility] last admission on 4/30/24. Pt was admitted from [facility] on 5/4/24. Prior to last admission pt lives with her spouse and was modified independent with ADLS and IADLS with the use of her 4WW. Pt stated she uses 2l of O2 at night with [facility]. Pt's plan is to DC back to [facility].     Information Source  Orientation Level: Oriented X4, Oriented to place, Oriented to time, Oriented to situation, Oriented to person  Information Given By: Patient, Other (Comments) (TCN)  Who is responsible for making decisions for patient? : Patient    Readmission Evaluation  Is this a readmission?: Yes - unplanned readmission    Elopement Risk  Legal Hold: No  Ambulatory or Self Mobile in Wheelchair: No-Not an Elopement Risk  Elopement Risk: Not at Risk for Elopement    Discharge Preparedness  What is your plan after discharge?: Skilled nursing facility  What are your discharge supports?: Spouse  Prior Functional Level: Independent with Activities of Daily Living, Uses Walker  Difficulity with ADLs: Walking    Functional Assesment  Prior Functional Level: Independent with Activities of Daily Living, Uses Walker    Finances  Financial Barriers to Discharge: No  Prescription Coverage: Yes    [facility] Directive  [facility] Directive?: None    Domestic Abuse  Have you ever been the victim of abuse or violence?: No    Psychological Assessment  History of Substance Abuse: None  History of Psychiatric Problems: No  Non-compliant with Treatment: No  Newly Diagnosed Illness: No    Discharge Risks or Barriers  Discharge risks or barriers?: No    Anticipated Discharge Information  Discharge Disposition: D/T to SNF with Medicare cert in anticipation of skilled care (03)  Discharge Contact Phone Number: [phone]

## 2024-05-08 ENCOUNTER — APPOINTMENT (OUTPATIENT)
Dept: RADIOLOGY | Facility: MEDICAL CENTER | Age: 79
DRG: 163 | End: 2024-05-08
Attending: STUDENT IN AN ORGANIZED HEALTH CARE EDUCATION/TRAINING PROGRAM
Payer: MEDICARE

## 2024-05-08 ENCOUNTER — APPOINTMENT (OUTPATIENT)
Dept: CARDIOLOGY | Facility: MEDICAL CENTER | Age: 79
DRG: 163 | End: 2024-05-08
Attending: INTERNAL MEDICINE
Payer: MEDICARE

## 2024-05-08 PROBLEM — I48.0 PAROXYSMAL ATRIAL FIBRILLATION WITH RVR (HCC): Status: ACTIVE | Noted: 2024-05-08

## 2024-05-08 LAB
ABO GROUP BLD: NORMAL
ALBUMIN SERPL BCP-MCNC: 2.8 G/DL (ref 3.2–4.9)
ALBUMIN SERPL BCP-MCNC: 3.4 G/DL (ref 3.2–4.9)
ALBUMIN/GLOB SERPL: 1.4 G/DL
ALP SERPL-CCNC: 80 U/L (ref 30–99)
ALT SERPL-CCNC: 63 U/L (ref 2–50)
ANION GAP SERPL CALC-SCNC: 9 MMOL/L (ref 7–16)
AST SERPL-CCNC: 19 U/L (ref 12–45)
BARCODED ABORH UBTYP: 5100
BARCODED PRD CODE UBPRD: NORMAL
BARCODED UNIT NUM UBUNT: NORMAL
BASOPHILS # BLD AUTO: 0.5 % (ref 0–1.8)
BASOPHILS # BLD: 0.04 K/UL (ref 0–0.12)
BILIRUB SERPL-MCNC: 1.2 MG/DL (ref 0.1–1.5)
BLD GP AB SCN SERPL QL: NORMAL
BUN SERPL-MCNC: 15 MG/DL (ref 8–22)
BUN SERPL-MCNC: 18 MG/DL (ref 8–22)
CALCIUM ALBUM COR SERPL-MCNC: 8.9 MG/DL (ref 8.5–10.5)
CALCIUM ALBUM COR SERPL-MCNC: 9.2 MG/DL (ref 8.5–10.5)
CALCIUM SERPL-MCNC: 7.9 MG/DL (ref 8.5–10.5)
CALCIUM SERPL-MCNC: 8.7 MG/DL (ref 8.5–10.5)
CHLORIDE SERPL-SCNC: 103 MMOL/L (ref 96–112)
CHLORIDE SERPL-SCNC: 108 MMOL/L (ref 96–112)
CO2 SERPL-SCNC: 22 MMOL/L (ref 20–33)
CO2 SERPL-SCNC: 23 MMOL/L (ref 20–33)
COMPONENT R 8504R: NORMAL
CREAT SERPL-MCNC: 0.48 MG/DL (ref 0.5–1.4)
CREAT SERPL-MCNC: 0.69 MG/DL (ref 0.5–1.4)
EKG IMPRESSION: NORMAL
EKG IMPRESSION: NORMAL
EOSINOPHIL # BLD AUTO: 0.16 K/UL (ref 0–0.51)
EOSINOPHIL NFR BLD: 1.9 % (ref 0–6.9)
ERYTHROCYTE [DISTWIDTH] IN BLOOD BY AUTOMATED COUNT: 51.8 FL (ref 35.9–50)
EXTRA TUBE BLU BLU: NORMAL
GFR SERPLBLD CREATININE-BSD FMLA CKD-EPI: 89 ML/MIN/1.73 M 2
GFR SERPLBLD CREATININE-BSD FMLA CKD-EPI: 98 ML/MIN/1.73 M 2
GLOBULIN SER CALC-MCNC: 2 G/DL (ref 1.9–3.5)
GLUCOSE SERPL-MCNC: 86 MG/DL (ref 65–99)
GLUCOSE SERPL-MCNC: 97 MG/DL (ref 65–99)
HCT VFR BLD AUTO: 22.4 % (ref 37–47)
HCT VFR BLD AUTO: 23.2 % (ref 37–47)
HCT VFR BLD AUTO: 24.9 % (ref 37–47)
HCT VFR BLD AUTO: 33 % (ref 37–47)
HGB BLD-MCNC: 10.6 G/DL (ref 12–16)
HGB BLD-MCNC: 7.4 G/DL (ref 12–16)
HGB BLD-MCNC: 7.6 G/DL (ref 12–16)
HGB BLD-MCNC: 7.8 G/DL (ref 12–16)
IMM GRANULOCYTES # BLD AUTO: 0.14 K/UL (ref 0–0.11)
IMM GRANULOCYTES NFR BLD AUTO: 1.7 % (ref 0–0.9)
LV EJECT FRACT  99904: 63
LV EJECT FRACT MOD 4C 99902: 63.39
LYMPHOCYTES # BLD AUTO: 1.13 K/UL (ref 1–4.8)
LYMPHOCYTES NFR BLD: 13.5 % (ref 22–41)
MAGNESIUM SERPL-MCNC: 1.9 MG/DL (ref 1.5–2.5)
MAGNESIUM SERPL-MCNC: 2 MG/DL (ref 1.5–2.5)
MCH RBC QN AUTO: 30.6 PG (ref 27–33)
MCHC RBC AUTO-ENTMCNC: 33 G/DL (ref 32.2–35.5)
MCV RBC AUTO: 92.6 FL (ref 81.4–97.8)
MONOCYTES # BLD AUTO: 0.72 K/UL (ref 0–0.85)
MONOCYTES NFR BLD AUTO: 8.6 % (ref 0–13.4)
NEUTROPHILS # BLD AUTO: 6.16 K/UL (ref 1.82–7.42)
NEUTROPHILS NFR BLD: 73.8 % (ref 44–72)
NRBC # BLD AUTO: 0.03 K/UL
NRBC BLD-RTO: 0.4 /100 WBC (ref 0–0.2)
PHOSPHATE SERPL-MCNC: 2.4 MG/DL (ref 2.5–4.5)
PHOSPHATE SERPL-MCNC: 5.2 MG/DL (ref 2.5–4.5)
PLATELET # BLD AUTO: 295 K/UL (ref 164–446)
PMV BLD AUTO: 10.6 FL (ref 9–12.9)
POTASSIUM SERPL-SCNC: 3.6 MMOL/L (ref 3.6–5.5)
POTASSIUM SERPL-SCNC: 3.9 MMOL/L (ref 3.6–5.5)
PRODUCT TYPE UPROD: NORMAL
PROT SERPL-MCNC: 4.8 G/DL (ref 6–8.2)
RBC # BLD AUTO: 2.42 M/UL (ref 4.2–5.4)
RH BLD: NORMAL
SODIUM SERPL-SCNC: 140 MMOL/L (ref 135–145)
SODIUM SERPL-SCNC: 140 MMOL/L (ref 135–145)
TROPONIN T SERPL-MCNC: 66 NG/L (ref 6–19)
TROPONIN T SERPL-MCNC: 77 NG/L (ref 6–19)
UFH PPP CHRO-ACNC: 0.16 IU/ML
UFH PPP CHRO-ACNC: 0.29 IU/ML
UFH PPP CHRO-ACNC: 0.39 IU/ML
UNIT STATUS USTAT: NORMAL
WBC # BLD AUTO: 8.4 K/UL (ref 4.8–10.8)

## 2024-05-08 PROCEDURE — 93010 ELECTROCARDIOGRAM REPORT: CPT | Performed by: INTERNAL MEDICINE

## 2024-05-08 PROCEDURE — 93306 TTE W/DOPPLER COMPLETE: CPT | Mod: 26 | Performed by: INTERNAL MEDICINE

## 2024-05-08 PROCEDURE — 30233N1 TRANSFUSION OF NONAUTOLOGOUS RED BLOOD CELLS INTO PERIPHERAL VEIN, PERCUTANEOUS APPROACH: ICD-10-PCS | Performed by: STUDENT IN AN ORGANIZED HEALTH CARE EDUCATION/TRAINING PROGRAM

## 2024-05-08 PROCEDURE — 99222 1ST HOSP IP/OBS MODERATE 55: CPT | Performed by: INTERNAL MEDICINE

## 2024-05-08 PROCEDURE — 99291 CRITICAL CARE FIRST HOUR: CPT | Performed by: STUDENT IN AN ORGANIZED HEALTH CARE EDUCATION/TRAINING PROGRAM

## 2024-05-08 RX ORDER — FUROSEMIDE 10 MG/ML
40 INJECTION INTRAMUSCULAR; INTRAVENOUS
Status: COMPLETED | OUTPATIENT
Start: 2024-05-08 | End: 2024-05-09

## 2024-05-08 RX ORDER — POTASSIUM CHLORIDE 20 MEQ/1
40 TABLET, EXTENDED RELEASE ORAL ONCE
Status: COMPLETED | OUTPATIENT
Start: 2024-05-08 | End: 2024-05-08

## 2024-05-08 RX ADMIN — DIBASIC SODIUM PHOSPHATE, MONOBASIC POTASSIUM PHOSPHATE AND MONOBASIC SODIUM PHOSPHATE 500 MG: 852; 155; 130 TABLET ORAL at 18:46

## 2024-05-08 RX ADMIN — HEPARIN SODIUM 15 UNITS/KG/HR: 5000 INJECTION, SOLUTION INTRAVENOUS at 20:53

## 2024-05-08 RX ADMIN — IOHEXOL 100 ML: 350 INJECTION, SOLUTION INTRAVENOUS at 13:15

## 2024-05-08 RX ADMIN — DULOXETINE HYDROCHLORIDE 30 MG: 30 CAPSULE, DELAYED RELEASE ORAL at 04:39

## 2024-05-08 RX ADMIN — POTASSIUM CHLORIDE 40 MEQ: 1500 TABLET, EXTENDED RELEASE ORAL at 00:42

## 2024-05-08 RX ADMIN — DIBASIC SODIUM PHOSPHATE, MONOBASIC POTASSIUM PHOSPHATE AND MONOBASIC SODIUM PHOSPHATE 500 MG: 852; 155; 130 TABLET ORAL at 11:42

## 2024-05-08 RX ADMIN — POTASSIUM CHLORIDE 40 MEQ: 1500 TABLET, EXTENDED RELEASE ORAL at 08:11

## 2024-05-08 RX ADMIN — FUROSEMIDE 40 MG: 10 INJECTION INTRAMUSCULAR; INTRAVENOUS at 12:01

## 2024-05-08 RX ADMIN — PREGABALIN 150 MG: 150 CAPSULE ORAL at 18:46

## 2024-05-08 RX ADMIN — METOPROLOL TARTRATE 25 MG: 25 TABLET, FILM COATED ORAL at 20:57

## 2024-05-08 RX ADMIN — METOPROLOL TARTRATE 5 MG: 5 INJECTION INTRAVENOUS at 17:36

## 2024-05-08 RX ADMIN — PREGABALIN 150 MG: 150 CAPSULE ORAL at 04:39

## 2024-05-08 RX ADMIN — QUETIAPINE FUMARATE 25 MG: 25 TABLET ORAL at 20:57

## 2024-05-08 RX ADMIN — METOPROLOL TARTRATE 5 MG: 5 INJECTION INTRAVENOUS at 16:44

## 2024-05-08 RX ADMIN — DIBASIC SODIUM PHOSPHATE, MONOBASIC POTASSIUM PHOSPHATE AND MONOBASIC SODIUM PHOSPHATE 500 MG: 852; 155; 130 TABLET ORAL at 04:38

## 2024-05-08 RX ADMIN — FUROSEMIDE 40 MG: 10 INJECTION INTRAMUSCULAR; INTRAVENOUS at 19:26

## 2024-05-08 RX ADMIN — ATORVASTATIN CALCIUM 10 MG: 10 TABLET, FILM COATED ORAL at 20:57

## 2024-05-08 RX ADMIN — METOPROLOL TARTRATE 5 MG: 5 INJECTION INTRAVENOUS at 17:09

## 2024-05-08 RX ADMIN — AMITRIPTYLINE HYDROCHLORIDE 100 MG: 50 TABLET, FILM COATED ORAL at 20:57

## 2024-05-08 RX ADMIN — HEPARIN SODIUM 2500 UNITS: 1000 INJECTION INTRAVENOUS; SUBCUTANEOUS at 19:22

## 2024-05-08 ASSESSMENT — COGNITIVE AND FUNCTIONAL STATUS - GENERAL
DRESSING REGULAR LOWER BODY CLOTHING: A LOT
TOILETING: A LOT
MOVING FROM LYING ON BACK TO SITTING ON SIDE OF FLAT BED: A LOT
DAILY ACTIVITIY SCORE: 12
PERSONAL GROOMING: A LOT
DRESSING REGULAR UPPER BODY CLOTHING: A LOT
EATING MEALS: A LOT
MOVING TO AND FROM BED TO CHAIR: A LOT
SUGGESTED CMS G CODE MODIFIER MOBILITY: CL
STANDING UP FROM CHAIR USING ARMS: A LOT
HELP NEEDED FOR BATHING: A LOT
CLIMB 3 TO 5 STEPS WITH RAILING: A LOT
SUGGESTED CMS G CODE MODIFIER DAILY ACTIVITY: CL
WALKING IN HOSPITAL ROOM: A LOT
MOBILITY SCORE: 12
TURNING FROM BACK TO SIDE WHILE IN FLAT BAD: A LOT

## 2024-05-08 ASSESSMENT — ENCOUNTER SYMPTOMS
MYALGIAS: 0
NAUSEA: 0
PALPITATIONS: 0
CHILLS: 0
FEVER: 0
COUGH: 0
DIZZINESS: 0
ABDOMINAL PAIN: 0
SHORTNESS OF BREATH: 0
HEADACHES: 0
VOMITING: 0

## 2024-05-08 ASSESSMENT — FIBROSIS 4 INDEX: FIB4 SCORE: 0.62

## 2024-05-08 ASSESSMENT — PAIN DESCRIPTION - PAIN TYPE: TYPE: ACUTE PAIN

## 2024-05-08 NOTE — DISCHARGE PLANNING
Case Management Discharge Planning    Admission Date: 5/4/2024  GMLOS: 4  ALOS: 4    6-Clicks ADL Score: 12  6-Clicks Mobility Score: 12  PT and/or OT Eval ordered: No  Post-acute Referrals Ordered: No  Post-acute Choice Obtained: Yes  Has referral(s) been sent to post-acute provider:  No      Anticipated Discharge Dispo: Discharge Disposition: D/T to SNF with Medicare cert in anticipation of skilled care (03)  Discharge Contact Phone Number: [phone]      Action(s) Taken: chart reviewed    Pt discussed during IDT rounds. [facility]F though per Jannet they will not have a bed available until Friday.   PT/OT to see pt.

## 2024-05-08 NOTE — PROGRESS NOTES
Bedside report received from off going RN/tech: Tu, assumed care of patient.   POC discussed with patient. Patient A n Ox4. Patient ST on monitors. Vitals stable  Monitors notified this RN at 1920 of possible ST elevation in a couple leads. Patient denied chest pain. Patients vitals stable. EKG ordered. MD notified. EKG showed minimal ST elevation. EKG upload to chart. MD notified. Will continue to monitor.   Fall Risk Score: MODERATE RISK  Fall risk interventions in place: Place yellow fall risk ID band on patient, Provide patient/family education based on risk assessment, Educate patient/family to call staff for assistance when getting out of bed, Place fall precaution signage outside patient door, Place patient in room close to nursing station, and Utilize bed/chair fall alarm  Bed type: Regular (Logan Score less than 17 interventions in place)  Patient on cardiac monitor: Yes  IVF/IV medications: Not Applicable   Oxygen: How many liters 1L  Bedside sitter: Not Applicable   Isolation: Not applicable

## 2024-05-08 NOTE — DISCHARGE PLANNING
HTH/SCP TCN chart review completed. Collaborated with YAKOV Ly.  Current discharge considerations are for return to [facility] when medically cleared.  Per chart review, patient has been accepted back to [facility].  Per collaboration with YAKOV, [facility] will not have a bed availability until Friday.  TCN will continue to follow and collaborate with discharge planning team as additional post acute needs arise. Thank you.    Completed:  Choice obtained:  SNF ([facility] - Accepted).

## 2024-05-08 NOTE — CONSULTS
CARDIOLOGY CONSULTATION NOTE      Reason of Consult: Abnormal EKG and abnormal echo 5/4/24    Consulting Physician: Miguel Duncan MD    HPI:  78 F was brought in from skilled nursing 5/4/24 for weakness found to have DVT, submassive PE with RV strain s/p emergent L thrombectomy 5/4 around 11am. Also on presentation on 5/4/24 she was noted to have spontaneous right RP and right psoas hematoma. She has HTN, non smoker, no known fam hx of ASCVD. Brothers with pacemakers.     Hg dropped from 13.4 (4/27/24) to 11.1 (4/29.24) and now 7.6 (5/8/24). No PRBCs given thus far .     She remains tachycardic with now normal BP, had low BP before.    Had brief A fib with RVR 5/6/24.    Reports feeling better, denies SOB, orthopnea, PND, CP or dizziness. Noticed more JAMAL and arm swelling over last 1-2 weeks. Had mild intermittent JAMAL over last few months. No BP checks at home.     at bedside.    Hospitalized for COVID PNA 4/20/24-4/30/24, then no PE. Normal LVEF then.    5/4: L lung thrombectomy for submassive PE (L>R)    5/4: central and art lines placed in ICU    TTE 4/22/24 reviewed:  Limited RV assessment but grossly appeared normal. Normal LVEF.    TTE 5/4/24: reviewed  Poor quality, Normal LVEF without clear wall motion assessment. RV appears dilated with reduced function    EKGs reviewed:  5/8/24: , mild ELE I, aVL, III, aVF, V4-V6, with KY depressions  5/7/24: similar to 5/8/24 EKG  5/6/24: A fib RVR 181bpm  5/4/24: ST 126bpm, No STEs, signs of RV strain with iRBBB  4/25/24: ST 135bpm, nonspecific ST - T wave changes      Past Medical History:   Diagnosis Date    Anesthesia     PONV    Arthritis     Left hip, knees, ankles    Asthma     Inhaler use daily.    Bowel habit changes     Constipation    Breath shortness     asthma    Cancer (HCC)     Breast 2015    Chiari malformation 1970's    shunt  in place    Chickenpox     Chronic back pain     2/2 scoliosis and syringomyelia     Contracture of hand joint      left     Decreased lung capacity     Dental disorder     upper/lower    Disorder of thyroid     Heart burn     High cholesterol     Hypertension     Indigestion     Joint replacement     Right shoulder, cervical    Obesity     Pain     Pain 08/06/2018    Back, neck and legs    Peripheral edema 06/06/2013    Pneumonia     PONV (postoperative nausea and vomiting)     Psychiatric problem     depression, anxiety    Scoliosis     Shortness of breath 08/06/2018    Chronic    Sleep apnea     uses O2 at night 2L    Sleep apnea 08/07/2018    Patient states having sleep study October 2018.    Snoring     No sleep study    sore throat 01/15/2015    Supplemental oxygen dependent     Syringomyelia (HCC)     surgery 1973, 1977       Social History     Socioeconomic History    Marital status:      Spouse name: Not on file    Number of children: Not on file    Years of education: Not on file    Highest education level: 12th grade   Occupational History    Not on file   Tobacco Use    Smoking status: Never    Smokeless tobacco: Never   Vaping Use    Vaping Use: Never used   Substance and Sexual Activity    Alcohol use: No     Alcohol/week: 0.0 oz    Drug use: No    Sexual activity: Not Currently     Comment:  - 51 years    Other Topics Concern    Not on file   Social History Narrative    Not on file     Social Determinants of Health     Financial Resource Strain: Low Risk  (9/28/2023)    Overall Financial Resource Strain (CARDIA)     Difficulty of Paying Living Expenses: Not very hard   Food Insecurity: No Food Insecurity (9/28/2023)    Hunger Vital Sign     Worried About Running Out of Food in the Last Year: Never true     Ran Out of Food in the Last Year: Never true   Transportation Needs: No Transportation Needs (9/28/2023)    PRAPARE - Transportation     Lack of Transportation (Medical): No     Lack of Transportation (Non-Medical): No   Physical Activity: Inactive (9/28/2023)    Exercise Vital Sign     Days of  Exercise per Week: 0 days     Minutes of Exercise per Session: 0 min   Stress: Stress Concern Present (9/28/2023)    [place] [employer] - Occupational Stress Questionnaire     Feeling of Stress : Very much   Social Connections: Moderately Isolated (9/28/2023)    Social Connection and Isolation Panel [NHANES]     Frequency of Communication with Friends and Family: Once a week     Frequency of Social Gatherings with Friends and Family: Once a week     Attends Worship Services: Never     Active Member of Clubs or Organizations: Yes     Attends Club or Organization Meetings: Never     Marital Status:    Intimate Partner Violence: Not At Risk (9/28/2023)    Humiliation, Afraid, Rape, and Kick questionnaire     Fear of Current or Ex-Partner: No     Emotionally Abused: No     Physically Abused: No     Sexually Abused: No   Housing Stability: Low Risk  (9/28/2023)    Housing Stability Vital Sign     Unable to Pay for Housing in the Last Year: No     Number of Places Lived in the Last Year: 1     Unstable Housing in the Last Year: No       No current facility-administered medications on file prior to encounter.     Current Outpatient Medications on File Prior to Encounter   Medication Sig Dispense Refill    hydrochlorothiazide (MICROZIDE) 12.5 MG capsule Take 12.5 mg by mouth every day. Hold for SBP less than 100 or HR less than 60      potassium chloride SA (KDUR) 20 MEQ Tab CR Take 20 mEq by mouth every day. Hold if lasix is held      benazepril (LOTENSIN) 20 MG Tab Take 20 mg by mouth every day. Hold for SPB less than 100 and HR less than 60      senna-docusate (PERICOLACE OR SENOKOT S) 8.6-50 MG Tab Take 2 Tablets by mouth at bedtime.      tamsulosin (FLOMAX) 0.4 MG capsule Take 0.4 mg by mouth at bedtime.      acetaminophen (TYLENOL) 325 MG Tab Take 650 mg by mouth every 6 hours as needed. Indications: Fever, Pain      carvedilol (COREG) 6.25 MG Tab Take 1 Tablet by mouth 2 times a day  with meals. 60 Tablet 1    ferrous sulfate 325 (65 Fe) MG tablet TAKE 1 TABLET BY MOUTH EVERY DAY 90 Tablet 2    atorvastatin (LIPITOR) 10 MG Tab TAKE 1 TABLET BY MOUTH EVERY EVENING. CHOLESTEROL 100 Tablet 1    DULoxetine (CYMBALTA) 30 MG Cap DR Particles Take 30 mg by mouth every day.      alendronate (FOSAMAX) 70 MG Tab Take 70 mg by mouth every Monday. Indications: Osteoporosis      pregabalin (LYRICA) 150 MG Cap Take 150 mg by mouth 2 times a day.      amitriptyline (ELAVIL) 100 MG Tab Take 100 mg by mouth every evening.      furosemide (LASIX) 20 MG Tab Take 20 mg by mouth every day. Hold for SBP <100  Indications: Edema      aspirin 81 MG EC tablet Take 81 mg by mouth every day.      QUEtiapine (SEROQUEL) 25 MG Tab Take 25 mg by mouth at bedtime.      HYDROcodone-acetaminophen (NORCO) 5-325 MG Tab per tablet Take 1 Tablet by mouth every 6 hours as needed. Indications: Pain  0       Current Facility-Administered Medications   Medication Dose Frequency Provider Last Rate Last Admin    [START ON 5/9/2024] ferrous sulfate tablet 325 mg  325 mg Q48HRS Miguel Duncan M.D.        phosphorus (K-Phos-Neutral) per tablet 500 mg  500 mg TID Miguel Duncan M.D.   500 mg at 05/08/24 0438    amitriptyline (Elavil) tablet 100 mg  100 mg Nightly Rocky Diggs Jr., D.O.   100 mg at 05/07/24 2148    atorvastatin (Lipitor) tablet 10 mg  10 mg Nightly Rocky Diggs Jr., D.O.   10 mg at 05/07/24 2149    HYDROcodone-acetaminophen (Norco) 5-325 MG per tablet 1 Tablet  1 Tablet Q6HRS PRN Rocky Diggs Jr., D.O.        senna-docusate (Pericolace Or Senokot S) 8.6-50 MG per tablet 2 Tablet  2 Tablet Q EVENING Marcia Marie M.D.   2 Tablet at 05/07/24 1716    And    polyethylene glycol/lytes (Miralax) Packet 1 Packet  1 Packet QDAY PRN Marcia Marie M.D.        Metoprolol Tartrate (Lopressor) injection 5 mg  5 mg Q5 MIN PRN YaniraJALEN Souza.        Respiratory Therapy Consult   Continuous RT Kevin Cespedes M.D.      "   heparin infusion 25,000 units in 500 mL 0.45% NACL  0-34 Units/kg/hr (Adjusted) Continuous Kevin Cespedes M.D. 19.1 mL/hr at 05/08/24 0716 15 Units/kg/hr at 05/08/24 0716    heparin injection 2,500 Units  40 Units/kg (Adjusted) PRN Kevin Cespedes M.D.   2,500 Units at 05/05/24 1351    DULoxetine (Cymbalta) capsule 30 mg  30 mg DAILY Navneet Proctor M.D.   30 mg at 05/08/24 0439    pregabalin (Lyrica) capsule 150 mg  150 mg BID Navneet Proctor M.D.   150 mg at 05/08/24 0439    QUEtiapine (SEROquel) tablet 25 mg  25 mg QHS Navneet Proctor M.D.   25 mg at 05/07/24 2148    acetaminophen (Tylenol) tablet 650 mg  650 mg Q6HRS PRN Navneet Proctor M.D.   650 mg at 05/05/24 0839   Last reviewed on 5/4/2024  8:39 AM by Darby Collazo, PhT     Sulfamethoxazole w-trimethoprim and Morphine    Family History   Problem Relation Age of Onset    Hypertension Mother     Sleep Apnea Brother     Cancer Neg Hx     Diabetes Neg Hx     Stroke Neg Hx     Heart Disease Neg Hx     Ovarian Cancer Neg Hx     Tubal Cancer Neg Hx     Peritoneal Cancer Neg Hx     Colorectal Cancer Neg Hx     Breast Cancer Neg Hx        ROS: As per HPI all other systems reviewed and negative     Physical Exam   Blood pressure 103/68, pulse 100, temperature 36.8 °C (98.2 °F), temperature source Temporal, resp. rate 18, height 1.626 m (5' 4\"), weight 79.9 kg (176 lb 2.4 oz), SpO2 96%, not currently breastfeeding.    Constitutional: pale, in NAD, lying flat without conversational dyspnea, warm extremities, appears ill.  Neck: JVD difficult to assess given neck habitus  Cardiovascular: tachycardic, no m/r/g  Pulmonary/Chest: CTAB with fine bibasilar rales  Abdominal: S/NT/ND BS+ , bruising  Extremities: Exhibits edema over both upper and lower extremities, 2+ at least  Skin: Skin is warm and dry.   Neuro: Non-focal, CN 2-12 intact grossly      Intake/Output Summary (Last 24 hours) at 5/8/2024 0923  Last data filed at 5/8/2024 0600  Gross per 24 hour   Intake -- "   Output 700 ml   Net -700 ml       Recent Labs     05/06/24  0350 05/06/24  0815 05/06/24  2152 05/07/24  0349 05/07/24  1731 05/07/24  2353 05/08/24  0617   WBC 13.4*  --  12.5*  --   --  8.4  --    RBC 2.71*  --  2.68*  --   --  2.42*  --    HEMOGLOBIN 8.2*   < > 8.3*   < > 8.5* 7.4* 7.6*   HEMATOCRIT 24.4*  --  23.9*   < > 27.1* 22.4* 23.2*   MCV 90.0  --  89.2  --   --  92.6  --    MCH 30.3  --  31.0  --   --  30.6  --    MCHC 33.6  --  34.7  --   --  33.0  --    RDW 48.6  --  47.9  --   --  51.8*  --    PLATELETCT 259  --  266  --   --  295  --    MPV 10.9  --  10.6  --   --  10.6  --     < > = values in this interval not displayed.     Recent Labs     05/06/24  0350 05/07/24  0349 05/07/24  2353   SODIUM 134* 138 140   POTASSIUM 3.1* 3.6 3.9   CHLORIDE 101 106 108   CO2 24 22 23   GLUCOSE 100* 80 86   BUN 23* 19 18   CREATININE 0.45* 0.44* 0.48*   CALCIUM 7.8* 7.7* 7.9*     Recent Labs     05/05/24  1036 05/06/24  0350 05/06/24  0640 05/06/24  1109   APTT 29.3 >240.0* >240.0* 156.8*   INR 1.19*  --   --   --                      Imaging reviewed    Impressions:  # submassive PE, L thrombectomy 5/4/24  # spontaneous Right RP and psoas hematoma  # Recent COVID 4/2024  # EKG with changes suggestive of pericarditis and RV strain  # HTN  # anasarca    Recommendations:  - IV lasix 40mg BID trial, JAMAL cab be due to RV strain and with underlying chronic HFpEF.  - repeat CTA A/P to follow up on hematomas given trending down Hg still and transfuse 1 u PRBC  - repeat limited TTE with con ordered to assess RV and LV wall motion if able to. No clinical concerns of ACS. No clinical concerns of pericarditis given she is lying flat in bed without CP or sig SOB.  - Had A fib once , no AC for now especially with no history of CVA and current state of bleeding. High bleeding risk. Monitored on tele    We will follow. Please contact me with any questions. Further recs based on progress, vitals and above test  results.    Discussed with the referring physician    Please note that this dictation was created using voice recognition software. There may be errors I did not discover before finalizing the note.     Florencio Lal MD, MPH [facility]  Interventional Cardiologist  [facility]   of Clinical Internal Medicine - [facility]

## 2024-05-08 NOTE — PROGRESS NOTES
Monitor Summary  Rhythm: ST w .15 ST elevation   Rate: 106-124  Ectopy: RPVC,RPAC  .12 / .08 / .32

## 2024-05-08 NOTE — PROGRESS NOTES
Bedside report received from off going RN/tech: Janeth, assumed care of patient.     Fall Risk Score: HIGH RISK  Fall risk interventions in place: Place yellow fall risk ID band on patient, Provide patient/family education based on risk assessment, Educate patient/family to call staff for assistance when getting out of bed, Place fall precaution signage outside patient door, Utilize bed/chair fall alarm, Notify charge of high risk for huddle, and Bed alarm connected correctly  Bed type: Low air loss (Logan Score less than 17 interventions in place)  Patient on cardiac monitor: Yes  IVF/IV medications: Infusion per MAR (List Med(s)) Heparin  Oxygen: How many liters 1L  Bedside sitter: Not Applicable   Isolation: Not applicable

## 2024-05-08 NOTE — CARE PLAN
The patient is Watcher - Medium risk of patient condition declining or worsening    Shift Goals  Clinical Goals: hep gtt, Q2 turns, saftey  Patient Goals: rest  Family Goals: na    Progress made toward(s) clinical / shift goals:    Problem: Knowledge Deficit - Standard  Goal: Patient and family/care givers will demonstrate understanding of plan of care, disease process/condition, diagnostic tests and medications  Outcome: Progressing     Problem: Fall Risk  Goal: Patient will remain free from falls  Outcome: Progressing     Problem: Pain - Standard  Goal: Alleviation of pain or a reduction in pain to the patient’s comfort goal  Outcome: Progressing     Problem: Risk for Bleeding  Goal: Patient will take measures to prevent bleeding and recognizes signs of bleeding that need to be reported immediately to a health care professional  Outcome: Progressing       Patient is not progressing towards the following goals:      Problem: Risk for Bleeding  Goal: Patient will not experience bleeding as evidenced by normal blood pressure, stable hematocrit and hemoglobin levels and desired ranges for coagulation profiles  Outcome: Not Progressing  Pt required a transfusion of PRBCs this shift.

## 2024-05-08 NOTE — DIETARY
"Nutrition services: Day 4 of admit.  Tereza Sánchez is a 78 y.o. female with admitting DX of acute saddle pulmonary embolism with acute cor pulmonale    Consult received for failure to thrive.    Met w/ pt and spouse at bedside. Pt reported no recent changes in wt but poor appetite x \"a couple of days\" that is ongoing. Pt reported not liking ONSs, will d/c supplement order at this time. Reviewed food options and pt selected some items that sounded appetizing, adjusted upcoming meals. Noted pt had some outside-food in her room, encouraged spouse to bring in food items for pt per her request to encourage improved PO.    Conducted nutrition-focused PE. No notable evidence of fat loss or muscle wasting found.    Assessment:  Height: 162.6 cm (5' 4\")  Weight: 79.9 kg (176 lb 2.4 oz) taken via bed scale on 5/8  Body mass index is 30.24 kg/m². BMI classification: obesity class I  Diet/Intake: Regular, finger foods / PO intake per ADLs has been <25% x 2 meals and 25-50% x 1 meal.     Evaluation:   Pt BIBA from SNF for GLF. PMHx includes cancer, dental disorder, high cholesterol, HTN, indigestion, obesity, peripheral edema.  Pt was previously on tube feed during recent [facility] admit from 4/20-4/30, however TF only administered for ~1 day. Pt experienced poor PO during this admit.   Per chart review, pt has lost 17 lb (8.8% body weight) x 1 year, which is notable but not significant.  Wt Readings from Last 6 Encounters:   05/08/24 79.9 kg (176 lb 2.4 oz)   04/30/24 77.9 kg (171 lb 11.8 oz)   12/05/23 78.5 kg (173 lb)   10/11/23 77.1 kg (170 lb)   10/02/23 81.9 kg (180 lb 8.9 oz)   03/29/23 87.5 kg (193 lb)   Labs: creatinine 0.48, phosphorous 2.4  Meds: Lipitor, lasix, phosphorous, potassium chloride, BM protocol  Edema: 2+ pitting BUE and BLE  Last BM: 5/6    Malnutrition Risk: Pt at risk given recent poor PO, however unable to meet ASPEN criteria at this time.    Recommendations/Plan:  Cancel supplement order per pt " request  RD to follow PO intake for need for further nutrition intervention  Encourage intake of >50% of meals  Document intake of all meals as % taken in ADL's to provide interdisciplinary communication across all shifts.   Monitor weight.  Nutrition rep will continue to see patient for ongoing meal and snack preferences.       RD following

## 2024-05-08 NOTE — CARE PLAN
The patient is Stable - Low risk of patient condition declining or worsening    Shift Goals  Clinical Goals: hep gtt, Q2 turns, saftey  Patient Goals: rest  Family Goals: na    Progress made toward(s) clinical / shift goals:  POC discussed with patient. Patient is running heparin gtt at 15 units. Xa remains therapeutic with morning labs. Patient has Q2 turns in place and a female pure wick for incontinence. I/O's documented Fall precautions in place. Bed alarm on. Vitals stable. Patient SR on the monitor.       Problem: Knowledge Deficit - Standard  Goal: Patient and family/care givers will demonstrate understanding of plan of care, disease process/condition, diagnostic tests and medications  Outcome: Progressing     Problem: Skin Integrity  Goal: Skin integrity is maintained or improved  Outcome: Progressing     Problem: Fall Risk  Goal: Patient will remain free from falls  Outcome: Progressing     Problem: Pain - Standard  Goal: Alleviation of pain or a reduction in pain to the patient’s comfort goal  Outcome: Progressing     Patient is not progressing towards the following goals:

## 2024-05-09 ENCOUNTER — APPOINTMENT (OUTPATIENT)
Dept: RADIOLOGY | Facility: MEDICAL CENTER | Age: 79
DRG: 163 | End: 2024-05-09
Attending: STUDENT IN AN ORGANIZED HEALTH CARE EDUCATION/TRAINING PROGRAM
Payer: MEDICARE

## 2024-05-09 ENCOUNTER — APPOINTMENT (OUTPATIENT)
Dept: CARDIOLOGY | Facility: MEDICAL CENTER | Age: 79
DRG: 163 | End: 2024-05-09
Attending: INTERNAL MEDICINE
Payer: MEDICARE

## 2024-05-09 LAB
ALBUMIN SERPL BCP-MCNC: 3.2 G/DL (ref 3.2–4.9)
ALBUMIN/GLOB SERPL: 1.3 G/DL
ALP SERPL-CCNC: 88 U/L (ref 30–99)
ALT SERPL-CCNC: 48 U/L (ref 2–50)
ANION GAP SERPL CALC-SCNC: 15 MMOL/L (ref 7–16)
APPEARANCE UR: CLEAR
AST SERPL-CCNC: 18 U/L (ref 12–45)
BACTERIA #/AREA URNS HPF: NEGATIVE /HPF
BACTERIA BLD CULT: NORMAL
BACTERIA BLD CULT: NORMAL
BASOPHILS # BLD AUTO: 0.6 % (ref 0–1.8)
BASOPHILS # BLD: 0.06 K/UL (ref 0–0.12)
BILIRUB SERPL-MCNC: 1.3 MG/DL (ref 0.1–1.5)
BILIRUB UR QL STRIP.AUTO: NEGATIVE
BUN SERPL-MCNC: 13 MG/DL (ref 8–22)
CALCIUM ALBUM COR SERPL-MCNC: 9.1 MG/DL (ref 8.5–10.5)
CALCIUM SERPL-MCNC: 8.5 MG/DL (ref 8.5–10.5)
CHLORIDE SERPL-SCNC: 104 MMOL/L (ref 96–112)
CO2 SERPL-SCNC: 22 MMOL/L (ref 20–33)
COLOR UR: YELLOW
CREAT SERPL-MCNC: 0.65 MG/DL (ref 0.5–1.4)
EOSINOPHIL # BLD AUTO: 0.07 K/UL (ref 0–0.51)
EOSINOPHIL NFR BLD: 0.7 % (ref 0–6.9)
EPI CELLS #/AREA URNS HPF: NEGATIVE /HPF
ERYTHROCYTE [DISTWIDTH] IN BLOOD BY AUTOMATED COUNT: 52.1 FL (ref 35.9–50)
GFR SERPLBLD CREATININE-BSD FMLA CKD-EPI: 91 ML/MIN/1.73 M 2
GLOBULIN SER CALC-MCNC: 2.4 G/DL (ref 1.9–3.5)
GLUCOSE SERPL-MCNC: 87 MG/DL (ref 65–99)
GLUCOSE UR STRIP.AUTO-MCNC: NEGATIVE MG/DL
HCT VFR BLD AUTO: 31.6 % (ref 37–47)
HCT VFR BLD AUTO: 32.7 % (ref 37–47)
HGB BLD-MCNC: 10.1 G/DL (ref 12–16)
HGB BLD-MCNC: 10.6 G/DL (ref 12–16)
HYALINE CASTS #/AREA URNS LPF: ABNORMAL /LPF
IMM GRANULOCYTES # BLD AUTO: 0.19 K/UL (ref 0–0.11)
IMM GRANULOCYTES NFR BLD AUTO: 1.9 % (ref 0–0.9)
KETONES UR STRIP.AUTO-MCNC: 15 MG/DL
LEUKOCYTE ESTERASE UR QL STRIP.AUTO: ABNORMAL
LV EJECT FRACT  99904: 60
LV EJECT FRACT MOD 2C 99903: 72.2
LV EJECT FRACT MOD 4C 99902: 62.77
LV EJECT FRACT MOD BP 99901: 63.32
LYMPHOCYTES # BLD AUTO: 0.81 K/UL (ref 1–4.8)
LYMPHOCYTES NFR BLD: 7.9 % (ref 22–41)
MAGNESIUM SERPL-MCNC: 1.9 MG/DL (ref 1.5–2.5)
MCH RBC QN AUTO: 29.6 PG (ref 27–33)
MCHC RBC AUTO-ENTMCNC: 32 G/DL (ref 32.2–35.5)
MCV RBC AUTO: 92.7 FL (ref 81.4–97.8)
MICRO URNS: ABNORMAL
MONOCYTES # BLD AUTO: 0.75 K/UL (ref 0–0.85)
MONOCYTES NFR BLD AUTO: 7.3 % (ref 0–13.4)
NEUTROPHILS # BLD AUTO: 8.34 K/UL (ref 1.82–7.42)
NEUTROPHILS NFR BLD: 81.6 % (ref 44–72)
NITRITE UR QL STRIP.AUTO: NEGATIVE
NRBC # BLD AUTO: 0.03 K/UL
NRBC BLD-RTO: 0.3 /100 WBC (ref 0–0.2)
PH UR STRIP.AUTO: 5 [PH] (ref 5–8)
PLATELET # BLD AUTO: 330 K/UL (ref 164–446)
PMV BLD AUTO: 10.7 FL (ref 9–12.9)
POTASSIUM SERPL-SCNC: 3.5 MMOL/L (ref 3.6–5.5)
PROT SERPL-MCNC: 5.6 G/DL (ref 6–8.2)
PROT UR QL STRIP: NEGATIVE MG/DL
RBC # BLD AUTO: 3.41 M/UL (ref 4.2–5.4)
RBC # URNS HPF: ABNORMAL /HPF
RBC UR QL AUTO: ABNORMAL
SIGNIFICANT IND 70042: NORMAL
SIGNIFICANT IND 70042: NORMAL
SITE SITE: NORMAL
SITE SITE: NORMAL
SODIUM SERPL-SCNC: 141 MMOL/L (ref 135–145)
SOURCE SOURCE: NORMAL
SOURCE SOURCE: NORMAL
SP GR UR STRIP.AUTO: 1.01
UFH PPP CHRO-ACNC: 0.12 IU/ML
UFH PPP CHRO-ACNC: 0.14 IU/ML
UFH PPP CHRO-ACNC: 0.53 IU/ML
UFH PPP CHRO-ACNC: 0.67 IU/ML
UROBILINOGEN UR STRIP.AUTO-MCNC: 0.2 MG/DL
WBC # BLD AUTO: 10.2 K/UL (ref 4.8–10.8)
WBC #/AREA URNS HPF: ABNORMAL /HPF

## 2024-05-09 PROCEDURE — 99232 SBSQ HOSP IP/OBS MODERATE 35: CPT | Mod: FS | Performed by: INTERNAL MEDICINE

## 2024-05-09 PROCEDURE — 05HD33Z INSERTION OF INFUSION DEVICE INTO RIGHT CEPHALIC VEIN, PERCUTANEOUS APPROACH: ICD-10-PCS | Performed by: STUDENT IN AN ORGANIZED HEALTH CARE EDUCATION/TRAINING PROGRAM

## 2024-05-09 PROCEDURE — 93308 TTE F-UP OR LMTD: CPT | Mod: 26 | Performed by: INTERNAL MEDICINE

## 2024-05-09 PROCEDURE — 99291 CRITICAL CARE FIRST HOUR: CPT | Performed by: STUDENT IN AN ORGANIZED HEALTH CARE EDUCATION/TRAINING PROGRAM

## 2024-05-09 RX ORDER — SPIRONOLACTONE 25 MG/1
25 TABLET ORAL
Status: DISCONTINUED | OUTPATIENT
Start: 2024-05-09 | End: 2024-05-10 | Stop reason: HOSPADM

## 2024-05-09 RX ORDER — FUROSEMIDE 20 MG/1
20 TABLET ORAL
Status: DISCONTINUED | OUTPATIENT
Start: 2024-05-10 | End: 2024-05-10 | Stop reason: HOSPADM

## 2024-05-09 RX ORDER — SODIUM CHLORIDE 9 MG/ML
INJECTION, SOLUTION INTRAVENOUS CONTINUOUS
Status: DISCONTINUED | OUTPATIENT
Start: 2024-05-09 | End: 2024-05-10 | Stop reason: HOSPADM

## 2024-05-09 RX ORDER — POTASSIUM CHLORIDE 20 MEQ/1
40 TABLET, EXTENDED RELEASE ORAL EVERY 6 HOURS
Status: COMPLETED | OUTPATIENT
Start: 2024-05-09 | End: 2024-05-09

## 2024-05-09 RX ORDER — MAGNESIUM SULFATE 1 G/100ML
1 INJECTION INTRAVENOUS ONCE
Status: ACTIVE | OUTPATIENT
Start: 2024-05-09 | End: 2024-05-10

## 2024-05-09 RX ADMIN — AMITRIPTYLINE HYDROCHLORIDE 100 MG: 50 TABLET, FILM COATED ORAL at 20:25

## 2024-05-09 RX ADMIN — HEPARIN SODIUM 19 UNITS/KG/HR: 5000 INJECTION, SOLUTION INTRAVENOUS at 20:37

## 2024-05-09 RX ADMIN — HUMAN ALBUMIN MICROSPHERES AND PERFLUTREN 3 ML: 10; .22 INJECTION, SOLUTION INTRAVENOUS at 10:29

## 2024-05-09 RX ADMIN — FERROUS SULFATE TAB 325 MG (65 MG ELEMENTAL FE) 325 MG: 325 (65 FE) TAB at 08:09

## 2024-05-09 RX ADMIN — POTASSIUM CHLORIDE 40 MEQ: 1500 TABLET, EXTENDED RELEASE ORAL at 08:55

## 2024-05-09 RX ADMIN — METOPROLOL TARTRATE 25 MG: 25 TABLET, FILM COATED ORAL at 16:45

## 2024-05-09 RX ADMIN — QUETIAPINE FUMARATE 25 MG: 25 TABLET ORAL at 20:25

## 2024-05-09 RX ADMIN — PREGABALIN 150 MG: 150 CAPSULE ORAL at 04:50

## 2024-05-09 RX ADMIN — HEPARIN SODIUM 2500 UNITS: 1000 INJECTION INTRAVENOUS; SUBCUTANEOUS at 12:19

## 2024-05-09 RX ADMIN — SODIUM CHLORIDE: 9 INJECTION, SOLUTION INTRAVENOUS at 09:00

## 2024-05-09 RX ADMIN — METOPROLOL TARTRATE 25 MG: 25 TABLET, FILM COATED ORAL at 04:54

## 2024-05-09 RX ADMIN — DIBASIC SODIUM PHOSPHATE, MONOBASIC POTASSIUM PHOSPHATE AND MONOBASIC SODIUM PHOSPHATE 500 MG: 852; 155; 130 TABLET ORAL at 16:45

## 2024-05-09 RX ADMIN — FUROSEMIDE 40 MG: 10 INJECTION INTRAMUSCULAR; INTRAVENOUS at 04:50

## 2024-05-09 RX ADMIN — DULOXETINE HYDROCHLORIDE 30 MG: 30 CAPSULE, DELAYED RELEASE ORAL at 04:50

## 2024-05-09 RX ADMIN — DIBASIC SODIUM PHOSPHATE, MONOBASIC POTASSIUM PHOSPHATE AND MONOBASIC SODIUM PHOSPHATE 500 MG: 852; 155; 130 TABLET ORAL at 04:50

## 2024-05-09 RX ADMIN — FUROSEMIDE 40 MG: 10 INJECTION INTRAMUSCULAR; INTRAVENOUS at 16:46

## 2024-05-09 RX ADMIN — ATORVASTATIN CALCIUM 10 MG: 10 TABLET, FILM COATED ORAL at 20:25

## 2024-05-09 RX ADMIN — POTASSIUM CHLORIDE 40 MEQ: 1500 TABLET, EXTENDED RELEASE ORAL at 11:29

## 2024-05-09 RX ADMIN — PREGABALIN 150 MG: 150 CAPSULE ORAL at 16:46

## 2024-05-09 RX ADMIN — SPIRONOLACTONE 25 MG: 25 TABLET ORAL at 11:29

## 2024-05-09 RX ADMIN — DIBASIC SODIUM PHOSPHATE, MONOBASIC POTASSIUM PHOSPHATE AND MONOBASIC SODIUM PHOSPHATE 500 MG: 852; 155; 130 TABLET ORAL at 11:30

## 2024-05-09 ASSESSMENT — ENCOUNTER SYMPTOMS
HEADACHES: 0
DIZZINESS: 0
COUGH: 0
PALPITATIONS: 0
NUMBNESS: 0
VOMITING: 0
SHORTNESS OF BREATH: 0
NAUSEA: 0
COLOR CHANGE: 1
FEVER: 0
BACK PAIN: 1
ABDOMINAL PAIN: 0
WEAKNESS: 1
FATIGUE: 1
DIAPHORESIS: 0
TROUBLE SWALLOWING: 0
CHILLS: 0
CHEST TIGHTNESS: 0
CONFUSION: 0
NERVOUS/ANXIOUS: 0
MYALGIAS: 0
AGITATION: 0

## 2024-05-09 ASSESSMENT — PATIENT HEALTH QUESTIONNAIRE - PHQ9
3. TROUBLE FALLING OR STAYING ASLEEP OR SLEEPING TOO MUCH: NEARLY EVERY DAY
2. FEELING DOWN, DEPRESSED, IRRITABLE, OR HOPELESS: MORE THAN HALF THE DAYS
9. THOUGHTS THAT YOU WOULD BE BETTER OFF DEAD, OR OF HURTING YOURSELF: NOT AT ALL
7. TROUBLE CONCENTRATING ON THINGS, SUCH AS READING THE NEWSPAPER OR WATCHING TELEVISION: NOT AT ALL
4. FEELING TIRED OR HAVING LITTLE ENERGY: SEVERAL DAYS
1. LITTLE INTEREST OR PLEASURE IN DOING THINGS: NOT AT ALL
6. FEELING BAD ABOUT YOURSELF - OR THAT YOU ARE A FAILURE OR HAVE LET YOURSELF OR YOUR FAMILY DOWN: NOT AL ALL
5. POOR APPETITE OR OVEREATING: NOT AT ALL
8. MOVING OR SPEAKING SO SLOWLY THAT OTHER PEOPLE COULD HAVE NOTICED. OR THE OPPOSITE, BEING SO FIGETY OR RESTLESS THAT YOU HAVE BEEN MOVING AROUND A LOT MORE THAN USUAL: NOT AT ALL
SUM OF ALL RESPONSES TO PHQ QUESTIONS 1-9: 6
SUM OF ALL RESPONSES TO PHQ9 QUESTIONS 1 AND 2: 2

## 2024-05-09 ASSESSMENT — LIFESTYLE VARIABLES
EVER FELT BAD OR GUILTY ABOUT YOUR DRINKING: NO
TOTAL SCORE: 0
ALCOHOL_USE: NO
DOES PATIENT WANT TO STOP DRINKING: NO
ON A TYPICAL DAY WHEN YOU DRINK ALCOHOL HOW MANY DRINKS DO YOU HAVE: 0
EVER HAD A DRINK FIRST THING IN THE MORNING TO STEADY YOUR NERVES TO GET RID OF A HANGOVER: NO
CONSUMPTION TOTAL: NEGATIVE
TOTAL SCORE: 0
HAVE YOU EVER FELT YOU SHOULD CUT DOWN ON YOUR DRINKING: NO
AVERAGE NUMBER OF DAYS PER WEEK YOU HAVE A DRINK CONTAINING ALCOHOL: 0
HOW MANY TIMES IN THE PAST YEAR HAVE YOU HAD 5 OR MORE DRINKS IN A DAY: 0
TOTAL SCORE: 0
HAVE PEOPLE ANNOYED YOU BY CRITICIZING YOUR DRINKING: NO

## 2024-05-09 ASSESSMENT — FIBROSIS 4 INDEX
FIB4 SCORE: 0.6
FIB4 SCORE: 0.6

## 2024-05-09 ASSESSMENT — PAIN DESCRIPTION - PAIN TYPE: TYPE: ACUTE PAIN

## 2024-05-09 NOTE — CARE PLAN
The patient is Stable - Low risk of patient condition declining or worsening    Shift Goals  Clinical Goals: hep gtt, turns, monitor HR  Patient Goals: rest  Family Goals: na    Pt is alert and demonstrated oriented conversation to person place time and event, opens eyes spontaneously and tracks RN, follows commands. Pt breathing at a normal rate and rhythm, and is in no visible respiratory or physical distress. Pt is calm with a flat affect. VS stable. Family at bedside. Heparin gtt infusing through PIV. ST 100s. Minitoring H&H q6h. Tracking I/O. Q2 turns in place. Incontinence managed with barrier wipes and cream. Sacral integument intact, red and blanching. Fall precautions in place. Denies pain.    Progress made toward(s) clinical / shift goals:    Problem: Knowledge Deficit - Standard  Goal: Patient and family/care givers will demonstrate understanding of plan of care, disease process/condition, diagnostic tests and medications  5/9/2024 1537 by Johanne Kong, R.N.  Outcome: Progressing       Problem: Skin Integrity  Goal: Skin integrity is maintained or improved  5/9/2024 1537 by Johanne Kong, R.N.  Outcome: Progressing       Problem: Fall Risk  Goal: Patient will remain free from falls  5/9/2024 1537 by Johanne Kong, R.N.  Outcome: Progressing         Patient is not progressing towards the following goals:

## 2024-05-09 NOTE — PROGRESS NOTES
Monitor Summary  Rhythm: ST - Afib RVR : required 3 doses metoprolol  Rate: 100s-150s  Ectopy: n/a  .16 / .08 / .31

## 2024-05-09 NOTE — ASSESSMENT & PLAN NOTE
5/7/2024  10 beats of supraventricular tachycardia.  Nonsustained.  Correct electrolytes.  Potassium 3.6 replaced for goal greater than 4.  Magnesium at goal at 2.  Continuous cardiac monitoring to monitor for arrhythmias    5/9/2024  No further episodes  
5/7/2024  10 beats of ventricular tachycardia.  Nonsustained.  Correct electrolytes.  Potassium 3.6 replaced for goal greater than 4.  Magnesium at goal at 2.  Continuous cardiac monitoring to monitor for arrhythmias  
5/7/2024  Phosphorus are low at 2.0.  Likely due to poor oral intake.  Of increased Neutra-Phos to 400 mg 3 times a day  Encourage oral intake  
5/7/2024  Potassium 3.6 today.  I have ordered potassium chloride for replacement goal greater than 4.0.  Magnesium is at goal at 2.0.  
5/7/2024  Worsening sinus tachycardia now with nonsustained ventricular tachycardia  Continuous cardiac monitoring in setting of saddle pulmonary embolism  Continue to hold on beta-blocker    5/8/2024  Now wit intermittent paroxysmal atrial fibrillation with RVR  Starting metoprolol 25 mg by mouth twice daily    5/9/2024  Improving   Continue metoprolol 25 mg by mouth twice daily  
5/8/2024  Patient going into atrial fibrillation RVR, received metoprolol 5 mg IV x 3 for sustained heart rate greater than 130s.     Repeat EKG per my read showing atrial fibrillation with RVR with heart rate of 160, QTc 478.  No significant ST elevation or depression.    Metoprolol 25 mg by mouth twice daily started.  Continuous cardiac monitoring  
After thrombectomy she developed severe hypotension and shock requiring intravenous pressors in the ICU  
Bilateral lower extremity DVT  Heparin gtt w/ Xa monitoring  
CT imaging confirms a right psoas and retroperitoneal hematoma without evidence of active extravasation  Evaluated by Dr. Machuca from trauma surgery - [place] for full anticoagulation with heparin at this time  Trend hemoglobin every 6 hours, slowly downtrending.  At this point therapeutic anticoagulation benefits still outweigh risks  May need blood transfusion  If unable to tolerate anticoagulation will need IVC filter  
Continue home oxygen  
Developed following pulmonary artery thrombectomy due to SIRS response  Spontaneously converted into sinus tachycardia without intervention  monitor off amiodarone for now  Optimize potassium and magnesium  
Hb downtrending, 8.0. Confirmed on CT imaging without active extravasation.   She also had psoas hematoma  Continue to follow her hemoglobin and if she develops bleeding while on heparin consider interventional radiology for embolization  
I am titrating norepinephrine and vasopressin to keep mean arterial pressure greater than 65  
Joycelyn score 5, stage III, high risk, 42% risk of PE-related complications, 10% PE-related mortality. ECHO 05/04: EF 55%, reduced RV systolic function, akinesis of inferior wall.   She underwent thrombectomy by interventional radiology on 5/4/2024  IV heparin drip and if there is no further drop in hemoglobin by 5/7/2024 consider transitioning to Lovenox    5/7/2024  Patient having mild drop in hemoglobin to 7.5 from 8.3.  Continue heparin drip    5/8/2024  Patient is showing signs of ST elevation in the anterior lateral leads.  Cardiology consulted recommending repeat CT scan with contrast to assess for bleeding. Discussed with Dr. Lal of cardiology  Discussed with interventional radiology team for consideration of EKOS given ST changes.  Recommending repeat imaging to reassess clot burden  CT aortogram showing improving pulmonary embolism.  Stable right psoas moderate size hematoma.  Continuing heparin drip.    5/9/2024  Continue heparin drip.  Plan to transition to apixaban tomorrow if tachycardia continuing to improve  
Likely due to decreased ambulation with recent COVID infection  Joycelyn score 5  Echocardiogram with RV dysfunction  S/P emergent pulmonary artery thrombectomy on 5/4  S/p post thrombectomy SIRS response and profound shock 5/4   Shock now resolved  Heparin gtt with Xa monitoring, currently supratherapeutic.  Initially concern for heparin nonresponder however at this point appears to be responding  
Potassium has been replaced  Check a basic metabolic panel in the morning  
Recently weaned from vasopressor support, hold antihypertensives for now  Restart when clinically appropriate  
SIRS criteria identified on my evaluation include:  Leukocytosis, with WBC greater than 12,000 her white blood count 17,000 and she is tachycardic in the 120s  SIRS is non-infectious, the patient does not have sepsis  Secondary to acute pulmonary embolism.  She does not appear to have source of a bacterial infection.    
She had been on nocturnal oxygen though after her last hospitalization was on 4 L nasal cannula oxygen    5/7/2024  Continues on 1 LPM oxygen via nasal cannula.    5/9/2024   Stable on 1 LPM  
She is on Lotensin 20 mg daily, Coreg 6.25 mg twice daily, Lasix 20 mg daily, hydrochlorothiazide 12.5 mg daily as an outpatient.  Due to shock all these have been stopped  Continue to monitor blood pressure and restart medications accordingly    5/7/2024  Blood pressures are controlled  Holding Lasix and hydrochlorothiazide  Continue to hold home carvedilol 6.25 mg twice daily and benazepril 20 mg daily  
U/S venous BLE with evidence of bilateral DVTs.    
05-Oct-2021 11:56

## 2024-05-09 NOTE — DISCHARGE PLANNING
[phone]  DPA confirmed with Jannet at [facility] that facility will have a bed available for pt tomorrow. Pt will need to transport via Jannet way requesting [phone] transport time. DPA to notify [facility] tomorrow regarding transport setup, pending medical clearance.

## 2024-05-09 NOTE — CARE PLAN
The patient is Watcher - Medium risk of patient condition declining or worsening    Shift Goals  Clinical Goals: hep gtt, turns, monitor HR  Patient Goals: rest  Family Goals: na    Progress made toward(s) clinical / shift goals:  Patient A n Ox4. Discussed POC with patient. Patients vitals signs stable. Patient ST on the monitor. Monitoring patients H/H every 6 hours. Documenting I/O's. Patient reports 0/10 pain. Q2 turns in place. Barrier wipes and creme used for incontinent episodes. Fall precautions place.       Problem: Knowledge Deficit - Standard  Goal: Patient and family/care givers will demonstrate understanding of plan of care, disease process/condition, diagnostic tests and medications  Outcome: Progressing     Problem: Skin Integrity  Goal: Skin integrity is maintained or improved  Outcome: Progressing     Problem: Fall Risk  Goal: Patient will remain free from falls  Outcome: Progressing     Problem: Pain - Standard  Goal: Alleviation of pain or a reduction in pain to the patient’s comfort goal  Outcome: Progressing       Patient is not progressing towards the following goals:

## 2024-05-09 NOTE — PROGRESS NOTES
Cardiology Follow Up Progress Note    Date of Service  5/9/2024    Attending Physician  Miguel Duncan M.D.    Chief Complaint   Weakness    Cardiology consult   Abnormal EKG     HPI  Tereza Sánchez is a 78 y.o. female admitted 5/4/2024 with past medical recent hospitalization with COVID  pneumonia, hypertension, and  history of DVT,     Recent submassive PE with RV strain s/p emergent L thrombectomy 5/4 5/4/24 had spontaneous right RP and right psoas hematoma.    Consulted for ST wave changes on telemetry and one brief episode of afib with RVR    Interim Events  No events overnight   Denies any chest pain/pressure, sob, dizziness or palpitations  NSR on the monitor   Limited echo with contrast pending   Noted right arm hematoma     Review of Systems  Review of Systems   Constitutional:  Positive for fatigue. Negative for chills, diaphoresis and fever.   HENT:  Negative for nosebleeds and trouble swallowing.    Respiratory:  Negative for cough, chest tightness and shortness of breath.    Cardiovascular:  Positive for leg swelling. Negative for chest pain and palpitations.   Genitourinary:  Negative for hematuria.   Musculoskeletal:  Positive for back pain.   Skin:  Positive for color change.   Neurological:  Negative for dizziness, syncope and numbness.   Psychiatric/Behavioral:  Negative for agitation and confusion. The patient is not nervous/anxious.        Vital signs in last 24 hours  Temp:  [36.1 °C (97 °F)-37 °C (98.6 °F)] 36.1 °C (97 °F)  Pulse:  [] 95  Resp:  [15-28] 15  BP: ()/() 95/59  SpO2:  [91 %-98 %] 95 %    Physical Exam  Physical Exam  Constitutional:       Appearance: She is ill-appearing.   Cardiovascular:      Rate and Rhythm: Normal rate and regular rhythm.      Heart sounds: No murmur heard.  Pulmonary:      Effort: Pulmonary effort is normal.      Breath sounds: Normal breath sounds.   Musculoskeletal:      Right lower leg: Edema present.      Left lower leg: Edema present.  "     Comments: Upper extremities edema    Skin:     Findings: Bruising and ecchymosis present.   Neurological:      Mental Status: She is alert and oriented to person, place, and time.         Lab Review  Lab Results   Component Value Date/Time    WBC 10.2 05/09/2024 12:30 AM    RBC 3.41 (L) 05/09/2024 12:30 AM    HEMOGLOBIN 10.6 (L) 05/09/2024 05:56 AM    HEMATOCRIT 32.7 (L) 05/09/2024 05:56 AM    MCV 92.7 05/09/2024 12:30 AM    MCH 29.6 05/09/2024 12:30 AM    MCHC 32.0 (L) 05/09/2024 12:30 AM    MPV 10.7 05/09/2024 12:30 AM      Lab Results   Component Value Date/Time    SODIUM 141 05/09/2024 12:30 AM    POTASSIUM 3.5 (L) 05/09/2024 12:30 AM    CHLORIDE 104 05/09/2024 12:30 AM    CO2 22 05/09/2024 12:30 AM    GLUCOSE 87 05/09/2024 12:30 AM    BUN 13 05/09/2024 12:30 AM    CREATININE 0.65 05/09/2024 12:30 AM    CREATININE 1.0 05/20/2008 05:55 AM      Lab Results   Component Value Date/Time    ASTSGOT 18 05/09/2024 12:30 AM    ALTSGPT 48 05/09/2024 12:30 AM   /  Lab Results   Component Value Date/Time    CHOLSTRLTOT 159 12/01/2023 07:48 AM    LDL 79 12/01/2023 07:48 AM    HDL 53 12/01/2023 07:48 AM    TRIGLYCERIDE 104 05/05/2024 03:05 AM    TROPONINT 77 (H) 05/08/2024 02:04 PM       No results for input(s): \"NTPROBNP\" in the last 72 hours.    Cardiac Imaging and Procedures Review  Telemetry showed NSR    Echocardiogram:    5/8/2024  Small pericardial effusion with evidence of hemodynamic compromise.  Normal right ventricular size and systolic function.    ECHO  5/4/2024  The right ventricle is not well visualized suspect reduced right   ventricular systolic function.  Limited imaging but grossly preserved left ventricular systolic   function.  The left ventricular ejection fraction is visually estimated to be 55%.  Akinesis inferior wall.  The right ventricle is not well visualized.    ECHO  4/22/2024  Normal left ventricular systolic function.  The left ventricular ejection fraction is visually estimated to be " 55-  60%. Normal diastolic function.  The right ventricle is not well-visualized, but appears grossly normal   in size and systolic function.  Unable to estimate pulmonary artery pressure due to an inadequate   tricuspid regurgitant jet.  No evidence of valvular abnormality by Doppler evaluation.   Normal inferior vena cava size and inspiratory collapse.      Assessment/Plan  No new Assessment & Plan notes have been filed under this hospital service since the last note was generated.  Service: Cardiology    Submassive PE   - s/p left thrombectomy 5/4/2024  - continue heparin gtt     2. Abnormal EKG   - suggestive of pericarditis and RV strain, asymptomatic   - noted mild pericardial effusion without hemodynamic compromise, repeat ECHO limited with contrast today     3. spontaneous Right RP and psoas hematoma   - repeat CT showed moderate right psoas hematoma, stable     4. HFpEF   - continue furosemide 40mg BID IV for another day. Then switch to 40mg qd   - I/o- 2.1K in 24hrs     5. Paroxysmal atrial fibrillation   - noted on EKG 5/8/2024 1600  - continue metoprolol 25mg BID   - continue heparin gtt      I personally spent a total of 15 minutes which includes face-to-face time and non-face-to-face time spent on preparing to see the patient, reviewing hospital notes and tests, obtaining history from the patient, performing a medically appropriate exam, counseling and educating the patient, ordering medications/tests/procedures/referrals as clinically indicated, and documenting information in the electronic medical record.        Thank you for allowing me to participate in the care of this patient.  I will continue to follow this patient    Please contact me with any questions.    JALEN Navarrete.

## 2024-05-09 NOTE — PROGRESS NOTES
Spoke with pharmacy regarding heparin draw and the 2 subtherapeutic. PT has been therapeutic for over 24 hours with 15 units/kg/hr but had a sudden drop  to 0.16.  A redraw was ordered to confirm the low XA result. Consulted with pharmacy and should proceed with the protocol.  PT was given 2500 bolus with 2 unit increase.

## 2024-05-09 NOTE — DISCHARGE PLANNING
TCN following. HTH/SCP chart reviewed. No new TCN needs identified. Please see prior TCN note from 5/8 for most recent discharge planning considerations if indicated.     Completed:  Choice obtained:  [facility] ([facility]

## 2024-05-09 NOTE — PROGRESS NOTES
Bedside report received from off going RN/tech: Johanne, assumed care of patient.     Fall Risk Score: HIGH RISK  Fall risk interventions in place: Place yellow fall risk ID band on patient, Provide patient/family education based on risk assessment, Educate patient/family to call staff for assistance when getting out of bed, Place fall precaution signage outside patient door, Place patient in room close to nursing station, and Utilize bed/chair fall alarm  Bed type: Low air loss (Logan Score less than 17 interventions in place)  Patient on cardiac monitor: Yes  IVF/IV medications: Infusion per MAR (List Med(s)) Heparin 15 units/ hr   Oxygen: How many liters 1L  Bedside sitter: Not Applicable   Isolation: Not applicable

## 2024-05-09 NOTE — PROCEDURES
Vascular Access Team    Date of Insertion: 5/9/24  Arm Circumference: n/a  Line Length: 10  Line Size: 20  Vein Occupancy %: 31  Reason for Midline: access  Labs: WBC 10.2, , BUN 13, Cr 0.65, GFR 91, INR 1.19 on 5/5/24    Orders confirmed, vessel patency confirmed with ultrasound. Risks and benefits of procedure explained to patient and education regarding line associated bloodstream infections provided. Questions answered.     PowerGlide Midline placed in RUE per licensed provider order with ultrasound guidance. 20g, 10 cm line placed in cephalic vein after 1 attempt(s).  Catheter inserted with brisk blood return. Secured with 0cm external from insertion site.  Line flushed without resistance with 10 mL 0.9% normal saline.  Midline secured with Biopatch and Tegaderm.     Midline placement is confirmed by nurse using ultrasound and ability to flush and draw blood. Midline is appropriate for use at this time.  No X-ray is needed for placement confirmation. Pt tolerated procedure well.  Patient condition relayed to unit RN or ordering physician via this post procedure note in the EMR.    Ultrasound images uploaded to PACS and viewable in the EMR - yes  Ultrasound imaged printed and placed in paper chart - no      BARD PowerGlide Midline ref # [id number], Lot # [id number], Expiration Date 02/28/2025

## 2024-05-09 NOTE — PROGRESS NOTES
Noticed small right swelling site on the arm. Pt still has pulse on the right wrist. Warm compress and elevation was performed.

## 2024-05-09 NOTE — PROGRESS NOTES
Bedside report received from off going RN/tech: Cyndi assumed care of patient.     Fall Risk Score: HIGH RISK  Fall risk interventions in place: Place yellow fall risk ID band on patient, Provide patient/family education based on risk assessment, Educate patient/family to call staff for assistance when getting out of bed, Place fall precaution signage outside patient door, Place patient in room close to nursing station, Utilize bed/chair fall alarm, and Bed alarm connected correctly  Bed type: Regular (Logan Score less than 17 interventions in place)  Patient on cardiac monitor: Yes  IVF/IV medications: Infusion per MAR (List Med(s)) hep gtt  Oxygen: How many liters 1L  Bedside sitter: Not Applicable   Isolation: Not applicable
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
## 2024-05-10 ENCOUNTER — PATIENT OUTREACH (OUTPATIENT)
Dept: SCHEDULING | Facility: IMAGING CENTER | Age: 79
End: 2024-05-10
Payer: MEDICARE

## 2024-05-10 VITALS
WEIGHT: 169.97 LBS | TEMPERATURE: 98.4 F | SYSTOLIC BLOOD PRESSURE: 97 MMHG | DIASTOLIC BLOOD PRESSURE: 60 MMHG | RESPIRATION RATE: 20 BRPM | HEART RATE: 94 BPM | OXYGEN SATURATION: 95 % | HEIGHT: 64 IN | BODY MASS INDEX: 29.02 KG/M2

## 2024-05-10 PROBLEM — D62 ACUTE BLOOD LOSS ANEMIA (ABLA): Status: ACTIVE | Noted: 2024-05-10

## 2024-05-10 PROBLEM — E87.6 HYPOKALEMIA: Status: RESOLVED | Noted: 2024-05-07 | Resolved: 2024-05-10

## 2024-05-10 PROBLEM — E87.8 ELECTROLYTE ABNORMALITY: Status: RESOLVED | Noted: 2024-04-22 | Resolved: 2024-05-10

## 2024-05-10 PROBLEM — I47.10 PAROXYSMAL SUPRAVENTRICULAR TACHYCARDIA (HCC): Status: RESOLVED | Noted: 2024-05-04 | Resolved: 2024-05-10

## 2024-05-10 PROBLEM — I30.9 ACUTE PERICARDIAL EFFUSION: Status: ACTIVE | Noted: 2024-05-10

## 2024-05-10 PROBLEM — R57.9 SHOCK (HCC): Status: RESOLVED | Noted: 2024-05-06 | Resolved: 2024-05-10

## 2024-05-10 LAB
ALBUMIN SERPL BCP-MCNC: 2.7 G/DL (ref 3.2–4.9)
ALBUMIN/GLOB SERPL: 1 G/DL
ALP SERPL-CCNC: 79 U/L (ref 30–99)
ALT SERPL-CCNC: 35 U/L (ref 2–50)
ANION GAP SERPL CALC-SCNC: 14 MMOL/L (ref 7–16)
AST SERPL-CCNC: 19 U/L (ref 12–45)
BASOPHILS # BLD AUTO: 0.8 % (ref 0–1.8)
BASOPHILS # BLD: 0.08 K/UL (ref 0–0.12)
BILIRUB SERPL-MCNC: 1.3 MG/DL (ref 0.1–1.5)
BUN SERPL-MCNC: 19 MG/DL (ref 8–22)
CALCIUM ALBUM COR SERPL-MCNC: 9.5 MG/DL (ref 8.5–10.5)
CALCIUM SERPL-MCNC: 8.5 MG/DL (ref 8.5–10.5)
CHLORIDE SERPL-SCNC: 102 MMOL/L (ref 96–112)
CO2 SERPL-SCNC: 24 MMOL/L (ref 20–33)
CREAT SERPL-MCNC: 0.67 MG/DL (ref 0.5–1.4)
EOSINOPHIL # BLD AUTO: 0.32 K/UL (ref 0–0.51)
EOSINOPHIL NFR BLD: 3.2 % (ref 0–6.9)
ERYTHROCYTE [DISTWIDTH] IN BLOOD BY AUTOMATED COUNT: 52.5 FL (ref 35.9–50)
GFR SERPLBLD CREATININE-BSD FMLA CKD-EPI: 90 ML/MIN/1.73 M 2
GLOBULIN SER CALC-MCNC: 2.8 G/DL (ref 1.9–3.5)
GLUCOSE SERPL-MCNC: 81 MG/DL (ref 65–99)
HCT VFR BLD AUTO: 30.3 % (ref 37–47)
HGB BLD-MCNC: 9.9 G/DL (ref 12–16)
IMM GRANULOCYTES # BLD AUTO: 0.12 K/UL (ref 0–0.11)
IMM GRANULOCYTES NFR BLD AUTO: 1.2 % (ref 0–0.9)
LYMPHOCYTES # BLD AUTO: 1.16 K/UL (ref 1–4.8)
LYMPHOCYTES NFR BLD: 11.7 % (ref 22–41)
MAGNESIUM SERPL-MCNC: 2.1 MG/DL (ref 1.5–2.5)
MCH RBC QN AUTO: 30.2 PG (ref 27–33)
MCHC RBC AUTO-ENTMCNC: 32.7 G/DL (ref 32.2–35.5)
MCV RBC AUTO: 92.4 FL (ref 81.4–97.8)
MONOCYTES # BLD AUTO: 0.8 K/UL (ref 0–0.85)
MONOCYTES NFR BLD AUTO: 8.1 % (ref 0–13.4)
NEUTROPHILS # BLD AUTO: 7.4 K/UL (ref 1.82–7.42)
NEUTROPHILS NFR BLD: 75 % (ref 44–72)
NRBC # BLD AUTO: 0 K/UL
NRBC BLD-RTO: 0 /100 WBC (ref 0–0.2)
PLATELET # BLD AUTO: 314 K/UL (ref 164–446)
PMV BLD AUTO: 10.8 FL (ref 9–12.9)
POTASSIUM SERPL-SCNC: 3.4 MMOL/L (ref 3.6–5.5)
PROT SERPL-MCNC: 5.5 G/DL (ref 6–8.2)
RBC # BLD AUTO: 3.28 M/UL (ref 4.2–5.4)
SODIUM SERPL-SCNC: 140 MMOL/L (ref 135–145)
UFH PPP CHRO-ACNC: 0.59 IU/ML
WBC # BLD AUTO: 9.9 K/UL (ref 4.8–10.8)

## 2024-05-10 PROCEDURE — 99239 HOSP IP/OBS DSCHRG MGMT >30: CPT | Performed by: STUDENT IN AN ORGANIZED HEALTH CARE EDUCATION/TRAINING PROGRAM

## 2024-05-10 RX ORDER — FERROUS SULFATE 325(65) MG
325 TABLET ORAL
Status: SHIPPED
Start: 2024-05-11

## 2024-05-10 RX ORDER — POTASSIUM CHLORIDE 20 MEQ/1
40 TABLET, EXTENDED RELEASE ORAL EVERY 6 HOURS
Status: DISCONTINUED | OUTPATIENT
Start: 2024-05-10 | End: 2024-05-10 | Stop reason: HOSPADM

## 2024-05-10 RX ORDER — SPIRONOLACTONE 25 MG/1
25 TABLET ORAL DAILY
Status: SHIPPED
Start: 2024-05-11

## 2024-05-10 RX ADMIN — POTASSIUM CHLORIDE 40 MEQ: 1500 TABLET, EXTENDED RELEASE ORAL at 08:02

## 2024-05-10 RX ADMIN — DIBASIC SODIUM PHOSPHATE, MONOBASIC POTASSIUM PHOSPHATE AND MONOBASIC SODIUM PHOSPHATE 500 MG: 852; 155; 130 TABLET ORAL at 05:48

## 2024-05-10 RX ADMIN — APIXABAN 10 MG: 5 TABLET, FILM COATED ORAL at 08:01

## 2024-05-10 RX ADMIN — FUROSEMIDE 20 MG: 20 TABLET ORAL at 05:48

## 2024-05-10 RX ADMIN — SPIRONOLACTONE 25 MG: 25 TABLET ORAL at 05:48

## 2024-05-10 RX ADMIN — PREGABALIN 150 MG: 150 CAPSULE ORAL at 05:48

## 2024-05-10 RX ADMIN — DULOXETINE HYDROCHLORIDE 30 MG: 30 CAPSULE, DELAYED RELEASE ORAL at 05:48

## 2024-05-10 RX ADMIN — METOPROLOL TARTRATE 25 MG: 25 TABLET, FILM COATED ORAL at 05:48

## 2024-05-10 ASSESSMENT — FIBROSIS 4 INDEX: FIB4 SCORE: 0.78

## 2024-05-10 NOTE — PROGRESS NOTES
Bedside report received from off going RN/tech: Cyndi assumed care of patient.     Fall Risk Score: HIGH RISK  Fall risk interventions in place: Place yellow fall risk ID band on patient, Provide patient/family education based on risk assessment, Educate patient/family to call staff for assistance when getting out of bed, Place fall precaution signage outside patient door, Place patient in room close to nursing station, Utilize bed/chair fall alarm, and Notify charge of high risk for huddle  Bed type: Regular (Logan Score less than 17 interventions in place)  Patient on cardiac monitor: Yes  IVF/IV medications: Infusion per MAR (List Med(s)) hep gtt  Oxygen: How many liters 1L  Bedside sitter: Not Applicable   Isolation: Not applicable

## 2024-05-10 NOTE — DISCHARGE PLANNING
TCN following. HTH/SCP chart reviewed. Noted per PAR note that patient scheduled for [facility] at 12:30 today for [facility].  No further TCN needs.     Completed:  Choice obtained: SNF ([facility] - Accepted).

## 2024-05-10 NOTE — DISCHARGE INSTRUCTIONS
Apixaban Tablets  What is this medication?  APIXABAN (a PIX a ban) prevents or treats blood clots. It is also used to lower the risk of stroke in people with AFib (atrial fibrillation). It belongs to a group of medications called blood thinners.  This medicine may be used for other purposes; ask your health care provider or pharmacist if you have questions.  COMMON BRAND NAME(S): Eliquis  What should I tell my care team before I take this medication?  They need to know if you have any of these conditions:  Antiphospholipid antibody syndrome  Bleeding disorder  History of bleeding in the brain  History of blood clots  History of stomach bleeding  Kidney disease  Liver disease  Mechanical heart valve  Spinal surgery  An unusual or allergic reaction to apixaban, other medications, foods, dyes, or preservatives  Pregnant or trying to get pregnant  Breast-feeding  How should I use this medication?  Take this medication by mouth. For your therapy to work as well as possible, take each dose exactly as prescribed on the prescription label. Do not skip doses. Skipping doses or stopping this medication can increase your risk of a blood clot or stroke. Keep taking this medication unless your care team tells you to stop. Take it as directed on the prescription label at the same time every day. You can take it with or without food. If it upsets your stomach, take it with food.  A special MedGuide will be given to you by the pharmacist with each prescription and refill. Be sure to read this information carefully each time.  Talk to your care team about the use of this medication in children. Special care may be needed.  Overdosage: If you think you have taken too much of this medicine contact a poison control center or emergency room at once.  NOTE: This medicine is only for you. Do not share this medicine with others.  What if I miss a dose?  If you miss a dose, take it as soon as you can. If it is almost time for your next  dose, take only that dose. Do not take double or extra doses.  What may interact with this medication?  This medication may interact with the following:  Aspirin and aspirin-like medications  Certain medications for fungal infections like itraconazole and ketoconazole  Certain medications for seizures like carbamazepine and phenytoin  Certain medications for blood clots like enoxaparin, dalteparin, heparin, and warfarin  Clarithromycin  NSAIDs, medications for pain and inflammation, like ibuprofen or naproxen  Rifampin  Ritonavir  Sage's wort  This list may not describe all possible interactions. Give your health care provider a list of all the medicines, herbs, non-prescription drugs, or dietary supplements you use. Also tell them if you smoke, drink alcohol, or use illegal drugs. Some items may interact with your medicine.  What should I watch for while using this medication?  Visit your healthcare professional for regular checks on your progress. You may need blood work done while you are taking this medication. Your condition will be monitored carefully while you are receiving this medication. It is important not to miss any appointments.  Avoid sports and activities that might cause injury while you are using this medication. Severe falls or injuries can cause unseen bleeding. Be careful when using sharp tools or knives. Consider using an electric razor. Take special care brushing or flossing your teeth. Report any injuries, bruising, or red spots on the skin to your healthcare professional.  If you are going to need surgery or other procedure, tell your healthcare professional that you are taking this medication.  Wear a medical ID bracelet or chain. Carry a card that describes your disease and details of your medication and dosage times.  What side effects may I notice from receiving this medication?  Side effects that you should report to your care team as soon as possible:  Allergic reactions--skin  rash, itching, hives, swelling of the face, lips, tongue, or throat  Bleeding--bloody or black, tar-like stools, vomiting blood or brown material that looks like coffee grounds, red or dark brown urine, small red or purple spots on the skin, unusual bruising or bleeding  Bleeding in the brain--severe headache, stiff neck, confusion, dizziness, change in vision, numbness or weakness of the face, arm, or leg, trouble speaking, trouble walking, vomiting  Heavy periods  This list may not describe all possible side effects. Call your doctor for medical advice about side effects. You may report side effects to FDA at 1-093-ENU-3228.  Where should I keep my medication?  Keep out of the reach of children and pets.  Store at room temperature between 20 and 25 degrees C (68 and 77 degrees F). Get rid of any unused medication after the expiration date.  To get rid of medications that are no longer needed or :  Take the medication to a medication take-back program. Check with your pharmacy or law enforcement to find a location.  If you cannot return the medication, check the label or package insert to see if the medication should be thrown out in the garbage or flushed down the toilet. If you are not sure, ask your care team. If it is safe to put in the trash, empty the medication out of the container. Mix the medication with cat litter, dirt, coffee grounds, or other unwanted substance. Seal the mixture in a bag or container. Put it in the trash.  NOTE: This sheet is a summary. It may not cover all possible information. If you have questions about this medicine, talk to your doctor, pharmacist, or health care provider.  ©  [employer]/Gold Standard (2022 00:00:00)

## 2024-05-10 NOTE — CARE PLAN
The patient is Stable - Low risk of patient condition declining or worsening    Shift Goals  Clinical Goals: hep gtt. turns, saftey  Patient Goals: rest  Family Goals: na    Progress made toward(s) clinical / shift goals:  POC discussed with patient. Hep gtt running 19 units. Q2 turns in place. Female wick used for incontinence. Patient reports 0/10 pain. Fall precautions in place. Patient remains on 1L NC.      Problem: Knowledge Deficit - Standard  Goal: Patient and family/care givers will demonstrate understanding of plan of care, disease process/condition, diagnostic tests and medications  Outcome: Progressing     Problem: Skin Integrity  Goal: Skin integrity is maintained or improved  Outcome: Progressing     Problem: Fall Risk  Goal: Patient will remain free from falls  Outcome: Progressing     Problem: Pain - Standard  Goal: Alleviation of pain or a reduction in pain to the patient’s comfort goal  Outcome: Progressing       Patient is not progressing towards the following goals:

## 2024-05-10 NOTE — PROGRESS NOTES
Bedside report received from off going RN/tech: Johanne, assumed care of patient.   POC discussed with patient. Patient A n O x4. Patient reports 0/10 pain. Patient ST on monitor.   Fall Risk Score: HIGH RISK  Fall risk interventions in place: Place yellow fall risk ID band on patient, Provide patient/family education based on risk assessment, Educate patient/family to call staff for assistance when getting out of bed, Place fall precaution signage outside patient door, and Utilize bed/chair fall alarm  Bed type: Low air loss (Logan Score less than 17 interventions in place)  Patient on cardiac monitor: Yes  IVF/IV medications: Infusion per MAR (List Med(s)) Heparin running 19 units   Oxygen: How many liters 1L  Bedside sitter: Not Applicable   Isolation: Not applicable

## 2024-05-10 NOTE — DISCHARGE PLANNING
Case Management Discharge Planning    Admission Date: 5/4/2024  GMLOS: 7.5  ALOS: 6    6-Clicks ADL Score: 12  6-Clicks Mobility Score: 12      Anticipated Discharge Dispo: Discharge Disposition: D/T to SNF with Medicare cert in anticipation of skilled care (03)  Discharge Contact Phone Number: [phone]    DME Needed: LMSW submitted transport to [facility] for 1200. Pending confirmation.

## 2024-05-10 NOTE — PROGRESS NOTES
GMT came to  patient. IV pulled and midline as well as tele box off. Pt was assisted to [facility] with GMT placed in semi  fowlers position and [facility].   Pt was discharged and paperwork signed with no further questions.

## 2024-05-10 NOTE — DISCHARGE PLANNING
DC Transport Scheduled    Transport Company Scheduled:  [facility]  Spoke with Landy at [facility] to schedule transport.    Scheduled Date: 5/10/23865  Scheduled Time: 1230    Destination: [facility]   Destination address: [facility] [place] [address]    Notified care team of scheduled transport via Voalte.     If there are any changes needed to the DC transportation scheduled, please contact [facility] at ext. [phone] between the hours of 9885-2805 Mon-Fri. If outside those hours, contact the ED Case Manager at ext. [phone].

## 2024-05-10 NOTE — DISCHARGE SUMMARY
Discharge Summary    CHIEF COMPLAINT ON ADMISSION  Chief Complaint   Patient presents with    GLF     BIBA from skilled nursing facility for GLF yesterday and syncopal episode this am. Unknown LOC, unknown head strike, +thinners. Pt arrives with extensive bruising to lower abdomen.        Reason for Admission  Acute saddle pulmonary embolism with acute cor pulmonale    Admission Date  5/4/2024     CODE STATUS  Full Code    HPI & HOSPITAL COURSE  Tereza Sánchez is a 78-year-old female presented on 5/4/2024 with weakness.  This is a pleasant woman with a history of Chiari malformation status post ventriculoperitoneal shunt, cervical milia, sleep apnea, chronic respiratory failure with nocturnal hypoxia requiring 3 LPM at nighttime, hypertension, hypercholesterolemia, hypothyroidism, and breast cancer.  Patient was recently admitted to this hospital from 4/20/2024 through 4/30/2024 with COVID 19 infection and septic shock requiring intubation with intravenous pressors.  CT angiogram of the chest at that time was negative for pulmonary embolism.  [facility]  She was sent back to this hospital for somewhat cryptic reasons perhaps weakness versus shortness of breath.  In the emergency room, extensive workup was performed, and the patient was found to have a lower extremity DVT with saddle pulmonary embolism as well as a retroperitoneal hematoma and so was hematoma.  Patient was started on intravenous heparin drip without a bolus.  Patient was admitted to the intermediate care unit.    Interventional radiology was consulted and the patient underwent a left lung thrombectomy by Dr. Donell Amezquita on 5/4/2024 for left greater than right side of massive thrombectomy.  After the procedure, patient developed hypotension with shock and was transferred to the intensive care unit for IV pressor support following a right IJ central line and arterial line placement.   Patient continued to recover well and was transferred subsequently to the telemetry floor for continuous cardiac monitoring, where she demonstrated up to 10 beats of nonsustained ventricular tachycardia.  Telemetry and EKG showed mild ST elevation changes, and cardiology was consulted.  Recommended repeat imaging, which showed improving pulmonary embolism as well as stable right moderate size psoas hematoma.  Echocardiogram with contrast was repeated, which showed normal left ventricular ejection fraction of 60%, trace to small pericardial effusion without evidence of hemodynamic compromise.    Patient developed atrial fibrillation of rapid ventricular response requiring IV metoprolol pushes.  She was started on metoprolol 25 mg by mouth twice daily with control of her rate.  She converted back to normal sinus rhythm.  Cardiology did not recommend any further invasive workup.  She was continued on IV diuresis with replacement of electrolytes and transition to oral furosemide and spironolactone.  No need for aspirin since patient will need full anticoagulation with transition to apixaban.  Patient continued to require 1 LPM oxygen via nasal cannula on the day of her discharge.    Patient's hemoglobin dropped down to 7.4 requiring transfusion 1 unit.  Subsequent hemoglobin checks remained stable and was 9.9 on day of discharge.    [facility]    Therefore, she is discharged in fair and stable condition to skilled nursing facility.    The patient met 2-midnight criteria for an inpatient stay at the time of discharge.    Discharge Date  5/10/2024      FOLLOW UP ITEMS POST DISCHARGE  -As per [facility].  -Please follow with your primary care provider following discharge from skilled nurse facility.    DISCHARGE DIAGNOSES  Principal Problem:    Acute saddle pulmonary embolism with acute cor pulmonale (HCC) (POA: Yes)  Active Problems:    Hypokalemia  (POA: No)    Paroxysmal atrial fibrillation with RVR (HCC) (POA: No)    Acute pericardial effusion (POA: Yes)    Hypophosphatemia (POA: Yes)    Primary hypertension (POA: Yes)      Overview: Chronic, controlled.      Tolerating atenolol 50mg, 2 tablets daily and benazepril-HCTZ 20/12.5       daily.      Does not see any specialist.     Chronic respiratory failure with hypoxia (HCC) (POA: Yes)      Overview: Chronic, controlled.      Uses oxygen at night only.    Retroperitoneal hematoma (POA: Yes)    Acute deep vein thrombosis (DVT) of proximal vein of right lower extremity (HCC) (POA: Yes)    Acute blood loss anemia (ABLA) (POA: Yes)  Resolved Problems:    Tachycardia (POA: Yes)    Electrolyte abnormality (POA: Yes)    SIRS (systemic inflammatory response syndrome) (HCC) (POA: Yes)    Cardiogenic shock (HCC) (POA: Unknown)    Paroxysmal supraventricular tachycardia (HCC) (POA: Yes)    Shock (HCC) (POA: Yes)      FOLLOW UP  Future Appointments   Date Time Provider Department Center   5/22/2024  9:30 AM ERWIN Rowley RWEDELURO None   6/5/2024  7:00 AM [facility] [address] [address]  [phone]        Michelle Jim P.A.-C.  [address]  [facility] [id number] [phone]    Follow up  Please call your primary care provider to schedule a hospital follow up.   Thank you      MEDICATIONS ON DISCHARGE     Medication List        START taking these medications        Instructions   apixaban 5mg Tabs  Start taking on: May 10, 2024  Commonly known as: Eliquis   Take 2 Tablets by mouth 2 times a day for 7 days, THEN 1 Tablet 2 times a day for 23 days. Indications: DVT/PE     metoprolol tartrate 25 MG Tabs  Commonly known as: Lopressor   Take 1 Tablet by mouth 2 times a day.  Dose: 25 mg     phosphorus 250 MG tablet  Commonly known as: K-Phos-Neutral   Take 1 Tablet by mouth 2 times a day.  Dose: 1 Tablet      spironolactone 25 MG Tabs  Start taking on: May 11, 2024  Commonly known as: Aldactone   Take 1 Tablet by mouth every day.  Dose: 25 mg            CHANGE how you take these medications        Instructions   ferrous sulfate 325 (65 Fe) MG tablet  Start taking on: May 11, 2024  What changed: when to take this   Take 1 Tablet by mouth every 48 hours.  Dose: 325 mg            CONTINUE taking these medications        Instructions   acetaminophen 325 MG Tabs  Commonly known as: Tylenol   Take 650 mg by mouth every 6 hours as needed. Indications: Fever, Pain  Dose: 650 mg     alendronate 70 MG Tabs  Commonly known as: Fosamax   Take 70 mg by mouth every Monday. Indications: Osteoporosis  Dose: 70 mg     amitriptyline 100 MG Tabs  Commonly known as: Elavil   Take 100 mg by mouth every evening.  Dose: 100 mg     atorvastatin 10 MG Tabs  Commonly known as: Lipitor   TAKE 1 TABLET BY MOUTH EVERY EVENING. CHOLESTEROL  Dose: 10 mg     DULoxetine 30 MG Cpep  Commonly known as: Cymbalta   Take 30 mg by mouth every day.  Dose: 30 mg     furosemide 20 MG Tabs  Commonly known as: Lasix   Take 20 mg by mouth every day. Hold for SBP <100  Indications: Edema  Dose: 20 mg     potassium chloride SA 20 MEQ Tbcr  Commonly known as: Kdur   Take 20 mEq by mouth every day. Hold if lasix is held  Dose: 20 mEq     pregabalin 150 MG Caps  Commonly known as: Lyrica   Take 150 mg by mouth 2 times a day.  Dose: 150 mg     QUEtiapine 25 MG Tabs  Commonly known as: SEROquel   Take 25 mg by mouth at bedtime.  Dose: 25 mg     senna-docusate 8.6-50 MG Tabs  Commonly known as: Pericolace Or Senokot S   Take 2 Tablets by mouth at bedtime.  Dose: 2 Tablet     tamsulosin 0.4 MG capsule  Commonly known as: Flomax   Take 0.4 mg by mouth at bedtime.  Dose: 0.4 mg            STOP taking these medications      aspirin 81 MG EC tablet     benazepril 20 MG Tabs  Commonly known as: Lotensin     carvedilol 6.25 MG Tabs  Commonly known as: Coreg      hydrochlorothiazide 12.5 MG capsule  Commonly known as: Microzide     HYDROcodone-acetaminophen 5-325 MG Tabs per tablet  Commonly known as: Norco              Allergies  Allergies   Allergen Reactions    Sulfamethoxazole W-Trimethoprim Rash     * full body rash*>  10 years ago    Morphine Vomiting     hallucinations       DIET  Orders Placed This Encounter   Procedures    Diet Order Diet: Regular; Miscellaneous modifications: (optional): Finger Foods     Standing Status:   Standing     Number of Occurrences:   1     Order Specific Question:   Diet:     Answer:   Regular [1]     Order Specific Question:   Miscellaneous modifications: (optional)     Answer:   Finger Foods  [2]       ACTIVITY  As tolerated and directed by skilled nursing.  Weight bearing as tolerated    LINES, DRAINS, AND WOUNDS  This is an automated list. Peripheral IVs will be removed prior to discharge.  Midline IV 05/09/24 Anterior;Right Upper arm (Active)   Site Assessment Dry;Intact;Clean 05/10/24 0800   Dressing Type Biopatch;Transparent 05/10/24 0800   Line Status Blood return noted;Flushed;Scrubbed the hub prior to access;Saline locked 05/10/24 0800   Dressing Status Clean;Dry;Intact 05/10/24 0800   Dressing Intervention N/A 05/10/24 0800   Dressing Change Due 05/16/24 05/09/24 1055   Infiltration Grading ([facility]) 0 05/10/24 0800   Phlebitis Scale ([facility] Only) 0 05/10/24 0800       Peripheral IV 05/08/24 20 G Anterior;Left;Proximal Forearm (Active)   Site Assessment Clean;Dry;Intact 05/10/24 0800   Dressing Type Transparent 05/10/24 0800   Line Status Flushed;Infusing;Saline locked;Scrubbed the hub prior to access 05/10/24 0800   Dressing Status Dry;Clean;Intact 05/10/24 0800   Dressing Intervention N/A 05/10/24 0800   Infiltration Grading ([facility]) 0 05/10/24 0800   Phlebitis Scale ([facility] Only) 0 05/10/24 0800     External Urinary Catheter (Active)   Collection Container Suction container 05/06/24 0800   Output (mL) 300 mL  05/10/24 0334      Moisture Associated Skin Damage 04/25/24 Breast;Buttock (Active)   Wound Image    04/25/24 0900   NEXT Weekly Photo (Inpatient Only) 05/02/24 04/25/24 0900   Drainage Amount None 05/10/24 0813   Drainage Description Serous 04/27/24 0900   Periwound Assessment Clean;Dry;Intact;Pink 05/07/24 0800   IAD Cleansing Soap and Water 04/28/24 0000   Periwound Protectant Not Applicable 04/26/24 2300   IAD Containment Device None 04/26/24 2300   WOUND NURSE ONLY - Time Spent with Patient (mins) 30 04/25/24 0900       Wound 04/24/24 Skin Tear Arm Distal Left (Active)   Wound Image   04/24/24 2000   Site Assessment Clean;Dry;Pink 05/10/24 0813   Periwound Assessment Clean;Dry;Intact;Pink 05/09/24 2100   Margins Defined edges 05/09/24 2100   Closure Open to air 05/09/24 2100   Drainage Amount None 05/09/24 2100   Drainage Description Sanguineous 04/25/24 0800   Treatments Site care;Offloading 04/27/24 2000   Offloading/DME Other (comment) 04/25/24 0800   Wound Cleansing Soap and Water 04/27/24 2000   Dressing Status Open to Air 05/09/24 2100   Dressing Changed Observed 04/28/24 2000       Wound 05/04/24 Incision Angio Puncture Groin Right (Active)   Site Assessment Clean;Dry;Intact 05/10/24 0813   Periwound Assessment Clean;Dry;Intact 05/10/24 0813   Margins Defined edges;Attached edges 05/07/24 1900   Closure Open to air 05/09/24 2100   Drainage Amount None 05/09/24 2100   Dressing Status Open to Air 05/09/24 2100   Dressing Changed New 05/04/24 1130   Dressing Options Dry Gauze;Transparent Film 05/04/24 1130       Wound 05/04/24 Incision Angio Puncture Groin Left (Active)   Site Assessment Clean;Dry;Red 05/10/24 0813   Periwound Assessment Clean;Intact;Dry 05/10/24 0813   Margins Defined edges;Attached edges 05/07/24 1900   Closure Open to air 05/09/24 2100   Drainage Amount None 05/09/24 2100   Dressing Status Open to Air 05/09/24 2100       Wound 05/07/24 Other (comment) Anterior (Active)   Site Assessment  Purple 05/10/24 0813   Periwound Assessment Purple 05/09/24 2100   Margins Undefined edges 05/08/24 2000   Closure Open to air 05/08/24 2000   Drainage Amount None 05/08/24 2000   Dressing Status Open to Air 05/07/24 1900       Peripheral IV 05/08/24 20 G Anterior;Left;Proximal Forearm (Active)   Site Assessment Clean;Dry;Intact 05/10/24 0800   Dressing Type Transparent 05/10/24 0800   Line Status Flushed;Infusing;Saline locked;Scrubbed the hub prior to access 05/10/24 0800   Dressing Status Dry;Clean;Intact 05/10/24 0800   Dressing Intervention N/A 05/10/24 0800   Infiltration Grading ([facility]) 0 05/10/24 0800   Phlebitis Scale ([facility] Only) 0 05/10/24 0800               MENTAL STATUS ON TRANSFER  Alert and orient x 3.  Appropriately conversational.       CONSULTATIONS  Critical care  Interventional radiology  Cardiology    PROCEDURES  Left lung thrombectomy by Dr. Donell Amezquita of interventional radiology on 5/4/2024  Central venous catheter placement to internal jugular vein by Dr. Harvey Hargrove on 5/4/2024  Arterial catheter placement to the radial artery for cardiogenic shock by Dr. Harvey Hargrove on 5/4/2024    LABORATORY  Lab Results   Component Value Date    SODIUM 140 05/10/2024    POTASSIUM 3.4 (L) 05/10/2024    CHLORIDE 102 05/10/2024    CO2 24 05/10/2024    GLUCOSE 81 05/10/2024    BUN 19 05/10/2024    CREATININE 0.67 05/10/2024    CREATININE 1.0 05/20/2008        Lab Results   Component Value Date    WBC 9.9 05/10/2024    HEMOGLOBIN 9.9 (L) 05/10/2024    HEMATOCRIT 30.3 (L) 05/10/2024    PLATELETCT 314 05/10/2024        Total time of the discharge process 32 minutes.

## 2024-05-24 ENCOUNTER — DOCUMENTATION (OUTPATIENT)
Dept: HEALTH INFORMATION MANAGEMENT | Facility: OTHER | Age: 79
End: 2024-05-24
Payer: MEDICARE

## 2024-05-24 ENCOUNTER — HOME HEALTH ADMISSION (OUTPATIENT)
Dept: HOME HEALTH SERVICES | Facility: HOME HEALTHCARE | Age: 79
End: 2024-05-24
Payer: MEDICARE

## 2024-05-28 ENCOUNTER — HOME CARE VISIT (OUTPATIENT)
Dept: HOME HEALTH SERVICES | Facility: HOME HEALTHCARE | Age: 79
End: 2024-05-28
Payer: MEDICARE

## 2024-05-28 ENCOUNTER — DOCUMENTATION (OUTPATIENT)
Dept: CARDIOLOGY | Facility: MEDICAL CENTER | Age: 79
End: 2024-05-28
Payer: MEDICARE

## 2024-05-28 VITALS
DIASTOLIC BLOOD PRESSURE: 56 MMHG | HEART RATE: 88 BPM | RESPIRATION RATE: 18 BRPM | OXYGEN SATURATION: 94 % | HEIGHT: 60 IN | BODY MASS INDEX: 33.2 KG/M2 | SYSTOLIC BLOOD PRESSURE: 99 MMHG | TEMPERATURE: 98.2 F

## 2024-05-28 PROCEDURE — 665005 NO-PAY RAP - HOME HEALTH

## 2024-05-28 SDOH — ECONOMIC STABILITY: HOUSING INSECURITY: EVIDENCE OF SMOKING MATERIAL: 0

## 2024-05-28 ASSESSMENT — ENCOUNTER SYMPTOMS
LAST BOWEL MOVEMENT: 66987
DYSPNEA ON EXERTION: 1
PAIN: 1
DYSPNEA ACTIVITY LEVEL: AFTER AMBULATING LESS THAN 10 FT
DEPRESSED MOOD: 1
HIGHEST PAIN SEVERITY IN PAST 24 HOURS: 8/10
DEBILITATING PAIN: 1
FORGETFULNESS: 1
LOWEST PAIN SEVERITY IN PAST 24 HOURS: 4/10
SHORTNESS OF BREATH: 1
PAIN LOCATION - PAIN FREQUENCY: CONSTANT
BOWEL PATTERN NORMAL: 1
LOWER EXTREMITY EDEMA: 1
DIZZINESS: 1
PERSON REPORTING PAIN: PATIENT
SUBJECTIVE PAIN PROGRESSION: GRADUALLY WORSENING
PAIN LOCATION - PAIN SEVERITY: 6/10
TREMORS: 1
FATIGUES EASILY: 1
PAIN LOCATION - RELIEVING FACTORS: HEAT, REST
PAIN LOCATION - EXACERBATING FACTORS: ACTIVITY
FATIGUE: 1
PAIN SEVERITY GOAL: 5/10

## 2024-05-28 ASSESSMENT — ACTIVITIES OF DAILY LIVING (ADL): OASIS_M1830: 03

## 2024-05-28 NOTE — PROGRESS NOTES
Medication chart review for [facility] services    Received referral from [facility].   Medications reviewed  compared with discharge summary if available.  Discharge summary date, if applicable:   5/10    Current medication list per [facility]     Medication list one, patient is currently taking    Current Outpatient Medications:     Home Care Oxygen, 3 L/min, Inhalation, Continuous    nystatin, 1 Application , Topical, QDAY PRN    baclofen, 10 mg, Oral, BID PRN    ondansetron, 4 mg, Oral, Q4HRS PRN    Esomeprazole Magnesium, 20 mg, Oral, QDAY PRN    acetaminophen, 1,000 mg, Oral, Q6HRS PRN    ferrous sulfate, 325 mg, Oral, Q48HRS    apixaban, Take 2 Tablets by mouth 2 times a day for 7 days, THEN 1 Tablet 2 times a day for 23 days. Indications: DVT/PE    metoprolol tartrate, 25 mg, Oral, BID    phosphorus, 1 Tablet, Oral, BID    spironolactone, 25 mg, Oral, DAILY    potassium chloride SA, 20 mEq, Oral, DAILY    senna-docusate, 1 Tablet, Oral, QHS    tamsulosin, 0.4 mg, Oral, QHS    atorvastatin, 10 mg, Oral, Nightly    DULoxetine, 30 mg, Oral, DAILY    alendronate, 70 mg, Oral, Q MONDAY    pregabalin, 150 mg, Oral, BID    amitriptyline, 100 mg, Oral, Nightly    furosemide, 20 mg, Oral, DAILY    QUEtiapine, 25 mg, Oral, QHS PRN      Medication list two, drugs that the patient has been prescribed or recommended to take by their healthcare provider on discharge summary  Medication List          START taking these medications         Instructions   apixaban 5mg Tabs  Start taking on: May 10, 2024  Commonly known as: Eliquis    Take 2 Tablets by mouth 2 times a day for 7 days, THEN 1 Tablet 2 times a day for 23 days. Indications: DVT/PE      metoprolol tartrate 25 MG Tabs  Commonly known as: Lopressor    Take 1 Tablet by mouth 2 times a day.  Dose: 25 mg      phosphorus 250 MG tablet  Commonly known as: K-Phos-Neutral    Take 1 Tablet by mouth 2 times a day.  Dose: 1 Tablet      spironolactone 25 MG  Tabs  Start taking on: May 11, 2024  Commonly known as: Aldactone    Take 1 Tablet by mouth every day.  Dose: 25 mg                CHANGE how you take these medications         Instructions   ferrous sulfate 325 (65 Fe) MG tablet  Start taking on: May 11, 2024  What changed: when to take this    Take 1 Tablet by mouth every 48 hours.  Dose: 325 mg                CONTINUE taking these medications         Instructions   acetaminophen 325 MG Tabs  Commonly known as: Tylenol    Take 650 mg by mouth every 6 hours as needed. Indications: Fever, Pain  Dose: 650 mg      alendronate 70 MG Tabs  Commonly known as: Fosamax    Take 70 mg by mouth every Monday. Indications: Osteoporosis  Dose: 70 mg      amitriptyline 100 MG Tabs  Commonly known as: Elavil    Take 100 mg by mouth every evening.  Dose: 100 mg      atorvastatin 10 MG Tabs  Commonly known as: Lipitor    TAKE 1 TABLET BY MOUTH EVERY EVENING. CHOLESTEROL  Dose: 10 mg      DULoxetine 30 MG Cpep  Commonly known as: Cymbalta    Take 30 mg by mouth every day.  Dose: 30 mg      furosemide 20 MG Tabs  Commonly known as: Lasix    Take 20 mg by mouth every day. Hold for SBP <100  Indications: Edema  Dose: 20 mg      potassium chloride SA 20 MEQ Tbcr  Commonly known as: Kdur    Take 20 mEq by mouth every day. Hold if lasix is held  Dose: 20 mEq      pregabalin 150 MG Caps  Commonly known as: Lyrica    Take 150 mg by mouth 2 times a day.  Dose: 150 mg      QUEtiapine 25 MG Tabs  Commonly known as: SEROquel    Take 25 mg by mouth at bedtime.  Dose: 25 mg      senna-docusate 8.6-50 MG Tabs  Commonly known as: Pericolace Or Senokot S    Take 2 Tablets by mouth at bedtime.  Dose: 2 Tablet      tamsulosin 0.4 MG capsule  Commonly known as: Flomax    Take 0.4 mg by mouth at bedtime.  Dose: 0.4 mg                STOP taking these medications       aspirin 81 MG EC tablet      benazepril 20 MG Tabs  Commonly known as: Lotensin      carvedilol 6.25 MG Tabs  Commonly known as: Coreg       hydrochlorothiazide 12.5 MG capsule  Commonly known as: Microzide      HYDROcodone-acetaminophen 5-325 MG Tabs per tablet  Commonly known as: Norco          Allergies   Allergen Reactions    Sulfamethoxazole W-Trimethoprim Rash     * full body rash*>  10 years ago    Morphine Vomiting     hallucinations       Labs     Lab Results   Component Value Date/Time    SODIUM 140 05/10/2024 01:28 AM    POTASSIUM 3.4 (L) 05/10/2024 01:28 AM    CHLORIDE 102 05/10/2024 01:28 AM    CO2 24 05/10/2024 01:28 AM    GLUCOSE 81 05/10/2024 01:28 AM    BUN 19 05/10/2024 01:28 AM    CREATININE 0.67 05/10/2024 01:28 AM    CREATININE 1.0 05/20/2008 05:55 AM     Lab Results   Component Value Date/Time    ALKPHOSPHAT 79 05/10/2024 01:28 AM    ASTSGOT 19 05/10/2024 01:28 AM    ALTSGPT 35 05/10/2024 01:28 AM    TBILIRUBIN 1.3 05/10/2024 01:28 AM    INR 1.19 (H) 05/05/2024 10:36 AM    ALBUMIN 2.7 (L) 05/10/2024 01:28 AM        Assessment for clinically significant drug interactions, drug omissions/additions, duplicative therapies.            CC   YUSUF CottoADOMINIK  [address]  [facility] [address]  Fax: [phone]    [facility]  Phone [phone] fax [phone]    This note was created using voice recognition software (Dragon). The accuracy of the dictation is limited by the abilities of the software. I have reviewed the note prior to signing, however some errors in grammar and context are still possible. If you have any questions related to this note please do not hesitate to contact our office.

## 2024-05-29 ENCOUNTER — HOME CARE VISIT (OUTPATIENT)
Dept: HOME HEALTH SERVICES | Facility: HOME HEALTHCARE | Age: 79
End: 2024-05-29
Payer: MEDICARE

## 2024-05-29 VITALS
HEART RATE: 92 BPM | SYSTOLIC BLOOD PRESSURE: 108 MMHG | RESPIRATION RATE: 18 BRPM | OXYGEN SATURATION: 92 % | DIASTOLIC BLOOD PRESSURE: 62 MMHG | TEMPERATURE: 99.1 F

## 2024-05-29 SDOH — ECONOMIC STABILITY: HOUSING INSECURITY: EVIDENCE OF SMOKING MATERIAL: 0

## 2024-05-29 ASSESSMENT — ACTIVITIES OF DAILY LIVING (ADL)
PREPARING MEALS: DEPENDENT
GROOMING_CURRENT_FUNCTION: SUPERVISION
SHOPPING: DEPENDENT
AMBULATION ASSISTANCE: 1
LAUNDRY: DEPENDENT
BATHING ASSESSED: 1
TRANSPORTATION ASSESSED: 1
SPONGING_UB_CURRENT_FUNCTION: MAXIMUM ASSIST
ORAL_CARE_ASSESSED: 1
SHOPPING ASSESSED: 1
AMBULATION ASSISTANCE: MODERATE ASSIST
TOILETING: MINIMUM ASSIST
TOILETING: 1
CURRENT_FUNCTION: MODERATE ASSIST
SPONGING_UB_CURRENT_FUNCTION: ONE PERSON
LAUNDRY ASSESSED: 1
BATHING_CURRENT_FUNCTION: ONE PERSON
FEEDING: MINIMUM ASSIST
DRESSING_LB_CURRENT_FUNCTION: MAXIMUM ASSIST
SPONGING_LB_CURRENT_FUNCTION: ONE PERSON
PHYSICAL TRANSFERS ASSESSED: 1
SPONGING_LB_CURRENT_FUNCTION: MAXIMUM ASSIST
CURRENT_FUNCTION: MAXIMUM ASSIST
ORAL_CARE_STANDBY_ASSIST: 1
BATHING_CURRENT_FUNCTION: MAXIMUM ASSIST
FEEDING ASSESSED: 1
TELEPHONE USE ASSESSED: 1
TRANSPORTATION: DEPENDENT
CUTTING_FOOD_MINIMUM_ASSIST: 1
DRESSING_UB_CURRENT_FUNCTION: MAXIMUM ASSIST
HOUSEKEEPING ASSESSED: 1
USING THE TELPHONE: INDEPENDENT
ORAL_CARE_CURRENT_FUNCTION: NEEDS ASSISTANCE
ORAL_CARE_EQUIPMENT_USED: SET UP
LIGHT HOUSEKEEPING: DEPENDENT
GROOMING ASSESSED: 1

## 2024-05-29 ASSESSMENT — ENCOUNTER SYMPTOMS
SUBJECTIVE PAIN PROGRESSION: UNCHANGED
PAIN LOCATION: LEFT LEG
PAIN: 1
PAIN SEVERITY GOAL: 3/10
PAIN LOCATION: BACK
LOWEST PAIN SEVERITY IN PAST 24 HOURS: 5/10
HIGHEST PAIN SEVERITY IN PAST 24 HOURS: 6/10
PERSON REPORTING PAIN: PATIENT
DIFFICULTY THINKING: 1

## 2024-05-30 ENCOUNTER — HOME CARE VISIT (OUTPATIENT)
Dept: HOME HEALTH SERVICES | Facility: HOME HEALTHCARE | Age: 79
End: 2024-05-30
Payer: MEDICARE

## 2024-05-30 VITALS
DIASTOLIC BLOOD PRESSURE: 62 MMHG | TEMPERATURE: 98.7 F | OXYGEN SATURATION: 95 % | SYSTOLIC BLOOD PRESSURE: 118 MMHG | HEART RATE: 112 BPM | RESPIRATION RATE: 20 BRPM

## 2024-05-30 PROCEDURE — G0151 HHCP-SERV OF PT,EA 15 MIN: HCPCS

## 2024-05-30 ASSESSMENT — ENCOUNTER SYMPTOMS
PERSON REPORTING PAIN: PATIENT
PAIN: 1

## 2024-05-30 NOTE — CASE COMMUNICATION
OT completed intial evaluation 5/29/24.  OT will see client 1W4 with 3 prn visits for adaptive equipment for decreased fine motor coordination, ADLs, transfers, and safety.

## 2024-05-31 ENCOUNTER — HOME CARE VISIT (OUTPATIENT)
Dept: HOME HEALTH SERVICES | Facility: HOME HEALTHCARE | Age: 79
End: 2024-05-31
Payer: MEDICARE

## 2024-05-31 PROCEDURE — G0299 HHS/HOSPICE OF RN EA 15 MIN: HCPCS

## 2024-06-01 SDOH — ECONOMIC STABILITY: HOUSING INSECURITY: EVIDENCE OF SMOKING MATERIAL: 0

## 2024-06-01 ASSESSMENT — ENCOUNTER SYMPTOMS
PERSON REPORTING PAIN: PATIENT
LOWER EXTREMITY EDEMA: 1
PAIN LOCATION - PAIN QUALITY: SHOOTING
HIGHEST PAIN SEVERITY IN PAST 24 HOURS: 8/10
VOMITING: DENIES
LOWEST PAIN SEVERITY IN PAST 24 HOURS: 6/10
DIZZINESS: 1
ORTHOSTATIC HYPOTENSION: 1
PAIN LOCATION - PAIN SEVERITY: 8/10
LAST BOWEL MOVEMENT: 66991
NAUSEA: DENIES
SHORTNESS OF BREATH: 1
CONSTIPATION: 1
PAIN SEVERITY GOAL: 2/10
SUBJECTIVE PAIN PROGRESSION: GRADUALLY WORSENING
PAIN LOCATION: BACK
PAIN: 1

## 2024-06-01 ASSESSMENT — ACTIVITIES OF DAILY LIVING (ADL)
CURRENT_FUNCTION: ONE PERSON
AMBULATION ASSISTANCE: ONE PERSON

## 2024-06-03 ENCOUNTER — HOME CARE VISIT (OUTPATIENT)
Dept: HOME HEALTH SERVICES | Facility: HOME HEALTHCARE | Age: 79
End: 2024-06-03
Payer: MEDICARE

## 2024-06-03 VITALS
RESPIRATION RATE: 18 BRPM | HEART RATE: 112 BPM | DIASTOLIC BLOOD PRESSURE: 62 MMHG | OXYGEN SATURATION: 95 % | SYSTOLIC BLOOD PRESSURE: 110 MMHG | TEMPERATURE: 98.9 F

## 2024-06-03 PROCEDURE — G0152 HHCP-SERV OF OT,EA 15 MIN: HCPCS

## 2024-06-03 ASSESSMENT — ENCOUNTER SYMPTOMS
HIGHEST PAIN SEVERITY IN PAST 24 HOURS: 9/10
LOWEST PAIN SEVERITY IN PAST 24 HOURS: 6/10
PAIN LOCATION: LEFT ARM
SUBJECTIVE PAIN PROGRESSION: WAXING AND WANING
PAIN LOCATION: LEFT LEG
PAIN SEVERITY GOAL: 3/10
PAIN LOCATION: LEFT BUTTOCK
PAIN: 1
PERSON REPORTING PAIN: PATIENT
DIFFICULTY THINKING: 1

## 2024-06-03 NOTE — CASE COMMUNICATION
Falls Template         Date & Time of fall: 6/2/24 at 1300           Cause of fall:  Mechanical           Location:  Bathing           Was the fall witnessed?                  If yes, by who? No            Actions taken by patient:  Client went to bathroom without assistance.  She went in the WC.  When transferring from WC to toilet she fell between toilet and the shower.   had to get her up off the floor            Any new in jury?                   If yes, what kind?  No            Any recent medication changes?    No            Did patient have appropriate DME available?  Yes                 If no, please explain:              Actions Taken: Notified care team and Notified MD

## 2024-06-03 NOTE — CASE COMMUNICATION
Client's pain level was 9/10 on her whole L side today.  This is out of normal parameters, so it is being reported. She stated that her pain occasionally gets this high.  She had not called the MD.

## 2024-06-03 NOTE — CASE COMMUNICATION
reviewed patients chart, patient was hospitalized on 5/4/24, and found to have lower extremity DVT with saddle pulmonary PE, Manual Lymphatic Drainage (MLD) has several contraindications or conditions that make it unsafe to perform, including DVT, I am not able to perform MLD/lymphedema eval at this time d/t pts diagnosis of DVT/PE. Thank you for the referral.

## 2024-06-03 NOTE — Clinical Note
Noted  Mariam Enciso RN  ----- Message -----  From: Sana Anderson OT  Sent: 6/3/2024   4:17 PM PDT  To: Mariam Enciso R.N.; Antonia Wilson R.N.; *      Client's pain level was 9/10 on her whole L side today.  This is out of normal parameters, so it is being reported. She stated that her pain occasionally gets this high.  She had not called the MD.

## 2024-06-04 ENCOUNTER — HOME CARE VISIT (OUTPATIENT)
Dept: HOME HEALTH SERVICES | Facility: HOME HEALTHCARE | Age: 79
End: 2024-06-04
Payer: MEDICARE

## 2024-06-04 VITALS
HEART RATE: 75 BPM | DIASTOLIC BLOOD PRESSURE: 60 MMHG | OXYGEN SATURATION: 92 % | RESPIRATION RATE: 18 BRPM | TEMPERATURE: 97.8 F | SYSTOLIC BLOOD PRESSURE: 110 MMHG

## 2024-06-04 PROCEDURE — G0299 HHS/HOSPICE OF RN EA 15 MIN: HCPCS

## 2024-06-04 SDOH — ECONOMIC STABILITY: HOUSING INSECURITY: EVIDENCE OF SMOKING MATERIAL: 0

## 2024-06-04 ASSESSMENT — ACTIVITIES OF DAILY LIVING (ADL)
AMBULATION ASSISTANCE: ONE PERSON
CURRENT_FUNCTION: ONE PERSON

## 2024-06-04 ASSESSMENT — ENCOUNTER SYMPTOMS
SUBJECTIVE PAIN PROGRESSION: UNCHANGED
VOMITING: PT DENIES ANY EMESIS AT THIS TIME
PAIN LOCATION - PAIN QUALITY: ACHY
LOWEST PAIN SEVERITY IN PAST 24 HOURS: 6/10
STOOL FREQUENCY: DAILY
PAIN LOCATION - PAIN FREQUENCY: FREQUENT
PAIN LOCATION - EXACERBATING FACTORS: WEATHER CHANGES
PAIN LOCATION: GENERALIZED
PAIN: 1
PAIN LOCATION - RELIEVING FACTORS: MEDS
PAIN LOCATION - PAIN SEVERITY: 3/10
PAIN SEVERITY GOAL: 2/10
MUSCLE WEAKNESS: 1
PERSON REPORTING PAIN: PATIENT
BOWEL PATTERN NORMAL: 1
DIFFICULTY THINKING: 1
HIGHEST PAIN SEVERITY IN PAST 24 HOURS: 3/10
LAST BOWEL MOVEMENT: 66994
NAUSEA: PT DENIES ANY NAUSEA AT THIS TIME

## 2024-06-05 ENCOUNTER — HOME CARE VISIT (OUTPATIENT)
Dept: HOME HEALTH SERVICES | Facility: HOME HEALTHCARE | Age: 79
End: 2024-06-05
Payer: MEDICARE

## 2024-06-05 PROBLEM — M81.0 AGE-RELATED OSTEOPOROSIS WITHOUT CURRENT PATHOLOGICAL FRACTURE: Status: ACTIVE | Noted: 2023-03-29

## 2024-06-05 PROBLEM — M81.0 SENILE OSTEOPOROSIS: Status: ACTIVE | Noted: 2024-06-05

## 2024-06-05 PROBLEM — I26.09 ACUTE COR PULMONALE (HCC): Status: ACTIVE | Noted: 2024-06-05

## 2024-06-05 PROBLEM — M81.0 SENILE OSTEOPOROSIS: Status: RESOLVED | Noted: 2024-06-05 | Resolved: 2024-06-05

## 2024-06-05 PROBLEM — I50.32 CHRONIC DIASTOLIC HEART FAILURE (HCC): Status: ACTIVE | Noted: 2024-06-05

## 2024-06-05 PROCEDURE — G0151 HHCP-SERV OF PT,EA 15 MIN: HCPCS

## 2024-06-07 ENCOUNTER — HOME CARE VISIT (OUTPATIENT)
Dept: HOME HEALTH SERVICES | Facility: HOME HEALTHCARE | Age: 79
End: 2024-06-07
Payer: MEDICARE

## 2024-06-07 VITALS — DIASTOLIC BLOOD PRESSURE: 60 MMHG | HEART RATE: 84 BPM | SYSTOLIC BLOOD PRESSURE: 110 MMHG | OXYGEN SATURATION: 91 %

## 2024-06-07 PROCEDURE — G0151 HHCP-SERV OF PT,EA 15 MIN: HCPCS

## 2024-06-07 PROCEDURE — G0299 HHS/HOSPICE OF RN EA 15 MIN: HCPCS

## 2024-06-08 VITALS
TEMPERATURE: 97.4 F | HEART RATE: 87 BPM | DIASTOLIC BLOOD PRESSURE: 52 MMHG | RESPIRATION RATE: 18 BRPM | OXYGEN SATURATION: 91 % | SYSTOLIC BLOOD PRESSURE: 92 MMHG

## 2024-06-08 VITALS
RESPIRATION RATE: 18 BRPM | DIASTOLIC BLOOD PRESSURE: 60 MMHG | OXYGEN SATURATION: 96 % | SYSTOLIC BLOOD PRESSURE: 110 MMHG | HEART RATE: 81 BPM | TEMPERATURE: 97.3 F

## 2024-06-08 ASSESSMENT — ENCOUNTER SYMPTOMS
PAIN LOCATION - PAIN FREQUENCY: CONSTANT
LOWEST PAIN SEVERITY IN PAST 24 HOURS: 6/10
HIGHEST PAIN SEVERITY IN PAST 24 HOURS: 8/10
PAIN LOCATION - PAIN DURATION: DAILY
PAIN: 1
LOWEST PAIN SEVERITY IN PAST 24 HOURS: 6/10
SUBJECTIVE PAIN PROGRESSION: WAXING AND WANING
PAIN LOCATION - PAIN QUALITY: ACHE
PERSON REPORTING PAIN: PATIENT
DEBILITATING PAIN: 1
DEBILITATING PAIN: 1
PERSON REPORTING PAIN: PATIENT
PAIN LOCATION - PAIN SEVERITY: 7/10
HIGHEST PAIN SEVERITY IN PAST 24 HOURS: 8/10
PAIN: 1

## 2024-06-10 ENCOUNTER — HOME CARE VISIT (OUTPATIENT)
Dept: HOME HEALTH SERVICES | Facility: HOME HEALTHCARE | Age: 79
End: 2024-06-10
Payer: MEDICARE

## 2024-06-10 VITALS
HEART RATE: 87 BPM | TEMPERATURE: 97.8 F | RESPIRATION RATE: 18 BRPM | DIASTOLIC BLOOD PRESSURE: 55 MMHG | SYSTOLIC BLOOD PRESSURE: 90 MMHG | OXYGEN SATURATION: 98 %

## 2024-06-10 VITALS
SYSTOLIC BLOOD PRESSURE: 94 MMHG | RESPIRATION RATE: 18 BRPM | DIASTOLIC BLOOD PRESSURE: 58 MMHG | TEMPERATURE: 97.8 F | OXYGEN SATURATION: 98 % | HEART RATE: 87 BPM

## 2024-06-10 PROCEDURE — G0157 HHC PT ASSISTANT EA 15: HCPCS | Mod: CQ

## 2024-06-10 PROCEDURE — G0152 HHCP-SERV OF OT,EA 15 MIN: HCPCS

## 2024-06-10 SDOH — ECONOMIC STABILITY: HOUSING INSECURITY: EVIDENCE OF SMOKING MATERIAL: 0

## 2024-06-10 ASSESSMENT — ACTIVITIES OF DAILY LIVING (ADL)
CURRENT_FUNCTION: STAND BY ASSIST
AMBULATION ASSISTANCE: STAND BY ASSIST

## 2024-06-10 ASSESSMENT — ENCOUNTER SYMPTOMS
LOWEST PAIN SEVERITY IN PAST 24 HOURS: 4/10
PAIN LOCATION: BACK
SUBJECTIVE PAIN PROGRESSION: UNCHANGED
PAIN SEVERITY GOAL: 0/10
PAIN: 1
HIGHEST PAIN SEVERITY IN PAST 24 HOURS: 2/10
LOWEST PAIN SEVERITY IN PAST 24 HOURS: 2/10
ARTHRALGIAS: 1
NAUSEA: DENIES
BOWEL PATTERN NORMAL: 1
PAIN LOCATION: BACK
HIGHEST PAIN SEVERITY IN PAST 24 HOURS: 5/10
PERSON REPORTING PAIN: PATIENT
HIGHEST PAIN SEVERITY IN PAST 24 HOURS: 5/10
SUBJECTIVE PAIN PROGRESSION: UNCHANGED
PERSON REPORTING PAIN: PATIENT
PERSON REPORTING PAIN: PATIENT
VOMITING: DENIES

## 2024-06-10 NOTE — Clinical Note
Thak you for the update  ----- Message -----  From: Elizabeth Coronel, PTA  Sent: 6/10/2024   6:09 PM PDT  To: Dior Astudillo, PT      Tereza is very limited due to back pain. She easily fatigues with any postural task. Gait belt left in the home for  to use with transfers. She is able to complete sitting ther ex. Attempted to educate on pressure relief but  reports that he does adjust her sitting and postioning during the day. Will cont with POC to increase her functional mob to prevent further decline in IND. S/B Dior Astudillo PT

## 2024-06-11 ENCOUNTER — TELEPHONE (OUTPATIENT)
Dept: MEDICAL GROUP | Facility: PHYSICIAN GROUP | Age: 79
End: 2024-06-11
Payer: MEDICARE

## 2024-06-11 ENCOUNTER — HOME CARE VISIT (OUTPATIENT)
Dept: HOME HEALTH SERVICES | Facility: HOME HEALTHCARE | Age: 79
End: 2024-06-11
Payer: MEDICARE

## 2024-06-11 VITALS
TEMPERATURE: 97.4 F | HEART RATE: 130 BPM | RESPIRATION RATE: 18 BRPM | DIASTOLIC BLOOD PRESSURE: 62 MMHG | SYSTOLIC BLOOD PRESSURE: 106 MMHG | OXYGEN SATURATION: 93 %

## 2024-06-11 PROCEDURE — G0299 HHS/HOSPICE OF RN EA 15 MIN: HCPCS

## 2024-06-11 SDOH — ECONOMIC STABILITY: HOUSING INSECURITY: EVIDENCE OF SMOKING MATERIAL: 0

## 2024-06-11 ASSESSMENT — ACTIVITIES OF DAILY LIVING (ADL)
AMBULATION ASSISTANCE: ONE PERSON
CURRENT_FUNCTION: ONE PERSON

## 2024-06-11 ASSESSMENT — ENCOUNTER SYMPTOMS
PERSON REPORTING PAIN: PATIENT
PAIN: 1
PAIN SEVERITY GOAL: 1/10
STOOL FREQUENCY: LESS THAN DAILY
VOMITING: PT STATED SHE HAS NOT HAD ANY EMESIS
PAIN LOCATION - PAIN QUALITY: SHARP
PAIN LOCATION - PAIN SEVERITY: 5/10
PAIN LOCATION - PAIN FREQUENCY: CONSTANT
LIMITED RANGE OF MOTION: 1
PAIN LOCATION: BACK
PAIN LOCATION - RELIEVING FACTORS: SITTING STILL
LAST BOWEL MOVEMENT: 67002
CONSTIPATION: 1
MUSCLE WEAKNESS: 1
LOWEST PAIN SEVERITY IN PAST 24 HOURS: 3/10
HIGHEST PAIN SEVERITY IN PAST 24 HOURS: 7/10
NAUSEA: PATIENT REPORTS NO NAUSEA

## 2024-06-11 NOTE — CASE COMMUNICATION
Tereza is very limited due to back pain. She easily fatigues with any postural task. Gait belt left in the home for  to use with transfers. She is able to complete sitting ther ex. Attempted to educate on pressure relief but  reports that he does adjust her sitting and postioning during the day. Will cont with POC to increase her functional mob to prevent further decline in IND. S/B Dior Astudillo PT

## 2024-06-11 NOTE — TELEPHONE ENCOUNTER
Caller Name: JIMMY Haines    Call Back Number: [phone]    chantell Haines RN from [facility] calls to report that Tereza's heart rate was 130 bpm today when her normal rate is usually around 70/80 bpm

## 2024-06-12 ENCOUNTER — TELEPHONE (OUTPATIENT)
Dept: MEDICAL GROUP | Facility: PHYSICIAN GROUP | Age: 79
End: 2024-06-12
Payer: MEDICARE

## 2024-06-12 ENCOUNTER — HOME CARE VISIT (OUTPATIENT)
Dept: HOME HEALTH SERVICES | Facility: HOME HEALTHCARE | Age: 79
End: 2024-06-12
Payer: MEDICARE

## 2024-06-12 NOTE — TELEPHONE ENCOUNTER
Phone Number Called: [phone] (home)     Spoke to Tereza's , Anthony. Informed him that Hortencia wants to schedule a follow up appointment to see Tereza. He told me he'd call back when he is at home to see what their schedule is like. He said he has our number to call us back.    *I will call again if we have not heard anything back from him in a day

## 2024-06-12 NOTE — CASE COMMUNICATION
I agree with these changes.  ----- Message -----  From: Gaby Sheppard R.N.  Sent: 6/12/2024  12:38 PM PDT  To: Hilary Perdomo R.N.      Quality Review Completed for 5/28 SOC OASIS by GERARDO Sheppard RN on 6/12/2024:     Edits completed by GERARDO Sheppard RN:      and  diagnosis coding updated per chart review   changed to 5/28 for LSOC ordered   is early timing, this is a SOC  PT reports SOB less than 10 ft. And OT reports  SOB with min exertion.  Changed  to 3 and  to 5/29 for collaboration   is #5, per OT patient sponge bathes at this time  [id number] I , P and M changed to #88 due to  indicates patient is chairfast   is taking antipsychotic Quietapine with indication per MAR  Checked dyspnea w/min exertion to 485 functional limitations.

## 2024-06-12 NOTE — Clinical Note
Quality Review Completed for 5/28 SOC OASIS by GERARDO Sheppard RN on 6/12/2024:     Edits completed by GERARDO Sheppard RN:      and  diagnosis coding updated per chart review   changed to 5/28 for LSOC ordered   is early timing, this is a SOC  PT reports SOB less than 10 ft. And OT reports SOB with min exertion.  Changed  to 3 and  to 5/29 for collaboration   is #5, per OT patient sponge bathes at this time  [id number] I , P and M changed to #88 due to  indicates patient is chairfast   is taking antipsychotic Quietapine with indication per MAR  Checked dyspnea w/min exertion to 485 functional limitations.

## 2024-06-14 ENCOUNTER — HOME CARE VISIT (OUTPATIENT)
Dept: HOME HEALTH SERVICES | Facility: HOME HEALTHCARE | Age: 79
End: 2024-06-14
Payer: MEDICARE

## 2024-06-14 VITALS
SYSTOLIC BLOOD PRESSURE: 90 MMHG | RESPIRATION RATE: 18 BRPM | OXYGEN SATURATION: 94 % | HEART RATE: 99 BPM | TEMPERATURE: 98.1 F | DIASTOLIC BLOOD PRESSURE: 60 MMHG

## 2024-06-14 VITALS
DIASTOLIC BLOOD PRESSURE: 60 MMHG | SYSTOLIC BLOOD PRESSURE: 90 MMHG | TEMPERATURE: 98 F | RESPIRATION RATE: 18 BRPM | HEART RATE: 91 BPM | OXYGEN SATURATION: 90 %

## 2024-06-14 PROCEDURE — G0299 HHS/HOSPICE OF RN EA 15 MIN: HCPCS

## 2024-06-14 PROCEDURE — G0157 HHC PT ASSISTANT EA 15: HCPCS | Mod: CQ

## 2024-06-14 SDOH — ECONOMIC STABILITY: HOUSING INSECURITY: EVIDENCE OF SMOKING MATERIAL: 0

## 2024-06-14 ASSESSMENT — ENCOUNTER SYMPTOMS
PERSON REPORTING PAIN: PATIENT
LOWEST PAIN SEVERITY IN PAST 24 HOURS: 2/10
PAIN LOCATION: BACK
PAIN LOCATION - PAIN SEVERITY: 5/10
PAIN LOCATION - RELIEVING FACTORS: NOTHING
PAIN LOCATION - PAIN QUALITY: ACHY
HIGHEST PAIN SEVERITY IN PAST 24 HOURS: 7/10
BOWEL PATTERN NORMAL: 1
LAST BOWEL MOVEMENT: 67004
PAIN LOCATION - PAIN FREQUENCY: CONSTANT
STOOL FREQUENCY: LESS THAN DAILY
PAIN SEVERITY GOAL: 0/10
SUBJECTIVE PAIN PROGRESSION: UNCHANGED
LOWEST PAIN SEVERITY IN PAST 24 HOURS: 5/10
PAIN: 1
NAUSEA: PATIENT REPORTS NO NAUSEA
PAIN SEVERITY GOAL: 2/10
PERSON REPORTING PAIN: PATIENT
VOMITING: PT STATED SHE HAS NOT HAD ANY EMESIS
PAIN: 1
PAIN LOCATION: BACK
HIGHEST PAIN SEVERITY IN PAST 24 HOURS: 5/10

## 2024-06-14 ASSESSMENT — ACTIVITIES OF DAILY LIVING (ADL)
CURRENT_FUNCTION: ONE PERSON
AMBULATION ASSISTANCE: ONE PERSON

## 2024-06-14 NOTE — Clinical Note
Thank you for the update.   ----- Message -----  From: Elizabeth Coronel, PTA  Sent: 6/14/2024  10:09 PM PDT  To: Dior Astudillo, PT      Teerza did very well today. She was able to work on amb today. Educated pt and  with working on using 4ww to amb from WC to sititng on couch and trying to walk more with  assisting her. She has the bality to wilner more up time and less sitting on her couch. Will cont with POC to increase her functional mob to prevent further decline in IND. S/B Dior Astudillo PT

## 2024-06-15 NOTE — CASE COMMUNICATION
Tereza did very well today. She was able to work on amb today. Educated pt and  with working on using 4ww to amb from WC to sititng on couch and trying to walk more with  assisting her. She has the bality to wilner more up time and less sitting on her couch. Will cont with POC to increase her functional mob to prevent further decline in IND. S/B Dior Astudillo PT

## 2024-06-18 ENCOUNTER — HOME CARE VISIT (OUTPATIENT)
Dept: HOME HEALTH SERVICES | Facility: HOME HEALTHCARE | Age: 79
End: 2024-06-18
Payer: MEDICARE

## 2024-06-18 VITALS
DIASTOLIC BLOOD PRESSURE: 60 MMHG | OXYGEN SATURATION: 96 % | TEMPERATURE: 98.4 F | HEART RATE: 89 BPM | SYSTOLIC BLOOD PRESSURE: 108 MMHG | RESPIRATION RATE: 18 BRPM

## 2024-06-18 VITALS
OXYGEN SATURATION: 96 % | SYSTOLIC BLOOD PRESSURE: 108 MMHG | RESPIRATION RATE: 18 BRPM | HEART RATE: 89 BPM | TEMPERATURE: 98.4 F | DIASTOLIC BLOOD PRESSURE: 60 MMHG

## 2024-06-18 VITALS
RESPIRATION RATE: 18 BRPM | TEMPERATURE: 98.4 F | SYSTOLIC BLOOD PRESSURE: 108 MMHG | OXYGEN SATURATION: 96 % | HEART RATE: 89 BPM | DIASTOLIC BLOOD PRESSURE: 60 MMHG

## 2024-06-18 PROCEDURE — G0157 HHC PT ASSISTANT EA 15: HCPCS | Mod: CQ

## 2024-06-18 PROCEDURE — G0152 HHCP-SERV OF OT,EA 15 MIN: HCPCS

## 2024-06-18 PROCEDURE — G0299 HHS/HOSPICE OF RN EA 15 MIN: HCPCS

## 2024-06-18 SDOH — ECONOMIC STABILITY: HOUSING INSECURITY: EVIDENCE OF SMOKING MATERIAL: 0

## 2024-06-18 ASSESSMENT — ENCOUNTER SYMPTOMS
SUBJECTIVE PAIN PROGRESSION: UNCHANGED
HIGHEST PAIN SEVERITY IN PAST 24 HOURS: 7/10
PAIN LOCATION: BACK
PERSON REPORTING PAIN: PATIENT
HIGHEST PAIN SEVERITY IN PAST 24 HOURS: 7/10
PAIN SEVERITY GOAL: 1/10
NAUSEA: PT DENIES ANY NAUSEA AT THIS TIME
PAIN LOCATION - PAIN QUALITY: ACHY
PAIN SEVERITY GOAL: 3/10
COUGH: 1
LOWEST PAIN SEVERITY IN PAST 24 HOURS: 4/10
VOMITING: PT DENIES ANY EMESIS AT THIS TIME
LOWEST PAIN SEVERITY IN PAST 24 HOURS: 5/10
PAIN LOCATION: BACK/NECK
SUBJECTIVE PAIN PROGRESSION: WAXING AND WANING
MUSCLE WEAKNESS: 1
LOWEST PAIN SEVERITY IN PAST 24 HOURS: 3/10
PAIN LOCATION - EXACERBATING FACTORS: ACTIVITY
PERSON REPORTING PAIN: PATIENT
PAIN LOCATION - PAIN FREQUENCY: FREQUENT
HIGHEST PAIN SEVERITY IN PAST 24 HOURS: 5/10
PERSON REPORTING PAIN: PATIENT
PAIN LOCATION: BACK
LAST BOWEL MOVEMENT: 67009
BOWEL PATTERN NORMAL: 1
STOOL FREQUENCY: LESS THAN DAILY
PAIN SEVERITY GOAL: 2/10
PAIN LOCATION - PAIN SEVERITY: 5/10
PAIN LOCATION - RELIEVING FACTORS: REST/MEDS
PAIN: 1
PAIN: 1
PAIN LOCATION: NECK
PAIN: 1
SUBJECTIVE PAIN PROGRESSION: UNCHANGED
COUGH CHARACTERISTICS: NON-PRODUCTIVE

## 2024-06-18 ASSESSMENT — ACTIVITIES OF DAILY LIVING (ADL)
AMBULATION ASSISTANCE: ONE PERSON
CURRENT_FUNCTION: ONE PERSON

## 2024-06-18 NOTE — Clinical Note
Thank you for the update  ----- Message -----  From: Elizabeth Coronel, PTA  Sent: 6/18/2024  11:54 AM PDT  To: Dior Astudillo, PT      Tereza was more fatigue and weak today. She did however wilner ther ex and amb today. Increase focus today on trunk strengthening.  and Elain both report a carry over with HEP. Will cont with POC to increase her functional mob to prevent further decline in IND. S/B Dior Astudillo PT

## 2024-06-18 NOTE — CASE COMMUNICATION
Tereza was more fatigue and weak today. She did however wilner ther ex and amb today. Increase focus today on trunk strengthening.  and Eugenie both report a carry over with HEP. Will cont with POC to increase her functional mob to prevent further decline in IND. S/B Dior Astudillo PT

## 2024-06-20 ENCOUNTER — HOME CARE VISIT (OUTPATIENT)
Dept: HOME HEALTH SERVICES | Facility: HOME HEALTHCARE | Age: 79
End: 2024-06-20
Payer: MEDICARE

## 2024-06-20 PROCEDURE — G0151 HHCP-SERV OF PT,EA 15 MIN: HCPCS

## 2024-06-21 ENCOUNTER — HOME CARE VISIT (OUTPATIENT)
Dept: HOME HEALTH SERVICES | Facility: HOME HEALTHCARE | Age: 79
End: 2024-06-21
Payer: MEDICARE

## 2024-06-21 VITALS
HEART RATE: 82 BPM | RESPIRATION RATE: 18 BRPM | TEMPERATURE: 97.8 F | OXYGEN SATURATION: 96 % | DIASTOLIC BLOOD PRESSURE: 60 MMHG | SYSTOLIC BLOOD PRESSURE: 102 MMHG

## 2024-06-21 PROCEDURE — G0299 HHS/HOSPICE OF RN EA 15 MIN: HCPCS

## 2024-06-21 SDOH — ECONOMIC STABILITY: HOUSING INSECURITY: EVIDENCE OF SMOKING MATERIAL: 0

## 2024-06-21 ASSESSMENT — ENCOUNTER SYMPTOMS
LAST BOWEL MOVEMENT: 67011
BOWEL PATTERN NORMAL: 1
PAIN: 1
LOWEST PAIN SEVERITY IN PAST 24 HOURS: 5/10
PAIN LOCATION - EXACERBATING FACTORS: ACTIVITY/ WEATHER
HIGHEST PAIN SEVERITY IN PAST 24 HOURS: 5/10
SUBJECTIVE PAIN PROGRESSION: UNCHANGED
PERSON REPORTING PAIN: PATIENT
PAIN LOCATION - PAIN SEVERITY: 5/10
PAIN LOCATION - RELIEVING FACTORS: REST/MEDS
PAIN LOCATION - PAIN QUALITY: ACHY
STOOL FREQUENCY: DAILY
PAIN LOCATION - PAIN FREQUENCY: FREQUENT
PAIN LOCATION: NECK/BACK
PAIN SEVERITY GOAL: 5/10
DESCRIPTION OF MEMORY LOSS: SHORT TERM

## 2024-06-23 VITALS
RESPIRATION RATE: 18 BRPM | SYSTOLIC BLOOD PRESSURE: 100 MMHG | HEART RATE: 78 BPM | OXYGEN SATURATION: 95 % | TEMPERATURE: 97.8 F | DIASTOLIC BLOOD PRESSURE: 60 MMHG

## 2024-06-23 ASSESSMENT — ENCOUNTER SYMPTOMS
PAIN LOCATION - PAIN SEVERITY: 5/10
HIGHEST PAIN SEVERITY IN PAST 24 HOURS: 9/10
SUBJECTIVE PAIN PROGRESSION: WAXING AND WANING
LOWEST PAIN SEVERITY IN PAST 24 HOURS: 5/10
DEBILITATING PAIN: 1
PAIN LOCATION - PAIN QUALITY: ACHE
PAIN: 1
PERSON REPORTING PAIN: PATIENT
PAIN LOCATION - EXACERBATING FACTORS: ACTIVITY
PAIN LOCATION - RELIEVING FACTORS: REST/MEDS

## 2024-06-24 ENCOUNTER — HOME CARE VISIT (OUTPATIENT)
Dept: HOME HEALTH SERVICES | Facility: HOME HEALTHCARE | Age: 79
End: 2024-06-24
Payer: MEDICARE

## 2024-06-24 VITALS
DIASTOLIC BLOOD PRESSURE: 62 MMHG | RESPIRATION RATE: 18 BRPM | TEMPERATURE: 98.6 F | HEART RATE: 88 BPM | OXYGEN SATURATION: 96 % | SYSTOLIC BLOOD PRESSURE: 102 MMHG

## 2024-06-24 PROCEDURE — G0152 HHCP-SERV OF OT,EA 15 MIN: HCPCS

## 2024-06-24 SDOH — ECONOMIC STABILITY: HOUSING INSECURITY: EVIDENCE OF SMOKING MATERIAL: 0

## 2024-06-24 ASSESSMENT — ENCOUNTER SYMPTOMS
PAIN LOCATION: NECK
HIGHEST PAIN SEVERITY IN PAST 24 HOURS: 5/10
PAIN LOCATION: BACK
PAIN SEVERITY GOAL: 3/10
DENIES PAIN: 1
LOWEST PAIN SEVERITY IN PAST 24 HOURS: 5/10
PERSON REPORTING PAIN: PATIENT
SUBJECTIVE PAIN PROGRESSION: UNCHANGED

## 2024-06-24 ASSESSMENT — ACTIVITIES OF DAILY LIVING (ADL)
GROOMING ASSESSED: 1
DRESSING_LB_CURRENT_FUNCTION: MINIMUM ASSIST
DRESSING_LB_CURRENT_FUNCTION: MODERATE ASSIST
FEEDING ASSESSED: 1
TRANSPORTATION: DEPENDENT
ORAL_CARE_CURRENT_FUNCTION: INDEPENDENT
BATHING ASSESSED: 1
SHOPPING ASSESSED: 1
BATHING_CURRENT_FUNCTION: SUPERVISION
TOILETING: 1
DRESSING_UB_CURRENT_FUNCTION: SUPERVISION
AMBULATION ASSISTANCE: CONTACT GUARD ASSIST
PREPARING MEALS: DEPENDENT
PHYSICAL TRANSFERS ASSESSED: 1
LIGHT HOUSEKEEPING: DEPENDENT
HOUSEKEEPING ASSESSED: 1
USING THE TELPHONE: INDEPENDENT
AMBULATION ASSISTANCE: 1
FEEDING: INDEPENDENT
TELEPHONE USE ASSESSED: 1
ORAL_CARE_ASSESSED: 1
SHOPPING: DEPENDENT
LAUNDRY ASSESSED: 1
GROOMING_CURRENT_FUNCTION: SUPERVISION
TRANSPORTATION ASSESSED: 1
CURRENT_FUNCTION: MINIMUM ASSIST
LAUNDRY: DEPENDENT
TOILETING: MINIMUM ASSIST

## 2024-06-24 NOTE — CASE COMMUNICATION
OT DISCHAGE SUMMARY:  The patient was seen for 5 home health occupational  therapy sessions.  Goals partially met.  The patient was discharged to self care  .  STATUS AT DISCHARGE:  Ambulation and Mobility: Supervised  Patient Equipment: walker and wheelchair for ambulation.  Transferring: Supervised  Bathing: Supervised  Dressing: Mod Assist  Special equipment used: shower chair, grab bars  Medication management: administered by cathie r person  Frequency of pain interfering with activity or movement: (drop downs)  - Patient has pain that does not interfere with activity/movement  When is the patient dyspneic or noticeably Short of Breath: (drop downs)  - When walking more than 20 feet/stairs    Client requested OT discharge.  Feels she and her  are doing well with therapy activities on their own.

## 2024-06-25 ENCOUNTER — HOME CARE VISIT (OUTPATIENT)
Dept: HOME HEALTH SERVICES | Facility: HOME HEALTHCARE | Age: 79
End: 2024-06-25
Payer: MEDICARE

## 2024-06-25 VITALS
HEART RATE: 87 BPM | OXYGEN SATURATION: 93 % | SYSTOLIC BLOOD PRESSURE: 98 MMHG | DIASTOLIC BLOOD PRESSURE: 58 MMHG | TEMPERATURE: 98.3 F | RESPIRATION RATE: 17 BRPM

## 2024-06-25 PROCEDURE — G0157 HHC PT ASSISTANT EA 15: HCPCS | Mod: CQ

## 2024-06-25 ASSESSMENT — ENCOUNTER SYMPTOMS
PAIN: 1
PAIN LOCATION: GENERALIZED
PAIN SEVERITY GOAL: 3/10
HIGHEST PAIN SEVERITY IN PAST 24 HOURS: 8/10
SUBJECTIVE PAIN PROGRESSION: UNCHANGED
PERSON REPORTING PAIN: PATIENT
LOWEST PAIN SEVERITY IN PAST 24 HOURS: 7/10

## 2024-06-26 SDOH — ECONOMIC STABILITY: HOUSING INSECURITY: EVIDENCE OF SMOKING MATERIAL: 0

## 2024-06-26 ASSESSMENT — ACTIVITIES OF DAILY LIVING (ADL)
OASIS_M1830: 02
HOME_HEALTH_OASIS: 01

## 2024-06-26 NOTE — CASE COMMUNICATION
On arrival Tereza was sitting on her couch leaning over to R side. Worked on adjusting her sitting postioning and then spoke with pt in reference to her wanting to DC. At this time she is requesting to DC as she feels like she can't get better. Her  walks her min distance and is able to assist her with all transfers. Discussed with  HEP, transfers and DC. He agrees with her request. PT notified and will F/u with DC plans.  S/B Dior Astudillo PT

## 2024-06-29 ENCOUNTER — PATIENT MESSAGE (OUTPATIENT)
Dept: MEDICAL GROUP | Facility: PHYSICIAN GROUP | Age: 79
End: 2024-06-29
Payer: MEDICARE

## 2024-06-29 DIAGNOSIS — I26.02 ACUTE SADDLE PULMONARY EMBOLISM WITH ACUTE COR PULMONALE (HCC): ICD-10-CM

## 2024-07-02 RX ORDER — ESOMEPRAZOLE MAGNESIUM 20 MG/1
20 TABLET, DELAYED RELEASE ORAL
Qty: 90 TABLET | Refills: 0 | Status: SHIPPED | OUTPATIENT
Start: 2024-07-02

## 2024-07-02 RX ORDER — AMOXICILLIN 250 MG
1 CAPSULE ORAL
Qty: 90 TABLET | Refills: 0 | Status: SHIPPED | OUTPATIENT
Start: 2024-07-02

## 2024-07-02 RX ORDER — DULOXETIN HYDROCHLORIDE 30 MG/1
30 CAPSULE, DELAYED RELEASE ORAL DAILY
Qty: 90 CAPSULE | Refills: 0 | Status: SHIPPED | OUTPATIENT
Start: 2024-07-02

## 2024-07-05 ENCOUNTER — HOME CARE VISIT (OUTPATIENT)
Dept: HOME HEALTH SERVICES | Facility: HOME HEALTHCARE | Age: 79
End: 2024-07-05
Payer: MEDICARE

## 2024-07-10 ENCOUNTER — APPOINTMENT (OUTPATIENT)
Dept: MEDICAL GROUP | Facility: PHYSICIAN GROUP | Age: 79
End: 2024-07-10
Payer: MEDICARE

## 2024-07-11 DIAGNOSIS — I48.0 PAROXYSMAL ATRIAL FIBRILLATION WITH RVR (HCC): ICD-10-CM

## 2024-07-11 DIAGNOSIS — J96.11 CHRONIC RESPIRATORY FAILURE WITH HYPOXIA (HCC): ICD-10-CM

## 2024-07-12 ENCOUNTER — APPOINTMENT (OUTPATIENT)
Dept: RADIOLOGY | Facility: MEDICAL CENTER | Age: 79
DRG: 186 | End: 2024-07-12
Payer: MEDICARE

## 2024-07-12 ENCOUNTER — APPOINTMENT (OUTPATIENT)
Dept: RADIOLOGY | Facility: MEDICAL CENTER | Age: 79
DRG: 186 | End: 2024-07-12
Attending: EMERGENCY MEDICINE
Payer: MEDICARE

## 2024-07-12 ENCOUNTER — HOSPITAL ENCOUNTER (INPATIENT)
Facility: MEDICAL CENTER | Age: 79
LOS: 6 days | DRG: 186 | End: 2024-07-18
Attending: EMERGENCY MEDICINE | Admitting: STUDENT IN AN ORGANIZED HEALTH CARE EDUCATION/TRAINING PROGRAM
Payer: MEDICARE

## 2024-07-12 DIAGNOSIS — R54 FRAILTY SYNDROME IN GERIATRIC PATIENT: ICD-10-CM

## 2024-07-12 DIAGNOSIS — J96.21 ACUTE ON CHRONIC HYPOXIC RESPIRATORY FAILURE (HCC): ICD-10-CM

## 2024-07-12 DIAGNOSIS — J18.9 PLEURAL EFFUSION ASSOCIATED WITH PULMONARY INFECTION: ICD-10-CM

## 2024-07-12 DIAGNOSIS — J91.8 PLEURAL EFFUSION ASSOCIATED WITH PULMONARY INFECTION: ICD-10-CM

## 2024-07-12 DIAGNOSIS — A41.9 SEPSIS, DUE TO UNSPECIFIED ORGANISM, UNSPECIFIED WHETHER ACUTE ORGAN DYSFUNCTION PRESENT (HCC): ICD-10-CM

## 2024-07-12 DIAGNOSIS — J90 PLEURAL EFFUSION: ICD-10-CM

## 2024-07-12 LAB
ALBUMIN SERPL BCP-MCNC: 3.4 G/DL (ref 3.2–4.9)
ALBUMIN/GLOB SERPL: 1 G/DL
ALP SERPL-CCNC: 96 U/L (ref 30–99)
ALT SERPL-CCNC: 31 U/L (ref 2–50)
ANION GAP SERPL CALC-SCNC: 13 MMOL/L (ref 7–16)
AST SERPL-CCNC: 18 U/L (ref 12–45)
BASOPHILS # BLD AUTO: 0.8 % (ref 0–1.8)
BASOPHILS # BLD: 0.09 K/UL (ref 0–0.12)
BILIRUB SERPL-MCNC: 0.4 MG/DL (ref 0.1–1.5)
BUN SERPL-MCNC: 22 MG/DL (ref 8–22)
CALCIUM ALBUM COR SERPL-MCNC: 9.5 MG/DL (ref 8.5–10.5)
CALCIUM SERPL-MCNC: 9 MG/DL (ref 8.5–10.5)
CHLORIDE SERPL-SCNC: 103 MMOL/L (ref 96–112)
CO2 SERPL-SCNC: 25 MMOL/L (ref 20–33)
CREAT SERPL-MCNC: 0.64 MG/DL (ref 0.5–1.4)
EKG IMPRESSION: NORMAL
EOSINOPHIL # BLD AUTO: 0.09 K/UL (ref 0–0.51)
EOSINOPHIL NFR BLD: 0.8 % (ref 0–6.9)
ERYTHROCYTE [DISTWIDTH] IN BLOOD BY AUTOMATED COUNT: 51.4 FL (ref 35.9–50)
FLUAV RNA SPEC QL NAA+PROBE: NEGATIVE
FLUBV RNA SPEC QL NAA+PROBE: NEGATIVE
GFR SERPLBLD CREATININE-BSD FMLA CKD-EPI: 91 ML/MIN/1.73 M 2
GLOBULIN SER CALC-MCNC: 3.5 G/DL (ref 1.9–3.5)
GLUCOSE SERPL-MCNC: 124 MG/DL (ref 65–99)
HCT VFR BLD AUTO: 43.5 % (ref 37–47)
HGB BLD-MCNC: 13.7 G/DL (ref 12–16)
IMM GRANULOCYTES # BLD AUTO: 0.08 K/UL (ref 0–0.11)
IMM GRANULOCYTES NFR BLD AUTO: 0.8 % (ref 0–0.9)
LACTATE SERPL-SCNC: 1.7 MMOL/L (ref 0.5–2)
LYMPHOCYTES # BLD AUTO: 0.8 K/UL (ref 1–4.8)
LYMPHOCYTES NFR BLD: 7.5 % (ref 22–41)
MCH RBC QN AUTO: 27.5 PG (ref 27–33)
MCHC RBC AUTO-ENTMCNC: 31.5 G/DL (ref 32.2–35.5)
MCV RBC AUTO: 87.3 FL (ref 81.4–97.8)
MONOCYTES # BLD AUTO: 1.16 K/UL (ref 0–0.85)
MONOCYTES NFR BLD AUTO: 10.9 % (ref 0–13.4)
NEUTROPHILS # BLD AUTO: 8.41 K/UL (ref 1.82–7.42)
NEUTROPHILS NFR BLD: 79.2 % (ref 44–72)
NRBC # BLD AUTO: 0 K/UL
NRBC BLD-RTO: 0 /100 WBC (ref 0–0.2)
PLATELET # BLD AUTO: 532 K/UL (ref 164–446)
PMV BLD AUTO: 10.7 FL (ref 9–12.9)
POTASSIUM SERPL-SCNC: 3.8 MMOL/L (ref 3.6–5.5)
PROT SERPL-MCNC: 6.9 G/DL (ref 6–8.2)
RBC # BLD AUTO: 4.98 M/UL (ref 4.2–5.4)
RSV RNA SPEC QL NAA+PROBE: NEGATIVE
SARS-COV-2 RNA RESP QL NAA+PROBE: NOTDETECTED
SODIUM SERPL-SCNC: 141 MMOL/L (ref 135–145)
WBC # BLD AUTO: 10.6 K/UL (ref 4.8–10.8)

## 2024-07-12 PROCEDURE — 700111 HCHG RX REV CODE 636 W/ 250 OVERRIDE (IP): Mod: JZ | Performed by: STUDENT IN AN ORGANIZED HEALTH CARE EDUCATION/TRAINING PROGRAM

## 2024-07-12 PROCEDURE — 700111 HCHG RX REV CODE 636 W/ 250 OVERRIDE (IP): Mod: JZ | Performed by: EMERGENCY MEDICINE

## 2024-07-12 PROCEDURE — 96365 THER/PROPH/DIAG IV INF INIT: CPT

## 2024-07-12 PROCEDURE — 87077 CULTURE AEROBIC IDENTIFY: CPT

## 2024-07-12 PROCEDURE — 93005 ELECTROCARDIOGRAM TRACING: CPT | Performed by: EMERGENCY MEDICINE

## 2024-07-12 PROCEDURE — 96375 TX/PRO/DX INJ NEW DRUG ADDON: CPT

## 2024-07-12 PROCEDURE — 99223 1ST HOSP IP/OBS HIGH 75: CPT | Mod: AI | Performed by: STUDENT IN AN ORGANIZED HEALTH CARE EDUCATION/TRAINING PROGRAM

## 2024-07-12 PROCEDURE — 36415 COLL VENOUS BLD VENIPUNCTURE: CPT

## 2024-07-12 PROCEDURE — 80053 COMPREHEN METABOLIC PANEL: CPT

## 2024-07-12 PROCEDURE — 700102 HCHG RX REV CODE 250 W/ 637 OVERRIDE(OP): Performed by: EMERGENCY MEDICINE

## 2024-07-12 PROCEDURE — 71045 X-RAY EXAM CHEST 1 VIEW: CPT

## 2024-07-12 PROCEDURE — 93005 ELECTROCARDIOGRAM TRACING: CPT

## 2024-07-12 PROCEDURE — 770020 HCHG ROOM/CARE - TELE (206)

## 2024-07-12 PROCEDURE — 83605 ASSAY OF LACTIC ACID: CPT

## 2024-07-12 PROCEDURE — 0241U HCHG SARS-COV-2 COVID-19 NFCT DS RESP RNA 4 TRGT ED POC: CPT

## 2024-07-12 PROCEDURE — A9270 NON-COVERED ITEM OR SERVICE: HCPCS | Performed by: EMERGENCY MEDICINE

## 2024-07-12 PROCEDURE — 87150 DNA/RNA AMPLIFIED PROBE: CPT

## 2024-07-12 PROCEDURE — 700102 HCHG RX REV CODE 250 W/ 637 OVERRIDE(OP): Performed by: STUDENT IN AN ORGANIZED HEALTH CARE EDUCATION/TRAINING PROGRAM

## 2024-07-12 PROCEDURE — 700105 HCHG RX REV CODE 258: Performed by: EMERGENCY MEDICINE

## 2024-07-12 PROCEDURE — 99285 EMERGENCY DEPT VISIT HI MDM: CPT

## 2024-07-12 PROCEDURE — 85025 COMPLETE CBC W/AUTO DIFF WBC: CPT

## 2024-07-12 PROCEDURE — 87040 BLOOD CULTURE FOR BACTERIA: CPT

## 2024-07-12 PROCEDURE — A9270 NON-COVERED ITEM OR SERVICE: HCPCS | Performed by: STUDENT IN AN ORGANIZED HEALTH CARE EDUCATION/TRAINING PROGRAM

## 2024-07-12 PROCEDURE — 84145 PROCALCITONIN (PCT): CPT

## 2024-07-12 RX ORDER — BACLOFEN 10 MG/1
10 TABLET ORAL 2 TIMES DAILY PRN
Status: DISCONTINUED | OUTPATIENT
Start: 2024-07-12 | End: 2024-07-18 | Stop reason: HOSPADM

## 2024-07-12 RX ORDER — AZITHROMYCIN 500 MG/5ML
500 INJECTION, POWDER, LYOPHILIZED, FOR SOLUTION INTRAVENOUS EVERY 24 HOURS
Status: DISPENSED | OUTPATIENT
Start: 2024-07-13 | End: 2024-07-16

## 2024-07-12 RX ORDER — ATORVASTATIN CALCIUM 10 MG/1
10 TABLET, FILM COATED ORAL NIGHTLY
Status: DISCONTINUED | OUTPATIENT
Start: 2024-07-12 | End: 2024-07-18 | Stop reason: HOSPADM

## 2024-07-12 RX ORDER — FUROSEMIDE 10 MG/ML
40 INJECTION INTRAMUSCULAR; INTRAVENOUS
Status: DISCONTINUED | OUTPATIENT
Start: 2024-07-12 | End: 2024-07-16

## 2024-07-12 RX ORDER — QUETIAPINE FUMARATE 25 MG/1
25 TABLET, FILM COATED ORAL
Status: DISCONTINUED | OUTPATIENT
Start: 2024-07-12 | End: 2024-07-18 | Stop reason: HOSPADM

## 2024-07-12 RX ORDER — AMITRIPTYLINE HYDROCHLORIDE 100 MG/1
100 TABLET ORAL NIGHTLY
Status: DISCONTINUED | OUTPATIENT
Start: 2024-07-12 | End: 2024-07-18 | Stop reason: HOSPADM

## 2024-07-12 RX ORDER — SODIUM CHLORIDE, SODIUM LACTATE, POTASSIUM CHLORIDE, AND CALCIUM CHLORIDE .6; .31; .03; .02 G/100ML; G/100ML; G/100ML; G/100ML
30 INJECTION, SOLUTION INTRAVENOUS ONCE
Status: COMPLETED | OUTPATIENT
Start: 2024-07-12 | End: 2024-07-12

## 2024-07-12 RX ORDER — AZITHROMYCIN 250 MG/1
500 TABLET, FILM COATED ORAL ONCE
Status: COMPLETED | OUTPATIENT
Start: 2024-07-12 | End: 2024-07-12

## 2024-07-12 RX ORDER — METOPROLOL TARTRATE 25 MG/1
25 TABLET, FILM COATED ORAL 2 TIMES DAILY
Status: DISCONTINUED | OUTPATIENT
Start: 2024-07-12 | End: 2024-07-16

## 2024-07-12 RX ORDER — DULOXETIN HYDROCHLORIDE 30 MG/1
30 CAPSULE, DELAYED RELEASE ORAL DAILY
Status: DISCONTINUED | OUTPATIENT
Start: 2024-07-13 | End: 2024-07-18 | Stop reason: HOSPADM

## 2024-07-12 RX ORDER — SPIRONOLACTONE 25 MG/1
25 TABLET ORAL DAILY
Status: DISCONTINUED | OUTPATIENT
Start: 2024-07-13 | End: 2024-07-18 | Stop reason: HOSPADM

## 2024-07-12 RX ORDER — PREGABALIN 150 MG/1
150 CAPSULE ORAL 2 TIMES DAILY
Status: DISCONTINUED | OUTPATIENT
Start: 2024-07-12 | End: 2024-07-18 | Stop reason: HOSPADM

## 2024-07-12 RX ORDER — TAMSULOSIN HYDROCHLORIDE 0.4 MG/1
0.4 CAPSULE ORAL
Status: DISCONTINUED | OUTPATIENT
Start: 2024-07-12 | End: 2024-07-18 | Stop reason: HOSPADM

## 2024-07-12 RX ADMIN — APIXABAN 5 MG: 5 TABLET, FILM COATED ORAL at 23:13

## 2024-07-12 RX ADMIN — SODIUM CHLORIDE, POTASSIUM CHLORIDE, SODIUM LACTATE AND CALCIUM CHLORIDE 1395 ML: 600; 310; 30; 20 INJECTION, SOLUTION INTRAVENOUS at 20:27

## 2024-07-12 RX ADMIN — METOPROLOL TARTRATE 25 MG: 25 TABLET, FILM COATED ORAL at 23:13

## 2024-07-12 RX ADMIN — AZITHROMYCIN DIHYDRATE 500 MG: 250 TABLET ORAL at 20:53

## 2024-07-12 RX ADMIN — TAMSULOSIN HYDROCHLORIDE 0.4 MG: 0.4 CAPSULE ORAL at 23:13

## 2024-07-12 RX ADMIN — FUROSEMIDE 40 MG: 10 INJECTION, SOLUTION INTRAVENOUS at 23:12

## 2024-07-12 RX ADMIN — PREGABALIN 150 MG: 150 CAPSULE ORAL at 23:13

## 2024-07-12 RX ADMIN — AMITRIPTYLINE HYDROCHLORIDE 100 MG: 100 TABLET, FILM COATED ORAL at 23:12

## 2024-07-12 RX ADMIN — AMPICILLIN AND SULBACTAM 3 G: 1; 2 INJECTION, POWDER, FOR SOLUTION INTRAMUSCULAR; INTRAVENOUS at 20:53

## 2024-07-12 RX ADMIN — ATORVASTATIN CALCIUM 10 MG: 10 TABLET, FILM COATED ORAL at 23:13

## 2024-07-12 ASSESSMENT — FIBROSIS 4 INDEX: FIB4 SCORE: 0.78

## 2024-07-12 ASSESSMENT — ENCOUNTER SYMPTOMS
DIARRHEA: 0
SHORTNESS OF BREATH: 1
PHOTOPHOBIA: 0
ABDOMINAL PAIN: 0
EYE PAIN: 0
COUGH: 1
STRIDOR: 0
WHEEZING: 0
HEMOPTYSIS: 0
DIZZINESS: 0
CHILLS: 0
PALPITATIONS: 0
HEADACHES: 0
INSOMNIA: 0
SPUTUM PRODUCTION: 1
HALLUCINATIONS: 0
NAUSEA: 0
CLAUDICATION: 0
NECK PAIN: 0
FEVER: 0
EYE DISCHARGE: 0
SINUS PAIN: 0
BACK PAIN: 0
NERVOUS/ANXIOUS: 0
DOUBLE VISION: 0
VOMITING: 0
DEPRESSION: 0
ORTHOPNEA: 0

## 2024-07-12 ASSESSMENT — PAIN DESCRIPTION - PAIN TYPE: TYPE: ACUTE PAIN

## 2024-07-12 ASSESSMENT — LIFESTYLE VARIABLES: SUBSTANCE_ABUSE: 0

## 2024-07-13 ENCOUNTER — APPOINTMENT (OUTPATIENT)
Dept: RADIOLOGY | Facility: MEDICAL CENTER | Age: 79
DRG: 186 | End: 2024-07-13
Attending: INTERNAL MEDICINE
Payer: MEDICARE

## 2024-07-13 LAB
APPEARANCE UR: CLEAR
BILIRUB UR QL STRIP.AUTO: NEGATIVE
COLOR UR: YELLOW
GLUCOSE UR STRIP.AUTO-MCNC: NEGATIVE MG/DL
KETONES UR STRIP.AUTO-MCNC: NEGATIVE MG/DL
LEUKOCYTE ESTERASE UR QL STRIP.AUTO: NEGATIVE
MICRO URNS: NORMAL
NITRITE UR QL STRIP.AUTO: NEGATIVE
PH UR STRIP.AUTO: 6 [PH] (ref 5–8)
PROCALCITONIN SERPL-MCNC: <0.05 NG/ML
PROT UR QL STRIP: NEGATIVE MG/DL
RBC UR QL AUTO: NEGATIVE
SP GR UR STRIP.AUTO: 1.01
UROBILINOGEN UR STRIP.AUTO-MCNC: 0.2 MG/DL

## 2024-07-13 PROCEDURE — 700102 HCHG RX REV CODE 250 W/ 637 OVERRIDE(OP): Performed by: STUDENT IN AN ORGANIZED HEALTH CARE EDUCATION/TRAINING PROGRAM

## 2024-07-13 PROCEDURE — 81003 URINALYSIS AUTO W/O SCOPE: CPT

## 2024-07-13 PROCEDURE — 92610 EVALUATE SWALLOWING FUNCTION: CPT

## 2024-07-13 PROCEDURE — 76604 US EXAM CHEST: CPT

## 2024-07-13 PROCEDURE — 97602 WOUND(S) CARE NON-SELECTIVE: CPT

## 2024-07-13 PROCEDURE — 87086 URINE CULTURE/COLONY COUNT: CPT

## 2024-07-13 PROCEDURE — 700105 HCHG RX REV CODE 258: Performed by: STUDENT IN AN ORGANIZED HEALTH CARE EDUCATION/TRAINING PROGRAM

## 2024-07-13 PROCEDURE — 99233 SBSQ HOSP IP/OBS HIGH 50: CPT | Performed by: INTERNAL MEDICINE

## 2024-07-13 PROCEDURE — A9270 NON-COVERED ITEM OR SERVICE: HCPCS | Performed by: STUDENT IN AN ORGANIZED HEALTH CARE EDUCATION/TRAINING PROGRAM

## 2024-07-13 PROCEDURE — 770020 HCHG ROOM/CARE - TELE (206)

## 2024-07-13 PROCEDURE — 700111 HCHG RX REV CODE 636 W/ 250 OVERRIDE (IP): Mod: JZ | Performed by: STUDENT IN AN ORGANIZED HEALTH CARE EDUCATION/TRAINING PROGRAM

## 2024-07-13 PROCEDURE — 700101 HCHG RX REV CODE 250: Performed by: STUDENT IN AN ORGANIZED HEALTH CARE EDUCATION/TRAINING PROGRAM

## 2024-07-13 RX ADMIN — BACLOFEN 10 MG: 10 TABLET ORAL at 20:10

## 2024-07-13 RX ADMIN — SPIRONOLACTONE 25 MG: 25 TABLET ORAL at 05:37

## 2024-07-13 RX ADMIN — AMITRIPTYLINE HYDROCHLORIDE 100 MG: 100 TABLET, FILM COATED ORAL at 20:10

## 2024-07-13 RX ADMIN — DULOXETINE HYDROCHLORIDE 30 MG: 30 CAPSULE, DELAYED RELEASE ORAL at 05:37

## 2024-07-13 RX ADMIN — METOPROLOL TARTRATE 25 MG: 25 TABLET, FILM COATED ORAL at 16:21

## 2024-07-13 RX ADMIN — AZITHROMYCIN 500 MG: 500 INJECTION, POWDER, LYOPHILIZED, FOR SOLUTION INTRAVENOUS at 20:12

## 2024-07-13 RX ADMIN — APIXABAN 5 MG: 5 TABLET, FILM COATED ORAL at 16:21

## 2024-07-13 RX ADMIN — FUROSEMIDE 40 MG: 10 INJECTION, SOLUTION INTRAVENOUS at 05:37

## 2024-07-13 RX ADMIN — AMPICILLIN AND SULBACTAM 3 G: 1; 2 INJECTION, POWDER, FOR SOLUTION INTRAMUSCULAR; INTRAVENOUS at 23:48

## 2024-07-13 RX ADMIN — PREGABALIN 150 MG: 150 CAPSULE ORAL at 16:21

## 2024-07-13 RX ADMIN — PREGABALIN 150 MG: 150 CAPSULE ORAL at 05:37

## 2024-07-13 RX ADMIN — METOPROLOL TARTRATE 25 MG: 25 TABLET, FILM COATED ORAL at 05:40

## 2024-07-13 RX ADMIN — ATORVASTATIN CALCIUM 10 MG: 10 TABLET, FILM COATED ORAL at 20:10

## 2024-07-13 RX ADMIN — TAMSULOSIN HYDROCHLORIDE 0.4 MG: 0.4 CAPSULE ORAL at 20:10

## 2024-07-13 RX ADMIN — APIXABAN 5 MG: 5 TABLET, FILM COATED ORAL at 05:37

## 2024-07-13 RX ADMIN — AMPICILLIN AND SULBACTAM 3 G: 1; 2 INJECTION, POWDER, FOR SOLUTION INTRAMUSCULAR; INTRAVENOUS at 16:21

## 2024-07-13 RX ADMIN — AMPICILLIN AND SULBACTAM 3 G: 1; 2 INJECTION, POWDER, FOR SOLUTION INTRAMUSCULAR; INTRAVENOUS at 02:50

## 2024-07-13 RX ADMIN — AMPICILLIN AND SULBACTAM 3 G: 1; 2 INJECTION, POWDER, FOR SOLUTION INTRAMUSCULAR; INTRAVENOUS at 11:39

## 2024-07-13 ASSESSMENT — ENCOUNTER SYMPTOMS
SHORTNESS OF BREATH: 1
COUGH: 1
EYE REDNESS: 0
BLOOD IN STOOL: 0
DIARRHEA: 0
ABDOMINAL PAIN: 0
CONSTIPATION: 0
DIZZINESS: 0
FEVER: 0
CHILLS: 0
EYE PAIN: 0
SEIZURES: 0
NAUSEA: 0
HEMOPTYSIS: 0
MYALGIAS: 0
HEADACHES: 0
WHEEZING: 0
NERVOUS/ANXIOUS: 0
TREMORS: 0
FOCAL WEAKNESS: 0
VOMITING: 0
INSOMNIA: 0
PALPITATIONS: 0
FALLS: 0
LOSS OF CONSCIOUSNESS: 0
WEAKNESS: 0

## 2024-07-13 ASSESSMENT — COGNITIVE AND FUNCTIONAL STATUS - GENERAL
HELP NEEDED FOR BATHING: TOTAL
MOBILITY SCORE: 8
DRESSING REGULAR UPPER BODY CLOTHING: A LOT
TOILETING: A LOT
TURNING FROM BACK TO SIDE WHILE IN FLAT BAD: A LOT
DRESSING REGULAR LOWER BODY CLOTHING: A LOT
MOVING TO AND FROM BED TO CHAIR: TOTAL
EATING MEALS: A LITTLE
STANDING UP FROM CHAIR USING ARMS: TOTAL
DAILY ACTIVITIY SCORE: 13
SUGGESTED CMS G CODE MODIFIER MOBILITY: CM
SUGGESTED CMS G CODE MODIFIER DAILY ACTIVITY: CL
PERSONAL GROOMING: A LITTLE
MOVING FROM LYING ON BACK TO SITTING ON SIDE OF FLAT BED: A LOT
WALKING IN HOSPITAL ROOM: TOTAL
CLIMB 3 TO 5 STEPS WITH RAILING: TOTAL

## 2024-07-13 ASSESSMENT — FIBROSIS 4 INDEX
FIB4 SCORE: 0.46
FIB4 SCORE: 0.46

## 2024-07-13 ASSESSMENT — PAIN DESCRIPTION - PAIN TYPE
TYPE: ACUTE PAIN
TYPE: ACUTE PAIN
TYPE: CHRONIC PAIN

## 2024-07-13 NOTE — H&P
Hospital Medicine History & Physical Note    Date of Service  7/12/2024    Primary Care Physician  Michelle Jim P.A.-C.      Code Status  Prior    Chief Complaint  Chief Complaint   Patient presents with    Shortness of Breath     Increased SOB since 7/6, pt has oxygen tank at home and has had to titrate oxygen up to 2L this week.     Cough     2-3 weeks, non-productive. Endorses sore throat    Denies fevers       History of Presenting Illness  Tereza Sánchez is a 78 y.o. female who presented 7/12/2024 with worsening shortness of breath and difficulty swallowing.  Patient has a possible history of Chiari formation status post event ventriculoperitoneal shunt, cervical Amillia, sleep apnea, chronic hypoxic respiratory failure requiring 2 L supplemental oxygen, obstructive sleep apnea, hypertension, hyperlipidemia, and known history of breast cancer, who now presents emergency department complaining of worsening shortness of breath.  Patient is nonambulatory, but states that while at rest, she is more short of breath.  She does endorse orthopnea as well.  This been going on for several days now.  At baseline she does require 2 L supplemental oxygen, but states that she had increased oxygen requirements up to 3 L.  She denies any fevers or chills, but she has been experiencing a cough that is productive in nature.  In the emergency department, chest x-ray was obtained showing a large right-sided pleural effusion with concern for underlying infection.  Patient apparently given a dose of Unasyn while in the emergency department.  Patient will be admitted for acute on chronic hypoxic respiratory failure with large sided pleural effusion    I discussed the plan of care with patient and family.    Review of Systems  Review of Systems   Constitutional:  Negative for chills and fever.   HENT:  Negative for congestion, ear discharge, ear pain, nosebleeds, sinus pain and tinnitus.    Eyes:  Negative for double vision,  photophobia, pain and discharge.   Respiratory:  Positive for cough, sputum production and shortness of breath. Negative for hemoptysis, wheezing and stridor.    Cardiovascular:  Negative for chest pain, palpitations, orthopnea, claudication and leg swelling.   Gastrointestinal:  Negative for abdominal pain, diarrhea, nausea and vomiting.   Genitourinary:  Negative for frequency, hematuria and urgency.   Musculoskeletal:  Negative for back pain and neck pain.   Neurological:  Negative for dizziness and headaches.   Psychiatric/Behavioral:  Negative for depression, hallucinations, substance abuse and suicidal ideas. The patient is not nervous/anxious and does not have insomnia.    All other systems reviewed and are negative.      Past Medical History   has a past medical history of Anesthesia, Arthritis, Asthma, Bowel habit changes, Breath shortness, Cancer ([facility]), Chiari malformation (1970's), Chickenpox, Chronic back pain, Contracture of hand joint, Decreased lung capacity, Dental disorder, Disorder of thyroid, Heart burn, High cholesterol, Hypertension, Indigestion, Joint replacement, Obesity, Pain, Pain (08/06/2018), Peripheral edema (06/06/2013), Pneumonia, PONV (postoperative nausea and vomiting), Psychiatric problem, Scoliosis, Shortness of breath (08/06/2018), Sleep apnea, Sleep apnea (08/07/2018), Snoring, sore throat (01/15/2015), Supplemental oxygen dependent, and Syringomyelia ([facility]).    Surgical History   has a past surgical history that includes rubin by laparoscopy (2002); shoulder arthroplasty total (2004); hip arthroplasty total (5/19/08); hip arthroplasty total; us-needle core bx-breast panel; node biopsy sentinel (2/6/2015); thyroid lobectomy (Left, 8/17/2018); thyroidectomy total (8/17/2018); laminotomy; hip replacement, total; cyst excision (1973); shunt insertion; mastectomy (2/6/2015); hip revision total (Left, 9/2/2021); other abdominal surgery; and pr reconstr total shoulder implant (Left,  "5/4/2022).     Family History  family history includes Hypertension in her mother; Sleep Apnea in her brother.   Family history reviewed with patient. There is no family history that is pertinent to the chief complaint.     Social History   reports that she has never smoked. She has never used smokeless tobacco. She reports that she does not drink alcohol and does not use drugs.    Allergies  Allergies   Allergen Reactions    Sulfamethoxazole W-Trimethoprim Rash     * full body rash*>  10 years ago    Morphine Vomiting     hallucinations       Medications  Prior to Admission Medications   Prescriptions Last Dose Informant Patient Reported? Taking?   DULoxetine (CYMBALTA) 30 MG Cap DR Particles 7/12/2024 at am Family Member No Yes   Sig: Take 1 Capsule by mouth every day. Indications: Major Depressive Disorder   Esomeprazole Magnesium 20 MG Tablet Delayed Response 7/12/2024 at am Family Member No Yes   Sig: Take 20 mg by mouth 1 time a day as needed (heartburn ). Indications: Heartburn   QUEtiapine (SEROQUEL) 25 MG Tab 7/9/2024 at prn Family Member Yes No   Sig: Take 25 mg by mouth at bedtime as needed (sleep). Indications: sleep   acetaminophen (TYLENOL) 500 MG Tab 7/12/2024 at am Family Member Yes Yes   Sig: Take 500 mg by mouth every 6 hours as needed for Mild Pain or Moderate Pain. Indications: Pain   alendronate (FOSAMAX) 70 MG Tab 7/8/2024 at unk Family Member Yes No   Sig: Take 70 mg by mouth every Monday. Indications: Osteoporosis   amitriptyline (ELAVIL) 100 MG Tab 7/11/2024 at pm Family Member Yes Yes   Sig: Take 100 mg by mouth every evening. Indications: \"pain\"   apixaban (ELIQUIS) 5mg Tab 7/12/2024 at am Family Member No Yes   Sig: Take 1 Tablet by mouth 2 times a day. Indications: DVT/PE   atorvastatin (LIPITOR) 10 MG Tab 7/11/2024 at pm Family Member No Yes   Sig: TAKE 1 TABLET BY MOUTH EVERY EVENING. CHOLESTEROL   baclofen (LIORESAL) 10 MG Tab 7/12/2024 at am Family Member Yes Yes   Sig: Take 10 mg by " mouth 2 times a day as needed (spasms). Indications: Muscle Spasm   ferrous sulfate 325 (65 Fe) MG tablet 7/11/2024 at am Family Member No Yes   Sig: Take 1 Tablet by mouth every 48 hours.   furosemide (LASIX) 20 MG Tab 7/12/2024 at am Family Member Yes Yes   Sig: Take 20 mg by mouth every day. Hold for SBP <100  Indications: Edema   metoprolol tartrate (LOPRESSOR) 25 MG Tab 7/12/2024 at am Family Member No Yes   Sig: Take 1 Tablet by mouth 2 times a day.   Patient taking differently: Take 25 mg by mouth 2 times a day as needed (blood pressure). Indications: Atrial Fibrillation, High Blood Pressure Disorder   nystatin (NYSTOP) powder prn at unk Family Member Yes No   Sig: Apply 1 Application  topically 1 time a day as needed (rash). Indications: Skin Infection due to Candida Yeast   ondansetron (ZOFRAN ODT) 4 MG TABLET DISPERSIBLE prn at unk Family Member Yes No   Sig: Take 4 mg by mouth every four hours as needed for Nausea/Vomiting. Indications: Nausea and Vomiting   phosphorus (K-PHOS-NEUTRAL) 250 MG tablet 7/12/2024 at am Family Member No Yes   Sig: Take 1 Tablet by mouth 2 times a day.   potassium chloride SA (KDUR) 20 MEQ Tab CR 7/12/2024 at am Family Member Yes Yes   Sig: Take 20 mEq by mouth every day. Hold if lasix is held   Indications: takes with Lasix   pregabalin (LYRICA) 150 MG Cap 7/12/2024 at am Family Member Yes Yes   Sig: Take 150 mg by mouth 2 times a day. Indications: Neuropathic Pain   senna-docusate (PERICOLACE OR SENOKOT S) 8.6-50 MG Tab 7/11/2024 at hs Family Member No Yes   Sig: Take 1 Tablet by mouth at bedtime. Indications: Constipation   spironolactone (ALDACTONE) 25 MG Tab 7/12/2024 at am Family Member No Yes   Sig: Take 1 Tablet by mouth every day.   tamsulosin (FLOMAX) 0.4 MG capsule 7/11/2024 at hs Family Member Yes Yes   Sig: Take 0.4 mg by mouth at bedtime. Indications: Obstruction of Bladder Outflow      Facility-Administered Medications: None       Physical Exam  Temp:  [36.8 °C  (98.2 °F)] 36.8 °C (98.2 °F)  Pulse:  [109-117] 109  Resp:  [13-20] 20  BP: (128-146)/(87-98) 128/87  SpO2:  [88 %-100 %] 96 %  Blood Pressure : 128/87   Temperature: 36.8 °C (98.2 °F)   Pulse: (!) 109   Respiration: 20   Pulse Oximetry: 96 %       Physical Exam  Constitutional:       General: She is not in acute distress.     Appearance: Normal appearance. She is normal weight. She is not ill-appearing, toxic-appearing or diaphoretic.   HENT:      Head: Normocephalic and atraumatic.      Nose: Nose normal.      Mouth/Throat:      Mouth: Mucous membranes are moist.   Eyes:      Extraocular Movements: Extraocular movements intact.      Pupils: Pupils are equal, round, and reactive to light.   Cardiovascular:      Rate and Rhythm: Normal rate and regular rhythm.      Pulses: Normal pulses.      Heart sounds: Normal heart sounds. No murmur heard.     No friction rub. No gallop.   Pulmonary:      Effort: Pulmonary effort is normal. No respiratory distress.      Breath sounds: No stridor. No wheezing, rhonchi or rales.   Chest:      Chest wall: No tenderness.   Abdominal:      General: Abdomen is flat. There is no distension.      Palpations: Abdomen is soft. There is no mass.      Tenderness: There is no abdominal tenderness. There is no guarding or rebound.      Hernia: No hernia is present.   Musculoskeletal:         General: No swelling, tenderness, deformity or signs of injury.      Right lower leg: No edema.      Left lower leg: No edema.      Comments:      Skin:     General: Skin is warm and dry.      Capillary Refill: Capillary refill takes less than 2 seconds.      Coloration: Skin is not jaundiced or pale.      Findings: No bruising, erythema, lesion or rash.   Neurological:      General: No focal deficit present.      Mental Status: She is alert and oriented to person, place, and time. Mental status is at baseline.      Cranial Nerves: No cranial nerve deficit.      Sensory: No sensory deficit.      Motor: No  "weakness.      Coordination: Coordination normal.   Psychiatric:         Mood and Affect: Mood normal.         Behavior: Behavior normal.         Laboratory:  Recent Labs     07/12/24  1824   WBC 10.6   RBC 4.98   HEMOGLOBIN 13.7   HEMATOCRIT 43.5   MCV 87.3   MCH 27.5   MCHC 31.5*   RDW 51.4*   PLATELETCT 532*   MPV 10.7     Recent Labs     07/12/24  1824   SODIUM 141   POTASSIUM 3.8   CHLORIDE 103   CO2 25   GLUCOSE 124*   BUN 22   CREATININE 0.64   CALCIUM 9.0     Recent Labs     07/12/24  1824   ALTSGPT 31   ASTSGOT 18   ALKPHOSPHAT 96   TBILIRUBIN 0.4   GLUCOSE 124*         No results for input(s): \"NTPROBNP\" in the last 72 hours.      No results for input(s): \"TROPONINT\" in the last 72 hours.    Imaging:  DX-CHEST-PORTABLE (1 VIEW)   Final Result      Moderate to large sized right-sided pleural effusion with associated compressive atelectasis and or consolidation. Underlying infection is possible.          X-Ray:  I have personally reviewed the images and compared with prior images.  EKG:  I have personally reviewed the images and compared with prior images.    Assessment/Plan:  Justification for Admission Status  I anticipate this patient will require at least two midnights for appropriate medical management, necessitating inpatient admission because acute on chronic hypoxic respiratory failure    Patient will need a Telemetry bed on MEDICAL service .  The need is secondary to acute on chronic hypoxic respiratory failure.    * Acute on chronic hypoxic respiratory failure (HCC)- (present on admission)  Assessment & Plan  At baseline, patient requires 2 L supplemental oxygen.  There is some underlying obstructive sleep apnea at baseline.  She did need to uptitrate her oxygen at home.  Currently requiring 3 L supplemental oxygen  CXR: Moderate to large sized right-sided pleural effusion with associated compressive atelectasis and or consolidation. Underlying infection is possible.   40 mg IV Lasix twice " daily  Patient will need monitoring with daily labs  Continue home dose Aldactone  Follow-up procalcitonin levels and sputum cultures  Empirically started on Unasyn and azithromycin IV.  De-escalate when appropriate      Pleural effusion  Assessment & Plan  CXR: Moderate to large size right sided pleural effusion with associated compressive atelectasis and or consolidation.  There is some possibility of underlying infection.  Empirically starting the patient on IV Unasyn and azithromycin  Follow-up procalcitonin level  Follow-up sputum culture  Lasix 40 mg IV twice daily  Given her aggressive diuresis, patient will need close monitoring with daily labs  Day team consult pulmonology service if needed/if no improvement    Paroxysmal atrial fibrillation with RVR ([facility])- (present on admission)  Assessment & Plan  During her previous hospitalization back in May 2024.  She was going in and out of atrial fibrillation with RVR.  She was discharged on metoprolol.    Patient is currently on anticoagulation given her history of DVTs    Acute deep vein thrombosis (DVT) of proximal vein of right lower extremity (HCC)- (present on admission)  Assessment & Plan  Resume home dose Apixiban     Chronic respiratory failure with hypoxia (HCC)- (present on admission)  Assessment & Plan  Patient states that she does use 2 L supplemental oxygen at baseline.  Underlying component of obstructive sleep apnea        VTE prophylaxis: therapeutic anticoagulation with apixiban

## 2024-07-13 NOTE — WOUND TEAM
[facility]  Inpatient Services  Wound and Skin Care Brief Evaluation    Admission Date: 7/12/2024     Last order of IP CONSULT TO WOUND CARE was found on 7/13/2024 from Hospital Encounter on 7/12/2024     HPI, PMH, SH: Reviewed    Chief Complaint   Patient presents with    Shortness of Breath     Increased SOB since 7/6, pt has oxygen tank at home and has had to titrate oxygen up to 2L this week.     Cough     2-3 weeks, non-productive. Endorses sore throat    Denies fevers     Diagnosis: Acute on chronic hypoxic respiratory failure (HCC) [J96.21]    Unit where seen by Wound Team: T832/02     Wound consult placed regarding bilateral upper extremities, sacrum, back, and abdomen. Chart and images reviewed. This clinician in to assess patient. Patient pleasant and agreeable. Sacrum, and bilateral heels are intact and blanching. The patient has a scab and redness to the R elbow that is being offloaded with silicone dressings. The L elbow with small skin tear, bedside nursing already have appropriate dressings in place. BUE's are intact, without open wounds. The pannus is intact, with small moisture. Interdry cloth already in place by bedside nursing. New interdry cloth placed. The patient's back was intact, with small scab being offloaded by silicone adhesive. Non-selectively debrided with Moist warm washcloth.     No pressure injuries or advanced wound care needs identified. Wound consult completed. No further follow up unless indicated and consulted.      LUE  RUE  R elbow      L heel  R heel      Pannus  Sacrum  Wound 07/13/24 Skin Tear Arm Posterior Left (Active)   Date First Assessed/Time First Assessed: 07/13/24 0136   Present on Original Admission: Yes  Hand Hygiene Completed: Yes  Primary Wound Type: Skin Tear  Location: Arm  Wound Orientation: Posterior  Laterality: Left      Assessments 7/13/2024 12:00 PM   Wound Image     Site Assessment Red;Yellow   Periwound Assessment Ecchymosis   Margins  Attached edges   Closure Adhesive bandage   Drainage Amount None   Treatments Offloading   Offloading/DME Other (comment)   Periwound Protectant Mepitel   Dressing Status Clean;Dry;Intact   Dressing Changed Observed   Dressing Options Mepitel One   Dressing Change/Treatment Frequency Weekly, and As Needed   Wound Team Following Not following       PREVENTATIVE INTERVENTIONS:    Q shift Logan - performed per nursing policy  Q shift pressure point assessments - performed per nursing policy    Surface/Positioning  Standard/trauma mattress - Currently in Place  Reposition q 2 hours - Patient on R side turned with a pillow    Offloading/Redistribution  Sacral offloading dressing (Silicone dressing) - Currently in Place  Heel offloading dressing (Silicone dressing) - Currently in Place  Float Heels off Bed with Pillows - Currently in Place           Respiratory  Silicone O2 tubing - Currently in Place  Gray Foam Ear protectors - Currently in Place    Containment/Moisture Prevention    Dri-yohannes pad - Currently in Place  Purwick/Condom Cath - Currently in Place  Barrier paste - Currently in Place

## 2024-07-13 NOTE — ASSESSMENT & PLAN NOTE
Patient states that she does use 2 L supplemental oxygen at baseline.  Underlying component of obstructive sleep apnea

## 2024-07-13 NOTE — PROGRESS NOTES
4 Eyes Skin Assessment Completed by JIMMY Young and JIMMY Villalta.    Head WDL  Ears WDL  Nose WDL  Mouth WDL  Neck WDL  Breast/Chest bruising, open wound, mepitel placed   Shoulder Blades WDL, bilateral incision   Spine Redness and Blanching, scab upper back, mepilex placed   (R) Arm/Elbow/Hand Redness and Blanching, mepilex placed.   (L) Arm/Elbow/Hand Redness and Blanching, mepilex placed. Open wound, mepitel placed.   Abdomen Bruising, excoration panus area, interdry placed   Groin Redness and Excoriation  Scrotum/Coccyx/Buttocks Redness and Blanching  (R) Leg Scab  (L) Leg Scab  (R) Heel/Foot/Toe Blanching and Boggy  (L) Heel/Foot/Toe Blanching and Boggy          Devices In Places Tele Box, Blood Pressure Cuff, Pulse Ox, and Nasal Cannula      Interventions In Place Gray Ear Foams, InterDry, Heel Mepilex, Pillows, Elbow Mepilex, Q2 Turns, and Barrier Cream    Possible Skin Injury Yes    Pictures Uploaded Into Epic Yes  Wound Consult Placed Yes  RN Wound Prevention Protocol Ordered Yes                                                                           CT demonstrated -Status post left common and external iliac vein stenting with complete occlusion, likely chronic. 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 Numerous extensive collateral vessels in the retroperitoneum, pelvis, and paraspinal regions and prominent superficial collaterals of the abdominal wall.

## 2024-07-13 NOTE — ED NOTES
Medication history reviewed with patients  at bedside.   Med rec is complete  Allergies reviewed.     Patient has not had any outpatient antibiotics in the last 30 days.   Anticoagulants:Eliquis 5mg- last dose 7/12/24 am.    Montrell Deleon PhT

## 2024-07-13 NOTE — DISCHARGE PLANNING
HTH/SCP TCN chart review completed. The most current review of medical record, knowledge of pt's PLOF and social support, LACE+ score of 72, 6 clicks scores of 8 mobility were considered.      Pt seen at bedside with spouse present. Introduced TCN program. Provided education regarding post acute levels of care. Education provided regarding case management policy for blanket SNF referrals. Discussed HTH/SCP plan benefits. Pt verbalizes understanding.     Note that pt and spouse are anticipating pt to dc to home once medically cleared. Pt has been to post acute placement in the past and pt and spouse preference is for dc to home with spouse assistance (which appears to essentially be pt's functional baseline).  Pt and spouse report having all appropriate ADs for dc to home plan as well. Note that spouse reports assisting pt with wc transfers and very short distances of ambulation. Note that earlier in year, pt had dc'd from [facility] to [facility] and then to home with [facility] where per review and spouse report, appears pt functionally had reached a plateau and was dc'd from [facility] services. Pt was essentially requiring assistance from spouse for mobility at that time. Spouse and pt report they currently do not see indication for resumption of prior HH services.    Note that spouse reports he does require assistance from his son to assist pt with entry into her home via wc, up several stairs. Per spouse, son is available after 2:30PM most days and reports pt would likely require specialized/gurney transport to home if his son is not available to assist at time of dc/medical clearance.    No additional provider requests at this time, though pt may require therapy consults if she has a functional decline while in [facility] (TCN will monitor as well). Given aforementioned, no choice obtained per pt/spouse request. Current dc planning considerations are anticipated for dc to home with spouse assistance and outpatient follow ups as  indicated. (Spouse and pt report they will consider HH services/providing choice if indicated closer to time of dc/medical clearance).  will continue to follow and collaborate with discharge planning team as additional post acute needs arise. Thank you.     Completed today:  Choice obtained: none currently indicated; see above; note that pt and spouse current preference is for eventual dc to home with outpatient follow ups if indicated  SCP with [facility]. discussed possible outpatient transitional PCP follow up if indicated and pt in agreement; sent information to assist in scheduling

## 2024-07-13 NOTE — PROCEDURES
Fluid check performed bedside.  Per PÉREZ Rubio, there is not enough fluid to safely perform a RT Thoracentesis at this time. Should the this be required by the DR, it will need to be ordered through IR.    Provided RN post procedure report via text.
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
## 2024-07-13 NOTE — ASSESSMENT & PLAN NOTE
Ultrasound guided right sided diagnostic thoracentesis, 250 mL of fluid withdrawn   7/15/224  Unasyn  Finished course of azithromycin  Stop IV Lasix  Follow fluid culture and cytology

## 2024-07-13 NOTE — ED TRIAGE NOTES
Chief Complaint   Patient presents with    Shortness of Breath     Increased SOB since 7/6, pt has oxygen tank at home and has had to titrate oxygen up to 2L this week.     Cough     2-3 weeks, non-productive. Endorses sore throat    Denies fevers     Pt in wheelchair to triage for above complaint.      Pt is alert/oriented and follows commands. Pt speaking in full sentences and responds appropriately to questions. No acute distress noted in triage and respirations are even and unlabored.     Respiratory swab performed. Pt placed in hallway for blood draw and educated on triage process. Pt encouraged to alert staff for any changes in condition.

## 2024-07-13 NOTE — ED PROVIDER NOTES
ED Provider Note    CHIEF COMPLAINT  Chief Complaint   Patient presents with    Shortness of Breath     Increased SOB since 7/6, pt has oxygen tank at home and has had to titrate oxygen up to 2L this week.     Cough     2-3 weeks, non-productive. Endorses sore throat    Denies fevers       EXTERNAL RECORDS REVIEWED  Inpatient Notes discharge summary 5/10/2024 reviewed.  Patient coming from skilled nursing facility after fall and ultimately identified of having saddle PE with acute cor pulmonale additionally diagnosed with COVID at that time and was intubated during this admission    HPI/ROS  LIMITATION TO HISTORY   Select: : None  OUTSIDE HISTORIAN(S):  Family at bedside providing history    Tereza Sánchez is a 78 y.o. female who presents to the ER with complaint of increasing shortness of breath as well as cough.  Was hospitalized in April of this year secondary to UTI.  She ultimately was discharged to rehab where she then ultimately developed pulmonary embolus.  Now on Eliquis for this.  Over the last few weeks has been having ongoing cough.  Also having difficulty swallowing and therefore difficulty with eating and drinking.  Most notably over the last week to 48 hours has had increasing cough and shortness of breath.  Has had to titrate up home oxygen.  No notable fever or chills.  No known sick contacts.  Family at bedside providing history most notably the     PAST MEDICAL HISTORY   has a past medical history of Anesthesia, Arthritis, Asthma, Bowel habit changes, Breath shortness, Cancer (HCC), Chiari malformation (1970's), Chickenpox, Chronic back pain, Contracture of hand joint, Decreased lung capacity, Dental disorder, Disorder of thyroid, Heart burn, High cholesterol, Hypertension, Indigestion, Joint replacement, Obesity, Pain, Pain (08/06/2018), Peripheral edema (06/06/2013), Pneumonia, PONV (postoperative nausea and vomiting), Psychiatric problem, Scoliosis, Shortness of breath (08/06/2018),  Sleep apnea, Sleep apnea (08/07/2018), Snoring, sore throat (01/15/2015), Supplemental oxygen dependent, and Syringomyelia (HCC).    SURGICAL HISTORY   has a past surgical history that includes rubin by laparoscopy (2002); shoulder arthroplasty total (2004); hip arthroplasty total (5/19/08); hip arthroplasty total; us-needle core bx-breast panel; node biopsy sentinel (2/6/2015); thyroid lobectomy (Left, 8/17/2018); thyroidectomy total (8/17/2018); laminotomy; hip replacement, total; cyst excision (1973); shunt insertion; mastectomy (2/6/2015); hip revision total (Left, 9/2/2021); other abdominal surgery; and reconstr total shoulder implant (Left, 5/4/2022).    FAMILY HISTORY  Family History   Problem Relation Age of Onset    Hypertension Mother     Sleep Apnea Brother     Cancer Neg Hx     Diabetes Neg Hx     Stroke Neg Hx     Heart Disease Neg Hx     Ovarian Cancer Neg Hx     Tubal Cancer Neg Hx     Peritoneal Cancer Neg Hx     Colorectal Cancer Neg Hx     Breast Cancer Neg Hx        SOCIAL HISTORY  Social History     Tobacco Use    Smoking status: Never    Smokeless tobacco: Never   Vaping Use    Vaping status: Never Used   Substance and Sexual Activity    Alcohol use: No     Alcohol/week: 0.0 oz    Drug use: No    Sexual activity: Not Currently     Comment:  - 51 years        CURRENT MEDICATIONS  Home Medications       Reviewed by Nakia Zeng R.N. (Registered Nurse) on 07/12/24 at 1814  Med List Status: Partial     Medication Last Dose Status   acetaminophen (TYLENOL) 500 MG Tab  Active   alendronate (FOSAMAX) 70 MG Tab  Active   amitriptyline (ELAVIL) 100 MG Tab  Active   apixaban (ELIQUIS) 5mg Tab  Active   atorvastatin (LIPITOR) 10 MG Tab  Active   baclofen (LIORESAL) 10 MG Tab  Active   DULoxetine (CYMBALTA) 30 MG Cap DR Particles  Active   Esomeprazole Magnesium 20 MG Tablet Delayed Response  Active   ferrous sulfate 325 (65 Fe) MG tablet  Active   furosemide (LASIX) 20 MG Tab  Active    Home Care Oxygen  Active   metoprolol tartrate (LOPRESSOR) 25 MG Tab  Active   nystatin (NYSTOP) powder  Active   ondansetron (ZOFRAN ODT) 4 MG TABLET DISPERSIBLE  Active   phosphorus (K-PHOS-NEUTRAL) 250 MG tablet  Active   potassium chloride SA (KDUR) 20 MEQ Tab CR  Active   pregabalin (LYRICA) 150 MG Cap  Active   QUEtiapine (SEROQUEL) 25 MG Tab  Active   senna-docusate (PERICOLACE OR SENOKOT S) 8.6-50 MG Tab  Active   Sennosides 25 MG Tab  Active   spironolactone (ALDACTONE) 25 MG Tab  Active   tamsulosin (FLOMAX) 0.4 MG capsule  Active                  Audit from Redirected Encounters    **Home medications have not yet been reviewed for this encounter**         ALLERGIES  Allergies   Allergen Reactions    Sulfamethoxazole W-Trimethoprim Rash     * full body rash*>  10 years ago    Morphine Vomiting     hallucinations       PHYSICAL EXAM  VITAL SIGNS: /87   Pulse (!) 109   Temp 36.8 °C (98.2 °F) (Temporal)   Resp 20   Ht 1.524 m (5')   Wt 77 kg (169 lb 12.1 oz)   SpO2 96%   BMI 33.15 kg/m²          Pulse ox interpretation: I interpret this pulse ox as normal.  Constitutional: Alert in no apparent distress.  HENT: No signs of trauma, Bilateral external ears normal, Nose normal.   Eyes: Pupils are equal and reactive  Neck: Normal range of motion, No tenderness, Supple  Cardiovascular: Regular rate and rhythm, no murmurs.   Thorax & Lungs: Normal breath sounds, No respiratory distress diminished in bases right greater than left  Abdomen: Bowel sounds normal, Soft, No tenderness  Skin: Warm, Dry, No erythema, No rash.   Musculoskeletal: Good range of motion in all major joints. No tenderness to palpation or major deformities noted.   Neurologic: Alert , Normal motor function, Normal sensory function, No focal deficits noted.   Psychiatric: Affect normal, Judgment normal, Mood normal.         EKG/LABS  Results for orders placed or performed during the hospital encounter of 07/12/24   CBC with  Differential   Result Value Ref Range    WBC 10.6 4.8 - 10.8 K/uL    RBC 4.98 4.20 - 5.40 M/uL    Hemoglobin 13.7 12.0 - 16.0 g/dL    Hematocrit 43.5 37.0 - 47.0 %    MCV 87.3 81.4 - 97.8 fL    MCH 27.5 27.0 - 33.0 pg    MCHC 31.5 (L) 32.2 - 35.5 g/dL    RDW 51.4 (H) 35.9 - 50.0 fL    Platelet Count 532 (H) 164 - 446 K/uL    MPV 10.7 9.0 - 12.9 fL    Neutrophils-Polys 79.20 (H) 44.00 - 72.00 %    Lymphocytes 7.50 (L) 22.00 - 41.00 %    Monocytes 10.90 0.00 - 13.40 %    Eosinophils 0.80 0.00 - 6.90 %    Basophils 0.80 0.00 - 1.80 %    Immature Granulocytes 0.80 0.00 - 0.90 %    Nucleated RBC 0.00 0.00 - 0.20 /100 WBC    Neutrophils (Absolute) 8.41 (H) 1.82 - 7.42 K/uL    Lymphs (Absolute) 0.80 (L) 1.00 - 4.80 K/uL    Monos (Absolute) 1.16 (H) 0.00 - 0.85 K/uL    Eos (Absolute) 0.09 0.00 - 0.51 K/uL    Baso (Absolute) 0.09 0.00 - 0.12 K/uL    Immature Granulocytes (abs) 0.08 0.00 - 0.11 K/uL    NRBC (Absolute) 0.00 K/uL   Comp Metabolic Panel   Result Value Ref Range    Sodium 141 135 - 145 mmol/L    Potassium 3.8 3.6 - 5.5 mmol/L    Chloride 103 96 - 112 mmol/L    Co2 25 20 - 33 mmol/L    Anion Gap 13.0 7.0 - 16.0    Glucose 124 (H) 65 - 99 mg/dL    Bun 22 8 - 22 mg/dL    Creatinine 0.64 0.50 - 1.40 mg/dL    Calcium 9.0 8.5 - 10.5 mg/dL    Correct Calcium 9.5 8.5 - 10.5 mg/dL    AST(SGOT) 18 12 - 45 U/L    ALT(SGPT) 31 2 - 50 U/L    Alkaline Phosphatase 96 30 - 99 U/L    Total Bilirubin 0.4 0.1 - 1.5 mg/dL    Albumin 3.4 3.2 - 4.9 g/dL    Total Protein 6.9 6.0 - 8.2 g/dL    Globulin 3.5 1.9 - 3.5 g/dL    A-G Ratio 1.0 g/dL   ESTIMATED GFR   Result Value Ref Range    GFR (CKD-EPI) 91 >60 mL/min/1.73 m 2   Lactic Acid   Result Value Ref Range    Lactic Acid 1.7 0.5 - 2.0 mmol/L   EKG (NOW)   Result Value Ref Range    Report       [facility] Emergency Dept.    Test Date:  2024-07-12  Pt Name:    ODETTE NICKERSON                  Department: ER  MRN:        [id number]                      Room:  Gender:      Female                       Technician: [id number]  :        [id number]                   Requested By:ER TRIAGE PROTOCOL  Order #:    [id number]                    Reading MD: Tu Lofton    Measurements  Intervals                                Axis  Rate:       114                          P:          57  SD:         133                          QRS:        11  QRSD:       85                           T:          0  QT:         338  QTc:        466    Interpretive Statements  Sinus tachycardia  Atrial premature complex  Low voltage, precordial leads  Borderline T wave abnormalities  Compared to ECG 2024 16:52:34  Atrial premature complex(es) now present  Low QRS voltage now present  T-wave abnormality now present  Atrial fibrillation no longer present  ST (T wave) deviation no longer presen t  Electronically Signed On 2024 22:18:45 PDT by Tu Lofton     POC CoV-2, FLU A/B, RSV by PCR   Result Value Ref Range    POC Influenza A RNA, PCR Negative Negative    POC Influenza B RNA, PCR Negative Negative    POC RSV, by PCR Negative Negative    POC SARS-CoV-2, PCR NotDetected        I have independently interpreted this EKG    RADIOLOGY/PROCEDURES   I have independently interpreted the diagnostic imaging associated with this visit and am waiting the final reading from the radiologist.   My preliminary interpretation is as follows: Right-sided pleural effusion noted    Radiologist interpretation:  DX-CHEST-PORTABLE (1 VIEW)   Final Result      Moderate to large sized right-sided pleural effusion with associated compressive atelectasis and or consolidation. Underlying infection is possible.          COURSE & MEDICAL DECISION MAKING    ASSESSMENT, COURSE AND PLAN  Care Narrative: 78-year-old female presenting to the emerged from with above presentation.  Will complete septic workup at this time.  Will initiate IV fluids and antibiotics as well    DISPOSITION AND DISCUSSIONS  I have discussed management of the  patient with the following physicians and MERCED's: Hospitalist    Discussion of management with other QHP or appropriate source(s): Pharmacy for medication verification      78-year-old female presenting to the emergency room with above presentation.  X-ray imaging showing right-sided pleural effusion with possible underlying infection.  This would correlate with some of the patient's symptoms.  I have reviewed the prior x-ray imaging which did not show previous effusion or mass.  COVID test is negative today.  Not profoundly anemic.  UA still pending.  Will admit to the hospitalist service for ongoing inpatient care and workup    FINAL DIAGNOSIS  1. Pleural effusion associated with pulmonary infection    2. Sepsis, due to unspecified organism, unspecified whether acute organ dysfunction present (HCC)           Electronically signed by: Tu Lofton M.D., 7/12/2024 8:18 PM

## 2024-07-14 ENCOUNTER — APPOINTMENT (OUTPATIENT)
Dept: RADIOLOGY | Facility: MEDICAL CENTER | Age: 79
DRG: 186 | End: 2024-07-14
Attending: INTERNAL MEDICINE
Payer: MEDICARE

## 2024-07-14 LAB
ALBUMIN SERPL BCP-MCNC: 3 G/DL (ref 3.2–4.9)
BACTERIA BLD CULT: ABNORMAL
BACTERIA BLD CULT: ABNORMAL
BASE EXCESS BLDA CALC-SCNC: 8 MMOL/L (ref -4–3)
BODY TEMPERATURE: 36.5 CENTIGRADE
BUN SERPL-MCNC: 16 MG/DL (ref 8–22)
CALCIUM ALBUM COR SERPL-MCNC: 9.7 MG/DL (ref 8.5–10.5)
CALCIUM SERPL-MCNC: 8.9 MG/DL (ref 8.5–10.5)
CHLORIDE SERPL-SCNC: 97 MMOL/L (ref 96–112)
CO2 SERPL-SCNC: 29 MMOL/L (ref 20–33)
CREAT SERPL-MCNC: 0.6 MG/DL (ref 0.5–1.4)
ERYTHROCYTE [DISTWIDTH] IN BLOOD BY AUTOMATED COUNT: 52.2 FL (ref 35.9–50)
GFR SERPLBLD CREATININE-BSD FMLA CKD-EPI: 92 ML/MIN/1.73 M 2
GLUCOSE SERPL-MCNC: 113 MG/DL (ref 65–99)
HCO3 BLDA-SCNC: 33 MMOL/L (ref 17–25)
HCT VFR BLD AUTO: 41.4 % (ref 37–47)
HGB BLD-MCNC: 13 G/DL (ref 12–16)
INHALED O2 FLOW RATE: 6 L/MIN (ref 2–10)
INHALED O2 FLOW RATE: ABNORMAL L/MIN
MCH RBC QN AUTO: 27.8 PG (ref 27–33)
MCHC RBC AUTO-ENTMCNC: 31.4 G/DL (ref 32.2–35.5)
MCV RBC AUTO: 88.7 FL (ref 81.4–97.8)
PCO2 BLDA: 45.4 MMHG (ref 26–37)
PCO2 TEMP ADJ BLDA: 44.4 MMHG (ref 26–37)
PH BLDA: 7.48 [PH] (ref 7.4–7.5)
PH TEMP ADJ BLDA: 7.48 [PH] (ref 7.4–7.5)
PHOSPHATE SERPL-MCNC: 2.7 MG/DL (ref 2.5–4.5)
PLATELET # BLD AUTO: 500 K/UL (ref 164–446)
PMV BLD AUTO: 10.7 FL (ref 9–12.9)
PO2 BLDA: 64.3 MMHG (ref 64–87)
PO2 TEMP ADJ BLDA: 62.1 MMHG (ref 64–87)
POTASSIUM SERPL-SCNC: 3.5 MMOL/L (ref 3.6–5.5)
RBC # BLD AUTO: 4.67 M/UL (ref 4.2–5.4)
SAO2 % BLDA: 92.2 % (ref 93–99)
SIGNIFICANT IND 70042: ABNORMAL
SITE SITE: ABNORMAL
SODIUM SERPL-SCNC: 138 MMOL/L (ref 135–145)
SOURCE SOURCE: ABNORMAL
WBC # BLD AUTO: 11.4 K/UL (ref 4.8–10.8)

## 2024-07-14 PROCEDURE — 700105 HCHG RX REV CODE 258: Performed by: STUDENT IN AN ORGANIZED HEALTH CARE EDUCATION/TRAINING PROGRAM

## 2024-07-14 PROCEDURE — 80069 RENAL FUNCTION PANEL: CPT

## 2024-07-14 PROCEDURE — 700111 HCHG RX REV CODE 636 W/ 250 OVERRIDE (IP): Mod: JZ | Performed by: INTERNAL MEDICINE

## 2024-07-14 PROCEDURE — 700102 HCHG RX REV CODE 250 W/ 637 OVERRIDE(OP): Performed by: STUDENT IN AN ORGANIZED HEALTH CARE EDUCATION/TRAINING PROGRAM

## 2024-07-14 PROCEDURE — 99418 PROLNG IP/OBS E/M EA 15 MIN: CPT | Performed by: INTERNAL MEDICINE

## 2024-07-14 PROCEDURE — 36415 COLL VENOUS BLD VENIPUNCTURE: CPT

## 2024-07-14 PROCEDURE — A9270 NON-COVERED ITEM OR SERVICE: HCPCS | Performed by: STUDENT IN AN ORGANIZED HEALTH CARE EDUCATION/TRAINING PROGRAM

## 2024-07-14 PROCEDURE — 700111 HCHG RX REV CODE 636 W/ 250 OVERRIDE (IP): Mod: JZ | Performed by: STUDENT IN AN ORGANIZED HEALTH CARE EDUCATION/TRAINING PROGRAM

## 2024-07-14 PROCEDURE — 82803 BLOOD GASES ANY COMBINATION: CPT

## 2024-07-14 PROCEDURE — 99497 ADVNCD CARE PLAN 30 MIN: CPT | Performed by: INTERNAL MEDICINE

## 2024-07-14 PROCEDURE — 71045 X-RAY EXAM CHEST 1 VIEW: CPT

## 2024-07-14 PROCEDURE — 85027 COMPLETE CBC AUTOMATED: CPT

## 2024-07-14 PROCEDURE — 770006 HCHG ROOM/CARE - MED/SURG/GYN SEMI*

## 2024-07-14 PROCEDURE — 99233 SBSQ HOSP IP/OBS HIGH 50: CPT | Mod: 25 | Performed by: INTERNAL MEDICINE

## 2024-07-14 RX ADMIN — AMPICILLIN AND SULBACTAM 3 G: 1; 2 INJECTION, POWDER, FOR SOLUTION INTRAMUSCULAR; INTRAVENOUS at 12:34

## 2024-07-14 RX ADMIN — FUROSEMIDE 40 MG: 10 INJECTION, SOLUTION INTRAVENOUS at 17:13

## 2024-07-14 RX ADMIN — PREGABALIN 150 MG: 150 CAPSULE ORAL at 17:11

## 2024-07-14 RX ADMIN — METOPROLOL TARTRATE 25 MG: 25 TABLET, FILM COATED ORAL at 17:11

## 2024-07-14 RX ADMIN — METOPROLOL TARTRATE 25 MG: 25 TABLET, FILM COATED ORAL at 05:02

## 2024-07-14 RX ADMIN — AMPICILLIN AND SULBACTAM 3 G: 1; 2 INJECTION, POWDER, FOR SOLUTION INTRAMUSCULAR; INTRAVENOUS at 17:23

## 2024-07-14 RX ADMIN — DULOXETINE HYDROCHLORIDE 30 MG: 30 CAPSULE, DELAYED RELEASE ORAL at 05:02

## 2024-07-14 RX ADMIN — APIXABAN 5 MG: 5 TABLET, FILM COATED ORAL at 05:02

## 2024-07-14 RX ADMIN — PREGABALIN 150 MG: 150 CAPSULE ORAL at 05:02

## 2024-07-14 RX ADMIN — AMPICILLIN AND SULBACTAM 3 G: 1; 2 INJECTION, POWDER, FOR SOLUTION INTRAMUSCULAR; INTRAVENOUS at 05:05

## 2024-07-14 RX ADMIN — FUROSEMIDE 40 MG: 10 INJECTION, SOLUTION INTRAVENOUS at 05:02

## 2024-07-14 RX ADMIN — APIXABAN 5 MG: 5 TABLET, FILM COATED ORAL at 17:11

## 2024-07-14 RX ADMIN — SPIRONOLACTONE 25 MG: 25 TABLET ORAL at 05:02

## 2024-07-14 ASSESSMENT — COGNITIVE AND FUNCTIONAL STATUS - GENERAL
PERSONAL GROOMING: A LOT
DAILY ACTIVITIY SCORE: 10
STANDING UP FROM CHAIR USING ARMS: TOTAL
TURNING FROM BACK TO SIDE WHILE IN FLAT BAD: A LOT
CLIMB 3 TO 5 STEPS WITH RAILING: TOTAL
WALKING IN HOSPITAL ROOM: TOTAL
EATING MEALS: A LOT
MOVING TO AND FROM BED TO CHAIR: TOTAL
DRESSING REGULAR LOWER BODY CLOTHING: A LOT
SUGGESTED CMS G CODE MODIFIER DAILY ACTIVITY: CL
DRESSING REGULAR UPPER BODY CLOTHING: A LOT
TOILETING: TOTAL
HELP NEEDED FOR BATHING: TOTAL
SUGGESTED CMS G CODE MODIFIER MOBILITY: CM
MOVING FROM LYING ON BACK TO SITTING ON SIDE OF FLAT BED: TOTAL
MOBILITY SCORE: 7

## 2024-07-14 ASSESSMENT — ENCOUNTER SYMPTOMS
NERVOUS/ANXIOUS: 0
ABDOMINAL PAIN: 0
EYE REDNESS: 0
HEADACHES: 0
CONSTIPATION: 0
SHORTNESS OF BREATH: 1
MYALGIAS: 0
CHILLS: 0
FEVER: 0
NAUSEA: 0
PALPITATIONS: 0
WEAKNESS: 0
VOMITING: 0
DIZZINESS: 0
LOSS OF CONSCIOUSNESS: 0
FOCAL WEAKNESS: 0
SEIZURES: 0
EYE PAIN: 0
TREMORS: 0
DIARRHEA: 0
FALLS: 0
INSOMNIA: 0
COUGH: 1
WHEEZING: 0
BLOOD IN STOOL: 0
HEMOPTYSIS: 0

## 2024-07-14 ASSESSMENT — PAIN DESCRIPTION - PAIN TYPE
TYPE: ACUTE PAIN
TYPE: ACUTE PAIN

## 2024-07-14 ASSESSMENT — FIBROSIS 4 INDEX: FIB4 SCORE: 0.49

## 2024-07-14 NOTE — DISCHARGE PLANNING
Care Transition Team Assessment  Patient is a 78 year-old female admitted for SOB. Please see pt's H&P for prior medical history. RNCM met with pt at bedside to complete assessment. Pt A&Ox4 and able to verify the information on the face sheet. Pt lives with spouse in a single-story. Emergency contact is spouse Anthony Sánchez [phone]. Prior to admission patient is somewhat independent with ADL's and IADL’s. Pt has a FWW and WC and uses 2L O2 nocturnally and PRN at baseline supplied by [employer]. Pt reported that S/O is good support. The patient's PCP is Michelle ORTEZ. Patient's preferred pharmacy is [facility]. Patient came from [facility] where she had been for about 3 weeks this time. Has used [facility] use in the past. Pt denies any SA or MH concerns. Patients confirmed medical coverage with SCP.  Patient has means to transportation and spouse will provide transport once medically stable for discharge. RNCM discussed discharge planning. Patient and spouse reported pt's goal is to return home once medically stable (Declined HH/SNF/Rehab)      Information Source  Orientation Level: Oriented X4  Information Given By: Patient, Spouse  Informant's Name: Anthony Sánchez  Who is responsible for making decisions for patient? : Patient    Readmission Evaluation  Is this a readmission?: No    Elopement Risk  Legal Hold: No  Ambulatory or Self Mobile in Wheelchair: No-Not an Elopement Risk  Elopement Risk: Not at Risk for Elopement    Interdisciplinary Discharge Planning  Lives with - Patient's Self Care Capacity: Spouse  Prior Services: Continuous (24 Hour) Care Giving Family    Discharge Preparedness  What is your plan after discharge?: Home with help  What are your discharge supports?: Spouse  Prior Functional Level: Ambulatory, Independent with Activities of Daily Living, Independent with Medication Management, Uses Walker, Uses Wheelchair  Difficulity with ADLs: Walking  Difficulty with ADLs Comment:  fww/wc  Difficulity with IADLs: Driving  Difficulity with IADL Comments: spouse drives    Functional Assesment  Prior Functional Level: Ambulatory, Independent with Activities of Daily Living, Independent with Medication Management, Uses Walker, Uses Wheelchair    Finances  Financial Barriers to Discharge: No    Advance Directive  Advance Directive?: None  Advance Directive offered?: AD Booklet given    Domestic Abuse  Have you ever been the victim of abuse or violence?: No    Psychological Assessment  History of Substance Abuse: None  History of Psychiatric Problems: No  Non-compliant with Treatment: No  Newly Diagnosed Illness: No    Discharge Risks or Barriers  Discharge risks or barriers?: No    Anticipated Discharge Information  Discharge Disposition: Discharged to home/self care (01)    Case Management Discharge Planning    Admission Date: 7/12/2024  GMLOS: 5.1  ALOS: 2    6-Clicks ADL Score: 13  6-Clicks Mobility Score: 8  PT and/or OT Eval ordered: No  Post-acute Referrals Ordered: No  Post-acute Choice Obtained: No  Has referral(s) been sent to post-acute provider:  No    Anticipated Discharge Dispo: Discharge Disposition: Discharged to home/self care (01)    DME Needed: No    Action(s) Taken: DC Assessment Complete (See below) AD booklet and notary information given to patient and spouse    Escalations Completed: None    Medically Clear: No    Next Steps: RNCM to continue to follow patient for DCP needs.     Barriers to Discharge: Medical clearance

## 2024-07-14 NOTE — PROGRESS NOTES
Hospital Medicine Daily Progress Note    Date of Service  7/14/2024    Chief Complaint  Tereza Sánchez is a 78 y.o. female admitted 7/12/2024 with Shortness of Breath (Increased SOB since 7/6, pt has oxygen tank at home and has had to titrate oxygen up to 2L this week. ) and Cough (2-3 weeks, non-productive. Endorses sore throat//Denies fevers)        Hospital Course  No notes on file    Interval Problem Update  Patient seen and examine at bedside  I reviewed the chart along with vitals, labs, imaging, test (both pending and resulted) and recommendations from specialists and interdisciplinary team.  Patient seen and examine at bedside  I reviewed the chart along with vitals, labs, imaging, test (both pending and resulted) and recommendations from specialists and interdisciplinary team.  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 She also has a productive cough. Denies fevers or chills. She increased her O2 to 3L  AT the ED afebrile, hemodynamically stable, placed on O2. CXR showed large R sided effusion assoc consolidation  WBC 10.6. Cr- 0.64  Managing for R sided effusion, acute on chronic hypoxia. May 2024 echo nromal LV and RV. Empiric antibiotics and Lasix started   Initially IR US evaluated and felt effusion too small to tap  Pulmonology recommended course of antibiotics.   SHe was weak but conversing. Noted hypoxia on monitor but she took O2 off. Procalcitonin<0.05, Repeat CXR however show large R pleural effusion. Earlier pleural eff was too small to tap but since this has increased, I REORDERED US thoracentesis and pleural fluid  "studies.   I discussed code status with  and patient. They confirm FULL CODE.  would like wife to \"be around\".   Advanced before but want to go home with  however he is also elderly. Ordered PTOT to eval and discuss if she is ready to home but most likely she will need skilled level.   FULL CODE discussed with      Patient pleasant, [facility]. Rheumatoid findings deformities hand.     I have discussed this patient's plan of care and discharge plan at IDT rounds today with Case Management, Nursing, Nursing leadership, and other members of the IDT team.    Consultants/Specialty  pulmonary    Code Status  Full Code    Disposition  Medically Cleared  I have placed the appropriate orders for post-discharge needs.    Review of Systems  Review of Systems   Constitutional:  Negative for chills and fever.   HENT:  Negative for congestion, hearing loss and nosebleeds.    Eyes:  Negative for pain and redness.   Respiratory:  Positive for cough and shortness of breath. Negative for hemoptysis and wheezing.    Cardiovascular:  Negative for chest pain and palpitations.   Gastrointestinal:  Negative for abdominal pain, blood in stool, constipation, diarrhea, nausea and vomiting.   Genitourinary:  Negative for dysuria, frequency and hematuria.   Musculoskeletal:  Negative for falls, joint pain and myalgias.   Skin:  Negative for rash.   Neurological:  Negative for dizziness, tremors, focal weakness, seizures, loss of consciousness, weakness and headaches.   Psychiatric/Behavioral:  The patient is not nervous/anxious and does not have insomnia.    All other systems reviewed and are negative.       Physical Exam  Temp:  [36.2 °C (97.1 °F)-36.7 °C (98.1 °F)] 36.2 °C (97.1 °F)  Pulse:  [] 110  Resp:  [16-20] 20  BP: ()/(71-91) 121/71  SpO2:  [90 %-97 %] 95 %    Physical Exam  Vitals and nursing note reviewed.   Constitutional:       General: She is not in acute distress.  HENT:      Head: " Normocephalic and atraumatic.      Right Ear: External ear normal.      Left Ear: External ear normal.      Nose: Nose normal.      Mouth/Throat:      Mouth: Mucous membranes are moist.   Eyes:      General: No scleral icterus.     Conjunctiva/sclera: Conjunctivae normal.   Cardiovascular:      Rate and Rhythm: Normal rate and regular rhythm.      Pulses: Normal pulses.      Heart sounds: No murmur heard.     No friction rub. No gallop.   Pulmonary:      Effort: Pulmonary effort is normal. No respiratory distress.      Breath sounds: Normal breath sounds. No stridor. No wheezing, rhonchi or rales.      Comments: Diminished at bases, crackle left base  Chest:      Chest wall: No tenderness.   Abdominal:      General: Abdomen is flat. Bowel sounds are normal. There is no distension.      Palpations: Abdomen is soft.      Tenderness: There is no abdominal tenderness. There is no guarding or rebound.   Musculoskeletal:         General: No swelling, tenderness or deformity. Normal range of motion.      Cervical back: Normal range of motion and neck supple. No rigidity.   Skin:     General: Skin is warm.      Coloration: Skin is not jaundiced.   Neurological:      General: No focal deficit present.      Mental Status: She is alert and oriented to person, place, and time. Mental status is at baseline.   Psychiatric:         Mood and Affect: Mood normal.         Behavior: Behavior normal.         Thought Content: Thought content normal.         Judgment: Judgment normal.         Fluids    Intake/Output Summary (Last 24 hours) at 7/14/2024 1455  Last data filed at 7/14/2024 0900  Gross per 24 hour   Intake 240 ml   Output 800 ml   Net -560 ml       Laboratory  Recent Labs     07/12/24 1824 07/14/24  0014   WBC 10.6 11.4*   RBC 4.98 4.67   HEMOGLOBIN 13.7 13.0   HEMATOCRIT 43.5 41.4   MCV 87.3 88.7   MCH 27.5 27.8   MCHC 31.5* 31.4*   RDW 51.4* 52.2*   PLATELETCT 532* 500*   MPV 10.7 10.7     Recent Labs     07/12/24 1824  07/14/24  0014   SODIUM 141 138   POTASSIUM 3.8 3.5*   CHLORIDE 103 97   CO2 25 29   GLUCOSE 124* 113*   BUN 22 16   CREATININE 0.64 0.60   CALCIUM 9.0 8.9                   Imaging  DX-CHEST-PORTABLE (1 VIEW)   Final Result      1.  Large right pleural effusion with opacification of the right mid and lower thorax. This has increased since prior chest radiograph 7/12/2024.   2.  Small linear metallic radiodensity projecting over the upper thoracic spine. Correlating with the previous CTA study from 5/8/2024, this metallic density is within the central canal of the thoracic spine. Etiology of this is unknown. Correlate    clinically.      US-CHEST   Final Result      Small volume of pleural fluid on the right. There is extensive consolidated lung within the right lung base which likely accounts for radiographic abnormality. Volume of pleural fluid is inadequate to safely perform thoracentesis.      DX-CHEST-PORTABLE (1 VIEW)   Final Result      Moderate to large sized right-sided pleural effusion with associated compressive atelectasis and or consolidation. Underlying infection is possible.      US-THORACENTESIS PUNCTURE RIGHT    (Results Pending)        Assessment/Plan  * Acute on chronic hypoxic respiratory failure (HCC)- (present on admission)  Assessment & Plan  At baseline, patient requires 2 L supplemental oxygen.  There is some underlying obstructive sleep apnea at baseline.  She did need to uptitrate her oxygen at home.  Currently requiring 3 L supplemental oxygen  CXR: Moderate to large sized right-sided pleural effusion with associated compressive atelectasis and or consolidation. Underlying infection is possible.   40 mg IV Lasix twice daily  Patient will need monitoring with daily labs  Continue home dose Aldactone  Follow-up procalcitonin levels and sputum cultures  Empirically started on Unasyn and azithromycin IV.  De-escalate when appropriate      Pleural effusion  Assessment & Plan  CXR: Moderate to  large size right sided pleural effusion with associated compressive atelectasis and or consolidation.  There is some possibility of underlying infection.  Empirically starting the patient on IV Unasyn and azithromycin  Follow-up procalcitonin level  Follow-up sputum culture  Lasix 40 mg IV twice daily  Given her aggressive diuresis, patient will need close monitoring with daily labs  Day team consult pulmonology service if needed/if no improvement  Too small to tap but repeat CXR showed it enlarged; reordered thoracentesis  Pulmo recommends Abx course    Paroxysmal atrial fibrillation with RVR (HCC)- (present on admission)  Assessment & Plan  During her previous hospitalization back in May 2024.  She was going in and out of atrial fibrillation with RVR.  She was discharged on metoprolol.    Patient is currently on anticoagulation given her history of DVTs  Vitals:    07/14/24 1449   BP: 121/71   Pulse: (!) 110   Resp: 20   Temp: 36.2 °C (97.1 °F)   SpO2: 95%       Give BB      Acute deep vein thrombosis (DVT) of proximal vein of right lower extremity (HCC)- (present on admission)  Assessment & Plan  Resume home dose Apixiban     Chronic respiratory failure with hypoxia (HCC)- (present on admission)  Assessment & Plan  Patient states that she does use 2 L supplemental oxygen at baseline.  Underlying component of obstructive sleep apnea         VTE prophylaxis: Eliquis    I have performed a physical exam and reviewed and updated ROS and Plan today (7/14/2024). In review of yesterday's note (7/13/2024), there are no changes except as documented above.    Patient is has a high medical complexity, complex decision making and is at high risk for complication, morbidity, and mortality, thus requiring the highest level of my preparedness for sudden, emergent intervention. Medical decision making is therefore complex. I provided  services, which included ordering labs and/or imaging, and discussing the case with  various consultants.medication orders, frequent reevaluations of the patient's condition and response to treatment. Time was also devoted to counseling and coordinating care including review of records, pertinent lab data and studies, as well as discussing diagnostic evaluation and work up, planned therapeutic interventions and future disposition of care. Where indicated, the assessment and plan reflect discussion of patient with consultants, other healthcare providers, family members, and additional research needed to obtain further information in formulating the plan of care for Tereza Sánchez. Total time spent was 66 minutes.   In addition to that I spent another 15 minutes for advanced care/counseling discussing and confirming code status and goals of care with patient, family, consultants and Palliative team.

## 2024-07-14 NOTE — PROGRESS NOTES
Hospital Medicine Daily Progress Note    Date of Service  7/13/2024    Chief Complaint  Tereza Sánchez is a 78 y.o. female admitted 7/12/2024 with Shortness of Breath (Increased SOB since 7/6, pt has oxygen tank at home and has had to titrate oxygen up to 2L this week. ) and Cough (2-3 weeks, non-productive. Endorses sore throat//Denies fevers)        Hospital Course  No notes on file    Interval Problem Update  Patient seen and examine at bedside  I reviewed the chart along with vitals, labs, imaging, test (both pending and resulted) and recommendations from specialists and interdisciplinary team.  Patient seen and examine at bedside  I reviewed the chart along with vitals, labs, imaging, test (both pending and resulted) and recommendations from specialists and interdisciplinary team.  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 She also has a productive cough. Denies fevers or chills. She increased her O2 to 3L  AT the ED afebrile, hemodynamically stable, placed on O2. CXR showed large R sided effusion assoc consolidation  WBC 10.6. Cr- 0.64  Managing for R sided effusion, acute on chronic hypoxia. May 2024 echo nromal LV and RV. Empiric antibiotics and Lasix started   IR US evaluated and felt effusion too small to tap  Pulmonology recommended course of antibiotics.     Patient pleasant, family at [facility]. Rheumatoid findings deformities hand.     I have discussed this patient's plan of care and discharge plan at IDT rounds today with Case Management, Nursing, Nursing leadership, and other members of the IDT  team.    Consultants/Specialty  pulmonary    Code Status  Full Code    Disposition  The patient is not medically cleared for discharge to home or a post-acute facility.      I have placed the appropriate orders for post-discharge needs.    Review of Systems  Review of Systems   Constitutional:  Negative for chills and fever.   HENT:  Negative for congestion, hearing loss and nosebleeds.    Eyes:  Negative for pain and redness.   Respiratory:  Positive for cough and shortness of breath. Negative for hemoptysis and wheezing.    Cardiovascular:  Negative for chest pain and palpitations.   Gastrointestinal:  Negative for abdominal pain, blood in stool, constipation, diarrhea, nausea and vomiting.   Genitourinary:  Negative for dysuria, frequency and hematuria.   Musculoskeletal:  Negative for falls, joint pain and myalgias.   Skin:  Negative for rash.   Neurological:  Negative for dizziness, tremors, focal weakness, seizures, loss of consciousness, weakness and headaches.   Psychiatric/Behavioral:  The patient is not nervous/anxious and does not have insomnia.    All other systems reviewed and are negative.       Physical Exam  Temp:  [35.9 °C (96.6 °F)-36.8 °C (98.2 °F)] 36.7 °C (98.1 °F)  Pulse:  [] 104  Resp:  [13-20] 18  BP: ()/(68-98) 92/71  SpO2:  [88 %-100 %] 92 %    Physical Exam  Vitals and nursing note reviewed.   Constitutional:       General: She is not in acute distress.  HENT:      Head: Normocephalic and atraumatic.      Right Ear: External ear normal.      Left Ear: External ear normal.      Nose: Nose normal.      Mouth/Throat:      Mouth: Mucous membranes are moist.   Eyes:      General: No scleral icterus.     Conjunctiva/sclera: Conjunctivae normal.   Cardiovascular:      Rate and Rhythm: Normal rate and regular rhythm.      Pulses: Normal pulses.      Heart sounds: No murmur heard.     No friction rub. No gallop.   Pulmonary:      Effort: Pulmonary effort is normal. No respiratory  distress.      Breath sounds: Normal breath sounds. No stridor. No wheezing, rhonchi or rales.      Comments: Diminished at bases, crackle left base  Chest:      Chest wall: No tenderness.   Abdominal:      General: Abdomen is flat. Bowel sounds are normal. There is no distension.      Palpations: Abdomen is soft.      Tenderness: There is no abdominal tenderness. There is no guarding or rebound.   Musculoskeletal:         General: No swelling, tenderness or deformity. Normal range of motion.      Cervical back: Normal range of motion and neck supple. No rigidity.   Skin:     General: Skin is warm.      Coloration: Skin is not jaundiced.   Neurological:      General: No focal deficit present.      Mental Status: She is alert and oriented to person, place, and time. Mental status is at baseline.   Psychiatric:         Mood and Affect: Mood normal.         Behavior: Behavior normal.         Thought Content: Thought content normal.         Judgment: Judgment normal.         Fluids    Intake/Output Summary (Last 24 hours) at 7/13/2024 1721  Last data filed at 7/13/2024 1000  Gross per 24 hour   Intake 1465 ml   Output 1900 ml   Net -435 ml       Laboratory  Recent Labs     07/12/24  1824   WBC 10.6   RBC 4.98   HEMOGLOBIN 13.7   HEMATOCRIT 43.5   MCV 87.3   MCH 27.5   MCHC 31.5*   RDW 51.4*   PLATELETCT 532*   MPV 10.7     Recent Labs     07/12/24  1824   SODIUM 141   POTASSIUM 3.8   CHLORIDE 103   CO2 25   GLUCOSE 124*   BUN 22   CREATININE 0.64   CALCIUM 9.0                   Imaging  US-CHEST   Final Result      Small volume of pleural fluid on the right. There is extensive consolidated lung within the right lung base which likely accounts for radiographic abnormality. Volume of pleural fluid is inadequate to safely perform thoracentesis.      DX-CHEST-PORTABLE (1 VIEW)   Final Result      Moderate to large sized right-sided pleural effusion with associated compressive atelectasis and or consolidation. Underlying  infection is possible.           Assessment/Plan  * Acute on chronic hypoxic respiratory failure (HCC)- (present on admission)  Assessment & Plan  At baseline, patient requires 2 L supplemental oxygen.  There is some underlying obstructive sleep apnea at baseline.  She did need to uptitrate her oxygen at home.  Currently requiring 3 L supplemental oxygen  CXR: Moderate to large sized right-sided pleural effusion with associated compressive atelectasis and or consolidation. Underlying infection is possible.   40 mg IV Lasix twice daily  Patient will need monitoring with daily labs  Continue home dose Aldactone  Follow-up procalcitonin levels and sputum cultures  Empirically started on Unasyn and azithromycin IV.  De-escalate when appropriate      Pleural effusion  Assessment & Plan  CXR: Moderate to large size right sided pleural effusion with associated compressive atelectasis and or consolidation.  There is some possibility of underlying infection.  Empirically starting the patient on IV Unasyn and azithromycin  Follow-up procalcitonin level  Follow-up sputum culture  Lasix 40 mg IV twice daily  Given her aggressive diuresis, patient will need close monitoring with daily labs  Day team consult pulmonology service if needed/if no improvement  Too small to tap  Pulmo recommends Abx course    Paroxysmal atrial fibrillation with RVR (HCC)- (present on admission)  Assessment & Plan  During her previous hospitalization back in May 2024.  She was going in and out of atrial fibrillation with RVR.  She was discharged on metoprolol.    Patient is currently on anticoagulation given her history of DVTs  Vitals:    07/13/24 1542   BP: 92/71   Pulse: (!) 104   Resp: 18   Temp: 36.7 °C (98.1 °F)   SpO2: 92%       Give BB      Acute deep vein thrombosis (DVT) of proximal vein of right lower extremity (HCC)- (present on admission)  Assessment & Plan  Resume home dose Apixiban     Chronic respiratory failure with hypoxia  ([facility])- (present on admission)  Assessment & Plan  Patient states that she does use 2 L supplemental oxygen at baseline.  Underlying component of obstructive sleep apnea         VTE prophylaxis: Eliquis    I have performed a physical exam and reviewed and updated ROS and Plan today (7/13/2024). In review of yesterday's note (7/12/2024), there are no changes except as documented above.

## 2024-07-14 NOTE — PROGRESS NOTES
Bedside report received from day RN, pt care assumed, assessment completed. Pt is A&O4, pain 7/10, ST on the monitor. Updated on POC, questions answered. Bed in lowest, locked position, treaded socks on, call light and belongings within reach. Fall precautions in place.      Fall Risk Score: HIGH RISK  Fall risk interventions in place: Provide patient/family education based on risk assessment  Bed type: Regular (Logan Score less than 17 interventions in place)  Patient on cardiac monitor: Yes  IVF/IV medications: Not Applicable   Oxygen: How many liters 2L and Traced the line to wall oxygen  Bedside sitter: Not Applicable   Isolation: Not applicable

## 2024-07-14 NOTE — PROGRESS NOTES
4 Eyes Skin Assessment Completed by JIMMY Hammer and JIMMY Corrales.    Head WDL  Ears WDL  Nose Redness  Mouth WDL  Neck WDL  Breast/Chest Redness and Blanching, wound with mepitel covering  Shoulder Blades Redness and Blanching  Spine Redness and Blanching  (R) Arm/Elbow/Hand Redness and Blanching, scab  (L) Arm/Elbow/Hand Redness and Blanching, wound with mepitel  Abdomen Redness and Blanching, excoriation to panus  Groin Redness, Blanching, and Rash  Scrotum/Coccyx/Buttocks Redness and Blanching  (R) Leg Scab  (L) Leg Scab  (R) Heel/Foot/Toe Redness, Blanching, and Boggy  (L) Heel/Foot/Toe Redness, Blanching, and Boggy          Devices In Places Pulse Ox and SCD's, purewick      Interventions In Place Gray Ear Foams, NC W/Ear Foams, Heel Mepilex, TAP System, Pillows, Q2 Turns, Low Air Loss Mattress, Barrier Cream, and Heels Loaded W/Pillows    Possible Skin Injury Yes    Pictures Uploaded Into Epic Yes  Wound Consult Placed Yes  RN Wound Prevention Protocol Ordered Yes

## 2024-07-14 NOTE — PROGRESS NOTES
Monitor Summary:   Rhythm: sr-st  Rate:   Measurement: .15/.05/.37  Ectopy: 1 event of non sustained PSVT, pvc's

## 2024-07-14 NOTE — PROGRESS NOTES
4 Eyes Skin Assessment Completed by JIMMY Jc and LAMBERTO Dalton.    Head WDL  Ears WDL  Nose WDL  Mouth WDL  Neck WDL  Breast/Chest Bruising; Wound  Shoulder Blades Incision  Spine Redness and Blanching  (R) Arm/Elbow/Hand Redness and Blanching  (L) Arm/Elbow/Hand Redness and Blanching, wound  Abdomen Rash and Bruising, excoriation  Groin Redness and Excoriation  Scrotum/Coccyx/Buttocks Redness and Blanching  (R) Leg Scab  (L) Leg Scab  (R) Heel/Foot/Toe Redness, Blanching, and Boggy  (L) Heel/Foot/Toe Redness, Blanching, and Boggy          Devices In Places Tele Box, Blood Pressure Cuff, Pulse Ox, and Nasal Cannula      Interventions In Place Gray Ear Foams, InterDry, Heel Mepilex, Sacral Mepilex, Elbow Mepilex, Q2 Turns, Low Air Loss Mattress, Barrier Cream, and Heels Loaded W/Pillows    Possible Skin Injury Yes    Pictures Uploaded Into Epic Yes  Wound Consult Placed N/A already following  RN Wound Prevention Protocol Ordered No  already in place

## 2024-07-14 NOTE — PROGRESS NOTES
Monitor Summary  Rhythm: SR/ST  Rate:   Ectopy: 4bts PSVT to 194 , 6bts PSVT to 164  .14 / .07 / .33

## 2024-07-14 NOTE — CARE PLAN
Problem: Knowledge Deficit - Standard  Goal: Patient and family/care givers will demonstrate understanding of plan of care, disease process/condition, diagnostic tests and medications  Outcome: Progressing     Problem: Skin Integrity  Goal: Skin integrity is maintained or improved  Outcome: Progressing   The patient is Stable - Low risk of patient condition declining or worsening    Shift Goals  Clinical Goals: Skin integrity, monitor lab values    Progress made toward(s) clinical / shift goals:  progressing    Patient is not progressing towards the following goals:

## 2024-07-15 ENCOUNTER — APPOINTMENT (OUTPATIENT)
Dept: RADIOLOGY | Facility: MEDICAL CENTER | Age: 79
DRG: 186 | End: 2024-07-15
Attending: INTERNAL MEDICINE
Payer: MEDICARE

## 2024-07-15 LAB
ALBUMIN SERPL BCP-MCNC: 3.1 G/DL (ref 3.2–4.9)
AMYLASE FLD-CCNC: 24 U/L
APPEARANCE FLD: NORMAL
BACTERIA UR CULT: NORMAL
BODY FLD TYPE: NORMAL
BUN SERPL-MCNC: 13 MG/DL (ref 8–22)
CALCIUM ALBUM COR SERPL-MCNC: 9.7 MG/DL (ref 8.5–10.5)
CALCIUM SERPL-MCNC: 9 MG/DL (ref 8.5–10.5)
CELLS FLD: 2
CHLORIDE SERPL-SCNC: 91 MMOL/L (ref 96–112)
CO2 SERPL-SCNC: 31 MMOL/L (ref 20–33)
COLOR FLD: NORMAL
CREAT SERPL-MCNC: 0.6 MG/DL (ref 0.5–1.4)
CSF COMMENTS 1658: NORMAL
ERYTHROCYTE [DISTWIDTH] IN BLOOD BY AUTOMATED COUNT: 52.1 FL (ref 35.9–50)
ERYTHROCYTE [SEDIMENTATION RATE] IN BLOOD BY WESTERGREN METHOD: 22 MM/HOUR (ref 0–25)
FUNGUS SPEC FUNGUS STN: NORMAL
GFR SERPLBLD CREATININE-BSD FMLA CKD-EPI: 92 ML/MIN/1.73 M 2
GLUCOSE FLD-MCNC: 135 MG/DL
GLUCOSE SERPL-MCNC: 125 MG/DL (ref 65–99)
HCT VFR BLD AUTO: 47.2 % (ref 37–47)
HGB BLD-MCNC: 14.9 G/DL (ref 12–16)
LDH FLD L TO P-CCNC: 677 U/L
LYMPHOCYTES NFR FLD: 11 %
MCH RBC QN AUTO: 27.8 PG (ref 27–33)
MCHC RBC AUTO-ENTMCNC: 31.6 G/DL (ref 32.2–35.5)
MCV RBC AUTO: 88.1 FL (ref 81.4–97.8)
MONOS+MACROS NFR FLD MANUAL: 25 %
NEUTROPHILS NFR FLD: 60 %
NUC CELL # FLD: 2821 CELLS/UL
PH FLD: 8 [PH]
PHOSPHATE SERPL-MCNC: 3.4 MG/DL (ref 2.5–4.5)
PLATELET # BLD AUTO: 537 K/UL (ref 164–446)
PMV BLD AUTO: 11 FL (ref 9–12.9)
POTASSIUM SERPL-SCNC: 3.4 MMOL/L (ref 3.6–5.5)
PROT FLD-MCNC: 4.4 G/DL
RBC # BLD AUTO: 5.36 M/UL (ref 4.2–5.4)
RBC # FLD: NORMAL CELLS/UL
SIGNIFICANT IND 70042: NORMAL
SIGNIFICANT IND 70042: NORMAL
SITE SITE: NORMAL
SITE SITE: NORMAL
SODIUM SERPL-SCNC: 138 MMOL/L (ref 135–145)
SOURCE SOURCE: NORMAL
SOURCE SOURCE: NORMAL
WBC # BLD AUTO: 15.2 K/UL (ref 4.8–10.8)
WBC OTHER NFR FLD: 2 %

## 2024-07-15 PROCEDURE — A9270 NON-COVERED ITEM OR SERVICE: HCPCS | Performed by: STUDENT IN AN ORGANIZED HEALTH CARE EDUCATION/TRAINING PROGRAM

## 2024-07-15 PROCEDURE — 700102 HCHG RX REV CODE 250 W/ 637 OVERRIDE(OP): Performed by: STUDENT IN AN ORGANIZED HEALTH CARE EDUCATION/TRAINING PROGRAM

## 2024-07-15 PROCEDURE — 99233 SBSQ HOSP IP/OBS HIGH 50: CPT | Performed by: STUDENT IN AN ORGANIZED HEALTH CARE EDUCATION/TRAINING PROGRAM

## 2024-07-15 PROCEDURE — 87116 MYCOBACTERIA CULTURE: CPT

## 2024-07-15 PROCEDURE — 80503 PATH CLIN CONSLTJ SF 5-20: CPT

## 2024-07-15 PROCEDURE — 36415 COLL VENOUS BLD VENIPUNCTURE: CPT

## 2024-07-15 PROCEDURE — 0W993ZX DRAINAGE OF RIGHT PLEURAL CAVITY, PERCUTANEOUS APPROACH, DIAGNOSTIC: ICD-10-PCS | Performed by: NURSE PRACTITIONER

## 2024-07-15 PROCEDURE — 89051 BODY FLUID CELL COUNT: CPT

## 2024-07-15 PROCEDURE — 85027 COMPLETE CBC AUTOMATED: CPT

## 2024-07-15 PROCEDURE — 82150 ASSAY OF AMYLASE: CPT

## 2024-07-15 PROCEDURE — 82945 GLUCOSE OTHER FLUID: CPT

## 2024-07-15 PROCEDURE — 87102 FUNGUS ISOLATION CULTURE: CPT

## 2024-07-15 PROCEDURE — 80069 RENAL FUNCTION PANEL: CPT

## 2024-07-15 PROCEDURE — 88342 IMHCHEM/IMCYTCHM 1ST ANTB: CPT

## 2024-07-15 PROCEDURE — 83615 LACTATE (LD) (LDH) ENZYME: CPT

## 2024-07-15 PROCEDURE — 71045 X-RAY EXAM CHEST 1 VIEW: CPT

## 2024-07-15 PROCEDURE — A9270 NON-COVERED ITEM OR SERVICE: HCPCS | Mod: JZ | Performed by: STUDENT IN AN ORGANIZED HEALTH CARE EDUCATION/TRAINING PROGRAM

## 2024-07-15 PROCEDURE — 770006 HCHG ROOM/CARE - MED/SURG/GYN SEMI*

## 2024-07-15 PROCEDURE — 700111 HCHG RX REV CODE 636 W/ 250 OVERRIDE (IP): Mod: JZ | Performed by: INTERNAL MEDICINE

## 2024-07-15 PROCEDURE — 97162 PT EVAL MOD COMPLEX 30 MIN: CPT

## 2024-07-15 PROCEDURE — C1729 CATH, DRAINAGE: HCPCS

## 2024-07-15 PROCEDURE — 87205 SMEAR GRAM STAIN: CPT | Mod: 91

## 2024-07-15 PROCEDURE — 88112 CYTOPATH CELL ENHANCE TECH: CPT

## 2024-07-15 PROCEDURE — 87015 SPECIMEN INFECT AGNT CONCNTJ: CPT | Mod: 91

## 2024-07-15 PROCEDURE — 700105 HCHG RX REV CODE 258: Performed by: STUDENT IN AN ORGANIZED HEALTH CARE EDUCATION/TRAINING PROGRAM

## 2024-07-15 PROCEDURE — 700111 HCHG RX REV CODE 636 W/ 250 OVERRIDE (IP): Mod: JZ

## 2024-07-15 PROCEDURE — 87070 CULTURE OTHR SPECIMN AEROBIC: CPT

## 2024-07-15 PROCEDURE — 84157 ASSAY OF PROTEIN OTHER: CPT

## 2024-07-15 PROCEDURE — 88305 TISSUE EXAM BY PATHOLOGIST: CPT

## 2024-07-15 PROCEDURE — 85652 RBC SED RATE AUTOMATED: CPT

## 2024-07-15 PROCEDURE — 87206 SMEAR FLUORESCENT/ACID STAI: CPT

## 2024-07-15 PROCEDURE — 83986 ASSAY PH BODY FLUID NOS: CPT

## 2024-07-15 PROCEDURE — 700101 HCHG RX REV CODE 250: Performed by: STUDENT IN AN ORGANIZED HEALTH CARE EDUCATION/TRAINING PROGRAM

## 2024-07-15 PROCEDURE — 88341 IMHCHEM/IMCYTCHM EA ADD ANTB: CPT | Mod: 91

## 2024-07-15 PROCEDURE — 700111 HCHG RX REV CODE 636 W/ 250 OVERRIDE (IP): Mod: JZ | Performed by: STUDENT IN AN ORGANIZED HEALTH CARE EDUCATION/TRAINING PROGRAM

## 2024-07-15 PROCEDURE — 700102 HCHG RX REV CODE 250 W/ 637 OVERRIDE(OP): Mod: JZ | Performed by: STUDENT IN AN ORGANIZED HEALTH CARE EDUCATION/TRAINING PROGRAM

## 2024-07-15 RX ORDER — TAMSULOSIN HYDROCHLORIDE 0.4 MG/1
0.4 CAPSULE ORAL
Qty: 100 CAPSULE | Refills: 1 | Status: SHIPPED | OUTPATIENT
Start: 2024-07-15

## 2024-07-15 RX ORDER — ONDANSETRON 4 MG/1
4 TABLET, ORALLY DISINTEGRATING ORAL EVERY 8 HOURS PRN
Qty: 30 TABLET | Refills: 0 | Status: SHIPPED | OUTPATIENT
Start: 2024-07-15

## 2024-07-15 RX ORDER — HALOPERIDOL 5 MG/ML
5 INJECTION INTRAMUSCULAR EVERY 6 HOURS PRN
Status: DISCONTINUED | OUTPATIENT
Start: 2024-07-15 | End: 2024-07-15

## 2024-07-15 RX ORDER — SPIRONOLACTONE 25 MG/1
25 TABLET ORAL DAILY
Qty: 100 TABLET | Refills: 1 | Status: SHIPPED | OUTPATIENT
Start: 2024-07-15

## 2024-07-15 RX ORDER — HALOPERIDOL 5 MG/ML
5 INJECTION INTRAMUSCULAR EVERY 6 HOURS PRN
Status: DISCONTINUED | OUTPATIENT
Start: 2024-07-15 | End: 2024-07-16

## 2024-07-15 RX ORDER — POTASSIUM CHLORIDE 1500 MG/1
40 TABLET, EXTENDED RELEASE ORAL ONCE
Status: COMPLETED | OUTPATIENT
Start: 2024-07-15 | End: 2024-07-15

## 2024-07-15 RX ADMIN — AZITHROMYCIN 500 MG: 500 INJECTION, POWDER, LYOPHILIZED, FOR SOLUTION INTRAVENOUS at 20:06

## 2024-07-15 RX ADMIN — METOPROLOL TARTRATE 25 MG: 25 TABLET, FILM COATED ORAL at 07:49

## 2024-07-15 RX ADMIN — ATORVASTATIN CALCIUM 10 MG: 10 TABLET, FILM COATED ORAL at 20:03

## 2024-07-15 RX ADMIN — POTASSIUM CHLORIDE 40 MEQ: 1500 TABLET, EXTENDED RELEASE ORAL at 12:06

## 2024-07-15 RX ADMIN — PREGABALIN 150 MG: 150 CAPSULE ORAL at 16:18

## 2024-07-15 RX ADMIN — DULOXETINE HYDROCHLORIDE 30 MG: 30 CAPSULE, DELAYED RELEASE ORAL at 07:50

## 2024-07-15 RX ADMIN — FUROSEMIDE 40 MG: 10 INJECTION, SOLUTION INTRAVENOUS at 16:14

## 2024-07-15 RX ADMIN — AMPICILLIN AND SULBACTAM 3 G: 1; 2 INJECTION, POWDER, FOR SOLUTION INTRAMUSCULAR; INTRAVENOUS at 05:05

## 2024-07-15 RX ADMIN — AMPICILLIN AND SULBACTAM 3 G: 1; 2 INJECTION, POWDER, FOR SOLUTION INTRAMUSCULAR; INTRAVENOUS at 12:10

## 2024-07-15 RX ADMIN — AMPICILLIN AND SULBACTAM 3 G: 1; 2 INJECTION, POWDER, FOR SOLUTION INTRAMUSCULAR; INTRAVENOUS at 23:21

## 2024-07-15 RX ADMIN — AMITRIPTYLINE HYDROCHLORIDE 100 MG: 100 TABLET, FILM COATED ORAL at 20:03

## 2024-07-15 RX ADMIN — METOPROLOL TARTRATE 25 MG: 25 TABLET, FILM COATED ORAL at 16:18

## 2024-07-15 RX ADMIN — TAMSULOSIN HYDROCHLORIDE 0.4 MG: 0.4 CAPSULE ORAL at 20:03

## 2024-07-15 RX ADMIN — AMPICILLIN AND SULBACTAM 3 G: 1; 2 INJECTION, POWDER, FOR SOLUTION INTRAMUSCULAR; INTRAVENOUS at 17:38

## 2024-07-15 RX ADMIN — SPIRONOLACTONE 25 MG: 25 TABLET ORAL at 07:49

## 2024-07-15 RX ADMIN — APIXABAN 5 MG: 5 TABLET, FILM COATED ORAL at 16:18

## 2024-07-15 RX ADMIN — PREGABALIN 150 MG: 150 CAPSULE ORAL at 07:50

## 2024-07-15 RX ADMIN — FUROSEMIDE 40 MG: 10 INJECTION, SOLUTION INTRAVENOUS at 04:57

## 2024-07-15 RX ADMIN — HALOPERIDOL LACTATE 5 MG: 5 INJECTION, SOLUTION INTRAMUSCULAR at 04:43

## 2024-07-15 ASSESSMENT — COGNITIVE AND FUNCTIONAL STATUS - GENERAL
MOBILITY SCORE: 10
SUGGESTED CMS G CODE MODIFIER MOBILITY: CL
MOVING FROM LYING ON BACK TO SITTING ON SIDE OF FLAT BED: A LOT
TURNING FROM BACK TO SIDE WHILE IN FLAT BAD: A LOT
MOVING TO AND FROM BED TO CHAIR: A LOT
STANDING UP FROM CHAIR USING ARMS: A LOT
WALKING IN HOSPITAL ROOM: TOTAL
CLIMB 3 TO 5 STEPS WITH RAILING: TOTAL

## 2024-07-15 ASSESSMENT — PAIN DESCRIPTION - PAIN TYPE: TYPE: ACUTE PAIN

## 2024-07-15 ASSESSMENT — GAIT ASSESSMENTS: GAIT LEVEL OF ASSIST: UNABLE TO PARTICIPATE

## 2024-07-15 NOTE — CARE PLAN
The patient is Stable - Low risk of patient condition declining or worsening    Shift Goals  Clinical Goals: monitor O2 sat and HR control, comply with meds and nursing interventions      Progress made toward(s) clinical / shift goals:  pt. Had US guided Thoracentesis on her R side of lung. Noted CDI dressing on R upper back area. Pt. Denies any SOB, no .  at bedside aware of the POC. Educated about fall risks and prec. PT saw pt. Still on IV abx Q6. Tolerated meds. Needs attended.      Problem: Knowledge Deficit - Standard  Goal: Patient and family/care givers will demonstrate understanding of plan of care, disease process/condition, diagnostic tests and medications  Outcome: Progressing     Problem: Skin Integrity  Goal: Skin integrity is maintained or improved  Outcome: Progressing     Problem: Fall Risk  Goal: Patient will remain free from falls  Outcome: Progressing     Problem: Pain - Standard  Goal: Alleviation of pain or a reduction in pain to the patient’s comfort goal  Outcome: Progressing       Patient is not progressing towards the following goals:

## 2024-07-15 NOTE — THERAPY
"Physical Therapy   Initial Evaluation     Patient Name: Tereza Sánchez  Age:  78 y.o., Sex:  female  Medical Record #: [id number]  Today's Date: 7/15/2024          Assessment  Patient is 78 y.o. female w/ hx of Chiari malformation, scoliosis,  shunt, sleep apnea, HLD, breast CA, bilateral hand deformities.  Admitted w/ acute on chronic respiratory failure.  Per MD note 7/12/2024, pt is non ambulatory.  Rec'd pt alert, in bed, spouse at bed side.  Spouse is 24/7 caregiver.  They report living in a single story house, where pt sleeps in a recliner and spouse assists w/ transferring pt to her w/c.  Today, pt needs max assist to sit eob.  She resists and attempts to lay back down into bed.  She refuses to attempt standing.  Spouse reports that he feels very confident taking patient home, he reports \"absolutely no issues\" w/ regard to mobility when pt d/c's.  They do not want any post acute placement.  Spouse observed PT sitting pt eob, and again expresses no concern regarding taking pt home.  Pt appears to be at her PLOF.  Has not ambulated since April.  No acute concerns.  PT for d/c needs.  Plan    Physical Therapy Initial Treatment Plan   Duration: Discharge Needs Only    DC Equipment Recommendations: None  Discharge Recommendations: Recommend home health for continued physical therapy services          Objective       07/15/24 1416   Prior Living Situation   Prior Services Continuous (24 Hour) Care Giving Family   Housing / Facility 1 Story House   Steps Into Home 3   Steps In Home 0   Equipment Owned Wheelchair   Lives with - Patient's Self Care Capacity Spouse   Prior Level of Functional Mobility   Transfer Status Required Assist   Ambulation   (non ambulatory since April 2024)   Wheelchair Required Assist   Strength Lower Body   Comments grossly trace   Balance Assessment   Sitting Balance (Static) Trace +   Sitting Balance (Dynamic) Trace +   Standing Balance (Static)   (NT, pt refused to attempt)   Weight " Shift Sitting Absent   Weight Shift Standing Absent   Bed Mobility    Supine to Sit Maximal Assist   Sit to Supine Maximal Assist   Scooting Maximal Assist   Gait Analysis   Gait Level Of Assist Unable to Participate   Functional Mobility   Sit to Stand Refused   Patient / Family Goals    Patient / Family Goal #1 Home, no post acute   Physical Therapy Initial Treatment Plan    Duration Discharge Needs Only   Anticipated Discharge Equipment and Recommendations   DC Equipment Recommendations None   Discharge Recommendations Recommend home health for continued physical therapy services

## 2024-07-15 NOTE — PROCEDURES
PÉREZ Mcqueen, performed a Rt Thoracentesis removing 250 ml of cloudy dark chencho fluid. All fluid was sent to the lab. Pt tolerated procedure well.   Final BP = 127/76  Post procedure report provided to RN via text.

## 2024-07-15 NOTE — PROGRESS NOTES
"Patient became confused, agitated and yelling when the CNA tried to take her vital signs. Per patient ' stop stealing; and get out of my house.\" Refusing to answer any questions.  Charge nurse and APRN notified and ordered haldol 5mg IM and was administered. We were able to contact her  and established PIV. Unasyn and Laxis also was administered. However, she refused to take oral meds.    "

## 2024-07-15 NOTE — CARE PLAN
The patient is Watcher - Medium risk of patient condition declining or worsening    Shift Goals  Clinical Goals: pt will maintain an O2 >90% throughout shift  Patient Goals: rest  Family Goals: erich    Progress made toward(s) clinical / shift goals:    Problem: Knowledge Deficit - Standard  Goal: Patient and family/care givers will demonstrate understanding of plan of care, disease process/condition, diagnostic tests and medications  Outcome: Progressing     Problem: Skin Integrity  Goal: Skin integrity is maintained or improved  Outcome: Progressing     Problem: Fall Risk  Goal: Patient will remain free from falls  Outcome: Progressing     Problem: Pain - Standard  Goal: Alleviation of pain or a reduction in pain to the patient’s comfort goal  Outcome: Progressing       Patient is not progressing towards the following goals:

## 2024-07-15 NOTE — PROGRESS NOTES
Patient removed PIV and  machine. Tried to reinsert PIV but the patient refused. Charge nurse notified.

## 2024-07-15 NOTE — PROGRESS NOTES
Received bedside report from previous RN, patient is alert and oriented x4. On o2 at 2L via NC with o2 sat of 95%. Fall precautions in place and call light within reach. All other needs met at this time.

## 2024-07-15 NOTE — CARE PLAN
The patient is Stable - Low risk of patient condition declining or worsening    Shift Goals  Clinical Goals: monitor o2 sat > 90% throughout the shift  Patient Goals: rest  Family Goals: eirch    Patient is not progressing towards the following goals:    Problem: Knowledge Deficit - Standard  Goal: Patient and family/care givers will demonstrate understanding of plan of care, disease process/condition, diagnostic tests and medications  Description: Target End Date:  1-3 days or as soon as patient condition allows    Document in Patient Education    1.  Patient and family/caregiver oriented to unit, equipment, visitation policy and means for communicating concern  2.  Complete/review Learning Assessment  3.  Assess knowledge level of disease process/condition, treatment plan, diagnostic tests and medications  4.  Explain disease process/condition, treatment plan, diagnostic tests and medications  Outcome: Not Progressing

## 2024-07-16 PROBLEM — R78.81 POSITIVE BLOOD CULTURE: Status: ACTIVE | Noted: 2024-07-16

## 2024-07-16 PROBLEM — Z86.718 HISTORY OF DVT (DEEP VEIN THROMBOSIS): Status: ACTIVE | Noted: 2024-05-04

## 2024-07-16 PROBLEM — D75.839 THROMBOCYTOSIS: Status: ACTIVE | Noted: 2024-07-16

## 2024-07-16 PROBLEM — Z86.711 HISTORY OF PULMONARY EMBOLISM: Status: ACTIVE | Noted: 2024-07-16

## 2024-07-16 PROBLEM — R54 FRAILTY SYNDROME IN GERIATRIC PATIENT: Status: ACTIVE | Noted: 2024-07-16

## 2024-07-16 LAB
ALBUMIN SERPL BCP-MCNC: 3.2 G/DL (ref 3.2–4.9)
ALBUMIN/GLOB SERPL: 0.8 G/DL
ALP SERPL-CCNC: 90 U/L (ref 30–99)
ALT SERPL-CCNC: 20 U/L (ref 2–50)
ANION GAP SERPL CALC-SCNC: 13 MMOL/L (ref 7–16)
AST SERPL-CCNC: 19 U/L (ref 12–45)
BASOPHILS # BLD AUTO: 0.7 % (ref 0–1.8)
BASOPHILS # BLD: 0.1 K/UL (ref 0–0.12)
BILIRUB SERPL-MCNC: 0.6 MG/DL (ref 0.1–1.5)
BUN SERPL-MCNC: 16 MG/DL (ref 8–22)
CALCIUM ALBUM COR SERPL-MCNC: 9.8 MG/DL (ref 8.5–10.5)
CALCIUM SERPL-MCNC: 9.2 MG/DL (ref 8.5–10.5)
CHLORIDE SERPL-SCNC: 91 MMOL/L (ref 96–112)
CO2 SERPL-SCNC: 34 MMOL/L (ref 20–33)
CREAT SERPL-MCNC: 0.64 MG/DL (ref 0.5–1.4)
CYTOLOGY REG CYTOL: NORMAL
EOSINOPHIL # BLD AUTO: 0.11 K/UL (ref 0–0.51)
EOSINOPHIL NFR BLD: 0.8 % (ref 0–6.9)
ERYTHROCYTE [DISTWIDTH] IN BLOOD BY AUTOMATED COUNT: 52.2 FL (ref 35.9–50)
GFR SERPLBLD CREATININE-BSD FMLA CKD-EPI: 91 ML/MIN/1.73 M 2
GLOBULIN SER CALC-MCNC: 3.9 G/DL (ref 1.9–3.5)
GLUCOSE SERPL-MCNC: 102 MG/DL (ref 65–99)
GRAM STN SPEC: NORMAL
HCT VFR BLD AUTO: 45 % (ref 37–47)
HGB BLD-MCNC: 13.6 G/DL (ref 12–16)
IMM GRANULOCYTES # BLD AUTO: 0.1 K/UL (ref 0–0.11)
IMM GRANULOCYTES NFR BLD AUTO: 0.7 % (ref 0–0.9)
LYMPHOCYTES # BLD AUTO: 0.56 K/UL (ref 1–4.8)
LYMPHOCYTES NFR BLD: 3.8 % (ref 22–41)
MAGNESIUM SERPL-MCNC: 2 MG/DL (ref 1.5–2.5)
MCH RBC QN AUTO: 26.6 PG (ref 27–33)
MCHC RBC AUTO-ENTMCNC: 30.2 G/DL (ref 32.2–35.5)
MCV RBC AUTO: 87.9 FL (ref 81.4–97.8)
MONOCYTES # BLD AUTO: 1.6 K/UL (ref 0–0.85)
MONOCYTES NFR BLD AUTO: 10.9 % (ref 0–13.4)
MYCOBACTERIUM SPEC CULT: NORMAL
NEUTROPHILS # BLD AUTO: 12.18 K/UL (ref 1.82–7.42)
NEUTROPHILS NFR BLD: 83.1 % (ref 44–72)
NRBC # BLD AUTO: 0 K/UL
NRBC BLD-RTO: 0 /100 WBC (ref 0–0.2)
NT-PROBNP SERPL IA-MCNC: 158 PG/ML (ref 0–125)
PATH REV: NORMAL
PATH REV: NORMAL
PHOSPHATE SERPL-MCNC: 3 MG/DL (ref 2.5–4.5)
PLATELET # BLD AUTO: 535 K/UL (ref 164–446)
PMV BLD AUTO: 10.9 FL (ref 9–12.9)
POTASSIUM SERPL-SCNC: 3.4 MMOL/L (ref 3.6–5.5)
PROCALCITONIN SERPL-MCNC: 0.14 NG/ML
PROT SERPL-MCNC: 7.1 G/DL (ref 6–8.2)
RBC # BLD AUTO: 5.12 M/UL (ref 4.2–5.4)
RHODAMINE-AURAMINE STN SPEC: NORMAL
RHODAMINE-AURAMINE STN SPEC: NORMAL
SIGNIFICANT IND 70042: NORMAL
SITE SITE: NORMAL
SODIUM SERPL-SCNC: 138 MMOL/L (ref 135–145)
SOURCE SOURCE: NORMAL
TSH SERPL-ACNC: 0.65 UIU/ML (ref 0.35–5.5)
VIT B12 SERPL-MCNC: 867 PG/ML (ref 211–911)
WBC # BLD AUTO: 14.7 K/UL (ref 4.8–10.8)

## 2024-07-16 PROCEDURE — 36415 COLL VENOUS BLD VENIPUNCTURE: CPT

## 2024-07-16 PROCEDURE — 700102 HCHG RX REV CODE 250 W/ 637 OVERRIDE(OP): Mod: JZ | Performed by: FAMILY MEDICINE

## 2024-07-16 PROCEDURE — 82607 VITAMIN B-12: CPT

## 2024-07-16 PROCEDURE — 700102 HCHG RX REV CODE 250 W/ 637 OVERRIDE(OP): Performed by: STUDENT IN AN ORGANIZED HEALTH CARE EDUCATION/TRAINING PROGRAM

## 2024-07-16 PROCEDURE — 80053 COMPREHEN METABOLIC PANEL: CPT

## 2024-07-16 PROCEDURE — A9270 NON-COVERED ITEM OR SERVICE: HCPCS | Performed by: STUDENT IN AN ORGANIZED HEALTH CARE EDUCATION/TRAINING PROGRAM

## 2024-07-16 PROCEDURE — 84100 ASSAY OF PHOSPHORUS: CPT

## 2024-07-16 PROCEDURE — 700105 HCHG RX REV CODE 258: Performed by: STUDENT IN AN ORGANIZED HEALTH CARE EDUCATION/TRAINING PROGRAM

## 2024-07-16 PROCEDURE — 700111 HCHG RX REV CODE 636 W/ 250 OVERRIDE (IP): Mod: JZ | Performed by: INTERNAL MEDICINE

## 2024-07-16 PROCEDURE — 700111 HCHG RX REV CODE 636 W/ 250 OVERRIDE (IP): Mod: JZ | Performed by: STUDENT IN AN ORGANIZED HEALTH CARE EDUCATION/TRAINING PROGRAM

## 2024-07-16 PROCEDURE — 83880 ASSAY OF NATRIURETIC PEPTIDE: CPT

## 2024-07-16 PROCEDURE — 99233 SBSQ HOSP IP/OBS HIGH 50: CPT | Performed by: FAMILY MEDICINE

## 2024-07-16 PROCEDURE — 83735 ASSAY OF MAGNESIUM: CPT

## 2024-07-16 PROCEDURE — 92526 ORAL FUNCTION THERAPY: CPT

## 2024-07-16 PROCEDURE — 770006 HCHG ROOM/CARE - MED/SURG/GYN SEMI*

## 2024-07-16 PROCEDURE — 85025 COMPLETE CBC W/AUTO DIFF WBC: CPT

## 2024-07-16 PROCEDURE — A9270 NON-COVERED ITEM OR SERVICE: HCPCS | Mod: JZ | Performed by: FAMILY MEDICINE

## 2024-07-16 PROCEDURE — 84443 ASSAY THYROID STIM HORMONE: CPT

## 2024-07-16 PROCEDURE — 84145 PROCALCITONIN (PCT): CPT

## 2024-07-16 RX ORDER — AMOXICILLIN 250 MG
2 CAPSULE ORAL EVERY EVENING
Status: DISCONTINUED | OUTPATIENT
Start: 2024-07-16 | End: 2024-07-18 | Stop reason: HOSPADM

## 2024-07-16 RX ORDER — FUROSEMIDE 10 MG/ML
40 INJECTION INTRAMUSCULAR; INTRAVENOUS
Status: DISCONTINUED | OUTPATIENT
Start: 2024-07-17 | End: 2024-07-17

## 2024-07-16 RX ORDER — POLYETHYLENE GLYCOL 3350 17 G/17G
1 POWDER, FOR SOLUTION ORAL
Status: DISCONTINUED | OUTPATIENT
Start: 2024-07-16 | End: 2024-07-18 | Stop reason: HOSPADM

## 2024-07-16 RX ORDER — METOPROLOL TARTRATE 50 MG
50 TABLET ORAL 2 TIMES DAILY
Status: DISCONTINUED | OUTPATIENT
Start: 2024-07-16 | End: 2024-07-18 | Stop reason: HOSPADM

## 2024-07-16 RX ORDER — POTASSIUM CHLORIDE 1500 MG/1
40 TABLET, EXTENDED RELEASE ORAL DAILY
Status: DISCONTINUED | OUTPATIENT
Start: 2024-07-16 | End: 2024-07-17

## 2024-07-16 RX ADMIN — PREGABALIN 150 MG: 150 CAPSULE ORAL at 17:11

## 2024-07-16 RX ADMIN — AMITRIPTYLINE HYDROCHLORIDE 100 MG: 100 TABLET, FILM COATED ORAL at 20:25

## 2024-07-16 RX ADMIN — TAMSULOSIN HYDROCHLORIDE 0.4 MG: 0.4 CAPSULE ORAL at 20:25

## 2024-07-16 RX ADMIN — APIXABAN 5 MG: 5 TABLET, FILM COATED ORAL at 17:11

## 2024-07-16 RX ADMIN — SPIRONOLACTONE 25 MG: 25 TABLET ORAL at 05:22

## 2024-07-16 RX ADMIN — AMPICILLIN AND SULBACTAM 3 G: 1; 2 INJECTION, POWDER, FOR SOLUTION INTRAMUSCULAR; INTRAVENOUS at 17:12

## 2024-07-16 RX ADMIN — DULOXETINE HYDROCHLORIDE 30 MG: 30 CAPSULE, DELAYED RELEASE ORAL at 05:22

## 2024-07-16 RX ADMIN — APIXABAN 5 MG: 5 TABLET, FILM COATED ORAL at 05:22

## 2024-07-16 RX ADMIN — PREGABALIN 150 MG: 150 CAPSULE ORAL at 05:22

## 2024-07-16 RX ADMIN — ATORVASTATIN CALCIUM 10 MG: 10 TABLET, FILM COATED ORAL at 20:25

## 2024-07-16 RX ADMIN — METOPROLOL TARTRATE 50 MG: 50 TABLET, FILM COATED ORAL at 05:22

## 2024-07-16 RX ADMIN — AMPICILLIN AND SULBACTAM 3 G: 1; 2 INJECTION, POWDER, FOR SOLUTION INTRAMUSCULAR; INTRAVENOUS at 11:54

## 2024-07-16 RX ADMIN — SENNOSIDES AND DOCUSATE SODIUM 2 TABLET: 50; 8.6 TABLET ORAL at 17:11

## 2024-07-16 RX ADMIN — AMPICILLIN AND SULBACTAM 3 G: 1; 2 INJECTION, POWDER, FOR SOLUTION INTRAMUSCULAR; INTRAVENOUS at 23:35

## 2024-07-16 RX ADMIN — METOPROLOL TARTRATE 50 MG: 50 TABLET, FILM COATED ORAL at 17:11

## 2024-07-16 RX ADMIN — POTASSIUM CHLORIDE 40 MEQ: 1500 TABLET, EXTENDED RELEASE ORAL at 13:35

## 2024-07-16 RX ADMIN — FUROSEMIDE 40 MG: 10 INJECTION, SOLUTION INTRAVENOUS at 05:08

## 2024-07-16 RX ADMIN — AMPICILLIN AND SULBACTAM 3 G: 1; 2 INJECTION, POWDER, FOR SOLUTION INTRAMUSCULAR; INTRAVENOUS at 05:17

## 2024-07-16 ASSESSMENT — ENCOUNTER SYMPTOMS
FEVER: 0
INSOMNIA: 0
HEADACHES: 0
TREMORS: 0
WEAKNESS: 1
PALPITATIONS: 0
NAUSEA: 0
ABDOMINAL PAIN: 0
WHEEZING: 0
SEIZURES: 0
DIZZINESS: 0
FOCAL WEAKNESS: 0
NECK PAIN: 0
LOSS OF CONSCIOUSNESS: 0
HEARTBURN: 0
CHILLS: 0
MYALGIAS: 0
FLANK PAIN: 0
NERVOUS/ANXIOUS: 0
EYE PAIN: 0
BLURRED VISION: 0
FALLS: 0
HEMOPTYSIS: 0
VOMITING: 0
SENSORY CHANGE: 0
COUGH: 1
SHORTNESS OF BREATH: 1
SORE THROAT: 0
DIAPHORESIS: 0
SPEECH CHANGE: 0
EYE REDNESS: 0
BACK PAIN: 0
WEAKNESS: 0
CONSTIPATION: 0
SHORTNESS OF BREATH: 0
DIARRHEA: 0
BLOOD IN STOOL: 0

## 2024-07-16 ASSESSMENT — PAIN DESCRIPTION - PAIN TYPE: TYPE: ACUTE PAIN

## 2024-07-16 NOTE — PROGRESS NOTES
Hospital Medicine Daily Progress Note    Date of Service  7/16/2024    Chief Complaint  Tereza Sánchez is a 78 y.o. female admitted 7/12/2024 with shortness of breath    Hospital Course  Admitted with shortness of breath, noted to have right-sided pleural effusion.  She was started on empiric coverage azithromycin and Unasyn.  She underwent Ultrasound guided right sided diagnostic thoracentesis, 250 mL of fluid withdrawn on 7/15/224.    Interval Problem Update  Pleural effusion -fluid culture negative so far, cytology pending  Respiratory failure - O2 2 lpm  Hypertension - sbp   Afib - currently sinus  Low potassium    Updates given and plan of care discussed with patient's spouse who was at bedside.    I have discussed this patient's plan of care and discharge plan at IDT rounds today with Case Management, Nursing, Nursing leadership, and other members of the IDT team.    Consultants/Specialty  None    Code Status  Full Code    Disposition  The patient is not medically cleared for discharge to home or a post-acute facility.  Anticipate discharge to: home with organized home healthcare and close outpatient follow-up    I have placed the appropriate orders for post-discharge needs.    Review of Systems  Review of Systems   Constitutional:  Positive for malaise/fatigue. Negative for chills, diaphoresis and fever.   HENT:  Negative for congestion, hearing loss and sore throat.    Eyes:  Negative for blurred vision.   Respiratory:  Positive for cough. Negative for shortness of breath and wheezing.    Cardiovascular:  Positive for leg swelling. Negative for chest pain and palpitations.   Gastrointestinal:  Negative for abdominal pain, diarrhea, heartburn, nausea and vomiting.   Genitourinary:  Negative for dysuria, flank pain and hematuria.   Musculoskeletal:  Negative for back pain, joint pain, myalgias and neck pain.   Skin:  Negative for rash.   Neurological:  Positive for weakness. Negative for dizziness,  sensory change, speech change, focal weakness and headaches.   Psychiatric/Behavioral:  The patient is not nervous/anxious.         Physical Exam  Temp:  [36 °C (96.8 °F)-36.7 °C (98 °F)] 36 °C (96.8 °F)  Pulse:  [] 94  Resp:  [17-19] 18  BP: (102-129)/(63-85) 112/77  SpO2:  [93 %-99 %] 99 %    Physical Exam  Vitals and nursing note reviewed.   Constitutional:       Appearance: She is obese.   HENT:      Head: Normocephalic and atraumatic.      Nose: No congestion.      Mouth/Throat:      Mouth: Mucous membranes are moist.   Eyes:      Extraocular Movements: Extraocular movements intact.      Conjunctiva/sclera: Conjunctivae normal.   Cardiovascular:      Rate and Rhythm: Normal rate and regular rhythm.   Pulmonary:      Effort: Pulmonary effort is normal.      Breath sounds: Examination of the right-lower field reveals decreased breath sounds. Decreased breath sounds and rales present.   Abdominal:      General: There is no distension.      Tenderness: There is no abdominal tenderness. There is no guarding or rebound.   Musculoskeletal:      Cervical back: No tenderness.      Right lower leg: Edema present.      Left lower leg: Edema present.   Skin:     General: Skin is warm and dry.   Neurological:      General: No focal deficit present.      Mental Status: She is alert and oriented to person, place, and time.      Cranial Nerves: No cranial nerve deficit.   Psychiatric:         Speech: Speech is delayed.         Fluids    Intake/Output Summary (Last 24 hours) at 7/16/2024 1229  Last data filed at 7/16/2024 0300  Gross per 24 hour   Intake 120 ml   Output 1000 ml   Net -880 ml       Laboratory  Recent Labs     07/14/24  0014 07/15/24  0839 07/16/24  0844   WBC 11.4* 15.2* 14.7*   RBC 4.67 5.36 5.12   HEMOGLOBIN 13.0 14.9 13.6   HEMATOCRIT 41.4 47.2* 45.0   MCV 88.7 88.1 87.9   MCH 27.8 27.8 26.6*   MCHC 31.4* 31.6* 30.2*   RDW 52.2* 52.1* 52.2*   PLATELETCT 500* 537* 535*   MPV 10.7 11.0 10.9     Recent  Labs     07/14/24  0014 07/15/24  0839 07/16/24  0844   SODIUM 138 138 138   POTASSIUM 3.5* 3.4* 3.4*   CHLORIDE 97 91* 91*   CO2 29 31 34*   GLUCOSE 113* 125* 102*   BUN 16 13 16   CREATININE 0.60 0.60 0.64   CALCIUM 8.9 9.0 9.2                   Imaging  US-THORACENTESIS PUNCTURE RIGHT   Final Result      1. Ultrasound guided right sided diagnostic thoracentesis.      2. 250 mL of fluid withdrawn.      DX-CHEST-PORTABLE (1 VIEW)   Final Result      Interval improvement in aeration of the right lung status post right-sided thoracentesis. No pneumothorax is evident.      DX-CHEST-PORTABLE (1 VIEW)   Final Result      1.  Large right pleural effusion with opacification of the right mid and lower thorax. This has increased since prior chest radiograph 7/12/2024.   2.  Small linear metallic radiodensity projecting over the upper thoracic spine. Correlating with the previous CTA study from 5/8/2024, this metallic density is within the central canal of the thoracic spine. Etiology of this is unknown. Correlate    clinically.      US-CHEST   Final Result      Small volume of pleural fluid on the right. There is extensive consolidated lung within the right lung base which likely accounts for radiographic abnormality. Volume of pleural fluid is inadequate to safely perform thoracentesis.      DX-CHEST-PORTABLE (1 VIEW)   Final Result      Moderate to large sized right-sided pleural effusion with associated compressive atelectasis and or consolidation. Underlying infection is possible.           Assessment/Plan  * Acute on chronic hypoxic respiratory failure (HCC)- (present on admission)  Assessment & Plan  IV Lasix  RT protocol      Frailty syndrome in geriatric patient- (present on admission)  Assessment & Plan  PT and OT    Pleural effusion- (present on admission)  Assessment & Plan  Ultrasound guided right sided diagnostic thoracentesis, 250 mL of fluid withdrawn   7/15/224  Azithromycin and Unasyn  IV Lasix  Follow  fluid culture and cytology      Positive blood culture- (present on admission)  Assessment & Plan  Repeat blood cultures    History of pulmonary embolism- (present on admission)  Assessment & Plan  Eliquis    Paroxysmal atrial fibrillation (HCC)- (present on admission)  Assessment & Plan  Metoprolol, Eliquis      Hypokalemia- (present on admission)  Assessment & Plan  Start Kdur  Follow bmp, Mg, Ph    History of DVT (deep vein thrombosis)- (present on admission)  Assessment & Plan  Eliquis     Prediabetes- (present on admission)  Assessment & Plan  Hekc [id number]    Chronic back pain- (present on admission)  Assessment & Plan  Cymbalta, Lyrica, Baclofen  Pain control    Primary hypertension- (present on admission)  Assessment & Plan  Metoprolol, Aldactone    Insomnia- (present on admission)  Assessment & Plan  Elavil, Seroquel    Dyslipidemia- (present on admission)  Assessment & Plan  Lipitor         VTE prophylaxis:    therapeutic anticoagulation with eliquis 5 mg BID      I have performed a physical exam and reviewed and updated ROS and Plan today (7/16/2024). In review of yesterday's note (7/15/2024), there are no changes except as documented above.

## 2024-07-16 NOTE — PROGRESS NOTES
Hospital Medicine Daily Progress Note    Date of Service  7/15/2024    Chief Complaint  Tereza Sánchez is a 78 y.o. female admitted 7/12/2024 with Shortness of Breath (Increased SOB since 7/6, pt has oxygen tank at home and has had to titrate oxygen up to 2L this week. ) and Cough (2-3 weeks, non-productive. Endorses sore throat//Denies fevers)        Hospital Course  77yo BMI 32.77 w/ history of Chiari formation status post event ventriculoperitoneal shunt, cervical Amillia, SELWYN, chronic hypoxic respiratory failure requiring 2L supplemental oxygen, hypertension, hyperlipidemia, and known history of breast cancer, par Afib, DVT/ saddle PE c/b hematoma currently on anticoagulation, CoVID infection April, presented 7/12/2024 with worsening shortness of breath and difficulty swallowing. She also has a productive cough. Denies fevers or chills. She increased her O2 to 3L  In the ED afebrile, hemodynamically stable, placed on O2. CXR showed large R sided effusion assoc consolidation  Managing for R sided effusion, acute on chronic hypoxia. May 2024 echo nromal LV and RV. Empiric antibiotics and Lasix started   Initially IR US evaluated and felt effusion too small to tap  Pulmonology recommended course of antibiotics.   She was weak but conversing. Noted hypoxia on monitor but she took O2 off. Procalcitonin<0.05, Repeat CXR however show large R pleural effusion. Earlier pleural eff was too small to tap but since this has increased, reordered US thoracentesis and pleural fluid studies performed 7/15.   I discussed code status with  and patient. They confirm FULL CODE.   Advanced before but want to go home with  however he is also elderly. Ordered PTOT to eval and discuss if she is ready to home but most likely she will need skilled level.     Interval Problem Update  Patient seen and examine at bedside  I reviewed the chart along with vitals, labs, imaging, test (both pending and resulted) and  recommendations from specialists and interdisciplinary team.  Patient seen and examine at bedside  I reviewed the chart along with vitals, labs, imaging, test (both pending and resulted) and recommendations from specialists and interdisciplinary team.    7/15  Evaluated in room patient room, afebrile, pulse in the low 100s, respiratory rate 17-19 systolic blood pressure 102-148 pulse ox 90-97% on 2 L nasal cannula.  Leukocytosis 15.2 platelets 537 potassium 3.4 chloride 91 glucose 125 albumin 3.1.  P.o. potassium replacement ordered.  PT recommending home health.  Thoracentesis performed today, studies pending.  Feels weak, no new complaints.  Increased metoprolol to get better control.    I have discussed this patient's plan of care and discharge plan at IDT rounds today with Case Management, Nursing, Nursing leadership, and other members of the IDT team.    Consultants/Specialty  pulmonary    Code Status  Full Code    Disposition  Medically Cleared  I have placed the appropriate orders for post-discharge needs.    Review of Systems  Review of Systems   Constitutional:  Negative for chills and fever.   HENT:  Negative for congestion, hearing loss and nosebleeds.    Eyes:  Negative for pain and redness.   Respiratory:  Positive for cough and shortness of breath. Negative for hemoptysis and wheezing.    Cardiovascular:  Negative for chest pain and palpitations.   Gastrointestinal:  Negative for abdominal pain, blood in stool, constipation, diarrhea, nausea and vomiting.   Genitourinary:  Negative for dysuria, frequency and hematuria.   Musculoskeletal:  Negative for falls, joint pain and myalgias.   Skin:  Negative for rash.   Neurological:  Negative for dizziness, tremors, focal weakness, seizures, loss of consciousness, weakness and headaches.   Psychiatric/Behavioral:  The patient is not nervous/anxious and does not have insomnia.    All other systems reviewed and are negative.       Physical Exam  Temp:  [36 °C  (96.8 °F)-36.7 °C (98 °F)] 36.7 °C (98 °F)  Pulse:  [101-118] 103  Resp:  [17-19] 19  BP: (102-148)/(63-85) 129/85  SpO2:  [90 %-97 %] 93 %    Physical Exam  Vitals and nursing note reviewed.   Constitutional:       General: She is not in acute distress.  HENT:      Head: Normocephalic and atraumatic.      Right Ear: External ear normal.      Left Ear: External ear normal.      Nose: Nose normal.      Mouth/Throat:      Mouth: Mucous membranes are moist.   Eyes:      General: No scleral icterus.     Conjunctiva/sclera: Conjunctivae normal.   Cardiovascular:      Rate and Rhythm: Normal rate and regular rhythm.      Pulses: Normal pulses.      Heart sounds: No murmur heard.     No friction rub. No gallop.   Pulmonary:      Effort: Pulmonary effort is normal. No respiratory distress.      Breath sounds: Normal breath sounds. No stridor. No wheezing, rhonchi or rales.      Comments: Diminished at bases, crackle left base  Chest:      Chest wall: No tenderness.   Abdominal:      General: Abdomen is flat. Bowel sounds are normal. There is no distension.      Palpations: Abdomen is soft.      Tenderness: There is no abdominal tenderness. There is no guarding or rebound.   Musculoskeletal:         General: Deformity (b/l hands deformities c/w RA) present. No swelling or tenderness. Normal range of motion.      Cervical back: Normal range of motion and neck supple. No rigidity.   Skin:     General: Skin is warm.      Coloration: Skin is not jaundiced.   Neurological:      General: No focal deficit present.      Mental Status: She is alert and oriented to person, place, and time. Mental status is at baseline.   Psychiatric:         Mood and Affect: Mood normal.         Behavior: Behavior normal.         Thought Content: Thought content normal.         Judgment: Judgment normal.         Fluids    Intake/Output Summary (Last 24 hours) at 7/16/2024 0449  Last data filed at 7/16/2024 0300  Gross per 24 hour   Intake 240 ml    Output 1000 ml   Net -760 ml       Laboratory  Recent Labs     07/14/24  0014 07/15/24  0839   WBC 11.4* 15.2*   RBC 4.67 5.36   HEMOGLOBIN 13.0 14.9   HEMATOCRIT 41.4 47.2*   MCV 88.7 88.1   MCH 27.8 27.8   MCHC 31.4* 31.6*   RDW 52.2* 52.1*   PLATELETCT 500* 537*   MPV 10.7 11.0     Recent Labs     07/14/24  0014 07/15/24  0839   SODIUM 138 138   POTASSIUM 3.5* 3.4*   CHLORIDE 97 91*   CO2 29 31   GLUCOSE 113* 125*   BUN 16 13   CREATININE 0.60 0.60   CALCIUM 8.9 9.0                   Imaging  US-THORACENTESIS PUNCTURE RIGHT   Final Result      1. Ultrasound guided right sided diagnostic thoracentesis.      2. 250 mL of fluid withdrawn.      DX-CHEST-PORTABLE (1 VIEW)   Final Result      Interval improvement in aeration of the right lung status post right-sided thoracentesis. No pneumothorax is evident.      DX-CHEST-PORTABLE (1 VIEW)   Final Result      1.  Large right pleural effusion with opacification of the right mid and lower thorax. This has increased since prior chest radiograph 7/12/2024.   2.  Small linear metallic radiodensity projecting over the upper thoracic spine. Correlating with the previous CTA study from 5/8/2024, this metallic density is within the central canal of the thoracic spine. Etiology of this is unknown. Correlate    clinically.      US-CHEST   Final Result      Small volume of pleural fluid on the right. There is extensive consolidated lung within the right lung base which likely accounts for radiographic abnormality. Volume of pleural fluid is inadequate to safely perform thoracentesis.      DX-CHEST-PORTABLE (1 VIEW)   Final Result      Moderate to large sized right-sided pleural effusion with associated compressive atelectasis and or consolidation. Underlying infection is possible.           Assessment/Plan  * Acute on chronic hypoxic respiratory failure (HCC)- (present on admission)  Assessment & Plan  At baseline, patient requires 2 L supplemental oxygen.  There is some  underlying obstructive sleep apnea at baseline.  She did need to uptitrate her oxygen at home.  Currently requiring 3 L supplemental oxygen  CXR: Moderate to large sized right-sided pleural effusion with associated compressive atelectasis and or consolidation. Underlying infection is possible.   40 mg IV Lasix twice daily  Patient will need monitoring with daily labs  Continue home dose Aldactone  Follow-up procalcitonin levels and sputum cultures  Empirically started on Unasyn and azithromycin IV.  De-escalate when appropriate      Pleural effusion  Assessment & Plan  CXR: Moderate to large size right sided pleural effusion with associated compressive atelectasis and or consolidation.  There is some possibility of underlying infection.  Empirically starting the patient on IV Unasyn and azithromycin  Follow-up procalcitonin level  Follow-up sputum culture  Lasix 40 mg IV twice daily  Given her aggressive diuresis, patient will need close monitoring with daily labs  Day team consult pulmonology service if needed/if no improvement  Too small to tap but repeat CXR showed it enlarged; reordered thoracentesis  Pulmo recommends Abx course    Paroxysmal atrial fibrillation with RVR (HCC)- (present on admission)  Assessment & Plan  During her previous hospitalization back in May 2024.  She was going in and out of atrial fibrillation with RVR.  She was discharged on metoprolol.    Patient is currently on anticoagulation given her history of DVTs  Vitals:    07/14/24 1449   BP: 121/71   Pulse: (!) 110   Resp: 20   Temp: 36.2 °C (97.1 °F)   SpO2: 95%       Give BB      Acute deep vein thrombosis (DVT) of proximal vein of right lower extremity (HCC)- (present on admission)  Assessment & Plan  Resume home dose Apixiban     Chronic respiratory failure with hypoxia (HCC)- (present on admission)  Assessment & Plan  Patient states that she does use 2 L supplemental oxygen at baseline.  Underlying component of obstructive sleep  apnea         VTE prophylaxis: Eliquis    I have performed a physical exam and reviewed and updated ROS and Plan today (7/15/2024). In review of yesterday's note (7/14/2024), there are no changes except as documented above.    Patient is has a high medical complexity, complex decision making and is at high risk for complication, morbidity, and mortality, thus requiring the highest level of my preparedness for sudden, emergent intervention. Medical decision making is therefore complex. I provided  services, which included ordering labs and/or imaging, and discussing the case with various consultants.medication orders, frequent reevaluations of the patient's condition and response to treatment. Time was also devoted to counseling and coordinating care including review of records, pertinent lab data and studies, as well as discussing diagnostic evaluation and work up, planned therapeutic interventions and future disposition of care. Where indicated, the assessment and plan reflect discussion of patient with consultants, other healthcare providers, family members, and additional research needed to obtain further information in formulating the plan of care for Tereza Sánchez. Total time spent was 55 minutes.

## 2024-07-16 NOTE — PROGRESS NOTES
Received bedside report from previous RN, patient is alert and oriented x4. On o2 at 2.5L via NC with o2 sat of 93% at this time. Q2 turns. Fall precautions in place and call light within reach. All other needs met at this time.

## 2024-07-16 NOTE — DISCHARGE PLANNING
HTH/SCP TCN chart review completed. Collaborated with CM.  Per chart review, PT recommends HH.  Patient and spouse declined HH/SNF/Rehab and requested for discharge home when medically cleared.  If son is unavailable to assist patient will need gurney transportation home.  CM aware.   Current discharge considerations are for Home with close outpatient f/u when medically cleared.      TCN will continue to follow and collaborate with discharge planning team as additional post acute needs arise. Thank you.    Completed:  PT recommends HH on 7/15/24.    Choice obtained: none currently indicated; see above; note that pt and spouse current preference is for eventual dc to home with outpatient follow ups if indicated  SCP with [facility]. discussed possible outpatient transitional PCP follow up if indicated and pt in agreement; sent information to assist in scheduling.  PCP f/u scheduled for 07.22.24 at 1:20 pm.  She also has a hospital follow up on sept 4th 8:20 am (member scheduled on her own through my chart).

## 2024-07-16 NOTE — THERAPY
"Speech Language Pathology   Daily Treatment     Patient Name: Tereza Sánchez  AGE:  78 y.o., SEX:  female  Medical Record #: [id number]  Date of Service: 7/16/2024      Precautions:  Precautions: Fall Risk, Swallow Precautions       Subjective  Pt agreeable and cooperative with SLP tx tasks; however, she reported she hasn't felt like eating the past few days. Further reported that she \"sits alone and thinks about all the stuff I can't do.\" RN and MD updated to these comments and generally concern that patient may be experiencing some hopelessness/depression.      Assessment  Pt seen for dysphagia management. On 2.5L NC, repositioned to upright in bed by SLP and CNA. Pt required A for feeding. Trials of TN0 and LQ3 consumed; trials of RG7 offered but patient declined. Appropriate oral bolus stripping and containment. Presumed complete AP transit with effective bolus formation evidenced by complete clearance of bolus from oral cavity. No overt s/sx of aspiration noted w/ single and sequential sips of TN0 water. One to two swallows appreciated per bolus. No reported globus during trials; pt does continue to endorse previous instances of globus. Education provided re swallow anatomy and physiology and rationale for GI consult in NLOC.       Clinical Impressions  Pt appears to be appropriate for continuation of current diet recommendations. Please use reflux precautions given concern for possible pharyngeal vs. esophageal dysphagia related to globus sensation. RN and MD updated to recommendations for GI in NLOC and updated to concern for patient's report of hopelessness.       Recommendations  Regular solids / thin liquids  Instrumentation: None indicated at this time  Medication: As tolerated  Supervision: Careful 1:1 hand feeding (from staff or family)  Positioning: Fully upright and midline during oral intake, Remain upright for 30-45 minutes after oral intake  Risk Management : Small bites/sips, Slow rate of intake, " "Alternate bites and sips  Oral Care: BID    Consult Referrals: GI (NLOC); Psychiatry      SLP Treatment Plan  Treatment Plan: Dysphagia Treatment, Patient/Family/Caregiver Training  SLP Frequency: 3x Per Week  Estimated Duration: Until Therapy Goals Met      Anticipated Discharge Needs  Discharge Recommendations: Recommend outpatient speech therapy services (Consider OP GI services)  Therapy Recommendations Upon DC: Dysphagia Training, Community Re-Integration, Patient / Family / Caregiver Education      Patient / Family Goals  Patient / Family Goal #1: \"I'll pick and choose what I want to eat.\"  Goal #1 Outcome: Progressing slower than expected  Short Term Goals  Short Term Goal # 1: Pt will consume a regular solid/thin liquid diet without any overt s/s of aspiration or decline in pulmonary status.  Goal Outcome # 1: Progressing slower than expected      Fani Gamez, SLP  "

## 2024-07-16 NOTE — CARE PLAN
The patient is Stable - Low risk of patient condition declining or worsening    Shift Goals  Clinical Goals: Monitor o2 sat and continue abx  Patient Goals: rest  Family Goals: erich    Progress made toward(s) clinical / shift goals: updated on POC and verbalized understanding. Medicated per MAR and tolerated well. Q2 turns. Needs attended.    Patient is not progressing towards the following goals:      Problem: Knowledge Deficit - Standard  Goal: Patient and family/care givers will demonstrate understanding of plan of care, disease process/condition, diagnostic tests and medications  Description: Target End Date:  1-3 days or as soon as patient condition allows    Document in Patient Education    1.  Patient and family/caregiver oriented to unit, equipment, visitation policy and means for communicating concern  2.  Complete/review Learning Assessment  3.  Assess knowledge level of disease process/condition, treatment plan, diagnostic tests and medications  4.  Explain disease process/condition, treatment plan, diagnostic tests and medications  Outcome: Not Progressing

## 2024-07-16 NOTE — THERAPY
Occupational Therapy Contact Note    Patient Name: Tereza Sánchez  Age:  78 y.o., Sex:  female  Medical Record #: [id number]  Today's Date: 7/16/2024 07/16/24 0919   Interdisciplinary Plan of Care Collaboration   Collaboration Comments OT evaluation attempted, met with pt and spouse at bedside. Pt's spouse is full time caregiver and reports no new needs or concerns for DC home once medically cleared. Explained OT role and services available, spouse and pt polietly declined interest. Evaluation not done.

## 2024-07-16 NOTE — DISCHARGE PLANNING
TCN following. HTH/SCP chart reviewed.  Patient and spouse decline HH/SNF/Rehab and request for discharge home when medically cleared.  If son is unavailable to assist patient will need gurney transportation home.  CM aware.  No new TCN needs identified. Please see prior TCN note from 7/13/24 for most recent discharge planning considerations if indicated.     Completed:  Choice obtained: none currently indicated; see above; note that pt and spouse current preference is for eventual dc to home with outpatient follow ups if indicated  SCP with [facility]. discussed possible outpatient transitional PCP follow up if indicated and pt in agreement; sent information to assist in scheduling.  PCP f/u scheduled for 07.22.24 at 1:20 pm.  She also has a hospital follow up on sept 4th 8:20 am (member scheduled on her own through my chart).       Addendum:  2009- Per chart review, PT recommends HH on 7/15/24.

## 2024-07-16 NOTE — HOSPITAL COURSE
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 She also has a productive cough. Denies fevers or chills. She increased her O2 to 3L  In the ED afebrile, hemodynamically stable, placed on O2. CXR showed large R sided effusion assoc consolidation  Managing for R sided effusion, acute on chronic hypoxia. May 2024 echo nromal LV and RV. Empiric antibiotics and Lasix started   Initially IR US evaluated and felt effusion too small to tap  Pulmonology recommended course of antibiotics.   She was weak but conversing. Noted hypoxia on monitor but she took O2 off. Procalcitonin<0.05, Repeat CXR however show large R pleural effusion. Earlier pleural eff was too small to tap but since this has increased, reordered US thoracentesis and pleural fluid studies performed 7/15.   I discussed code status with  and patient. They confirm FULL CODE.   Advanced before but want to go home with  however he is also elderly. Ordered PTOT to eval and discuss if she is ready to home but most likely she will need skilled level.

## 2024-07-16 NOTE — CARE PLAN
The patient is Stable - Low risk of patient condition declining or worsening    Shift Goals  Clinical Goals: iv abx, encouraged increase in oral intake    Progress made toward(s) clinical / shift goals:  Q6 iv abx still in place. Pt. Still refused to eat, encouraged to drink fluids and juices. Pt. Refused to mobilize at this time. Educated about fall risks and prec. Bed alarm on. Needs attended.      Problem: Knowledge Deficit - Standard  Goal: Patient and family/care givers will demonstrate understanding of plan of care, disease process/condition, diagnostic tests and medications  Outcome: Progressing     Problem: Skin Integrity  Goal: Skin integrity is maintained or improved  Outcome: Progressing     Problem: Fall Risk  Goal: Patient will remain free from falls  Outcome: Progressing     Problem: Pain - Standard  Goal: Alleviation of pain or a reduction in pain to the patient’s comfort goal  Outcome: Progressing       Patient is not progressing towards the following goals:

## 2024-07-17 ENCOUNTER — HOME HEALTH ADMISSION (OUTPATIENT)
Dept: HOME HEALTH SERVICES | Facility: HOME HEALTHCARE | Age: 79
End: 2024-07-17
Payer: MEDICARE

## 2024-07-17 LAB
ANION GAP SERPL CALC-SCNC: 12 MMOL/L (ref 7–16)
BACTERIA BLD CULT: NORMAL
BUN SERPL-MCNC: 16 MG/DL (ref 8–22)
CALCIUM SERPL-MCNC: 9.1 MG/DL (ref 8.5–10.5)
CHLORIDE SERPL-SCNC: 92 MMOL/L (ref 96–112)
CO2 SERPL-SCNC: 33 MMOL/L (ref 20–33)
CREAT SERPL-MCNC: 0.61 MG/DL (ref 0.5–1.4)
ERYTHROCYTE [DISTWIDTH] IN BLOOD BY AUTOMATED COUNT: 52.1 FL (ref 35.9–50)
EST. AVERAGE GLUCOSE BLD GHB EST-MCNC: 126 MG/DL
GFR SERPLBLD CREATININE-BSD FMLA CKD-EPI: 92 ML/MIN/1.73 M 2
GLUCOSE SERPL-MCNC: 96 MG/DL (ref 65–99)
HBA1C MFR BLD: 6 % (ref 4–5.6)
HCT VFR BLD AUTO: 42.5 % (ref 37–47)
HGB BLD-MCNC: 13 G/DL (ref 12–16)
MAGNESIUM SERPL-MCNC: 2 MG/DL (ref 1.5–2.5)
MCH RBC QN AUTO: 26.8 PG (ref 27–33)
MCHC RBC AUTO-ENTMCNC: 30.6 G/DL (ref 32.2–35.5)
MCV RBC AUTO: 87.6 FL (ref 81.4–97.8)
PHOSPHATE SERPL-MCNC: 3.3 MG/DL (ref 2.5–4.5)
PLATELET # BLD AUTO: 471 K/UL (ref 164–446)
PMV BLD AUTO: 11.2 FL (ref 9–12.9)
POTASSIUM SERPL-SCNC: 3.9 MMOL/L (ref 3.6–5.5)
PROCALCITONIN SERPL-MCNC: 0.17 NG/ML
RBC # BLD AUTO: 4.85 M/UL (ref 4.2–5.4)
SIGNIFICANT IND 70042: NORMAL
SITE SITE: NORMAL
SODIUM SERPL-SCNC: 137 MMOL/L (ref 135–145)
SOURCE SOURCE: NORMAL
WBC # BLD AUTO: 12.2 K/UL (ref 4.8–10.8)

## 2024-07-17 PROCEDURE — 87040 BLOOD CULTURE FOR BACTERIA: CPT | Mod: 91

## 2024-07-17 PROCEDURE — 700102 HCHG RX REV CODE 250 W/ 637 OVERRIDE(OP): Performed by: STUDENT IN AN ORGANIZED HEALTH CARE EDUCATION/TRAINING PROGRAM

## 2024-07-17 PROCEDURE — 84145 PROCALCITONIN (PCT): CPT

## 2024-07-17 PROCEDURE — 770006 HCHG ROOM/CARE - MED/SURG/GYN SEMI*

## 2024-07-17 PROCEDURE — 83735 ASSAY OF MAGNESIUM: CPT

## 2024-07-17 PROCEDURE — 84100 ASSAY OF PHOSPHORUS: CPT

## 2024-07-17 PROCEDURE — 36415 COLL VENOUS BLD VENIPUNCTURE: CPT

## 2024-07-17 PROCEDURE — 700105 HCHG RX REV CODE 258: Performed by: STUDENT IN AN ORGANIZED HEALTH CARE EDUCATION/TRAINING PROGRAM

## 2024-07-17 PROCEDURE — 83036 HEMOGLOBIN GLYCOSYLATED A1C: CPT

## 2024-07-17 PROCEDURE — 80048 BASIC METABOLIC PNL TOTAL CA: CPT

## 2024-07-17 PROCEDURE — A9270 NON-COVERED ITEM OR SERVICE: HCPCS | Performed by: STUDENT IN AN ORGANIZED HEALTH CARE EDUCATION/TRAINING PROGRAM

## 2024-07-17 PROCEDURE — 99232 SBSQ HOSP IP/OBS MODERATE 35: CPT | Performed by: FAMILY MEDICINE

## 2024-07-17 PROCEDURE — 85027 COMPLETE CBC AUTOMATED: CPT

## 2024-07-17 PROCEDURE — 700111 HCHG RX REV CODE 636 W/ 250 OVERRIDE (IP): Mod: JZ | Performed by: STUDENT IN AN ORGANIZED HEALTH CARE EDUCATION/TRAINING PROGRAM

## 2024-07-17 PROCEDURE — 700102 HCHG RX REV CODE 250 W/ 637 OVERRIDE(OP): Mod: JZ | Performed by: FAMILY MEDICINE

## 2024-07-17 PROCEDURE — A9270 NON-COVERED ITEM OR SERVICE: HCPCS | Mod: JZ | Performed by: FAMILY MEDICINE

## 2024-07-17 PROCEDURE — 700111 HCHG RX REV CODE 636 W/ 250 OVERRIDE (IP): Mod: JZ | Performed by: FAMILY MEDICINE

## 2024-07-17 RX ADMIN — APIXABAN 5 MG: 5 TABLET, FILM COATED ORAL at 16:54

## 2024-07-17 RX ADMIN — SPIRONOLACTONE 25 MG: 25 TABLET ORAL at 04:23

## 2024-07-17 RX ADMIN — AMPICILLIN AND SULBACTAM 3 G: 1; 2 INJECTION, POWDER, FOR SOLUTION INTRAMUSCULAR; INTRAVENOUS at 23:21

## 2024-07-17 RX ADMIN — FUROSEMIDE 40 MG: 10 INJECTION INTRAMUSCULAR; INTRAVENOUS at 05:21

## 2024-07-17 RX ADMIN — METOPROLOL TARTRATE 50 MG: 50 TABLET, FILM COATED ORAL at 04:23

## 2024-07-17 RX ADMIN — AMPICILLIN AND SULBACTAM 3 G: 1; 2 INJECTION, POWDER, FOR SOLUTION INTRAMUSCULAR; INTRAVENOUS at 11:42

## 2024-07-17 RX ADMIN — TAMSULOSIN HYDROCHLORIDE 0.4 MG: 0.4 CAPSULE ORAL at 21:04

## 2024-07-17 RX ADMIN — PREGABALIN 150 MG: 150 CAPSULE ORAL at 04:23

## 2024-07-17 RX ADMIN — METOPROLOL TARTRATE 50 MG: 50 TABLET, FILM COATED ORAL at 16:53

## 2024-07-17 RX ADMIN — AMPICILLIN AND SULBACTAM 3 G: 1; 2 INJECTION, POWDER, FOR SOLUTION INTRAMUSCULAR; INTRAVENOUS at 16:58

## 2024-07-17 RX ADMIN — APIXABAN 5 MG: 5 TABLET, FILM COATED ORAL at 04:23

## 2024-07-17 RX ADMIN — AMITRIPTYLINE HYDROCHLORIDE 100 MG: 100 TABLET, FILM COATED ORAL at 21:03

## 2024-07-17 RX ADMIN — POTASSIUM CHLORIDE 40 MEQ: 1500 TABLET, EXTENDED RELEASE ORAL at 04:22

## 2024-07-17 RX ADMIN — DULOXETINE HYDROCHLORIDE 30 MG: 30 CAPSULE, DELAYED RELEASE ORAL at 04:23

## 2024-07-17 RX ADMIN — POLYETHYLENE GLYCOL 3350 1 PACKET: 17 POWDER, FOR SOLUTION ORAL at 04:32

## 2024-07-17 RX ADMIN — PREGABALIN 150 MG: 150 CAPSULE ORAL at 16:53

## 2024-07-17 RX ADMIN — AMPICILLIN AND SULBACTAM 3 G: 1; 2 INJECTION, POWDER, FOR SOLUTION INTRAMUSCULAR; INTRAVENOUS at 05:27

## 2024-07-17 RX ADMIN — ATORVASTATIN CALCIUM 10 MG: 10 TABLET, FILM COATED ORAL at 21:04

## 2024-07-17 ASSESSMENT — ENCOUNTER SYMPTOMS
SORE THROAT: 0
HEARTBURN: 0
WEAKNESS: 1
FEVER: 0
MYALGIAS: 0
FLANK PAIN: 0
DIAPHORESIS: 0
ABDOMINAL PAIN: 0
WHEEZING: 0
CHILLS: 0
BACK PAIN: 0
NECK PAIN: 0
SPEECH CHANGE: 0
BLURRED VISION: 0
SHORTNESS OF BREATH: 0
SENSORY CHANGE: 0
COUGH: 1
VOMITING: 0
DIZZINESS: 0
FOCAL WEAKNESS: 0
PALPITATIONS: 0
NERVOUS/ANXIOUS: 0
HEADACHES: 0
NAUSEA: 0
DIARRHEA: 0

## 2024-07-17 ASSESSMENT — PAIN DESCRIPTION - PAIN TYPE: TYPE: ACUTE PAIN

## 2024-07-17 NOTE — PROGRESS NOTES
Hourly rounds performed. Patient is resting comfortably. Denies pain and SOB. Fall precautions continued and call light within reach. All other needs met at this time.

## 2024-07-17 NOTE — FACE TO FACE
Face to Face Supporting Documentation - Home Health    The encounter with this patient was in whole or in part the primary reason for home health admission.    Date of encounter:   Patient:                    MRN:                       YOB: 2022  Tereza Sánchez  [id number]  1945     Home health to see patient for:  Skilled Nursing care for assessment, interventions & education, Home health aide, Physical Therapy evaluation and treatment, and Occupational therapy evaluation and treatment    Skilled need for:  Exacerbation of Chronic Disease State acute on chronic respiratory failure    Skilled nursing interventions to include:  Comment: PT and OT    Homebound status evidenced by:  Needs the assistance of another person in order to leave the home. Leaving home requires a considerable and taxing effort. There is a normal inability to leave the home.    Community Physician to provide follow up care: Michelle Jim P.A.-C.     Optional Interventions? No      I certify the face to face encounter for this home health care referral meets the CMS requirements and the encounter/clinical assessment with the patient was, in whole, or in part, for the medical condition(s) listed above, which is the primary reason for home health care. Based on my clinical findings: the service(s) are medically necessary, support the need for home health care, and the homebound criteria are met.  I certify that this patient has had a face to face encounter by myself.  David Myles M.D. - VARINDERI: [phone]

## 2024-07-17 NOTE — PROGRESS NOTES
Received bedside report from previous RN, patient is alert and oriented x4. On o2 at 2.5L via NC with o2 sat of 94% at this time. Denies pain and SOB.  Fall precautions in place and call light within reach. All other needs met at this time.

## 2024-07-17 NOTE — CARE PLAN
The patient is Stable - Low risk of patient condition declining or worsening    Shift Goals  Clinical Goals: continue abx and have BM  Patient Goals: rest  Family Goals: erich    Progress made toward(s) clinical / shift goals:  updated patient on POC and verbalized understanding. Medicated per MAR and tolerated well. Q2 turns. Miralax given. Needs attended.     Patient is not progressing towards the following goals:      Problem: Knowledge Deficit - Standard  Goal: Patient and family/care givers will demonstrate understanding of plan of care, disease process/condition, diagnostic tests and medications  Description: Target End Date:  1-3 days or as soon as patient condition allows    Document in Patient Education    1.  Patient and family/caregiver oriented to unit, equipment, visitation policy and means for communicating concern  2.  Complete/review Learning Assessment  3.  Assess knowledge level of disease process/condition, treatment plan, diagnostic tests and medications  4.  Explain disease process/condition, treatment plan, diagnostic tests and medications  Outcome: Not Progressing

## 2024-07-17 NOTE — PROGRESS NOTES
Hospital Medicine Daily Progress Note    Date of Service  7/17/2024    Chief Complaint  Tereza Sánchez is a 78 y.o. female admitted 7/12/2024 with shortness of breath    Hospital Course  Admitted with shortness of breath, noted to have right-sided pleural effusion.  She was started on empiric coverage azithromycin and Unasyn.  She underwent Ultrasound guided right sided diagnostic thoracentesis, 250 mL of fluid withdrawn on 7/15/224.    Interval Problem Update  Pleural effusion -fluid culture negative so far, cytology pending  Respiratory failure - O2 2 lpm  Hypertension - sbp   Afib - currently sinus    Updates given and plan of care discussed with patient's spouse who was at bedside.    I have discussed this patient's plan of care and discharge plan at IDT rounds today with Case Management, Nursing, Nursing leadership, and other members of the IDT team.    Consultants/Specialty  None    Code Status  Full Code    Disposition  The patient is not medically cleared for discharge to home or a post-acute facility.  Anticipate discharge to: home with organized home healthcare and close outpatient follow-up    I have placed the appropriate orders for post-discharge needs.    Review of Systems  Review of Systems   Constitutional:  Positive for malaise/fatigue. Negative for chills, diaphoresis and fever.   HENT:  Negative for congestion, hearing loss and sore throat.    Eyes:  Negative for blurred vision.   Respiratory:  Positive for cough. Negative for shortness of breath and wheezing.    Cardiovascular:  Positive for leg swelling. Negative for chest pain and palpitations.   Gastrointestinal:  Negative for abdominal pain, diarrhea, heartburn, nausea and vomiting.   Genitourinary:  Negative for dysuria, flank pain and hematuria.   Musculoskeletal:  Negative for back pain, joint pain, myalgias and neck pain.   Skin:  Negative for rash.   Neurological:  Positive for weakness. Negative for dizziness, sensory change,  speech change, focal weakness and headaches.   Psychiatric/Behavioral:  The patient is not nervous/anxious.         Physical Exam  Temp:  [36.1 °C (97 °F)-36.7 °C (98.1 °F)] 36.2 °C (97.1 °F)  Pulse:  [] 90  Resp:  [18-19] 18  BP: ()/(60-89) 93/60  SpO2:  [94 %-99 %] 99 %    Physical Exam  Vitals and nursing note reviewed.   Constitutional:       Appearance: She is obese.   HENT:      Head: Normocephalic and atraumatic.      Nose: No congestion.      Mouth/Throat:      Mouth: Mucous membranes are moist.   Eyes:      Extraocular Movements: Extraocular movements intact.      Conjunctiva/sclera: Conjunctivae normal.   Cardiovascular:      Rate and Rhythm: Normal rate and regular rhythm.   Pulmonary:      Effort: Pulmonary effort is normal.      Breath sounds: Examination of the right-lower field reveals decreased breath sounds. Decreased breath sounds and rales present.   Abdominal:      General: There is no distension.      Tenderness: There is no abdominal tenderness. There is no guarding or rebound.   Musculoskeletal:      Cervical back: No tenderness.      Right lower leg: Edema present.      Left lower leg: Edema present.   Skin:     General: Skin is warm and dry.   Neurological:      General: No focal deficit present.      Mental Status: She is alert and oriented to person, place, and time.      Cranial Nerves: No cranial nerve deficit.   Psychiatric:         Speech: Speech is delayed.         Fluids    Intake/Output Summary (Last 24 hours) at 7/17/2024 1309  Last data filed at 7/16/2024 2200  Gross per 24 hour   Intake 340 ml   Output --   Net 340 ml       Laboratory  Recent Labs     07/15/24  0839 07/16/24  0844 07/17/24  0904   WBC 15.2* 14.7* 12.2*   RBC 5.36 5.12 4.85   HEMOGLOBIN 14.9 13.6 13.0   HEMATOCRIT 47.2* 45.0 42.5   MCV 88.1 87.9 87.6   MCH 27.8 26.6* 26.8*   MCHC 31.6* 30.2* 30.6*   RDW 52.1* 52.2* 52.1*   PLATELETCT 537* 535* 471*   MPV 11.0 10.9 11.2     Recent Labs      07/15/24  0839 07/16/24  0844 07/17/24  0904   SODIUM 138 138 137   POTASSIUM 3.4* 3.4* 3.9   CHLORIDE 91* 91* 92*   CO2 31 34* 33   GLUCOSE 125* 102* 96   BUN 13 16 16   CREATININE 0.60 0.64 0.61   CALCIUM 9.0 9.2 9.1                   Imaging  US-THORACENTESIS PUNCTURE RIGHT   Final Result      1. Ultrasound guided right sided diagnostic thoracentesis.      2. 250 mL of fluid withdrawn.      DX-CHEST-PORTABLE (1 VIEW)   Final Result      Interval improvement in aeration of the right lung status post right-sided thoracentesis. No pneumothorax is evident.      DX-CHEST-PORTABLE (1 VIEW)   Final Result      1.  Large right pleural effusion with opacification of the right mid and lower thorax. This has increased since prior chest radiograph 7/12/2024.   2.  Small linear metallic radiodensity projecting over the upper thoracic spine. Correlating with the previous CTA study from 5/8/2024, this metallic density is within the central canal of the thoracic spine. Etiology of this is unknown. Correlate    clinically.      US-CHEST   Final Result      Small volume of pleural fluid on the right. There is extensive consolidated lung within the right lung base which likely accounts for radiographic abnormality. Volume of pleural fluid is inadequate to safely perform thoracentesis.      DX-CHEST-PORTABLE (1 VIEW)   Final Result      Moderate to large sized right-sided pleural effusion with associated compressive atelectasis and or consolidation. Underlying infection is possible.           Assessment/Plan  * Acute on chronic hypoxic respiratory failure (HCC)- (present on admission)  Assessment & Plan  Stop IV Lasix  RT protocol      Frailty syndrome in geriatric patient- (present on admission)  Assessment & Plan  PT and OT    Pleural effusion- (present on admission)  Assessment & Plan  Ultrasound guided right sided diagnostic thoracentesis, 250 mL of fluid withdrawn   7/15/224  Unasyn  Finished course of azithromycin  Stop IV  Lasix  Follow fluid culture and cytology      Thrombocytosis- (present on admission)  Assessment & Plan  Follow cbc    Positive blood culture- (present on admission)  Assessment & Plan  Repeat blood cultures    History of pulmonary embolism- (present on admission)  Assessment & Plan  Eliquis    Paroxysmal atrial fibrillation (HCC)- (present on admission)  Assessment & Plan  Metoprolol, Eliquis      Hypokalemia- (present on admission)  Assessment & Plan  Kdur  Follow bmp    History of DVT (deep vein thrombosis)- (present on admission)  Assessment & Plan  Eliquis     Prediabetes- (present on admission)  Assessment & Plan  Hgba1c 6.0    SELWYN (obstructive sleep apnea)- (present on admission)  Assessment & Plan  O2 only, does not require CPAP per patient and spouse    Chronic back pain- (present on admission)  Assessment & Plan  Cymbalta, Lyrica, Baclofen  Pain control    Primary hypertension- (present on admission)  Assessment & Plan  Metoprolol, Aldactone    Insomnia- (present on admission)  Assessment & Plan  Elavil, Seroquel    Dyslipidemia- (present on admission)  Assessment & Plan  Lipitor         VTE prophylaxis:    therapeutic anticoagulation with eliquis 5 mg BID      I have performed a physical exam and reviewed and updated ROS and Plan today (7/17/2024). In review of yesterday's note (7/16/2024), there are no changes except as documented above.

## 2024-07-17 NOTE — DISCHARGE PLANNING
Received Choice form at 8116  Agency/Facility Name: [facility]  Referral sent per Choice form @ 0280

## 2024-07-17 NOTE — DISCHARGE PLANNING
HTH/SCP TCN chart review completed. Collaborated with YOSEF Gilmore. Current discharge considerations are anticipated for dc to home with HH services per PT recs. Patient seen at bedside as noted pt was initially declining any post acute services. Pt reports MD discussed dc planning options with pt in AM and pt and spouse have now discussed as well. Pt reports she would now be agreeable to HH services if continues to be indicated and note MD has placed HH FTF/order on 7/16 as well. Relayed above information to YOSEF and TCN obtained HH choice and faxed to [facility] to file in media for dc planning needs. TCN will continue to follow and collaborate with discharge planning team as additional post acute needs arise. Thank you.    Completed:  PT with recommendations for HH on 7/15  OT has screened pt with no additional OT needs  Choice obtained: [facility] ([facility])  SSCP with [facility]. discussed possible outpatient transitional PCP follow up if indicated and pt in agreement; sent information to assist in scheduling. PCP f/u scheduled for 07.22.24 at 1:20 pm. She also has a hospital follow up on sept 4th 8:20 am (member scheduled on her own through my chart).

## 2024-07-17 NOTE — DISCHARGE PLANNING
ATTN: Case Management  RE: Referral for Home Health    As of 7/17/2024, we have accepted the Home Health referral for the patient listed above.    A [facility] clinician will be out to see the patient within 48 hours. If you have any questions or concerns regarding the patient’s transition to Home Health, please do not hesitate to contact us at [phone].      We look forward to collaborating with you,  [facility] Health Team

## 2024-07-18 VITALS
TEMPERATURE: 98 F | HEIGHT: 60 IN | OXYGEN SATURATION: 98 % | WEIGHT: 167.55 LBS | RESPIRATION RATE: 18 BRPM | HEART RATE: 101 BPM | SYSTOLIC BLOOD PRESSURE: 133 MMHG | DIASTOLIC BLOOD PRESSURE: 65 MMHG | BODY MASS INDEX: 32.89 KG/M2

## 2024-07-18 LAB
ANION GAP SERPL CALC-SCNC: 16 MMOL/L (ref 7–16)
BUN SERPL-MCNC: 17 MG/DL (ref 8–22)
CALCIUM SERPL-MCNC: 8.8 MG/DL (ref 8.5–10.5)
CHLORIDE SERPL-SCNC: 92 MMOL/L (ref 96–112)
CO2 SERPL-SCNC: 30 MMOL/L (ref 20–33)
CREAT SERPL-MCNC: 0.51 MG/DL (ref 0.5–1.4)
ERYTHROCYTE [DISTWIDTH] IN BLOOD BY AUTOMATED COUNT: 51.8 FL (ref 35.9–50)
GFR SERPLBLD CREATININE-BSD FMLA CKD-EPI: 96 ML/MIN/1.73 M 2
GLUCOSE SERPL-MCNC: 76 MG/DL (ref 65–99)
HCT VFR BLD AUTO: 38.9 % (ref 37–47)
HGB BLD-MCNC: 12.3 G/DL (ref 12–16)
MCH RBC QN AUTO: 27.6 PG (ref 27–33)
MCHC RBC AUTO-ENTMCNC: 31.6 G/DL (ref 32.2–35.5)
MCV RBC AUTO: 87.4 FL (ref 81.4–97.8)
PLATELET # BLD AUTO: 459 K/UL (ref 164–446)
PMV BLD AUTO: 11 FL (ref 9–12.9)
POTASSIUM SERPL-SCNC: 3.7 MMOL/L (ref 3.6–5.5)
RBC # BLD AUTO: 4.45 M/UL (ref 4.2–5.4)
SODIUM SERPL-SCNC: 138 MMOL/L (ref 135–145)
WBC # BLD AUTO: 12.4 K/UL (ref 4.8–10.8)

## 2024-07-18 PROCEDURE — A9270 NON-COVERED ITEM OR SERVICE: HCPCS | Performed by: STUDENT IN AN ORGANIZED HEALTH CARE EDUCATION/TRAINING PROGRAM

## 2024-07-18 PROCEDURE — 97602 WOUND(S) CARE NON-SELECTIVE: CPT

## 2024-07-18 PROCEDURE — 700102 HCHG RX REV CODE 250 W/ 637 OVERRIDE(OP): Performed by: STUDENT IN AN ORGANIZED HEALTH CARE EDUCATION/TRAINING PROGRAM

## 2024-07-18 PROCEDURE — 99239 HOSP IP/OBS DSCHRG MGMT >30: CPT | Performed by: FAMILY MEDICINE

## 2024-07-18 PROCEDURE — 92526 ORAL FUNCTION THERAPY: CPT

## 2024-07-18 PROCEDURE — 80048 BASIC METABOLIC PNL TOTAL CA: CPT

## 2024-07-18 PROCEDURE — 36415 COLL VENOUS BLD VENIPUNCTURE: CPT

## 2024-07-18 PROCEDURE — 85027 COMPLETE CBC AUTOMATED: CPT

## 2024-07-18 RX ADMIN — APIXABAN 5 MG: 5 TABLET, FILM COATED ORAL at 07:51

## 2024-07-18 RX ADMIN — SPIRONOLACTONE 25 MG: 25 TABLET ORAL at 07:51

## 2024-07-18 RX ADMIN — METOPROLOL TARTRATE 50 MG: 50 TABLET, FILM COATED ORAL at 07:51

## 2024-07-18 RX ADMIN — DULOXETINE HYDROCHLORIDE 30 MG: 30 CAPSULE, DELAYED RELEASE ORAL at 07:51

## 2024-07-18 RX ADMIN — PREGABALIN 150 MG: 150 CAPSULE ORAL at 07:51

## 2024-07-18 ASSESSMENT — PAIN DESCRIPTION - PAIN TYPE: TYPE: ACUTE PAIN;CHRONIC PAIN

## 2024-07-18 NOTE — DISCHARGE INSTRUCTIONS
Discharge Instructions    Discharged to home by car with relative. Discharged via wheelchair, hospital escort: Yes.  Special equipment needed: Oxygen    Be sure to schedule a follow-up appointment with your primary care doctor or any specialists as instructed.     Discharge Plan:   Confirmed Symptoms Management: Discussed  Medication Reconciliation Updated: Yes    I understand that a diet low in cholesterol, fat, and sodium is recommended for good health. Unless I have been given specific instructions below for another diet, I accept this instruction as my diet prescription.   Other diet: 1:1 feeding     Special Instructions: None    -Is this patient being discharged with medication to prevent blood clots?  No    Is patient discharged on Warfarin / Coumadin?   No     Acute Respiratory Failure, Adult  Acute respiratory failure is when one or both of these things happen:  Oxygen cannot pass from the lungs into the blood, causing the blood oxygen level to drop. Loss of blood oxygen means tissues and organs may not work well.  A gas called carbon dioxide cannot pass from the blood into the lungs so the body can get rid of it. The buildup of carbon dioxide can damage the tissues and organs in the body.  Acute respiratory failure happens fast. It is an emergency and needs to be treated right away.  What are the causes?  Common causes of respiratory failure that may cause low oxygen levels include:  Trauma to the lung, chest, or ribs, or to the tissues around the lung.  Lung conditions, such as pneumonia, asthma, or blood clots in the lungs (pulmonary embolism).  Breathing in harmful chemicals, smoke, water, or vomit.  A widespread infection (sepsis).  Heart attack.  Common causes of respiratory failure that cause a buildup of carbon dioxide include:  Stroke.  A spinal cord injury.  Drug or alcohol overdose.  Sepsis.  The heart stopping all of a sudden (cardiac arrest).  What increases the risk?  This condition is more  likely to develop in people who have:  Known lung conditions, such as asthma or chronic obstructive pulmonary disease (COPD).  A condition that damages or weakens the muscles, nerves, bones, or tissues that are involved in breathing, such as myasthenia gravis or Guillain-Barré syndrome.  A health problem that blocks the unconscious reflex that is involved in breathing, such as hypothyroidism or sleep apnea.  What are the signs or symptoms?  Symptoms may depend on the cause and on the levels of oxygen and carbon dioxide in your blood.   Trouble breathing is the main symptom of acute respiratory failure.  Other symptoms may include:  Fast breathing.  Making high-pitched whistling sounds when you breathe (wheezing) and grunting.  Confusion or changes in behavior.  Feeling tired, sleeping more than normal, or being hard to wake.  Skin, lips, or fingernails that look blue (cyanosis).  Feeling restless or anxious.  Fast or irregular heartbeats (palpitations).  How is this diagnosed?  This condition may be diagnosed based on:  Your medical history and a physical exam. Your health care provider will listen to your heart and lungs to check for abnormal sounds.  Tests to confirm the diagnosis and find the cause of respiratory failure. Tests may include:  Measuring the amount of oxygen in your blood (pulse oximetry). This involves placing a small device on your finger, earlobe, or toe.  Blood tests to measure levels of blood oxygen and carbon dioxide.  Chest X-ray.  How is this treated?  Treatment for this condition usually takes place in a hospital intensive care unit (ICU). Treatment depends on the cause of the condition. Treatment may include one or more of these:  Oxygen given through your nose or a face mask.  A device to help you breathe, such as a continuous positive airway pressure (CPAP) machine or bilevel positive airway pressure (BIPAP) machine. The device gives you oxygen and pressure.  Other breathing treatments,  fluids, and medicines.  A breathing machine called a ventilator. This gives you oxygen and pressure to help you breathe. A tube is put into your mouth and windpipe (trachea) and connects to the ventilator.  Tracheostomy placement, if you are on a ventilator for a long time. A tracheostomy is a breathing tube put through your neck into your trachea.  Follow these instructions at home:  Medicines  Take over-the-counter and prescription medicines only as told by your health care provider.  If you were prescribed antibiotics, take them as told by your health care provider. Do not stop using the antibiotic even if you start to feel better.  General instructions  Return to your normal activities as told by your health care provider. Ask your health care provider what activities are safe for you.  Do not use any products that contain nicotine or tobacco. These products include cigarettes, chewing tobacco, and vaping devices, such as e-cigarettes. If you need help quitting, ask your health care provider.  Keep all follow-up visits.  Contact a health care provider if:  Your symptoms do not improve or they get worse.  Get help right away if:  You are having trouble breathing.  You lose consciousness.  Your heart starts beating very fast.  Your fingers, lips, or other areas of your body turn blue.  You are confused.  These symptoms may be an emergency. Get help right away. Call 911.  Do not wait to see if the symptoms will go away.  Do not drive yourself to the hospital.  Summary  Acute respiratory failure is a condition that develops fast and needs to be treated right away.  The main symptom of this condition is trouble breathing.  Treatment for this condition usually takes place in a hospital intensive care unit (ICU). Treatment may include oxygen, fluids, and medicines. A machine may be used to help you breathe, such as a ventilator.  Contact a health care provider if your symptoms do not improve or if they get worse.  This  information is not intended to replace advice given to you by your health care provider. Make sure you discuss any questions you have with your health care provider.  Document Revised: 02/24/2023 Document Reviewed: 02/24/2023  Elsevier Patient Education © 2023 [employer]

## 2024-07-18 NOTE — DISCHARGE SUMMARY
Discharge Summary    CHIEF COMPLAINT ON ADMISSION  Chief Complaint   Patient presents with    Shortness of Breath     Increased SOB since 7/6, pt has oxygen tank at home and has had to titrate oxygen up to 2L this week.     Cough     2-3 weeks, non-productive. Endorses sore throat    Denies fevers       Reason for Admission  SOB     Admission Date  7/12/2024    CODE STATUS  Full Code    HPI & HOSPITAL COURSE  This is a 78 y.o. female here with   shortness of breath, noted to have right-sided pleural effusion.  She was started on empiric coverage azithromycin and Unasyn.  She underwent Ultrasound guided right sided diagnostic thoracentesis, 250 mL of fluid withdrawn on 7/15/224.  Procalcitonin noted to be negative.  She did finished antibiotic course, fluid culture appeared to be negative, cytology was also noted be negative.  Physical therapy and outpatient therapy came to evaluate her, and recommended postacute placement which patient and her spouse refused.  Lengthy discussion that this was highly advised and recommended, however they preferred to be discharged home with home health.    Therefore, she is discharged in fair and stable condition to home with organized home healthcare and close outpatient follow-up.    The patient met 2-midnight criteria for an inpatient stay at the time of discharge.    Discharge Date  7/18/224    FOLLOW UP ITEMS POST DISCHARGE  Follow up as below    DISCHARGE DIAGNOSES  Principal Problem:    Acute on chronic hypoxic respiratory failure (HCC) (POA: Yes)  Active Problems:    Pleural effusion (POA: Yes)    Frailty syndrome in geriatric patient (POA: Yes)    Primary hypertension (POA: Yes)      Overview: Chronic, controlled.      Tolerating atenolol 50mg, 2 tablets daily and benazepril-HCTZ 20/12.5       daily.      Does not see any specialist.     Chronic back pain (POA: Yes)      Overview: Chronic, uncontrolled.      Managed by Dr. Ruiz but sees [facility], Dr. Chavez,  for       HC/APAP 5/325 TID and Lyrica 150mg TID.    SELWYN (obstructive sleep apnea) (POA: Yes)      Overview: Chronic, controlled.      Was told she doesn't need a CPAP/BiPAP.      Just does oxygen at night.      Has not seen pulmonology in a few years.    Prediabetes (POA: Yes)      Overview: Chronic, stable.        Component          Latest Ref Rng 4/4/2023 12/1/2023       Glycohemoglobin          4.0 - 5.6 % 6.0 (H)  5.9 (H)                   History of DVT (deep vein thrombosis) (POA: Yes)    Hypokalemia (POA: Yes)    Paroxysmal atrial fibrillation (HCC) (POA: Yes)    History of pulmonary embolism (POA: Yes)    Positive blood culture (POA: Yes)    Thrombocytosis (POA: Yes)    Dyslipidemia (POA: Yes)      Overview: Chronic, controlled.             Latest Labs:       Lab Results       Component Value Date/Time        CHOLSTRLTOT 159 12/01/2023 07:48 AM        LDL 79 12/01/2023 07:48 AM        HDL 53 12/01/2023 07:48 AM        TRIGLYCERIDE 133 12/01/2023 07:48 AM              Medications: started atorvastatin 3/29/2023.      Medication side effects: none            Risk calculator: The 10-year ASCVD risk score (Douglas DK, et al., 2019)       is: 12.5%           Insomnia (POA: Yes)      Overview: Chronic, controlled.      Tolerates amitriptyline 100mg at night.      Has been on this for 20 years or so.  Resolved Problems:    * No resolved hospital problems. *      FOLLOW UP  Future Appointments   Date Time Provider Department Center   7/22/2024  1:20 PM JOHN Cotto [facility]   9/4/2024  8:20 AM JOHN Cotto [facility]  [address] [address]  [facility] [address]  [phone]        Michelle Jim P.A.-C.  [address]  [facility] [id number] [phone]    Follow up        MEDICATIONS ON DISCHARGE     Medication List        CHANGE how you take these medications        Instructions   metoprolol tartrate 25 MG Tabs  What changed:   when to  "take this  reasons to take this  Commonly known as: Lopressor   Take 1 Tablet by mouth 2 times a day. Indications: Atrial Fibrillation, High Blood Pressure Disorder  Dose: 25 mg     ondansetron 4 MG Tbdp  What changed: when to take this  Commonly known as: Zofran ODT   Take 1 Tablet by mouth every 8 hours as needed for Nausea/Vomiting. Indications: Nausea and Vomiting  Dose: 4 mg            CONTINUE taking these medications        Instructions   alendronate 70 MG Tabs  Commonly known as: Fosamax   Take 70 mg by mouth every Monday. Indications: Osteoporosis  Dose: 70 mg     amitriptyline 100 MG Tabs  Commonly known as: Elavil   Take 100 mg by mouth every evening. Indications: \"pain\"  Dose: 100 mg     apixaban 5mg Tabs  Commonly known as: Eliquis   Take 1 Tablet by mouth 2 times a day. Indications: DVT/PE  Dose: 5 mg     atorvastatin 10 MG Tabs  Commonly known as: Lipitor   TAKE 1 TABLET BY MOUTH EVERY EVENING. CHOLESTEROL  Dose: 10 mg     baclofen 10 MG Tabs  Commonly known as: Lioresal   Take 10 mg by mouth 2 times a day as needed (spasms). Indications: Muscle Spasm  Dose: 10 mg     DULoxetine 30 MG Cpep  Commonly known as: Cymbalta   Take 1 Capsule by mouth every day. Indications: Major Depressive Disorder  Dose: 30 mg     Esomeprazole Magnesium 20 MG Tbec   Take 20 mg by mouth 1 time a day as needed (heartburn ). Indications: Heartburn  Dose: 20 mg     ferrous sulfate 325 (65 Fe) MG tablet   Take 1 Tablet by mouth every 48 hours.  Dose: 325 mg     furosemide 20 MG Tabs  Commonly known as: Lasix   Take 20 mg by mouth every day. Hold for SBP <100  Indications: Edema  Dose: 20 mg     Nystop powder  Generic drug: nystatin   Apply 1 Application  topically 1 time a day as needed (rash). Indications: Skin Infection due to Candida Yeast  Dose: 1 Application      potassium chloride SA 20 MEQ Tbcr  Commonly known as: Kdur   Take 20 mEq by mouth every day. Hold if lasix is held   Indications: takes with Lasix  Dose: 20 mEq   "   pregabalin 150 MG Caps  Commonly known as: Lyrica   Take 150 mg by mouth 2 times a day. Indications: Neuropathic Pain  Dose: 150 mg     QUEtiapine 25 MG Tabs  Commonly known as: SEROquel   Take 25 mg by mouth at bedtime as needed (sleep). Indications: sleep  Dose: 25 mg     senna-docusate 8.6-50 MG Tabs  Commonly known as: Pericolace Or Senokot S   Take 1 Tablet by mouth at bedtime. Indications: Constipation  Dose: 1 Tablet     spironolactone 25 MG Tabs  Commonly known as: Aldactone   Take 1 Tablet by mouth every day. Indications: Edema  Dose: 25 mg     tamsulosin 0.4 MG capsule  Commonly known as: Flomax   Take 1 Capsule by mouth at bedtime. Indications: Obstruction of Bladder Outflow  Dose: 0.4 mg     TYLENOL 500 MG Tabs  Generic drug: acetaminophen   Take 500 mg by mouth every 6 hours as needed for Mild Pain or Moderate Pain. Indications: Pain  Dose: 500 mg            STOP taking these medications      phosphorus 250 MG tablet  Commonly known as: K-Phos-Neutral              Allergies  Allergies   Allergen Reactions    Sulfamethoxazole W-Trimethoprim Rash     * full body rash*>  10 years ago    Morphine Vomiting     hallucinations       DIET  Orders Placed This Encounter   Procedures    Diet Order Diet: Regular (spouse ok for help feed); Tray Modifications (optional): SLP - 1:1 Supervision by Nursing     Standing Status:   Standing     Number of Occurrences:   1     Order Specific Question:   Diet:     Answer:   Regular [1]     Comments:   spouse ok for help feed     Order Specific Question:   Tray Modifications (optional)     Answer:   SLP - 1:1 Supervision by Nursing       ACTIVITY  As tolerated.  Weight bearing as tolerated    CONSULTATIONS  None    PROCEDURES  Ultrasound-guided right-sided thoracenteses 250 mL of fluid withdrawn 7/15/2024    LABORATORY  Lab Results   Component Value Date    SODIUM 138 07/18/2024    POTASSIUM 3.7 07/18/2024    CHLORIDE 92 (L) 07/18/2024    CO2 30 07/18/2024    GLUCOSE 76  07/18/2024    BUN 17 07/18/2024    CREATININE 0.51 07/18/2024    CREATININE 1.0 05/20/2008        Lab Results   Component Value Date    WBC 12.4 (H) 07/18/2024    HEMOGLOBIN 12.3 07/18/2024    HEMATOCRIT 38.9 07/18/2024    PLATELETCT 459 (H) 07/18/2024        Total time of the discharge process exceeds 40 minutes.

## 2024-07-18 NOTE — DISCHARGE PLANNING
TCN following. HTH/SCP chart reviewed. No new TCN needs identified. Please see prior TCN note from 7/17 for most recent discharge planning considerations if indicated.     Current discharge considerations are anticipated for dc to home with HH services per PT recs and note [facility] has accepted pt onto HH services per review.    TCN will continue to follow and collaborate with discharge planning team as additional post acute needs arise. Thank you.     Completed:  PT with recommendations for HH on 7/15  OT has screened pt with no additional OT needs  Choice obtained: HH ([facility]) -> accepted  SSCP with [facility] PCP. discussed possible outpatient transitional PCP follow up if indicated and pt in agreement; sent information to assist in scheduling. PCP f/u scheduled for 07.22.24 at 1:20 pm. She also has a hospital follow up on sept 4th 8:20 am (member scheduled on her own through my chart).

## 2024-07-18 NOTE — DISCHARGE PLANNING
Case Management Discharge Planning    Admission Date: 7/12/2024  GMLOS: 3.6  ALOS: 6    6-Clicks ADL Score: 10  6-Clicks Mobility Score: 10  PT and/or OT Eval ordered: Yes  Post-acute Referrals Ordered: Yes  Post-acute Choice Obtained: Yes  Has referral(s) been sent to post-acute provider:  Yes      Anticipated Discharge Dispo: Discharge Disposition: Discharged to home/self care (01)    DME Needed: No    Action(s) Taken: LSW met with pt at bedside and completed IMM. LSW inquired about DC ride with the S/O. S/O reported that he had a ride planned for the patient and declined assistance from this LSW for transport.     Escalations Completed: None    Medically Clear: Yes    Next Steps: Patient discharging home with .     Barriers to Discharge: None    Is the patient up for discharge tomorrow: No

## 2024-07-18 NOTE — THERAPY
"Speech Language Pathology   Daily Treatment     Patient Name: Tereza Sánchez  AGE:  78 y.o., SEX:  female  Medical Record #: [id number]  Date of Service: 7/18/2024      Precautions:  Precautions: Fall Risk, Swallow Precautions         Subjective  Patient received awake,  at bedside. Pt's  endorsed pt has difficulty \"swallowing things that are hard to chew.\" Half-eaten lunch tray at bedside; pt endorsed low appetite.       Assessment  Pt seen this date for dysphagia management. Pt agreeable to thin liquids only. No overt s/sx of airway invasion. Pt endorsed occasional globus sensation. Pt reported she is set to discharge home today. Provided thorough education regarding swallow compensatory strategies, along with general aspiration and reflux precautions. Pt stated understanding, denied concerns regarding eating/drinking at home following discharge.       Clinical Impressions  Limited PO trials observed this date. No overt s/sx of aspiration with thin liquids observed. Recommend continuation of oral diet. SLP to follow to ensure diet tolerance. Pt may benefit from outpatient GI consult to further assess reports of globus sensation.       Recommendations  Diet Consistency: Regular solids / thin liquids  Instrumentation: None indicated at this time  Medication: As tolerated  Supervision: Careful 1:1 hand feeding (from staff or family)  Positioning: Fully upright and midline during oral intake, Remain upright for 30-45 minutes after oral intake  Risk Management : Small bites/sips, Alternate bites and sips, Slow rate of intake  Oral Care: BID      SLP Treatment Plan  Treatment Plan: Dysphagia Treatment, Patient/Family/Caregiver Training  SLP Frequency: 3x Per Week  Estimated Duration: Until Therapy Goals Met      Anticipated Discharge Needs  Discharge Recommendations: Recommend outpatient speech therapy services (Consider OP GI services)  Therapy Recommendations Upon DC: Dysphagia Training, Patient / Family " "/ Caregiver Education      Patient / Family Goals  Patient / Family Goal #1: \"I'll pick and choose what I want to eat.\"  Goal #1 Outcome: Progressing as expected  Short Term Goals  Short Term Goal # 1: Pt will consume a regular solid/thin liquid diet without any overt s/s of aspiration or decline in pulmonary status.  Goal Outcome # 1: Progressing slower than expected      ALIREZA Looney  "

## 2024-07-18 NOTE — CARE PLAN
Problem: Skin Integrity  Goal: Skin integrity is maintained or improved  Outcome: Progressing     Problem: Fall Risk  Goal: Patient will remain free from falls  Outcome: Progressing     The patient is Stable - Low risk of patient condition declining or worsening    Shift Goals  Clinical Goals: IV ABX therapy  Patient Goals: rest  Family Goals: erich    Progress made toward(s) clinical / shift goals:  preventive measures for skin breakdown and falls in place.     Patient is not progressing towards the following goals:

## 2024-07-18 NOTE — CARE PLAN
The patient is Stable - Low risk of patient condition declining or worsening    Shift Goals  Clinical Goals: iv abx, encouraged increase in oral intake    Progress made toward(s) clinical / shift goals:  pt. Had successful Bm today although watery will monitor, held additional stool softener, no abdominal cramping no fever. Denies any complaint of pain. PIV intact on IV abx. Due meds given and tolerated. Needs attended.     Problem: Knowledge Deficit - Standard  Goal: Patient and family/care givers will demonstrate understanding of plan of care, disease process/condition, diagnostic tests and medications  Outcome: Progressing     Problem: Skin Integrity  Goal: Skin integrity is maintained or improved  Outcome: Progressing     Problem: Fall Risk  Goal: Patient will remain free from falls  Outcome: Progressing     Problem: Pain - Standard  Goal: Alleviation of pain or a reduction in pain to the patient’s comfort goal  Outcome: Progressing       Patient is not progressing towards the following goals:

## 2024-07-18 NOTE — WOUND TEAM
"[facility]  Inpatient Services  Initial Wound and Skin Care Evaluation    Admission Date: 7/12/2024     Last order of IP CONSULT TO WOUND CARE was found on 7/17/2024 from Hospital Encounter on 7/12/2024     HPI, PMH, SH: Reviewed    Past Surgical History:   Procedure Laterality Date    PB RECONSTR TOTAL SHOULDER IMPLANT Left 5/4/2022    Procedure: LEFT REVERSE TOTAL SHOULDER ARTHROPLASTY;  Surgeon: Vinicius Whaley M.D.;  Location: SURGERY SAME DAY [facility];  Service: Orthopedics    HIP REVISION TOTAL Left 9/2/2021    Procedure: REVISION, TOTAL ARTHROPLASTY, HIP;  Surgeon: Daniel Allison M.D.;  Location: SURGERY [facility];  Service: Orthopedics    THYROID LOBECTOMY Left 8/17/2018    Procedure: THYROID LOBECTOMY;  Surgeon: Adelina Wilson M.D.;  Location: SURGERY SAME DAY [facility];  Service: General    THYROIDECTOMY TOTAL  8/17/2018    Procedure: NIMS RECURRENT LARYNGEAL NERVE MONITORING;  Surgeon: Adelina Wilson M.D.;  Location: SURGERY SAME DAY [facility];  Service: General    NODE BIOPSY SENTINEL  2/6/2015    Performed by Adelina Wilson M.D. at SURGERY [facility]    MASTECTOMY  2/6/2015    right-Performed by Adelina Wilson M.D. at SURGERY [facility]    HIP ARTHROPLASTY TOTAL  5/19/08    Performed by FARTUN ERAZO at SURGERY [facility]    SHOULDER ARTHROPLASTY TOTAL  2004    Right    JAN BY LAPAROSCOPY  2002    CYST EXCISION  1973    \"remove cyst    HIP ARTHROPLASTY TOTAL      HIP REPLACEMENT, TOTAL      LAMINOTOMY      OTHER ABDOMINAL SURGERY      SHUNT INSERTION      cerebral shunt    US-NEEDLE CORE BX-BREAST PANEL       Social History     Tobacco Use    Smoking status: Never    Smokeless tobacco: Never   Substance Use Topics    Alcohol use: No     Alcohol/week: 0.0 oz     Chief Complaint   Patient presents with    Shortness of Breath     Increased SOB since 7/6, pt has oxygen tank at home and has had to titrate oxygen up to 2L this week.     Cough     " 2-3 weeks, non-productive. Endorses sore throat    Denies fevers     Diagnosis: Acute on chronic hypoxic respiratory failure (HCC) [J96.21]    Unit where seen by Wound Team: S611/01     WOUND CONSULT RELATED TO:  Right breast    WOUND TEAM PLAN OF CARE - Frequency of Follow-up:   Nursing to follow dressing orders written for wound care. Contact wound team if area fails to progress, deteriorates or with any questions/concerns if something comes up before next scheduled follow up (See below as to whether wound is following and frequency of wound follow up)   Weekly - Right breast     WOUND HISTORY:     Tereza Sánchez is a 78 y.o. female admitted 7/12/2024 with shortness of breath     Hospital Course  Admitted with shortness of breath, noted to have right-sided pleural effusion.  She was started on empiric coverage azithromycin and Unasyn.  She underwent Ultrasound guided right sided diagnostic thoracentesis, 250 mL of fluid withdrawn on 7/15/224.       WOUND ASSESSMENT/LDA  Wound 07/13/24 Abrasion Breast Lateral Right (Active)   Date First Assessed/Time First Assessed: 07/13/24 0135   Present on Original Admission: Yes  Hand Hygiene Completed: Yes  Primary Wound Type: Abrasion  Location: Breast  Wound Orientation: Lateral  Laterality: Right      Assessments 7/18/2024 11:15 AM   Wound Image      Site Assessment Red;Pink;Granulation tissue;Brown;Scabbed;Yellow;Slough;Drainage   Periwound Assessment Dry;Intact;Blanchable erythema;Pink   Margins Attached edges;Defined edges   Closure Adhesive bandage   Drainage Amount Small   Drainage Description Serosanguineous   Treatments Cleansed;Nonselective debridement;Site care   Wound Cleansing Approved Wound Cleanser   Periwound Protectant No-sting Skin Prep   Dressing Status Removed   Dressing Changed Changed   Dressing Cleansing/Solutions Not Applicable   Dressing Options Hydrocolloid Thin   Dressing Change/Treatment Frequency Every 72 hrs, and As Needed   NEXT Dressing  Change/Treatment Date 07/21/24   NEXT Weekly Photo (Inpatient Only) 07/24/24   Wound Team Following Weekly   Non-staged Wound Description Full thickness   Wound Length (cm) 0.5 cm   Wound Width (cm) 2.5 cm   Wound Depth (cm) 0.2 cm   Wound Surface Area (cm^2) 1.25 cm^2   Wound Volume (cm^3) 0.25 cm^3   Wound Bed Granulation (%) 80 %   Wound Bed Slough (%) 10 %   Wound Bed Eschar (%) 10 %   Shape oval   Wound Odor None   Pulses N/A   WOUND NURSE ONLY - Time Spent with Patient (mins) 30        Vascular:    GUSTAVO:   No results found.    Lab Values:    Lab Results   Component Value Date/Time    WBC 12.4 (H) 07/18/2024 08:22 AM    RBC 4.45 07/18/2024 08:22 AM    HEMOGLOBIN 12.3 07/18/2024 08:22 AM    HEMATOCRIT 38.9 07/18/2024 08:22 AM    CREACTPROT <0.30 04/29/2024 06:36 AM    SEDRATEWES 22 07/15/2024 06:23 PM    HBA1C 6.0 (H) 07/17/2024 09:04 AM         Culture Results show:  No results found for this or any previous visit (from the past 720 hour(s)).    Pain Level/Medicated:  None, Tolerated without pain medication       INTERVENTIONS BY WOUND TEAM:  Chart and images reviewed. Discussed with bedside RN. All areas of concern (based on picture review, LDA review and discussion with bedside RN) have been thoroughly assessed. Documentation of areas based on significant findings. This RN in to assess patient. Performed standard wound care which includes appropriate positioning, dressing removal and non-selective debridement. Pictures and measurements obtained weekly if/when required.    Wound:  Right breast  Preparation for Dressing removal: Removed without difficulty  Cleansed/Non-selectively Debrided with:  Wound cleanser and Gauze  Berenice wound: Cleansed with Wound cleanser and Gauze, Prepped with No Sting  Primary Dressing:  hydrocolloid thin    Advanced Wound Care Discharge Planning  Number of Clinicians necessary to complete wound care: 1  Is patient requiring IV pain medications for dressing changes:  No   Length of  time for dressing change 30 min. (This does not include chart review, pre-medication time, set up, clean up or time spent charting.)    Interdisciplinary consultation: Patient, Bedside RN (Chitra)    EVALUATION / RATIONALE FOR TREATMENT:     Date:  07/18/24  Wound Status:  Initial evaluation    Full thickness wound to right breast, small amount of serosanguinous drainage. Hydrocolloid applied to allow rapid epithelialization at this phase of wound healing, maintain moist wound bed, to lower pH for wound healing and to facilitate autolytic debridement.          Goals: Steady decrease in wound area and depth weekly.    NURSING PLAN OF CARE ORDERS:  Dressing changes: See Dressing Care orders    NUTRITION RECOMMENDATIONS   Wound Team Recommendations:  N/A    DIET ORDERS (From admission to next 24h)       Start     Ordered    07/13/24 1425  Diet Order Diet: Regular (spouse ok for help feed); Tray Modifications (optional): SLP - 1:1 Supervision by Nursing  ALL MEALS        Question Answer Comment   Diet: Regular spouse ok for help feed   Tray Modifications (optional) SLP - 1:1 Supervision by Nursing        07/13/24 1422                    PREVENTATIVE INTERVENTIONS:    Q shift Logan - performed per nursing policy  Q shift pressure point assessments - performed per nursing policy         Anticipated discharge plans:  TBD        Vac Discharge Needs:  Vac Discharge plan is purely a recommendation from wound team and not a requirement for discharge unless otherwise stated by physician.  Not Applicable Pt not on a wound vac

## 2024-07-18 NOTE — DOCUMENTATION QUERY
[facility]                                                                       Query Response Note      PATIENT:               ODETTE NICKERSON  ACCT #:                  [id number]  MRN:                     [id number]  :                      1945  ADMIT DATE:       2024 7:07 PM  DISCH DATE:          RESPONDING  PROVIDER #:        [id number]           QUERY TEXT:    Patient on IV Unasyn and Azithromycin for possible underlying infection.  Pleural effusion fluid culture negative.    Can the diagnosis for which patient is being treated with antibiotics be further clarified? (Includes suspected/probable)    The patient's Clinical Indicators include:  Clinical Findings:       -CXR: Moderate to large sized right-sided pleural effusion with associated compressive atelectasis and or consolidation. Underlying infection is possible.  -C/o ongoing productive cough  -Labs: WBC 10.6; Procalcitonin <0.05    7/15 WBC 15.3  -Pleural effusion fluid pathology: No evidence of malignancy; Predominantly mixed inflammatory cells, histiocytes, and mesothelial cells.  -Pleural effusion fluid Cx: NEG    Risk Factors: Dysphagia, recent admission for COVID, was discharged to rehab facility  Treatments: Unasyn and azithromycin IV, imaging, O2 support, IV Lasix, thoracentesis      Contact me with any questions.    Thank you for your time and attention,  Colleen Solis RN, CDI  Connect via email, Voalte or messenger.    Note: If you agree with a listed diagnosis, please remember to include it in your concurrent daily documentation and onto the Discharge Summary.  Options provided:   -- Suspected Pneumonia   -- Suspected Aspiration Pneumonia   -- Underlying infection has been ruled out   -- Other explanation, Please specify other explanation      Query created by: Colleen Solis on 2024 1:01 PM    RESPONSE TEXT:    Underlying infection has been ruled out           Electronically signed by:  SUZY GARCIA MD 7/18/2024 4:21 PM

## 2024-07-18 NOTE — PROGRESS NOTES
Received bedside report and accepted care of patient.  Patient currently resting in bed in no visible or stated distress.  Bed controls on and bed in locked position.  Bed alarm on.  Call light and personal possessions within reach.  Plan of care to include pain management, assistance with ADL's, wound care, Q2 turns and continued discharge planning.  Will continue to update notes/plan of care as needed throughout shift.

## 2024-07-19 ENCOUNTER — HOSPITAL ENCOUNTER (INPATIENT)
Facility: MEDICAL CENTER | Age: 79
LOS: 6 days | DRG: 291 | End: 2024-07-25
Attending: EMERGENCY MEDICINE | Admitting: HOSPITALIST
Payer: MEDICARE

## 2024-07-19 ENCOUNTER — PATIENT OUTREACH (OUTPATIENT)
Dept: MEDICAL GROUP | Facility: PHYSICIAN GROUP | Age: 79
End: 2024-07-19
Payer: MEDICARE

## 2024-07-19 ENCOUNTER — APPOINTMENT (OUTPATIENT)
Dept: RADIOLOGY | Facility: MEDICAL CENTER | Age: 79
DRG: 291 | End: 2024-07-19
Attending: EMERGENCY MEDICINE
Payer: MEDICARE

## 2024-07-19 DIAGNOSIS — J96.21 ACUTE ON CHRONIC RESPIRATORY FAILURE WITH HYPOXIA (HCC): ICD-10-CM

## 2024-07-19 DIAGNOSIS — J90 PLEURAL EFFUSION: ICD-10-CM

## 2024-07-19 DIAGNOSIS — I47.20 V-TACH (HCC): ICD-10-CM

## 2024-07-19 DIAGNOSIS — G62.9 NEUROPATHY: ICD-10-CM

## 2024-07-19 DIAGNOSIS — J96.20 ACUTE ON CHRONIC RESPIRATORY FAILURE, UNSPECIFIED WHETHER WITH HYPOXIA OR HYPERCAPNIA (HCC): ICD-10-CM

## 2024-07-19 DIAGNOSIS — R65.10 SIRS (SYSTEMIC INFLAMMATORY RESPONSE SYNDROME) (HCC): ICD-10-CM

## 2024-07-19 DIAGNOSIS — I47.10 SUPRAVENTRICULAR TACHYCARDIA (HCC): ICD-10-CM

## 2024-07-19 DIAGNOSIS — R06.00 DYSPNEA, UNSPECIFIED TYPE: ICD-10-CM

## 2024-07-19 DIAGNOSIS — I50.32 CHRONIC DIASTOLIC HEART FAILURE (HCC): ICD-10-CM

## 2024-07-19 DIAGNOSIS — R78.81 POSITIVE BLOOD CULTURE: ICD-10-CM

## 2024-07-19 DIAGNOSIS — J96.01 ACUTE RESPIRATORY FAILURE WITH HYPOXIA (HCC): ICD-10-CM

## 2024-07-19 DIAGNOSIS — R54 FRAILTY SYNDROME IN GERIATRIC PATIENT: ICD-10-CM

## 2024-07-19 PROBLEM — D72.829 LEUKOCYTOSIS: Status: ACTIVE | Noted: 2024-07-19

## 2024-07-19 LAB
ALBUMIN SERPL BCP-MCNC: 2.5 G/DL (ref 3.2–4.9)
ALBUMIN/GLOB SERPL: 0.7 G/DL
ALP SERPL-CCNC: 93 U/L (ref 30–99)
ALT SERPL-CCNC: 27 U/L (ref 2–50)
ANION GAP SERPL CALC-SCNC: 23 MMOL/L (ref 7–16)
AST SERPL-CCNC: 74 U/L (ref 12–45)
BASOPHILS # BLD AUTO: 0.6 % (ref 0–1.8)
BASOPHILS # BLD: 0.12 K/UL (ref 0–0.12)
BILIRUB SERPL-MCNC: 0.6 MG/DL (ref 0.1–1.5)
BUN SERPL-MCNC: 18 MG/DL (ref 8–22)
CALCIUM ALBUM COR SERPL-MCNC: 9.9 MG/DL (ref 8.5–10.5)
CALCIUM SERPL-MCNC: 8.7 MG/DL (ref 8.5–10.5)
CHLORIDE SERPL-SCNC: 99 MMOL/L (ref 96–112)
CO2 SERPL-SCNC: 18 MMOL/L (ref 20–33)
CREAT SERPL-MCNC: 0.61 MG/DL (ref 0.5–1.4)
EKG IMPRESSION: NORMAL
EKG IMPRESSION: NORMAL
EOSINOPHIL # BLD AUTO: 0 K/UL (ref 0–0.51)
EOSINOPHIL NFR BLD: 0 % (ref 0–6.9)
ERYTHROCYTE [DISTWIDTH] IN BLOOD BY AUTOMATED COUNT: 51.3 FL (ref 35.9–50)
FLUAV RNA SPEC QL NAA+PROBE: NEGATIVE
FLUBV RNA SPEC QL NAA+PROBE: NEGATIVE
GFR SERPLBLD CREATININE-BSD FMLA CKD-EPI: 92 ML/MIN/1.73 M 2
GLOBULIN SER CALC-MCNC: 3.8 G/DL (ref 1.9–3.5)
GLUCOSE SERPL-MCNC: 113 MG/DL (ref 65–99)
HCT VFR BLD AUTO: 50.2 % (ref 37–47)
HGB BLD-MCNC: 15.9 G/DL (ref 12–16)
IMM GRANULOCYTES # BLD AUTO: 0.1 K/UL (ref 0–0.11)
IMM GRANULOCYTES NFR BLD AUTO: 0.5 % (ref 0–0.9)
LYMPHOCYTES # BLD AUTO: 0.56 K/UL (ref 1–4.8)
LYMPHOCYTES NFR BLD: 2.9 % (ref 22–41)
MAGNESIUM SERPL-MCNC: 2 MG/DL (ref 1.5–2.5)
MCH RBC QN AUTO: 27.1 PG (ref 27–33)
MCHC RBC AUTO-ENTMCNC: 31.7 G/DL (ref 32.2–35.5)
MCV RBC AUTO: 85.7 FL (ref 81.4–97.8)
MONOCYTES # BLD AUTO: 1.34 K/UL (ref 0–0.85)
MONOCYTES NFR BLD AUTO: 6.8 % (ref 0–13.4)
NEUTROPHILS # BLD AUTO: 17.45 K/UL (ref 1.82–7.42)
NEUTROPHILS NFR BLD: 89.2 % (ref 44–72)
NRBC # BLD AUTO: 0 K/UL
NRBC BLD-RTO: 0 /100 WBC (ref 0–0.2)
PLATELET # BLD AUTO: 606 K/UL (ref 164–446)
PMV BLD AUTO: 11.8 FL (ref 9–12.9)
POTASSIUM SERPL-SCNC: 4 MMOL/L (ref 3.6–5.5)
PROT SERPL-MCNC: 6.3 G/DL (ref 6–8.2)
RBC # BLD AUTO: 5.86 M/UL (ref 4.2–5.4)
RSV RNA SPEC QL NAA+PROBE: NEGATIVE
SARS-COV-2 RNA RESP QL NAA+PROBE: NOTDETECTED
SCCMEC + MECA PNL NOSE NAA+PROBE: NEGATIVE
SODIUM SERPL-SCNC: 140 MMOL/L (ref 135–145)
TROPONIN T SERPL-MCNC: 54 NG/L (ref 6–19)
TROPONIN T SERPL-MCNC: 55 NG/L (ref 6–19)
WBC # BLD AUTO: 19.6 K/UL (ref 4.8–10.8)

## 2024-07-19 PROCEDURE — 96365 THER/PROPH/DIAG IV INF INIT: CPT

## 2024-07-19 PROCEDURE — 87641 MR-STAPH DNA AMP PROBE: CPT

## 2024-07-19 PROCEDURE — 770020 HCHG ROOM/CARE - TELE (206)

## 2024-07-19 PROCEDURE — 0241U HCHG SARS-COV-2 COVID-19 NFCT DS RESP RNA 4 TRGT ED POC: CPT

## 2024-07-19 PROCEDURE — 700101 HCHG RX REV CODE 250

## 2024-07-19 PROCEDURE — 99285 EMERGENCY DEPT VISIT HI MDM: CPT

## 2024-07-19 PROCEDURE — 700105 HCHG RX REV CODE 258: Performed by: HOSPITALIST

## 2024-07-19 PROCEDURE — 80053 COMPREHEN METABOLIC PANEL: CPT

## 2024-07-19 PROCEDURE — 0W993ZZ DRAINAGE OF RIGHT PLEURAL CAVITY, PERCUTANEOUS APPROACH: ICD-10-PCS

## 2024-07-19 PROCEDURE — 96375 TX/PRO/DX INJ NEW DRUG ADDON: CPT

## 2024-07-19 PROCEDURE — 87040 BLOOD CULTURE FOR BACTERIA: CPT

## 2024-07-19 PROCEDURE — 700101 HCHG RX REV CODE 250: Performed by: EMERGENCY MEDICINE

## 2024-07-19 PROCEDURE — 85025 COMPLETE CBC W/AUTO DIFF WBC: CPT

## 2024-07-19 PROCEDURE — 84484 ASSAY OF TROPONIN QUANT: CPT

## 2024-07-19 PROCEDURE — 36415 COLL VENOUS BLD VENIPUNCTURE: CPT

## 2024-07-19 PROCEDURE — 96366 THER/PROPH/DIAG IV INF ADDON: CPT

## 2024-07-19 PROCEDURE — 5A2204Z RESTORATION OF CARDIAC RHYTHM, SINGLE: ICD-10-PCS | Performed by: EMERGENCY MEDICINE

## 2024-07-19 PROCEDURE — A9270 NON-COVERED ITEM OR SERVICE: HCPCS | Performed by: HOSPITALIST

## 2024-07-19 PROCEDURE — 71045 X-RAY EXAM CHEST 1 VIEW: CPT

## 2024-07-19 PROCEDURE — 96367 TX/PROPH/DG ADDL SEQ IV INF: CPT

## 2024-07-19 PROCEDURE — 92960 CARDIOVERSION ELECTRIC EXT: CPT

## 2024-07-19 PROCEDURE — 700102 HCHG RX REV CODE 250 W/ 637 OVERRIDE(OP): Performed by: HOSPITALIST

## 2024-07-19 PROCEDURE — 700111 HCHG RX REV CODE 636 W/ 250 OVERRIDE (IP)

## 2024-07-19 PROCEDURE — 700105 HCHG RX REV CODE 258: Performed by: EMERGENCY MEDICINE

## 2024-07-19 PROCEDURE — 99223 1ST HOSP IP/OBS HIGH 75: CPT | Mod: AI | Performed by: HOSPITALIST

## 2024-07-19 PROCEDURE — 96376 TX/PRO/DX INJ SAME DRUG ADON: CPT

## 2024-07-19 PROCEDURE — 83735 ASSAY OF MAGNESIUM: CPT

## 2024-07-19 PROCEDURE — 93005 ELECTROCARDIOGRAM TRACING: CPT | Performed by: EMERGENCY MEDICINE

## 2024-07-19 PROCEDURE — 700111 HCHG RX REV CODE 636 W/ 250 OVERRIDE (IP): Mod: JZ | Performed by: HOSPITALIST

## 2024-07-19 PROCEDURE — 700111 HCHG RX REV CODE 636 W/ 250 OVERRIDE (IP): Mod: JZ | Performed by: EMERGENCY MEDICINE

## 2024-07-19 RX ORDER — POTASSIUM CHLORIDE 1500 MG/1
20 TABLET, EXTENDED RELEASE ORAL DAILY
Status: DISCONTINUED | OUTPATIENT
Start: 2024-07-20 | End: 2024-07-20

## 2024-07-19 RX ORDER — ONDANSETRON 4 MG/1
4 TABLET, ORALLY DISINTEGRATING ORAL EVERY 4 HOURS PRN
Status: DISCONTINUED | OUTPATIENT
Start: 2024-07-19 | End: 2024-07-25 | Stop reason: HOSPADM

## 2024-07-19 RX ORDER — MIDAZOLAM HYDROCHLORIDE 1 MG/ML
INJECTION INTRAMUSCULAR; INTRAVENOUS
Status: COMPLETED
Start: 2024-07-19 | End: 2024-07-19

## 2024-07-19 RX ORDER — OXYCODONE HYDROCHLORIDE 5 MG/1
5 TABLET ORAL
Status: DISCONTINUED | OUTPATIENT
Start: 2024-07-19 | End: 2024-07-25 | Stop reason: HOSPADM

## 2024-07-19 RX ORDER — MIDAZOLAM HYDROCHLORIDE 1 MG/ML
2 INJECTION INTRAMUSCULAR; INTRAVENOUS ONCE
Status: COMPLETED | OUTPATIENT
Start: 2024-07-19 | End: 2024-07-19

## 2024-07-19 RX ORDER — NYSTATIN 100000 [USP'U]/G
100000 POWDER TOPICAL
Status: DISCONTINUED | OUTPATIENT
Start: 2024-07-19 | End: 2024-07-25 | Stop reason: HOSPADM

## 2024-07-19 RX ORDER — ATORVASTATIN CALCIUM 10 MG/1
10 TABLET, FILM COATED ORAL NIGHTLY
Status: DISCONTINUED | OUTPATIENT
Start: 2024-07-19 | End: 2024-07-25 | Stop reason: HOSPADM

## 2024-07-19 RX ORDER — TAMSULOSIN HYDROCHLORIDE 0.4 MG/1
0.4 CAPSULE ORAL
Status: DISCONTINUED | OUTPATIENT
Start: 2024-07-19 | End: 2024-07-25 | Stop reason: HOSPADM

## 2024-07-19 RX ORDER — SPIRONOLACTONE 25 MG/1
25 TABLET ORAL DAILY
Status: DISCONTINUED | OUTPATIENT
Start: 2024-07-19 | End: 2024-07-20

## 2024-07-19 RX ORDER — ADENOSINE 3 MG/ML
6 INJECTION, SOLUTION INTRAVENOUS ONCE
Status: COMPLETED | OUTPATIENT
Start: 2024-07-19 | End: 2024-07-19

## 2024-07-19 RX ORDER — ACETAMINOPHEN 325 MG/1
650 TABLET ORAL EVERY 6 HOURS PRN
Status: DISCONTINUED | OUTPATIENT
Start: 2024-07-19 | End: 2024-07-22

## 2024-07-19 RX ORDER — METOPROLOL TARTRATE 1 MG/ML
INJECTION, SOLUTION INTRAVENOUS
Status: COMPLETED
Start: 2024-07-19 | End: 2024-07-19

## 2024-07-19 RX ORDER — ONDANSETRON 2 MG/ML
4 INJECTION INTRAMUSCULAR; INTRAVENOUS EVERY 4 HOURS PRN
Status: DISCONTINUED | OUTPATIENT
Start: 2024-07-19 | End: 2024-07-25 | Stop reason: HOSPADM

## 2024-07-19 RX ORDER — SODIUM CHLORIDE 9 MG/ML
1000 INJECTION, SOLUTION INTRAVENOUS ONCE
Status: COMPLETED | OUTPATIENT
Start: 2024-07-19 | End: 2024-07-19

## 2024-07-19 RX ORDER — LABETALOL HYDROCHLORIDE 5 MG/ML
10 INJECTION, SOLUTION INTRAVENOUS EVERY 4 HOURS PRN
Status: DISCONTINUED | OUTPATIENT
Start: 2024-07-19 | End: 2024-07-25 | Stop reason: HOSPADM

## 2024-07-19 RX ORDER — AMOXICILLIN 250 MG
2 CAPSULE ORAL EVERY EVENING
Status: DISCONTINUED | OUTPATIENT
Start: 2024-07-19 | End: 2024-07-25 | Stop reason: HOSPADM

## 2024-07-19 RX ORDER — PREGABALIN 150 MG/1
150 CAPSULE ORAL 2 TIMES DAILY
Status: DISCONTINUED | OUTPATIENT
Start: 2024-07-19 | End: 2024-07-25 | Stop reason: HOSPADM

## 2024-07-19 RX ORDER — BACLOFEN 10 MG/1
10 TABLET ORAL 2 TIMES DAILY PRN
Status: DISCONTINUED | OUTPATIENT
Start: 2024-07-19 | End: 2024-07-25 | Stop reason: HOSPADM

## 2024-07-19 RX ORDER — QUETIAPINE FUMARATE 25 MG/1
25 TABLET, FILM COATED ORAL
Status: DISCONTINUED | OUTPATIENT
Start: 2024-07-19 | End: 2024-07-25 | Stop reason: HOSPADM

## 2024-07-19 RX ORDER — FUROSEMIDE 10 MG/ML
40 INJECTION INTRAMUSCULAR; INTRAVENOUS
Status: DISCONTINUED | OUTPATIENT
Start: 2024-07-19 | End: 2024-07-25 | Stop reason: HOSPADM

## 2024-07-19 RX ORDER — AMITRIPTYLINE HYDROCHLORIDE 50 MG/1
100 TABLET ORAL NIGHTLY
Status: DISCONTINUED | OUTPATIENT
Start: 2024-07-19 | End: 2024-07-25 | Stop reason: HOSPADM

## 2024-07-19 RX ORDER — POLYETHYLENE GLYCOL 3350 17 G/17G
1 POWDER, FOR SOLUTION ORAL
Status: DISCONTINUED | OUTPATIENT
Start: 2024-07-19 | End: 2024-07-25 | Stop reason: HOSPADM

## 2024-07-19 RX ORDER — DULOXETIN HYDROCHLORIDE 30 MG/1
30 CAPSULE, DELAYED RELEASE ORAL EVERY EVENING
Status: DISCONTINUED | OUTPATIENT
Start: 2024-07-19 | End: 2024-07-25 | Stop reason: HOSPADM

## 2024-07-19 RX ORDER — OXYCODONE HYDROCHLORIDE 5 MG/1
2.5 TABLET ORAL
Status: DISCONTINUED | OUTPATIENT
Start: 2024-07-19 | End: 2024-07-25 | Stop reason: HOSPADM

## 2024-07-19 RX ORDER — HYDROMORPHONE HYDROCHLORIDE 1 MG/ML
0.25 INJECTION, SOLUTION INTRAMUSCULAR; INTRAVENOUS; SUBCUTANEOUS
Status: DISCONTINUED | OUTPATIENT
Start: 2024-07-19 | End: 2024-07-25 | Stop reason: HOSPADM

## 2024-07-19 RX ORDER — METOPROLOL TARTRATE 1 MG/ML
5 INJECTION, SOLUTION INTRAVENOUS
Status: DISCONTINUED | OUTPATIENT
Start: 2024-07-19 | End: 2024-07-19

## 2024-07-19 RX ORDER — METOPROLOL TARTRATE 25 MG/1
25 TABLET, FILM COATED ORAL 2 TIMES DAILY
Status: DISCONTINUED | OUTPATIENT
Start: 2024-07-19 | End: 2024-07-20

## 2024-07-19 RX ADMIN — DULOXETINE HYDROCHLORIDE 30 MG: 30 CAPSULE, DELAYED RELEASE ORAL at 17:46

## 2024-07-19 RX ADMIN — ATORVASTATIN CALCIUM 10 MG: 10 TABLET, FILM COATED ORAL at 21:25

## 2024-07-19 RX ADMIN — APIXABAN 5 MG: 5 TABLET, FILM COATED ORAL at 17:46

## 2024-07-19 RX ADMIN — MIDAZOLAM HYDROCHLORIDE 2 MG: 1 INJECTION INTRAMUSCULAR; INTRAVENOUS at 12:07

## 2024-07-19 RX ADMIN — PIPERACILLIN AND TAZOBACTAM 4.5 G: 4; .5 INJECTION, POWDER, FOR SOLUTION INTRAVENOUS at 14:29

## 2024-07-19 RX ADMIN — SENNOSIDES AND DOCUSATE SODIUM 2 TABLET: 50; 8.6 TABLET ORAL at 17:46

## 2024-07-19 RX ADMIN — SPIRONOLACTONE 25 MG: 25 TABLET ORAL at 17:46

## 2024-07-19 RX ADMIN — METOPROLOL TARTRATE 5 MG: 5 INJECTION INTRAVENOUS at 12:11

## 2024-07-19 RX ADMIN — VANCOMYCIN HYDROCHLORIDE 2000 MG: 5 INJECTION, POWDER, LYOPHILIZED, FOR SOLUTION INTRAVENOUS at 14:56

## 2024-07-19 RX ADMIN — ADENOSINE 6 MG: 3 INJECTION INTRAVENOUS at 12:03

## 2024-07-19 RX ADMIN — SODIUM CHLORIDE 1000 ML: 9 INJECTION, SOLUTION INTRAVENOUS at 12:34

## 2024-07-19 RX ADMIN — AMPICILLIN SODIUM, SULBACTAM SODIUM 3 G: 2; 1 INJECTION, POWDER, FOR SOLUTION INTRAMUSCULAR; INTRAVENOUS at 18:15

## 2024-07-19 RX ADMIN — MIDAZOLAM HYDROCHLORIDE 2 MG: 1 INJECTION, SOLUTION INTRAMUSCULAR; INTRAVENOUS at 12:07

## 2024-07-19 RX ADMIN — FUROSEMIDE 40 MG: 10 INJECTION INTRAMUSCULAR; INTRAVENOUS at 17:49

## 2024-07-19 RX ADMIN — METOPROLOL TARTRATE 25 MG: 25 TABLET, FILM COATED ORAL at 17:46

## 2024-07-19 RX ADMIN — METOPROLOL TARTRATE 5 MG: 5 INJECTION INTRAVENOUS at 14:28

## 2024-07-19 RX ADMIN — TAMSULOSIN HYDROCHLORIDE 0.4 MG: 0.4 CAPSULE ORAL at 21:25

## 2024-07-19 RX ADMIN — AMITRIPTYLINE HYDROCHLORIDE 100 MG: 50 TABLET, FILM COATED ORAL at 21:23

## 2024-07-19 ASSESSMENT — COPD QUESTIONNAIRES
HAVE YOU SMOKED AT LEAST 100 CIGARETTES IN YOUR ENTIRE LIFE: NO/DON'T KNOW
DO YOU EVER COUGH UP ANY MUCUS OR PHLEGM?: NO/ONLY WITH OCCASIONAL COLDS OR INFECTIONS
COPD SCREENING SCORE: 5
DURING THE PAST 4 WEEKS HOW MUCH DID YOU FEEL SHORT OF BREATH: SOME OF THE TIME

## 2024-07-19 ASSESSMENT — FIBROSIS 4 INDEX
FIB4 SCORE: 1.79
FIB4 SCORE: 0.7
FIB4 SCORE: 1.79

## 2024-07-19 ASSESSMENT — PAIN DESCRIPTION - PAIN TYPE: TYPE: ACUTE PAIN

## 2024-07-19 NOTE — ED TRIAGE NOTES
Tereza Sánchez  78 y.o.  female  Chief Complaint   Patient presents with    Shortness of Breath     X 1 day. D/C from [facility] yesterday post thoracentesis for pleural effusion. On Eliquis.  called EMS as pt was short of breath & slid off the couch, no trauma. Pt in a fib with RVR, rate 160s. Tachypneic, afebrile. Denies chest pain.        Pt BIB EMS from home.. Alert & oriented. On 3L O2 at baseline. Has been taking Eliquis for at least 5 days in hospital, none today.

## 2024-07-19 NOTE — ED NOTES
Pt medicated per mar HR slowed to . Pt remains drowsy responds to verbal stimuli.  Pure wick placed for pt comfort.

## 2024-07-19 NOTE — ED PROVIDER NOTES
ED Provider Note    CHIEF COMPLAINT  Chief Complaint   Patient presents with    Shortness of Breath     X 1 day. D/C from [facility] yesterday post thoracentesis for pleural effusion. On Eliquis.  called EMS as pt was short of breath & slid off the couch, no trauma. Pt in a fib with RVR, rate 160s. Tachypneic, afebrile. Denies chest pain.        EXTERNAL RECORDS REVIEWED  Discharge summary yesterday, acute on chronic hypoxic respiratory failure with a pleural effusion, underwent thoracentesis    HPI/ROS  LIMITATION TO HISTORY   Select: Acutely ill  OUTSIDE HISTORIAN(S):      Tereza Sánchez is a 78 y.o. female who presents with acute respiratory distress.  Just discharged the hospital yesterday after hospitalization with respiratory failure and pleural effusion status post thoracentesis.  Brought in by EMS, initially  had called because of her shortness of breath.  The patient was found to have a heart rate in the 150s by EMS.  She denies chest pain.  The patient appears to be quite acutely ill and is unable really to provide much further history, I was called to emergently evaluate her upon her arrival.    PAST MEDICAL HISTORY   has a past medical history of Anesthesia, Arthritis, Asthma, Bowel habit changes, Breath shortness, Cancer (HCC), Chiari malformation (1970's), Chickenpox, Chronic back pain, Contracture of hand joint, Decreased lung capacity, Dental disorder, Disorder of thyroid, Heart burn, High cholesterol, Hypertension, Indigestion, Joint replacement, Obesity, Pain, Pain (08/06/2018), Peripheral edema (06/06/2013), Pneumonia, PONV (postoperative nausea and vomiting), Psychiatric problem, Scoliosis, Shortness of breath (08/06/2018), Sleep apnea, Sleep apnea (08/07/2018), Snoring, sore throat (01/15/2015), Supplemental oxygen dependent, and Syringomyelia ([facility]).    SURGICAL HISTORY   has a past surgical history that includes rubin by laparoscopy (2002); shoulder arthroplasty total (2004);  hip arthroplasty total (5/19/08); hip arthroplasty total; us-needle core bx-breast panel; node biopsy sentinel (2/6/2015); thyroid lobectomy (Left, 8/17/2018); thyroidectomy total (8/17/2018); laminotomy; hip replacement, total; cyst excision (1973); shunt insertion; mastectomy (2/6/2015); hip revision total (Left, 9/2/2021); other abdominal surgery; and reconstr total shoulder implant (Left, 5/4/2022).    FAMILY HISTORY  Family History   Problem Relation Age of Onset    Hypertension Mother     Sleep Apnea Brother     Cancer Neg Hx     Diabetes Neg Hx     Stroke Neg Hx     Heart Disease Neg Hx     Ovarian Cancer Neg Hx     Tubal Cancer Neg Hx     Peritoneal Cancer Neg Hx     Colorectal Cancer Neg Hx     Breast Cancer Neg Hx        SOCIAL HISTORY  Social History     Tobacco Use    Smoking status: Never    Smokeless tobacco: Never   Vaping Use    Vaping status: Never Used   Substance and Sexual Activity    Alcohol use: No     Alcohol/week: 0.0 oz    Drug use: No    Sexual activity: Not Currently     Comment:  - 51 years        CURRENT MEDICATIONS  Home Medications       Reviewed by Africa Vernon R.N. (Registered Nurse) on 07/19/24 at 1145  Med List Status: Not Addressed     Medication Last Dose Status   acetaminophen (TYLENOL) 500 MG Tab  Active   alendronate (FOSAMAX) 70 MG Tab  Active   amitriptyline (ELAVIL) 100 MG Tab  Active   apixaban (ELIQUIS) 5mg Tab  Active   atorvastatin (LIPITOR) 10 MG Tab  Active   baclofen (LIORESAL) 10 MG Tab  Active   DULoxetine (CYMBALTA) 30 MG Cap DR Particles  Active   Esomeprazole Magnesium 20 MG Tablet Delayed Response  Active   ferrous sulfate 325 (65 Fe) MG tablet  Active   furosemide (LASIX) 20 MG Tab  Active   metoprolol tartrate (LOPRESSOR) 25 MG Tab  Active   nystatin (NYSTOP) powder  Active   ondansetron (ZOFRAN ODT) 4 MG TABLET DISPERSIBLE  Active   potassium chloride SA (KDUR) 20 MEQ Tab CR  Active   pregabalin (LYRICA) 150 MG Cap  Active   QUEtiapine (SEROQUEL)  25 MG Tab  Active   senna-docusate (PERICOLACE OR SENOKOT S) 8.6-50 MG Tab  Active   spironolactone (ALDACTONE) 25 MG Tab  Active   tamsulosin (FLOMAX) 0.4 MG capsule  Active                  Audit from Redirected Encounters    **Home medications have not yet been reviewed for this encounter**         ALLERGIES  Allergies   Allergen Reactions    Sulfamethoxazole W-Trimethoprim Rash     * full body rash*>  10 years ago    Morphine Vomiting     hallucinations       PHYSICAL EXAM  VITAL SIGNS: BP (!) 141/85   Pulse (!) 151   Temp 37 °C (98.6 °F) (Temporal)   Resp (!) 28   Ht 1.524 m (5')   Wt 75.8 kg (167 lb)   SpO2 94% on facemask  BMI 32.61 kg/m²    Constitutional: Acutely ill-appearing, respiratory distress   HENT: Normocephalic, no obvious evidence of acute trauma.  Eyes: No scleral icterus. Normal conjunctiva   Thorax & Lungs: Increased respiratory rate, increased respiratory effort. Right-sided crackles, tachycardic but regular  Abdomen: The abdomen is not visibly distended. Upon palpation, I find it to be without tenderness.  No mass appreciated.    EKG/LABS  Results for orders placed or performed during the hospital encounter of 07/19/24   CBC with Differential   Result Value Ref Range    WBC 19.6 (H) 4.8 - 10.8 K/uL    RBC 5.86 (H) 4.20 - 5.40 M/uL    Hemoglobin 15.9 12.0 - 16.0 g/dL    Hematocrit 50.2 (H) 37.0 - 47.0 %    MCV 85.7 81.4 - 97.8 fL    MCH 27.1 27.0 - 33.0 pg    MCHC 31.7 (L) 32.2 - 35.5 g/dL    RDW 51.3 (H) 35.9 - 50.0 fL    Platelet Count 606 (H) 164 - 446 K/uL    MPV 11.8 9.0 - 12.9 fL    Neutrophils-Polys 89.20 (H) 44.00 - 72.00 %    Lymphocytes 2.90 (L) 22.00 - 41.00 %    Monocytes 6.80 0.00 - 13.40 %    Eosinophils 0.00 0.00 - 6.90 %    Basophils 0.60 0.00 - 1.80 %    Immature Granulocytes 0.50 0.00 - 0.90 %    Nucleated RBC 0.00 0.00 - 0.20 /100 WBC    Neutrophils (Absolute) 17.45 (H) 1.82 - 7.42 K/uL    Lymphs (Absolute) 0.56 (L) 1.00 - 4.80 K/uL    Monos (Absolute) 1.34 (H) 0.00 -  0.85 K/uL    Eos (Absolute) 0.00 0.00 - 0.51 K/uL    Baso (Absolute) 0.12 0.00 - 0.12 K/uL    Immature Granulocytes (abs) 0.10 0.00 - 0.11 K/uL    NRBC (Absolute) 0.00 K/uL   Troponins in two (2) hours   Result Value Ref Range    Troponin T 55 (H) 6 - 19 ng/L   Blood Culture - Draw one from central line and one from peripheral site    Specimen: Peripheral; Blood   Result Value Ref Range    Significant Indicator NEG     Source BLD     Site PERIPHERAL     Culture Result       No Growth  Note: Blood cultures are incubated for 5 days and  are monitored continuously.Positive blood cultures  are called to the RN and reported as soon as  they are identified.     Blood Culture - Draw one from central line and one from peripheral site    Specimen: Line; Blood   Result Value Ref Range    Significant Indicator NEG     Source BLD     Site Peripheral     Culture Result       No Growth  Note: Blood cultures are incubated for 5 days and  are monitored continuously.Positive blood cultures  are called to the RN and reported as soon as  they are identified.     MRSA By PCR (Amp)    Specimen: Nares; Respirate   Result Value Ref Range    MRSA by PCR Negative Negative   Comp Metabolic Panel   Result Value Ref Range    Sodium 140 135 - 145 mmol/L    Potassium 4.0 3.6 - 5.5 mmol/L    Chloride 99 96 - 112 mmol/L    Co2 18 (L) 20 - 33 mmol/L    Anion Gap 23.0 (H) 7.0 - 16.0    Glucose 113 (H) 65 - 99 mg/dL    Bun 18 8 - 22 mg/dL    Creatinine 0.61 0.50 - 1.40 mg/dL    Calcium 8.7 8.5 - 10.5 mg/dL    Correct Calcium 9.9 8.5 - 10.5 mg/dL    AST(SGOT) 74 (H) 12 - 45 U/L    ALT(SGPT) 27 2 - 50 U/L    Alkaline Phosphatase 93 30 - 99 U/L    Total Bilirubin 0.6 0.1 - 1.5 mg/dL    Albumin 2.5 (L) 3.2 - 4.9 g/dL    Total Protein 6.3 6.0 - 8.2 g/dL    Globulin 3.8 (H) 1.9 - 3.5 g/dL    A-G Ratio 0.7 g/dL   TROPONIN   Result Value Ref Range    Troponin T 54 (H) 6 - 19 ng/L   ESTIMATED GFR   Result Value Ref Range    GFR (CKD-EPI) 92 >60 mL/min/1.73 m  2   EKG   Result Value Ref Range    Report       [facility] Emergency Dept.    Test Date:  2024  Pt Name:    ODETTE NICKERSON                  Department: ER  MRN:        [id number]                      Room:       RD 09  Gender:     Female                       Technician: [id number]  :        1945                   Requested By:ER TRIAGE PROTOCOL  Order #:    [id number]                    Reading MD: DANDRE HOYT MD    Measurements  Intervals                                Axis  Rate:       150                          P:          21  NM:         126                          QRS:        10  QRSD:       80                           T:          27  QT:         341  QTc:        539    Interpretive Statements  Supraventricular tachycardia Rate of 150.  Upright T waves, no ST segment  deviation.  My impression of this EKG: No acute ischemia    Electronically Signed On 2024 14:39:53 PDT by DANDRE HOYT MD     EKG   Result Value Ref Range    Report       [facility] Emergency Dept.    Test Date:  2024  Pt Name:    ODETTE NICKERSON                  Department: ER  MRN:        [id number]                      Room:        09  Gender:     Female                       Technician: [id number]  :        1945                   Requested By:DANDRE HOYT  Order #:    [id number]                    Reading MD: DANDRE HOYT MD    Measurements  Intervals                                Axis  Rate:       115                          P:          29  NM:         153                          QRS:        15  QRSD:       87                           T:          31  QT:         316  QTc:        437    Interpretive Statements  Sinus tachycardia with a rate of 115, narrow QRS, upright T waves, no ST  segment deviation.  My impression of this EKG, no acute ischemia  Electronically Signed On 2024 14:40:22 PDT by DANDRE HOYT MD     POC CoV-2, FLU A/B, RSV by PCR    Result Value Ref Range    POC Influenza A RNA, PCR Negative Negative    POC Influenza B RNA, PCR Negative Negative    POC RSV, by PCR Negative Negative    POC SARS-CoV-2, PCR NotDetected       I have independently interpreted this EKG    RADIOLOGY/PROCEDURES   I have independently interpreted the diagnostic imaging associated with this visit and am waiting the final reading from the radiologist.   My preliminary interpretation is as follows: Large opacification right lung    Radiologist interpretation:  DX-CHEST-PORTABLE (1 VIEW)   Final Result      Possible mild increase in right-sided opacity, pleural effusion with associated atelectasis and/or consolidation.              Cardioversion Procedure Note    Indication: supraventricular tachycardia    Consent: Unable to be obtained due to the emergent nature of this procedure.    Pre-Medication: midazolam (Versed) intravenously    Procedure: The patient was placed in the supine position and the chest area was exposed. The cardioversion pads were applied in the standard manner and configuration.    Attempt #1: The defibrillator was set on the synchronous mode and charged to 200 joules.  A charge was then delivered which resulted in no change in rhythm.    Attempt #2: The defibrillator was set on the synchronous and charged to 200 joules.  A charge was then delivered which resulted in  no change in rhythm.    The patient tolerated the procedure well.    Complications: Unsuccessful cardioversion to sinus rhythm        Conscious Sedation Procedure Note    Indication: cardioversion    Consent: Verbal consent provided by the patient    Physician Involvement: The attending physician was present and supervising this procedure.    Pre-Sedation Documentation and Exam: I have personally completed a history, physical exam & review of systems for this patient (see notes).    Airway Assessment: Mallampati Class II - (soft palate, fauces & uvula are visible)    Prior History of  Anesthesia Complications: none    ASA Classification: Class 3 - A patient with severe systemic disease that limits activity but is not incapacitating    Sedation/ Anesthesia Plan: intravenous sedation    Medications Used: midazolam (Versed) intravenously    Monitoring and Safety: The patient was placed on a cardiac monitor and vital signs, pulse oximetry and level of consciousness were continuously evaluated throughout the procedure. The patient was closely monitored until recovery from the medications was complete and the patient had returned to baseline status. Respiratory therapy was on standby at all times during the procedure.      (The following sections must be completed)  Post-Sedation Vital Signs: Vital signs were reviewed and were stable after the procedure (see flow sheet for vitals)            Intraservice Time: 12 (Minutes)    Post-Sedation Exam: Lungs: clear and Cardiovascular: Tachycardic, regular           Complications: none    I provided both the sedation and procedure, a nurse was present at the bedside for the entire procedure.            COURSE & MEDICAL DECISION MAKING    ASSESSMENT, COURSE AND PLAN  Care Narrative: This is an acutely ill 78-year-old female coming in with shortness of breath with a heart rate of 150, just discharged in the hospital after a hospitalization for respiratory failure secondary to pleural effusion status postthoracentesis.  Immediate upon her arrival we called for an x-ray to exclude a pneumothorax, x-ray revealed a return of her effusion with no obvious pneumothorax.  Her EKG revealed a tacky arrhythmia with a heart rate in the 150s, it was very irregular, SVT versus atrial flutter so she was initially administered a dose of adenosine, a brief pause revealed no underlying flutter waves but a return to her tachyarrhythmia immediately.  She was then sedated for electrical cardioversion which failed after multiple shocks.  She was then administered a dose of IV  metoprolol with significant improvement in her heart rates down into the 110s.  She reports feeling somewhat improved.  Blood work reveals leukocytosis nearly 20,000.  Also evidence of other hemoconcentration with hemoglobin nearly 16, platelets are 606, this all represents significant increase from just yesterday so she will receive some IV fluids.  I will also treat her with some antibiotics as that white out on the lung could represent an infection, she meets markers of sepsis.  Chemistry panel is unremarkable, mild chronic elevation in troponin.  At this point the patient will require hospitalization for further evaluation and care, up  reports that during discharge yesterday they ultimately declined placement but he believes now that would be a better option for his wife.        CRITICAL CARE  The very real possibilty of a deterioration of this patient's condition required the highest level of my preparedness for sudden, emergent intervention.  I provided critical care services, which included medication orders, frequent reevaluations of the patient's condition and response to treatment, ordering and reviewing test results, and discussing the case with various consultants.  The critical care time associated with the care of the patient was 32 minutes. Review chart for interventions. This time is exclusive of any other billable procedures.       DISPOSITION AND DISCUSSIONS  I have discussed management of the patient with the following physicians and MERCED's: Dr. Murphy, admitting hospitalist      FINAL DIAGNOSIS  1. Supraventricular tachycardia (HCC)    2. Dyspnea, unspecified type    3. Pleural effusion    4. SIRS (systemic inflammatory response syndrome) (HCC)    5. Acute respiratory failure with hypoxia (HCC)    Procedures: Cardioversion, procedural sedation       Electronically signed by: Minh Rodriguez M.D., 7/19/2024 12:50 PM

## 2024-07-19 NOTE — ED NOTES
Med rec completed per pt's  and historical med rec   Allergies reviewed    Pt takes Eliquis 5 mg twice a day      states that pt has not taken any medications since being home from the hospital

## 2024-07-20 ENCOUNTER — APPOINTMENT (OUTPATIENT)
Dept: RADIOLOGY | Facility: MEDICAL CENTER | Age: 79
DRG: 291 | End: 2024-07-20
Attending: HOSPITALIST
Payer: MEDICARE

## 2024-07-20 PROBLEM — J96.20 ACUTE ON CHRONIC RESPIRATORY FAILURE (HCC): Status: ACTIVE | Noted: 2024-07-19

## 2024-07-20 PROBLEM — I47.20 V-TACH (HCC): Status: ACTIVE | Noted: 2024-04-26

## 2024-07-20 LAB
ANION GAP SERPL CALC-SCNC: 20 MMOL/L (ref 7–16)
BACTERIA FLD AEROBE CULT: NORMAL
BUN SERPL-MCNC: 17 MG/DL (ref 8–22)
CALCIUM SERPL-MCNC: 8.7 MG/DL (ref 8.5–10.5)
CHLORIDE SERPL-SCNC: 96 MMOL/L (ref 96–112)
CO2 SERPL-SCNC: 22 MMOL/L (ref 20–33)
CREAT SERPL-MCNC: 0.57 MG/DL (ref 0.5–1.4)
EKG IMPRESSION: NORMAL
EKG IMPRESSION: NORMAL
ERYTHROCYTE [DISTWIDTH] IN BLOOD BY AUTOMATED COUNT: 52 FL (ref 35.9–50)
GFR SERPLBLD CREATININE-BSD FMLA CKD-EPI: 94 ML/MIN/1.73 M 2
GLUCOSE SERPL-MCNC: 122 MG/DL (ref 65–99)
GRAM STN SPEC: NORMAL
HCT VFR BLD AUTO: 42.5 % (ref 37–47)
HGB BLD-MCNC: 13.5 G/DL (ref 12–16)
MCH RBC QN AUTO: 27.5 PG (ref 27–33)
MCHC RBC AUTO-ENTMCNC: 31.8 G/DL (ref 32.2–35.5)
MCV RBC AUTO: 86.6 FL (ref 81.4–97.8)
PLATELET # BLD AUTO: 468 K/UL (ref 164–446)
PMV BLD AUTO: 11.5 FL (ref 9–12.9)
POTASSIUM SERPL-SCNC: 3.3 MMOL/L (ref 3.6–5.5)
RBC # BLD AUTO: 4.91 M/UL (ref 4.2–5.4)
SIGNIFICANT IND 70042: NORMAL
SITE SITE: NORMAL
SODIUM SERPL-SCNC: 138 MMOL/L (ref 135–145)
SOURCE SOURCE: NORMAL
WBC # BLD AUTO: 13.5 K/UL (ref 4.8–10.8)

## 2024-07-20 PROCEDURE — 97602 WOUND(S) CARE NON-SELECTIVE: CPT

## 2024-07-20 PROCEDURE — A9270 NON-COVERED ITEM OR SERVICE: HCPCS | Performed by: HOSPITALIST

## 2024-07-20 PROCEDURE — 700102 HCHG RX REV CODE 250 W/ 637 OVERRIDE(OP): Performed by: HOSPITALIST

## 2024-07-20 PROCEDURE — 700111 HCHG RX REV CODE 636 W/ 250 OVERRIDE (IP)

## 2024-07-20 PROCEDURE — 700102 HCHG RX REV CODE 250 W/ 637 OVERRIDE(OP): Performed by: INTERNAL MEDICINE

## 2024-07-20 PROCEDURE — 99233 SBSQ HOSP IP/OBS HIGH 50: CPT | Performed by: STUDENT IN AN ORGANIZED HEALTH CARE EDUCATION/TRAINING PROGRAM

## 2024-07-20 PROCEDURE — 700105 HCHG RX REV CODE 258: Performed by: HOSPITALIST

## 2024-07-20 PROCEDURE — 93005 ELECTROCARDIOGRAM TRACING: CPT

## 2024-07-20 PROCEDURE — A9270 NON-COVERED ITEM OR SERVICE: HCPCS | Performed by: INTERNAL MEDICINE

## 2024-07-20 PROCEDURE — 99222 1ST HOSP IP/OBS MODERATE 55: CPT | Mod: 25 | Performed by: INTERNAL MEDICINE

## 2024-07-20 PROCEDURE — 93005 ELECTROCARDIOGRAM TRACING: CPT | Performed by: STUDENT IN AN ORGANIZED HEALTH CARE EDUCATION/TRAINING PROGRAM

## 2024-07-20 PROCEDURE — 700101 HCHG RX REV CODE 250

## 2024-07-20 PROCEDURE — 85027 COMPLETE CBC AUTOMATED: CPT

## 2024-07-20 PROCEDURE — 80048 BASIC METABOLIC PNL TOTAL CA: CPT

## 2024-07-20 PROCEDURE — 93010 ELECTROCARDIOGRAM REPORT: CPT | Performed by: INTERNAL MEDICINE

## 2024-07-20 PROCEDURE — 93010 ELECTROCARDIOGRAM REPORT: CPT | Mod: 76 | Performed by: INTERNAL MEDICINE

## 2024-07-20 PROCEDURE — 700111 HCHG RX REV CODE 636 W/ 250 OVERRIDE (IP): Performed by: HOSPITALIST

## 2024-07-20 PROCEDURE — 700102 HCHG RX REV CODE 250 W/ 637 OVERRIDE(OP): Performed by: STUDENT IN AN ORGANIZED HEALTH CARE EDUCATION/TRAINING PROGRAM

## 2024-07-20 PROCEDURE — A9270 NON-COVERED ITEM OR SERVICE: HCPCS | Performed by: STUDENT IN AN ORGANIZED HEALTH CARE EDUCATION/TRAINING PROGRAM

## 2024-07-20 PROCEDURE — 770020 HCHG ROOM/CARE - TELE (206)

## 2024-07-20 PROCEDURE — 36415 COLL VENOUS BLD VENIPUNCTURE: CPT

## 2024-07-20 RX ORDER — MAGNESIUM SULFATE HEPTAHYDRATE 40 MG/ML
2 INJECTION, SOLUTION INTRAVENOUS ONCE
Status: COMPLETED | OUTPATIENT
Start: 2024-07-20 | End: 2024-07-20

## 2024-07-20 RX ORDER — METOPROLOL TARTRATE 50 MG
50 TABLET ORAL 2 TIMES DAILY
Status: DISCONTINUED | OUTPATIENT
Start: 2024-07-20 | End: 2024-07-25 | Stop reason: HOSPADM

## 2024-07-20 RX ORDER — AMIODARONE HYDROCHLORIDE 200 MG/1
200 TABLET ORAL DAILY
Status: DISCONTINUED | OUTPATIENT
Start: 2024-07-20 | End: 2024-07-25 | Stop reason: HOSPADM

## 2024-07-20 RX ORDER — METOPROLOL TARTRATE 1 MG/ML
5 INJECTION, SOLUTION INTRAVENOUS
Status: DISCONTINUED | OUTPATIENT
Start: 2024-07-20 | End: 2024-07-25 | Stop reason: HOSPADM

## 2024-07-20 RX ORDER — SPIRONOLACTONE 25 MG/1
12.5 TABLET ORAL DAILY
Status: DISCONTINUED | OUTPATIENT
Start: 2024-07-21 | End: 2024-07-25 | Stop reason: HOSPADM

## 2024-07-20 RX ORDER — POTASSIUM CHLORIDE 1500 MG/1
20 TABLET, EXTENDED RELEASE ORAL ONCE
Status: DISCONTINUED | OUTPATIENT
Start: 2024-07-20 | End: 2024-07-20

## 2024-07-20 RX ORDER — POTASSIUM CHLORIDE 1500 MG/1
40 TABLET, EXTENDED RELEASE ORAL DAILY
Status: DISCONTINUED | OUTPATIENT
Start: 2024-07-21 | End: 2024-07-25 | Stop reason: HOSPADM

## 2024-07-20 RX ADMIN — AMPICILLIN SODIUM, SULBACTAM SODIUM 3 G: 2; 1 INJECTION, POWDER, FOR SOLUTION INTRAMUSCULAR; INTRAVENOUS at 00:23

## 2024-07-20 RX ADMIN — METOPROLOL TARTRATE 5 MG: 5 INJECTION INTRAVENOUS at 16:39

## 2024-07-20 RX ADMIN — METOPROLOL TARTRATE 50 MG: 50 TABLET, FILM COATED ORAL at 18:00

## 2024-07-20 RX ADMIN — TAMSULOSIN HYDROCHLORIDE 0.4 MG: 0.4 CAPSULE ORAL at 20:21

## 2024-07-20 RX ADMIN — FUROSEMIDE 40 MG: 10 INJECTION INTRAMUSCULAR; INTRAVENOUS at 05:17

## 2024-07-20 RX ADMIN — APIXABAN 5 MG: 5 TABLET, FILM COATED ORAL at 18:00

## 2024-07-20 RX ADMIN — PREGABALIN 150 MG: 150 CAPSULE ORAL at 06:00

## 2024-07-20 RX ADMIN — VANCOMYCIN HYDROCHLORIDE 750 MG: 500 INJECTION, POWDER, LYOPHILIZED, FOR SOLUTION INTRAVENOUS at 03:43

## 2024-07-20 RX ADMIN — SPIRONOLACTONE 25 MG: 25 TABLET ORAL at 05:26

## 2024-07-20 RX ADMIN — ATORVASTATIN CALCIUM 10 MG: 10 TABLET, FILM COATED ORAL at 20:20

## 2024-07-20 RX ADMIN — AMPICILLIN SODIUM, SULBACTAM SODIUM 3 G: 2; 1 INJECTION, POWDER, FOR SOLUTION INTRAMUSCULAR; INTRAVENOUS at 11:36

## 2024-07-20 RX ADMIN — AMITRIPTYLINE HYDROCHLORIDE 100 MG: 50 TABLET, FILM COATED ORAL at 20:21

## 2024-07-20 RX ADMIN — PREGABALIN 150 MG: 150 CAPSULE ORAL at 18:00

## 2024-07-20 RX ADMIN — POTASSIUM CHLORIDE 20 MEQ: 1500 TABLET, EXTENDED RELEASE ORAL at 05:26

## 2024-07-20 RX ADMIN — AMIODARONE HYDROCHLORIDE 200 MG: 200 TABLET ORAL at 18:45

## 2024-07-20 RX ADMIN — AMPICILLIN SODIUM, SULBACTAM SODIUM 3 G: 2; 1 INJECTION, POWDER, FOR SOLUTION INTRAMUSCULAR; INTRAVENOUS at 05:21

## 2024-07-20 RX ADMIN — AMPICILLIN SODIUM, SULBACTAM SODIUM 3 G: 2; 1 INJECTION, POWDER, FOR SOLUTION INTRAMUSCULAR; INTRAVENOUS at 18:02

## 2024-07-20 RX ADMIN — METOPROLOL TARTRATE 25 MG: 25 TABLET, FILM COATED ORAL at 05:21

## 2024-07-20 RX ADMIN — SENNOSIDES AND DOCUSATE SODIUM 2 TABLET: 50; 8.6 TABLET ORAL at 18:00

## 2024-07-20 RX ADMIN — MAGNESIUM SULFATE HEPTAHYDRATE 2 G: 2 INJECTION, SOLUTION INTRAVENOUS at 01:31

## 2024-07-20 RX ADMIN — APIXABAN 5 MG: 5 TABLET, FILM COATED ORAL at 05:21

## 2024-07-20 RX ADMIN — ACETAMINOPHEN 650 MG: 325 TABLET, FILM COATED ORAL at 20:20

## 2024-07-20 RX ADMIN — DULOXETINE HYDROCHLORIDE 30 MG: 30 CAPSULE, DELAYED RELEASE ORAL at 18:00

## 2024-07-20 ASSESSMENT — ENCOUNTER SYMPTOMS
PALPITATIONS: 0
FEVER: 0
ORTHOPNEA: 0
SHORTNESS OF BREATH: 1
DIZZINESS: 0
MEMORY LOSS: 1
ABDOMINAL PAIN: 0
VOMITING: 0
NAUSEA: 0
WEAKNESS: 1
COUGH: 0

## 2024-07-20 ASSESSMENT — COGNITIVE AND FUNCTIONAL STATUS - GENERAL
SUGGESTED CMS G CODE MODIFIER DAILY ACTIVITY: CM
DRESSING REGULAR LOWER BODY CLOTHING: TOTAL
WALKING IN HOSPITAL ROOM: TOTAL
TURNING FROM BACK TO SIDE WHILE IN FLAT BAD: A LOT
MOVING TO AND FROM BED TO CHAIR: TOTAL
MOVING FROM LYING ON BACK TO SITTING ON SIDE OF FLAT BED: TOTAL
EATING MEALS: A LOT
DAILY ACTIVITIY SCORE: 8
MOBILITY SCORE: 7
TOILETING: TOTAL
HELP NEEDED FOR BATHING: TOTAL
SUGGESTED CMS G CODE MODIFIER MOBILITY: CM
STANDING UP FROM CHAIR USING ARMS: TOTAL
CLIMB 3 TO 5 STEPS WITH RAILING: TOTAL
PERSONAL GROOMING: A LOT
DRESSING REGULAR UPPER BODY CLOTHING: TOTAL

## 2024-07-20 ASSESSMENT — PAIN DESCRIPTION - PAIN TYPE: TYPE: CHRONIC PAIN

## 2024-07-20 ASSESSMENT — FIBROSIS 4 INDEX: FIB4 SCORE: 2.31

## 2024-07-20 NOTE — DISCHARGE PLANNING
[facility]P TCN chart review completed. The most current review of medical record, knowledge of pt's PLOF and social support, LACE+ score of 72, 6 clicks scores of 7 mobility were considered.      Pt seen at bedside with spouse present. Pt and spouse aware of TCN program from recent admission and recall this TCN from prior admit. Provided education regarding post acute levels of care. Education provided regarding case management policy for blanket SNF referrals. Discussed [facility] plan benefits. Note that pt is very lethargic at this time and much discussion re: dc planning was with spouse with pt present in room.    Note that appears that pt/spouse are now more agreeable to considerations for post acute placement given recent dc to home where pt slid off couch and required increasing levels of assistance 2' to medical condition/status. Note that at baseline, spouse provides assistance for majority of IADLs, some ADls and transfers/short distance ambulation. However appears pt is currently requiring notably increased assistance and spouse reports concerns with providing current level needed. Discussed CM blanket referral process as noted above and pt/spouse report they will provide choice from accepting SNF facilities if indicated for dc planning.     No provider requests at this time, though per review, anticipating PT and OT consults will be placed once pt is more medically appropriate/stable to participate with therapy given current fnx deficits and low LACE scores. No choice obtained as pt/spouse will provide choice from accepting SNF facilities once indicated.     Current discharge considerations are anticipated for dc to post acute placement. TCN will continue to follow and collaborate with discharge planning team as additional post acute needs arise. Thank you.     Completed today:  Will monitor for PT and OT consults to be placed once pt more medically appropriate/stable; see above  Choice obtained: none  Pt  aware of [facility]'s blanket referral policy and report will select from accepting SNF facilities once indicated (see above)

## 2024-07-20 NOTE — ED NOTES
Afib rvr lasting apx 4 minutes. Dr Carreon notified. Pt self converted intermittent afib and Sinus tach.

## 2024-07-20 NOTE — ASSESSMENT & PLAN NOTE
Patient was just discharged  7/15 and after physical therapy and Occupational Therapy evaluation they recommended rehab and the patient's  refused now he is okay with it.  Therapy consulted, will need placement   Fall precautions

## 2024-07-20 NOTE — HOSPITAL COURSE
Tereza Sánchez is a 78 y.o. female with a past medical history of history of Chiari malformation status post ventriculoperitoneal shunt, sleep apnea not on CPAP, chronic respiratory failure requiring 3 LPM, hypertension, hypercholesterolemia, hypothyroidism, and hx of breast cancer, recent IMCU admission in May for DVT and saddle PE treated with thrombectomy complicated by retroperitoneal hematoma, she was discharged to SNF at that time. She returned for SOB early this week and was discharged on 7/15 after being treated for pleural effusion and pneumonia, post acute care was recommended however pt declined and thus was discharge home with spouse.    At home the patient's condition declined to the point where now she is altered with her mental status is much worse than her respiratory status.    On admission she was tachcyardic with 's susepcted A.flutter vs SVT, she was cardioverted without resolution but improved after IV metoprolol with . Labs with WBC 19, plt 606, Hgb 6. Xray showed increased rt pleural effusion. She was started on IV antibiotics and admitted for further evaluation.

## 2024-07-20 NOTE — ASSESSMENT & PLAN NOTE
History of breast cancer about 6 years ago she saw Dr. Dia.  There is a potential that the cancer may be recurring and that is why she is getting pleural effusions we may need a full cancer workup  Does have a concerning lesion on her breast, may need punch biopsy

## 2024-07-20 NOTE — ASSESSMENT & PLAN NOTE
Chronic insomnia at this point I am stopping any medications that may sedate her more as the patient is very sedated already

## 2024-07-20 NOTE — PROGRESS NOTES
Received report from NOC shift RN. Patient is A&Ox2, disoriented to place and situation. VSS,no signs of distress. Bed in lowest and locked position, call light in reach, bed alarm on, plan of care ongoing.

## 2024-07-20 NOTE — PROGRESS NOTES
Pt transferred up to T729. Placed on tele box, vitals stable with some beats of Vtach, A&Ox1- to person only, and no pain. Notified on call hospitalist Dr. Kelly of runs of Vtach. Bed alarm on, call light and belongings within reach.

## 2024-07-20 NOTE — ASSESSMENT & PLAN NOTE
Patient's white blood cell count at this point is elevated most likely secondary to unknown source of infection  Cont. Unasyn, stop  IV vancomycin as MRSA nares is negative. Did reently complete abx course, will consider stopping and monitoring if she is more stable   We will monitor culture results  Thoracentesis attempted, post procedure pneumothorax

## 2024-07-20 NOTE — CARE PLAN
The patient is Watcher - Medium risk of patient condition declining or worsening    Shift Goals  Clinical Goals: monitor oxygenation & labs  Patient Goals: NAHUM    Progress made toward(s) clinical / shift goals:    Problem: Hemodynamics  Goal: Patient's hemodynamics, fluid balance and neurologic status will be stable or improve    Pt's labs/electrolytes remain stable. VSS stable, however HR is slughtly fast 100s-110s. Also had a couple runs of Vtach. Administered mag sulfate and no more Vtach runs.     Problem: Respiratory  Goal: Patient will achieve/maintain optimum respiratory ventilation and gas exchange  Outcome: Progressing  Administered 3L via oxymask to ensure proper oxygenation.     Problem: Risk for Aspiration  Goal: Patient's risk for aspiration will be absent or decrease  Outcome: Progressing  HOB remained elevated to reduce risk for aspiration. Crushed pills to ensure pt didn't aspirate.     Patient is not progressing towards the following goals:

## 2024-07-20 NOTE — CARE PLAN
The patient is Watcher - Medium risk of patient condition declining or worsening    Shift Goals  Clinical Goals: Monitor oxygen, thoracentesis  Patient Goals: NAHUM      Problem: Skin Integrity  Goal: Skin integrity is maintained or improved  Outcome: Progressing  Q2 turns implemented, wound care involved, appropriate wound interventions in place.     Problem: Fall Risk  Goal: Patient will remain free from falls  Outcome: Progressing   Safety precautions and fall prevention in place. Fall prevention education provided.  Bed in low locked position, bed alarm on, treaded socks on patient, call bell within reach.

## 2024-07-20 NOTE — ASSESSMENT & PLAN NOTE
Uncontrolled   Multiple runs of V-tach and bursts of PSVT  I have increased metoprolol from 25 to 50 BID   Amodiarone 200 mg added  Discussed with cardio  Echo ordered, reviewed and unremarkable   Telemetry reviewed has maintained sinus rhythm continue with metoprolol and amiodarone  Monitor and replace electrolytes as needed   Continue AC

## 2024-07-20 NOTE — PROGRESS NOTES
[facility] Medicine Daily Progress Note    Date of Service  7/20/2024    Chief Complaint  Tereza Sánchez is a 78 y.o. female admitted 7/19/2024 with shortness of breath and lethargy     Hospital Course  Tereza Sánchez is a 78 y.o. female with a past medical history of history of Chiari malformation status post ventriculoperitoneal shunt, sleep apnea not on CPAP, chronic respiratory failure requiring 3 LPM, hypertension, hypercholesterolemia, hypothyroidism, and hx of breast cancer, recent IMCU admission in May for DVT and saddle PE treated with thrombectomy complicated by retroperitoneal hematoma, she was discharged to SNF at that time. She returned for SOB early this week and was discharged on 7/15 after being treated for pleural effusion and pneumonia, post acute care was recommended however pt declined and thus was discharge home with spouse.    At home the patient's condition declined to the point where now she is altered with her mental status is much worse than her respiratory status.    On admission she was tachcyardic with 's susepcted A.flutter vs SVT, she was cardioverted without resolution but improved after IV metoprolol with . Labs with WBC 19, plt 606, Hgb 6. Xray showed increased rt pleural effusion. She was started on IV antibiotics and admitted for further evaluation.     Interval Problem Update  Pt seen and examined, she is awake, notes feeling slightly improved but overall unwell and weak. Denies chest pain, feels SOB but not worse than her baseline. She appears lethargic but per  she looks better than she did last night   - she remains tachycardic HR , her BP is stable, she is on her baseline oxygen of 3L.   - tele monitor shows multiple runs of Vtach as well as bursts of 's (SVT vs A.fib?). I have decreased home aldactone and increased metoprolol for better HR control, may need cardiology consultation if persistent. Limited echo ordered   - wbc improved, K low at  3.3, oral K given. Mag at goal. Trop mild elevated but stable   - cultures thus far neg. MRSA nares neg, stop vanc, continue unasyn for now   - repeat right thoracentesis ordered, planning for tomorrow, INR ordered       I have discussed this patient's plan of care and discharge plan at IDT rounds today with Case Management, Nursing, Nursing leadership, and other members of the IDT team.    Consultants/Specialty  NA    Code Status  Full Code    Disposition  The patient is not medically cleared for discharge to home or a post-acute facility.  Anticipate discharge to: skilled nursing facility    I have placed the appropriate orders for post-discharge needs.    Review of Systems  Review of Systems   Constitutional:  Positive for malaise/fatigue. Negative for fever.   HENT:  Negative for congestion.    Eyes:         Chronic blindness   Respiratory:  Positive for shortness of breath. Negative for cough.    Cardiovascular:  Positive for leg swelling. Negative for chest pain, palpitations and orthopnea.   Gastrointestinal:  Negative for abdominal pain, nausea and vomiting.   Genitourinary:  Negative for dysuria.   Musculoskeletal:  Positive for joint pain.   Neurological:  Positive for weakness. Negative for dizziness.   Psychiatric/Behavioral:  Positive for memory loss.         Physical Exam  Temp:  [35.8 °C (96.4 °F)-36.7 °C (98 °F)] 36.7 °C (98 °F)  Pulse:  [] 108  Resp:  [17-36] 17  BP: (101-125)/(62-78) 104/71  SpO2:  [92 %-95 %] 95 %    Physical Exam  Vitals and nursing note reviewed.   Constitutional:       Appearance: She is obese. She is ill-appearing and toxic-appearing.      Comments: Elderly ill appearing female   HENT:      Head: Normocephalic.      Mouth/Throat:      Mouth: Mucous membranes are dry.   Eyes:      General: No scleral icterus.     Comments: Chronic blindness   Cardiovascular:      Rate and Rhythm: Regular rhythm. Tachycardia present.      Heart sounds: Normal heart sounds.   Pulmonary:       Effort: Respiratory distress present.      Comments: Diminished breath sounds, poor inspiratory effort with shallow breathing, increasing WOB  Abdominal:      General: Bowel sounds are normal. There is no distension.      Palpations: Abdomen is soft.      Tenderness: There is no abdominal tenderness.   Musculoskeletal:      Cervical back: Neck supple.      Right lower leg: Edema present.      Left lower leg: Edema present.   Skin:     General: Skin is warm.      Capillary Refill: Capillary refill takes 2 to 3 seconds.      Coloration: Skin is pale.   Neurological:      Mental Status: She is disoriented.         Fluids    Intake/Output Summary (Last 24 hours) at 7/20/2024 1557  Last data filed at 7/20/2024 0800  Gross per 24 hour   Intake 0 ml   Output --   Net 0 ml       Laboratory  Recent Labs     07/18/24  0822 07/19/24  1147 07/20/24  0501   WBC 12.4* 19.6* 13.5*   RBC 4.45 5.86* 4.91   HEMOGLOBIN 12.3 15.9 13.5   HEMATOCRIT 38.9 50.2* 42.5   MCV 87.4 85.7 86.6   MCH 27.6 27.1 27.5   MCHC 31.6* 31.7* 31.8*   RDW 51.8* 51.3* 52.0*   PLATELETCT 459* 606* 468*   MPV 11.0 11.8 11.5     Recent Labs     07/18/24  0822 07/19/24  1449 07/20/24  0501   SODIUM 138 140 138   POTASSIUM 3.7 4.0 3.3*   CHLORIDE 92* 99 96   CO2 30 18* 22   GLUCOSE 76 113* 122*   BUN 17 18 17   CREATININE 0.51 0.61 0.57   CALCIUM 8.8 8.7 8.7                   Imaging  DX-CHEST-PORTABLE (1 VIEW)   Final Result      Possible mild increase in right-sided opacity, pleural effusion with associated atelectasis and/or consolidation.      US-THORACENTESIS PUNCTURE RIGHT    (Results Pending)   EC-ECHOCARDIOGRAM LTD W/O CONT    (Results Pending)        Assessment/Plan  * Acute on chronic respiratory failure (HCC)- (present on admission)  Assessment & Plan  Patient is acute respiratory failure with hypoxia which is most to her worsening pleural effusion on the right side.  Patient will need continued oxygen support to keep oxygen saturations above 90%  RT  protocol   Thoracentesis ordered   Continue AC-hx of PE  Contiue lasix   May need CT if no improvement   Unclear reason for recurrent effusion, pathology neg for malignancy or infection on previous admit     Leukocytosis- (present on admission)  Assessment & Plan  Patient's white blood cell count at this point is elevated most likely secondary to unknown source of infection  Cont. Unasyn, stop  IV vancomycin as MRSA nares is negative. Did reently complete abx course, will consider stopping and monitoring if she is more stable   We will monitor culture results  Patient will need thoracentesis also that will need to be sent for culture as well since the white blood cell continues to be elevated    Thrombocytosis- (present on admission)  Assessment & Plan  Etiology unclear, likely reactive   Monitor     History of pulmonary embolism- (present on admission)  Assessment & Plan  Because of history of pulmonary embolism continue at this point with anticoagulation using Eliquis    Chronic diastolic heart failure (HCC)- (present on admission)  Assessment & Plan  Patient has a history of chronic diastolic heart failure    Patient is chronically on anticoagulation and thus not on aspirin.  Started on diuresis, will continue with close attention to volume status and renal function     Paroxysmal atrial fibrillation (HCC)- (present on admission)  Assessment & Plan  Uncontrolled   Multiple runs of V-tach and bursts of PSVT  I have increased metoprolol from 25 to 50 BID   Check echo  Monitor on tele   Monitor and replace electrolytes as needed   Continue AC     V-tach (HCC)- (present on admission)  Assessment & Plan  Pt having multiple frequent runs of V--tach and PSVT, unclear etiology   - narrow complex tachyarrhythmia I ER on admission, s/p cardioversion and IV metoprolol with improvement to low 100s  Trop flat, EKG without signs of ishemia   - will get repeat echo   - I have increased metoprolol   - if persistent will get  cardiology consultation   - monitor on tele   - monitor electrolytes, goal K >4, Mag >2    Polypharmacy- (present on admission)  Assessment & Plan  Patient has polypharmacy  Minimize sedative medications   Monitor for adverse side effects     Insomnia- (present on admission)  Assessment & Plan  Chronic insomnia at this point I am stopping any medications that may sedate her more as the patient is very sedated already    History of breast cancer- (present on admission)  Assessment & Plan  History of breast cancer about 6 years ago she saw Dr. Dia.  There is a potential that the cancer may be recurring and that is why she is getting pleural effusions we may need a full cancer workup    Major depressive disorder- (present on admission)  Assessment & Plan  Currently not suicidal or homicidal continue with Cymbalta and amitriptyline    SELWYN (obstructive sleep apnea)- (present on admission)  Assessment & Plan  Currently not using CPAP at night    Risk for falls- (present on admission)  Assessment & Plan  Patient was just discharged  7/15 and after physical therapy and Occupational Therapy evaluation they recommended rehab and the patient's  refused now he is okay with it.  Therapy consulted, will need placement   Fall precautions     Dyslipidemia- (present on admission)  Assessment & Plan  Low-fat low-cholesterol diet  Statin  Fasting lipid panel    GERD (gastroesophageal reflux disease)- (present on admission)  Assessment & Plan  PPI therapy with omeprazole         VTE prophylaxis:    therapeutic anticoagulation with eliquis 5 mg BID      I have performed a physical exam and reviewed and updated ROS and Plan today (7/20/2024). In review of yesterday's note (7/19/2024), there are no changes except as documented above.      Greater than 51 minutes spent prepping to see patient (e.g. review of tests) obtaining and/or reviewing separately obtained history. Performing a medically appropriate examination and/  evaluation.  Counseling and educating the patient/family/caregiver.  Ordering medications, tests, or procedures.  Referring and communicating with other health care professionals.  Documenting clinical information in EPIC.  Independently interpreting results and communicating results to patient/family/caregiver.  Care coordination.

## 2024-07-20 NOTE — WOUND TEAM
"[facility]  Inpatient Services  Initial Wound and Skin Care Evaluation    Admission Date: 7/19/2024     Last order of IP CONSULT TO WOUND CARE was found on 7/19/2024 from Hospital Encounter on 7/19/2024     HPI, PMH, SH: Reviewed    Past Surgical History:   Procedure Laterality Date    PB RECONSTR TOTAL SHOULDER IMPLANT Left 5/4/2022    Procedure: LEFT REVERSE TOTAL SHOULDER ARTHROPLASTY;  Surgeon: Vinicius Whaley M.D.;  Location: SURGERY SAME DAY [facility];  Service: Orthopedics    HIP REVISION TOTAL Left 9/2/2021    Procedure: REVISION, TOTAL ARTHROPLASTY, HIP;  Surgeon: Daniel Allison M.D.;  Location: SURGERY [facility];  Service: Orthopedics    THYROID LOBECTOMY Left 8/17/2018    Procedure: THYROID LOBECTOMY;  Surgeon: Adelina Wilson M.D.;  Location: SURGERY SAME DAY [facility];  Service: General    THYROIDECTOMY TOTAL  8/17/2018    Procedure: NIMS RECURRENT LARYNGEAL NERVE MONITORING;  Surgeon: Adelina Wilson M.D.;  Location: SURGERY SAME DAY [facility];  Service: General    NODE BIOPSY SENTINEL  2/6/2015    Performed by Adelina Wilson M.D. at SURGERY [facility]    MASTECTOMY  2/6/2015    right-Performed by Adelina Wilson M.D. at SURGERY [facility]    HIP ARTHROPLASTY TOTAL  5/19/08    Performed by FARTUN ERAZO at SURGERY [facility]    SHOULDER ARTHROPLASTY TOTAL  2004    Right    JAN BY LAPAROSCOPY  2002    CYST EXCISION  1973    \"remove cyst    HIP ARTHROPLASTY TOTAL      HIP REPLACEMENT, TOTAL      LAMINOTOMY      OTHER ABDOMINAL SURGERY      SHUNT INSERTION      cerebral shunt    US-NEEDLE CORE BX-BREAST PANEL       Social History     Tobacco Use    Smoking status: Never    Smokeless tobacco: Never   Substance Use Topics    Alcohol use: No     Alcohol/week: 0.0 oz     Chief Complaint   Patient presents with    Shortness of Breath     X 1 day. D/C from [facility] yesterday post thoracentesis for pleural effusion. On Eliquis.  called EMS as pt " "was short of breath & slid off the couch, no trauma. Pt in a fib with RVR, rate 160s. Tachypneic, afebrile. Denies chest pain.      Diagnosis: Acute respiratory failure with hypoxemia (HCC) [J96.01]    Unit where seen by Wound Team: T729/01     WOUND CONSULT RELATED TO:  R breast, R back, L arm    WOUND TEAM PLAN OF CARE - Frequency of Follow-up:   Nursing to follow dressing orders written for wound care. Contact wound team if area fails to progress, deteriorates or with any questions/concerns if something comes up before next scheduled follow up (See below as to whether wound is following and frequency of wound follow up)   Weekly - R breast  Not following, consult as needed  - R back, L arm    WOUND HISTORY:    at beside states wound has been present \"for a while the last few times she was at the hospital.\" Unable to provide further history regarding how or when the wound developed. Wound was evaluated by Wound Team on previous recent admission and has since deteriorated.       WOUND ASSESSMENT/LDA  Wound 07/13/24 Full Thickness Wound Breast Lateral Right (Active)   Date First Assessed/Time First Assessed: 07/13/24 0135   Present on Original Admission: Yes  Hand Hygiene Completed: Yes  Primary Wound Type: Full Thickness Wound  Location: Breast  Wound Orientation: Lateral  Laterality: Right      Assessments 7/20/2024  3:00 PM   Wound Image      Site Assessment Red;Purple   Periwound Assessment Red;Purple;Fragile;Induration   Margins Attached edges   Closure Secondary intention   Drainage Amount Small   Drainage Description Serosanguineous   Treatments Cleansed;Nonselective debridement;Site care   Wound Cleansing Approved Wound Cleanser   Periwound Protectant No-sting Skin Prep   Dressing Status Clean;Dry;Intact   Dressing Changed Changed   Dressing Cleansing/Solutions Not Applicable   Dressing Options Petrolatum Gauze (yellow);Silicone Adhesive Foam   Dressing Change/Treatment Frequency Daily, and As Needed "   NEXT Dressing Change/Treatment Date 07/21/24   NEXT Weekly Photo (Inpatient Only) 07/27/24   Wound Team Following Weekly   Non-staged Wound Description Full thickness   Wound Length (cm) 3.8 cm   Wound Width (cm) 2.2 cm   Wound Surface Area (cm^2) 8.36 cm^2   Shape Irregular   Wound Odor None   WOUND NURSE ONLY - Time Spent with Patient (mins) 30       Wound 07/13/24 Skin Tear Arm Posterior Left (Active)   Date First Assessed/Time First Assessed: 07/13/24 0136   Present on Original Admission: Yes  Hand Hygiene Completed: Yes  Primary Wound Type: Skin Tear  Location: Arm  Wound Orientation: Posterior  Laterality: Left      Assessments 7/20/2024  3:00 PM   Wound Image     Site Assessment Scabbed;Red;Yellow   Periwound Assessment Pink   Margins Attached edges   Closure Adhesive bandage   Drainage Amount None   Treatments Cleansed;Nonselective debridement;Site care   Wound Cleansing Approved Wound Cleanser   Periwound Protectant Mepitel   Dressing Status Clean;Intact;Dry   Dressing Changed Changed   Dressing Cleansing/Solutions Not Applicable   Dressing Options Mepitel One   Dressing Change/Treatment Frequency Weekly, and As Needed   NEXT Dressing Change/Treatment Date 07/27/24   NEXT Weekly Photo (Inpatient Only) 07/27/24   Wound Team Following Not following   Non-staged Wound Description Full thickness        Right Upper Back       Sacrum      Right Heel      Left Heel      Vascular:    GUSTAVO:   No results found.    Lab Values:    Lab Results   Component Value Date/Time    WBC 13.5 (H) 07/20/2024 05:01 AM    RBC 4.91 07/20/2024 05:01 AM    HEMOGLOBIN 13.5 07/20/2024 05:01 AM    HEMATOCRIT 42.5 07/20/2024 05:01 AM    CREACTPROT <0.30 04/29/2024 06:36 AM    SEDRATEWES 22 07/15/2024 06:23 PM    HBA1C 6.0 (H) 07/17/2024 09:04 AM         Culture Results show:  No results found for this or any previous visit (from the past 720 hour(s)).    Pain Level/Medicated:  None, Tolerated without pain medication       INTERVENTIONS BY  WOUND TEAM:  Chart and images reviewed. Discussed with bedside RN. All areas of concern (based on picture review, LDA review and discussion with bedside RN) have been thoroughly assessed. Documentation of areas based on significant findings. This RN in to assess patient. Performed standard wound care which includes appropriate positioning, dressing removal and non-selective debridement. Pictures and measurements obtained weekly if/when required.    Wound:  RIGHT BREAST  Preparation for Dressing removal: Removed without difficulty  Cleansed/Non-selectively Debrided with:  Wound cleanser and Gauze  Berenice wound: Cleansed with Wound cleanser and Gauze, Prepped with No Sting  Primary Dressing:  xeroform  Secondary (Outer) Dressing: silicone adhesive foam    Advanced Wound Care Discharge Planning  Number of Clinicians necessary to complete wound care: 1  Is patient requiring IV pain medications for dressing changes:  No   Length of time for dressing change 30 min. (This does not include chart review, pre-medication time, set up, clean up or time spent charting.)    Interdisciplinary consultation: Patient, Jennifer CARDONA (Wound RN) and Bedside RN Chichi, Hospitalist MD Shipman .     EVALUATION / RATIONALE FOR TREATMENT:     Date:  07/20/24  Wound Status:  Initial evaluation    Patient right breast with full thickness wound of unknown etiology. Wound has evolved irregularly since previous admission. Purple discoloration now noted along lateral edge and wound with surrounding induration. Xeroform (yellow) applied to provide an occlusive dressing that incorporates bacteriostatic protection. Patient may benefit from punch biopsy depending on how wound evolves. Will recheck wound in a few days.    Left upper arm skin tear protected with mepitel to support healing.     Patient right upper back with excoriated redness.    BL heels and sacrum also assessed during encounter and found to be intact with blanchable redness. Offloading  dressings applied. Barrier paste ordered for protective layer.         Goals: Steady decrease in wound area and depth weekly.    NURSING PLAN OF CARE ORDERS:  Dressing changes: See Dressing Care orders    NUTRITION RECOMMENDATIONS   Wound Team Recommendations:  N/A    DIET ORDERS (From admission to next 24h)       Start     Ordered    07/20/24 1132  Diet Order Diet: Regular; Miscellaneous modifications: (optional): [employer]  ALL MEALS        Question Answer Comment   Diet: Regular    Miscellaneous modifications: (optional) [employer]         07/20/24 1132                    PREVENTATIVE INTERVENTIONS:    Q shift Logan - performed per nursing policy  Q shift pressure point assessments - performed per nursing policy    Surface/Positioning  Standard/trauma mattress - Currently in Place  Reposition q 2 hours - Currently in Place  TAPs Turning system - Currently in Place    Offloading/Redistribution  Heel offloading dressing (Silicone dressing) - Applied this Visit      Containment/Moisture Prevention    Dri-yohannes pad - Currently in Place  Purwick/Condom Cath - Currently in Place  Barrier paste - Ordered    Anticipated discharge plans:  TBD        Vac Discharge Needs:  Vac Discharge plan is purely a recommendation from wound team and not a requirement for discharge unless otherwise stated by physician.  Not Applicable Pt not on a wound vac

## 2024-07-20 NOTE — PROGRESS NOTES
Pharmacy Vancomycin Kinetics Note for 7/19/2024     78 y.o. female on Vancomycin day # 1     Vancomycin Indication (AUC Dosing):  \sepsis    Provider specified end date: 07/26/24    Active Antibiotics (From admission, onward)      Ordered     Ordering Provider       Fri Jul 19, 2024  9:02 PM    07/19/24 2102  vancomycin (Vancocin) 750 mg in  mL IVPB  (vancomycin (VANCOCIN) IV (LD + Maintenance))  EVERY 12 HOURS         Rodríguez Murphy M.D.       Fri Jul 19, 2024  4:53 PM    07/19/24 1653  ampicillin/sulbactam (Unasyn) 3 g in  mL IVPB  EVERY 6 HOURS         Rodríguez Murphy M.D.    07/19/24 1653  MD Alert...Vancomycin per Pharmacy  PRN        Question:  Indication(s) for vancomycin?  Answer:  Unknown source of infection    Rodríguez Murphy M.D.            Dosing Weight: 71.4 kg (157 lb 6.5 oz)      Admission History: Admitted on 7/19/2024 for Acute respiratory failure with hypoxemia (HCC) [J96.01]  Pertinent history: recurrent plueral effusion, unclear source of infection, leukocytosis    Allergies:     Sulfamethoxazole w-trimethoprim and Morphine     Pertinent cultures to date:     Results       Procedure Component Value Units Date/Time    MRSA By PCR (Amp) [[id number]] Collected: 07/19/24 1449    Order Status: Completed Specimen: Respirate from Nares Updated: 07/19/24 1727     MRSA by PCR Negative    BLOOD CULTURE [[id number]]     Order Status: Canceled Specimen: Blood from Peripheral     BLOOD CULTURE [[id number]]     Order Status: Canceled Specimen: Blood from Peripheral     Blood Culture - Draw one from central line and one from peripheral site [[id number]] Collected: 07/19/24 1352    Order Status: Completed Specimen: Blood from Peripheral Updated: 07/19/24 1608     Significant Indicator NEG     Source BLD     Site PERIPHERAL     Culture Result No Growth  Note: Blood cultures are incubated for 5 days and  are monitored continuously.Positive blood cultures  are called to the RN and reported as soon as  they  are identified.      Blood Culture - Draw one from central line and one from peripheral site [[id number]] Collected: 24 1400    Order Status: Completed Specimen: Blood from Line Updated: 24 1608     Significant Indicator NEG     Source BLD     Site Peripheral     Culture Result No Growth  Note: Blood cultures are incubated for 5 days and  are monitored continuously.Positive blood cultures  are called to the RN and reported as soon as  they are identified.              Labs:     Estimated Creatinine Clearance: 69.2 mL/min (by C-G formula based on SCr of 0.61 mg/dL).  Recent Labs     24  1147   WBC 12.2* 12.4* 19.6*   NEUTSPOLYS  --   --  89.20*     Recent Labs     24  1449   BUN 16 17 18   CREATININE 0.61 0.51 0.61   ALBUMIN  --   --  2.5*       Intake/Output Summary (Last 24 hours) at 2024 2102  Last data filed at 2024 1459  Gross per 24 hour   Intake 100 ml   Output --   Net 100 ml      /70   Pulse (!) 104   Temp 36.6 °C (97.9 °F) (Temporal)   Resp (!) 26   Ht 1.524 m (5')   Wt 71.4 kg (157 lb 6.5 oz)   SpO2 93%  Temp (24hrs), Av.8 °C (98.3 °F), Min:36.6 °C (97.9 °F), Max:37 °C (98.6 °F)      List concerns for Vancomycin clearance:      age    Pharmacokinetics:     AUC kinetics:   Ke (hr ^-1): 0.0625 hr^-1  Half life: 11.09 hr  Clearance: 3.505  Estimated TDD: 1752.5  Estimated Dose: 957  Estimated interval: 13.1      A/P:     -  Vancomycin dose: start 10mg/kg q12hr (750mg)    -  Next vancomycin level(s): after 4th or 5th dose if remains on vanco (not ordered)    -  Predicted vancomycin AUC from initial AUC test calculator: 428 mg·hr/L    -  Comments: dosing as above. Pharmacy will follow. Narrow therapy as able. Monitor renal.    Vargas Bedolla, PharmD

## 2024-07-20 NOTE — ASSESSMENT & PLAN NOTE
Pt having multiple frequent runs of V--tach and PSVT, unclear etiology   - narrow complex tachyarrhythmia I ER on admission, s/p cardioversion and IV metoprolol with improvement to low 100s  Trop flat, EKG without signs of ishemia   - repeat echo with normal EF, otherwise unremarkable   - metoprolol  increased to 50 mg BID, amiodarone 200 mg ordered   - consulted and discussed with cardiology Dr. Chaidez , neurology has since signed off  -Monitor has been reviewed she has maintained sinus rhythm heart rate is controlled  - monitor electrolytes, goal K >4, Mag >2

## 2024-07-20 NOTE — ASSESSMENT & PLAN NOTE
Patient has a history of chronic diastolic heart failure    Patient is chronically on anticoagulation and thus not on aspirin.  Echo is relatively unremarkable with EF55%, no remarks on DF  Hold on further diuresis for now, will check weights as she is down from baseline   Monitor volume status

## 2024-07-20 NOTE — PROGRESS NOTES
4 Eyes Skin Assessment Completed by Corinne, RN and JIMMY Johnson.    Head WDL  Ears WDL  Nose WDL  Mouth WDL  Neck Redness and Blanching  Breast/Chest Redness, Non-Blanching, Rash, and Excoriation (exocriation under R breast) , bruising, abrasion on R upper breast area)  Shoulder Blades Redness and Blanching  Spine Redness and Blanching , skin tear on right back area  (R) Arm/Elbow/Hand Redness, Blanching, and Edema  (L) Arm/Elbow/Hand Redness, Blanching, and Edema , scab  Abdomen Redness and Rash  Groin Redness and Non-Blanching  Scrotum/Coccyx/Buttocks Redness and Blanching  (R) Leg dryness, flaky  (L) Leg dryness, flaky  (R) Heel/Foot/Toe dryness, flaky  (L) Heel/Foot/Toe dryness, flaky          Devices In Places Tele Box, Blood Pressure Cuff, Pulse Ox, and Oxy Mask      Interventions In Place Pillows    Possible Skin Injury Yes    Pictures Uploaded Into Epic Yes  Wound Consult Placed Yes  RN Wound Prevention Protocol Ordered Yes

## 2024-07-20 NOTE — CONSULTS
Reason for Consult:  Asked by Dr Elizabeth Castillo M.D. to see this patient with tachycardia  Patient's PCP: Michelle Jim P.A.-C.    CC:   Chief Complaint   Patient presents with    Shortness of Breath     X 1 day. D/C from [facility] yesterday post thoracentesis for pleural effusion. On Eliquis.  called EMS as pt was short of breath & slid off the couch, no trauma. Pt in a fib with RVR, rate 160s. Tachypneic, afebrile. Denies chest pain.        HPI: This is a 78-year-old woman with a history of paroxysmal atrial fibrillation chronic lung disease with recent thoracentesis who presents with shortness of breath after recent thoracentesis along hospitalization she was noted to have intermittent tachycardia on telemetry consistent with short paroxysms of atrial fibrillation.  She was cardioverted in the emergency room but she presented with severe illness    Medications / Drug list prior to admission:  No current facility-administered medications on file prior to encounter.     Current Outpatient Medications on File Prior to Encounter   Medication Sig Dispense Refill    spironolactone (ALDACTONE) 25 MG Tab Take 1 Tablet by mouth every day. Indications: Edema 100 Tablet 1    tamsulosin (FLOMAX) 0.4 MG capsule Take 1 Capsule by mouth at bedtime. Indications: Obstruction of Bladder Outflow 100 Capsule 1    metoprolol tartrate (LOPRESSOR) 25 MG Tab Take 1 Tablet by mouth 2 times a day. Indications: Atrial Fibrillation, High Blood Pressure Disorder 200 Tablet 1    ondansetron (ZOFRAN ODT) 4 MG TABLET DISPERSIBLE Take 1 Tablet by mouth every 8 hours as needed for Nausea/Vomiting. Indications: Nausea and Vomiting 30 Tablet 0    DULoxetine (CYMBALTA) 30 MG Cap DR Particles Take 1 Capsule by mouth every day. Indications: Major Depressive Disorder 90 Capsule 0    Esomeprazole Magnesium 20 MG Tablet Delayed Response Take 20 mg by mouth 1 time a day as needed (heartburn ). Indications: Heartburn 90 Tablet 0     "senna-docusate (PERICOLACE OR SENOKOT S) 8.6-50 MG Tab Take 1 Tablet by mouth at bedtime. Indications: Constipation 90 Tablet 0    apixaban (ELIQUIS) 5mg Tab Take 1 Tablet by mouth 2 times a day. Indications: DVT/PE 60 Tablet 0    nystatin (NYSTOP) powder Apply 1 Application  topically 1 time a day as needed (rash). Indications: Skin Infection due to Candida Yeast      baclofen (LIORESAL) 10 MG Tab Take 10 mg by mouth 2 times a day as needed (spasms). Indications: Muscle Spasm      acetaminophen (TYLENOL) 500 MG Tab Take 500 mg by mouth every 6 hours as needed for Mild Pain or Moderate Pain. Indications: Pain      ferrous sulfate 325 (65 Fe) MG tablet Take 1 Tablet by mouth every 48 hours.      potassium chloride SA (KDUR) 20 MEQ Tab CR Take 20 mEq by mouth every day. Hold if lasix is held   Indications: takes with Lasix      atorvastatin (LIPITOR) 10 MG Tab TAKE 1 TABLET BY MOUTH EVERY EVENING. CHOLESTEROL 100 Tablet 1    alendronate (FOSAMAX) 70 MG Tab Take 70 mg by mouth every Monday. Indications: Osteoporosis      pregabalin (LYRICA) 150 MG Cap Take 150 mg by mouth 2 times a day. Indications: Neuropathic Pain      amitriptyline (ELAVIL) 100 MG Tab Take 100 mg by mouth every evening. Indications: \"pain\"      furosemide (LASIX) 20 MG Tab Take 20 mg by mouth every day. Hold for SBP <100  Indications: Edema      QUEtiapine (SEROQUEL) 25 MG Tab Take 25 mg by mouth at bedtime as needed (sleep). Indications: sleep         Current list of administered Medications:    Current Facility-Administered Medications:     Metoprolol Tartrate (Lopressor) injection 5 mg, 5 mg, Intravenous, Q5 MIN PRN, LUISA ToscanoN.PJulio    [START ON 7/21/2024] potassium chloride SA (Kdur) tablet 40 mEq, 40 mEq, Oral, DAILY, Elizabeth Castillo M.D.    [START ON 7/21/2024] spironolactone (Aldactone) tablet 12.5 mg, 12.5 mg, Oral, DAILY, Elizabeth Castillo M.D.    metoprolol tartrate (Lopressor) tablet 50 mg, 50 mg, Oral, BID, Elizabeth Castillo, " GEORGIANADJulio    amitriptyline (Elavil) tablet 100 mg, 100 mg, Oral, Nightly, Levishmael Murphy M.D., 100 mg at 07/19/24 2123    apixaban (Eliquis) tablet 5 mg, 5 mg, Oral, BID, Levente Levcarlos, M.D., 5 mg at 07/20/24 0521    atorvastatin (Lipitor) tablet 10 mg, 10 mg, Oral, Nightly, Levishmael Murphy M.D., 10 mg at 07/19/24 2125    baclofen (Lioresal) tablet 10 mg, 10 mg, Oral, BID PRN, GEORGIANA AntunezDJulio    DULoxetine (Cymbalta) capsule 30 mg, 30 mg, Oral, Q EVENING, KIMBERLY Antunez.D., 30 mg at 07/19/24 1746    omeprazole (PriLOSEC) capsule 20 mg, 20 mg, Oral, QDAY PRN, GEORGIANA AntunezDJulio    nystatin (Mycostatin) powder 100,000 Units, 100,000 Units, Topical, QDAY PRN, Rodríguez Murphy M.D.    furosemide (Lasix) injection 40 mg, 40 mg, Intravenous, BID DIURETIC, KIMBERLY Antunez.D., 40 mg at 07/20/24 0517    pregabalin (Lyrica) capsule 150 mg, 150 mg, Oral, BID, Rodríguez Murphy M.D., 150 mg at 07/20/24 0600    QUEtiapine (SEROquel) tablet 25 mg, 25 mg, Oral, QHS PRN, Rodríguez Murphy M.D.    tamsulosin (Flomax) capsule 0.4 mg, 0.4 mg, Oral, QHS, Rodríguez Murphy M.D., 0.4 mg at 07/19/24 2125    Respiratory Therapy Consult, , Nebulization, Continuous RT, Rodríguez Murphy M.D.    acetaminophen (Tylenol) tablet 650 mg, 650 mg, Oral, Q6HRS PRN, Rodríguez Murphy M.D.    Notify provider if pain remains uncontrolled, , , CONTINUOUS **AND** Use the Numeric Rating Scale (NRS), Talbot-Baker Faces (WBF), or FLACC on regular floors and Critical-Care Pain Observation Tool (CPOT) on ICUs/Trauma to assess pain, , , CONTINUOUS **AND** Pulse Ox, , , CONTINUOUS **AND** Pharmacy Consult Request ...Pain Management Review 1 Each, 1 Each, Other, PHARMACY TO DOSE **AND** If patient difficult to arouse and/or has respiratory depression (respiratory rate of 10 or less), stop any opiates that are currently infusing and call a Rapid Response., , , CONTINUOUS, Rodríguez Murphy M.D.    oxyCODONE immediate-release (Roxicodone) tablet 2.5 mg, 2.5 mg, Oral, Q3HRS PRN **OR**  oxyCODONE immediate-release (Roxicodone) tablet 5 mg, 5 mg, Oral, Q3HRS PRN **OR** HYDROmorphone (Dilaudid) injection 0.25 mg, 0.25 mg, Intravenous, Q3HRS PRN, Rodríguez Murphy M.D.    senna-docusate (Pericolace Or Senokot S) 8.6-50 MG per tablet 2 Tablet, 2 Tablet, Oral, Q EVENING, 2 Tablet at 07/19/24 1746 **AND** polyethylene glycol/lytes (Miralax) Packet 1 Packet, 1 Packet, Oral, QDAY PRN, Rodríguez Murphy M.D.    labetalol (Normodyne/Trandate) injection 10 mg, 10 mg, Intravenous, Q4HRS PRN, Rodríguez Murphy M.D.    ondansetron (Zofran) syringe/vial injection 4 mg, 4 mg, Intravenous, Q4HRS PRN, Rodríguez Murphy M.D.    ondansetron (Zofran ODT) dispertab 4 mg, 4 mg, Oral, Q4HRS PRN, Rodríguez Murphy M.D.    ampicillin/sulbactam (Unasyn) 3 g in  mL IVPB, 3 g, Intravenous, Q6HRS, Rodríguez Murphy M.D., Stopped at 07/20/24 1206    Past Medical History:   Diagnosis Date    Anesthesia     PONV    Arthritis     Left hip, knees, ankles    Asthma     Inhaler use daily.    Bowel habit changes     Constipation    Breath shortness     asthma    Cancer (HCC)     Breast 2015    Chiari malformation 1970's    shunt  in place    Chickenpox     Chronic back pain     2/2 scoliosis and syringomyelia     Contracture of hand joint     left     Decreased lung capacity     Dental disorder     upper/lower    Disorder of thyroid     Heart burn     High cholesterol     Hypertension     Indigestion     Joint replacement     Right shoulder, cervical    Obesity     Pain     Pain 08/06/2018    Back, neck and legs    Peripheral edema 06/06/2013    Pneumonia     PONV (postoperative nausea and vomiting)     Psychiatric problem     depression, anxiety    Scoliosis     Shortness of breath 08/06/2018    Chronic    Sleep apnea     uses O2 at night 2L    Sleep apnea 08/07/2018    Patient states having sleep study October 2018.    Snoring     No sleep study    sore throat 01/15/2015    Supplemental oxygen dependent     Syringomyelia (HCC)     surgery 1973,  "1977       Past Surgical History:   Procedure Laterality Date    PB RECONSTR TOTAL SHOULDER IMPLANT Left 5/4/2022    Procedure: LEFT REVERSE TOTAL SHOULDER ARTHROPLASTY;  Surgeon: Vinicius Whaley M.D.;  Location: SURGERY SAME DAY [facility];  Service: Orthopedics    HIP REVISION TOTAL Left 9/2/2021    Procedure: REVISION, TOTAL ARTHROPLASTY, HIP;  Surgeon: Daniel Allison M.D.;  Location: SURGERY [facility];  Service: Orthopedics    THYROID LOBECTOMY Left 8/17/2018    Procedure: THYROID LOBECTOMY;  Surgeon: Adelina Wilson M.D.;  Location: SURGERY SAME DAY [facility];  Service: General    THYROIDECTOMY TOTAL  8/17/2018    Procedure: NIMS RECURRENT LARYNGEAL NERVE MONITORING;  Surgeon: Adelina Wilson M.D.;  Location: SURGERY SAME DAY [facility];  Service: General    NODE BIOPSY SENTINEL  2/6/2015    Performed by Adelina Wilson M.D. at SURGERY [facility]    MASTECTOMY  2/6/2015    right-Performed by Adelina Wilson M.D. at SURGERY [facility]    HIP ARTHROPLASTY TOTAL  5/19/08    Performed by FARTUN ERAZO at SURGERY [facility]    SHOULDER ARTHROPLASTY TOTAL  2004    Right    JAN BY LAPAROSCOPY  2002    CYST EXCISION  1973    \"remove cyst    HIP ARTHROPLASTY TOTAL      HIP REPLACEMENT, TOTAL      LAMINOTOMY      OTHER ABDOMINAL SURGERY      SHUNT INSERTION      cerebral shunt    US-NEEDLE CORE BX-BREAST PANEL         Family History   Problem Relation Age of Onset    Hypertension Mother     Sleep Apnea Brother     Cancer Neg Hx     Diabetes Neg Hx     Stroke Neg Hx     Heart Disease Neg Hx     Ovarian Cancer Neg Hx     Tubal Cancer Neg Hx     Peritoneal Cancer Neg Hx     Colorectal Cancer Neg Hx     Breast Cancer Neg Hx      Patient family history was personally reviewed, no pertinent family history to current presentation    Social History     Tobacco Use    Smoking status: Never    Smokeless tobacco: Never   Vaping Use    Vaping status: Never Used   Substance Use Topics    " Alcohol use: No     Alcohol/week: 0.0 oz    Drug use: No       ALLERGIES:  Allergies   Allergen Reactions    Sulfamethoxazole W-Trimethoprim Rash     * full body rash*>  10 years ago    Morphine Vomiting     hallucinations       Review of systems:  A complete review of symptoms was reviewed with patient. This is reviewed in H&P and PMH. ALL OTHERS reviewed and negative    Physical exam:  Patient Vitals for the past 24 hrs:   BP Temp Temp src Pulse Resp SpO2 Weight   07/20/24 1535 104/71 36.7 °C (98 °F) Temporal (!) 108 17 95 % --   07/20/24 1142 116/70 36.1 °C (97 °F) Temporal 100 17 94 % --   07/20/24 0730 117/73 36.4 °C (97.5 °F) Temporal 90 19 95 % --   07/20/24 0521 125/74 -- -- (!) 102 -- -- --   07/20/24 0409 107/78 36.6 °C (97.9 °F) Temporal (!) 101 20 92 % 70.5 kg (155 lb 6.8 oz)   07/20/24 0015 104/72 (!) 35.8 °C (96.4 °F) Temporal (!) 108 20 93 % --   07/19/24 2021 106/70 36.6 °C (97.9 °F) Temporal (!) 104 (!) 26 93 % 71.4 kg (157 lb 6.5 oz)   07/19/24 2015 -- -- -- -- -- -- 71.4 kg (157 lb 6.5 oz)   07/19/24 1930 104/68 -- -- 93 (!) 33 95 % --   07/19/24 1900 106/75 -- -- 98 (!) 29 95 % --   07/19/24 1830 104/62 -- -- (!) 157 (!) 27 93 % --   07/19/24 1800 121/73 -- -- (!) 105 (!) 23 94 % --   07/19/24 1730 103/69 -- -- (!) 107 (!) 23 93 % --   07/19/24 1716 101/64 -- -- 100 (!) 29 92 % --   07/19/24 1646 105/68 -- -- (!) 106 (!) 31 93 % --     General: No acute distress.   EYES: no jaundice  HEENT: OP clear   Neck:  No JVD.   CVS:  [  RRR.   Resp: Normal respiratory effort,   Abdomen: ND,  Skin: Grossly nothing acute no obvious rashes  Neurological: Alert, Moves all extremities  Extremities:   [  no edema. No cyanosis.       Data:  Laboratory studies personally reviewed by me:  Recent Results (from the past 24 hour(s))   MAGNESIUM    Collection Time: 07/19/24  9:11 PM   Result Value Ref Range    Magnesium 2.0 1.5 - 2.5 mg/dL   Basic Metabolic Panel (BMP)    Collection Time: 07/20/24  5:01 AM   Result  Value Ref Range    Sodium 138 135 - 145 mmol/L    Potassium 3.3 (L) 3.6 - 5.5 mmol/L    Chloride 96 96 - 112 mmol/L    Co2 22 20 - 33 mmol/L    Glucose 122 (H) 65 - 99 mg/dL    Bun 17 8 - 22 mg/dL    Creatinine 0.57 0.50 - 1.40 mg/dL    Calcium 8.7 8.5 - 10.5 mg/dL    Anion Gap 20.0 (H) 7.0 - 16.0   CBC without Differential    Collection Time: 24  5:01 AM   Result Value Ref Range    WBC 13.5 (H) 4.8 - 10.8 K/uL    RBC 4.91 4.20 - 5.40 M/uL    Hemoglobin 13.5 12.0 - 16.0 g/dL    Hematocrit 42.5 37.0 - 47.0 %    MCV 86.6 81.4 - 97.8 fL    MCH 27.5 27.0 - 33.0 pg    MCHC 31.8 (L) 32.2 - 35.5 g/dL    RDW 52.0 (H) 35.9 - 50.0 fL    Platelet Count 468 (H) 164 - 446 K/uL    MPV 11.5 9.0 - 12.9 fL   ESTIMATED GFR    Collection Time: 24  5:01 AM   Result Value Ref Range    GFR (CKD-EPI) 94 >60 mL/min/1.73 m 2   EKG    Collection Time: 24  5:06 AM   Result Value Ref Range    Report       [facility]    Test Date:  2024  Pt Name:    ODETTE NICKERSON                  Department: 171  MRN:        [id number]                      Room:       T729  Gender:     Female                       Technician: ARNEL  :        [id number]                   Requested By:JEANETTE ELIZONDO  Order #:    [id number]                    Reading MD:    Measurements  Intervals                                Axis  Rate:       102                          P:          13  AZ:         116                          QRS:        11  QRSD:       83                           T:          0  QT:         395  QTc:        515    Interpretive Statements  Sinus tachycardia  Low voltage, precordial leads  Borderline T wave abnormalities  Prolonged QT interval  Compared to ECG 2024 12:24:31  Low QRS voltage now present  T-wave abnormality now present  Prolonged QT interval now present  Possible ischemia no longer present         Imaging:  DX-CHEST-PORTABLE (1 VIEW)   Final Result      Possible mild increase in right-sided opacity, pleural effusion  with associated atelectasis and/or consolidation.      US-THORACENTESIS PUNCTURE RIGHT    (Results Pending)   EC-ECHOCARDIOGRAM LTD W/O CONT    (Results Pending)           EKG tracings personally reviewed by me  ST with prior flutter        Telemetry reviewed she has had paroxysms of atrial fibrillation/flutter and nonsustained VT    Echocardiogram images personally reviewed by me show normal EF in May 2024    All pertinent features of laboratory and imaging reviewed including primary images where applicable      Principal Problem:    Acute on chronic respiratory failure (HCC) (POA: Yes)  Active Problems:    GERD (gastroesophageal reflux disease) (POA: Yes)      Overview: Chronic, controlled.      Tolerates esomeprazole 20mg daily.      Has been on this for years.      Has tried other medications that didn't work.      Does not see GI.    Dyslipidemia (POA: Yes)      Overview: Chronic, controlled.             Latest Labs:       Lab Results       Component Value Date/Time        CHOLSTRLTOT 159 12/01/2023 07:48 AM        LDL 79 12/01/2023 07:48 AM        HDL 53 12/01/2023 07:48 AM        TRIGLYCERIDE 133 12/01/2023 07:48 AM              Medications: started atorvastatin 3/29/2023.      Medication side effects: none            Risk calculator: The 10-year ASCVD risk score (Douglas DK, et al., 2019)       is: 12.5%           Risk for falls (POA: Yes)      Overview: Chronic, controlled.      Most recent fall: 2021.      Uses a 4 wheeled walker.      SELWYN (obstructive sleep apnea) (POA: Yes)      Overview: Chronic, controlled.      Was told she doesn't need a CPAP/BiPAP.      Just does oxygen at night.      Has not seen pulmonology in a few years.    Major depressive disorder (POA: Yes)      Overview: Chronic, controlled.      Tolerating duloxetine 30mg daily for the last few years.       No therapy. No pyschiatry.          History of breast cancer (POA: Yes)      Overview: Chronic, controlled.       History of breast  cancer in 2015.       Was told she no longer needs to follow up.      Not on any ongoing medication.    Insomnia (POA: Yes)      Overview: Chronic, controlled.      Tolerates amitriptyline 100mg at night.      Has been on this for 20 years or so.    Polypharmacy (POA: Yes)      Overview:             Inpatient chart note: 10/2023      Patient is on multiple medications that can cause polypharmacy; outpatient       problem but dayteam to consider discontinue or decreasing dosing of       medications leading to symptoms of dizziness: baclofen, quetiapine, norco,       atenolol, duloxetine, pregabalin, amitriptyline           V-tach (HCC) (POA: Yes)    Paroxysmal atrial fibrillation (HCC) (POA: Yes)    Chronic diastolic heart failure (HCC) (POA: Yes)    History of pulmonary embolism (POA: Yes)    Thrombocytosis (POA: Yes)    Leukocytosis (POA: Yes)  Resolved Problems:    * No resolved hospital problems. *      Assessment / Plan:  Paroxysmal atrial fibrillation  Added amiodarone given her underlying health she will not tolerate longer paroxysms of atrial fibrillation well  Eliquis is important    Nonsustained VT  Amiodarone and monitoring    Acute on chronic heart failure with preserved ejection fraction  Based on her weight it was 160s and now is charted as much lower, please confirm with standing weights but for now would hold diuresis volume status is difficult to ascertain    Cardiology will follow along    I personally discussed her case with  Dr Elizabeth Castillo M.D.    Future Appointments   Date Time Provider Department Center   9/4/2024  8:20 AM JOHN [facility] [place]       It is my pleasure to participate in the care of Ms. Sánchez.  Please do not hesitate to contact me with questions or concerns.    Navdeep Chaidez MD PhD FAC  Cardiologist [facility]    7/20/2024    Please note that this dictation was created using voice recognition software. There may be  errors I did not discover before finalizing the note.

## 2024-07-20 NOTE — ASSESSMENT & PLAN NOTE
Because of history of pulmonary embolism continue at this point with anticoagulation using Eliquis

## 2024-07-21 ENCOUNTER — APPOINTMENT (OUTPATIENT)
Dept: RADIOLOGY | Facility: MEDICAL CENTER | Age: 79
DRG: 291 | End: 2024-07-21
Payer: MEDICARE

## 2024-07-21 ENCOUNTER — APPOINTMENT (OUTPATIENT)
Dept: RADIOLOGY | Facility: MEDICAL CENTER | Age: 79
DRG: 291 | End: 2024-07-21
Attending: HOSPITALIST
Payer: MEDICARE

## 2024-07-21 ENCOUNTER — APPOINTMENT (OUTPATIENT)
Dept: CARDIOLOGY | Facility: MEDICAL CENTER | Age: 79
DRG: 291 | End: 2024-07-21
Attending: STUDENT IN AN ORGANIZED HEALTH CARE EDUCATION/TRAINING PROGRAM
Payer: MEDICARE

## 2024-07-21 PROBLEM — J95.811 PNEUMOTHORAX OF RIGHT LUNG AFTER BIOPSY: Status: ACTIVE | Noted: 2024-07-21

## 2024-07-21 LAB
ALBUMIN SERPL BCP-MCNC: 2.6 G/DL (ref 3.2–4.9)
ALBUMIN/GLOB SERPL: 0.8 G/DL
ALP SERPL-CCNC: 82 U/L (ref 30–99)
ALT SERPL-CCNC: 32 U/L (ref 2–50)
ANION GAP SERPL CALC-SCNC: 11 MMOL/L (ref 7–16)
AST SERPL-CCNC: 53 U/L (ref 12–45)
BASOPHILS # BLD AUTO: 1.1 % (ref 0–1.8)
BASOPHILS # BLD: 0.1 K/UL (ref 0–0.12)
BILIRUB SERPL-MCNC: 0.4 MG/DL (ref 0.1–1.5)
BUN SERPL-MCNC: 16 MG/DL (ref 8–22)
CALCIUM ALBUM COR SERPL-MCNC: 9.6 MG/DL (ref 8.5–10.5)
CALCIUM SERPL-MCNC: 8.5 MG/DL (ref 8.5–10.5)
CHLORIDE SERPL-SCNC: 98 MMOL/L (ref 96–112)
CO2 SERPL-SCNC: 30 MMOL/L (ref 20–33)
CREAT SERPL-MCNC: 0.59 MG/DL (ref 0.5–1.4)
EOSINOPHIL # BLD AUTO: 0.18 K/UL (ref 0–0.51)
EOSINOPHIL NFR BLD: 1.9 % (ref 0–6.9)
ERYTHROCYTE [DISTWIDTH] IN BLOOD BY AUTOMATED COUNT: 51 FL (ref 35.9–50)
GFR SERPLBLD CREATININE-BSD FMLA CKD-EPI: 93 ML/MIN/1.73 M 2
GLOBULIN SER CALC-MCNC: 3.4 G/DL (ref 1.9–3.5)
GLUCOSE SERPL-MCNC: 121 MG/DL (ref 65–99)
HCT VFR BLD AUTO: 38.2 % (ref 37–47)
HGB BLD-MCNC: 12 G/DL (ref 12–16)
IMM GRANULOCYTES # BLD AUTO: 0.05 K/UL (ref 0–0.11)
IMM GRANULOCYTES NFR BLD AUTO: 0.5 % (ref 0–0.9)
INR PPP: 1.84 (ref 0.87–1.13)
LV EJECT FRACT  99904: 55
LYMPHOCYTES # BLD AUTO: 0.87 K/UL (ref 1–4.8)
LYMPHOCYTES NFR BLD: 9.2 % (ref 22–41)
MAGNESIUM SERPL-MCNC: 2.2 MG/DL (ref 1.5–2.5)
MCH RBC QN AUTO: 26.7 PG (ref 27–33)
MCHC RBC AUTO-ENTMCNC: 31.4 G/DL (ref 32.2–35.5)
MCV RBC AUTO: 84.9 FL (ref 81.4–97.8)
MONOCYTES # BLD AUTO: 0.96 K/UL (ref 0–0.85)
MONOCYTES NFR BLD AUTO: 10.2 % (ref 0–13.4)
NEUTROPHILS # BLD AUTO: 7.25 K/UL (ref 1.82–7.42)
NEUTROPHILS NFR BLD: 77.1 % (ref 44–72)
NRBC # BLD AUTO: 0 K/UL
NRBC BLD-RTO: 0 /100 WBC (ref 0–0.2)
PLATELET # BLD AUTO: 429 K/UL (ref 164–446)
PMV BLD AUTO: 11.1 FL (ref 9–12.9)
POTASSIUM SERPL-SCNC: 3.1 MMOL/L (ref 3.6–5.5)
PROCALCITONIN SERPL-MCNC: 0.14 NG/ML
PROT SERPL-MCNC: 6 G/DL (ref 6–8.2)
PROTHROMBIN TIME: 21.5 SEC (ref 12–14.6)
RBC # BLD AUTO: 4.5 M/UL (ref 4.2–5.4)
SODIUM SERPL-SCNC: 139 MMOL/L (ref 135–145)
T4 FREE SERPL-MCNC: 1.29 NG/DL (ref 0.93–1.7)
WBC # BLD AUTO: 9.4 K/UL (ref 4.8–10.8)

## 2024-07-21 PROCEDURE — A9270 NON-COVERED ITEM OR SERVICE: HCPCS | Performed by: STUDENT IN AN ORGANIZED HEALTH CARE EDUCATION/TRAINING PROGRAM

## 2024-07-21 PROCEDURE — 700102 HCHG RX REV CODE 250 W/ 637 OVERRIDE(OP): Performed by: INTERNAL MEDICINE

## 2024-07-21 PROCEDURE — 71045 X-RAY EXAM CHEST 1 VIEW: CPT

## 2024-07-21 PROCEDURE — 84439 ASSAY OF FREE THYROXINE: CPT

## 2024-07-21 PROCEDURE — 700111 HCHG RX REV CODE 636 W/ 250 OVERRIDE (IP): Mod: JZ | Performed by: HOSPITALIST

## 2024-07-21 PROCEDURE — 84145 PROCALCITONIN (PCT): CPT

## 2024-07-21 PROCEDURE — 770020 HCHG ROOM/CARE - TELE (206)

## 2024-07-21 PROCEDURE — 83735 ASSAY OF MAGNESIUM: CPT

## 2024-07-21 PROCEDURE — 700105 HCHG RX REV CODE 258: Performed by: HOSPITALIST

## 2024-07-21 PROCEDURE — 700102 HCHG RX REV CODE 250 W/ 637 OVERRIDE(OP): Performed by: HOSPITALIST

## 2024-07-21 PROCEDURE — 700102 HCHG RX REV CODE 250 W/ 637 OVERRIDE(OP): Performed by: STUDENT IN AN ORGANIZED HEALTH CARE EDUCATION/TRAINING PROGRAM

## 2024-07-21 PROCEDURE — A9270 NON-COVERED ITEM OR SERVICE: HCPCS | Performed by: INTERNAL MEDICINE

## 2024-07-21 PROCEDURE — 93325 DOPPLER ECHO COLOR FLOW MAPG: CPT

## 2024-07-21 PROCEDURE — 36415 COLL VENOUS BLD VENIPUNCTURE: CPT

## 2024-07-21 PROCEDURE — 32555 ASPIRATE PLEURA W/ IMAGING: CPT | Mod: RT

## 2024-07-21 PROCEDURE — 85610 PROTHROMBIN TIME: CPT

## 2024-07-21 PROCEDURE — 99233 SBSQ HOSP IP/OBS HIGH 50: CPT | Performed by: STUDENT IN AN ORGANIZED HEALTH CARE EDUCATION/TRAINING PROGRAM

## 2024-07-21 PROCEDURE — 85025 COMPLETE CBC W/AUTO DIFF WBC: CPT

## 2024-07-21 PROCEDURE — 93308 TTE F-UP OR LMTD: CPT | Mod: 26 | Performed by: INTERNAL MEDICINE

## 2024-07-21 PROCEDURE — 93325 DOPPLER ECHO COLOR FLOW MAPG: CPT | Mod: 26 | Performed by: INTERNAL MEDICINE

## 2024-07-21 PROCEDURE — A9270 NON-COVERED ITEM OR SERVICE: HCPCS | Performed by: HOSPITALIST

## 2024-07-21 PROCEDURE — 80053 COMPREHEN METABOLIC PANEL: CPT

## 2024-07-21 RX ORDER — POTASSIUM CHLORIDE 1500 MG/1
40 TABLET, EXTENDED RELEASE ORAL ONCE
Status: COMPLETED | OUTPATIENT
Start: 2024-07-21 | End: 2024-07-21

## 2024-07-21 RX ADMIN — PREGABALIN 150 MG: 150 CAPSULE ORAL at 17:28

## 2024-07-21 RX ADMIN — METOPROLOL TARTRATE 50 MG: 50 TABLET, FILM COATED ORAL at 05:39

## 2024-07-21 RX ADMIN — APIXABAN 5 MG: 5 TABLET, FILM COATED ORAL at 17:28

## 2024-07-21 RX ADMIN — POTASSIUM CHLORIDE 40 MEQ: 1500 TABLET, EXTENDED RELEASE ORAL at 17:23

## 2024-07-21 RX ADMIN — AMPICILLIN SODIUM, SULBACTAM SODIUM 3 G: 2; 1 INJECTION, POWDER, FOR SOLUTION INTRAMUSCULAR; INTRAVENOUS at 11:54

## 2024-07-21 RX ADMIN — POTASSIUM CHLORIDE 40 MEQ: 1500 TABLET, EXTENDED RELEASE ORAL at 05:39

## 2024-07-21 RX ADMIN — AMIODARONE HYDROCHLORIDE 200 MG: 200 TABLET ORAL at 11:48

## 2024-07-21 RX ADMIN — DULOXETINE HYDROCHLORIDE 30 MG: 30 CAPSULE, DELAYED RELEASE ORAL at 17:28

## 2024-07-21 RX ADMIN — SENNOSIDES AND DOCUSATE SODIUM 2 TABLET: 50; 8.6 TABLET ORAL at 17:29

## 2024-07-21 RX ADMIN — APIXABAN 5 MG: 5 TABLET, FILM COATED ORAL at 05:38

## 2024-07-21 RX ADMIN — SPIRONOLACTONE 12.5 MG: 25 TABLET ORAL at 05:38

## 2024-07-21 RX ADMIN — METOPROLOL TARTRATE 50 MG: 50 TABLET, FILM COATED ORAL at 17:28

## 2024-07-21 RX ADMIN — AMPICILLIN SODIUM, SULBACTAM SODIUM 3 G: 2; 1 INJECTION, POWDER, FOR SOLUTION INTRAMUSCULAR; INTRAVENOUS at 17:27

## 2024-07-21 RX ADMIN — AMPICILLIN SODIUM, SULBACTAM SODIUM 3 G: 2; 1 INJECTION, POWDER, FOR SOLUTION INTRAMUSCULAR; INTRAVENOUS at 05:45

## 2024-07-21 RX ADMIN — AMITRIPTYLINE HYDROCHLORIDE 100 MG: 50 TABLET, FILM COATED ORAL at 20:15

## 2024-07-21 RX ADMIN — AMPICILLIN SODIUM, SULBACTAM SODIUM 3 G: 2; 1 INJECTION, POWDER, FOR SOLUTION INTRAMUSCULAR; INTRAVENOUS at 00:36

## 2024-07-21 RX ADMIN — PREGABALIN 150 MG: 150 CAPSULE ORAL at 05:39

## 2024-07-21 RX ADMIN — ATORVASTATIN CALCIUM 10 MG: 10 TABLET, FILM COATED ORAL at 20:15

## 2024-07-21 RX ADMIN — TAMSULOSIN HYDROCHLORIDE 0.4 MG: 0.4 CAPSULE ORAL at 20:15

## 2024-07-21 ASSESSMENT — ENCOUNTER SYMPTOMS
PALPITATIONS: 0
FEVER: 0
SHORTNESS OF BREATH: 0
NAUSEA: 0
TINGLING: 1
VOMITING: 0
SENSORY CHANGE: 1
MEMORY LOSS: 1
WEAKNESS: 1
ABDOMINAL PAIN: 0
ORTHOPNEA: 0
DIZZINESS: 0
STRIDOR: 0
WHEEZING: 0
COUGH: 0
CHEST TIGHTNESS: 0

## 2024-07-21 ASSESSMENT — FIBROSIS 4 INDEX: FIB4 SCORE: 2.52

## 2024-07-21 ASSESSMENT — PAIN DESCRIPTION - PAIN TYPE: TYPE: ACUTE PAIN

## 2024-07-21 NOTE — PROCEDURES
RT Therapeutic Thoracentesis attempt aborted by PÉREZ Mendez, less than 5 CC bloody fluid removed. Pt tolerated procedure well.   RN provided post procedure report verbally.

## 2024-07-21 NOTE — PROGRESS NOTES
Handoff report received from day shift nurse. Pt care assumed. Pt is currently resting in bed awake NAD noted. POC discussed with Pt and Pt verbalizes no questions or needs at this time. Pt is AAOx2 to self and time, on 3L NC, on Tele monitoring rate and rhythm verified, and VSS. Call light and belongings within reach, bed in lowest and locked position, all fall precautions in place and Pt educated on use of call light. Hourly rounding initiated.

## 2024-07-21 NOTE — ASSESSMENT & PLAN NOTE
Small pneumo post thoracentesis  A chest x-ray today 722 again shows small residual right-sided pneumothorax unchanged.  Will repeat imaging tomorrow if stable or improved likely will be clear for discharge  Hemodynamically stable and asymptomatic

## 2024-07-21 NOTE — CARE PLAN
The patient is Stable - Low risk of patient condition declining or worsening    Shift Goals  Clinical Goals: monitor heart rate and rhythm. thoracentesis in AM  Patient Goals: NAHUM  Family Goals: NAHUM    Progress made toward(s) clinical / shift goals:    Problem: Knowledge Deficit - Standard  Goal: Patient and family/care givers will demonstrate understanding of plan of care, disease process/condition, diagnostic tests and medications  Outcome: Progressing     Problem: Skin Integrity  Goal: Skin integrity is maintained or improved  Outcome: Progressing     Problem: Fall Risk  Goal: Patient will remain free from falls  Outcome: Progressing       Pt updated on POC, tests, and medications. Pt verbalizes understanding and has no further questions at this time. Pt educated on calling for any more questions.   Q2 turns in place with wedges, purewick to control moisture and keep skin dry, and pillows to float heels as well as Allevyn on sacrum and heels.  All fall precautions in place pt remains free from falls.

## 2024-07-21 NOTE — CARE PLAN
The patient is Stable - Low risk of patient condition declining or worsening    Shift Goals  Clinical Goals: HR/Rhythm, Safety  Patient Goals: NAHUM  Family Goals: NAHUM    Progress made toward(s) clinical / shift goals:      Problem: Knowledge Deficit - Standard  Goal: Patient and family/care givers will demonstrate understanding of plan of care, disease process/condition, diagnostic tests and medications  Outcome: Progressing     Problem: Skin Integrity  Goal: Skin integrity is maintained or improved  Outcome: Progressing     Problem: Fall Risk  Goal: Patient will remain free from falls  Outcome: Progressing     Problem: Communication  Goal: The ability to communicate needs accurately and effectively will improve  Outcome: Progressing     Problem: Hemodynamics  Goal: Patient's hemodynamics, fluid balance and neurologic status will be stable or improve  Outcome: Progressing     Problem: Respiratory  Goal: Patient will achieve/maintain optimum respiratory ventilation and gas exchange  Outcome: Progressing     Problem: Risk for Aspiration  Goal: Patient's risk for aspiration will be absent or decrease  Outcome: Progressing     Problem: Pain - Standard  Goal: Alleviation of pain or a reduction in pain to the patient’s comfort goal  Outcome: Progressing       Patient is not progressing towards the following goals:

## 2024-07-21 NOTE — PROGRESS NOTES
Hospital Medicine Daily Progress Note    Date of Service  7/21/2024    Chief Complaint  Tereza Sánchez is a 78 y.o. female admitted 7/19/2024 with shortness of breath and lethargy     Hospital Course  Tereza Sánchez is a 78 y.o. female with a past medical history of history of Chiari malformation status post ventriculoperitoneal shunt, sleep apnea not on CPAP, chronic respiratory failure requiring 3 LPM, hypertension, hypercholesterolemia, hypothyroidism, and hx of breast cancer, recent IMCU admission in May for DVT and saddle PE treated with thrombectomy complicated by retroperitoneal hematoma, she was discharged to SNF at that time. She returned for SOB early this week and was discharged on 7/15 after being treated for pleural effusion and pneumonia, post acute care was recommended however pt declined and thus was discharge home with spouse.    At home the patient's condition declined to the point where now she is altered with her mental status is much worse than her respiratory status.    On admission she was tachcyardic with 's susepcted A.flutter vs SVT, she was cardioverted without resolution but improved after IV metoprolol with . Labs with WBC 19, plt 606, Hgb 6. Xray showed increased rt pleural effusion. She was started on IV antibiotics and admitted for further evaluation.     Interval Problem Update  Pt seen and examined, she is awake but agitated this AM, upset for being in hospital. Not participating in exam or conversation.  at bedside.   - multiple bursts of A>fib RVR yesterday (cardioverted in ER unsuccessfully), I consulted with caresteve. Started on amiodarone 200 mg daily, continue metoprolol. Continue tele monitor of additional day to ensure improvement   - on baseline oxygen   - thoracentesis today with small post procedure pneumothorax, HD stable, repeat xray in 1 hour   -labs with normalized WBC, K is low 3.1, continue oral replacement 40 mg daily.   - unasyn, plan for 5  day course     I have consulted with palliative care for further GOC conversation     C/o Rt hand numbness, per pt has been present but progressively worse, pt put a lot of her upper weight on her Rt elbow, also hx of surgery on shoulder and left breast cancer/sx with likely some nerve damage as well. Monitor and try and offload       I have discussed this patient's plan of care and discharge plan at IDT rounds today with Case Management, Nursing, Nursing leadership, and other members of the IDT team.    Consultants/Specialty  NA    Code Status  Full Code    Disposition  The patient is not medically cleared for discharge to home or a post-acute facility.      I have placed the appropriate orders for post-discharge needs.    Review of Systems  Review of Systems   Constitutional:  Positive for malaise/fatigue. Negative for fever.   HENT:  Negative for congestion.    Eyes:         Chronic blindness   Respiratory:  Negative for cough and shortness of breath.    Cardiovascular:  Positive for leg swelling. Negative for chest pain, palpitations and orthopnea.   Gastrointestinal:  Negative for abdominal pain, nausea and vomiting.   Genitourinary:  Negative for dysuria.   Musculoskeletal:  Positive for joint pain.   Neurological:  Positive for tingling, sensory change and weakness. Negative for dizziness.   Psychiatric/Behavioral:  Positive for memory loss.         Physical Exam  Temp:  [36.1 °C (97 °F)-36.5 °C (97.7 °F)] 36.5 °C (97.7 °F)  Pulse:  [80-94] 90  Resp:  [16-18] 16  BP: ()/(62-78) 107/67  SpO2:  [93 %-96 %] 93 %    Physical Exam  Vitals and nursing note reviewed.   Constitutional:       Appearance: She is obese. She is ill-appearing. She is not toxic-appearing.      Comments: Elderly ill appearing female, appears more alert and improved complexion today    HENT:      Head: Normocephalic.      Mouth/Throat:      Mouth: Mucous membranes are moist.   Eyes:      General: No scleral icterus.     Comments:  Chronic blindness   Cardiovascular:      Rate and Rhythm: Normal rate and regular rhythm.      Heart sounds: Normal heart sounds.   Pulmonary:      Effort: Respiratory distress present.      Comments: Diminished breath sounds, poor inspiratory effort with shallow breathing, increasing WOB  Abdominal:      General: Bowel sounds are normal. There is no distension.      Palpations: Abdomen is soft.      Tenderness: There is no abdominal tenderness.   Musculoskeletal:      Cervical back: Neck supple.      Right lower leg: Edema present.      Left lower leg: Edema present.      Comments: Claw hand deformity bilaterally, numbness from Rt hand/wrist    Skin:     General: Skin is warm.      Capillary Refill: Capillary refill takes 2 to 3 seconds.      Coloration: Skin is pale.   Neurological:      Mental Status: She is alert. She is disoriented.         Fluids    Intake/Output Summary (Last 24 hours) at 7/21/2024 1633  Last data filed at 7/21/2024 0319  Gross per 24 hour   Intake 0 ml   Output 300 ml   Net -300 ml       Laboratory  Recent Labs     07/19/24  1147 07/20/24  0501 07/21/24  0210   WBC 19.6* 13.5* 9.4   RBC 5.86* 4.91 4.50   HEMOGLOBIN 15.9 13.5 12.0   HEMATOCRIT 50.2* 42.5 38.2   MCV 85.7 86.6 84.9   MCH 27.1 27.5 26.7*   MCHC 31.7* 31.8* 31.4*   RDW 51.3* 52.0* 51.0*   PLATELETCT 606* 468* 429   MPV 11.8 11.5 11.1     Recent Labs     07/19/24  1449 07/20/24  0501 07/21/24  0210   SODIUM 140 138 139   POTASSIUM 4.0 3.3* 3.1*   CHLORIDE 99 96 98   CO2 18* 22 30   GLUCOSE 113* 122* 121*   BUN 18 17 16   CREATININE 0.61 0.57 0.59   CALCIUM 8.7 8.7 8.5     Recent Labs     07/21/24  0210   INR 1.84*               Imaging  DX-CHEST-PORTABLE (1 VIEW)   Final Result      Small right-sided pneumothorax. The patient is currently asymptomatic and a repeat x-ray will be obtained in one hour.         EC-ECHOCARDIOGRAM LTD W/O CONT   Final Result      DX-CHEST-PORTABLE (1 VIEW)   Final Result      Possible mild increase in  right-sided opacity, pleural effusion with associated atelectasis and/or consolidation.      US-THORACENTESIS PUNCTURE RIGHT    (Results Pending)   DX-CHEST-PORTABLE (1 VIEW)    (Results Pending)        Assessment/Plan  * Acute on chronic respiratory failure (HCC)- (present on admission)  Assessment & Plan  Patient is acute respiratory failure with hypoxia which is most to her worsening pleural effusion on the right side.  Patient will need continued oxygen support to keep oxygen saturations above 90%  RT protocol   Thoracentesis ordered   Continue AC-hx of PE  Contiue lasix   May need CT if no improvement   Unclear reason for recurrent effusion, pathology neg for malignancy or infection on previous admit     Pneumothorax of right lung after biopsy  Assessment & Plan  Small pneumo post thoracentesis  Hemodynamically stable and asymptomatic  Repeat chest x-ray in 1 hour further plan of care pending results    Leukocytosis- (present on admission)  Assessment & Plan  Patient's white blood cell count at this point is elevated most likely secondary to unknown source of infection  Cont. Unasyn, stop  IV vancomycin as MRSA nares is negative. Did reently complete abx course, will consider stopping and monitoring if she is more stable   We will monitor culture results  Thoracentesis attempted, post procedure pneumothorax     Thrombocytosis- (present on admission)  Assessment & Plan  Etiology unclear, likely reactive   Monitor   Improving     History of pulmonary embolism- (present on admission)  Assessment & Plan  Because of history of pulmonary embolism continue at this point with anticoagulation using Eliquis    Chronic diastolic heart failure (HCC)- (present on admission)  Assessment & Plan  Patient has a history of chronic diastolic heart failure    Patient is chronically on anticoagulation and thus not on aspirin.  Echo is relatively unremarkable with EF55%, no remarks on DF  Hold on further diuresis for now, will check  weights as she is down from baseline   Monitor volume status     Paroxysmal atrial fibrillation (HCC)- (present on admission)  Assessment & Plan  Uncontrolled   Multiple runs of V-tach and bursts of PSVT  I have increased metoprolol from 25 to 50 BID   Amodiarone 200 mg added  Discussed with cardio  Echo ordered, reviewed and unremarkable   Monitor on tele   Monitor and replace electrolytes as needed   Continue AC     V-tach (HCC)- (present on admission)  Assessment & Plan  Pt having multiple frequent runs of V--tach and PSVT, unclear etiology   - narrow complex tachyarrhythmia I ER on admission, s/p cardioversion and IV metoprolol with improvement to low 100s  Trop flat, EKG without signs of ishemia   - repeat echo with normal EF, otherwise unremarkable   - metoprolol  increased to 50 mg BID, amiodarone 200 mg ordered   - consulted and discussed with cardiology Dr. Chaidez   - monitor on tele   - monitor electrolytes, goal K >4, Mag >2    Polypharmacy- (present on admission)  Assessment & Plan  Patient has polypharmacy  Minimize sedative medications   Monitor for adverse side effects     Insomnia- (present on admission)  Assessment & Plan  Chronic insomnia at this point I am stopping any medications that may sedate her more as the patient is very sedated already    History of breast cancer- (present on admission)  Assessment & Plan  History of breast cancer about 6 years ago she saw Dr. Dia.  There is a potential that the cancer may be recurring and that is why she is getting pleural effusions we may need a full cancer workup  Does have a concerning lesion on her breast, may need punch biopsy     Major depressive disorder- (present on admission)  Assessment & Plan  Currently not suicidal or homicidal continue with Cymbalta and amitriptyline    SELWYN (obstructive sleep apnea)- (present on admission)  Assessment & Plan  Currently not using CPAP at night    Risk for falls- (present on admission)  Assessment &  Plan  Patient was just discharged  7/15 and after physical therapy and Occupational Therapy evaluation they recommended rehab and the patient's  refused now he is okay with it.  Therapy consulted, will need placement   Fall precautions     Dyslipidemia- (present on admission)  Assessment & Plan  Low-fat low-cholesterol diet  Statin  Fasting lipid panel    GERD (gastroesophageal reflux disease)- (present on admission)  Assessment & Plan  PPI therapy with omeprazole         VTE prophylaxis:    therapeutic anticoagulation with eliquis 5 mg BID      I have performed a physical exam and reviewed and updated ROS and Plan today (7/21/2024). In review of yesterday's note (7/20/2024), there are no changes except as documented above.      Greater than 51 minutes spent prepping to see patient (e.g. review of tests) obtaining and/or reviewing separately obtained history. Performing a medically appropriate examination and/ evaluation.  Counseling and educating the patient/family/caregiver.  Ordering medications, tests, or procedures.  Referring and communicating with other health care professionals.  Documenting clinical information in EPIC.  Independently interpreting results and communicating results to patient/family/caregiver.  Care coordination.

## 2024-07-21 NOTE — PROGRESS NOTES
Cardiology Follow Up Progress Note    Date of Service  7/21/2024    Attending Physician  Elizabeth Castillo M.D.    Chief Complaint     Acute respiratory distress with atrial arrhythmia    HPI  Tereza Sánchez is a 78 y.o. female admitted 7/19/2024 with acute on chronic respiratory distress& AMS  in ER found to be tachycardic  suspected SVT vs atrial  flutter underwent DCCV in ER.  Recent thoracentesis 7/15/2024.    PMH: PAF on OAC with Eliquis, chronic respiratory failure requiring 3 L of NC, hypertension, hyperlipidemia, hypothyroid, prior breast cancer, recent admission in May for DVT and PE status post thrombectomy complicated by retroperitoneal hematoma, COVID 4/24    Interim Events    No overnight cardiac events  Telemetry-SR  SBP appropriate  Declined standing weight this morning.  Cantankerous  Refused to answer any questions.  Labs reviewed  Normal TSH, T4        Review of Systems  Review of Systems   Respiratory:  Negative for chest tightness, shortness of breath, wheezing and stridor.        Vital signs in last 24 hours  Temp:  [36.1 °C (97 °F)-36.7 °C (98 °F)] 36.1 °C (97 °F)  Pulse:  [] 80  Resp:  [16-18] 16  BP: ()/(62-78) 102/67  SpO2:  [94 %-96 %] 95 %    Physical Exam  Physical Exam  Cardiovascular:      Rate and Rhythm: Normal rate and regular rhythm.   Pulmonary:      Effort: Pulmonary effort is normal.   Skin:     General: Skin is warm.   Neurological:      Mental Status: She is alert.         Lab Review  Lab Results   Component Value Date/Time    WBC 9.4 07/21/2024 02:10 AM    RBC 4.50 07/21/2024 02:10 AM    HEMOGLOBIN 12.0 07/21/2024 02:10 AM    HEMATOCRIT 38.2 07/21/2024 02:10 AM    MCV 84.9 07/21/2024 02:10 AM    MCH 26.7 (L) 07/21/2024 02:10 AM    MCHC 31.4 (L) 07/21/2024 02:10 AM    MPV 11.1 07/21/2024 02:10 AM      Lab Results   Component Value Date/Time    SODIUM 139 07/21/2024 02:10 AM    POTASSIUM 3.1 (L) 07/21/2024 02:10 AM    CHLORIDE 98 07/21/2024 02:10 AM    CO2 30  "07/21/2024 02:10 AM    GLUCOSE 121 (H) 07/21/2024 02:10 AM    BUN 16 07/21/2024 02:10 AM    CREATININE 0.59 07/21/2024 02:10 AM    CREATININE 1.0 05/20/2008 05:55 AM      Lab Results   Component Value Date/Time    ASTSGOT 53 (H) 07/21/2024 02:10 AM    ALTSGPT 32 07/21/2024 02:10 AM     Lab Results   Component Value Date/Time    CHOLSTRLTOT 159 12/01/2023 07:48 AM    LDL 79 12/01/2023 07:48 AM    HDL 53 12/01/2023 07:48 AM    TRIGLYCERIDE 104 05/05/2024 03:05 AM    TROPONINT 54 (H) 07/19/2024 02:49 PM       No results for input(s): \"NTPROBNP\" in the last 72 hours.    Cardiac Imaging and Procedures Review  EKG:  SR  7/20/24    Echocardiogram:     The left ventricle is   small in size. Mild concentric left ventricular hypertrophy. Normal   left ventricular systolic function. The left ventricular ejection   fraction is visually estimated to be 60%. Normal regional wall motion.      Imaging  Chest X-Ray:    Possible mild increase in right-sided opacity, pleural effusion with associated atelectasis and/or consolidation.              Assessment/Plan    # Acute on chronic respiratory failure  # SVT versus flutter status post DCCV in ER 7/19/2024  # Prior PAF  # On OAC with Eliquis  # Nonsustained VT  # HFpEF, acute on chronic  # Polypharmacy  # Recent R thoracentesis 7/15  # PE/DVT 5/4/2024      -Patient is currently in SR.  -Recommend transition  IV furosemide to oral Lasix.  -Replete potassium.  -Continue amiodarone 200 mg daily.  -Continue apixaban 5 twice daily.  -Continue metoprolol 50 twice daily.  -Spironolactone 12.5 daily.  -Recommend palliative care consult.    No further cardiac recommendations.    Cardiology will sign off      I personally spent a total of 15 minutes which includes face-to-face time and non-face-to-face time spent on preparing to see the patient, reviewing hospital notes and tests, obtaining history from the patient, performing a medically appropriate exam, counseling and educating the patient, " ordering medications/tests/procedures/referrals as clinically indicated, and documenting information in the electronic medical record.       Thank you for allowing me to participate in the care of this patient.  Cardiology will sign off on this patient    Please contact me with any questions.    REINIER Smith.   Cardiologist, [facility]  [phone]

## 2024-07-22 ENCOUNTER — APPOINTMENT (OUTPATIENT)
Dept: MEDICAL GROUP | Facility: PHYSICIAN GROUP | Age: 79
End: 2024-07-22
Payer: MEDICARE

## 2024-07-22 ENCOUNTER — APPOINTMENT (OUTPATIENT)
Dept: RADIOLOGY | Facility: MEDICAL CENTER | Age: 79
DRG: 291 | End: 2024-07-22
Payer: MEDICARE

## 2024-07-22 LAB
ANION GAP SERPL CALC-SCNC: 12 MMOL/L (ref 7–16)
BACTERIA BLD CULT: NORMAL
BACTERIA BLD CULT: NORMAL
BASOPHILS # BLD AUTO: 1.3 % (ref 0–1.8)
BASOPHILS # BLD: 0.09 K/UL (ref 0–0.12)
BUN SERPL-MCNC: 16 MG/DL (ref 8–22)
CALCIUM SERPL-MCNC: 8.6 MG/DL (ref 8.5–10.5)
CHLORIDE SERPL-SCNC: 101 MMOL/L (ref 96–112)
CO2 SERPL-SCNC: 26 MMOL/L (ref 20–33)
CREAT SERPL-MCNC: 0.41 MG/DL (ref 0.5–1.4)
EOSINOPHIL # BLD AUTO: 0.19 K/UL (ref 0–0.51)
EOSINOPHIL NFR BLD: 2.7 % (ref 0–6.9)
ERYTHROCYTE [DISTWIDTH] IN BLOOD BY AUTOMATED COUNT: 51.8 FL (ref 35.9–50)
GFR SERPLBLD CREATININE-BSD FMLA CKD-EPI: 101 ML/MIN/1.73 M 2
GLUCOSE SERPL-MCNC: 94 MG/DL (ref 65–99)
HCT VFR BLD AUTO: 36.4 % (ref 37–47)
HGB BLD-MCNC: 11.4 G/DL (ref 12–16)
IMM GRANULOCYTES # BLD AUTO: 0.03 K/UL (ref 0–0.11)
IMM GRANULOCYTES NFR BLD AUTO: 0.4 % (ref 0–0.9)
LYMPHOCYTES # BLD AUTO: 0.7 K/UL (ref 1–4.8)
LYMPHOCYTES NFR BLD: 10.1 % (ref 22–41)
MAGNESIUM SERPL-MCNC: 2.1 MG/DL (ref 1.5–2.5)
MCH RBC QN AUTO: 27.3 PG (ref 27–33)
MCHC RBC AUTO-ENTMCNC: 31.3 G/DL (ref 32.2–35.5)
MCV RBC AUTO: 87.3 FL (ref 81.4–97.8)
MONOCYTES # BLD AUTO: 0.67 K/UL (ref 0–0.85)
MONOCYTES NFR BLD AUTO: 9.6 % (ref 0–13.4)
NEUTROPHILS # BLD AUTO: 5.28 K/UL (ref 1.82–7.42)
NEUTROPHILS NFR BLD: 75.9 % (ref 44–72)
NRBC # BLD AUTO: 0 K/UL
NRBC BLD-RTO: 0 /100 WBC (ref 0–0.2)
PLATELET # BLD AUTO: 403 K/UL (ref 164–446)
PMV BLD AUTO: 11.2 FL (ref 9–12.9)
POTASSIUM SERPL-SCNC: 3.7 MMOL/L (ref 3.6–5.5)
RBC # BLD AUTO: 4.17 M/UL (ref 4.2–5.4)
SIGNIFICANT IND 70042: NORMAL
SIGNIFICANT IND 70042: NORMAL
SITE SITE: NORMAL
SITE SITE: NORMAL
SODIUM SERPL-SCNC: 139 MMOL/L (ref 135–145)
SOURCE SOURCE: NORMAL
SOURCE SOURCE: NORMAL
WBC # BLD AUTO: 7 K/UL (ref 4.8–10.8)

## 2024-07-22 PROCEDURE — A9270 NON-COVERED ITEM OR SERVICE: HCPCS | Performed by: STUDENT IN AN ORGANIZED HEALTH CARE EDUCATION/TRAINING PROGRAM

## 2024-07-22 PROCEDURE — A9270 NON-COVERED ITEM OR SERVICE: HCPCS | Performed by: HOSPITALIST

## 2024-07-22 PROCEDURE — 700111 HCHG RX REV CODE 636 W/ 250 OVERRIDE (IP): Mod: JZ | Performed by: HOSPITALIST

## 2024-07-22 PROCEDURE — A9270 NON-COVERED ITEM OR SERVICE: HCPCS | Performed by: INTERNAL MEDICINE

## 2024-07-22 PROCEDURE — 700102 HCHG RX REV CODE 250 W/ 637 OVERRIDE(OP): Performed by: INTERNAL MEDICINE

## 2024-07-22 PROCEDURE — 770020 HCHG ROOM/CARE - TELE (206)

## 2024-07-22 PROCEDURE — 85025 COMPLETE CBC W/AUTO DIFF WBC: CPT

## 2024-07-22 PROCEDURE — 700102 HCHG RX REV CODE 250 W/ 637 OVERRIDE(OP): Performed by: STUDENT IN AN ORGANIZED HEALTH CARE EDUCATION/TRAINING PROGRAM

## 2024-07-22 PROCEDURE — 700102 HCHG RX REV CODE 250 W/ 637 OVERRIDE(OP): Performed by: HOSPITALIST

## 2024-07-22 PROCEDURE — 83735 ASSAY OF MAGNESIUM: CPT

## 2024-07-22 PROCEDURE — 99497 ADVNCD CARE PLAN 30 MIN: CPT | Performed by: STUDENT IN AN ORGANIZED HEALTH CARE EDUCATION/TRAINING PROGRAM

## 2024-07-22 PROCEDURE — 80048 BASIC METABOLIC PNL TOTAL CA: CPT

## 2024-07-22 PROCEDURE — 71045 X-RAY EXAM CHEST 1 VIEW: CPT

## 2024-07-22 PROCEDURE — 700105 HCHG RX REV CODE 258: Performed by: HOSPITALIST

## 2024-07-22 PROCEDURE — 36415 COLL VENOUS BLD VENIPUNCTURE: CPT

## 2024-07-22 PROCEDURE — 99233 SBSQ HOSP IP/OBS HIGH 50: CPT | Performed by: STUDENT IN AN ORGANIZED HEALTH CARE EDUCATION/TRAINING PROGRAM

## 2024-07-22 PROCEDURE — 99223 1ST HOSP IP/OBS HIGH 75: CPT | Mod: 25 | Performed by: STUDENT IN AN ORGANIZED HEALTH CARE EDUCATION/TRAINING PROGRAM

## 2024-07-22 RX ORDER — ACETAMINOPHEN 500 MG
500 TABLET ORAL EVERY 6 HOURS PRN
Status: DISCONTINUED | OUTPATIENT
Start: 2024-07-22 | End: 2024-07-25 | Stop reason: HOSPADM

## 2024-07-22 RX ORDER — ACETAMINOPHEN 500 MG
1000 TABLET ORAL 2 TIMES DAILY
Status: DISCONTINUED | OUTPATIENT
Start: 2024-07-22 | End: 2024-07-25 | Stop reason: HOSPADM

## 2024-07-22 RX ADMIN — PREGABALIN 150 MG: 150 CAPSULE ORAL at 05:58

## 2024-07-22 RX ADMIN — ACETAMINOPHEN 1000 MG: 500 TABLET ORAL at 17:52

## 2024-07-22 RX ADMIN — METOPROLOL TARTRATE 50 MG: 50 TABLET, FILM COATED ORAL at 17:55

## 2024-07-22 RX ADMIN — SENNOSIDES AND DOCUSATE SODIUM 2 TABLET: 50; 8.6 TABLET ORAL at 17:53

## 2024-07-22 RX ADMIN — AMPICILLIN SODIUM, SULBACTAM SODIUM 3 G: 2; 1 INJECTION, POWDER, FOR SOLUTION INTRAMUSCULAR; INTRAVENOUS at 00:04

## 2024-07-22 RX ADMIN — SPIRONOLACTONE 12.5 MG: 25 TABLET ORAL at 05:58

## 2024-07-22 RX ADMIN — AMPICILLIN SODIUM, SULBACTAM SODIUM 3 G: 2; 1 INJECTION, POWDER, FOR SOLUTION INTRAMUSCULAR; INTRAVENOUS at 05:57

## 2024-07-22 RX ADMIN — APIXABAN 5 MG: 5 TABLET, FILM COATED ORAL at 17:53

## 2024-07-22 RX ADMIN — AMIODARONE HYDROCHLORIDE 200 MG: 200 TABLET ORAL at 05:57

## 2024-07-22 RX ADMIN — PREGABALIN 150 MG: 150 CAPSULE ORAL at 17:53

## 2024-07-22 RX ADMIN — POTASSIUM CHLORIDE 40 MEQ: 1500 TABLET, EXTENDED RELEASE ORAL at 05:57

## 2024-07-22 RX ADMIN — AMPICILLIN SODIUM, SULBACTAM SODIUM 3 G: 2; 1 INJECTION, POWDER, FOR SOLUTION INTRAMUSCULAR; INTRAVENOUS at 12:30

## 2024-07-22 RX ADMIN — DULOXETINE HYDROCHLORIDE 30 MG: 30 CAPSULE, DELAYED RELEASE ORAL at 17:53

## 2024-07-22 RX ADMIN — AMPICILLIN SODIUM, SULBACTAM SODIUM 3 G: 2; 1 INJECTION, POWDER, FOR SOLUTION INTRAMUSCULAR; INTRAVENOUS at 17:59

## 2024-07-22 RX ADMIN — METOPROLOL TARTRATE 50 MG: 50 TABLET, FILM COATED ORAL at 05:58

## 2024-07-22 RX ADMIN — APIXABAN 5 MG: 5 TABLET, FILM COATED ORAL at 05:58

## 2024-07-22 ASSESSMENT — ENCOUNTER SYMPTOMS
SHORTNESS OF BREATH: 0
WEAKNESS: 1
TINGLING: 1
NAUSEA: 0
COUGH: 0
SENSORY CHANGE: 1
ABDOMINAL PAIN: 0
MEMORY LOSS: 1
FEVER: 0
ORTHOPNEA: 0
DIZZINESS: 0
VOMITING: 0
PALPITATIONS: 0

## 2024-07-22 ASSESSMENT — CHA2DS2 SCORE
CHF OR LEFT VENTRICULAR DYSFUNCTION: YES
DIABETES: NO
AGE 75 OR GREATER: YES
VASCULAR DISEASE: NO
HYPERTENSION: NO
CHA2DS2 VASC SCORE: 6
SEX: FEMALE
PRIOR STROKE OR TIA OR THROMBOEMBOLISM: YES
AGE 65 TO 74: NO

## 2024-07-22 ASSESSMENT — FIBROSIS 4 INDEX: FIB4 SCORE: 1.77

## 2024-07-22 ASSESSMENT — PAIN DESCRIPTION - PAIN TYPE: TYPE: ACUTE PAIN;CHRONIC PAIN

## 2024-07-22 NOTE — PROGRESS NOTES
Hospital Medicine Daily Progress Note    Date of Service  7/22/2024    Chief Complaint  Tereza Sánchez is a 78 y.o. female admitted 7/19/2024 with shortness of breath and lethargy     Hospital Course  Tereza Sánchez is a 78 y.o. female with a past medical history of history of Chiari malformation status post ventriculoperitoneal shunt, sleep apnea not on CPAP, chronic respiratory failure requiring 3 LPM, hypertension, hypercholesterolemia, hypothyroidism, and hx of breast cancer, recent IMCU admission in May for DVT and saddle PE treated with thrombectomy complicated by retroperitoneal hematoma, she was discharged to SNF at that time. She returned for SOB early this week and was discharged on 7/15 after being treated for pleural effusion and pneumonia, post acute care was recommended however pt declined and thus was discharge home with spouse.    At home the patient's condition declined to the point where now she is altered with her mental status is much worse than her respiratory status.    On admission she was tachcyardic with 's susepcted A.flutter vs SVT, she was cardioverted without resolution but improved after IV metoprolol with . Labs with WBC 19, plt 606, Hgb 6. Xray showed increased rt pleural effusion. She was started on IV antibiotics and admitted for further evaluation.     Interval Problem Update  Pt seen at bedside, no acute issues overnight, feeling slightly improved today.   - vitals stable, remains on 3L  - chest xray with residual small Rt pneumothorax, no symptoms, will repeat chest x-ray in a.m. or if there is any acute change in her clinical status  -Her heart rate is better controlled, telemetry reviewed and she is maintained sinus rhythm continue metoprolol and amiodarone  -Labs are stable electrolyte abnormalities have improved  -Palliative did visit with the patient and I greatly appreciate their discussion with her and her family regarding her goals of care.  Sounds like  they will revisit patient tomorrow to determine plan of care whether pursuing nursing facility versus possibly home with hospice    If her chest x-ray remained stable and she has no other acute issues anticipate she will be cleared to discharge to SNF if home hospice is not pursued    I have discussed this patient's plan of care and discharge plan at IDT rounds today with Case Management, Nursing, Nursing leadership, and other members of the IDT team.    Consultants/Specialty  NA    Code Status  Full Code    Disposition  The patient is not medically cleared for discharge to home or a post-acute facility.      I have placed the appropriate orders for post-discharge needs.    Review of Systems  Review of Systems   Constitutional:  Positive for malaise/fatigue. Negative for fever.   HENT:  Negative for congestion.    Eyes:         Chronic blindness   Respiratory:  Negative for cough and shortness of breath.    Cardiovascular:  Positive for leg swelling. Negative for chest pain, palpitations and orthopnea.   Gastrointestinal:  Negative for abdominal pain, nausea and vomiting.   Genitourinary:  Negative for dysuria.   Musculoskeletal:  Positive for joint pain.   Neurological:  Positive for tingling, sensory change and weakness. Negative for dizziness.   Psychiatric/Behavioral:  Positive for memory loss.         Physical Exam  Temp:  [36 °C (96.8 °F)-36.7 °C (98 °F)] 36.1 °C (97 °F)  Pulse:  [81-97] 89  Resp:  [16-17] 17  BP: ()/(60-79) 92/63  SpO2:  [94 %-97 %] 95 %    Physical Exam  Vitals and nursing note reviewed.   Constitutional:       Appearance: She is obese. She is ill-appearing. She is not toxic-appearing.      Comments: Elderly ill appearing female, appears more alert and improved complexion today    HENT:      Head: Normocephalic.      Mouth/Throat:      Mouth: Mucous membranes are moist.   Eyes:      General: No scleral icterus.     Comments: Chronic blindness   Cardiovascular:      Rate and Rhythm:  Normal rate and regular rhythm.      Heart sounds: Normal heart sounds.   Pulmonary:      Effort: Respiratory distress present.      Comments: Diminished breath sounds, poor inspiratory effort with shallow breathing, increasing WOB  Abdominal:      General: Bowel sounds are normal. There is no distension.      Palpations: Abdomen is soft.      Tenderness: There is no abdominal tenderness.   Musculoskeletal:      Cervical back: Neck supple.      Right lower leg: Edema present.      Left lower leg: Edema present.      Comments: Claw hand deformity bilaterally, numbness from Rt hand/wrist    Skin:     General: Skin is warm.      Capillary Refill: Capillary refill takes 2 to 3 seconds.      Coloration: Skin is pale.   Neurological:      Mental Status: She is alert. She is disoriented.         Fluids    Intake/Output Summary (Last 24 hours) at 7/22/2024 1659  Last data filed at 7/22/2024 0100  Gross per 24 hour   Intake --   Output 100 ml   Net -100 ml       Laboratory  Recent Labs     07/20/24  0501 07/21/24  0210 07/22/24  0117   WBC 13.5* 9.4 7.0   RBC 4.91 4.50 4.17*   HEMOGLOBIN 13.5 12.0 11.4*   HEMATOCRIT 42.5 38.2 36.4*   MCV 86.6 84.9 87.3   MCH 27.5 26.7* 27.3   MCHC 31.8* 31.4* 31.3*   RDW 52.0* 51.0* 51.8*   PLATELETCT 468* 429 403   MPV 11.5 11.1 11.2     Recent Labs     07/20/24  0501 07/21/24  0210 07/22/24  0117   SODIUM 138 139 139   POTASSIUM 3.3* 3.1* 3.7   CHLORIDE 96 98 101   CO2 22 30 26   GLUCOSE 122* 121* 94   BUN 17 16 16   CREATININE 0.57 0.59 0.41*   CALCIUM 8.7 8.5 8.6     Recent Labs     07/21/24  0210   INR 1.84*               Imaging  DX-CHEST-PORTABLE (1 VIEW)   Final Result         1.  Pulmonary infiltrates, similar to prior study.   2.  Trace right pneumothorax, similar to prior study.   3.  Small right pleural effusion   4.  Cardiomegaly      DX-CHEST-PORTABLE (1 VIEW)   Final Result      1.  There is a trace of residual right apical pneumothorax decreased from prior study.   2.  No  significant change of the right perihilar and lower lobe parenchymal opacity with small amount of adjacent pleural fluid.      DX-CHEST-PORTABLE (1 VIEW)   Final Result      Small right-sided pneumothorax. The patient is currently asymptomatic and a repeat x-ray will be obtained in one hour.         US-THORACENTESIS PUNCTURE RIGHT   Final Result      1. Ultrasound guided right sided therapeutic thoracentesis.      2. less than 5 mL of fluid withdrawn.      EC-ECHOCARDIOGRAM LTD W/O CONT   Final Result      DX-CHEST-PORTABLE (1 VIEW)   Final Result      Possible mild increase in right-sided opacity, pleural effusion with associated atelectasis and/or consolidation.           Assessment/Plan  * Acute on chronic respiratory failure (HCC)- (present on admission)  Assessment & Plan  Patient is acute respiratory failure with hypoxia which is most to her worsening pleural effusion on the right side.  Patient will need continued oxygen support to keep oxygen saturations above 90%  RT protocol   Thoracentesis ordered   Continue AC-hx of PE  Contiue lasix   May need CT if no improvement   Unclear reason for recurrent effusion, pathology neg for malignancy or infection on previous admit     Pneumothorax of right lung after biopsy  Assessment & Plan  Small pneumo post thoracentesis  A chest x-ray today 722 again shows small residual right-sided pneumothorax unchanged.  Will repeat imaging tomorrow if stable or improved likely will be clear for discharge  Hemodynamically stable and asymptomatic    Leukocytosis- (present on admission)  Assessment & Plan  Patient's white blood cell count at this point is elevated most likely secondary to unknown source of infection  Cont. Unasyn, stop  IV vancomycin as MRSA nares is negative. Did reently complete abx course, will consider stopping and monitoring if she is more stable   We will monitor culture results  Thoracentesis attempted, post procedure pneumothorax     Thrombocytosis-  (present on admission)  Assessment & Plan  Etiology unclear, likely reactive   Monitor   Improving     History of pulmonary embolism- (present on admission)  Assessment & Plan  Because of history of pulmonary embolism continue at this point with anticoagulation using Eliquis    Chronic diastolic heart failure (HCC)- (present on admission)  Assessment & Plan  Patient has a history of chronic diastolic heart failure    Patient is chronically on anticoagulation and thus not on aspirin.  Echo is relatively unremarkable with EF55%, no remarks on DF  Hold on further diuresis for now, will check weights as she is down from baseline   Monitor volume status     Paroxysmal atrial fibrillation (HCC)- (present on admission)  Assessment & Plan  Uncontrolled   Multiple runs of V-tach and bursts of PSVT  I have increased metoprolol from 25 to 50 BID   Amodiarone 200 mg added  Discussed with cardio  Echo ordered, reviewed and unremarkable   Telemetry reviewed has maintained sinus rhythm continue with metoprolol and amiodarone  Monitor and replace electrolytes as needed   Continue AC     V-tach (HCC)- (present on admission)  Assessment & Plan  Pt having multiple frequent runs of V--tach and PSVT, unclear etiology   - narrow complex tachyarrhythmia I ER on admission, s/p cardioversion and IV metoprolol with improvement to low 100s  Trop flat, EKG without signs of ishemia   - repeat echo with normal EF, otherwise unremarkable   - metoprolol  increased to 50 mg BID, amiodarone 200 mg ordered   - consulted and discussed with cardiology Dr. Chaidez , neurology has since signed off  -Monitor has been reviewed she has maintained sinus rhythm heart rate is controlled  - monitor electrolytes, goal K >4, Mag >2    Polypharmacy- (present on admission)  Assessment & Plan  Patient has polypharmacy  Minimize sedative medications   Monitor for adverse side effects     Insomnia- (present on admission)  Assessment & Plan  Chronic insomnia at this  point I am stopping any medications that may sedate her more as the patient is very sedated already    History of breast cancer- (present on admission)  Assessment & Plan  History of breast cancer about 6 years ago she saw Dr. Dia.  There is a potential that the cancer may be recurring and that is why she is getting pleural effusions we may need a full cancer workup  Does have a concerning lesion on her breast, may need punch biopsy     Major depressive disorder- (present on admission)  Assessment & Plan  Currently not suicidal or homicidal continue with Cymbalta and amitriptyline    SELWYN (obstructive sleep apnea)- (present on admission)  Assessment & Plan  Currently not using CPAP at night    Risk for falls- (present on admission)  Assessment & Plan  Patient was just discharged  7/15 and after physical therapy and Occupational Therapy evaluation they recommended rehab and the patient's  refused now he is okay with it.  Therapy consulted, will need placement   Fall precautions     Dyslipidemia- (present on admission)  Assessment & Plan  Low-fat low-cholesterol diet  Statin  Fasting lipid panel    GERD (gastroesophageal reflux disease)- (present on admission)  Assessment & Plan  PPI therapy with omeprazole         VTE prophylaxis:    therapeutic anticoagulation with eliquis 5 mg BID      I have performed a physical exam and reviewed and updated ROS and Plan today (7/22/2024). In review of yesterday's note (7/21/2024), there are no changes except as documented above.

## 2024-07-22 NOTE — CARE PLAN
The patient is Stable - Low risk of patient condition declining or worsening    Shift Goals  Clinical Goals: monitor heart rate & rhythm, IV abx, q2 turns  Patient Goals: go home  Family Goals: updates    Progress made toward(s) clinical / shift goals:    Problem: Knowledge Deficit - Standard  Goal: Patient and family/care givers will demonstrate understanding of plan of care, disease process/condition, diagnostic tests and medications  Outcome: Progressing     Problem: Skin Integrity  Goal: Skin integrity is maintained or improved  Outcome: Progressing     Problem: Fall Risk  Goal: Patient will remain free from falls  Outcome: Progressing       Pt updated on POC, tests, and medications. Pt verbalizes understanding and has no further questions at this time. Pt educated on calling for any more questions.   Q2 turns with wedges in place, pt has purewick to protect skin barrier. Sacral allevyn placed today.   All fall precautions in place, pt remains free from falls.

## 2024-07-22 NOTE — DISCHARGE PLANNING
Addendum:  7-22-24/1550  RN GARFIELD met with patient and spouse, Anthony at bedside.  They stated that they are deliberating on dc plan for Home with Hospice or SNF for rehab.  Pt and Anthony agreeable to SNF for rehab.  Anthony signed choice form for [facility]) [facility] and 2) [facility].  Form faxed to [facility].  Pt stated she is unable to walk.  Anthony stated that he helps patient with all ADLs and IADLs and transfers her with stand step from bed to wc.  Pt receiving O2 3LNC.  ON service with [facility].      Case Management Discharge Planning    Admission Date: 7/19/2024  GMLOS: 3.6  ALOS: 3    6-Clicks ADL Score: 8  6-Clicks Mobility Score: 7  PT and/or OT Eval ordered: Yes  Post-acute Referrals Ordered: Yes  Post-acute Choice Obtained: No  Has referral(s) been sent to post-acute provider:  Yes      Anticipated Discharge Dispo: Discharge Disposition:   D/T to SNF with Medicare cert in anticipation of skilled care (03)  Discharge Contact Phone Number: [phone]    Action(s) Taken:   SNF referrals sent per protocol.    Medically Clear: No    Next Steps:   Follow up on referrals.  Meet with patient and spouse.    Barriers to Discharge: Medical clearance and Pending Placement    Care Transition Team Assessment    Information Source  Orientation Level: Oriented X4  Information Given By: Patient, Spouse  Who is responsible for making decisions for patient? : Patient    Readmission Evaluation  Is this a readmission?: Yes - unplanned readmission    Elopement Risk  Legal Hold: No  Ambulatory or Self Mobile in Wheelchair: No-Not an Elopement Risk  Elopement Risk: Not at Risk for Elopement         Discharge Preparedness  What is your plan after discharge?: Uncertain - pending medical team collaboration  What are your discharge supports?: Spouse         Finances  Financial Barriers to Discharge: No  Prescription Coverage: Yes    Vision / Hearing Impairment  Right Eye Vision: Impaired, Wears Glasses  Left Eye Vision: Impaired, Wears Glasses          Advance Directive  Advance Directive?: None    Domestic Abuse  Have you ever been the victim of abuse or violence?: No    Psychological Assessment  History of Substance Abuse: None  History of Psychiatric Problems: Yes  Newly Diagnosed Illness: No    Discharge Risks or Barriers  Patient risk factors: Readmission, Multiple ED visits    Anticipated Discharge Information  Discharge Disposition: D/T to SNF with Medicare cert in anticipation of skilled care (03)  Discharge Contact Phone Number: [phone]

## 2024-07-22 NOTE — DISCHARGE PLANNING
"TCN following. HTH/SCP chart reviewed. No new TCN needs identified. Please see prior TCN note from 7/20 for most recent discharge planning considerations if indicated. Note at this time, anticipating pt would require some degree of post acute placement at time of dc given current cognitive status/altered MS and current functional deficits, though will monitor for final medical POC/GOC and therapy recs if indicated (noted no therapy consults yet in chart, though pt does now have palliative consult in place to attempt to discuss GOC/POC*) and TCN will monitor for outcomes of this conversation for appropriate dc planning needs.     As prior, noted TCN has discussed CM blanket referral process if SNF indicated and pt/spouse reported they will provide choice from accepting SNF facilities if indicated for dc planning.      No provider requests at this time, though per review, anticipating PT and OT consults will be placed once pt is more medically appropriate/stable to participate with therapy given current fnx deficits, low LACE scores. No choice obtained as pt/spouse will provide choice from accepting SNF facilities once indicated.     Completed:  Pt may need PT and OT consults pending outcomes of palliative/GOC conversation  Choice obtained: none; see above  Pt aware of [facility] blanket referral policy and report will select from accepting SNF facilities once indicated (see above)    *noted palliative consult/note from today is now in place and per discussion with palliative appears dc planning options including post acute placement, HH and hospice have been explained; note per palliative note \"Tereza (pt) and Anthony (spouse) were agreeable to us following up on this discussion again tomorrow, to better figure out the plan ahead\"  -> TCN will monitor for outcomes of this discussion to assist with transitional dc planning as needed  "

## 2024-07-22 NOTE — PROGRESS NOTES
Monitor summary:        Rhythm: SR   Rate: 84-91  Ectopy: N/A  Measurements: 13./.09/.40        12hr chart check

## 2024-07-22 NOTE — CONSULTS
Inpatient Palliative Medicine Evaluation     Tereza Sánchez  78 y.o. female  [id number]    Location = [facility]  Referral Source = Elizabeth Castillo M.d.    PCP = Michelle Jim P.A.-C.    Reason for palliative medicine consultation and/or visit: ACP       Assessment and Plan:     GOAL(S) OF CARE = remains LONGEVITY at this time ==> More time with Anthony    SYMPTOMS ETIOLOGY/CAUSES = generalized weakness and dyspnea secondary to recent saddle PE, superimposed to SELWYN and chronic respiratory failure, in a complicated recovery course    PROGNOSIS =   - [X] hospice-eligible = technically meeting both cardiac disease and pulmonary disease hospice admission criteria  - NOT yet hospice-appropriate = pending more discussion among pt &  & son. Will follow.    CODE STATUS = remains FULL at this time, as is consistent with MAY 2024 POLST on file      ADVANCE CARE PLANNING =   Relevant history reviewed  Medical updates  Hospice philosophy introduction    PALLIATIVE CARE TEAM INTERVENTIONS =   ACP  Brought patient and  up to date medically, made aware of patient's 3 months of decline due to the difficult post-COVID recovery complicated by PE and recurrent crisis/admissions. They appeared understanding.    Laid out disposition options with patient:  1 last acute rehab attempt to see if she can benefit, time-limited trial to avoid repeat failures  Home with HH again, but at high risk of failure like last admission without improvement.  Home with Hospice, for more comfort and time with loved ones, while arriving at same outcome.    Introduced hospice philosophy and general provisions.    Patient seems more receptive to the idea of hospice, but ultimately patient and her  and son all need to further talk about things before finalizing a decision.    Pt and Anthony agreed to palliative care team following up again tomorrow to follow up on this discussion.          Summary =   Tereza Sánchez is a 78 y.o. female  "with chronic respiratory failure on 2-3LPM, SELWYN, afib, recent COVID-19 infection in APR 2024, recent saddle PE in May 2024 who unfortunately was readmitted shortly the day after discharge from last admission 7/12-18/2024, as patient declined acute rehab placement this time.      ---------------------------------------------------------------------------------------------------------------  IM PGY2 Dr. Rich Hatfield and I myself met with patient and her  Anthony dorsey. Introduced our role and reasons for consultation as inpatient palliative care team, and they were receptive to this interview.    We reviewed patient's recent history, and it was clear to both patient and Anthony that patient's health had continuously declined since her recent COVID-19 infection in APR 2024, despite all interventions so far. She use to be able to ambulate short distances at home, but now could barely stood without \"her legs giving out.\" Patient had also been eating much less, about 1/3 of her normal intake. Overall, patient and Anthony both agreed that while today was a better day since readmission, she is still quite a way off prior functional baseline.    We discussed the roads ahead and options, as listed above: another trial at acute rehab, high risk home discharge again, or hospice care, as likely the outcome would not change either way.    Reviewed general hospice philosophy and provisions with patient and Anthony. Explained to them that, as patient made it clear that her priority is to have more time with Anthony, hospice would be a perfectly reasonable option in a situation when patient's health can no longer improve.    Patient herself seemed more receptive (if not somewhat resigned) to the idea of hospice, while Anthony would like to explore 1 more rehab attempt at this time....    Assured family that any of these presented options would be reasonable, as long as we understand what we are up against, and do not perpetuate a " negative cycle where patient continues to be asked to perform these arduous attempts while she declines and reaps no benefits.... not good for pt's sake.  Family expressed understanding of our explanation.    Stressed the importance of time-limited nature (1 more admission, 1 more crisis, 1 more failure, etc.), if that's what family decide to do.    Stressed the importance to avoid suffering and repeated non-beneficial interventions.    Family understood. Encouraged them to further talk about things today, and with their son Candace Starks and Anthony were agreeable to us following up on this discussion again tomorrow, to better figure out the plan ahead.           Medical Decision Making =    Patient is of high medical complexity with incurable disease = acute on chronic respiratory failure  Care complexity further complicated by secondary conditions = saddle PE, recent COVID19 infection, HFpEF, SELWYN, afib, advance age, limited sociofamlial support    Extensive data reviewed & coordination performed = labs, imaging, specialty inputs, MAR records, family meeting    High risk of morbidity from additional interventions &  studies = advance age, low function, refractory symptoms despite interventions, high risk medication use        Advance care planning  =   The patient and/or legal decision maker has provided voluntary consent to discuss advance care planning. We discussed code status.  FULL    Total time spent in ACP discussion 30 minutes, which is separate from the time spent completing the evaluation and management visit.     Thank you for allowing me the opportunity to participate in the care of Tereza Sánchez     I spent a total of 80 minutes reviewing medical records, direct face-to-face time with the patient and/or family, documentation and coordination of care. This is separate from the time spent on advance care planning, which is documented above.         NAZ FINN DO (TIM)  [facility]   [address] [place]  [id number]  P: [phone]  F: [phone]

## 2024-07-22 NOTE — PROGRESS NOTES
"Pt verbalizing wanting to leave, and verbalizing that she does not wish to go to rehab as she has already been there and \"I just end up back here anyway's.\" Education provided as to how pt is not medically cleared to leave, pt and  verbalize understanding. Care ongoing.   "

## 2024-07-22 NOTE — PROGRESS NOTES
Handoff report received from day shift nurse. Pt care assumed. Pt is currently resting in bed awake NAD noted  at bedside. POC discussed with Pt and Pt verbalizes no questions or needs at this time. Pt is AAOx2 to self and time, on Ra, on Tele monitoring rate and rhythm verified, and VSS. Call light and belongings within reach, bed in lowest and locked position, all fall precautions in place and Pt educated on use of call light. Hourly rounding initiated.

## 2024-07-23 ENCOUNTER — APPOINTMENT (OUTPATIENT)
Dept: RADIOLOGY | Facility: MEDICAL CENTER | Age: 79
DRG: 291 | End: 2024-07-23
Attending: STUDENT IN AN ORGANIZED HEALTH CARE EDUCATION/TRAINING PROGRAM
Payer: MEDICARE

## 2024-07-23 LAB
ANION GAP SERPL CALC-SCNC: 11 MMOL/L (ref 7–16)
ANISOCYTOSIS BLD QL SMEAR: ABNORMAL
BASOPHILS # BLD AUTO: 1.6 % (ref 0–1.8)
BASOPHILS # BLD: 0.1 K/UL (ref 0–0.12)
BUN SERPL-MCNC: 15 MG/DL (ref 8–22)
CALCIUM SERPL-MCNC: 8.6 MG/DL (ref 8.5–10.5)
CHLORIDE SERPL-SCNC: 102 MMOL/L (ref 96–112)
CO2 SERPL-SCNC: 27 MMOL/L (ref 20–33)
COMMENT 1642: NORMAL
CREAT SERPL-MCNC: 0.48 MG/DL (ref 0.5–1.4)
EOSINOPHIL # BLD AUTO: 0.19 K/UL (ref 0–0.51)
EOSINOPHIL NFR BLD: 3.1 % (ref 0–6.9)
ERYTHROCYTE [DISTWIDTH] IN BLOOD BY AUTOMATED COUNT: 52.8 FL (ref 35.9–50)
FUNGUS SPEC CULT: NORMAL
FUNGUS SPEC FUNGUS STN: NORMAL
GFR SERPLBLD CREATININE-BSD FMLA CKD-EPI: 98 ML/MIN/1.73 M 2
GLUCOSE SERPL-MCNC: 95 MG/DL (ref 65–99)
HCT VFR BLD AUTO: 37.6 % (ref 37–47)
HGB BLD-MCNC: 11.2 G/DL (ref 12–16)
HYPOCHROMIA BLD QL SMEAR: ABNORMAL
IMM GRANULOCYTES # BLD AUTO: 0.05 K/UL (ref 0–0.11)
IMM GRANULOCYTES NFR BLD AUTO: 0.8 % (ref 0–0.9)
LYMPHOCYTES # BLD AUTO: 0.8 K/UL (ref 1–4.8)
LYMPHOCYTES NFR BLD: 13.1 % (ref 22–41)
MCH RBC QN AUTO: 26.2 PG (ref 27–33)
MCHC RBC AUTO-ENTMCNC: 29.8 G/DL (ref 32.2–35.5)
MCV RBC AUTO: 88.1 FL (ref 81.4–97.8)
MICROCYTES BLD QL SMEAR: ABNORMAL
MONOCYTES # BLD AUTO: 0.67 K/UL (ref 0–0.85)
MONOCYTES NFR BLD AUTO: 11 % (ref 0–13.4)
MORPHOLOGY BLD-IMP: NORMAL
NEUTROPHILS # BLD AUTO: 4.28 K/UL (ref 1.82–7.42)
NEUTROPHILS NFR BLD: 70.4 % (ref 44–72)
NRBC # BLD AUTO: 0 K/UL
NRBC BLD-RTO: 0 /100 WBC (ref 0–0.2)
PLATELET # BLD AUTO: 419 K/UL (ref 164–446)
PLATELET BLD QL SMEAR: NORMAL
PMV BLD AUTO: 11.3 FL (ref 9–12.9)
POTASSIUM SERPL-SCNC: 4.2 MMOL/L (ref 3.6–5.5)
RBC # BLD AUTO: 4.27 M/UL (ref 4.2–5.4)
RBC BLD AUTO: PRESENT
SIGNIFICANT IND 70042: NORMAL
SITE SITE: NORMAL
SODIUM SERPL-SCNC: 140 MMOL/L (ref 135–145)
SOURCE SOURCE: NORMAL
WBC # BLD AUTO: 6.1 K/UL (ref 4.8–10.8)

## 2024-07-23 PROCEDURE — A9270 NON-COVERED ITEM OR SERVICE: HCPCS | Performed by: HOSPITALIST

## 2024-07-23 PROCEDURE — A9270 NON-COVERED ITEM OR SERVICE: HCPCS | Performed by: STUDENT IN AN ORGANIZED HEALTH CARE EDUCATION/TRAINING PROGRAM

## 2024-07-23 PROCEDURE — 700102 HCHG RX REV CODE 250 W/ 637 OVERRIDE(OP): Performed by: INTERNAL MEDICINE

## 2024-07-23 PROCEDURE — 85025 COMPLETE CBC W/AUTO DIFF WBC: CPT

## 2024-07-23 PROCEDURE — A9270 NON-COVERED ITEM OR SERVICE: HCPCS | Performed by: INTERNAL MEDICINE

## 2024-07-23 PROCEDURE — 700102 HCHG RX REV CODE 250 W/ 637 OVERRIDE(OP): Performed by: STUDENT IN AN ORGANIZED HEALTH CARE EDUCATION/TRAINING PROGRAM

## 2024-07-23 PROCEDURE — 700102 HCHG RX REV CODE 250 W/ 637 OVERRIDE(OP): Performed by: HOSPITALIST

## 2024-07-23 PROCEDURE — 770020 HCHG ROOM/CARE - TELE (206)

## 2024-07-23 PROCEDURE — 97166 OT EVAL MOD COMPLEX 45 MIN: CPT

## 2024-07-23 PROCEDURE — 99233 SBSQ HOSP IP/OBS HIGH 50: CPT | Performed by: STUDENT IN AN ORGANIZED HEALTH CARE EDUCATION/TRAINING PROGRAM

## 2024-07-23 PROCEDURE — 80048 BASIC METABOLIC PNL TOTAL CA: CPT

## 2024-07-23 PROCEDURE — 97162 PT EVAL MOD COMPLEX 30 MIN: CPT

## 2024-07-23 PROCEDURE — 700111 HCHG RX REV CODE 636 W/ 250 OVERRIDE (IP): Mod: JZ | Performed by: HOSPITALIST

## 2024-07-23 PROCEDURE — 99232 SBSQ HOSP IP/OBS MODERATE 35: CPT | Performed by: INTERNAL MEDICINE

## 2024-07-23 PROCEDURE — 71045 X-RAY EXAM CHEST 1 VIEW: CPT

## 2024-07-23 PROCEDURE — 36415 COLL VENOUS BLD VENIPUNCTURE: CPT

## 2024-07-23 PROCEDURE — 97535 SELF CARE MNGMENT TRAINING: CPT

## 2024-07-23 PROCEDURE — 700105 HCHG RX REV CODE 258: Performed by: HOSPITALIST

## 2024-07-23 RX ADMIN — APIXABAN 5 MG: 5 TABLET, FILM COATED ORAL at 16:56

## 2024-07-23 RX ADMIN — TAMSULOSIN HYDROCHLORIDE 0.4 MG: 0.4 CAPSULE ORAL at 19:32

## 2024-07-23 RX ADMIN — METOPROLOL TARTRATE 50 MG: 50 TABLET, FILM COATED ORAL at 16:56

## 2024-07-23 RX ADMIN — TAMSULOSIN HYDROCHLORIDE 0.4 MG: 0.4 CAPSULE ORAL at 00:52

## 2024-07-23 RX ADMIN — AMPICILLIN SODIUM, SULBACTAM SODIUM 3 G: 2; 1 INJECTION, POWDER, FOR SOLUTION INTRAMUSCULAR; INTRAVENOUS at 06:12

## 2024-07-23 RX ADMIN — AMPICILLIN SODIUM, SULBACTAM SODIUM 3 G: 2; 1 INJECTION, POWDER, FOR SOLUTION INTRAMUSCULAR; INTRAVENOUS at 00:59

## 2024-07-23 RX ADMIN — ATORVASTATIN CALCIUM 10 MG: 10 TABLET, FILM COATED ORAL at 19:32

## 2024-07-23 RX ADMIN — AMITRIPTYLINE HYDROCHLORIDE 100 MG: 50 TABLET, FILM COATED ORAL at 00:52

## 2024-07-23 RX ADMIN — AMITRIPTYLINE HYDROCHLORIDE 100 MG: 50 TABLET, FILM COATED ORAL at 19:32

## 2024-07-23 RX ADMIN — ATORVASTATIN CALCIUM 10 MG: 10 TABLET, FILM COATED ORAL at 00:52

## 2024-07-23 RX ADMIN — PREGABALIN 150 MG: 150 CAPSULE ORAL at 16:56

## 2024-07-23 RX ADMIN — MAJOR MAGNESIUM CITRATE ORAL SOLUTION - LEMON 296 ML: 1.75 LIQUID ORAL at 16:56

## 2024-07-23 RX ADMIN — ACETAMINOPHEN 1000 MG: 500 TABLET ORAL at 16:56

## 2024-07-23 RX ADMIN — DULOXETINE HYDROCHLORIDE 30 MG: 30 CAPSULE, DELAYED RELEASE ORAL at 16:56

## 2024-07-23 RX ADMIN — AMPICILLIN SODIUM, SULBACTAM SODIUM 3 G: 2; 1 INJECTION, POWDER, FOR SOLUTION INTRAMUSCULAR; INTRAVENOUS at 16:59

## 2024-07-23 RX ADMIN — AMPICILLIN SODIUM, SULBACTAM SODIUM 3 G: 2; 1 INJECTION, POWDER, FOR SOLUTION INTRAMUSCULAR; INTRAVENOUS at 12:33

## 2024-07-23 ASSESSMENT — ENCOUNTER SYMPTOMS
WEAKNESS: 1
ORTHOPNEA: 0
DIZZINESS: 0
NAUSEA: 0
TINGLING: 1
ABDOMINAL PAIN: 0
MEMORY LOSS: 1
SENSORY CHANGE: 1
COUGH: 0
VOMITING: 0
PALPITATIONS: 0
SHORTNESS OF BREATH: 0
FEVER: 0

## 2024-07-23 ASSESSMENT — PAIN DESCRIPTION - PAIN TYPE
TYPE: ACUTE PAIN

## 2024-07-23 ASSESSMENT — COGNITIVE AND FUNCTIONAL STATUS - GENERAL
STANDING UP FROM CHAIR USING ARMS: A LOT
DRESSING REGULAR UPPER BODY CLOTHING: A LOT
WALKING IN HOSPITAL ROOM: A LOT
TURNING FROM BACK TO SIDE WHILE IN FLAT BAD: A LITTLE
SUGGESTED CMS G CODE MODIFIER MOBILITY: CL
MOBILITY SCORE: 12
DRESSING REGULAR LOWER BODY CLOTHING: TOTAL
PERSONAL GROOMING: A LOT
CLIMB 3 TO 5 STEPS WITH RAILING: TOTAL
SUGGESTED CMS G CODE MODIFIER DAILY ACTIVITY: CL
TOILETING: TOTAL
HELP NEEDED FOR BATHING: TOTAL
EATING MEALS: A LOT
DAILY ACTIVITIY SCORE: 9
MOVING FROM LYING ON BACK TO SITTING ON SIDE OF FLAT BED: A LOT
MOVING TO AND FROM BED TO CHAIR: A LOT

## 2024-07-23 ASSESSMENT — GAIT ASSESSMENTS
DEVIATION: SHUFFLED GAIT
ASSISTIVE DEVICE: FRONT WHEEL WALKER
GAIT LEVEL OF ASSIST: MAXIMAL ASSIST

## 2024-07-23 ASSESSMENT — FIBROSIS 4 INDEX: FIB4 SCORE: 1.7

## 2024-07-23 ASSESSMENT — ACTIVITIES OF DAILY LIVING (ADL): TOILETING: REQUIRES ASSIST

## 2024-07-23 NOTE — PROGRESS NOTES
Received bedside report from RN, pt care assumed, VSS, pt assessment complete. Pt AAOx2 (oriented to person and place), with no c/o of pain at this time. No signs of acute distress noted at this time. Plan of care discussed with pt and verbalizes no questions. Pt denies any additional needs at this time. Bed locked/in lowest position, bed alarm on, pt educated on fall risk and verbalized understanding, call light within reach, hourly rounding initiated.

## 2024-07-23 NOTE — CARE PLAN
The patient is Stable - Low risk of patient condition declining or worsening    Shift Goals  Clinical Goals: monitor vitals and labs, safety  Patient Goals: sleep  Family Goals: updates, rest    Progress made toward(s) clinical / shift goals:    Problem: Knowledge Deficit - Standard  Goal: Patient and family/care givers will demonstrate understanding of plan of care, disease process/condition, diagnostic tests and medications  Outcome: Progressing     Problem: Skin Integrity  Goal: Skin integrity is maintained or improved  Outcome: Progressing     Problem: Fall Risk  Goal: Patient will remain free from falls  Outcome: Progressing     Problem: Hemodynamics  Goal: Patient's hemodynamics, fluid balance and neurologic status will be stable or improve  Outcome: Progressing       Patient is not progressing towards the following goals:      Problem: Respiratory  Goal: Patient will achieve/maintain optimum respiratory ventilation and gas exchange  Outcome: Not Progressing   Pt's spouse verbalizes understanding of care plan, pt reoriented. No c/o pain, on IV Unasyn, requires 3L O2. Holding IV lasix

## 2024-07-23 NOTE — THERAPY
"Occupational Therapy   Initial Evaluation     Patient Name: Tereza Sánchez  Age:  78 y.o., Sex:  female  Medical Record #: [id number]  Today's Date: 7/23/2024     Precautions  Precautions: Fall Risk    Assessment  Patient is 78 y.o. female readmitted to the hospital for a fib with RVR and respiratory failure with AMS a day after being discharged. She was previously seen for PE and pleural effusion requiring thoracentesis. PMHx includes asthma, cancer, Chiari malformation, LBP, HTN, scoliosis, psych history, sleep apnea, TSA, DMITRY with revision, and HFpEF     Pt seen for OT eval. Pt presents with significant weakness requiring increased assist for bed mobility, diminished activity tolerance evidenced by 1 trial STS modA. Pt with B hand deformities at DIP and PIP, requires assist for all FMC and self care skills. Pt would like to progress with therapies as able, will continue to benefit from inpt OT       Plan    Occupational Therapy Initial Treatment Plan   Treatment Interventions: (P) Self Care / Activities of Daily Living, Adaptive Equipment, Therapeutic Exercises, Therapeutic Activity  Treatment Frequency: (P) 3 Times per Week  Duration: (P) Until Therapy Goals Met    DC Equipment Recommendations: (P) Unable to determine at this time  Discharge Recommendations: (P) Recommend post-acute placement for additional occupational therapy services prior to discharge home     Subjective    \"I want to get stronger\"      Objective       07/23/24 1517   Prior Living Situation   Prior Services Intermittent Physical Support for ADL Per Family   Housing / Facility 1 Story House   Steps Into Home 3   Rail None   Bathroom Set up Walk In Shower;Shower Chair;Grab Bars   Equipment Owned Front-Wheel Walker;Tub / Shower Seat;Wheelchair;Adjustable Bed Without Rails   Lives with - Patient's Self Care Capacity Spouse   Prior Level of ADL Function   Self Feeding Independent   Grooming / Hygiene Requires Assist   Bathing Requires Assist "   Dressing Requires Assist   Toileting Requires Assist   Comments requires intermittent assist for all ADLs per pt and spouse   Prior Level of IADL Function   Medication Management Dependent   Laundry Dependent   Kitchen Mobility Requires Assist   Finances Dependent   Home Management Dependent   Shopping Dependent   Prior Level Of Mobility Uses Wheel Chair for Community Mobility   Driving / Transportation Relatives / Others Provide Transportation   Comments 4WW in home, w/c in community   History of Falls   History of Falls Yes   Date of Last Fall   (2 falls in past year)   Precautions   Precautions Fall Risk   Vitals   Pulse 95   Pulse Oximetry 96 %   O2 (LPM) 2   O2 Delivery Device Silicone Nasal Cannula   Pain   Intervention Declines   Pain 0 - 10 Group   Location Shoulder   Location Orientation Left   Therapist Pain Assessment During Activity;Post Activity Pain Same as Prior to Activity;Nurse Notified  (unrated pain in L shoulder if laying on it, generalized pain)   Cognition    Cognition / Consciousness X   Level of Consciousness Responds to voice   Ability To Follow Commands 1 Step   Safety Awareness Impaired   New Learning Impaired   Attention Impaired   Comments able to follow 1 step commands, can make needs known. delayed responses throughout, oriented to month and year. diminished attention   Passive ROM Upper Body   Passive ROM Upper Body X   Gross Passive ROM Gross Passive Range of Motion Impaired, but Appears Adequate for Functional Activities.   Comments B hand deformities at PIP and DIPs   Active ROM Upper Body   Active ROM Upper Body  X   Dominant Hand Right   Comments BUE impaired AROM in B shoulders and B hands at DIP and PIP. claw hand defotmities    Strength Upper Body   Upper Body Strength  X   Gross Strength Generalized Weakness, Equal Bilaterally.    Comments BUE impaired for all funtional skills. RUE> LUE   Sensation Upper Body   Upper Extremity Sensation  X   Comments numbness in R hand and  digits.   Upper Body Muscle Tone   Upper Body Muscle Tone  WDL   Neurological Concerns   Neurological Concerns No   Coordination Upper Body   Coordination X   Fine Motor Coordination impaired   Gross Motor Coordination impaired   Balance Assessment   Sitting Balance (Static) Fair -   Sitting Balance (Dynamic) Fair -   Standing Balance (Static) Trace   Standing Balance (Dynamic) Trace   Weight Shift Sitting Poor   Weight Shift Standing Poor   Bed Mobility    Supine to Sit Minimal Assist   Sit to Supine Maximal Assist   Scooting Maximal Assist   Rolling Minimal Assist to Rt.   Comments HOB elevated, use of bed rail   ADL Assessment   Grooming Moderate Assist  (oral care, difficulty managing supplies and grasp)   Bathing Maximal Assist   Upper Body Dressing Moderate Assist   Lower Body Dressing Total Assist   Toileting   (declined need)   Functional Mobility   Sit to Stand Moderate Assist   Toilet Transfers Unable to Participate   Transfer Method Stand Step   Mobility supine>sit EOB> STS> supine   Comments w/FWW   Visual Perception   Visual Perception  WDL   Edema / Skin Assessment   Edema / Skin  X   Comments bruising on L forearm, redness R forearm.   Activity Tolerance   Sitting Edge of Bed 15 min   Standing <1 min   Patient / Family Goals   Patient / Family Goal #1 to get stronger   Short Term Goals   Short Term Goal # 1 Pt will complete functional transfer to chair modA   Short Term Goal # 2 Pt will complete UB dressing Gloria   Short Term Goal # 3 Pt will complete seated grooming assist with A/E as needed supervised   Education Group   Education Provided Role of Occupational Therapist;Activities of Daily Living   Role of Occupational Therapist Patient Response Patient;Family;Acceptance;Demonstration;Explanation;Verbal Demonstration;Action Demonstration   ADL Patient Response Patient;Family;Acceptance;Explanation;Demonstration;Verbal Demonstration;Action Demonstration   Occupational Therapy Initial Treatment Plan     Treatment Interventions Self Care / Activities of Daily Living;Adaptive Equipment;Therapeutic Exercises;Therapeutic Activity   Treatment Frequency 3 Times per Week   Duration Until Therapy Goals Met   Problem List   Problem List Decreased Active Daily Living Skills;Decreased Upper Extremity Strength Right;Decreased Upper Extremity Strength Left;Decreased Upper Extremity AROM Right;Decreased Upper Extremity AROM Left;Decreased Functional Mobility;Decreased Activity Tolerance;Safety Awareness Deficits / Cognition;Impaired Coordination Right Upper Extremity;Impaired Coordination Left Upper Extremity;Impaired Postural Control / Balance   Anticipated Discharge Equipment and Recommendations   DC Equipment Recommendations Unable to determine at this time   Discharge Recommendations Recommend post-acute placement for additional occupational therapy services prior to discharge home   Interdisciplinary Plan of Care Collaboration   IDT Collaboration with  Nursing   Patient Position at End of Therapy In Bed;Bed Alarm On;Tray Table within Reach;Call Light within Reach;Family / Friend in Room   Collaboration Comments RN updated   Session Information   Date / Session Number  7/23, #1 (1/3, 7/29)

## 2024-07-23 NOTE — PROGRESS NOTES
Hospital Medicine Daily Progress Note    Date of Service  7/23/2024    Chief Complaint  Tereza Sánchez is a 78 y.o. female admitted 7/19/2024 with shortness of breath and lethargy     Hospital Course  Tereza Sánchez is a 78 y.o. female with a past medical history of history of Chiari malformation status post ventriculoperitoneal shunt, sleep apnea not on CPAP, chronic respiratory failure requiring 3 LPM, hypertension, hypercholesterolemia, hypothyroidism, and hx of breast cancer, recent IMCU admission in May for DVT and saddle PE treated with thrombectomy complicated by retroperitoneal hematoma, she was discharged to SNF at that time. She returned for SOB early this week and was discharged on 7/15 after being treated for pleural effusion and pneumonia, post acute care was recommended however pt declined and thus was discharge home with spouse.    At home the patient's condition declined to the point where now she is altered with her mental status is much worse than her respiratory status.    On admission she was tachcyardic with 's susepcted A.flutter vs SVT, she was cardioverted without resolution but improved after IV metoprolol with . Labs with WBC 19, plt 606, Hgb 6. Xray showed increased rt pleural effusion. She was started on IV antibiotics and admitted for further evaluation.     Interval Problem Update  Pt seen at bedside, no acute issues overnight, feeling slightly improved today.   - vitals stable, remains on 3L  - chest xray with residual small Rt pneumothorax, no symptoms, will repeat chest x-ray in a.m. or if there is any acute change in her clinical status  -Her heart rate is better controlled, telemetry reviewed and she is maintained sinus rhythm continue metoprolol and amiodarone  -Labs are stable electrolyte abnormalities have improved  -Palliative did visit with the patient and I greatly appreciate their discussion with her and her family regarding her goals of care.  Sounds like  they will revisit patient tomorrow to determine plan of care whether pursuing nursing facility versus possibly home with hospice  If her chest x-ray remained stable and she has no other acute issues anticipate she will be cleared to discharge to SNF if home hospice is not pursued  7/23: patient seen and examined, resting in bed, afebrile, CXR done today reviewed right  pleural effusion,    at bedside updated on point of care and questions answered.  Palliative to see patient again today     I have discussed this patient's plan of care and discharge plan at IDT rounds today with Case Management, Nursing, Nursing leadership, and other members of the IDT team.    Consultants/Specialty  NA    Code Status  Full Code    Disposition  The patient is not medically cleared for discharge to home or a post-acute facility.      I have placed the appropriate orders for post-discharge needs.    Review of Systems  Review of Systems   Constitutional:  Positive for malaise/fatigue. Negative for fever.   HENT:  Negative for congestion.    Eyes:         Chronic blindness   Respiratory:  Negative for cough and shortness of breath.    Cardiovascular:  Positive for leg swelling. Negative for chest pain, palpitations and orthopnea.   Gastrointestinal:  Negative for abdominal pain, nausea and vomiting.   Genitourinary:  Negative for dysuria.   Musculoskeletal:  Positive for joint pain.   Neurological:  Positive for tingling, sensory change and weakness. Negative for dizziness.   Psychiatric/Behavioral:  Positive for memory loss.         Physical Exam  Temp:  [36.1 °C (97 °F)-37 °C (98.6 °F)] 36.1 °C (97 °F)  Pulse:  [80-90] 90  Resp:  [16-17] 17  BP: ()/(57-75) 101/61  SpO2:  [47 %-97 %] 92 %    Physical Exam  Vitals and nursing note reviewed.   Constitutional:       Appearance: She is obese. She is ill-appearing. She is not toxic-appearing.      Comments: Elderly ill appearing female, appears more alert and improved complexion  today    HENT:      Head: Normocephalic.      Mouth/Throat:      Mouth: Mucous membranes are moist.   Eyes:      General: No scleral icterus.     Comments: Chronic blindness   Cardiovascular:      Rate and Rhythm: Normal rate and regular rhythm.      Heart sounds: Normal heart sounds.   Pulmonary:      Effort: Respiratory distress present.      Comments: Diminished breath sounds, poor inspiratory effort with shallow breathing, increasing WOB  Abdominal:      General: Bowel sounds are normal. There is no distension.      Palpations: Abdomen is soft.      Tenderness: There is no abdominal tenderness.   Musculoskeletal:      Cervical back: Neck supple.      Right lower leg: Edema present.      Left lower leg: Edema present.      Comments: Claw hand deformity bilaterally, numbness from Rt hand/wrist    Skin:     General: Skin is warm.      Capillary Refill: Capillary refill takes 2 to 3 seconds.      Coloration: Skin is pale.   Neurological:      Mental Status: She is alert. She is disoriented.         Fluids    Intake/Output Summary (Last 24 hours) at 7/23/2024 1329  Last data filed at 7/23/2024 0900  Gross per 24 hour   Intake 200 ml   Output 400 ml   Net -200 ml       Laboratory  Recent Labs     07/21/24  0210 07/22/24  0117 07/23/24  0157   WBC 9.4 7.0 6.1   RBC 4.50 4.17* 4.27   HEMOGLOBIN 12.0 11.4* 11.2*   HEMATOCRIT 38.2 36.4* 37.6   MCV 84.9 87.3 88.1   MCH 26.7* 27.3 26.2*   MCHC 31.4* 31.3* 29.8*   RDW 51.0* 51.8* 52.8*   PLATELETCT 429 403 419   MPV 11.1 11.2 11.3     Recent Labs     07/21/24  0210 07/22/24  0117 07/23/24  0157   SODIUM 139 139 140   POTASSIUM 3.1* 3.7 4.2   CHLORIDE 98 101 102   CO2 30 26 27   GLUCOSE 121* 94 95   BUN 16 16 15   CREATININE 0.59 0.41* 0.48*   CALCIUM 8.5 8.6 8.6     Recent Labs     07/21/24  0210   INR 1.84*               Imaging  DX-CHEST-PORTABLE (1 VIEW)   Final Result         1.  Pulmonary infiltrates, similar to prior study.   2.  Small right pleural effusion, slightly  increased since prior study   3.  Cardiomegaly      DX-CHEST-PORTABLE (1 VIEW)   Final Result         1.  Pulmonary infiltrates, similar to prior study.   2.  Trace right pneumothorax, similar to prior study.   3.  Small right pleural effusion   4.  Cardiomegaly      DX-CHEST-PORTABLE (1 VIEW)   Final Result      1.  There is a trace of residual right apical pneumothorax decreased from prior study.   2.  No significant change of the right perihilar and lower lobe parenchymal opacity with small amount of adjacent pleural fluid.      DX-CHEST-PORTABLE (1 VIEW)   Final Result      Small right-sided pneumothorax. The patient is currently asymptomatic and a repeat x-ray will be obtained in one hour.         US-THORACENTESIS PUNCTURE RIGHT   Final Result      1. Ultrasound guided right sided therapeutic thoracentesis.      2. less than 5 mL of fluid withdrawn.      EC-ECHOCARDIOGRAM LTD W/O CONT   Final Result      DX-CHEST-PORTABLE (1 VIEW)   Final Result      Possible mild increase in right-sided opacity, pleural effusion with associated atelectasis and/or consolidation.           Assessment/Plan  * Acute on chronic respiratory failure (HCC)- (present on admission)  Assessment & Plan  Patient is acute respiratory failure with hypoxia which is most to her worsening pleural effusion on the right side.  Patient will need continued oxygen support to keep oxygen saturations above 90%  RT protocol   Thoracentesis ordered   Continue AC-hx of PE  Contiue lasix   May need CT if no improvement   Unclear reason for recurrent effusion, pathology neg for malignancy or infection on previous admit     Pneumothorax of right lung after biopsy  Assessment & Plan  Small pneumo post thoracentesis  A chest x-ray today 722 again shows small residual right-sided pneumothorax unchanged.  Will repeat imaging tomorrow if stable or improved likely will be clear for discharge  Hemodynamically stable and asymptomatic    Leukocytosis- (present on  admission)  Assessment & Plan  Patient's white blood cell count at this point is elevated most likely secondary to unknown source of infection  Cont. Unasyn, stop  IV vancomycin as MRSA nares is negative. Did reently complete abx course, will consider stopping and monitoring if she is more stable   We will monitor culture results  Thoracentesis attempted, post procedure pneumothorax     Thrombocytosis- (present on admission)  Assessment & Plan  Etiology unclear, likely reactive   Monitor   Improving     History of pulmonary embolism- (present on admission)  Assessment & Plan  Because of history of pulmonary embolism continue at this point with anticoagulation using Eliquis    Chronic diastolic heart failure (HCC)- (present on admission)  Assessment & Plan  Patient has a history of chronic diastolic heart failure    Patient is chronically on anticoagulation and thus not on aspirin.  Echo is relatively unremarkable with EF55%, no remarks on DF  Hold on further diuresis for now, will check weights as she is down from baseline   Monitor volume status     Paroxysmal atrial fibrillation (HCC)- (present on admission)  Assessment & Plan  Uncontrolled   Multiple runs of V-tach and bursts of PSVT  I have increased metoprolol from 25 to 50 BID   Amodiarone 200 mg added  Discussed with cardio  Echo ordered, reviewed and unremarkable   Telemetry reviewed has maintained sinus rhythm continue with metoprolol and amiodarone  Monitor and replace electrolytes as needed   Continue AC     V-tach (HCC)- (present on admission)  Assessment & Plan  Pt having multiple frequent runs of V--tach and PSVT, unclear etiology   - narrow complex tachyarrhythmia I ER on admission, s/p cardioversion and IV metoprolol with improvement to low 100s  Trop flat, EKG without signs of ishemia   - repeat echo with normal EF, otherwise unremarkable   - metoprolol  increased to 50 mg BID, amiodarone 200 mg ordered   - consulted and discussed with cardiology  Dr. Chaidez , neurology has since signed off  -Monitor has been reviewed she has maintained sinus rhythm heart rate is controlled  - monitor electrolytes, goal K >4, Mag >2    Polypharmacy- (present on admission)  Assessment & Plan  Patient has polypharmacy  Minimize sedative medications   Monitor for adverse side effects     Insomnia- (present on admission)  Assessment & Plan  Chronic insomnia at this point I am stopping any medications that may sedate her more as the patient is very sedated already    History of breast cancer- (present on admission)  Assessment & Plan  History of breast cancer about 6 years ago she saw Dr. Dia.  There is a potential that the cancer may be recurring and that is why she is getting pleural effusions we may need a full cancer workup  Does have a concerning lesion on her breast, may need punch biopsy     Major depressive disorder- (present on admission)  Assessment & Plan  Currently not suicidal or homicidal continue with Cymbalta and amitriptyline    SELWYN (obstructive sleep apnea)- (present on admission)  Assessment & Plan  Currently not using CPAP at night    Risk for falls- (present on admission)  Assessment & Plan  Patient was just discharged  7/15 and after physical therapy and Occupational Therapy evaluation they recommended rehab and the patient's  refused now he is okay with it.  Therapy consulted, will need placement   Fall precautions     Dyslipidemia- (present on admission)  Assessment & Plan  Low-fat low-cholesterol diet  Statin  Fasting lipid panel    GERD (gastroesophageal reflux disease)- (present on admission)  Assessment & Plan  PPI therapy with omeprazole         VTE prophylaxis: eliquis     I have performed a physical exam and reviewed and updated ROS and Plan today (7/23/2024). In review of yesterday's note (7/22/2024), there are no changes except as documented above.

## 2024-07-23 NOTE — THERAPY
"Physical Therapy   Initial Evaluation     Patient Name: Tereza Sánchez  Age:  78 y.o., Sex:  female  Medical Record #: [id number]  Today's Date: 7/23/2024     Precautions  Precautions: (P) Fall Risk    Assessment    78 y.o. female readmitted to the hospital for a fib with RVR and respiratory failure with AMS a day after being discharged.  She was previously seen for PE and pleural effusion requiring thoracentesis.  PMHx includes asthma, cancer, Chiari malformation, LBP, HTN, scoliosis, psych history, sleep apnea, TSA, DMITRY with revision, and HFpEF.   She lives with  in a 1SH with 3 ELE and was MI for mobility with a rollator in the home.  She presents with weakness, poor activity tolerance, and decreased balance impairing her mobility.  She will benefit from continued acute PT services to address the above deficits in order to return home safely.  Recommend post acute PT services.    Plan    Physical Therapy Initial Treatment Plan   Treatment Plan : (P) Bed Mobility, Equipment, Family / Caregiver Training, Gait Training, Manual Therapy, Neuro Re-Education / Balance, Self Care / Home Evaluation, Stair Training, Therapeutic Activities, Therapeutic Exercise  Treatment Frequency: (P) 4 Times per Week  Duration: (P) Until Therapy Goals Met    DC Equipment Recommendations: (P) Unable to determine at this time  Discharge Recommendations: (P) Recommend post-acute placement for additional physical therapy services prior to discharge home       Subjective    \"Am I going to stay there (rehab) forever?\"     Objective       07/23/24 1333   Initial Contact Note    Initial Contact Note Order Received and Verified, Physical Therapy Evaluation in Progress with Full Report to Follow.   Precautions   Precautions Fall Risk   Vitals   Pulse 91  (rest, 111 /p mobility)   Pulse Oximetry 94 %  (rest, 92 /p mobility)   O2 (LPM) 2   O2 Delivery Device Silicone Nasal Cannula   Pain 0 - 10 Group   Therapist Pain Assessment During " Activity  (knees, no rated)   Prior Living Situation   Housing / Facility 1 Story House   Steps Into Home 3   Rail None   Bathroom Set up Walk In Shower;Shower Chair;Grab Bars   Equipment Owned 4-Wheel Walker;Wheelchair;Adjustable Bed Without Rails   Lives with - Patient's Self Care Capacity Spouse   Prior Level of Functional Mobility   Bed Mobility   (sleeps in a recliner)   Transfer Status Independent   Ambulation Independent   Ambulation Distance household   Wheelchair Required Assist  (community)   Comments doesn't drive   History of Falls   History of Falls Yes  (Pt reports 2 falls x1 year)   Date of Last Fall   (reason for admission)   Active ROM Lower Body    Active ROM Lower Body  WDL   Strength Lower Body   Lower Body Strength  X   Gross Strength Generalized Weakness, Equal Bilaterally   Comments grossly 3/5   Sensation Lower Body   Lower Extremity Sensation   WDL   Balance Assessment   Sitting Balance (Static) Fair -   Sitting Balance (Dynamic) Fair -   Standing Balance (Static) Trace   Standing Balance (Dynamic) Trace   Weight Shift Sitting Poor   Weight Shift Standing Poor   Bed Mobility    Supine to Sit Contact Guard Assist   Sit to Supine Moderate Assist  (complaints of dizziness with change of position)   Scooting Minimal Assist   Rolling Minimal Assist to Rt.   Comments HOB elevated, rail   Gait Analysis   Gait Level Of Assist Maximal Assist   Assistive Device Front Wheel Walker   Distance (Feet)   (3 steps)   # of Times Distance was Traveled 1   Deviation Shuffled Gait  (increased lateral lean with single limb support)   Weight Bearing Status FWB BLEs   Functional Mobility   Sit to Stand Moderate Assist   Bed, Chair, Wheelchair Transfer Maximal Assist   Transfer Method Stand Step   Mobility with FWW   Comments complaints of vertigo sit > stand   Activity Tolerance   Sitting Edge of Bed 3 min   Edema / Skin Assessment   Edema / Skin  Not Assessed   Short Term Goals    Short Term Goal # 1 Pt will  perform sit < >stand CGA from chair with arm rests in 6 visits in order to prepare for ambulation   Short Term Goal # 2 Pt will ambulate 10' Gloria with FWW in 6 visits in order to progress household ambulation   Education Group   Education Provided Role of Physical Therapist;Gait Training;Transfer Status   Role of Physical Therapist Patient Response Patient;Acceptance;Explanation;Action Demonstration   Gait Training Patient Response Patient;Acceptance;Explanation;Action Demonstration;Reinforcement Needed   Transfer Status Patient Response Patient;Acceptance;Explanation;Action Demonstration;Reinforcement Needed   Additional Comments PT process at post acute with pt and  advised to think about and be realistic about what and how much the pt needs to be able to do for herself and how mush the  can assist her to set appropriate goals for therapy to discharge home- information acknowledged   Physical Therapy Initial Treatment Plan    Treatment Plan  Bed Mobility;Equipment;Family / Caregiver Training;Gait Training;Manual Therapy;Neuro Re-Education / Balance;Self Care / Home Evaluation;Stair Training;Therapeutic Activities;Therapeutic Exercise   Treatment Frequency 4 Times per Week   Duration Until Therapy Goals Met   Problem List    Problems Pain;Impaired Bed Mobility;Impaired Transfers;Impaired Ambulation;Functional Strength Deficit;Decreased Activity Tolerance   Anticipated Discharge Equipment and Recommendations   DC Equipment Recommendations Unable to determine at this time   Discharge Recommendations Recommend post-acute placement for additional physical therapy services prior to discharge home   Interdisciplinary Plan of Care Collaboration   IDT Collaboration with  Nursing   Patient Position at End of Therapy In Bed;Call Light within Reach;Tray Table within Reach;Bed Alarm On   Collaboration Comments PT recommendations   Session Information   Date / Session Number  7/23 -1 (1/4, 7/29)

## 2024-07-23 NOTE — CARE PLAN
Problem: Knowledge Deficit - Standard  Goal: Patient and family/care givers will demonstrate understanding of plan of care, disease process/condition, diagnostic tests and medications  Outcome: Progressing     Problem: Skin Integrity  Goal: Skin integrity is maintained or improved  Outcome: Progressing     Problem: Fall Risk  Goal: Patient will remain free from falls  Outcome: Progressing     Problem: Communication  Goal: The ability to communicate needs accurately and effectively will improve  Outcome: Progressing     Problem: Hemodynamics  Goal: Patient's hemodynamics, fluid balance and neurologic status will be stable or improve  Outcome: Progressing     Problem: Respiratory  Goal: Patient will achieve/maintain optimum respiratory ventilation and gas exchange  Outcome: Progressing     Problem: Risk for Aspiration  Goal: Patient's risk for aspiration will be absent or decrease  Outcome: Progressing     Problem: Pain - Standard  Goal: Alleviation of pain or a reduction in pain to the patient’s comfort goal  Outcome: Progressing   The patient is Watcher - Medium risk of patient condition declining or worsening    Shift Goals  Clinical Goals: VSS, safety, rest  Patient Goals: Rest, D/C  Family Goals: Updates    Progress made toward(s) clinical / shift goals:  VSS, safety, rest    Patient is not progressing towards the following goals:

## 2024-07-23 NOTE — PROGRESS NOTES
Monitor summary:        Rhythm: SR   Rate: 82-98  Ectopy: 0  Measurements: .17/.08/.28      12hr chart check

## 2024-07-23 NOTE — DISCHARGE PLANNING
"HTH/SCP TCN chart review completed. Current discharge considerations is anticipated for dc to SNF/post acute placement v hospice measures (see prior TCN note from 7/22 for details with current dc planning considerations).     Note per MD note from 4:55PM on 7/22 as well \"Palliative did visit with the patient...they will revisit patient tomorrow (7/23) to determine plan of care whether pursuing nursing facility versus possibly home with hospice\" and that \"if her chest x-ray remained stable and she has no other acute issues anticipate she will be cleared to discharge to SNF if home hospice is not pursued\". TCN had discussed blanket SNF referral policy and note that CM sent blanket referrals and obtained initial SNF choices from accepting facilities (see below). TCN has reached out to [facility] team for SNF authorization at this time as well.     TCN will continue to follow and collaborate with discharge planning team as additional post acute needs arise. Thank you.    Completed:  Pt may need PT and OT consults* pending outcomes of final palliative/GOC conversation (see above)  Choice obtained: none; see above  Pt aware of [facility]'s blanket referral policy and has selected 1) [facility] accepted and 2) [facility] -> accepted    *noted in PM that PT and OT consults are now in place and per review anticipated plan is for dc to SNF tomorrow pending final medical clearance, etc    *update: note PT and OT recs for post acute placement and TCN has obtained auth for dc to SNF given updated recs as anticipated possible dc to [facility] tomorrow  "

## 2024-07-23 NOTE — PROGRESS NOTES
Inpatient Palliative Medicine Progress Note     Tereza Sánchez  78 y.o. female  [id number]    Location = [facility]  Referral Source = Elisa Park M.d.    PCP = Michelle Jim P.A.-C.    Reason for palliative medicine consultation and/or visit: ACP       Assessment and Plan:     GOAL(S) OF CARE = remains LONGEVITY at this time ==> More time with Anthony    SYMPTOMS ETIOLOGY/CAUSES = generalized weakness and dyspnea secondary to recent saddle PE, superimposed to SELWYN and chronic respiratory failure, in a complicated recovery course    PROGNOSIS =   - [X] hospice-eligible = technically meeting both cardiac disease and pulmonary disease hospice admission criteria  - NOT yet hospice-appropriate = pending more discussion among pt &  & son. Will follow.    CODE STATUS = remains FULL at this time, as is consistent with MAY 2024 POLST on file  7/23/2024 Attempted to discuss and re-clarify. Patient unable to decide and wanted more time to consider.      ADVANCE CARE PLANNING =   Relevant history reviewed  Medical updates  Hospice philosophy introduction    PALLIATIVE CARE TEAM INTERVENTIONS =   ACP  Brought patient and  up to date medically, made aware of patient's 3 months of decline due to the difficult post-COVID recovery complicated by PE and recurrent crisis/admissions. They appeared understanding.    Laid out disposition options with patient:  1 last acute rehab attempt to see if she can benefit, time-limited trial to avoid repeat failures  Home with HH again, but at high risk of failure like last admission without improvement.  Home with Hospice, for more comfort and time with loved ones, while arriving at same outcome.    Introduced hospice philosophy and general provisions.    7/23/2024 Attempted to follow up on disposition and code status: patient alone by herself and unable to make decisions at this time.          Summary =   Tereza Sánchez is a 78 y.o. female with chronic respiratory failure on  "2-3LPM, SELWYN, afib, recent COVID-19 infection in APR 2024, recent saddle PE in May 2024 who unfortunately was readmitted shortly the day after discharge from last admission 7/12-18/2024, as patient declined acute rehab placement this time.      ---------------------------------------------------------------------------------------------------------------  7/23/2024  Met with patient bedside to attempt following up our prior discussions about DISPOSITION (rehab vs hospice vs home (which we don't recommend at all)).     She felt better this morning and was only requiring 2LPM of O2 through nasal cannula. Seemed somewhat more awake though still bed-bound and not ambulatory.    Attempted to address disposition. Patient stated she and her  Anthony were NOT able to discuss much at all last time, and she herself did not have an answer for me at this time.     Attempted to address codes status, given her worsening health last 3 months without improvement, and now the much reduced anticipated benefits from ventilation & CPR in her weakened state. Patient seemed to understand and follow the logics of my DNAR DNI recommendation, but again ultimately requested more time to consider before discussing with family and finalizing.    As such, at this time, there remains no definitive decision on patient's part for either disposition nor code status.        7/22/2024  IM PGY2 Dr. Rich Hatfield and I myself met with patient and her  Anthony bedside. Introduced our role and reasons for consultation as inpatient palliative care team, and they were receptive to this interview.    We reviewed patient's recent history, and it was clear to both patient and Anthony that patient's health had continuously declined since her recent COVID-19 infection in APR 2024, despite all interventions so far. She use to be able to ambulate short distances at home, but now could barely stood without \"her legs giving out.\" Patient had also been eating " much less, about 1/3 of her normal intake. Overall, patient and Anthony both agreed that while today was a better day since readmission, she is still quite a way off prior functional baseline.    We discussed the roads ahead and options, as listed above: another trial at acute rehab, high risk home discharge again, or hospice care, as likely the outcome would not change either way.    Reviewed general hospice philosophy and provisions with patient and Anthony. Explained to them that, as patient made it clear that her priority is to have more time with Anthony, hospice would be a perfectly reasonable option in a situation when patient's health can no longer improve.    Patient herself seemed more receptive (if not somewhat resigned) to the idea of hospice, while Anthony would like to explore 1 more rehab attempt at this time....    Assured family that any of these presented options would be reasonable, as long as we understand what we are up against, and do not perpetuate a negative cycle where patient continues to be asked to perform these arduous attempts while she declines and reaps no benefits.... not good for pt's sake.  Family expressed understanding of our explanation.    Stressed the importance of time-limited nature (1 more admission, 1 more crisis, 1 more failure, etc.), if that's what family decide to do.    Stressed the importance to avoid suffering and repeated non-beneficial interventions.    Family understood. Encouraged them to further talk about things today, and with their son Ren.    Tereza and Anthony were agreeable to us following up on this discussion again tomorrow, to better figure out the plan ahead.           Medical Decision Making =    Patient is of high medical complexity with incurable disease = acute on chronic respiratory failure  Care complexity further complicated by secondary conditions = saddle PE, recent COVID19 infection, HFpEF, SELWYN, afib, advance age, limited sociofamlial  support    Extensive data reviewed & coordination performed = labs, imaging, specialty inputs, MAR records, family meeting    High risk of morbidity from additional interventions &  studies = advance age, low function, refractory symptoms despite interventions, high risk medication use        Advance care planning  =   The patient and/or legal decision maker has provided voluntary consent to discuss advance care planning. We discussed code status.  FULL    Thank you for allowing me the opportunity to participate in the care of Tereza Sánchez     I spent a total of 50 minutes reviewing medical records, direct face-to-face time with the patient and/or family, documentation and coordination of care. This is separate from the time spent on advance care planning, which is documented above.         NAZ (DO [facility]   [id number] [place]  [id number]  P: [phone]  F: [phone]

## 2024-07-24 ENCOUNTER — APPOINTMENT (OUTPATIENT)
Dept: RADIOLOGY | Facility: MEDICAL CENTER | Age: 79
DRG: 291 | End: 2024-07-24
Attending: INTERNAL MEDICINE
Payer: MEDICARE

## 2024-07-24 LAB
ANION GAP SERPL CALC-SCNC: 8 MMOL/L (ref 7–16)
BACTERIA BLD CULT: NORMAL
BACTERIA BLD CULT: NORMAL
BUN SERPL-MCNC: 14 MG/DL (ref 8–22)
CALCIUM SERPL-MCNC: 8.6 MG/DL (ref 8.5–10.5)
CHLORIDE SERPL-SCNC: 102 MMOL/L (ref 96–112)
CO2 SERPL-SCNC: 28 MMOL/L (ref 20–33)
CREAT SERPL-MCNC: 0.45 MG/DL (ref 0.5–1.4)
ERYTHROCYTE [DISTWIDTH] IN BLOOD BY AUTOMATED COUNT: 52.4 FL (ref 35.9–50)
GFR SERPLBLD CREATININE-BSD FMLA CKD-EPI: 99 ML/MIN/1.73 M 2
GLUCOSE SERPL-MCNC: 78 MG/DL (ref 65–99)
HCT VFR BLD AUTO: 34.4 % (ref 37–47)
HGB BLD-MCNC: 10.6 G/DL (ref 12–16)
MCH RBC QN AUTO: 26.9 PG (ref 27–33)
MCHC RBC AUTO-ENTMCNC: 30.8 G/DL (ref 32.2–35.5)
MCV RBC AUTO: 87.3 FL (ref 81.4–97.8)
PLATELET # BLD AUTO: 379 K/UL (ref 164–446)
PMV BLD AUTO: 11.2 FL (ref 9–12.9)
POTASSIUM SERPL-SCNC: 4.1 MMOL/L (ref 3.6–5.5)
RBC # BLD AUTO: 3.94 M/UL (ref 4.2–5.4)
SIGNIFICANT IND 70042: NORMAL
SIGNIFICANT IND 70042: NORMAL
SITE SITE: NORMAL
SITE SITE: NORMAL
SODIUM SERPL-SCNC: 138 MMOL/L (ref 135–145)
SOURCE SOURCE: NORMAL
SOURCE SOURCE: NORMAL
WBC # BLD AUTO: 4.4 K/UL (ref 4.8–10.8)

## 2024-07-24 PROCEDURE — 700102 HCHG RX REV CODE 250 W/ 637 OVERRIDE(OP): Performed by: STUDENT IN AN ORGANIZED HEALTH CARE EDUCATION/TRAINING PROGRAM

## 2024-07-24 PROCEDURE — 80048 BASIC METABOLIC PNL TOTAL CA: CPT

## 2024-07-24 PROCEDURE — 99232 SBSQ HOSP IP/OBS MODERATE 35: CPT | Performed by: INTERNAL MEDICINE

## 2024-07-24 PROCEDURE — A9270 NON-COVERED ITEM OR SERVICE: HCPCS | Performed by: STUDENT IN AN ORGANIZED HEALTH CARE EDUCATION/TRAINING PROGRAM

## 2024-07-24 PROCEDURE — 700111 HCHG RX REV CODE 636 W/ 250 OVERRIDE (IP): Mod: JZ | Performed by: HOSPITALIST

## 2024-07-24 PROCEDURE — A9270 NON-COVERED ITEM OR SERVICE: HCPCS | Mod: JZ | Performed by: STUDENT IN AN ORGANIZED HEALTH CARE EDUCATION/TRAINING PROGRAM

## 2024-07-24 PROCEDURE — 700102 HCHG RX REV CODE 250 W/ 637 OVERRIDE(OP): Performed by: HOSPITALIST

## 2024-07-24 PROCEDURE — 700102 HCHG RX REV CODE 250 W/ 637 OVERRIDE(OP): Mod: JZ | Performed by: STUDENT IN AN ORGANIZED HEALTH CARE EDUCATION/TRAINING PROGRAM

## 2024-07-24 PROCEDURE — A9270 NON-COVERED ITEM OR SERVICE: HCPCS | Performed by: HOSPITALIST

## 2024-07-24 PROCEDURE — 97602 WOUND(S) CARE NON-SELECTIVE: CPT

## 2024-07-24 PROCEDURE — 700105 HCHG RX REV CODE 258: Performed by: HOSPITALIST

## 2024-07-24 PROCEDURE — A9270 NON-COVERED ITEM OR SERVICE: HCPCS | Performed by: INTERNAL MEDICINE

## 2024-07-24 PROCEDURE — 770020 HCHG ROOM/CARE - TELE (206)

## 2024-07-24 PROCEDURE — 36415 COLL VENOUS BLD VENIPUNCTURE: CPT

## 2024-07-24 PROCEDURE — 85027 COMPLETE CBC AUTOMATED: CPT

## 2024-07-24 PROCEDURE — 71045 X-RAY EXAM CHEST 1 VIEW: CPT

## 2024-07-24 PROCEDURE — 700102 HCHG RX REV CODE 250 W/ 637 OVERRIDE(OP): Performed by: INTERNAL MEDICINE

## 2024-07-24 PROCEDURE — 700105 HCHG RX REV CODE 258: Performed by: INTERNAL MEDICINE

## 2024-07-24 RX ORDER — SODIUM CHLORIDE 9 MG/ML
INJECTION, SOLUTION INTRAVENOUS ONCE
Status: COMPLETED | OUTPATIENT
Start: 2024-07-24 | End: 2024-07-24

## 2024-07-24 RX ADMIN — AMIODARONE HYDROCHLORIDE 200 MG: 200 TABLET ORAL at 04:55

## 2024-07-24 RX ADMIN — SENNOSIDES AND DOCUSATE SODIUM 2 TABLET: 50; 8.6 TABLET ORAL at 17:37

## 2024-07-24 RX ADMIN — APIXABAN 5 MG: 5 TABLET, FILM COATED ORAL at 17:36

## 2024-07-24 RX ADMIN — POTASSIUM CHLORIDE 40 MEQ: 1500 TABLET, EXTENDED RELEASE ORAL at 04:54

## 2024-07-24 RX ADMIN — METOPROLOL TARTRATE 50 MG: 50 TABLET, FILM COATED ORAL at 17:36

## 2024-07-24 RX ADMIN — TAMSULOSIN HYDROCHLORIDE 0.4 MG: 0.4 CAPSULE ORAL at 19:44

## 2024-07-24 RX ADMIN — SODIUM CHLORIDE: 9 INJECTION, SOLUTION INTRAVENOUS at 07:59

## 2024-07-24 RX ADMIN — PREGABALIN 150 MG: 150 CAPSULE ORAL at 17:36

## 2024-07-24 RX ADMIN — ATORVASTATIN CALCIUM 10 MG: 10 TABLET, FILM COATED ORAL at 19:44

## 2024-07-24 RX ADMIN — AMITRIPTYLINE HYDROCHLORIDE 100 MG: 50 TABLET, FILM COATED ORAL at 19:44

## 2024-07-24 RX ADMIN — AMOXICILLIN AND CLAVULANATE POTASSIUM 1 TABLET: 875; 125 TABLET, FILM COATED ORAL at 19:44

## 2024-07-24 RX ADMIN — DULOXETINE HYDROCHLORIDE 30 MG: 30 CAPSULE, DELAYED RELEASE ORAL at 17:36

## 2024-07-24 RX ADMIN — AMPICILLIN SODIUM, SULBACTAM SODIUM 3 G: 2; 1 INJECTION, POWDER, FOR SOLUTION INTRAMUSCULAR; INTRAVENOUS at 03:24

## 2024-07-24 RX ADMIN — AMOXICILLIN AND CLAVULANATE POTASSIUM 1 TABLET: 875; 125 TABLET, FILM COATED ORAL at 11:51

## 2024-07-24 RX ADMIN — ACETAMINOPHEN 1000 MG: 500 TABLET ORAL at 04:55

## 2024-07-24 RX ADMIN — ACETAMINOPHEN 1000 MG: 500 TABLET ORAL at 17:36

## 2024-07-24 RX ADMIN — PREGABALIN 150 MG: 150 CAPSULE ORAL at 04:55

## 2024-07-24 RX ADMIN — APIXABAN 5 MG: 5 TABLET, FILM COATED ORAL at 04:55

## 2024-07-24 ASSESSMENT — ENCOUNTER SYMPTOMS
FEVER: 0
NAUSEA: 0
MEMORY LOSS: 1
SHORTNESS OF BREATH: 0
DIZZINESS: 0
ABDOMINAL PAIN: 0
VOMITING: 0
ORTHOPNEA: 0
COUGH: 0
WEAKNESS: 1
PALPITATIONS: 0
SENSORY CHANGE: 1
TINGLING: 1

## 2024-07-24 ASSESSMENT — PAIN DESCRIPTION - PAIN TYPE
TYPE: ACUTE PAIN
TYPE: ACUTE PAIN

## 2024-07-24 ASSESSMENT — FIBROSIS 4 INDEX: FIB4 SCORE: 1.88

## 2024-07-24 NOTE — PROGRESS NOTES
Hospital Medicine Daily Progress Note    Date of Service  7/24/2024    Chief Complaint  Tereza Sánchez is a 78 y.o. female admitted 7/19/2024 with shortness of breath and lethargy     Hospital Course  Tereza Sánchez is a 78 y.o. female with a past medical history of history of Chiari malformation status post ventriculoperitoneal shunt, sleep apnea not on CPAP, chronic respiratory failure requiring 3 LPM, hypertension, hypercholesterolemia, hypothyroidism, and hx of breast cancer, recent IMCU admission in May for DVT and saddle PE treated with thrombectomy complicated by retroperitoneal hematoma, she was discharged to SNF at that time. She returned for SOB early this week and was discharged on 7/15 after being treated for pleural effusion and pneumonia, post acute care was recommended however pt declined and thus was discharge home with spouse.    At home the patient's condition declined to the point where now she is altered with her mental status is much worse than her respiratory status.    On admission she was tachcyardic with 's susepcted A.flutter vs SVT, she was cardioverted without resolution but improved after IV metoprolol with . Labs with WBC 19, plt 606, Hgb 6. Xray showed increased rt pleural effusion. She was started on IV antibiotics and admitted for further evaluation.     Interval Problem Update  Pt seen at bedside, no acute issues overnight, feeling slightly improved today.   - vitals stable, remains on 3L  - chest xray with residual small Rt pneumothorax, no symptoms, will repeat chest x-ray in a.m. or if there is any acute change in her clinical status  -Her heart rate is better controlled, telemetry reviewed and she is maintained sinus rhythm continue metoprolol and amiodarone  -Labs are stable electrolyte abnormalities have improved  -Palliative did visit with the patient and I greatly appreciate their discussion with her and her family regarding her goals of care.  Sounds like  they will revisit patient tomorrow to determine plan of care whether pursuing nursing facility versus possibly home with hospice  If her chest x-ray remained stable and she has no other acute issues anticipate she will be cleared to discharge to SNF if home hospice is not pursued  7/23: patient seen and examined, resting in bed, afebrile, CXR done today reviewed right  pleural effusion,    at bedside updated on point of care and questions answered.  Palliative to see patient again today   7/24: Patient seen and examine,d hypotensive this AM so gave a 250 cc bolus   Repeating also a CXR   Close monitor on tele for decompensation   I have discussed this patient's plan of care and discharge plan at IDT rounds today with Case Management, Nursing, Nursing leadership, and other members of the IDT team.    Consultants/Specialty  NA    Code Status  Full Code    Disposition  The patient is not medically cleared for discharge to home or a post-acute facility.      I have placed the appropriate orders for post-discharge needs.    Review of Systems  Review of Systems   Constitutional:  Positive for malaise/fatigue. Negative for fever.   HENT:  Negative for congestion.    Eyes:         Chronic blindness   Respiratory:  Negative for cough and shortness of breath.    Cardiovascular:  Positive for leg swelling. Negative for chest pain, palpitations and orthopnea.   Gastrointestinal:  Negative for abdominal pain, nausea and vomiting.   Genitourinary:  Negative for dysuria.   Musculoskeletal:  Positive for joint pain.   Neurological:  Positive for tingling, sensory change and weakness. Negative for dizziness.   Psychiatric/Behavioral:  Positive for memory loss.         Physical Exam  Temp:  [36 °C (96.8 °F)-36.7 °C (98 °F)] 36.1 °C (97 °F)  Pulse:  [74-96] 82  Resp:  [16-18] 18  BP: ()/(50-75) 116/65  SpO2:  [92 %-97 %] 96 %    Physical Exam  Vitals and nursing note reviewed.   Constitutional:       Appearance: She is  obese. She is ill-appearing. She is not toxic-appearing.      Comments: Elderly ill appearing female, appears more alert and improved complexion today    HENT:      Head: Normocephalic.      Mouth/Throat:      Mouth: Mucous membranes are moist.   Eyes:      General: No scleral icterus.     Comments: Chronic blindness   Cardiovascular:      Rate and Rhythm: Normal rate and regular rhythm.      Heart sounds: Normal heart sounds.   Pulmonary:      Effort: Respiratory distress present.      Comments: Diminished breath sounds, poor inspiratory effort with shallow breathing, increasing WOB  Abdominal:      General: Bowel sounds are normal. There is no distension.      Palpations: Abdomen is soft.      Tenderness: There is no abdominal tenderness.   Musculoskeletal:      Cervical back: Neck supple.      Right lower leg: Edema present.      Left lower leg: Edema present.      Comments: Claw hand deformity bilaterally, numbness from Rt hand/wrist    Skin:     General: Skin is warm.      Capillary Refill: Capillary refill takes 2 to 3 seconds.      Coloration: Skin is pale.   Neurological:      Mental Status: She is alert. She is disoriented.         Fluids    Intake/Output Summary (Last 24 hours) at 7/24/2024 1426  Last data filed at 7/24/2024 0807  Gross per 24 hour   Intake 120 ml   Output --   Net 120 ml       Laboratory  Recent Labs     07/22/24  0117 07/23/24  0157 07/24/24  0156   WBC 7.0 6.1 4.4*   RBC 4.17* 4.27 3.94*   HEMOGLOBIN 11.4* 11.2* 10.6*   HEMATOCRIT 36.4* 37.6 34.4*   MCV 87.3 88.1 87.3   MCH 27.3 26.2* 26.9*   MCHC 31.3* 29.8* 30.8*   RDW 51.8* 52.8* 52.4*   PLATELETCT 403 419 379   MPV 11.2 11.3 11.2     Recent Labs     07/22/24  0117 07/23/24  0157 07/24/24  0156   SODIUM 139 140 138   POTASSIUM 3.7 4.2 4.1   CHLORIDE 101 102 102   CO2 26 27 28   GLUCOSE 94 95 78   BUN 16 15 14   CREATININE 0.41* 0.48* 0.45*   CALCIUM 8.6 8.6 8.6                     Imaging  DX-CHEST-PORTABLE (1 VIEW)   Final Result       There has been no significant interval change from the prior study.      DX-CHEST-PORTABLE (1 VIEW)   Final Result         1.  Pulmonary infiltrates, similar to prior study.   2.  Small right pleural effusion, slightly increased since prior study   3.  Cardiomegaly      DX-CHEST-PORTABLE (1 VIEW)   Final Result         1.  Pulmonary infiltrates, similar to prior study.   2.  Trace right pneumothorax, similar to prior study.   3.  Small right pleural effusion   4.  Cardiomegaly      DX-CHEST-PORTABLE (1 VIEW)   Final Result      1.  There is a trace of residual right apical pneumothorax decreased from prior study.   2.  No significant change of the right perihilar and lower lobe parenchymal opacity with small amount of adjacent pleural fluid.      DX-CHEST-PORTABLE (1 VIEW)   Final Result      Small right-sided pneumothorax. The patient is currently asymptomatic and a repeat x-ray will be obtained in one hour.         US-THORACENTESIS PUNCTURE RIGHT   Final Result      1. Ultrasound guided right sided therapeutic thoracentesis.      2. less than 5 mL of fluid withdrawn.      EC-ECHOCARDIOGRAM LTD W/O CONT   Final Result      DX-CHEST-PORTABLE (1 VIEW)   Final Result      Possible mild increase in right-sided opacity, pleural effusion with associated atelectasis and/or consolidation.           Assessment/Plan  * Acute on chronic respiratory failure (HCC)- (present on admission)  Assessment & Plan  Patient is acute respiratory failure with hypoxia which is most to her worsening pleural effusion on the right side.  Patient will need continued oxygen support to keep oxygen saturations above 90%  RT protocol   Thoracentesis ordered   Continue AC-hx of PE  Contiue lasix   May need CT if no improvement   Unclear reason for recurrent effusion, pathology neg for malignancy or infection on previous admit     Pneumothorax of right lung after biopsy  Assessment & Plan  Small pneumo post thoracentesis  A chest x-ray today 658  again shows small residual right-sided pneumothorax unchanged.  Will repeat imaging tomorrow if stable or improved likely will be clear for discharge  Hemodynamically stable and asymptomatic    Leukocytosis- (present on admission)  Assessment & Plan  Patient's white blood cell count at this point is elevated most likely secondary to unknown source of infection  Cont. Unasyn, stop  IV vancomycin as MRSA nares is negative. Did reently complete abx course, will consider stopping and monitoring if she is more stable   We will monitor culture results  Thoracentesis attempted, post procedure pneumothorax     Thrombocytosis- (present on admission)  Assessment & Plan  Etiology unclear, likely reactive   Monitor   Improving     History of pulmonary embolism- (present on admission)  Assessment & Plan  Because of history of pulmonary embolism continue at this point with anticoagulation using Eliquis    Chronic diastolic heart failure (HCC)- (present on admission)  Assessment & Plan  Patient has a history of chronic diastolic heart failure    Patient is chronically on anticoagulation and thus not on aspirin.  Echo is relatively unremarkable with EF55%, no remarks on DF  Hold on further diuresis for now, will check weights as she is down from baseline   Monitor volume status     Paroxysmal atrial fibrillation (HCC)- (present on admission)  Assessment & Plan  Uncontrolled   Multiple runs of V-tach and bursts of PSVT  I have increased metoprolol from 25 to 50 BID   Amodiarone 200 mg added  Discussed with cardio  Echo ordered, reviewed and unremarkable   Telemetry reviewed has maintained sinus rhythm continue with metoprolol and amiodarone  Monitor and replace electrolytes as needed   Continue AC     V-tach (HCC)- (present on admission)  Assessment & Plan  Pt having multiple frequent runs of V--tach and PSVT, unclear etiology   - narrow complex tachyarrhythmia I ER on admission, s/p cardioversion and IV metoprolol with improvement  to low 100s  Trop flat, EKG without signs of ishemia   - repeat echo with normal EF, otherwise unremarkable   - metoprolol  increased to 50 mg BID, amiodarone 200 mg ordered   - consulted and discussed with cardiology Dr. Chaidez , neurology has since signed off  -Monitor has been reviewed she has maintained sinus rhythm heart rate is controlled  - monitor electrolytes, goal K >4, Mag >2    Polypharmacy- (present on admission)  Assessment & Plan  Patient has polypharmacy  Minimize sedative medications   Monitor for adverse side effects     Insomnia- (present on admission)  Assessment & Plan  Chronic insomnia at this point I am stopping any medications that may sedate her more as the patient is very sedated already    History of breast cancer- (present on admission)  Assessment & Plan  History of breast cancer about 6 years ago she saw Dr. Dia.  There is a potential that the cancer may be recurring and that is why she is getting pleural effusions we may need a full cancer workup  Does have a concerning lesion on her breast, may need punch biopsy     Major depressive disorder- (present on admission)  Assessment & Plan  Currently not suicidal or homicidal continue with Cymbalta and amitriptyline    SELWYN (obstructive sleep apnea)- (present on admission)  Assessment & Plan  Currently not using CPAP at night    Risk for falls- (present on admission)  Assessment & Plan  Patient was just discharged  7/15 and after physical therapy and Occupational Therapy evaluation they recommended rehab and the patient's  refused now he is okay with it.  Therapy consulted, will need placement   Fall precautions     Dyslipidemia- (present on admission)  Assessment & Plan  Low-fat low-cholesterol diet  Statin  Fasting lipid panel    GERD (gastroesophageal reflux disease)- (present on admission)  Assessment & Plan  PPI therapy with omeprazole         VTE prophylaxis: eliquis     I have performed a physical exam and reviewed and  updated ROS and Plan today (7/24/2024). In review of yesterday's note (7/23/2024), there are no changes except as documented above.

## 2024-07-24 NOTE — PROGRESS NOTES
Monitor summary:        Rhythm: SR   Rate: 75-85  Ectopy: 0  Measurements: .15/.09/.35        12hr chart check

## 2024-07-24 NOTE — WOUND TEAM
"[facility]  Inpatient Services  Wound and Skin Care Follow-up    Admission Date: 7/19/2024     Last order of IP CONSULT TO WOUND CARE was found on 7/23/2024 from Hospital Encounter on 7/19/2024     HPI, PMH, SH: Reviewed    Past Surgical History:   Procedure Laterality Date    PB RECONSTR TOTAL SHOULDER IMPLANT Left 5/4/2022    Procedure: LEFT REVERSE TOTAL SHOULDER ARTHROPLASTY;  Surgeon: Vinicius Whaley M.D.;  Location: SURGERY SAME DAY [facility];  Service: Orthopedics    HIP REVISION TOTAL Left 9/2/2021    Procedure: REVISION, TOTAL ARTHROPLASTY, HIP;  Surgeon: Daniel Allison M.D.;  Location: SURGERY [facility];  Service: Orthopedics    THYROID LOBECTOMY Left 8/17/2018    Procedure: THYROID LOBECTOMY;  Surgeon: Adelina Wilson M.D.;  Location: SURGERY SAME DAY [facility];  Service: General    THYROIDECTOMY TOTAL  8/17/2018    Procedure: NIMS RECURRENT LARYNGEAL NERVE MONITORING;  Surgeon: Adelina Wilson M.D.;  Location: SURGERY SAME DAY [facility];  Service: General    NODE BIOPSY SENTINEL  2/6/2015    Performed by Adelina Wilson M.D. at SURGERY [facility]    MASTECTOMY  2/6/2015    right-Performed by Adelina Wilson M.D. at SURGERY [facility]    HIP ARTHROPLASTY TOTAL  5/19/08    Performed by FARTUN ERAZO at SURGERY [facility]    SHOULDER ARTHROPLASTY TOTAL  2004    Right    JAN BY LAPAROSCOPY  2002    CYST EXCISION  1973    \"remove cyst    HIP ARTHROPLASTY TOTAL      HIP REPLACEMENT, TOTAL      LAMINOTOMY      OTHER ABDOMINAL SURGERY      SHUNT INSERTION      cerebral shunt    US-NEEDLE CORE BX-BREAST PANEL       Social History     Tobacco Use    Smoking status: Never    Smokeless tobacco: Never   Substance Use Topics    Alcohol use: No     Alcohol/week: 0.0 oz     Chief Complaint   Patient presents with    Shortness of Breath     X 1 day. D/C from [facility] yesterday post thoracentesis for pleural effusion. On Eliquis.  called EMS as pt was short " "of breath & slid off the couch, no trauma. Pt in a fib with RVR, rate 160s. Tachypneic, afebrile. Denies chest pain.      Diagnosis: Acute respiratory failure with hypoxemia (HCC) [J96.01]    Unit where seen by Wound Team: T729/01     WOUND FOLLOW UP RELATED TO:  Right breast    New consult received for 'white/yellow discharge from vagina'. Spoke with primary RNVero regarding consult. Consult completed. Bedside nursing to contact provider for this type of finding if no wound is present.     WOUND TEAM PLAN OF CARE - Frequency of Follow-up:   Nursing to follow dressing orders written for wound care. Contact wound team if area fails to progress, deteriorates or with any questions/concerns if something comes up before next scheduled follow up (See below as to whether wound is following and frequency of wound follow up)  Dressing changes by wound team:                   Weekly - Right breast    WOUND HISTORY:        at beside states wound has been present \"for a while the last few times she was at the hospital.\" Unable to provide further history regarding how or when the wound developed. Wound was evaluated by Wound Team on previous recent admission and has since deteriorated.       WOUND ASSESSMENT/LDA  Wound 07/13/24 Full Thickness Wound Breast Lateral Right (Active)   Date First Assessed/Time First Assessed: 07/13/24 0135   Present on Original Admission: Yes  Hand Hygiene Completed: Yes  Primary Wound Type: Full Thickness Wound  Location: Breast  Wound Orientation: Lateral  Laterality: Right      Assessments 7/24/2024 11:00 AM   Wound Image     Site Assessment Purple;Red   Periwound Assessment Clean;Dry;Intact;Blanchable erythema   Margins Defined edges   Closure Adhesive bandage   Drainage Amount Scant   Drainage Description Sanguineous   Treatments Cleansed;Nonselective debridement;Site care;Offloading   Wound Cleansing Approved Wound Cleanser   Periwound Protectant No-sting Skin Prep   Dressing Status " Removed   Dressing Changed Reinforced   Dressing Cleansing/Solutions Not Applicable   Dressing Options Petrolatum Gauze (yellow);Silicone Adhesive Foam   Dressing Change/Treatment Frequency Daily, and As Needed   NEXT Dressing Change/Treatment Date 07/25/24   NEXT Weekly Photo (Inpatient Only) 07/31/24   Wound Team Following Weekly   Non-staged Wound Description Full thickness   Wound Length (cm) 1.5 cm   Wound Width (cm) 4 cm   Wound Surface Area (cm^2) 6 cm^2   Shape irregular   Wound Odor None   WOUND NURSE ONLY - Time Spent with Patient (mins) 30        Vascular:    GUSTAVO:   No results found.    Lab Values:    Lab Results   Component Value Date/Time    WBC 4.4 (L) 07/24/2024 01:56 AM    RBC 3.94 (L) 07/24/2024 01:56 AM    HEMOGLOBIN 10.6 (L) 07/24/2024 01:56 AM    HEMATOCRIT 34.4 (L) 07/24/2024 01:56 AM    CREACTPROT <0.30 04/29/2024 06:36 AM    SEDRATEWES 22 07/15/2024 06:23 PM    HBA1C 6.0 (H) 07/17/2024 09:04 AM         Culture Results show:  No results found for this or any previous visit (from the past 720 hour(s)).    Pain Level/Medicated:  Patient denies pain       INTERVENTIONS BY WOUND TEAM:  Chart and images reviewed. Discussed with bedside RN. All areas of concern (based on picture review, LDA review and discussion with bedside RN) have been thoroughly assessed. Documentation of areas based on significant findings. This RN in to assess patient. Performed standard wound care which includes appropriate positioning, dressing removal and non-selective debridement. Pictures and measurements obtained weekly if/when required.    Wound:  RIGHT BREAST  Preparation for Dressing removal: Removed without difficulty  Cleansed/Non-selectively Debrided with:  Wound cleanser and Gauze  Berenice wound: Cleansed with Wound cleanser and Gauze, Prepped with No Sting  Primary Dressing:  xeroform  Secondary (Outer) Dressing: silicone adhesive dressing    Advanced Wound Care Discharge Planning  Number of Clinicians necessary to  complete wound care: 1  Is patient requiring IV pain medications for dressing changes:  No   Length of time for dressing change 30 min. (This does not include chart review, pre-medication time, set up, clean up or time spent charting.)    Interdisciplinary consultation: Patient, Bedside RN Supriya)    EVALUATION / RATIONALE FOR TREATMENT:     Date:  07/24/24  Wound Status:  Wound improving    Right breast wound is evolving with pink granular tissue now visible to medial edge, purple tissue appears to be janelle and decreasing in depth. Berenice-wound erythema is lighter in color and perimeter, continues to sunil. Continue with xeroform.     Date:  07/20/24  Wound Status:  Initial evaluation    Patient right breast with full thickness wound of unknown etiology. Wound has evolved irregularly since previous admission. Purple discoloration now noted along lateral edge and wound with surrounding induration. Xeroform (yellow) applied to provide an occlusive dressing that incorporates bacteriostatic protection. Patient may benefit from punch biopsy depending on how wound evolves. Will recheck wound in a few days.    Left upper arm skin tear protected with mepitel to support healing.     Patient right upper back with excoriated redness.    BL heels and sacrum also assessed during encounter and found to be intact with blanchable redness. Offloading dressings applied. Barrier paste ordered for protective layer.         Goals: Steady decrease in wound area and depth weekly.    NURSING PLAN OF CARE ORDERS:  Dressing changes: See Dressing Care orders  Skin care: See Skin Care orders    NUTRITION RECOMMENDATIONS   Wound Team Recommendations:  Protein supplements     DIET ORDERS (From admission to next 24h)       Start     Ordered    07/20/24 1132  Diet Order Diet: Regular; Miscellaneous modifications: (optional): [employer]  ALL MEALS        Question Answer Comment   Diet: Regular    Miscellaneous modifications: (optional)  [employer]         07/20/24 1132                    PREVENTATIVE INTERVENTIONS:   Q shift Logan - performed per nursing policy  Q shift pressure point assessments - performed per nursing policy         Anticipated discharge plans:  TBD        Vac Discharge Needs:  Vac Discharge plan is purely a recommendation from wound team and not a requirement for discharge unless otherwise stated by physician.  Not Applicable Pt not on a wound vac

## 2024-07-24 NOTE — DISCHARGE PLANNING
HTH/SCP TCN chart review completed. Collaborated with YAKOV Porras. Discussed current discharge considerations noting patient anticipated discharge to SNF level of care at [facility], once patient medically clear.    TCN  met with patient and  at bedside.  Provided education regarding SNF level of care and SCP plan benefit questions answered.     As SCP auth complete and in collaboration with CM, no additional acute TCN  needs anticipated in patient discharge planning at this time. TCN will continue to follow and remains available via voalte as needed in discharge planning. Thank you.    Completed:  PT/OT with recommendations for post-acute placement on 7/23  Choice obtained by YAKOV  Pt aware of [facility]'s blanket referral policy and has selected 1) [facility] -> accepted and 2) [facility] -> accepted. Per review and collaboration with YAKOV, noted anticipated dc is SNF level of care at [facility], once medically clear

## 2024-07-24 NOTE — CARE PLAN
The patient is Stable - Low risk of patient condition declining or worsening    Shift Goals  Clinical Goals: wean O2, safety, mobility  Patient Goals: rest  Family Goals: updates    Progress made toward(s) clinical / shift goals:        Problem: Knowledge Deficit - Standard  Goal: Patient and family/care givers will demonstrate understanding of plan of care, disease process/condition, diagnostic tests and medications  Outcome: Progressing    Patient is educated of disease process and condition. Treatment team has included patient in plan of care. All medications indications and side effects are explained. Patient is encouraged to ask questions. Patient and spouse indicate understanding.      Problem: Skin Integrity  Goal: Skin integrity is maintained or improved  Outcome: Progressing    Wound team assessed patient today, overall patient wounds improving. Q2 turns implemented and patient tolerated turns. Wound care provided to breast. New pictures uploaded into epic. Low air loss bed ordered for patient. Incontinence managed with female wick.      Problem: Fall Risk  Goal: Patient will remain free from falls  Outcome: Progressing    Patient's risk for injury and falls assessed. Appropriate safety precautions in place including bed alarm. Patient educated to utilize call light for needs. Patient verbalizes and demonstrates understanding and compliance.      Problem: Respiratory  Goal: Patient will achieve/maintain optimum respiratory ventilation and gas exchange  Outcome: Progressing    Patient respiratory status assessed. Patient educated on importance of coughing and deep breathing. Deep breaths are encouraged and patient performs correctly. Educated on how mobility can improve respiratory status. Patient verbalizes understanding.        Patient is not progressing towards the following goals: N/A

## 2024-07-24 NOTE — CARE PLAN
The patient is Stable - Low risk of patient condition declining or worsening    Shift Goals  Clinical Goals: wean O2, safety, mobility  Patient Goals: rest  Family Goals: updates    Progress made toward(s) clinical / shift goals:    Problem: Knowledge Deficit - Standard  Goal: Patient and family/care givers will demonstrate understanding of plan of care, disease process/condition, diagnostic tests and medications  Outcome: Progressing     Problem: Skin Integrity  Goal: Skin integrity is maintained or improved  Outcome: Progressing     Problem: Fall Risk  Goal: Patient will remain free from falls  Outcome: Progressing     Problem: Communication  Goal: The ability to communicate needs accurately and effectively will improve  Outcome: Progressing     Problem: Hemodynamics  Goal: Patient's hemodynamics, fluid balance and neurologic status will be stable or improve  Outcome: Progressing       Patient is not progressing towards the following goals:    Pt and spouse verbalize understanding of care plan. Pt more alert during shift and was Aox3. 2L O2, q2 turns as tolerated. Heel dressings changes, breasts cleansed and interdry applied. Receiving IV Unasyn, HOB up 30 degrees.

## 2024-07-24 NOTE — PROGRESS NOTES
Bedside report received, assumed care of patient. Resting in bed with  at bedside breathing even and unlabored. No signs of pain.. A&O x3. Tele monitoring in place. Educated on fall risk, all fall precautions in place. Call light within reach, bed locked and in lowest position, denied other needs at this time.

## 2024-07-24 NOTE — DOCUMENTATION QUERY
"                                                                         [facility]                                                                       Query Response Note      PATIENT:               ODETTE NICKERSON  ACCT #:                  [id number]  MRN:                     [id number]  :                      1945  ADMIT DATE:       2024 11:36 AM  DISCH DATE:          RESPONDING  PROVIDER #:        [id number]           QUERY TEXT:    There is conflicting documentation with regards to the acuity of heart failure.    \"Chronic diastolic heart failure\" is documented in the H&P and progress notes.  \"HFpEF, acute on chronic\" is documented in the cardiology notes.    Please clarify the acuity of the heart failure based upon the clinical indicators and treatment.    The patient's Clinical Indicators include:  H&P: \"Chronic diastolic heart failure ... history of chronic diastolic heart failure which at this point will need diuresis and optimization with beta-blocker as well.\"     Cardiology Consult: \"Acute on chronic heart failure with preserved ejection fraction Based on her weight it was 160s and now is charted as much lower, please confirm with standing weights but for now would hold diuresis volume status is difficult to ascertain\"     Cardiology PN: \"# HFpEF, acute on chronic\"      PN: \"Chronic diastolic heart failure ... Echo is relatively unremarkable with EF55%, no remarks on DF Hold on further diuresis for now, will check weights as she is down from baseline Monitor volume status\"     ECHO: \"Normal LV size, thickness and systolic function with normal LVEF 55% Normal RV size and systolic function No significant valvular abnormalities IVC not well visualized No pericardial effusion Estimated RVSP 30-35mmHg\"    Risk Factors: R pleural effusion, BLE edema, dyspnea, cardiomegaly, acute on chronic respiratory failure.   Treatment: Lasix 40mg IV x2 > spironolactone PO, Lopressor PO, CXR, " ECHO.    Thank you,  Caty Head RN  Clinical    Connect via [email or web address] or [email or web address]  Options provided:   -- Acute on chronic diastolic heart failure/HFpEF   -- Chronic diastolic heart failure/HFpEF   -- Other explanation, (please specify other explanation)   -- Unable to determine      Query created by: Caty Head on 7/22/2024 7:00 AM    RESPONSE TEXT:    Acute on chronic diastolic heart failure/HFpEF          Electronically signed by:  LUCILLE SRIVASTAVA MD 7/24/2024 7:52 AM

## 2024-07-24 NOTE — CARE PLAN
The patient is     Shift Goals  Clinical Goals: safety. wean o2  Patient Goals: comfort  Family Goals: updates, rest    Progress made toward(s) clinical / shift goals:      Patient is not progressing towards the following goals:      Problem: Knowledge Deficit - Standard  Goal: Patient and family/care givers will demonstrate understanding of plan of care, disease process/condition, diagnostic tests and medications  Outcome: Progressing     Problem: Skin Integrity  Goal: Skin integrity is maintained or improved  Outcome: Progressing     Problem: Fall Risk  Goal: Patient will remain free from falls  Outcome: Progressing     Problem: Communication  Goal: The ability to communicate needs accurately and effectively will improve  Outcome: Progressing     Problem: Hemodynamics  Goal: Patient's hemodynamics, fluid balance and neurologic status will be stable or improve  Outcome: Progressing     Problem: Respiratory  Goal: Patient will achieve/maintain optimum respiratory ventilation and gas exchange  Outcome: Progressing     Problem: Risk for Aspiration  Goal: Patient's risk for aspiration will be absent or decrease  Outcome: Progressing     Problem: Pain - Standard  Goal: Alleviation of pain or a reduction in pain to the patient’s comfort goal  Outcome: Progressing

## 2024-07-24 NOTE — PROGRESS NOTES
Received bedside report from RN, pt care assumed, VSS, pt assessment complete. Pt AAOx3, with NO C/O of pain at this time. 2L O2. No signs of acute distress noted at this time. Plan of care discussed with pt and verbalizes no questions. Pt denies any additional needs at this time. Bed locked/in lowest position, bed alarm ON, pt educated on fall risk and verbalized understanding, call light within reach, hourly rounding initiated.

## 2024-07-25 VITALS
TEMPERATURE: 97.5 F | OXYGEN SATURATION: 95 % | HEIGHT: 60 IN | BODY MASS INDEX: 32.25 KG/M2 | HEART RATE: 94 BPM | WEIGHT: 164.24 LBS | SYSTOLIC BLOOD PRESSURE: 101 MMHG | DIASTOLIC BLOOD PRESSURE: 64 MMHG | RESPIRATION RATE: 18 BRPM

## 2024-07-25 LAB
ANION GAP SERPL CALC-SCNC: 7 MMOL/L (ref 7–16)
BUN SERPL-MCNC: 13 MG/DL (ref 8–22)
CALCIUM SERPL-MCNC: 8.5 MG/DL (ref 8.5–10.5)
CHLORIDE SERPL-SCNC: 102 MMOL/L (ref 96–112)
CO2 SERPL-SCNC: 27 MMOL/L (ref 20–33)
CREAT SERPL-MCNC: 0.49 MG/DL (ref 0.5–1.4)
ERYTHROCYTE [DISTWIDTH] IN BLOOD BY AUTOMATED COUNT: 54.2 FL (ref 35.9–50)
GFR SERPLBLD CREATININE-BSD FMLA CKD-EPI: 97 ML/MIN/1.73 M 2
GLUCOSE SERPL-MCNC: 97 MG/DL (ref 65–99)
HCT VFR BLD AUTO: 35.5 % (ref 37–47)
HGB BLD-MCNC: 10.6 G/DL (ref 12–16)
MCH RBC QN AUTO: 26.6 PG (ref 27–33)
MCHC RBC AUTO-ENTMCNC: 29.9 G/DL (ref 32.2–35.5)
MCV RBC AUTO: 89.2 FL (ref 81.4–97.8)
PLATELET # BLD AUTO: 377 K/UL (ref 164–446)
PMV BLD AUTO: 11 FL (ref 9–12.9)
POTASSIUM SERPL-SCNC: 4.8 MMOL/L (ref 3.6–5.5)
RBC # BLD AUTO: 3.98 M/UL (ref 4.2–5.4)
SODIUM SERPL-SCNC: 136 MMOL/L (ref 135–145)
WBC # BLD AUTO: 4.9 K/UL (ref 4.8–10.8)

## 2024-07-25 PROCEDURE — 80048 BASIC METABOLIC PNL TOTAL CA: CPT

## 2024-07-25 PROCEDURE — 36415 COLL VENOUS BLD VENIPUNCTURE: CPT

## 2024-07-25 PROCEDURE — 99231 SBSQ HOSP IP/OBS SF/LOW 25: CPT | Performed by: STUDENT IN AN ORGANIZED HEALTH CARE EDUCATION/TRAINING PROGRAM

## 2024-07-25 PROCEDURE — A9270 NON-COVERED ITEM OR SERVICE: HCPCS | Performed by: STUDENT IN AN ORGANIZED HEALTH CARE EDUCATION/TRAINING PROGRAM

## 2024-07-25 PROCEDURE — A9270 NON-COVERED ITEM OR SERVICE: HCPCS | Performed by: HOSPITALIST

## 2024-07-25 PROCEDURE — 85027 COMPLETE CBC AUTOMATED: CPT

## 2024-07-25 PROCEDURE — 700102 HCHG RX REV CODE 250 W/ 637 OVERRIDE(OP): Performed by: INTERNAL MEDICINE

## 2024-07-25 PROCEDURE — A9270 NON-COVERED ITEM OR SERVICE: HCPCS | Mod: JZ | Performed by: STUDENT IN AN ORGANIZED HEALTH CARE EDUCATION/TRAINING PROGRAM

## 2024-07-25 PROCEDURE — 700102 HCHG RX REV CODE 250 W/ 637 OVERRIDE(OP): Mod: JZ | Performed by: STUDENT IN AN ORGANIZED HEALTH CARE EDUCATION/TRAINING PROGRAM

## 2024-07-25 PROCEDURE — 99239 HOSP IP/OBS DSCHRG MGMT >30: CPT | Performed by: INTERNAL MEDICINE

## 2024-07-25 PROCEDURE — 700102 HCHG RX REV CODE 250 W/ 637 OVERRIDE(OP): Performed by: STUDENT IN AN ORGANIZED HEALTH CARE EDUCATION/TRAINING PROGRAM

## 2024-07-25 PROCEDURE — 700102 HCHG RX REV CODE 250 W/ 637 OVERRIDE(OP): Performed by: HOSPITALIST

## 2024-07-25 PROCEDURE — A9270 NON-COVERED ITEM OR SERVICE: HCPCS | Performed by: INTERNAL MEDICINE

## 2024-07-25 PROCEDURE — 97530 THERAPEUTIC ACTIVITIES: CPT | Mod: CQ

## 2024-07-25 RX ORDER — PREGABALIN 150 MG/1
150 CAPSULE ORAL 2 TIMES DAILY
Qty: 6 CAPSULE | Refills: 0 | Status: SHIPPED | OUTPATIENT
Start: 2024-07-25 | End: 2024-07-28

## 2024-07-25 RX ORDER — AMIODARONE HYDROCHLORIDE 200 MG/1
200 TABLET ORAL DAILY
Status: SHIPPED
Start: 2024-07-25 | End: 2024-08-08 | Stop reason: SDUPTHER

## 2024-07-25 RX ADMIN — PREGABALIN 150 MG: 150 CAPSULE ORAL at 03:48

## 2024-07-25 RX ADMIN — AMOXICILLIN AND CLAVULANATE POTASSIUM 1 TABLET: 875; 125 TABLET, FILM COATED ORAL at 07:51

## 2024-07-25 RX ADMIN — APIXABAN 5 MG: 5 TABLET, FILM COATED ORAL at 03:48

## 2024-07-25 RX ADMIN — POTASSIUM CHLORIDE 40 MEQ: 1500 TABLET, EXTENDED RELEASE ORAL at 03:48

## 2024-07-25 RX ADMIN — ACETAMINOPHEN 1000 MG: 500 TABLET ORAL at 03:47

## 2024-07-25 ASSESSMENT — COGNITIVE AND FUNCTIONAL STATUS - GENERAL
STANDING UP FROM CHAIR USING ARMS: A LOT
MOVING FROM LYING ON BACK TO SITTING ON SIDE OF FLAT BED: A LOT
SUGGESTED CMS G CODE MODIFIER MOBILITY: CL
TURNING FROM BACK TO SIDE WHILE IN FLAT BAD: A LITTLE
MOBILITY SCORE: 12
WALKING IN HOSPITAL ROOM: A LOT
CLIMB 3 TO 5 STEPS WITH RAILING: TOTAL
MOVING TO AND FROM BED TO CHAIR: A LOT

## 2024-07-25 ASSESSMENT — FIBROSIS 4 INDEX: FIB4 SCORE: 1.88

## 2024-07-25 ASSESSMENT — PAIN DESCRIPTION - PAIN TYPE: TYPE: ACUTE PAIN

## 2024-07-25 NOTE — PROGRESS NOTES
Monitor Summary:     Rhythm: SR  Rate: 81-93  Ectopy: (R) PAC, (R) PVC, (R) couplet, 1 sec run of AF  Measurements: 0.12/0.07/0.32                 12 Hour Chart Check

## 2024-07-25 NOTE — DISCHARGE PLANNING
0856  Agency/Facility Name: [facility]   Spoke To: Sindy   Outcome: [facility] called to notify pt medically cleared for today, [facility] requesting transport to be arranged at 1300. YAKOV Beasley notified.       0942  Agency/Facility Name:[facility]   Spoke To: Sindy   Outcome: [facility] called to notify if transport can be pushed to 1700, as the bed won't be available until 1700. YAKOV Beasley notified.

## 2024-07-25 NOTE — DISCHARGE PLANNING
Case Management Discharge Planning    Admission Date: 7/19/2024  GMLOS: 3.6  ALOS: 6    6-Clicks ADL Score: 9  6-Clicks Mobility Score: 12  PT and/or OT Eval ordered: Yes  Post-acute Referrals Ordered: Yes  Post-acute Choice Obtained: Yes  Has referral(s) been sent to post-acute provider:  Yes      Anticipated Discharge Dispo: Discharge Disposition: D/T to SNF with Medicare cert in anticipation of skilled care (03)  Discharge Contact Phone Number: [phone]    DME Needed: No    Action(s) Taken: Patient is medically cleared to discharge to [facility] today. [facility] has a bed available today and requesting transport be set up at 1300. RNCM placed [facility] order for gurney transport  at 1300; pending confirmation time.     [facility] is now wanting transport at 1700. RNCM informed [facility] and confirmed new transportation time at 1700. RNCM delivered IMM at bedside to patient and patient's spouse and faxed to [facility].    Escalations Completed: None    Medically Clear: Yes    Next Steps: pending transportation time    Barriers to Discharge: None    Is the patient up for discharge tomorrow: No - today

## 2024-07-25 NOTE — DISCHARGE PLANNING
DC Transport Scheduled    Transport Company Scheduled:  [facility]  Spoke with Landy at [facility] to schedule transport.    Scheduled Date: 7/25/2024  Scheduled Time: 1300    Destination: [facility]  Destination address: [address] , [place] [address]    Notified care team of scheduled transport via Voalte.     If there are any changes needed to the DC transportation scheduled, please contact [facility] at ext. [phone] between the hours of 2746-5981 Mon-Fri. If outside those hours, contact the ED Case Manager at ext. [phone].

## 2024-07-25 NOTE — DISCHARGE SUMMARY
Discharge Summary    CHIEF COMPLAINT ON ADMISSION  Chief Complaint   Patient presents with    Shortness of Breath     X 1 day. D/C from [facility] yesterday post thoracentesis for pleural effusion. On Eliquis.  called EMS as pt was short of breath & slid off the couch, no trauma. Pt in a fib with RVR, rate 160s. Tachypneic, afebrile. Denies chest pain.        Reason for Admission  EMS    Admission Date  7/19/2024     CODE STATUS  Full Code    HPI & HOSPITAL COURSE  This is a 78 y.o. female with a past medical history of history of Chiari malformation status post ventriculoperitoneal shunt, sleep apnea not on CPAP, chronic respiratory failure requiring 3 LPM, hypertension, hypercholesterolemia, hypothyroidism, and hx of breast cancer, recent IMCU admission in May for DVT and saddle PE treated with thrombectomy complicated by retroperitoneal hematoma, she was discharged to SNF at that time. She returned for SOB early this week and was discharged on 7/15 after being treated for pleural effusion and pneumonia, post acute care was recommended however pt declined and thus was discharge home with spouse.    At home the patient's condition declined to the point where she became altered  On admission she was tachcyardic with 's susepcted A.flutter vs SVT, she was cardioverted without resolution but improved after IV metoprolol with . Labs with WBC 19, plt 606, Hgb 6. Xray showed increased rt pleural effusion. She was started on IV antibiotics and admitted for further evaluation.   Regarding her A>fibb cardiology was consulted and she was started on amiodarone and her HR is now well controlled. She also had thoracentesis and after it had a small pneumothorax which has now resolved.  Patient now doing better mentation has improved.   Palliative team was also consulted and discussed with patient about hospice but she has declined hospice at this time  She will be discharged to SNF today     The patient met  2-midnight criteria for an inpatient stay at the time of discharge.      FOLLOW UP ITEMS POST DISCHARGE  PCP    DISCHARGE DIAGNOSES  Principal Problem:    Acute on chronic respiratory failure (HCC) (POA: Yes)  Active Problems:    GERD (gastroesophageal reflux disease) (POA: Yes)      Overview: Chronic, controlled.      Tolerates esomeprazole 20mg daily.      Has been on this for years.      Has tried other medications that didn't work.      Does not see GI.    Dyslipidemia (POA: Yes)      Overview: Chronic, controlled.             Latest Labs:       Lab Results       Component Value Date/Time        CHOLSTRLTOT 159 12/01/2023 07:48 AM        LDL 79 12/01/2023 07:48 AM        HDL 53 12/01/2023 07:48 AM        TRIGLYCERIDE 133 12/01/2023 07:48 AM              Medications: started atorvastatin 3/29/2023.      Medication side effects: none            Risk calculator: The 10-year ASCVD risk score (Douglas ANDRADE, et al., 2019)       is: 12.5%           Risk for falls (POA: Yes)      Overview: Chronic, controlled.      Most recent fall: 2021.      Uses a 4 wheeled walker.      SELWYN (obstructive sleep apnea) (POA: Yes)      Overview: Chronic, controlled.      Was told she doesn't need a CPAP/BiPAP.      Just does oxygen at night.      Has not seen pulmonology in a few years.    Major depressive disorder (POA: Yes)      Overview: Chronic, controlled.      Tolerating duloxetine 30mg daily for the last few years.       No therapy. No pyschiatry.          History of breast cancer (POA: Yes)      Overview: Chronic, controlled.       History of breast cancer in 2015.       Was told she no longer needs to follow up.      Not on any ongoing medication.    Insomnia (POA: Yes)      Overview: Chronic, controlled.      Tolerates amitriptyline 100mg at night.      Has been on this for 20 years or so.    Polypharmacy (POA: Yes)      Overview:             Inpatient chart note: 10/2023      Patient is on multiple medications that can cause  "polypharmacy; outpatient       problem but dayteam to consider discontinue or decreasing dosing of       medications leading to symptoms of dizziness: baclofen, quetiapine, norco,       atenolol, duloxetine, pregabalin, amitriptyline           V-tach (HCC) (POA: Yes)    Paroxysmal atrial fibrillation (HCC) (POA: Yes)    Chronic diastolic heart failure (HCC) (POA: Yes)    History of pulmonary embolism (POA: Yes)    Thrombocytosis (POA: Yes)    Leukocytosis (POA: Yes)    Pneumothorax of right lung after biopsy (POA: Unknown)  Resolved Problems:    * No resolved hospital problems. *      FOLLOW UP  Future Appointments   Date Time Provider Department Center   9/4/2024  8:20 AM JOHN Cotto [facility]  [facility] [place] [address]  [phone]          MEDICATIONS ON DISCHARGE     Medication List        START taking these medications        Instructions   amiodarone 200 MG Tabs  Commonly known as: Cordarone   Take 1 Tablet by mouth every day.  Dose: 200 mg     amoxicillin-clavulanate 875-125 MG Tabs  Commonly known as: Augmentin   Take 1 Tablet by mouth every 12 hours for 1 day.  Dose: 1 Tablet            CONTINUE taking these medications        Instructions   alendronate 70 MG Tabs  Commonly known as: Fosamax   Take 70 mg by mouth every Monday. Indications: Osteoporosis  Dose: 70 mg     amitriptyline 100 MG Tabs  Commonly known as: Elavil   Take 100 mg by mouth every evening. Indications: \"pain\"  Dose: 100 mg     apixaban 5mg Tabs  Commonly known as: Eliquis   Take 1 Tablet by mouth 2 times a day. Indications: DVT/PE  Dose: 5 mg     atorvastatin 10 MG Tabs  Commonly known as: Lipitor   TAKE 1 TABLET BY MOUTH EVERY EVENING. CHOLESTEROL  Dose: 10 mg     baclofen 10 MG Tabs  Commonly known as: Lioresal   Take 10 mg by mouth 2 times a day as needed (spasms). Indications: Muscle Spasm  Dose: 10 mg     DULoxetine 30 MG Cpep  Commonly known as: " Cymbalta   Take 1 Capsule by mouth every day. Indications: Major Depressive Disorder  Dose: 30 mg     Esomeprazole Magnesium 20 MG Tbec   Take 20 mg by mouth 1 time a day as needed (heartburn ). Indications: Heartburn  Dose: 20 mg     ferrous sulfate 325 (65 Fe) MG tablet   Take 1 Tablet by mouth every 48 hours.  Dose: 325 mg     furosemide 20 MG Tabs  Commonly known as: Lasix   Take 20 mg by mouth every day. Hold for SBP <100  Indications: Edema  Dose: 20 mg     metoprolol tartrate 25 MG Tabs  Commonly known as: Lopressor   Take 1 Tablet by mouth 2 times a day. Indications: Atrial Fibrillation, High Blood Pressure Disorder  Dose: 25 mg     Nystop powder  Generic drug: nystatin   Apply 1 Application  topically 1 time a day as needed (rash). Indications: Skin Infection due to Candida Yeast  Dose: 1 Application      ondansetron 4 MG Tbdp  Commonly known as: Zofran ODT   Take 1 Tablet by mouth every 8 hours as needed for Nausea/Vomiting. Indications: Nausea and Vomiting  Dose: 4 mg     potassium chloride SA 20 MEQ Tbcr  Commonly known as: Kdur   Take 20 mEq by mouth every day. Hold if lasix is held   Indications: takes with Lasix  Dose: 20 mEq     pregabalin 150 MG Caps  Commonly known as: Lyrica   Take 150 mg by mouth 2 times a day. Indications: Neuropathic Pain  Dose: 150 mg     QUEtiapine 25 MG Tabs  Commonly known as: SEROquel   Take 25 mg by mouth at bedtime as needed (sleep). Indications: sleep  Dose: 25 mg     senna-docusate 8.6-50 MG Tabs  Commonly known as: Pericolace Or Senokot S   Take 1 Tablet by mouth at bedtime. Indications: Constipation  Dose: 1 Tablet     spironolactone 25 MG Tabs  Commonly known as: Aldactone   Take 1 Tablet by mouth every day. Indications: Edema  Dose: 25 mg     tamsulosin 0.4 MG capsule  Commonly known as: Flomax   Take 1 Capsule by mouth at bedtime. Indications: Obstruction of Bladder Outflow  Dose: 0.4 mg     TYLENOL 500 MG Tabs  Generic drug: acetaminophen   Take 500 mg by mouth  every 6 hours as needed for Mild Pain or Moderate Pain. Indications: Pain  Dose: 500 mg              Allergies  Allergies   Allergen Reactions    Sulfamethoxazole W-Trimethoprim Rash     * full body rash*>  10 years ago    Morphine Vomiting     hallucinations       DIET  Orders Placed This Encounter   Procedures    Diet Order Diet: Regular; Miscellaneous modifications: (optional): Finger Foods     Standing Status:   Standing     Number of Occurrences:   1     Order Specific Question:   Diet:     Answer:   Regular [1]     Order Specific Question:   Miscellaneous modifications: (optional)     Answer:   Finger Foods  [2]       ACTIVITY  As tolerated.  Weight bearing as tolerated    LINES, DRAINS, AND WOUNDS  This is an automated list. Peripheral IVs will be removed prior to discharge.  Peripheral IV 07/24/24 18 G Anterior;Right Forearm (Active)   Site Assessment Clean;Dry;Intact 07/25/24 0822   Dressing Type Transparent 07/25/24 0822   Line Status Scrubbed the hub prior to access;Flushed;Saline locked 07/25/24 0822   Dressing Status Clean;Dry;Intact 07/25/24 0822   Dressing Intervention N/A 07/25/24 0822   Infiltration Grading ([facility], CV) 0 07/25/24 0822   Phlebitis Scale ([facility] Only) 0 07/25/24 0822     External Urinary Catheter (Female Wick) (Active)   Output (mL) 600 mL 07/24/24 1800      Wound 07/13/24 Full Thickness Wound Breast Lateral Right (Active)   Wound Image   07/24/24 1100   Site Assessment NAHUM 07/25/24 0757   Periwound Assessment NAHUM 07/25/24 0757   Margins NAHUM 07/25/24 0757   Closure NAHUM 07/25/24 0757   Drainage Amount NAHUM 07/25/24 0757   Drainage Description NAHUM 07/25/24 0757   Treatments Cleansed 07/25/24 0424   Wound Cleansing Approved Wound Cleanser 07/25/24 0424   Periwound Protectant No-sting Skin Prep 07/25/24 0424   Dressing Status Clean;Dry;Intact 07/25/24 0757   Dressing Changed Observed 07/25/24 0757   Dressing Cleansing/Solutions Not Applicable 07/25/24 0757   Dressing Options Petrolatum  Gauze (yellow);Silicone Adhesive Foam 07/25/24 0424   Dressing Change/Treatment Frequency Daily, and As Needed 07/25/24 0757   NEXT Dressing Change/Treatment Date 07/26/24 07/25/24 0424   NEXT Weekly Photo (Inpatient Only) 07/31/24 07/24/24 1100   Adhesive Closure Strips Applied 07/24/24 0807   Wound Team Following Weekly 07/24/24 1100   Non-staged Wound Description Full thickness 07/24/24 1100   Wound Length (cm) 1.5 cm 07/24/24 1100   Wound Width (cm) 4 cm 07/24/24 1100   Wound Surface Area (cm^2) 6 cm^2 07/24/24 1100   Shape irregular 07/24/24 1100   Wound Odor None 07/24/24 1100   WOUND NURSE ONLY - Time Spent with Patient (mins) 30 07/24/24 1100       Wound 07/13/24 Skin Tear Arm Posterior Left (Active)   Wound Image   07/20/24 1500   Site Assessment Clean;Dry;Yellow 07/25/24 0757   Periwound Assessment Clean;Dry;Intact;Pink 07/25/24 0757   Margins Attached edges 07/25/24 0757   Closure Secondary intention 07/25/24 0757   Drainage Amount Small 07/25/24 0757   Drainage Description Sanguineous 07/25/24 0757   Treatments Cleansed 07/24/24 0807   Wound Cleansing Approved Wound Cleanser 07/24/24 0807   Periwound Protectant Mepitel 07/20/24 1500   Dressing Status Clean;Dry;Intact 07/25/24 0757   Dressing Changed Observed 07/25/24 0757   Dressing Cleansing/Solutions Not Applicable 07/20/24 1500   Dressing Options Mepitel One 07/24/24 0807   Dressing Change/Treatment Frequency Weekly, and As Needed 07/20/24 1500   NEXT Dressing Change/Treatment Date 07/27/24 07/20/24 1500   NEXT Weekly Photo (Inpatient Only) 07/27/24 07/20/24 1500   Wound Team Following Not following 07/20/24 1500   Non-staged Wound Description Full thickness 07/20/24 1500       Wound 07/19/24 Breast Lower Right excoriation underneath breast (Active)   Site Assessment Dry;Red;Bleeding 07/25/24 0424   Periwound Assessment Clean;Dry;Intact;Pink 07/25/24 0424   Margins Attached edges 07/25/24 0424   Closure Open to air 07/25/24 0424   Drainage Amount  Small 07/25/24 0424   Drainage Description Sanguineous 07/25/24 0424   Treatments Cleansed 07/25/24 0424   Wound Cleansing Foam Cleanser/Washcloth 07/25/24 0424   Periwound Protectant Interdry 07/25/24 0424   Dressing Status Clean;Dry;Intact 07/23/24 0100   Dressing Change/Treatment Frequency Daily, and As Needed 07/25/24 0424   NEXT Dressing Change/Treatment Date 07/26/24 07/25/24 0424       Wound 07/19/24 Back Right skin tear on R back (Active)   Site Assessment Red 07/22/24 0800   Periwound Assessment Red 07/20/24 1900   Margins Defined edges 07/20/24 1900   Closure Open to air 07/22/24 0800   Drainage Amount None 07/22/24 0800   Treatments Site care 07/22/24 0800   Wound Cleansing Approved Wound Cleanser 07/22/24 0800       Peripheral IV 07/24/24 18 G Anterior;Right Forearm (Active)   Site Assessment Clean;Dry;Intact 07/25/24 0822   Dressing Type Transparent 07/25/24 0822   Line Status Scrubbed the hub prior to access;Flushed;Saline locked 07/25/24 0822   Dressing Status Clean;Dry;Intact 07/25/24 0822   Dressing Intervention N/A 07/25/24 0822   Infiltration Grading ([facility], CV) 0 07/25/24 0822   Phlebitis Scale ([facility] Only) 0 07/25/24 0822               MENTAL STATUS ON TRANSFER             CONSULTATIONS  Cardiology   Palliative     PROCEDURES  Thoracentesis     LABORATORY  Lab Results   Component Value Date    SODIUM 136 07/25/2024    POTASSIUM 4.8 07/25/2024    CHLORIDE 102 07/25/2024    CO2 27 07/25/2024    GLUCOSE 97 07/25/2024    BUN 13 07/25/2024    CREATININE 0.49 (L) 07/25/2024    CREATININE 1.0 05/20/2008        Lab Results   Component Value Date    WBC 4.9 07/25/2024    HEMOGLOBIN 10.6 (L) 07/25/2024    HEMATOCRIT 35.5 (L) 07/25/2024    PLATELETCT 377 07/25/2024        Total time of the discharge process exceeds 35 minutes.

## 2024-07-25 NOTE — THERAPY
Physical Therapy   Daily Treatment     Patient Name: Tereza Sánchez  Age:  78 y.o., Sex:  female  Medical Record #: [id number]  Today's Date: 7/25/2024     Precautions  Precautions: Fall Risk    Assessment    Pt greeted and seen for PT treatment. ModA for bed mobility, able to maintain seated balance w/ time. Pt reported fear of standing and falling, pt agreeable to stand within rae stedy and reporting feeling more safe during standing practice. Pt req'd ModA to stand from bed height within rae stedy. Toney to stand from elevated paddle height in rae stedy. She was able to stand for ~10 seconds each stand, c/o B LE fatigue. Pt currently limited by impaired balance, decreased strength and decreased activity tolerance which negatively impacts functional mobility. Pt will continue to benefit from skilled PT to address deficits.       Plan    Treatment Plan Status: Continue Current Treatment Plan  Type of Treatment: Bed Mobility, Equipment, Family / Caregiver Training, Gait Training, Manual Therapy, Neuro Re-Education / Balance, Self Care / Home Evaluation, Stair Training, Therapeutic Activities, Therapeutic Exercise  Treatment Frequency: 4 Times per Week  Treatment Duration: Until Therapy Goals Met    DC Equipment Recommendations: Unable to determine at this time  Discharge Recommendations: Recommend post-acute placement for additional physical therapy services prior to discharge home       07/25/24 1452   Vitals   O2 (LPM) 2   O2 Delivery Device Silicone Nasal Cannula   Pain 0 - 10 Group   Therapist Pain Assessment Post Activity;Nurse Notified  (min c/o of B LE pain/fatigue after standing)   Cognition    Level of Consciousness Alert   Comments pleasant and cooperative, some delayed response. Supportive SO bedside.   Balance   Sitting Balance (Static) Fair -   Sitting Balance (Dynamic) Fair -   Standing Balance (Static) Poor   Standing Balance (Dynamic) Trace   Weight Shift Sitting Poor   Weight Shift Standing Poor    Skilled Intervention Verbal Cuing;Tactile Cuing;Sequencing;Facilitation;Compensatory Strategies   Comments improved sitting balance with time. pt very kyphotic   Bed Mobility    Supine to Sit Moderate Assist   Sit to Supine Moderate Assist   Scooting Moderate Assist   Rolling Minimal Assist to Rt.;Minimum Assist to Lt.   Skilled Intervention Verbal Cuing;Tactile Cuing;Sequencing;Facilitation;Compensatory Strategies   Comments HOB flat   Gait Analysis   Comments Pt unable to take marching steps in rae stedy.   Functional Mobility   Sit to Stand Moderate Assist   Mobility STSx3 with weight shifting   Skilled Intervention Verbal Cuing;Tactile Cuing;Sequencing;Compensatory Strategies;Facilitation   Comments Pt reported fear of standing and falling, pt agreeable to stand within rae stedy and reporting feeling more safe during standing practice. Pt req'd ModA to stand from bed height within rae stedy. Toney to stand from elevated paddle height in rae stedy. She was able to stand for ~10 seconds each stand, c/o B Le fatigue.   Short Term Goals    Short Term Goal # 1 Pt will perform sit < >stand CGA from chair with arm rests in 6 visits in order to prepare for ambulation   Goal Outcome # 1 goal not met   Short Term Goal # 2 Pt will ambulate 10' Toney with FWW in 6 visits in order to progress household ambulation   Goal Outcome # 2 Goal not met   Supervising Physical Therapist (PTA Treatments Only)   Supervising Physical Therapist [place] Anne

## 2024-07-25 NOTE — PROGRESS NOTES
Inpatient Palliative Medicine Progress Note     Tereza Sánchez  78 y.o. female  [id number]    Location = [facility]  Referral Source = Elisa Park M.d.    PCP = Michelle Jim P.A.-C.    Reason for palliative medicine consultation and/or visit: ACP       Assessment and Plan:     GOAL(S) OF CARE = remains LONGEVITY at this time ==> More time with Anthony    SYMPTOMS ETIOLOGY/CAUSES = generalized weakness and dyspnea secondary to recent saddle PE, superimposed to SELWYN and chronic respiratory failure, in a complicated recovery course    PROGNOSIS =   - [X] hospice-eligible = technically meeting both cardiac disease and pulmonary disease hospice admission criteria  - NOT yet hospice-appropriate = pending more discussion among pt &  & son. Will follow.    CODE STATUS = remains FULL at this time, as is consistent with MAY 2024 POLST on file  7/23/2024 Attempted to discuss and re-clarify. Patient unable to decide and wanted more time to consider.      ADVANCE CARE PLANNING =   Relevant history reviewed  Medical updates  Hospice philosophy introduction    PALLIATIVE CARE TEAM INTERVENTIONS =   ACP  Brought patient and  up to date medically, made aware of patient's 3 months of decline due to the difficult post-COVID recovery complicated by PE and recurrent crisis/admissions. They appeared understanding.    Laid out disposition options with patient:  1 last acute rehab attempt to see if she can benefit, time-limited trial to avoid repeat failures  Home with HH again, but at high risk of failure like last admission without improvement.  Home with Hospice, for more comfort and time with loved ones, while arriving at same outcome.    Introduced hospice philosophy and general provisions. ==> Patient was ultimately NOT yet amenable to comfort-focused care modality/hospice at this time. She will be discharged to SNF, per her & 's election.        Summary =   Tereza Sánchez is a 78 y.o. female with chronic  "respiratory failure on 2-3LPM, SELWYN, afib, recent COVID-19 infection in APR 2024, recent saddle PE in May 2024 who unfortunately was readmitted shortly the day after discharge from last admission 7/12-18/2024, as patient declined acute rehab placement this time.      ---------------------------------------------------------------------------------------------------------------  7/25/2024  [facility] IM PGY2 Dr. Rich Munoz and myself met with patient again bedside to follow up on prior discussions. Patient has made up her mind to pursue SNF placement, with the hope of continued improvement over time. Patient did repeat multiple times last two visits that she \"did not feel bad at all.\"    Patient remains bed/chair bound. She endorsed having been this way for months, and that this is nothing new to her.    She is not quite ready for hospice transition yet, longevity and more time with her  Anthony.    We wished patient the best of luck. We encouraged her to try her best and recover as much function as possible.    However we did also let her know that, if she were to rapidly decline and become admitted again, without improvement, then that next admission will be when we revisit the idea of hospice yet again. Tereza acknowledged full understanding.        7/23/2024  Met with patient bedside to attempt following up our prior discussions about DISPOSITION (rehab vs hospice vs home (which we don't recommend at all)).     She felt better this morning and was only requiring 2LPM of O2 through nasal cannula. Seemed somewhat more awake though still bed-bound and not ambulatory.    Attempted to address disposition. Patient stated she and her  Anthony were NOT able to discuss much at all last time, and she herself did not have an answer for me at this time.     Attempted to address codes status, given her worsening health last 3 months without improvement, and now the much reduced anticipated benefits from ventilation & CPR " "in her weakened state. Patient seemed to understand and follow the logics of my DNAR DNI recommendation, but again ultimately requested more time to consider before discussing with family and finalizing.    As such, at this time, there remains no definitive decision on patient's part for either disposition nor code status.        7/22/2024  IM PGY2 Dr. Rich Hatfield and I myself met with patient and her  Anthony dorsey. Introduced our role and reasons for consultation as inpatient palliative care team, and they were receptive to this interview.    We reviewed patient's recent history, and it was clear to both patient and Anthony that patient's health had continuously declined since her recent COVID-19 infection in APR 2024, despite all interventions so far. She use to be able to ambulate short distances at home, but now could barely stood without \"her legs giving out.\" Patient had also been eating much less, about 1/3 of her normal intake. Overall, patient and Anthony both agreed that while today was a better day since readmission, she is still quite a way off prior functional baseline.    We discussed the roads ahead and options, as listed above: another trial at acute rehab, high risk home discharge again, or hospice care, as likely the outcome would not change either way.    Reviewed general hospice philosophy and provisions with patient and Anthony. Explained to them that, as patient made it clear that her priority is to have more time with Anthony, hospice would be a perfectly reasonable option in a situation when patient's health can no longer improve.    Patient herself seemed more receptive (if not somewhat resigned) to the idea of hospice, while Anthony would like to explore 1 more rehab attempt at this time....    Assured family that any of these presented options would be reasonable, as long as we understand what we are up against, and do not perpetuate a negative cycle where patient continues to be asked " to perform these arduous attempts while she declines and reaps no benefits.... not good for pt's sake.  Family expressed understanding of our explanation.    Stressed the importance of time-limited nature (1 more admission, 1 more crisis, 1 more failure, etc.), if that's what family decide to do.    Stressed the importance to avoid suffering and repeated non-beneficial interventions.    Family understood. Encouraged them to further talk about things today, and with their son Candace Starks and Anthony were agreeable to us following up on this discussion again tomorrow, to better figure out the plan ahead.           Medical Decision Making =    Patient is of high medical complexity with incurable disease = acute on chronic respiratory failure  Care complexity further complicated by secondary conditions = saddle PE, recent COVID19 infection, HFpEF, SELWYN, afib, advance age, limited sociofamlial support    Extensive data reviewed & coordination performed = labs, imaging, specialty inputs, MAR records, family meeting    High risk of morbidity from additional interventions &  studies = advance age, low function, refractory symptoms despite interventions, high risk medication use        Advance care planning  =   The patient and/or legal decision maker has provided voluntary consent to discuss advance care planning. We discussed code status.  FULL    Thank you for allowing me the opportunity to participate in the care of Tereza Sánchez     I spent a total of 35 minutes reviewing medical records, direct face-to-face time with the patient and/or family, documentation and coordination of care. This is separate from the time spent on advance care planning, which is documented above.         NAZ FINN DO ([place])  [facility]   [id number] [place]  [id number]  P: [phone]  F: [phone]

## 2024-07-25 NOTE — PROGRESS NOTES
Monitor summary:        Rhythm: SR, ST  Rate:   Ectopy: (R) PVC  Measurements: .14/.08/.26        12hr chart check               Home

## 2024-07-25 NOTE — PROGRESS NOTES
Bedside report received, assumed care of patient. Resting in bed, breathing even and unlabored on 2LNC, denied pain. A&O x3. Tele monitoring in place. Educated on fall risk, all fall precautions in place. Call light within reach, bed locked and in lowest position, denied other needs at this time.

## 2024-08-02 NOTE — TELEPHONE ENCOUNTER
Received request via: Pharmacy    Was the patient seen in the last year in this department? Yes    Does the patient have an active prescription (recently filled or refills available) for medication(s) requested? No    Pharmacy Name: [facility]/pharmacy #[id number] - [place], [place] - [address] [facility].     Does the patient have MCFP Plus and need 100 day supply (blood pressure, diabetes and cholesterol meds only)? Patient does not have SCP

## 2024-08-06 NOTE — TELEPHONE ENCOUNTER
Caller Name: Melba, a home health Nurse  Call Back Number: [phone]    Melba calls stating that she is admitting Tereza. She reports her vital signs for today, 8/6/24:    Heartrate: 120 bpm  Respirations: 26 per minute  BP: 98/78  T: 98.3  O2: 98% on 2 L of O2 nasal cannula    Patient/Caregiver provided printed discharge information.

## 2024-08-07 NOTE — CASE COMMUNICATION
Primary dx/Skilled need: 78 yo female HH admitting Dx: paroxysmal AFIB, acute on chronic hypoxic resp failure, acute cor pulmonale, acute renal failure, and chronic HF.  PMH: CHF, DVT, pulmonary embolism, pleural effusion, HTN, HLD, Right breast cancer, Chiari malformation, intracranial  shunt, partial thyroidectomy, MDD, anxiety/panic disorder, insomnia, osteoporosis, and GERD.  SN frequency: 2w2, 1w2  Zip code: [address]  Disciplines or dered: SN, PT, OT.  Refuses MSW and no indication for SLP.    Insurance and authorization: [facility]  Certification period: 8/6/24 - 10/4/24  [facility]  Message left on Michelle Jim MA's voicemail reporting VS.    VITALS SOC: BP 98/78, Apical , RESP 26, TEMP 98.3, SPO2 98% on 2L.  Denies chest pain, denies SOB, denies pain.  Skin warm, dry, and pink.

## 2024-08-07 NOTE — CASE COMMUNICATION
Drug-Drug: DULoxetine and amitriptyline   Duloxetine may increase the plasma concentrations and pharmacologic effects of tricyclic antidepressants. Additive serotonergic effects may also occur during coadministration of duloxetine and tricyclic antidepressants (TCAs), and the risk of developing serotonin syndrome may be increased.   Details Major   DULoxetine (CYMBALTA) 30 MG Cap DR Particles     amitriptyline (ELAVIL) 100 MG Tab   Drug -Drug  <jscript:void(0)>  Drug-Drug: amiodarone and ondansetron   Additive QT interval prolongation may occur during coadministration of a moderate-risk QT-prolonging agent (eg, parenteral administration of ondansetron) and a high-risk QT-prolonging agent (eg, amiodarone).   Details Major   amiodarone (CORDARONE) 200 MG Tab     ondansetron (ZOFRAN ODT) 4 MG TABLET DISPERSIBLE   Drug-Drug  <jscript:void(0)>  Drug-Drug: spironolactone and  K Phos Mono-Sod Phos Di & Mono (PHOSPHA 250 NEUTRAL PO), potassium chloride SA   Potassium salts (eg, K Phos Reynolds-Sod Phos Di & Mono (PHOSPHA 250 NEUTRAL PO), potassium chloride SA) may enhance the hyperkalemic effect of spironolactone.   Details Major   spironolactone (ALDACTONE) 25 MG Tab     K Phos Mono-Sod Phos Di & Mono (PHOSPHA 250 NEUTRAL PO)     potassium chloride SA (KDUR) 20 MEQ Tab CR   Drug-Drug  <jscript:void(0)>  Drug-Juan Manuel g: amiodarone and metoprolol tartrate   Administration of amiodarone and metoprolol tartrate may result in severe bradycardia, hypotension and cardiac arrest.   Details Major   amiodarone (CORDARONE) 200 MG Tab     metoprolol tartrate (LOPRESSOR) 25 MG Tab   Drug-Drug  <jscript:void(0)>  Drug-Drug: QUEtiapine and amiodarone   Additive QT interval prolongation may occur during coadministration of quetiapine, a moderate-risk QT-prolonging  agent, and amiodarone.   Details Major   QUEtiapine (SEROQUEL) 25 MG Tab     amiodarone (CORDARONE) 200 MG Tab   Drug-Drug  <jscript:void(0)>  Drug-Drug: amiodarone and atorvastatin    Plasma concentrations and pharmacologic effects of atorvastatin may be increased by amiodarone.   Details Moderate   amiodarone (CORDARONE) 200 MG Tab     atorvastatin (LIPITOR) 10 MG Tab   Drug-Drug  <jscript:void(0)>  Drug-Drug: alendronate and ferrous  sulfate   According to the 's package labeling for etidronate and ibandronate, absorption is decreased when ferrous sulfate is coadministered.   Details Moderate   alendronate (FOSAMAX) 70 MG Tab     ferrous sulfate 325 (65 Fe) MG tablet   Drug-Drug  <jscript:void(0)>  Drug-Drug: potassium chloride SA and QUEtiapine, amitriptyline   Coadministration of anticholinergics (eg, QUEtiapine, amitriptyline) and solid dosage forms o f potassium chloride (eg, Klor-Con) may increase the risk of potassium-induced gastrointestinal mucosal damage.   Details Moderate   potassium chloride SA (KDUR) 20 MEQ Tab CR     amitriptyline (ELAVIL) 100 MG Tab     QUEtiapine (SEROQUEL) 25 MG Tab   Drug-Drug  <jscript:void(0)>  Drug-Drug: amiodarone and Eliquis   Plasma concentrations and pharmacologic effects of apixaban may be increased by amiodarone.   Details Moderate   amiodaron e (CORDARONE) 200 MG Tab     ELIQUIS 5 MG Tab   Drug-Drug  <jscript:void(0)>  Drug-Drug: Eliquis and DULoxetine   Antidepressants with antiplatelet effects (eg, DULoxetine) may enhance the anticoagulant effect of direct oral anticoagulants (eg, Eliquis).   Details Moderate   ELIQUIS 5 MG Tab     DULoxetine (CYMBALTA) 30 MG Cap DR Particles   Drug-Drug  <jscript:void(0)>  Drug-Drug: ondansetron and DULoxetine, amitriptyline   Additive se rotonergic effects may occur during coadministration of ondansetron and DULoxetine, amitriptyline, and the risk of developing serotonin syndrome may be increased.   Details Moderate   ondansetron (ZOFRAN ODT) 4 MG TABLET DISPERSIBLE     amitriptyline (ELAVIL) 100 MG Tab     DULoxetine (CYMBALTA) 30 MG Cap DR Particles   Drug-Drug  <jscript:void(0)>  Drug-Drug:  ondansetron and QUEtiapine   Additive QT interval prolongation may occur duri ng coadministration of moderate-risk QT-prolonging agents, QUEtiapine and ondansetron.   Details Moderate   ondansetron (ZOFRAN ODT) 4 MG TABLET DISPERSIBLE     QUEtiapine (SEROQUEL) 25 MG Tab   Drug-Drug  <jscript:void(0)>  Drug-Drug: amitriptyline and amiodarone   Pharmacologic and toxic effects of amitriptyline may be increased by amiodarone. Toxicity characterized by extrapyramidal symptoms may occur.   Details Moderate   amitriptyl ine (ELAVIL) 100 MG Tab     amiodarone (CORDARONE) 200 MG Tab   Drug-Drug  <jscript:void(0)>  Drug-Drug: metoprolol tartrate and DULoxetine   Plasma concentrations and pharmacologic effects of metoprolol may be increased by moderate CYP2D6 inhibitors (eg, DULoxetine).   Details Moderate   metoprolol tartrate (LOPRESSOR) 25 MG Tab     DULoxetine (CYMBALTA) 30 MG Cap DR Particles   Drug-Drug  <jscript:void(0)>  Drug-Drug: Esomeprazole Mag nesium and ferrous sulfate   Inhibitors of the Proton Pump (PPIs and PCABs) (eg, Esomeprazole Magnesium) may decrease the absorption of iron preparations (eg, ferrous sulfate).   Details Minor   Esomeprazole Magnesium 20 MG Tablet Delayed Response     ferrous sulfate 325 (65 Fe) MG tablet   Drug-Drug  <jscript:void(0)>  Drug-Drug: ondansetron and acetaminophen   The pharmacologic effects of acetaminophen may be blocked by co-administrat ion of ondansetron.   Details Minor   ondansetron (ZOFRAN ODT) 4 MG TABLET DISPERSIBLE     acetaminophen (TYLENOL) 500 MG Tab   Drug-Drug  <jscript:void(0)>  Drug-Drug: acetaminophen and QUEtiapine, amitriptyline   The analgesic and antipyretic effectiveness of acetaminophen might be delayed and/or reduced when given concurrently with QUEtiapine, amitriptyline.   Details Minor   acetaminophen (TYLENOL) 500 MG Tab     amitriptyline (ELAV IL) 100 MG Tab     QUEtiapine (SEROQUEL) 25 MG Tab   Allergy  <jscript:void(0)>  Allergy/Contraindication:  "SULFAMETHOXAZOLE W-TRIMETHOPRIM and tamsulosin   Reactions: Rash. Reaction type: Systemic. User documented allergy severity: Medium.   \"* full body rash*> 10 years ago\"Details Cross-Sensitive Class Match   tamsulosin (FLOMAX) 0.4 MG capsule   Drug-Food  <jscript:void(0)>  Drug-Food: atorvastatin   The oral bioavailability and pharm acologic effects of statins may be increased by grapefruit juice. Although relatively uncommon, skeletal muscle toxicity characterized by myalgias and rhabdomyolysis may result. The predictability of this interaction is difficult to determine due to the variance of study methods, doses of statins used, and the amounts and timing of grapefruit juice that was administered.   Details Major   atorvastatin (LIPITOR) 10 MG Tab   Drug-Food  <j script:void(0)>  Drug-Food: amiodarone   Plasma concentrations of amiodarone may be increased by grapefruit juice. Pharmacologic and toxic effects of amiodarone may be increased.   Details Moderate   amiodarone (CORDARONE) 200 MG Tab   Drug-Food  <jscript:void(0)>  Drug-Food: metoprolol tartrate   The oral bioavailability and pharmacologic effects of propranolol and metoprolol may be increased when consistently taken with meals, especia lly meals high in protein. Atenolol bioavailability may be decreased when taken with food.   Details Moderate   metoprolol tartrate (LOPRESSOR) 25 MG Tab   Drug-Food  <jscript:void(0)>  Drug-Food: alendronate   alendronate absorption may be decreased when taken with food. Clinical significance is not known.   Details Moderate   alendronate (FOSAMAX) 70 MG Tab   Drug-Food  <jscript:void(0)>  Drug-Food: furosemide   Food may decrease the  bioavailability and pharmacologic effects of furosemide.   Details Moderate   furosemide (LASIX) 20 MG Tab   Drug-Food  <jscript:void(0)>  Drug-Food: amitriptyline   Pharmacologic effects of amitriptyline may be increased by grapefruit juice. Elevated plasma concentrations with toxicity may " "occur. Conversely, high-fiber diets may decrease serum amitriptyline concentrations and decrease antidepressant effects.   Details Moderate   amitri ptyline (ELAVIL) 100 MG Tab   Drug-Alcohol  <jscript:void(0)>  Drug-Alcohol: acetaminophen   The risk of acetaminophen-induced hepatotoxicity may be increased by chronic intake of ethanol.   Details Moderate   acetaminophen (TYLENOL) 500 MG Tab   Drug-Alcohol  <jscript:void(0)>  Drug-Alcohol: baclofen   Baclofen and alcohol consumption may have additive central nervous system depressant effects.   Details Moderate   baclofen (LIORESAL)  10 MG Tab   Drug-Alcohol  <jscript:void(0)>  Drug-Alcohol: QUEtiapine, pregabalin   The CNS depressant effects of QUEtiapine, pregabalin and ethanol may be increased. Excessive sedation and impaired psychomotor function may occur. In addition, co-ingestion of alcohol with oral extended release dosage forms of morphine, oxymorphone, and hydromorphone may result in increased plasma concentrations of the narcotic and the potential of fatal  overdose situations.   Details Moderate   QUEtiapine (SEROQUEL) 25 MG Tab     pregabalin (LYRICA) 150 MG Cap   Drug-Alcohol  <jscript:void(0)>  Drug-Alcohol: DULoxetine   Concomitant heavy use of alcohol with serotonin and norepinephrine reuptake inhibitors \"(eg, DULoxetine)\" has been associated with hepatotoxicity.   Details Minor   DULoxetine (CYMBALTA) 30 MG Cap DR Particles   Drug-Alcohol  <jscript:void(0)>  Drug-Alcohol: amitripty line   Concurrent intake of ethanol and amitriptyline may cause enhanced sedation and impairment in psychomotor functions. However, serum concentrations of amitriptyline may be decreased in chronic alcoholics and the clinical efficacy of amitriptyline may be altered.   Details Minor   amitriptyline (ELAVIL) 100 MG Tab  "

## 2024-08-08 PROBLEM — E87.6 HYPOKALEMIA: Status: RESOLVED | Noted: 2024-01-01 | Resolved: 2024-01-01

## 2024-08-08 PROBLEM — I30.9 ACUTE PERICARDIAL EFFUSION: Status: RESOLVED | Noted: 2024-01-01 | Resolved: 2024-01-01

## 2024-08-08 PROBLEM — E83.39 HYPOPHOSPHATEMIA: Status: RESOLVED | Noted: 2024-01-01 | Resolved: 2024-01-01

## 2024-08-08 PROBLEM — Z86.711 HISTORY OF PULMONARY EMBOLISM: Status: RESOLVED | Noted: 2024-01-01 | Resolved: 2024-01-01

## 2024-08-08 PROBLEM — N17.9 ACUTE RENAL FAILURE (ARF) (HCC): Status: RESOLVED | Noted: 2024-01-01 | Resolved: 2024-01-01

## 2024-08-08 PROBLEM — R30.0 DYSURIA: Status: RESOLVED | Noted: 2023-01-01 | Resolved: 2024-01-01

## 2024-08-08 PROBLEM — K68.3 RETROPERITONEAL HEMATOMA: Status: RESOLVED | Noted: 2024-01-01 | Resolved: 2024-01-01

## 2024-08-08 PROBLEM — J96.21 ACUTE ON CHRONIC HYPOXIC RESPIRATORY FAILURE (HCC): Status: RESOLVED | Noted: 2024-01-01 | Resolved: 2024-01-01

## 2024-08-08 PROBLEM — J95.811 PNEUMOTHORAX OF RIGHT LUNG AFTER BIOPSY: Status: RESOLVED | Noted: 2024-01-01 | Resolved: 2024-01-01

## 2024-08-08 PROBLEM — R54 FRAILTY SYNDROME IN GERIATRIC PATIENT: Status: RESOLVED | Noted: 2024-01-01 | Resolved: 2024-01-01

## 2024-08-08 PROBLEM — U07.1 ACUTE HYPOXEMIC RESPIRATORY FAILURE DUE TO COVID-19 (HCC): Status: RESOLVED | Noted: 2024-01-01 | Resolved: 2024-01-01

## 2024-08-08 PROBLEM — R65.21 SEPTIC SHOCK (HCC): Status: RESOLVED | Noted: 2024-01-01 | Resolved: 2024-01-01

## 2024-08-08 PROBLEM — G47.34 NOCTURNAL HYPOXIA: Status: RESOLVED | Noted: 2018-11-28 | Resolved: 2024-01-01

## 2024-08-08 PROBLEM — D75.839 THROMBOCYTOSIS: Status: RESOLVED | Noted: 2024-01-01 | Resolved: 2024-01-01

## 2024-08-08 PROBLEM — R63.4 UNINTENTIONAL WEIGHT LOSS: Status: ACTIVE | Noted: 2024-01-01

## 2024-08-08 PROBLEM — J90 PLEURAL EFFUSION: Status: RESOLVED | Noted: 2024-01-01 | Resolved: 2024-01-01

## 2024-08-08 PROBLEM — J96.11 CHRONIC RESPIRATORY FAILURE WITH HYPOXIA (HCC): Status: ACTIVE | Noted: 2024-01-01

## 2024-08-08 PROBLEM — A41.9 SEPTIC SHOCK (HCC): Status: RESOLVED | Noted: 2024-01-01 | Resolved: 2024-01-01

## 2024-08-08 PROBLEM — F05: Status: RESOLVED | Noted: 2024-01-01 | Resolved: 2024-01-01

## 2024-08-08 PROBLEM — D62 ACUTE BLOOD LOSS ANEMIA (ABLA): Status: RESOLVED | Noted: 2024-01-01 | Resolved: 2024-01-01

## 2024-08-08 PROBLEM — U07.1 COVID-19: Status: RESOLVED | Noted: 2024-01-01 | Resolved: 2024-01-01

## 2024-08-08 PROBLEM — D72.829 LEUKOCYTOSIS: Status: RESOLVED | Noted: 2024-01-01 | Resolved: 2024-01-01

## 2024-08-08 PROBLEM — J96.01 ACUTE HYPOXEMIC RESPIRATORY FAILURE DUE TO COVID-19 (HCC): Status: RESOLVED | Noted: 2024-01-01 | Resolved: 2024-01-01

## 2024-08-08 PROBLEM — R10.33 ABDOMINAL PAIN, ACUTE, PERIUMBILICAL: Status: RESOLVED | Noted: 2023-01-01 | Resolved: 2024-01-01

## 2024-08-08 PROBLEM — R31.9 HEMATURIA: Status: RESOLVED | Noted: 2024-01-01 | Resolved: 2024-01-01

## 2024-08-08 PROBLEM — R78.81 POSITIVE BLOOD CULTURE: Status: RESOLVED | Noted: 2024-01-01 | Resolved: 2024-01-01

## 2024-08-08 PROBLEM — I95.1 ORTHOSTASIS: Status: RESOLVED | Noted: 2023-01-01 | Resolved: 2024-01-01

## 2024-08-08 PROBLEM — I26.09 ACUTE COR PULMONALE (HCC): Status: RESOLVED | Noted: 2024-01-01 | Resolved: 2024-01-01

## 2024-08-08 PROBLEM — J96.20 ACUTE ON CHRONIC RESPIRATORY FAILURE (HCC): Status: RESOLVED | Noted: 2024-01-01 | Resolved: 2024-01-01

## 2024-08-08 PROBLEM — R79.89 ELEVATED TROPONIN: Status: RESOLVED | Noted: 2023-01-01 | Resolved: 2024-01-01

## 2024-08-08 NOTE — ASSESSMENT & PLAN NOTE
Chronic, intermittent.  Noted during hospitalization July 2024.  Metoprolol increased to 50 mg twice daily.  Patient stopped amiodarone 200 mg because she ran out.  This was refilled today.  Referring to cardiology

## 2024-08-08 NOTE — ASSESSMENT & PLAN NOTE
Chronic, uncontrolled.   Patient is unsure if she has fallen since her most recent hospitalization, July 2024.  She is currently using a wheelchair only as she does not have enough strength to stand.  She needs 2 people to assist her in standing.    Currently has home health PT, soon-to-be OT, and skilled nursing coming out twice a week.

## 2024-08-08 NOTE — ASSESSMENT & PLAN NOTE
Chronic, stable.  Patient continues with furosemide 20 mg daily and spironolactone 25 mg daily.  Most recent ECHO 7/20/2024 shows EF 55%.  Patient does not currently have a cardiologist.  Placing referral today.

## 2024-08-08 NOTE — ASSESSMENT & PLAN NOTE
Chronic, uncontrolled.  Patient has had multiple hospitalizations since October 2023.  Her  states she will drink chocolate milk that has 25 g of protein.  Advised trying to drink 2 of those a day, among other food if she is able to.    8/8/2024: Patient's  to send me her weight from home.  8/8/2024 was a telemedicine visit.  7/19/2024: 164#  5/4/2024: 169#  12/5/2023: 173#  3/29/2023: 193#

## 2024-08-08 NOTE — ASSESSMENT & PLAN NOTE
Chronic, controlled.  Tolerating atenolol 50mg, 2 tablets daily and benazepril-HCTZ 20/12.5 daily.  Referring to cardiology.

## 2024-08-08 NOTE — PROGRESS NOTES
Virtual Visit:  This visit was conducted via Zoom using secure and encrypted videoconferencing technology.   The patient was in their home in the state of [place].    The patient's identity was confirmed and verbal consent was obtained for this virtual visit.     SUBJECTIVE:     CC: hospital follow-up, needing home health      HPI:   Tereza presents today with her , from their home, for the following:    ASSESSMENT & PLAN by Problem:       Problem List Items Addressed This Visit       RESOLVED: Acute cor pulmonale (HCC)    Relevant Medications    amiodarone (CORDARONE) 200 MG Tab    Other Relevant Orders    Referral to Home Health    Advance care planning     Patient does not currently have advance directives in place.  I did specifically asked the patient if she wishes to be full code, explaining this means to have CPR performed on her and to be intubated if needed.  Patient and her  both verbalized that she is still full code.         Age-related physical debility (Chronic)     Chronic, uncontrolled.  Patient is currently using a wheelchair as she is unable to stand without a two-person assist.  Has deformed fingers/hands, unable to use the via m for many of her ADLs.     Denies constipation and urinary symptoms.  Currently using a bedpan as getting out of bed, transferring is too difficult.  Denies any rashes or skin breakdown.         Atherosclerosis of aorta (HCC)     Chronic, stable.  Tolerating/continue atorvastatin 10 mg daily.         Relevant Medications    amiodarone (CORDARONE) 200 MG Tab    Chronic back pain     Chronic, uncontrolled.  Managed by Dr. Ruiz but sees [facility], Dr. Chavez.  Patient is not currently on hydrocodone/acetaminophen but does continue to take pregabalin.         Chronic diastolic heart failure (HCC)     Chronic, stable.  Patient continues with furosemide 20 mg daily and spironolactone 25 mg daily.  Most recent ECHO 7/20/2024 shows EF 55%.  Patient does  not currently have a cardiologist.  Placing referral today.         Relevant Medications    amiodarone (CORDARONE) 200 MG Tab    Other Relevant Orders    Referral to Home Health    Chronic respiratory failure with hypoxia (HCC)     Chronic, uncontrolled.   Patient is on 2 L/min continuous oxygen.         Relevant Orders    Referral to Home Health    Dyslipidemia     Chronic, controlled.  Tolerating/continue atorvastatin 10mg daily.         History of thromboembolism     Chronic, controlled.   DVT and PE May 2024.  Tolerating/continue Eliquis 5 mg twice daily.  Managed by anticoagulation clinic.         Relevant Orders    Referral to Anticoagulation Monitoring    Paroxysmal atrial fibrillation (HCC)     Chronic, intermittent.  Noted during hospitalization July 2024.  Metoprolol increased to 50 mg twice daily.  Patient apparently has run out of amiodarone 200 mg but this is being refilled today.    Referring to cardiology         Relevant Medications    amiodarone (CORDARONE) 200 MG Tab    Polypharmacy     May need to consider referral to pharmacotherapy for polypharmacy review.         Prediabetes    Relevant Orders    HEMOGLOBIN A1C    Primary hypertension     Chronic, controlled.  Tolerating atenolol 50mg, 2 tablets daily and benazepril-HCTZ 20/12.5 daily.  Referring to cardiology.         Relevant Medications    amiodarone (CORDARONE) 200 MG Tab    Other Relevant Orders    Comp Metabolic Panel    Risk for falls     Chronic, uncontrolled.   Patient is unsure if she has fallen since her most recent hospitalization, July 2024.  She is currently using a wheelchair only as she does not have enough strength to stand.  She needs 2 people to assist her in standing.    Currently has home health PT, soon-to-be OT, and skilled nursing coming out twice a week.         Relevant Orders    Referral to Home Health    Uncomplicated opioid dependence (HCC)     Chronic, controlled.  Previously managed by Dr. Chavez.  Not currently  on hydrocodone.         Unintentional weight loss     Chronic, uncontrolled.  Patient has had multiple hospitalizations since October 2023.  Her  states she will drink chocolate milk that has 25 g of protein.  Advised trying to drink 2 of those a day, among other food if she is able to.    8/8/2024: Patient's  to send me her weight from home.  8/8/2024 was a telemedicine visit.  7/19/2024: 164#  5/4/2024: 169#  12/5/2023: 173#  3/29/2023: 193#         Relevant Orders    CBC WITH DIFFERENTIAL    TSH WITH REFLEX TO FT4    VITAMIN B12    PHOSPHORUS    MAGNESIUM    V-tach (HCC)     Chronic, intermittent.  Noted during hospitalization July 2024.  Metoprolol increased to 50 mg twice daily.  Patient stopped amiodarone 200 mg because she ran out.  This was refilled today.  Referring to cardiology         Relevant Medications    amiodarone (CORDARONE) 200 MG Tab    Other Relevant Orders    REFERRAL TO CARDIOLOGY     Other Visit Diagnoses       Vitamin D deficiency        Relevant Orders    VITAMIN D,25 HYDROXY (DEFICIENCY)    Frailty syndrome in geriatric patient                Health orders placed today.    The patient's  states he does know how to pull up the after visit summary from her visit today.    Explicit instructions were written in this after visit summary on what we discussed.    Repeat labs November 2024.      Return in about 3 months (around 11/8/2024) for med check.    HCC Gap Form    Last edited 08/08/24 13:20 PDT by Michelle Jim P.A.-C.         HPI:     Problem   Chronic Respiratory Failure With Hypoxia (Hcc)    Chronic, uncontrolled.   Patient is on 2 L/min continuous oxygen.     Unintentional Weight Loss    Chronic, uncontrolled.  Patient has had multiple hospitalizations since October 2023.  Patient does not like supplemental drinks such as Ensure or boost.  Her  states she will drink chocolate milk that has 25 g of protein.  Advised trying to drink 2 of those a day, among  other food if she is able to.  Patient states she does not have much of an appetite.    8/8/2024: Patient's  to send me her weight from home.  8/8/2024 was a telemedicine visit.  7/19/2024: 164#  5/4/2024: 169#  12/5/2023: 173#  3/29/2023: 193#     Chronic Diastolic Heart Failure (Hcc)    Chronic, stable.  Patient continues with furosemide 20 mg daily and spironolactone 25 mg daily.  Most recent ECHO 7/20/2024 shows EF 55%.  Patient does not currently have a cardiologist.  Placing referral today.     Paroxysmal Atrial Fibrillation (Hcc)    Chronic, intermittent.  Noted during hospitalization July 2024.  Metoprolol increased to 50 mg twice daily.  Patient apparently has run out of amiodarone 200 mg but this is being refilled today.    Referring to cardiology     History of Thromboembolism    Chronic, controlled.   DVT and PE May 2024.  Tolerating/continue Eliquis 5 mg twice daily.  Managed by anticoagulation clinic.     Advance Care Planning    Patient does not currently have advance directives in place.  I did specifically asked the patient if she wishes to be full code, explaining this means to have CPR performed on her and to be intubated if needed.  Patient and her  both verbalized that she is still full code.     V-Tach (Hcc)    Chronic, intermittent.  Noted during hospitalization July 2024.  Metoprolol increased to 50 mg twice daily.  Patient stopped amiodarone 200 mg because she ran out.  This was refilled today.  Referring to cardiology     Atherosclerosis of Aorta (Hcc)    Chronic, stable.  Tolerating/continue atorvastatin 10 mg daily.     Polypharmacy    May need to consider referral to pharmacotherapy for polypharmacy review.    Inpatient chart note: 10/2023  Patient is on multiple medications that can cause polypharmacy; outpatient problem but dayteam to consider discontinue or decreasing dosing of medications leading to symptoms of dizziness: baclofen, quetiapine, norco, atenolol,  duloxetine, pregabalin, amitriptyline        Age-Related Physical Debility    Chronic, uncontrolled.  Patient is currently using a wheelchair as she is unable to stand without a two-person assist.  Has deformed fingers/hands, unable to use the via m for many of her ADLs.     Denies constipation and urinary symptoms.  Currently using a bedpan as getting out of bed, transferring is too difficult.  Denies any rashes or skin breakdown.     Uncomplicated Opioid Dependence (Hcc)    Chronic, controlled.  Previously managed by Dr. Chavez.  Not currently on hydrocodone.     Risk for Falls    Chronic, uncontrolled.   Patient is unsure if she has fallen since her most recent hospitalization, July 2024.  She is currently using a wheelchair only as she does not have enough strength to stand.  She needs 2 people to assist her in standing.    Currently has home health PT, soon-to-be OT, and skilled nursing coming out twice a week.     Dyslipidemia    Chronic, controlled.     Latest Labs:   Lab Results   Component Value Date/Time    CHOLSTRLTOT 159 12/01/2023 07:48 AM    LDL 79 12/01/2023 07:48 AM    HDL 53 12/01/2023 07:48 AM    TRIGLYCERIDE 104 05/05/2024 03:05 AM      Medications: Tolerating/continue atorvastatin 10mg daily  Medication side effects: none    Risk calculator: The ASCVD Risk score ([place] DK, et al., 2019) failed to calculate.        Primary Hypertension    Chronic, controlled.  Tolerating atenolol 50mg, 2 tablets daily and benazepril-HCTZ 20/12.5 daily.  Referring to cardiology.     Chronic Back Pain    Chronic, uncontrolled.  Managed by Dr. Ruiz but sees [facility], Dr. Chavez.  Patient is not currently on hydrocodone/acetaminophen but does continue to take pregabalin.     Pneumothorax of Right Lung After Biopsy (Resolved)   Acute On Chronic Respiratory Failure (Hcc) (Resolved)   Leukocytosis (Resolved)   Frailty Syndrome in Geriatric Patient (Resolved)   History of Pulmonary Embolism (Resolved)    Positive Blood Culture (Resolved)   Thrombocytosis (Resolved)   Acute On Chronic Hypoxic Respiratory Failure (Hcc) (Resolved)   Pleural Effusion (Resolved)   Acute Cor Pulmonale (Hcc) (Resolved)   Acute Pericardial Effusion (Resolved)   Acute Blood Loss Anemia ([place]) (Resolved)   Hypokalemia (Resolved)   Hypophosphatemia (Resolved)   Retroperitoneal Hematoma (Resolved)   Persistent Hyperactive Delirium Due to Multiple Etiologies (Resolved)   Covid-19 (Resolved)   Septic Shock (Hcc) (Resolved)   Acute Renal Failure (Arf) (Hcc) (Resolved)   Acute Hypoxemic Respiratory Failure Due to Covid-19 (Hcc) (Resolved)   Hematuria (Resolved)   Dysuria (Resolved)   Orthostasis (Resolved)   Elevated Troponin (Resolved)   Abdominal Pain, Acute, Periumbilical (Resolved)   Nocturnal Hypoxia (Resolved)    Chronic, controlled.  Uses oxygen at night.  No longer sees pulmonology.     Lung Nodule < 6cm On CT (Resolved)              ROS:  Review of Systems   Constitutional:  Negative for chills and fever.   Respiratory:  Negative for shortness of breath.    Cardiovascular:  Negative for chest pain.       OBJECTIVE:     Exam:  Resp 16   Ht 1.524 m (5') Comment: per patient  Wt 73.9 kg (163 lb) Comment: per patient  BMI 31.83 kg/m²  Body mass index is 31.83 kg/m².    Physical Exam  Vitals reviewed.   Constitutional:       General: She is not in acute distress.     Appearance: Normal appearance.   Pulmonary:      Effort: Pulmonary effort is normal.   Neurological:      General: No focal deficit present.      Mental Status: She is alert.   Psychiatric:         Mood and Affect: Mood normal.         Behavior: Behavior normal.         Judgment: Judgment normal.           CHART REVIEW:         CHIEF COMPLAINT ON ADMISSION       Chief Complaint   Patient presents with    Shortness of Breath       X 1 day. D/C from [facility] yesterday post thoracentesis for pleural effusion. On Eliquis.  called EMS as pt was short of breath & slid off the  couch, no trauma. Pt in a fib with RVR, rate 160s. Tachypneic, afebrile. Denies chest pain.          Reason for Admission  EMS     Admission Date  7/19/2024      CODE STATUS  Full Code     HPI & HOSPITAL COURSE  This is a 78 y.o. female with a past medical history of history of Chiari malformation status post ventriculoperitoneal shunt, sleep apnea not on CPAP, chronic respiratory failure requiring 3 LPM, hypertension, hypercholesterolemia, hypothyroidism, and hx of breast cancer, recent IMCU admission in May for DVT and saddle PE treated with thrombectomy complicated by retroperitoneal hematoma, she was discharged to SNF at that time. She returned for SOB early this week and was discharged on 7/15 after being treated for pleural effusion and pneumonia, post acute care was recommended however pt declined and thus was discharge home with spouse.    At home the patient's condition declined to the point where she became altered  On admission she was tachcyardic with 's susepcted A.flutter vs SVT, she was cardioverted without resolution but improved after IV metoprolol with . Labs with WBC 19, plt 606, Hgb 6. Xray showed increased rt pleural effusion. She was started on IV antibiotics and admitted for further evaluation.   Regarding her A>fibb cardiology was consulted and she was started on amiodarone and her HR is now well controlled. She also had thoracentesis and after it had a small pneumothorax which has now resolved.  Patient now doing better mentation has improved.   Palliative team was also consulted and discussed with patient about hospice but she has declined hospice at this time  She will be discharged to SNF today      The patient met 2-midnight criteria for an inpatient stay at the time of discharge.        FOLLOW UP ITEMS POST DISCHARGE  PCP     DISCHARGE DIAGNOSES  Principal Problem:    Acute on chronic respiratory failure (HCC) (POA: Yes)              Vascular Laboratory   CONCLUSIONS   RIGHT  posterior tibial and bilateral peroneal DVT.       I spent a total of 102 minutes with record review, exam, communication with the patient, communication with other providers, and documentation of this encounter.      Please note that this dictation was created using voice recognition software. I have made every reasonable attempt to correct obvious errors, but I expect that there are errors of grammar and possibly content that I did not discover before finalizing the note.

## 2024-08-08 NOTE — PATIENT INSTRUCTIONS
I have placed a referral to the anticoagulation clinic.   This is for the Eliquis, because of the blood clot in your lungs.    I have placed a referral for the cardiologist.  This is for irregular rhythms of the heart.    I sent a new prescription for amiodarone 200 mg daily.  Please restart this medication.    If you can, please send your current weight or asked the home health nurse to send it to me.    If you have not received any information on the 2 referrals that I placed today, please call the referral department: [phone].    Lastly, please repeat your labs in November.  You do not need to fast for these.

## 2024-08-08 NOTE — PROGRESS NOTES
Medication chart review for [facility] services    Received referral from [facility].   Medications reviewed  compared with discharge summary if available.  Discharge summary date, if applicable:   7/25/2024    Current medication list per [facility]     Medication list one, patient is currently taking    Current Outpatient Medications:     Home Care Oxygen, 2 L/min, Inhalation, Continuous    pregabalin, 150 mg, Oral, BID    K Phos [place]-Sod Phos Di & Mono (PHOSPHA 250 NEUTRAL PO), 1 Tablet, Oral, BID    Eliquis, TAKE 1 TABLET BY MOUTH 2 TIMES A DAY. INDICATIONS: DVT/PE    furosemide, 20 mg, Oral, DAILY    amiodarone, 200 mg, Oral, DAILY (Patient not taking: Reported on 8/1/2024)    spironolactone, 25 mg, Oral, DAILY    tamsulosin, 0.4 mg, Oral, QHS    metoprolol tartrate, 25 mg, Oral, BID    ondansetron, 4 mg, Oral, Q8HRS PRN    DULoxetine, 30 mg, Oral, DAILY    Esomeprazole Magnesium, 20 mg, Oral, QDAY PRN    senna-docusate, 1 Tablet, Oral, QHS    nystatin, 1 Application , Topical, QDAY PRN    baclofen, 10 mg, Oral, BID PRN    acetaminophen, 500 mg, Oral, Q6HRS PRN    ferrous sulfate, 325 mg, Oral, Q48HRS    potassium chloride SA, 20 mEq, Oral, DAILY    atorvastatin, 10 mg, Oral, Nightly    alendronate, 70 mg, Oral, Q MONDAY    amitriptyline, 100 mg, Oral, Nightly    QUEtiapine, 25 mg, Oral, QHS PRN      Medication list two, drugs that the patient has been prescribed or recommended to take by their healthcare provider on discharge summary          MEDICATIONS ON DISCHARGE      Medication List          START taking these medications         Instructions   amiodarone 200 MG Tabs  Commonly known as: Cordarone    Take 1 Tablet by mouth every day.  Dose: 200 mg      amoxicillin-clavulanate 875-125 MG Tabs  Commonly known as: Augmentin    Take 1 Tablet by mouth every 12 hours for 1 day.  Dose: 1 Tablet                CONTINUE taking these medications         Instructions   alendronate 70 MG Tabs  Commonly known  "as: Fosamax    Take 70 mg by mouth every Monday. Indications: Osteoporosis  Dose: 70 mg      amitriptyline 100 MG Tabs  Commonly known as: Elavil    Take 100 mg by mouth every evening. Indications: \"pain\"  Dose: 100 mg      apixaban 5mg Tabs  Commonly known as: Eliquis    Take 1 Tablet by mouth 2 times a day. Indications: DVT/PE  Dose: 5 mg      atorvastatin 10 MG Tabs  Commonly known as: Lipitor    TAKE 1 TABLET BY MOUTH EVERY EVENING. CHOLESTEROL  Dose: 10 mg      baclofen 10 MG Tabs  Commonly known as: Lioresal    Take 10 mg by mouth 2 times a day as needed (spasms). Indications: Muscle Spasm  Dose: 10 mg      DULoxetine 30 MG Cpep  Commonly known as: Cymbalta    Take 1 Capsule by mouth every day. Indications: Major Depressive Disorder  Dose: 30 mg      Esomeprazole Magnesium 20 MG Tbec    Take 20 mg by mouth 1 time a day as needed (heartburn ). Indications: Heartburn  Dose: 20 mg      ferrous sulfate 325 (65 Fe) MG tablet    Take 1 Tablet by mouth every 48 hours.  Dose: 325 mg      furosemide 20 MG Tabs  Commonly known as: Lasix    Take 20 mg by mouth every day. Hold for SBP <100  Indications: Edema  Dose: 20 mg      metoprolol tartrate 25 MG Tabs  Commonly known as: Lopressor    Take 1 Tablet by mouth 2 times a day. Indications: Atrial Fibrillation, High Blood Pressure Disorder  Dose: 25 mg      Nystop powder  Generic drug: nystatin    Apply 1 Application  topically 1 time a day as needed (rash). Indications: Skin Infection due to Candida Yeast  Dose: 1 Application       ondansetron 4 MG Tbdp  Commonly known as: Zofran ODT    Take 1 Tablet by mouth every 8 hours as needed for Nausea/Vomiting. Indications: Nausea and Vomiting  Dose: 4 mg      potassium chloride SA 20 MEQ Tbcr  Commonly known as: Kdur    Take 20 mEq by mouth every day. Hold if lasix is held   Indications: takes with Lasix  Dose: 20 mEq      pregabalin 150 MG Caps  Commonly known as: Lyrica    Take 150 mg by mouth 2 times a day. Indications: " Neuropathic Pain  Dose: 150 mg      QUEtiapine 25 MG Tabs  Commonly known as: SEROquel    Take 25 mg by mouth at bedtime as needed (sleep). Indications: sleep  Dose: 25 mg      senna-docusate 8.6-50 MG Tabs  Commonly known as: Pericolace Or Senokot S    Take 1 Tablet by mouth at bedtime. Indications: Constipation  Dose: 1 Tablet      spironolactone 25 MG Tabs  Commonly known as: Aldactone    Take 1 Tablet by mouth every day. Indications: Edema  Dose: 25 mg      tamsulosin 0.4 MG capsule  Commonly known as: Flomax    Take 1 Capsule by mouth at bedtime. Indications: Obstruction of Bladder Outflow  Dose: 0.4 mg      TYLENOL 500 MG Tabs  Generic drug: acetaminophen    Take 500 mg by mouth every 6 hours as needed for Mild Pain or Moderate Pain. Indications: Pain  Dose: 500 mg          Allergies   Allergen Reactions    Sulfamethoxazole W-Trimethoprim Rash     * full body rash*>  10 years ago    Morphine Vomiting     hallucinations       Labs     Lab Results   Component Value Date/Time    SODIUM 136 07/25/2024 05:32 AM    POTASSIUM 4.8 07/25/2024 05:32 AM    CHLORIDE 102 07/25/2024 05:32 AM    CO2 27 07/25/2024 05:32 AM    GLUCOSE 97 07/25/2024 05:32 AM    BUN 13 07/25/2024 05:32 AM    CREATININE 0.49 (L) 07/25/2024 05:32 AM    CREATININE 1.0 05/20/2008 05:55 AM     Lab Results   Component Value Date/Time    ALKPHOSPHAT 82 07/21/2024 02:10 AM    ASTSGOT 53 (H) 07/21/2024 02:10 AM    ALTSGPT 32 07/21/2024 02:10 AM    TBILIRUBIN 0.4 07/21/2024 02:10 AM    INR 1.84 (H) 07/21/2024 02:10 AM    ALBUMIN 2.6 (L) 07/21/2024 02:10 AM        Assessment for clinically significant drug interactions, drug omissions/additions, duplicative therapies.          tamsulosin (FLOMAX) 0.4 MG capsulePrescription. Active.  High  Drug-Drug: DULoxetine and amitriptylineDuloxetine may increase the plasma concentrations and pharmacologic effects of tricyclic antidepressants. Additive serotonergic effects may also occur during coadministration of  duloxetine and tricyclic antidepressants (TCAs), and the risk of developing serotonin syndrome may be increased.  Last overridden by: Lebron Colvin III, M.D. on Jul 2, 2024 10:46 AM     DetailsDon't Show This Warning Again  amitriptyline (ELAVIL) 100 MG TabPatient reported medication. Active.  DULoxetine (CYMBALTA) 30 MG Cap DR ParticlesPrescription. Active.  High  Drug-Drug: spironolactone and potassium chloride SA, PHOSPHA 250 NEUTRAL POPotassium salts (eg, Potassium Preparations) may enhance the hyperkalemic effect of Spironolactone.  DetailsDon't Show This Warning Again  K Phos [place]-Sod Phos Di & Mono (PHOSPHA 250 NEUTRAL PO)Patient reported medication. Active.  potassium chloride SA (KDUR) 20 MEQ Tab CRPatient reported medication. Active.  spironolactone (ALDACTONE) 25 MG TabPrescription. Active.    CC   Michelle Jim P.A.-C.  [address]  [facility] [address]  Fax: [phone]    [facility]  Phone [phone] fax [phone]    This note was created using voice recognition software (Dragon). The accuracy of the dictation is limited by the abilities of the software. I have reviewed the note prior to signing, however some errors in grammar and context are still possible. If you have any questions related to this note please do not hesitate to contact our office.

## 2024-08-08 NOTE — ASSESSMENT & PLAN NOTE
Chronic, controlled.   DVT and PE May 2024.  Tolerating/continue Eliquis 5 mg twice daily.  Managed by anticoagulation clinic.

## 2024-08-08 NOTE — ASSESSMENT & PLAN NOTE
Chronic, uncontrolled.  Patient is currently using a wheelchair as she is unable to stand without a two-person assist.  Has deformed fingers/hands, unable to use the via m for many of her ADLs.     Denies constipation and urinary symptoms.  Currently using a bedpan as getting out of bed, transferring is too difficult.  Denies any rashes or skin breakdown.

## 2024-08-08 NOTE — ASSESSMENT & PLAN NOTE
Chronic, uncontrolled.  Managed by Dr. Ruiz but sees [facility], Dr. Chavez.  Patient is not currently on hydrocodone/acetaminophen but does continue to take pregabalin.

## 2024-08-08 NOTE — ASSESSMENT & PLAN NOTE
Chronic, intermittent.  Noted during hospitalization July 2024.  Metoprolol increased to 50 mg twice daily.  Patient apparently has run out of amiodarone 200 mg but this is being refilled today.    Referring to cardiology

## 2024-08-08 NOTE — ASSESSMENT & PLAN NOTE
Patient does not currently have advance directives in place.  I did specifically asked the patient if she wishes to be full code, explaining this means to have CPR performed on her and to be intubated if needed.  Patient and her  both verbalized that she is still full code.

## 2024-08-12 LAB
FUNGUS SPEC CULT: NORMAL
FUNGUS SPEC FUNGUS STN: NORMAL
SIGNIFICANT IND 70042: NORMAL
SITE SITE: NORMAL
SOURCE SOURCE: NORMAL

## 2024-08-12 NOTE — PROGRESS NOTES
[facility]     Received anticoagulation referral from PCP on 08/08/24.     Called and spoke to patient's spouse. Initial visit scheduled on 08/19/24 at 2:30pm.    1st call     Insurance: HTH/SCP  PCP: [facility]  Locations to be seen: Any    If no response by 09/08/24 OR 2 no shows/cancellations, will remove from referral list    Juliette Pendleton PharmD  [facility]  Phone [phone]

## 2024-08-13 NOTE — TELEPHONE ENCOUNTER
Received request via: Pharmacy    Was the patient seen in the last year in this department? Yes    Does the patient have an active prescription (recently filled or refills available) for medication(s) requested? No    Pharmacy Name: [facility]/pharmacy #[id number] - [place], [place] - [address] [facility].     Does the patient have snf Plus and need 100-day supply? (This applies to ALL medications) Yes, quantity updated to 100 days

## 2024-08-18 PROCEDURE — 665999 HH PPS REVENUE DEBIT

## 2024-08-18 PROCEDURE — 665998 HH PPS REVENUE CREDIT

## 2024-08-19 PROCEDURE — 665999 HH PPS REVENUE DEBIT

## 2024-08-19 PROCEDURE — 665998 HH PPS REVENUE CREDIT

## 2024-08-20 PROCEDURE — 665998 HH PPS REVENUE CREDIT

## 2024-08-20 PROCEDURE — 665999 HH PPS REVENUE DEBIT

## 2024-08-21 PROCEDURE — 665999 HH PPS REVENUE DEBIT

## 2024-08-21 PROCEDURE — 665998 HH PPS REVENUE CREDIT

## 2024-08-22 ENCOUNTER — HOME CARE VISIT (OUTPATIENT)
Dept: HOME HEALTH SERVICES | Facility: HOME HEALTHCARE | Age: 79
End: 2024-08-22
Payer: MEDICARE

## 2024-08-22 PROCEDURE — 665999 HH PPS REVENUE DEBIT

## 2024-08-22 PROCEDURE — 665998 HH PPS REVENUE CREDIT

## 2024-08-22 NOTE — CASE COMMUNICATION
Quality Review for 8.15.24 MO OASIS performed on by MIREYA Maxwell RN on 8.22.2024:    Edits completed by MIREYA Maxwell RN:  1. Changed  C to yes and F to na per the POC

## 2024-08-22 NOTE — CASE COMMUNICATION
Quality Review for 8.6.24 SOC OASIS performed on by MIREYA Mawxell RN on 8.22.2024:    Edits completed by MIREYA Maxwell RN:  1.  and  dx coding updated per chart review.   2. Changed  to 8.6.24 per LSOC  3. Changed  to 2, 3 and  to 8.1.24 per narrative on dc date from [facility]  4. Changed  to 3 per [id number] D, E response of 2

## 2024-08-23 PROCEDURE — 665998 HH PPS REVENUE CREDIT

## 2024-08-23 PROCEDURE — 665999 HH PPS REVENUE DEBIT

## 2024-08-23 NOTE — CASE COMMUNICATION
Reviewed and approved of edits.  ----- Message -----  From: Demetrice Maxwell R.N.  Sent: 8/22/2024   8:51 AM PDT  To: Melba Amezquita R.N.      Quality Review for 8.6.24 SOC OASIS performed on by MIREYA Maxwell RN on 8.22.2024:    Edits completed by MIREYA Maxwell RN:  1.  and  dx coding updated per chart review.   2. Changed  to 8.6.24 per LSOC  3. Changed  to 2, 3 and  to 8.1.24 per narrative on dc date from [facility]  4. Changed  to 3 per [id number] D, E response of 2

## 2024-08-24 PROCEDURE — 665998 HH PPS REVENUE CREDIT

## 2024-08-24 PROCEDURE — 665999 HH PPS REVENUE DEBIT

## 2024-08-25 PROCEDURE — 665998 HH PPS REVENUE CREDIT

## 2024-08-25 PROCEDURE — 665999 HH PPS REVENUE DEBIT

## 2024-08-26 PROCEDURE — 665998 HH PPS REVENUE CREDIT

## 2024-08-26 PROCEDURE — 665999 HH PPS REVENUE DEBIT

## 2024-08-27 PROCEDURE — 665998 HH PPS REVENUE CREDIT

## 2024-08-27 PROCEDURE — 665999 HH PPS REVENUE DEBIT

## 2024-08-28 LAB
MYCOBACTERIUM SPEC CULT: NORMAL
RHODAMINE-AURAMINE STN SPEC: NORMAL
SIGNIFICANT IND 70042: NORMAL
SITE SITE: NORMAL
SOURCE SOURCE: NORMAL

## 2024-08-29 NOTE — CASE COMMUNICATION
I agree with this change.    Thank you.    ----- Message -----  From: Demetrice Maxwell R.N.  Sent: 8/22/2024   9:16 AM PDT  To: Dior Astudillo PT      Quality Review for 8.15.24 DC OASIS performed on by MIREYA Maxwell RN on 8.22.2024:    Edits completed by MIREYA Maxwell RN:  1. Changed  C to yes and F to na per the POC

## 2024-09-04 ENCOUNTER — APPOINTMENT (OUTPATIENT)
Dept: MEDICAL GROUP | Facility: PHYSICIAN GROUP | Age: 79
End: 2024-09-04
Payer: MEDICARE

## (undated) DEVICE — SLEEVE, VASO, THIGH, MED

## (undated) DEVICE — SUTURE 0 ETHIBOND CT-1 - (12/BX) 18 INCH

## (undated) DEVICE — DRAPE STRLE REG TOWEL 18X24 - (10/BX 4BX/CA)"

## (undated) DEVICE — SUTURE 3-0 VICRYL PLUS RB-1 - 8 X 18 INCH (12/BX)

## (undated) DEVICE — SPONGE PEANUT - (5/PK 50PK/CA)

## (undated) DEVICE — SET LEADWIRE 5 LEAD BEDSIDE DISPOSABLE ECG (1SET OF 5/EA)

## (undated) DEVICE — ELECTRODE DUAL RETURN W/ CORD - (50/PK)

## (undated) DEVICE — GOWN WARMING STANDARD FLEX - (30/CA)

## (undated) DEVICE — SET EXTENSION WITH 2 PORTS (48EA/CA) ***PART #2C8610 IS A SUBSTITUTE*****

## (undated) DEVICE — Device

## (undated) DEVICE — HEAD HOLDER JUNIOR/ADULT

## (undated) DEVICE — NEPTUNE 4 PORT MANIFOLD - (20/PK)

## (undated) DEVICE — TIP INTPLS HFLO ML ORFC BTRY - (12/CS)  FOR SURGILAV

## (undated) DEVICE — TUBE CONNECTING SUCTION - CLEAR PLASTIC STERILE 72 IN (50EA/CA)

## (undated) DEVICE — LACTATED RINGERS INJ 1000 ML - (14EA/CA 60CA/PF)

## (undated) DEVICE — CANISTER SUCTION 3000ML MECHANICAL FILTER AUTO SHUTOFF MEDI-VAC NONSTERILE LF DISP  (40EA/CA)

## (undated) DEVICE — GLOVE BIOGEL SZ 6.5 SURGICAL PF LTX (50PR/BX 4BX/CA)

## (undated) DEVICE — SUTURE 3-0 VICRYL PLUS SH - 27 INCH (36/BX)

## (undated) DEVICE — SUCTION INSTRUMENT YANKAUER BULBOUS TIP W/O VENT (50EA/CA)

## (undated) DEVICE — BLANKET WARMING LOWER BODY (10EA/CA)

## (undated) DEVICE — PROTECTOR ULNA NERVE - (36PR/CA)

## (undated) DEVICE — SUTURE 3-0 VICRYL PLUS SH - 8X 18 INCH (12/BX)

## (undated) DEVICE — GLOVE BIOGEL INDICATOR SZ 8 SURGICAL PF LTX - (50/BX 4BX/CA)

## (undated) DEVICE — FOAM FACEHOLDER SPIDER (8EA/BX)

## (undated) DEVICE — WATER IRRIGATION STERILE 1000ML (12EA/CA)

## (undated) DEVICE — TUBE E-T HI-LO CUFF 6.5MM (10EA/BX)

## (undated) DEVICE — TUBE E-T HI-LO CUFF 7.0MM (10EA/PK)

## (undated) DEVICE — DRAPE SURGICAL U 77X120 - (10/CA)

## (undated) DEVICE — SPONGE GAUZE STER 4X4 8-PL - (2/PK 50PK/BX 12BX/CS)

## (undated) DEVICE — LENS/HOOD FOR SPACESUIT - (32/PK) PEEL AWAY FACE

## (undated) DEVICE — MASK ANESTHESIA ADULT  - (100/CA)

## (undated) DEVICE — GLOVE BIOGEL SZ 7.5 SURGICAL PF LTX - (50PR/BX 4BX/CA)

## (undated) DEVICE — SPONGE GAUZESTER 4 X 4 4PLY - (128PK/CA)

## (undated) DEVICE — PACK MINOR BASIN - (2EA/CA)

## (undated) DEVICE — SUTURE 1 VICRYL PLUS CTX - 8 X 18 INCH (12/BX)

## (undated) DEVICE — CHLORAPREP 26 ML APPLICATOR - ORANGE TINT(25/CA)

## (undated) DEVICE — KIT ANESTHESIA W/CIRCUIT & 3/LT BAG W/FILTER (20EA/CA)

## (undated) DEVICE — GLOVE SURGICAL PROTEXIS 8 1/2 - (50PR/BX)

## (undated) DEVICE — SLING ORTH UNV TIETX VLFM ARM

## (undated) DEVICE — GLOVE BIOGEL PI INDICATOR SZ 6.5 SURGICAL PF LF - (50/BX 4BX/CA)

## (undated) DEVICE — CLOSURE SKIN STRIP 1/2 X 4 IN - (STERI STRIP) (50/BX 4BX/CA)

## (undated) DEVICE — SUTURE 5 TI-CRON HOS-14 - (36/BX)

## (undated) DEVICE — DRAPE MAGNETIC (INSTRA-MAG) - (30/CA)

## (undated) DEVICE — STOCKINETTE IMPERVIOUS 12X48 - STERILELF (10/CA)"

## (undated) DEVICE — SUTURE 3-0 SILK 12 X 18 IN - (36/BX)

## (undated) DEVICE — SUTURE 3-0 MONOCRYL PLUS PS-1 - 27 INCH (36/BX)

## (undated) DEVICE — GLOVE BIOGEL INDICATOR SZ 6.5 SURGICAL PF LTX - (50PR/BX 4BX/CA)

## (undated) DEVICE — PACK TOTAL HIP - (1/CA)

## (undated) DEVICE — SUTURE GENERAL

## (undated) DEVICE — BLADE SAW 90X25X1.37MM SAGITTAL DUAL CUT

## (undated) DEVICE — MEDICINE CUP STERILE 2 OZ - (100/CA)

## (undated) DEVICE — NEEDLE SHARP EZ CLEAN 2.0 CM (12EA/BX)

## (undated) DEVICE — CATHETER IV 20 GA X 1-1/4 ---SURG.& SDS ONLY--- (50EA/BX)

## (undated) DEVICE — TOWEL STOP TIMEOUT SAFETY FLAG (40EA/CA)

## (undated) DEVICE — PROBE PRASS STAND STIMULATING (5EA/PK)

## (undated) DEVICE — RETRACTOR LIGHTED RADIALUX

## (undated) DEVICE — DRESSING XEROFORM 1X8 - (50/BX 4BX/CA)

## (undated) DEVICE — ELECTRODE 850 FOAM ADHESIVE - HYDROGEL RADIOTRNSPRNT (50/PK)

## (undated) DEVICE — DRAPE U ORTHOPEDIC - (10/BX)

## (undated) DEVICE — GLOVE BIOGEL PI INDICATOR SZ 8.0 SURGICAL PF LF -(50/BX 4BX/CA)

## (undated) DEVICE — SYRINGE 30 ML LL (56/BX)

## (undated) DEVICE — SENSOR SPO2 NEO LNCS ADHESIVE (20/BX) SEE USER NOTES

## (undated) DEVICE — SODIUM CHL IRRIGATION 0.9% 1000ML (12EA/CA)

## (undated) DEVICE — GOWN SURGEONS LARGE - (32/CA)

## (undated) DEVICE — SLEEVE VASO CALF MED - (10PR/CA)

## (undated) DEVICE — DRESSING TRANSPARENT FILM TEGADERM 4 X 4.75" (50EA/BX)"

## (undated) DEVICE — TUBING CLEARLINK DUO-VENT - C-FLO (48EA/CA)

## (undated) DEVICE — AGENT HEMOSTATIC BELLOW HEMOBLAST (12EA/CA)

## (undated) DEVICE — DRAPE U SPLIT IMP 54 X 76 - (24/CA)

## (undated) DEVICE — FIBRILLAR SURGICEL 4X4 - 10/CA

## (undated) DEVICE — SUTURE 2-0 VICRYL PLUS CT-1 - 8 X 18 INCH(12/BX)

## (undated) DEVICE — STERI STRIP COMPOUND BENZOIN - TINCTURE 0.6ML WITH APPLICATOR (40EA/BX)

## (undated) DEVICE — STAPLER SKIN DISP - (6/BX 10BX/CA) VISISTAT

## (undated) DEVICE — SUTURE 2-0 MONOCRYL PLUS UNDYED CT-1 1 X 36 (36EA/BX)"

## (undated) DEVICE — SHEET THYROID - (10EA/CA)

## (undated) DEVICE — CANISTER SUCTION RIGID RED 1500CC (40EA/CA)

## (undated) DEVICE — KIT  I.V. START (100EA/CA)

## (undated) DEVICE — HANDPIECE 10FT INTPLS SCT PLS IRRIGATION HAND CONTROL SET (6/PK)

## (undated) DEVICE — NEEDLE SPINAL NON-SAFETY 18 GA X 3 IN (25EA/BX)

## (undated) DEVICE — SHEAR HS FOCUS 9CM CVD - (6/BX)

## (undated) DEVICE — TOWELS CLOTH SURGICAL - (4/PK 20PK/CA)